# Patient Record
Sex: MALE | Race: WHITE | NOT HISPANIC OR LATINO | Employment: OTHER | URBAN - METROPOLITAN AREA
[De-identification: names, ages, dates, MRNs, and addresses within clinical notes are randomized per-mention and may not be internally consistent; named-entity substitution may affect disease eponyms.]

---

## 2017-01-03 DIAGNOSIS — E78.5 HYPERLIPIDEMIA LDL GOAL <70: Primary | ICD-10-CM

## 2017-01-03 NOTE — TELEPHONE ENCOUNTER
Lipitor     Last Written Prescription Date: 09/16/16  Last Fill Quantity: 90, # refills: 0  Last Office Visit with [facility] or  Health prescribing provider: 08/25/16   Next 5 appointments (look out 90 days)     Jan 19, 2017  2:45 PM   Return Visit with Harvey Rosas MD   [facility] ([facility])    [address]  [facility] [id number] [phone]                   CHOL      137   12/28/2015  HDL       58   12/28/2015  LDL       66   12/28/2015  TRIG       66   12/28/2015  CHOLHDLRATIO      3.5   8/19/2015    Labs showing if normal/abnormal  Lab Results   Component Value Date    CHOL 137 12/28/2015    TRIG 66 12/28/2015    HDL 58 12/28/2015    LDL 66 12/28/2015    VLDL 55* 08/19/2015    CHOLHDLRATIO 3.5 08/19/2015     Metformin         Last Written Prescription Date: 04/22/16  Last Fill Quantity: 180, # refills: 1  Last Office Visit with [facility] or  Health prescribing provider:  08/25/16 in IM and 12/22/16 Endo   Next 5 appointments (look out 90 days)     Jan 19, 2017  2:45 PM   Return Visit with Harvey Rosas MD   [facility] ([facility])    [address] [place] [address] [address]  [facility] [address] [phone]                   BP Readings from Last 3 Encounters:   12/22/16 132/68   11/02/16 126/68   09/08/16 132/78     MICROL       38   5/24/2016  No results found for this basename: microalbumin  CREATININE   Date Value Ref Range Status   05/24/2016 0.99 0.66 - 1.25 mg/dL Final   ]  GFR ESTIMATE   Date Value Ref Range Status   05/24/2016 76 >60 mL/min/1.7m2 Final     Comment:     Non  GFR Calc   04/22/2016 55* >60 mL/min/1.7m2 Final     Comment:     Non  GFR Calc   08/19/2015 79 >60 mL/min/1.7m2 Final     Comment:     Non  GFR Calc     GFR ESTIMATE IF BLACK   Date Value Ref Range Status   05/24/2016 >90   GFR Calc    >60 mL/min/1.7m2 Final   04/22/2016 67 >60 mL/min/1.7m2 Final     Comment:      GFR Calc   08/19/2015 >90   GFR Calc   >60 mL/min/1.7m2 Final     CHOL      137   12/28/2015  HDL       58   12/28/2015  LDL       66   12/28/2015  TRIG       66   12/28/2015  CHOLHDLRATIO      3.5   8/19/2015  AST       22   4/22/2016  ALT       51   4/22/2016  A1C     10.0   11/2/2016  A1C     12.9   6/27/2016  A1C     13.3   4/22/2016  A1C      7.1   11/10/2015  A1C      6.7   4/11/2015  POTASSIUM   Date Value Ref Range Status   05/24/2016 4.7 3.4 - 5.3 mmol/L Final       Labs showing if normal/abnormal  Lab Results   Component Value Date    UCRR 111 05/24/2016    MICROL 38 05/24/2016     Lab Results   Component Value Date    CHOL 137 12/28/2015    TRIG 66 12/28/2015    HDL 58 12/28/2015    LDL 66 12/28/2015    VLDL 55* 08/19/2015    CHOLHDLRATIO 3.5 08/19/2015     Lab Results   Component Value Date    A1C 10.0* 11/02/2016    A1C 12.9* 06/27/2016    A1C 13.3* 04/22/2016

## 2017-01-04 ENCOUNTER — MYC REFILL (OUTPATIENT)
Dept: INTERNAL MEDICINE | Facility: CLINIC | Age: 67
End: 2017-01-04

## 2017-01-04 DIAGNOSIS — E78.5 HYPERLIPIDEMIA LDL GOAL <70: ICD-10-CM

## 2017-01-04 DIAGNOSIS — R80.9 PROTEINURIA: ICD-10-CM

## 2017-01-04 DIAGNOSIS — R80.9 PROTEINURIA: Primary | ICD-10-CM

## 2017-01-04 RX ORDER — ATORVASTATIN CALCIUM 40 MG/1
40 TABLET, FILM COATED ORAL DAILY
Qty: 90 TABLET | Refills: 0 | Status: SHIPPED | OUTPATIENT
Start: 2017-01-04 | End: 2017-04-03

## 2017-01-04 RX ORDER — LISINOPRIL 5 MG/1
5 TABLET ORAL DAILY
Qty: 90 TABLET | Refills: 0 | Status: SHIPPED | OUTPATIENT
Start: 2017-01-04 | End: 2017-03-30

## 2017-01-04 NOTE — TELEPHONE ENCOUNTER
Last OV with PCP 7/12/16    Prescription approved per [facility].

## 2017-01-04 NOTE — TELEPHONE ENCOUNTER
LIsinopril      Last Written Prescription Date: 09/14/16  Last Fill Quantity: 90, # refills: 0  Last Office Visit with [facility] or [facility] prescribing provider: 12/22/16 Randa   Next 5 appointments (look out 90 days)     Jan 19, 2017  2:45 PM   Return Visit with Harvey Rosas MD   [facility] ([facility])    [address] [place] [id number] [phone]                   POTASSIUM   Date Value Ref Range Status   05/24/2016 4.7 3.4 - 5.3 mmol/L Final     CREATININE   Date Value Ref Range Status   05/24/2016 0.99 0.66 - 1.25 mg/dL Final     BP Readings from Last 3 Encounters:   12/22/16 132/68   11/02/16 126/68   09/08/16 132/78

## 2017-01-05 RX ORDER — LISINOPRIL 5 MG/1
5 TABLET ORAL DAILY
Qty: 90 TABLET | Refills: 0 | Status: CANCELLED | OUTPATIENT
Start: 2017-01-05

## 2017-01-05 RX ORDER — ATORVASTATIN CALCIUM 40 MG/1
40 TABLET, FILM COATED ORAL DAILY
Qty: 90 TABLET | Refills: 0 | Status: CANCELLED | OUTPATIENT
Start: 2017-01-05

## 2017-01-05 NOTE — TELEPHONE ENCOUNTER
Message from MyChart:  Original authorizing provider: MD Adan Canchola would like a refill of the following medications:  metFORMIN (GLUCOPHAGE) 1000 MG tablet [Lauro Galloway MD]  lisinopril (PRINIVIL,ZESTRIL) 5 MG tablet [Lauro Galloway MD]  atorvastatin (LIPITOR) 40 MG tablet [Lauro Galloway MD]    Preferred pharmacy: [facility] [address] [place] [address] [address] [place] [address]    Comment:  Have had refill in place from [facility] since 12/30. I am leaving on trip to [place] 01/06/17 7:30 am. Could I get these filled to  tonight or tomorrow. Thanks, Abraham Ruffin

## 2017-01-15 ENCOUNTER — MYC MEDICAL ADVICE (OUTPATIENT)
Dept: ENDOCRINOLOGY | Facility: CLINIC | Age: 67
End: 2017-01-15

## 2017-01-15 DIAGNOSIS — E11.21 TYPE 2 DIABETES MELLITUS WITH DIABETIC NEPHROPATHY, WITH LONG-TERM CURRENT USE OF INSULIN (H): Primary | ICD-10-CM

## 2017-01-15 DIAGNOSIS — Z79.4 TYPE 2 DIABETES MELLITUS WITH DIABETIC NEPHROPATHY, WITH LONG-TERM CURRENT USE OF INSULIN (H): Primary | ICD-10-CM

## 2017-01-16 ENCOUNTER — HOSPITAL ENCOUNTER (OUTPATIENT)
Dept: CARDIOLOGY | Facility: CLINIC | Age: 67
Discharge: HOME | End: 2017-01-16
Attending: INTERNAL MEDICINE | Admitting: INTERNAL MEDICINE
Payer: COMMERCIAL

## 2017-01-16 DIAGNOSIS — E78.2 MIXED HYPERLIPIDEMIA: ICD-10-CM

## 2017-01-16 DIAGNOSIS — I25.10 CORONARY ARTERY DISEASE INVOLVING NATIVE CORONARY ARTERY OF NATIVE HEART WITHOUT ANGINA PECTORIS: ICD-10-CM

## 2017-01-16 DIAGNOSIS — R07.89 OTHER CHEST PAIN: ICD-10-CM

## 2017-01-16 DIAGNOSIS — I10 ESSENTIAL HYPERTENSION: ICD-10-CM

## 2017-01-16 DIAGNOSIS — Z95.1 S/P CABG X 3: ICD-10-CM

## 2017-01-16 PROCEDURE — 93306 TTE W/DOPPLER COMPLETE: CPT

## 2017-01-16 PROCEDURE — 93306 TTE W/DOPPLER COMPLETE: CPT | Mod: 26 | Performed by: INTERNAL MEDICINE

## 2017-01-19 ENCOUNTER — OFFICE VISIT (OUTPATIENT)
Dept: CARDIOLOGY | Facility: CLINIC | Age: 67
End: 2017-01-19
Attending: INTERNAL MEDICINE
Payer: COMMERCIAL

## 2017-01-19 VITALS
HEART RATE: 74 BPM | DIASTOLIC BLOOD PRESSURE: 70 MMHG | HEIGHT: 69 IN | BODY MASS INDEX: 35.8 KG/M2 | WEIGHT: 241.7 LBS | SYSTOLIC BLOOD PRESSURE: 106 MMHG

## 2017-01-19 DIAGNOSIS — R07.89 OTHER CHEST PAIN: ICD-10-CM

## 2017-01-19 DIAGNOSIS — I10 ESSENTIAL HYPERTENSION: ICD-10-CM

## 2017-01-19 DIAGNOSIS — Z95.1 S/P CABG X 3: ICD-10-CM

## 2017-01-19 DIAGNOSIS — I25.10 CORONARY ARTERY DISEASE INVOLVING NATIVE CORONARY ARTERY OF NATIVE HEART WITHOUT ANGINA PECTORIS: ICD-10-CM

## 2017-01-19 DIAGNOSIS — E78.2 MIXED HYPERLIPIDEMIA: ICD-10-CM

## 2017-01-19 PROCEDURE — 99214 OFFICE O/P EST MOD 30 MIN: CPT | Performed by: INTERNAL MEDICINE

## 2017-01-19 NOTE — LETTER
1/19/2017    Lauro Galloway MD  [facility]   [address] [facility] [address]    RE: Adan Magallonrd       Dear Colleague,    I had the pleasure of seeing Adan Ruffin in the [facility].    Mr. Ruffin is a very pleasant 66-year-old gentleman with known history of coronary artery disease, hypertension and previous myocardial infarction.  He underwent 3-vessel coronary bypass surgery with a LIMA to the LAD, vein graft to the PDA and OM.  The surgery was complicated by RV dysfunction.  He has completed cardiac rehabilitation and has done well.      He comes to clinic today after a recent trip to [place].  He was active there, walking regularly with no difficulty.  He denies any chest pain, chest pressure, shortness of breath, heart racing, palpitations or edema.      Review of his echocardiogram from 01/16/2017 shows normal LV and RV function and normal valvular function.  Lipid profile has not been done within the past year.  His primary concern is that over erectile dysfunction which he has noted over the past year and a half period of time.     Outpatient Encounter Prescriptions as of 1/19/2017   Medication Sig Dispense Refill     atorvastatin (LIPITOR) 40 MG tablet Take 1 tablet (40 mg) by mouth daily 90 tablet 0     metFORMIN (GLUCOPHAGE) 1000 MG tablet Take 1 tablet (1,000 mg) by mouth 2 times daily (with meals) 180 tablet 1     lisinopril (PRINIVIL/ZESTRIL) 5 MG tablet Take 1 tablet (5 mg) by mouth daily 90 tablet 0     insulin glargine U-300 (TOUJEO) 300 UNIT/ML injection Inject 70 units in am, inject 60 units in pm 3 Month 3     insulin aspart (NOVOLOG PENFILL) 100 UNIT/ML injection Inject 3 times daily (before meals) 12-27 units per meal per carb count. About 90 units/day 3 Month 1     metoprolol (TOPROL-XL) 25 MG 24 hr tablet Take 1.5 tablets (37.5 mg) by mouth 2 times daily 270 tablet 1     glipiZIDE (GLIPIZIDE XL) 5 MG 24 hr tablet Take 1 tablet (5  mg) by mouth daily 90 tablet 1     sertraline (ZOLOFT) 100 MG tablet Take 1 tablet (100 mg) by mouth daily 90 tablet 1     aspirin  MG EC tablet Take 1 tablet (325 mg) by mouth daily 60 tablet 3     Multiple Vitamins-Minerals (CENTRUM SILVER) per tablet Take 1 tablet by mouth daily       vitamin  B complex with vitamin C (VITAMIN  B COMPLEX) TABS Take 2 tablets by mouth daily       order for DME Equipment being ordered: blood glucose meter 1 each 0     order for DME All DM testing supplies including test strips, lancets, solution, syringes, needles and/or pen needles for testing 3 times per day & injecting 3 times per day.      RX goes to  Network Supplier, see tele enc 09/21/2016 1 Box 3     order for DME All DM testing supplies including test strips, lancets, solution, syringes, needles and/or pen needles for testing 4 times per day & injecting 5 times per day.  Dispense glucometer compatible supplies. Accucheck Geena. 3 Month 3     insulin pen needle (NOVOFINE) 30G X 8 MM Use 4-5 daily or as directed. 300 each 1     blood glucose monitoring (NO BRAND SPECIFIED) test strip Use to test blood sugars 4 times daily or as directed 6 Box 1     ACE NOT PRESCRIBED, INTENTIONAL, 1 each daily ACE Inhibitor not prescribed due to Intolerance 0 each 0     No facility-administered encounter medications on file as of 1/19/2017.       ASSESSMENT AND PLAN:  Overall, Mr. Ruffin is doing well from a cardiovascular standpoint.  He has no evidence of angina pectoris, congestive heart failure or cardiac arrhythmias.  He is due for a lipid profile from Dr. Galloway and he will make an appointment today to get this drawn in the near future.  Regarding his erectile dysfunction, most likely secondary to diabetes.  Other possibilities include peripheral vascular disease.  He has no evidence of heart failure.  Other possibility is that of beta blocker.  I am recommending that we do a temporary trial off of beta blockers as he is now  greater than 1 year out from his myocardial infarction and has no evidence of angina or heart failure.  If this significantly improves his ED, than I am okay with him being off beta blockers.  If not, I would prefer him being back on beta blockers.  Further evaluation could be outlined including seeing a urologist, a peripheral vascular workup etc. should his beta blocker does cessation not help with his situation.  We will plan on seeing him back in 1 year's time or sooner should symptoms warrant.     Again, thank you for allowing me to participate in the care of your patient.      Sincerely,    LEWIS BYRNE MD     [facility]

## 2017-01-19 NOTE — PROGRESS NOTES
HPI and Plan:   See dictation    No orders of the defined types were placed in this encounter.     No orders of the defined types were placed in this encounter.     There are no discontinued medications.      Encounter Diagnoses   Name Primary?     Mixed hyperlipidemia      Essential hypertension      S/P CABG x 3      Coronary artery disease involving native coronary artery of native heart without angina pectoris      Other chest pain        CURRENT MEDICATIONS:  Current Outpatient Prescriptions   Medication Sig Dispense Refill     atorvastatin (LIPITOR) 40 MG tablet Take 1 tablet (40 mg) by mouth daily 90 tablet 0     metFORMIN (GLUCOPHAGE) 1000 MG tablet Take 1 tablet (1,000 mg) by mouth 2 times daily (with meals) 180 tablet 1     lisinopril (PRINIVIL/ZESTRIL) 5 MG tablet Take 1 tablet (5 mg) by mouth daily 90 tablet 0     insulin glargine U-300 (TOUJEO) 300 UNIT/ML injection Inject 70 units in am, inject 60 units in pm 3 Month 3     insulin aspart (NOVOLOG PENFILL) 100 UNIT/ML injection Inject 3 times daily (before meals) 12-27 units per meal per carb count. About 90 units/day 3 Month 1     metoprolol (TOPROL-XL) 25 MG 24 hr tablet Take 1.5 tablets (37.5 mg) by mouth 2 times daily 270 tablet 1     glipiZIDE (GLIPIZIDE XL) 5 MG 24 hr tablet Take 1 tablet (5 mg) by mouth daily 90 tablet 1     sertraline (ZOLOFT) 100 MG tablet Take 1 tablet (100 mg) by mouth daily 90 tablet 1     aspirin  MG EC tablet Take 1 tablet (325 mg) by mouth daily 60 tablet 3     Multiple Vitamins-Minerals (CENTRUM SILVER) per tablet Take 1 tablet by mouth daily       vitamin  B complex with vitamin C (VITAMIN  B COMPLEX) TABS Take 2 tablets by mouth daily       order for DME Equipment being ordered: blood glucose meter 1 each 0     order for DME All DM testing supplies including test strips, lancets, solution, syringes, needles and/or pen needles for testing 3 times per day & injecting 3 times per day.      RX goes to  Network  Supplier, see tele enc 09/21/2016 1 Box 3     order for DME All DM testing supplies including test strips, lancets, solution, syringes, needles and/or pen needles for testing 4 times per day & injecting 5 times per day.  Dispense glucometer compatible supplies. Accucheck Geena. 3 Month 3     insulin pen needle (NOVOFINE) 30G X 8 MM Use 4-5 daily or as directed. 300 each 1     blood glucose monitoring (NO BRAND SPECIFIED) test strip Use to test blood sugars 4 times daily or as directed 6 Box 1     ACE NOT PRESCRIBED, INTENTIONAL, 1 each daily ACE Inhibitor not prescribed due to Intolerance 0 each 0       ALLERGIES     Allergies   Allergen Reactions     Codeine Rash     Simvastatin Other (See Comments)     Leg pain       PAST MEDICAL HISTORY:  Past Medical History   Diagnosis Date     Chest pain on exertion      HTN (hypertension)      Hyperlipidaemia      Former smoker, stopped smoking in distant past      Quit 10 yrs ago, 1-2 PPD x 20+ yrs     CAD (coronary artery disease) 3/13/2015     Obesity 3/13/2015     History of angina      Diabetes mellitus (H)      Insulin & Metformin...diagnosed in 1996     Diabetes mellitus, type 2 (H) 8/21/2015     Microalbuminuria 6/27/2016     Encounter for long-term (current) use of insulin (H) 11/2/2016       PAST SURGICAL HISTORY:  Past Surgical History   Procedure Laterality Date     Excise pilonidal cyst, simple  1975     Removal of skin cancer  1992     Tonsillectomy & adenoidectomy  1950     Heart cath, angioplasty  3-     3 vessel disease-occluded RCA, stenosis LM-to have CABG     Bypass graft artery coronary N/A 4/7/2015     Procedure: BYPASS GRAFT ARTERY CORONARY;  Surgeon: Johan Hall MD;  Location:  OR     Colonoscopy N/A 6/20/2016     Procedure: COLONOSCOPY;  Surgeon: Marcela Schwartz MD;  Location:  GI       FAMILY HISTORY:  Family History   Problem Relation Age of Onset     Hyperlipidemia Father      Breast Cancer Mother      CEREBROVASCULAR  "DISEASE Mother      CANCER Father      lung, unrelated to smoking     CANCER Paternal Grandmother        SOCIAL HISTORY:  Social History     Social History     Marital Status: Single     Spouse Name: N/A     Number of Children: N/A     Years of Education: N/A     Social History Main Topics     Smoking status: Former Smoker -- 1.50 packs/day for 29 years     Types: Cigarettes     Quit date: 01/01/2000     Smokeless tobacco: Never Used     Alcohol Use: 0.0 oz/week     0 Standard drinks or equivalent per week      Comment: 1 mixed drink a night     Drug Use: No     Sexual Activity:     Partners: Female     Other Topics Concern     Caffeine Concern No     none     Sleep Concern No     Stress Concern Yes     sometimes at home     Weight Concern Yes     would like to lose weight     Special Diet No     Exercise No     Seat Belt Yes     Social History Narrative       Review of Systems:  Skin:  Negative     Eyes:  Positive for glasses  ENT:  Negative    Respiratory:  Positive for cough  Cardiovascular:  Negative    Gastroenterology: Negative    Genitourinary:  Negative    Musculoskeletal:  Positive for joint pain  Neurologic:  Positive for numbness or tingling of hands  Psychiatric:  Positive for anxiety  Heme/Lymph/Imm:  Positive for allergies  Endocrine:  Positive for diabetes    Physical Exam:  Vitals: /70 mmHg  Pulse 74  Ht 1.753 m (5' 9\")  Wt 109.634 kg (241 lb 11.2 oz)  BMI 35.68 kg/m2    Constitutional:  cooperative, alert and oriented, well developed, well nourished, in no acute distress        Skin:  warm and dry to the touch, no apparent skin lesions or masses noted surgical scars well-healed      Head:  no masses or lesions        Eyes:  conjunctivae and lids unremarkable;sclera white        ENT:  dentition good        Neck:  carotid pulses are full and equal bilaterally, JVP normal, no carotid bruit, no thyromegaly        Chest:  normal breath sounds, clear to auscultation, normal A-P diameter, normal " symmetry, normal respiratory excursion, no use of accessory muscles        Cardiac: regular rhythm, normal S1/S2, no S3 or S4, apical impulse not displaced, no murmurs, gallops or rubs                  Abdomen:           Vascular:                                        Extremities and Back:  no deformities, clubbing, cyanosis, erythema observed        Neurological:  affect appropriate, oriented to time, person and place          Recent Lab Results:  LIPID RESULTS:  Lab Results   Component Value Date    CHOL 137 12/28/2015    HDL 58 12/28/2015    LDL 66 12/28/2015    TRIG 66 12/28/2015    CHOLHDLRATIO 3.5 08/19/2015       LIVER ENZYME RESULTS:  Lab Results   Component Value Date    AST 22 04/22/2016    ALT 51 04/22/2016       CBC RESULTS:  Lab Results   Component Value Date    WBC 8.4 08/19/2015    RBC 5.02 08/19/2015    HGB 13.2* 08/19/2015    HCT 40.6 08/19/2015    MCV 81 08/19/2015    MCH 26.3* 08/19/2015    MCHC 32.5 08/19/2015    RDW 16.5* 08/19/2015     08/19/2015       BMP RESULTS:  Lab Results   Component Value Date     05/24/2016    POTASSIUM 4.7 05/24/2016    CHLORIDE 105 05/24/2016    CO2 26 05/24/2016    ANIONGAP 6 05/24/2016    * 05/24/2016    BUN 28 05/24/2016    CR 0.99 05/24/2016    GFRESTIMATED 76 05/24/2016    GFRESTBLACK >90   GFR Calc   05/24/2016    AURE 9.9 05/24/2016        A1C RESULTS:  Lab Results   Component Value Date    A1C 10.0* 11/02/2016       INR RESULTS:  Lab Results   Component Value Date    INR 1.30* 04/07/2015    INR 1.39* 04/07/2015           CC  Harvey Rosas MD   [facility]  [address]  [place] [id number]

## 2017-01-20 NOTE — PROGRESS NOTES
HISTORY OF PRESENT ILLNESS:  Mr. Ruffin is a very pleasant 66-year-old gentleman with known history of coronary artery disease, hypertension and previous myocardial infarction.  He underwent 3-vessel coronary bypass surgery with a LIMA to the LAD, vein graft to the PDA and OM.  The surgery was complicated by RV dysfunction.  He has completed cardiac rehabilitation and has done well.      He comes to clinic today after a recent trip to [place].  He was active there, walking regularly with no difficulty.  He denies any chest pain, chest pressure, shortness of breath, heart racing, palpitations or edema.      Review of his echocardiogram from 01/16/2017 shows normal LV and RV function and normal valvular function.  Lipid profile has not been done within the past year.  His primary concern is that over erectile dysfunction which he has noted over the past year and a half period of time.      ASSESSMENT AND PLAN:  Overall, Mr. Ruffin is doing well from a cardiovascular standpoint.  He has no evidence of angina pectoris, congestive heart failure or cardiac arrhythmias.  He is due for a lipid profile from Dr. Galloway and he will make an appointment today to get this drawn in the near future.  Regarding his erectile dysfunction, most likely secondary to diabetes.  Other possibilities include peripheral vascular disease.  He has no evidence of heart failure.  Other possibility is that of beta blocker.  I am recommending that we do a temporary trial off of beta blockers as he is now greater than 1 year out from his myocardial infarction and has no evidence of angina or heart failure.  If this significantly improves his ED, than I am okay with him being off beta blockers.  If not, I would prefer him being back on beta blockers.  Further evaluation could be outlined including seeing a urologist, a peripheral vascular workup etc. should his beta blocker does cessation not help with his situation.  We will plan on seeing him back  in 1 year's time or sooner should symptoms warrant.      Harvey Byrne MD, FACC       cc:   Lauro Galloway MD   [facility] [address]   [place]  [address]         HARVEY BYRNE MD [facility]             D: 2017 15:06   T: 2017 10:14   MT:       Name:     EDUARDO PARKINSON   MRN:      [id number]        Account:      [id number]   :      1950           Service Date: 2017      Document: [id number]

## 2017-01-23 ENCOUNTER — ALLIED HEALTH/NURSE VISIT (OUTPATIENT)
Dept: EDUCATION SERVICES | Facility: CLINIC | Age: 67
End: 2017-01-23
Payer: COMMERCIAL

## 2017-01-23 DIAGNOSIS — Z79.4 TYPE 2 DIABETES MELLITUS WITH DIABETIC NEPHROPATHY, WITH LONG-TERM CURRENT USE OF INSULIN (H): Primary | ICD-10-CM

## 2017-01-23 DIAGNOSIS — E11.21 TYPE 2 DIABETES MELLITUS WITH DIABETIC NEPHROPATHY, WITH LONG-TERM CURRENT USE OF INSULIN (H): Primary | ICD-10-CM

## 2017-01-23 PROCEDURE — G0108 DIAB MANAGE TRN  PER INDIV: HCPCS

## 2017-01-23 NOTE — PROGRESS NOTES
Diabetes Self Management Training: Professional Continuous Glucose Monitor Insertion    SUBJECTIVE:   Adan Ruffin is accompanied by self    Patient concerns: I don't think I am having good blood sugars    OBJECTIVE:    Lab Results:  A1C     10.0   11/2/2016   GLC      202   5/24/2016  HDL       58   12/28/2015    Vitals:  There were no vitals taken for this visit.    ASSESSMENT:    DexCom sensor started today.  DexCom sensor (Lot # [id number], Expiration date 9/21/17) was inserted with no resistance or bleeding at insertion site.    Pt will plan to wear the sensor through  1/27/17    WRITTEN AND VERBAL INFORMATION GIVEN TO SUPPORT UNDERSTANDING OF:  DexCom CGM: Charge  for only 1-3 hours at most when low battery indicator appears. Sensor insertion, intention of monitoring for 7 days and avoiding swimming more than 30 minutes. SMBG at least 2 times after 2-3 hours wearing initially, then at least 1 time every 12 hours, enter events of food intake, medications/insulin, exercise, hypoglycemia. Understanding of program screens, alarms, use of buttons, graphs available for viewing, sample sensor reports, trouble shooting, emergency contact, removal of sensor, stop sensor when prompted, need to leave sensor unit and data collection sheets at clinic at designated time/date.    Can purchase 3M medical tape if more tape necessary for adhesion.       Patient verbalizes understanding of how to remove sensor and all instructions provided.     Educational and other materials:  Food/exercise/medication log sheets  Contact information  Return Check List    PLAN:  Pt was given instructions for tracking BG, medications, food intake and activity.    Follow-up:    Patient to return all items associated with professional Continuous Glucose Monitor System to the [facility] by 1/31/17.     VARSHA Junior CDE  Time Spent: 30 minutes  Encounter Type: Individual

## 2017-01-30 ENCOUNTER — VIRTUAL VISIT (OUTPATIENT)
Dept: EDUCATION SERVICES | Facility: CLINIC | Age: 67
End: 2017-01-30

## 2017-01-30 DIAGNOSIS — E11.21 TYPE 2 DIABETES MELLITUS WITH DIABETIC NEPHROPATHY, WITH LONG-TERM CURRENT USE OF INSULIN (H): Primary | ICD-10-CM

## 2017-01-30 DIAGNOSIS — Z79.4 TYPE 2 DIABETES MELLITUS WITH DIABETIC NEPHROPATHY, WITH LONG-TERM CURRENT USE OF INSULIN (H): Primary | ICD-10-CM

## 2017-01-30 NOTE — PROGRESS NOTES
Dexcom Professional Continuous Glucose Monitor Interpretation     Patient History:   1. Type of Diabetes: Type 2 diabetes  2. Duration of diabetes or year of diagnosis: 20 years ago  3. Current treatment regimen (include all diabetes medications, dose & dosing frequency/timing): Oral Medications: Glipizide - Dose: 5 mg, Time: morning and Metformin - Dose: 1000, Time: breakfast and supper, Injectable Medications: Toujeo 70-0-0-60 and NovoLog Insulin 30-35 units TID (?)  *Abbreviated insulin dose documentation key: Insulin Trade Name (ftveexolb-julvt-lyibmp-bedtime) - i.e. Humalog 5-5-5-0 (Humalog 5 units at breakfast, 5 units at lunch, and 5 units at dinner).  4. Most Recent A1c Result:  A1C     10.0   2016  5. Indication/s for Dexcom study: Unexplained fluctuations in glucose values and Pre-pump glucose study.    Statistics:   1. Total number of sensor values is 1074. This averages to 288 values on each of the full days. 288 per day is ideal (the first and last day generally provide half or less of the usual number of readings because these are typically not full days).  The test is not optimal if <50% of the readings are available per day.  2. Number of meter values and paired readings is adequate.  Less than 2 (q 12 hours) paired readings per day may be associated with a less reliable test.  3. Standard deviation is: 48.  Standard deviation (SD) indicates how variable sensor readings are.  Patients who do not have diabetes may have a SD around 20, while someone with suboptimal diabetes control may have a SD of 60 or more.  4. Glucose excursions:     Percent in target is: 36%  Percent above target is: 61%  Percent below target is: 3%                        Data evaluation:   1. Sensor modal day evaluation shows the followin. Consistent day-to-day patterns noted: pattern of daytime hyperglycemia.  2. Average blood sugar: 149 mg/dL.  3. The overnight ranges from  mg/dL. The average BG on all nights  ranges from 119-166 mg/dL.  4. The premeal is usually above the target range.   5. The postmeal is above the target range.  Patient's Logbook shows the following:   Carbohydrate counting is: accurate  Medication and/or insulin dosing is: inaccurate     Assessment and Plan:  Current regimen is 62% long acting insulin, 38% short acting    Adan stated that he uses an insulin:carb to dose his insulin, however in this report he really only used ~30-35 units of Novolog as a set dose of insulin, no matter his carb intake. At times, he would check his blood sugar, give 30 units of Novolog, and then 1-2 hours later eat carbs with a meal. Sometimes he took his insulin right before eating, but most of the time he ate at least 30 minutes to an hour after his dose.     Also noted over correction of low blood sugars, resulting in hyperglycemia. Will discuss low blood sugar protocols.   Noted that he takes his evening meds without food - question if he is taking his Metformin at night without food.     Would recommend that he returns to an insulin to carb ratio with a correction scale, and teaching him the importance to checking and dosing his insulin before meals.     Total daily insulin dose = ~207 units on average  Rule of 450 = 2.17 --> will start with 1 unit:5 grams carbohydrate  Rule of 1800 = 8 --> will start with 1:10 >140  Decrease Toujeo by 5% to help with the increase in bolus insulin --> now take 65 units AM and 60 units PM     VARSHA Junior CDE  Time Spent: 60 minutes  Any diabetes medication dose changes were made via the CDE Protocol and Collaborative Practice Agreement with the patient's endocrinology provider. A copy of this encounter was shared with the provider.

## 2017-01-30 NOTE — Clinical Note
Please see scanned continuous glucose monitoring (CGM) reports, interpretation and recommendations. As a provider, you can bill for a non face-to-face interpretation of the sensor report. If you feel it is appropriate, please create a 'Documentation Only' encounter noting the interpretation and your recommended plan and bill for this glucose sensor interpretation using code [id number]. VARSHA Junior CDE

## 2017-01-30 NOTE — MR AVS SNAPSHOT
After Visit Summary   1/30/2017    Adan Ruffin    MRN: [id number]           Patient Information     Date Of Birth          1950        Visit Information        Provider Department      1/30/2017 9:30 AM [facility] DIABETIC ED RESOURCE [facility]        Today's Diagnoses     Type 2 diabetes mellitus with diabetic nephropathy, with long-term current use of insulin (H)    -  1       Follow-ups after your visit        Your next 10 appointments already scheduled     Apr 19, 2017  9:00 AM CDT   Return Visit with Marilee Tolentino MD   [facility] ([facility])    [address] [facility] [address]  [facility] [address] [phone]              Who to contact     If you have questions or need follow up information about today's clinic visit or your schedule please contact [facility] directly at [phone].  Normal or non-critical lab and imaging results will be communicated to you by MyChart, letter or phone within 4 business days after the clinic has received the results. If you do not hear from us within 7 days, please contact the clinic through [employer] or phone. If you have a critical or abnormal lab result, we will notify you by phone as soon as possible.  Submit refill requests through [employer] or call your pharmacy and they will forward the refill request to us. Please allow 3 business days for your refill to be completed.          Additional Information About Your Visit        MyChart Information     [employer] gives you secure access to your electronic health record. If you see a primary care provider, you can also send messages to your care team and make appointments. If you have questions, please call your primary care clinic.  If you do not have a primary care provider, please call [phone] and they will assist you.        Care EveryWhere ID     This is your Care EveryWhere ID. This could be used by other organizations to  access your [facility] medical records  [id number]         Blood Pressure from Last 3 Encounters:   03/08/17 130/70   01/19/17 106/70   12/22/16 132/68    Weight from Last 3 Encounters:   03/08/17 247 lb (112 kg)   01/19/17 241 lb 11.2 oz (109.6 kg)   12/22/16 242 lb 6.4 oz (110 kg)              Today, you had the following     No orders found for display       Primary Care Provider Office Phone # Fax #    Lauro Galloway [phone]       [facility] [address] [facility] [address]        Thank you!     Thank you for choosing [facility]  for your care. Our goal is always to provide you with excellent care. Hearing back from our patients is one way we can continue to improve our services. Please take a few minutes to complete the written survey that you may receive in the mail after your visit with us. Thank you!             Your Updated Medication List - Protect others around you: Learn how to safely use, store and throw away your medicines at www.disposemymeds.org.          This list is accurate as of: 1/30/17 11:59 PM.  Always use your most recent med list.                   Brand Name Dispense Instructions for use    ACE NOT PRESCRIBED (INTENTIONAL)     0 each    1 each daily ACE Inhibitor not prescribed due to Intolerance       aspirin 325 MG EC tablet     60 tablet    Take 1 tablet (325 mg) by mouth daily       blood glucose monitoring test strip    no brand specified    6 Box    Use to test blood sugars 4 times daily or as directed       CENTRUM SILVER per tablet      Take 1 tablet by mouth daily       glipiZIDE 5 MG 24 hr tablet    glipiZIDE XL    90 tablet    Take 1 tablet (5 mg) by mouth daily       insulin pen needle 30G X 8 MM    NOVOFINE    300 each    Use 4-5 daily or as directed.       metFORMIN 1000 MG tablet    GLUCOPHAGE    180 tablet    Take 1 tablet (1,000 mg) by mouth 2 times daily (with meals)       metoprolol 25 MG 24 hr tablet     TOPROL-XL    270 tablet    Take 1.5 tablets (37.5 mg) by mouth 2 times daily       * order for DME     3 Month    All DM testing supplies including test strips, lancets, solution, syringes, needles and/or pen needles for testing 4 times per day & injecting 5 times per day. Dispense glucometer compatible supplies. Accucheck Geena.       * order for DME     1 Box    All DM testing supplies including test strips, lancets, solution, syringes, needles and/or pen needles for testing 3 times per day & injecting 3 times per day.   RX goes to  [facility] Supplier, see tele enc 09/21/2016       order for DME     1 each    Equipment being ordered: blood glucose meter       sertraline 100 MG tablet    ZOLOFT    90 tablet    Take 1 tablet (100 mg) by mouth daily       vitamin B complex with vitamin C Tabs tablet      Take 2 tablets by mouth daily       * Notice:  This list has 2 medication(s) that are the same as other medications prescribed for you. Read the directions carefully, and ask your doctor or other care provider to review them with you.

## 2017-01-31 ENCOUNTER — DOCUMENTATION ONLY (OUTPATIENT)
Dept: ENDOCRINOLOGY | Facility: CLINIC | Age: 67
End: 2017-01-31
Payer: COMMERCIAL

## 2017-01-31 DIAGNOSIS — E11.21 TYPE 2 DIABETES MELLITUS WITH DIABETIC NEPHROPATHY, WITH LONG-TERM CURRENT USE OF INSULIN (H): Primary | ICD-10-CM

## 2017-01-31 DIAGNOSIS — Z79.4 TYPE 2 DIABETES MELLITUS WITH DIABETIC NEPHROPATHY, WITH LONG-TERM CURRENT USE OF INSULIN (H): Primary | ICD-10-CM

## 2017-01-31 PROCEDURE — 95251 CONT GLUC MNTR ANALYSIS I&R: CPT | Performed by: INTERNAL MEDICINE

## 2017-01-31 NOTE — PROGRESS NOTES
Dexcom Professional Continuous Glucose Monitor Interpretation      Patient History:    1. Type of Diabetes: Type 2 diabetes  2. Duration of diabetes or year of diagnosis: 20 years ago  3. Current treatment regimen (include all diabetes medications, dose & dosing frequency/timing): Oral Medications: Glipizide - Dose: 5 mg, Time: morning and Metformin - Dose: 1000, Time: breakfast and supper, Injectable Medications: Toujeo 70-0-0-60 and NovoLog Insulin 30-35 units TID (?)  *Abbreviated insulin dose documentation key: Insulin Trade Name (eiqabpvxl-vqwdq-xmpldy-bedtime) - i.e. Humalog 5-5-5-0 (Humalog 5 units at breakfast, 5 units at lunch, and 5 units at dinner).  4. Most Recent A1c Result:  A1C     10.0   2016  5. Indication/s for Dexcom study: Unexplained fluctuations in glucose values and Pre-pump glucose study.    Statistics:    1. Total number of sensor values is 1074. This averages to 288 values on each of the full days. 288 per day is ideal (the first and last day generally provide half or less of the usual number of readings because these are typically not full days).  The test is not optimal if <50% of the readings are available per day.  2. Number of meter values and paired readings is adequate.  Less than 2 (q 12 hours) paired readings per day may be associated with a less reliable test.  3. Standard deviation is: 48.  Standard deviation (SD) indicates how variable sensor readings are.  Patients who do not have diabetes may have a SD around 20, while someone with suboptimal diabetes control may have a SD of 60 or more.  4. Glucose excursions:    Percent in target is: 36%  Percent above target is: 61%  Percent below target is: 3%                        Data evaluation:    1. Sensor modal day evaluation shows the followin. Consistent day-to-day patterns noted: pattern of daytime hyperglycemia.  2. Average blood sugar: 149 mg/dL.  3. The overnight ranges from  mg/dL. The average BG on all  nights ranges from 119-166 mg/dL.  4. The premeal is usually above the target range.    5. The postmeal is above the target range.  Patient's Logbook shows the following:    Carbohydrate counting is: accurate  Medication and/or insulin dosing is: inaccurate     Assessment and Plan:  Current regimen is 62% long acting insulin, 38% short acting    Adan stated that he uses an insulin:carb to dose his insulin, however in this report he really only used ~30-35 units of Novolog as a set dose of insulin, no matter his carb intake. At times, he would check his blood sugar, give 30 units of Novolog, and then 1-2 hours later eat carbs with a meal. Sometimes he took his insulin right before eating, but most of the time he ate at least 30 minutes to an hour after his dose.     Also noted over correction of low blood sugars, resulting in hyperglycemia. Will discuss low blood sugar protocols.    Noted that he takes his evening meds without food - question if he is taking his Metformin at night without food.     Would recommend that he returns to an insulin to carb ratio with a correction scale, and teaching him the importance to checking and dosing his insulin before meals.     Total daily insulin dose = ~207 units on average  Rule of 450 = 2.17 --> will start with 1 unit:5 grams carbohydrate  Rule of 1800 = 8 --> will start with 1:10 >140  Decrease Toujeo by 5% to help with the increase in bolus insulin --> now take 65 units AM and 60 units PM     CGM data reviewed.  Diabetic Educator Assessment and plan reviewed.    I agree with CGM data assessment and plan.    Marilee Tolentino

## 2017-02-06 ENCOUNTER — MYC MEDICAL ADVICE (OUTPATIENT)
Dept: INTERNAL MEDICINE | Facility: CLINIC | Age: 67
End: 2017-02-06

## 2017-02-14 ENCOUNTER — ALLIED HEALTH/NURSE VISIT (OUTPATIENT)
Dept: EDUCATION SERVICES | Facility: CLINIC | Age: 67
End: 2017-02-14
Payer: COMMERCIAL

## 2017-02-14 DIAGNOSIS — Z79.4 TYPE 2 DIABETES MELLITUS WITH DIABETIC NEPHROPATHY, WITH LONG-TERM CURRENT USE OF INSULIN (H): ICD-10-CM

## 2017-02-14 DIAGNOSIS — E11.21 TYPE 2 DIABETES MELLITUS WITH DIABETIC NEPHROPATHY, WITH LONG-TERM CURRENT USE OF INSULIN (H): ICD-10-CM

## 2017-02-14 PROCEDURE — G0108 DIAB MANAGE TRN  PER INDIV: HCPCS

## 2017-02-14 NOTE — PATIENT INSTRUCTIONS
Changes to insulin dosing today:    For Novolog:   ALWAYS take your Novolog BEFORE you eat  For Food: Take 1 unit for every 5 grams of carbohydrate PLUS  For Blood Sugar: 1 unit for every 10 mg/dL you are above 140 mg/dL    If your blood sugar is Add this amount of insulin   141-150 1 unit   151-160 2 units   161-170 3 units   171-180 4 units   181-190 5 units   191-200 6 units   201-210 7 units   211-220 8 units   221-230 9 units   231-240 10 units     Decrease Toujeo to 65 units AM and 60 units PM     Also, get into the routine of testing blood sugars before each meal, so that you can be successful in this new insulin regimen     Always take Metformin with food, not on an empty stomach    You can email me within a week of making this change to make further adjustments -- it would be ideal for you to test 2 hours after a meal as well to see how well this insulin to carb ratio is working.

## 2017-02-14 NOTE — PROGRESS NOTES
Diabetes Self Management Training: Follow-up Visit    Adan LUI Ruffin presents today for review of continuous glucose monitoring related to Type 2 diabetes.    He is accompanied by self    Patient's diabetes management related comments/concerns: my numbers seem too high    Patient's emotional response to diabetes: expresses readiness to learn and concern for health and well-being    Patient would like this visit to be focused around the following diabetes-related behaviors and goals: Taking Medication    ASSESSMENT:  Patient Problem List and Family Medical History reviewed for relevant medical history, current medical status, and diabetes risk factors.    Current Diabetes Management per Patient:  Taking diabetes medications?   yes:     Diabetes Medication(s)     Biguanides Sig    metFORMIN (GLUCOPHAGE) 1000 MG tablet Take 1 tablet (1,000 mg) by mouth 2 times daily (with meals)    Insulin Sig    insulin glargine U-300 (TOUJEO) 300 UNIT/ML injection Inject 70 units in am, inject 60 units in pm    insulin aspart (NOVOLOG PENFILL) 100 UNIT/ML injection Inject 3 times daily (before meals) 12-27 units per meal per carb count. About 90 units/day    Sulfonylureas Sig    glipiZIDE (GLIPIZIDE XL) 5 MG 24 hr tablet Take 1 tablet (5 mg) by mouth daily          *Abbreviated insulin dose documentation key: Insulin Trade Name (xakthavrd-pstft-vosvov-bedtime) - i.e. Humalog 5-5-5-0 (Humalog 5 units at breakfast, 5 units at lunch, and 5 units at dinner).    Past Diabetes Education: Yes    Patient glucose self monitoring as follows: three times daily.   (See CGM download for review)    BG values are: Not in goal  Patient's most recent   Lab Results   Component Value Date    A1C 10.0 11/02/2016    is not meeting goal of <7.0    Nutrition:  (See CGM download for review)    Physical Activity:    Going to the gym a few days a week    Diabetes Complications:  Acute Complications: At risk for hypoglycemia? yes  Symptoms of low blood sugar?  "sweating and shaking  Frequency of hypoglycemia: 1-2x/week    Vitals:  There were no vitals taken for this visit.  Estimated body mass index is 35.69 kg/(m^2) as calculated from the following:    Height as of 1/19/17: 1.753 m (5' 9\").    Weight as of 1/19/17: 109.6 kg (241 lb 11.2 oz).   Last 3 BP:   BP Readings from Last 3 Encounters:   01/19/17 106/70   12/22/16 132/68   11/02/16 126/68       History   Smoking Status     Former Smoker     Packs/day: 1.50     Years: 29.00     Types: Cigarettes     Quit date: 1/1/2000   Smokeless Tobacco     Never Used       Labs:  Lab Results   Component Value Date    A1C 10.0 11/02/2016     Lab Results   Component Value Date     05/24/2016     Lab Results   Component Value Date    LDL 66 12/28/2015     HDL Cholesterol   Date Value Ref Range Status   12/28/2015 58 >39 mg/dL Final   ]  GFR Estimate   Date Value Ref Range Status   05/24/2016 76 >60 mL/min/1.7m2 Final     Comment:     Non  GFR Calc     GFR Estimate If Black   Date Value Ref Range Status   05/24/2016 >90   GFR Calc   >60 mL/min/1.7m2 Final     Lab Results   Component Value Date    CR 0.99 05/24/2016     No results found for: MICROALBUMIN    Socio/Economic Considerations:    Support system: spouse/significant other    Health Beliefs and Attitudes:   Patient Activation Measure Survey Score:  KRZYSZTOF Score (Last Two) 4/22/2016   KRZYSZTOF Raw Score 48   Activation Score 80   KRZYSZTOF Level 4       Stage of Change: PREPARATION (Decided to change - considering how)      Diabetes knowledge and skills assessment:     Patient is knowledgeable in diabetes management concepts related to: Healthy Eating, Being Active, Monitoring, Taking Medication and Problem Solving    Patient needs further education on the following diabetes management concepts: Reducing Risks    Barriers to Learning Assessment: No Barriers identified    Based on learning assessment above, most appropriate setting for further diabetes " education would be: Group class or Individual setting.    INTERVENTION:   Education provided today on:  AADE Self-Care Behaviors:  Healthy Eating: carbohydrate counting and consistency in amount, composition, and timing of food intake  Being Active: relationship to blood glucose  Taking Medication: action of prescribed medication, side effects of prescribed medications and when to take medications  Problem Solving: low blood glucose - causes, signs/symptoms, treatment and prevention and carrying a carbohydrate source at all times  Healthy Coping: benefits of making appropriate lifestyle changes    Opportunities for ongoing education and support in diabetes-self management were discussed.    Pt verbalized understanding of concepts discussed and recommendations provided today.       Education Materials Provided:  No new materials provided today    PLAN:  See Patient Instructions for co-developed, patient-stated behavior change goals.  AVS printed and provided to patient today.    FOLLOW-UP:  Follow-up planned for BG review by phone/e-mail/[place].   Chart routed to referring provider.    Ongoing plan for education and support: Written resources (magazines, books, etc.)    VARSHA Junior CDE  Time Spent: 30 minutes  Encounter Type: Individual    Any diabetes medication dose changes were made via the CDE Protocol and Collaborative Practice Agreement with the patient's endocrinology provider. A copy of this encounter was shared with the provider.

## 2017-02-14 NOTE — MR AVS SNAPSHOT
After Visit Summary   2/14/2017    Adan Ruffin    MRN: [id number]           Patient Information     Date Of Birth          1950        Visit Information        Provider Department      2/14/2017 8:30 AM [facility] DIABETIC ED RESOURCE [facility]        Care Instructions    Changes to insulin dosing today:    For Novolog:   ALWAYS take your Novolog BEFORE you eat  For Food: Take 1 unit for every 5 grams of carbohydrate PLUS  For Blood Sugar: 1 unit for every 10 mg/dL you are above 140 mg/dL    If your blood sugar is Add this amount of insulin   141-150 1 unit   151-160 2 units   161-170 3 units   171-180 4 units   181-190 5 units   191-200 6 units   201-210 7 units   211-220 8 units   221-230 9 units   231-240 10 units     Decrease Toujeo to 65 units AM and 60 units PM     Also, get into the routine of testing blood sugars before each meal, so that you can be successful in this new insulin regimen     Always take Metformin with food, not on an empty stomach    You can email me within a week of making this change to make further adjustments -- it would be ideal for you to test 2 hours after a meal as well to see how well this insulin to carb ratio is working.         Follow-ups after your visit        Who to contact     If you have questions or need follow up information about today's clinic visit or your schedule please contact [facility] directly at [phone].  Normal or non-critical lab and imaging results will be communicated to you by MyChart, letter or phone within 4 business days after the clinic has received the results. If you do not hear from us within 7 days, please contact the clinic through MyChart or phone. If you have a critical or abnormal lab result, we will notify you by phone as soon as possible.  Submit refill requests through Wiseryou or call your pharmacy and they will forward the refill request to us. Please allow 3 business days for your  refill to be completed.          Additional Information About Your Visit        Withlocalshart Information     Dubb gives you secure access to your electronic health record. If you see a primary care provider, you can also send messages to your care team and make appointments. If you have questions, please call your primary care clinic.  If you do not have a primary care provider, please call [phone] and they will assist you.        Care EveryWhere ID     This is your Care EveryWhere ID. This could be used by other organizations to access your [place] medical records  [id number]         Blood Pressure from Last 3 Encounters:   01/19/17 106/70   12/22/16 132/68   11/02/16 126/68    Weight from Last 3 Encounters:   01/19/17 109.6 kg (241 lb 11.2 oz)   12/22/16 110 kg (242 lb 6.4 oz)   11/02/16 107.9 kg (237 lb 12.8 oz)              Today, you had the following     No orders found for display       Primary Care Provider Office Phone # Fax #    Lauro Galloway [phone]       [facility] [address] [facility] [address]        Thank you!     Thank you for choosing [facility]  for your care. Our goal is always to provide you with excellent care. Hearing back from our patients is one way we can continue to improve our services. Please take a few minutes to complete the written survey that you may receive in the mail after your visit with us. Thank you!             Your Updated Medication List - Protect others around you: Learn how to safely use, store and throw away your medicines at www.disposemymeds.org.          This list is accurate as of: 2/14/17  8:59 AM.  Always use your most recent med list.                   Brand Name Dispense Instructions for use    ACE NOT PRESCRIBED (INTENTIONAL)     0 each    1 each daily ACE Inhibitor not prescribed due to Intolerance       aspirin 325 MG EC tablet     60 tablet    Take 1 tablet (325 mg) by mouth daily        atorvastatin 40 MG tablet    LIPITOR    90 tablet    Take 1 tablet (40 mg) by mouth daily       blood glucose monitoring test strip    no brand specified    6 Box    Use to test blood sugars 4 times daily or as directed       CENTRUM SILVER per tablet      Take 1 tablet by mouth daily       glipiZIDE 5 MG 24 hr tablet    glipiZIDE XL    90 tablet    Take 1 tablet (5 mg) by mouth daily       insulin aspart 100 UNIT/ML injection    NovoLOG PENFILL    3 Month    Inject 3 times daily (before meals) 12-27 units per meal per carb count. About 90 units/day       insulin glargine U-300 300 UNIT/ML injection    TOUJEO    3 Month    Inject 70 units in am, inject 60 units in pm       insulin pen needle 30G X 8 MM    NOVOFINE    300 each    Use 4-5 daily or as directed.       lisinopril 5 MG tablet    PRINIVIL/ZESTRIL    90 tablet    Take 1 tablet (5 mg) by mouth daily       metFORMIN 1000 MG tablet    GLUCOPHAGE    180 tablet    Take 1 tablet (1,000 mg) by mouth 2 times daily (with meals)       metoprolol 25 MG 24 hr tablet    TOPROL-XL    270 tablet    Take 1.5 tablets (37.5 mg) by mouth 2 times daily       * order for DME     3 Month    All DM testing supplies including test strips, lancets, solution, syringes, needles and/or pen needles for testing 4 times per day & injecting 5 times per day. Dispense glucometer compatible supplies. Accucheck Geena.       * order for DME     1 Box    All DM testing supplies including test strips, lancets, solution, syringes, needles and/or pen needles for testing 3 times per day & injecting 3 times per day.   RX goes to  Network Supplier, see tele enc 09/21/2016       order for DME     1 each    Equipment being ordered: blood glucose meter       sertraline 100 MG tablet    ZOLOFT    90 tablet    Take 1 tablet (100 mg) by mouth daily       vitamin B complex with vitamin C Tabs tablet      Take 2 tablets by mouth daily       * Notice:  This list has 2 medication(s) that are the same as other  medications prescribed for you. Read the directions carefully, and ask your doctor or other care provider to review them with you.

## 2017-03-07 ENCOUNTER — TELEPHONE (OUTPATIENT)
Dept: INTERNAL MEDICINE | Facility: CLINIC | Age: 67
End: 2017-03-07

## 2017-03-07 NOTE — TELEPHONE ENCOUNTER
"FYI:  Patient calling with 2 month history of deep cough and SOB x2 months.  SOB occurs when he lays flat or with activity.  It does not get bad enough to sleep sitting up.  Cough is a deep non-productive cough without wheezing or CP.  Denies fever, or post nasal drip, \"it just feels like I am dry from my nose to my lungs\".  Weight up 20-25 lb. Since last summer.  Denies having any dependent edema.      Scheduled pt to see PCP 3/8/17.  RODRIGO Valderrama R.N.    "

## 2017-03-08 ENCOUNTER — RADIANT APPOINTMENT (OUTPATIENT)
Dept: GENERAL RADIOLOGY | Facility: CLINIC | Age: 67
End: 2017-03-08
Attending: INTERNAL MEDICINE
Payer: COMMERCIAL

## 2017-03-08 ENCOUNTER — OFFICE VISIT (OUTPATIENT)
Dept: INTERNAL MEDICINE | Facility: CLINIC | Age: 67
End: 2017-03-08
Payer: COMMERCIAL

## 2017-03-08 VITALS
HEIGHT: 69 IN | WEIGHT: 247 LBS | OXYGEN SATURATION: 93 % | TEMPERATURE: 97.7 F | HEART RATE: 98 BPM | SYSTOLIC BLOOD PRESSURE: 130 MMHG | DIASTOLIC BLOOD PRESSURE: 70 MMHG | BODY MASS INDEX: 36.58 KG/M2

## 2017-03-08 DIAGNOSIS — E78.5 HYPERLIPIDEMIA, UNSPECIFIED HYPERLIPIDEMIA TYPE: ICD-10-CM

## 2017-03-08 DIAGNOSIS — R05.9 COUGH: ICD-10-CM

## 2017-03-08 DIAGNOSIS — Z00.00 ROUTINE GENERAL MEDICAL EXAMINATION AT A HEALTH CARE FACILITY: ICD-10-CM

## 2017-03-08 DIAGNOSIS — I25.10 CORONARY ARTERY DISEASE INVOLVING NATIVE CORONARY ARTERY OF NATIVE HEART WITHOUT ANGINA PECTORIS: ICD-10-CM

## 2017-03-08 DIAGNOSIS — Z79.4 TYPE 2 DIABETES MELLITUS WITH DIABETIC NEPHROPATHY, WITH LONG-TERM CURRENT USE OF INSULIN (H): ICD-10-CM

## 2017-03-08 DIAGNOSIS — R05.9 COUGH: Primary | ICD-10-CM

## 2017-03-08 DIAGNOSIS — E11.21 TYPE 2 DIABETES MELLITUS WITH DIABETIC NEPHROPATHY, WITH LONG-TERM CURRENT USE OF INSULIN (H): ICD-10-CM

## 2017-03-08 DIAGNOSIS — I10 ESSENTIAL HYPERTENSION: ICD-10-CM

## 2017-03-08 PROCEDURE — 71020 XR CHEST 2 VW: CPT

## 2017-03-08 PROCEDURE — 99215 OFFICE O/P EST HI 40 MIN: CPT | Performed by: INTERNAL MEDICINE

## 2017-03-08 RX ORDER — FUROSEMIDE 20 MG
20 TABLET ORAL DAILY
Qty: 30 TABLET | Refills: 1 | Status: SHIPPED | OUTPATIENT
Start: 2017-03-08 | End: 2017-04-17

## 2017-03-08 NOTE — MR AVS SNAPSHOT
After Visit Summary   3/8/2017    Adan Ruffin    MRN: [id number]           Patient Information     Date Of Birth          1950        Visit Information        Provider Department      3/8/2017 3:20 PM Lauro Galloway MD [facility]        Today's Diagnoses     Cough    -  1    Coronary artery disease involving native coronary artery of native heart without angina pectoris        Essential hypertension        Type 2 diabetes mellitus with diabetic nephropathy, with long-term current use of insulin (H)        Routine general medical examination at a health care facility        Hyperlipidemia, unspecified hyperlipidemia type           Follow-ups after your visit        Your next 10 appointments already scheduled     Mar 09, 2017  8:30 AM CST   LAB with [facility]   [facility]    [address]  [facility] [address] [phone]           Patient must bring picture ID.  Patient should be prepared to give a urine specimen  Please do not eat 10-12 hours before your appointment if you are coming in fasting for labs on lipids, cholesterol, or glucose (sugar).  Pregnant women should follow their Care Team instructions. Water with medications is okay. Do not drink coffee or other fluids.   If you have concerns about taking  your medications, please ask at office or if scheduling via Layer, send a message by clicking on Secure Messaging, Message Your Care Team.              Future tests that were ordered for you today     Open Future Orders        Priority Expected Expires Ordered    Lipid panel reflex to direct LDL Routine  4/8/2017 3/8/2017    BNP-N terminal pro Routine  4/8/2017 3/8/2017    Hepatitis C antibody Routine  4/8/2017 3/8/2017    Comprehensive metabolic panel Routine  4/8/2017 3/8/2017    CBC with platelets Routine  4/8/2017 3/8/2017    TSH with free T4 reflex Routine  4/8/2017 3/8/2017    Hemoglobin A1c Routine  " 4/8/2017 3/8/2017            Who to contact     If you have questions or need follow up information about today's clinic visit or your schedule please contact [facility] directly at [phone].  Normal or non-critical lab and imaging results will be communicated to you by MyChart, letter or phone within 4 business days after the clinic has received the results. If you do not hear from us within 7 days, please contact the clinic through VOIP Depothart or phone. If you have a critical or abnormal lab result, we will notify you by phone as soon as possible.  Submit refill requests through Cyber-Rain or call your pharmacy and they will forward the refill request to us. Please allow 3 business days for your refill to be completed.          Additional Information About Your Visit        VOIP DepotharS-cubism Information     Cyber-Rain gives you secure access to your electronic health record. If you see a primary care provider, you can also send messages to your care team and make appointments. If you have questions, please call your primary care clinic.  If you do not have a primary care provider, please call [phone] and they will assist you.        Care EveryWhere ID     This is your Care EveryWhere ID. This could be used by other organizations to access your [place] medical records  [id number]        Your Vitals Were     Pulse Temperature Height Pulse Oximetry BMI (Body Mass Index)       98 97.7  F (36.5  C) (Oral) 5' 9\" (1.753 m) 93% 36.48 kg/m2        Blood Pressure from Last 3 Encounters:   03/08/17 130/70   01/19/17 106/70   12/22/16 132/68    Weight from Last 3 Encounters:   03/08/17 247 lb (112 kg)   01/19/17 241 lb 11.2 oz (109.6 kg)   12/22/16 242 lb 6.4 oz (110 kg)                 Today's Medication Changes          These changes are accurate as of: 3/8/17  4:57 PM.  If you have any questions, ask your nurse or doctor.               Start taking these medicines.        Dose/Directions    furosemide 20 MG " tablet   Commonly known as:  LASIX   Used for:  Essential hypertension   Started by:  Lauro Galloway MD        Dose:  20 mg   Take 1 tablet (20 mg) by mouth daily   Quantity:  30 tablet   Refills:  1            Where to get your medicines      These medications were sent to [facility] [id number] [address] [facility] [address]  [address], [facility] [address]     Phone:  [phone]     furosemide 20 MG tablet    insulin aspart 100 UNIT/ML injection                Primary Care Provider Office Phone # Fax #    Lauro Galloway [phone]       [facility] [address] [facility] [address]        Thank you!     Thank you for choosing [facility]  for your care. Our goal is always to provide you with excellent care. Hearing back from our patients is one way we can continue to improve our services. Please take a few minutes to complete the written survey that you may receive in the mail after your visit with us. Thank you!             Your Updated Medication List - Protect others around you: Learn how to safely use, store and throw away your medicines at www.disposemymeds.org.          This list is accurate as of: 3/8/17  4:57 PM.  Always use your most recent med list.                   Brand Name Dispense Instructions for use    ACE NOT PRESCRIBED (INTENTIONAL)     0 each    1 each daily ACE Inhibitor not prescribed due to Intolerance       aspirin 325 MG EC tablet     60 tablet    Take 1 tablet (325 mg) by mouth daily       atorvastatin 40 MG tablet    LIPITOR    90 tablet    Take 1 tablet (40 mg) by mouth daily       blood glucose monitoring test strip    no brand specified    6 Box    Use to test blood sugars 4 times daily or as directed       CENTRUM SILVER per tablet      Take 1 tablet by mouth daily       furosemide 20 MG tablet    LASIX    30 tablet    Take 1 tablet (20 mg) by mouth daily        glipiZIDE 5 MG 24 hr tablet    glipiZIDE XL    90 tablet    Take 1 tablet (5 mg) by mouth daily       insulin aspart 100 UNIT/ML injection    NovoLOG PENFILL    30 mL    Take 1 unit for every 5 grams of carbohydrate PLUS 1 unit for every 10 mg/dL you are over 140mg/dL       insulin glargine U-300 300 UNIT/ML injection    TOUJEO     Inject 65 units in am, inject 60 units in pm       insulin pen needle 30G X 8 MM    NOVOFINE    300 each    Use 4-5 daily or as directed.       lisinopril 5 MG tablet    PRINIVIL/ZESTRIL    90 tablet    Take 1 tablet (5 mg) by mouth daily       metFORMIN 1000 MG tablet    GLUCOPHAGE    180 tablet    Take 1 tablet (1,000 mg) by mouth 2 times daily (with meals)       metoprolol 25 MG 24 hr tablet    TOPROL-XL    270 tablet    Take 1.5 tablets (37.5 mg) by mouth 2 times daily       * order for DME     3 Month    All DM testing supplies including test strips, lancets, solution, syringes, needles and/or pen needles for testing 4 times per day & injecting 5 times per day. Dispense glucometer compatible supplies. Accucheck Geena.       * order for DME     1 Box    All DM testing supplies including test strips, lancets, solution, syringes, needles and/or pen needles for testing 3 times per day & injecting 3 times per day.   RX goes to  Network Supplier, see tele enc 09/21/2016       order for DME     1 each    Equipment being ordered: blood glucose meter       sertraline 100 MG tablet    ZOLOFT    90 tablet    Take 1 tablet (100 mg) by mouth daily       vitamin B complex with vitamin C Tabs tablet      Take 2 tablets by mouth daily       * Notice:  This list has 2 medication(s) that are the same as other medications prescribed for you. Read the directions carefully, and ask your doctor or other care provider to review them with you.

## 2017-03-08 NOTE — PROGRESS NOTES
SUBJECTIVE:                                                    Adan Ruffin is a 66 year old male who presents to clinic today for the following health issues:    Patient is seen for a follow up visit.  Has had recent URI. Has persistent cough for 2 months. Non productive. No chest pain. Has mild SOB on exertion.   His weight is up and has increased LE edema, mild. No fevers.   Has h/o ischemic heart disease, asymptomatic regarding chest pains, has h/o SOB,no palpitations. Has good compliance with treatment, diet and exercise.  Has H/O hyperlipidemia. On medical treatment and diet. No side effects. No muscle weakness, myalgias or upset stomach.   Seen cardiology. Stopped / held Metoprolol because of possible ED side effects.   Has h/o HTN. on medical treatment. BP has been controlled. No side effects from medications. No CP, HA, dizziness. good compliance with medications and low salt diet.  BP remains controlled off BB.  Has H/O DM. On diet , exercise and insulin. Blood sugars are fluctuating. No parestesias. No hypoglycemias.  Pt is obese. Difficulty to lose weight because of SOB.       PROBLEMS TO ADD ON...     Has h/o depression. On Zoloft medical treatment, controlled, no side effects. No depressive symptoms or suicidal ideation.    Reviewed preventive measures.     Problem list and histories reviewed & adjusted, as indicated.  Additional history: as documented    Patient Active Problem List   Diagnosis     CAD (coronary artery disease)     Obesity     S/P CABG x 3     Fluid overload     Anemia due to blood loss, acute     Insulin dependent diabetes mellitus (H)     Anxiety     Hyperlipidemia     HTN (hypertension)     Advanced directives, counseling/discussion     Microalbuminuria     Type 2 diabetes mellitus with diabetic nephropathy, with long-term current use of insulin (H)     Encounter for long-term (current) use of insulin (H)     Past Surgical History   Procedure Laterality Date     Excise  pilonidal cyst, simple  1975     Removal of skin cancer  1992     Tonsillectomy & adenoidectomy  1950     Heart cath, angioplasty  3-     3 vessel disease-occluded RCA, stenosis LM-to have CABG     Bypass graft artery coronary N/A 4/7/2015     Procedure: BYPASS GRAFT ARTERY CORONARY;  Surgeon: Johan Hall MD;  Location: [facility] OR     Colonoscopy N/A 6/20/2016     Procedure: COLONOSCOPY;  Surgeon: Marcela Schwartz MD;  Location:  GI       Social History   Substance Use Topics     Smoking status: Former Smoker     Packs/day: 1.50     Years: 29.00     Types: Cigarettes     Quit date: 1/1/2000     Smokeless tobacco: Never Used     Alcohol use 0.0 oz/week     0 Standard drinks or equivalent per week      Comment: 1 mixed drink a night     Family History   Problem Relation Age of Onset     Breast Cancer Mother      CEREBROVASCULAR DISEASE Mother      Hyperlipidemia Father      CANCER Father      lung, unrelated to smoking     CANCER Paternal Grandmother          Current Outpatient Prescriptions   Medication Sig Dispense Refill     insulin aspart (NOVOLOG PENFILL) 100 UNIT/ML injection Take 1 unit for every 5 grams of carbohydrate PLUS 1 unit for every 10 mg/dL you are over 140mg/dL 30 mL 3     furosemide (LASIX) 20 MG tablet Take 1 tablet (20 mg) by mouth daily 30 tablet 1     insulin glargine U-300 (TOUJEO) 300 UNIT/ML injection Inject 65 units in am, inject 60 units in pm       atorvastatin (LIPITOR) 40 MG tablet Take 1 tablet (40 mg) by mouth daily 90 tablet 0     metFORMIN (GLUCOPHAGE) 1000 MG tablet Take 1 tablet (1,000 mg) by mouth 2 times daily (with meals) 180 tablet 1     lisinopril (PRINIVIL/ZESTRIL) 5 MG tablet Take 1 tablet (5 mg) by mouth daily 90 tablet 0     glipiZIDE (GLIPIZIDE XL) 5 MG 24 hr tablet Take 1 tablet (5 mg) by mouth daily 90 tablet 1     blood glucose monitoring (NO BRAND SPECIFIED) test strip Use to test blood sugars 4 times daily or as directed 6 Box 1     ACE NOT  PRESCRIBED, INTENTIONAL, 1 each daily ACE Inhibitor not prescribed due to Intolerance 0 each 0     aspirin  MG EC tablet Take 1 tablet (325 mg) by mouth daily 60 tablet 3     Multiple Vitamins-Minerals (CENTRUM SILVER) per tablet Take 1 tablet by mouth daily       vitamin  B complex with vitamin C (VITAMIN  B COMPLEX) TABS Take 2 tablets by mouth daily       [DISCONTINUED] insulin aspart (NOVOLOG PENFILL) 100 UNIT/ML injection Take 1 unit for every 5 grams of carbohydrate PLUS 1 unit for every 10 mg/dL you are over 140mg/dL       order for DME Equipment being ordered: blood glucose meter 1 each 0     metoprolol (TOPROL-XL) 25 MG 24 hr tablet Take 1.5 tablets (37.5 mg) by mouth 2 times daily (Patient not taking: Reported on 3/8/2017) 270 tablet 1     order for DME All DM testing supplies including test strips, lancets, solution, syringes, needles and/or pen needles for testing 3 times per day & injecting 3 times per day.      RX goes to  Network Supplier, see tele enc 09/21/2016 1 Box 3     sertraline (ZOLOFT) 100 MG tablet Take 1 tablet (100 mg) by mouth daily (Patient not taking: Reported on 3/8/2017) 90 tablet 1     order for DME All DM testing supplies including test strips, lancets, solution, syringes, needles and/or pen needles for testing 4 times per day & injecting 5 times per day.  Dispense glucometer compatible supplies. Accucheck Geena. 3 Month 3     insulin pen needle (NOVOFINE) 30G X 8 MM Use 4-5 daily or as directed. 300 each 1       Reviewed and updated as needed this visit by clinical staff  Tobacco  Allergies  Meds  Med Hx  Surg Hx  Fam Hx  Soc Hx      Reviewed and updated as needed this visit by Provider         ROS:  Constitutional, HEENT, cardiovascular, pulmonary, GI, , musculoskeletal, neuro, skin, endocrine and psych systems are negative, except as otherwise noted.    OBJECTIVE:                                                    /70  Pulse 98  Temp 97.7  F (36.5  C) (Oral)   "Ht 5' 9\" (1.753 m)  Wt 247 lb (112 kg)  SpO2 93%  BMI 36.48 kg/m2  Body mass index is 36.48 kg/(m^2).  GENERAL: obese, weak, alert and no distress  NECK: no adenopathy, no asymmetry, masses, or scars and thyroid normal to palpation  RESP: lungs clear to auscultation - no rales, rhonchi or wheezes, mild base crackles   CV: regular rate and rhythm, normal S1 S2, no S3 or S4, no murmur, click or rub, no peripheral edema and peripheral pulses strong  ABDOMEN: soft,obese,  nontender, no hepatosplenomegaly, no masses and bowel sounds normal  MS: no gross musculoskeletal defects noted, 1+ LE edema    Diagnostic Test Results:  No results found for this or any previous visit (from the past 24 hour(s)).     ASSESSMENT/PLAN:                                                      Problem List Items Addressed This Visit     CAD (coronary artery disease)    Relevant Orders    BNP-N terminal pro    Hyperlipidemia    HTN (hypertension)    Relevant Medications    furosemide (LASIX) 20 MG tablet    Other Relevant Orders    Comprehensive metabolic panel    CBC with platelets    TSH with free T4 reflex    Type 2 diabetes mellitus with diabetic nephropathy, with long-term current use of insulin (H)    Relevant Orders    Lipid panel reflex to direct LDL    Hemoglobin A1c      Other Visit Diagnoses     Cough    -  Primary    Relevant Orders    XR Chest 2 Views    Routine general medical examination at a health care facility        Relevant Orders    Hepatitis C antibody           Assess CXR- mild venous congestion, cardiomegaly   Will start on diuretic  Assess lab work for CHF, consider repeated ECHO  Controlled HTN  Resolved URI  Cont treatment   Assess DM/ lipids     Follow-Up:with results     Lauro Galloway MD  [facility]    "

## 2017-03-08 NOTE — TELEPHONE ENCOUNTER
[facility] Medication pended and routed to provider for approval

## 2017-03-08 NOTE — NURSING NOTE
"Chief Complaint   Patient presents with     URI     Sxs of cough, congestion and SOB ana 2 months       Initial /70  Pulse 98  Temp 97.7  F (36.5  C) (Oral)  Ht 5' 9\" (1.753 m)  Wt 247 lb (112 kg)  SpO2 93%  BMI 36.48 kg/m2 Estimated body mass index is 36.48 kg/(m^2) as calculated from the following:    Height as of this encounter: 5' 9\" (1.753 m).    Weight as of this encounter: 247 lb (112 kg).  Medication Reconciliation: complete   Faustina Edgar MA      "

## 2017-03-09 DIAGNOSIS — E11.21 TYPE 2 DIABETES MELLITUS WITH DIABETIC NEPHROPATHY, WITH LONG-TERM CURRENT USE OF INSULIN (H): ICD-10-CM

## 2017-03-09 DIAGNOSIS — I10 ESSENTIAL HYPERTENSION: ICD-10-CM

## 2017-03-09 DIAGNOSIS — Z79.4 TYPE 2 DIABETES MELLITUS WITH DIABETIC NEPHROPATHY, WITH LONG-TERM CURRENT USE OF INSULIN (H): ICD-10-CM

## 2017-03-09 DIAGNOSIS — Z00.00 ROUTINE GENERAL MEDICAL EXAMINATION AT A HEALTH CARE FACILITY: ICD-10-CM

## 2017-03-09 DIAGNOSIS — I25.10 CORONARY ARTERY DISEASE INVOLVING NATIVE CORONARY ARTERY OF NATIVE HEART WITHOUT ANGINA PECTORIS: ICD-10-CM

## 2017-03-09 LAB
ALBUMIN SERPL-MCNC: 3.6 G/DL (ref 3.4–5)
ALP SERPL-CCNC: 101 U/L (ref 40–150)
ALT SERPL W P-5'-P-CCNC: 50 U/L (ref 0–70)
ANION GAP SERPL CALCULATED.3IONS-SCNC: 6 MMOL/L (ref 3–14)
AST SERPL W P-5'-P-CCNC: 25 U/L (ref 0–45)
BILIRUB SERPL-MCNC: 0.2 MG/DL (ref 0.2–1.3)
BUN SERPL-MCNC: 28 MG/DL (ref 7–30)
CALCIUM SERPL-MCNC: 9.8 MG/DL (ref 8.5–10.1)
CHLORIDE SERPL-SCNC: 107 MMOL/L (ref 94–109)
CHOLEST SERPL-MCNC: 142 MG/DL
CO2 SERPL-SCNC: 27 MMOL/L (ref 20–32)
CREAT SERPL-MCNC: 1.01 MG/DL (ref 0.66–1.25)
ERYTHROCYTE [DISTWIDTH] IN BLOOD BY AUTOMATED COUNT: 14 % (ref 10–15)
GFR SERPL CREATININE-BSD FRML MDRD: 74 ML/MIN/1.7M2
GLUCOSE SERPL-MCNC: 253 MG/DL (ref 70–99)
HBA1C MFR BLD: 10.5 % (ref 4.3–6)
HCT VFR BLD AUTO: 41.4 % (ref 40–53)
HDLC SERPL-MCNC: 46 MG/DL
HGB BLD-MCNC: 13.6 G/DL (ref 13.3–17.7)
LDLC SERPL CALC-MCNC: 69 MG/DL
MCH RBC QN AUTO: 30.1 PG (ref 26.5–33)
MCHC RBC AUTO-ENTMCNC: 32.9 G/DL (ref 31.5–36.5)
MCV RBC AUTO: 92 FL (ref 78–100)
NONHDLC SERPL-MCNC: 96 MG/DL
NT-PROBNP SERPL-MCNC: 59 PG/ML (ref 0–125)
PLATELET # BLD AUTO: 233 10E9/L (ref 150–450)
POTASSIUM SERPL-SCNC: 4.8 MMOL/L (ref 3.4–5.3)
PROT SERPL-MCNC: 7.8 G/DL (ref 6.8–8.8)
RBC # BLD AUTO: 4.52 10E12/L (ref 4.4–5.9)
SODIUM SERPL-SCNC: 140 MMOL/L (ref 133–144)
TRIGL SERPL-MCNC: 134 MG/DL
TSH SERPL DL<=0.005 MIU/L-ACNC: 1.58 MU/L (ref 0.4–4)
WBC # BLD AUTO: 8.4 10E9/L (ref 4–11)

## 2017-03-09 PROCEDURE — 83880 ASSAY OF NATRIURETIC PEPTIDE: CPT | Performed by: INTERNAL MEDICINE

## 2017-03-09 PROCEDURE — 36415 COLL VENOUS BLD VENIPUNCTURE: CPT | Performed by: INTERNAL MEDICINE

## 2017-03-09 PROCEDURE — 84443 ASSAY THYROID STIM HORMONE: CPT | Performed by: INTERNAL MEDICINE

## 2017-03-09 PROCEDURE — 86803 HEPATITIS C AB TEST: CPT | Performed by: INTERNAL MEDICINE

## 2017-03-09 PROCEDURE — 80053 COMPREHEN METABOLIC PANEL: CPT | Performed by: INTERNAL MEDICINE

## 2017-03-09 PROCEDURE — 80061 LIPID PANEL: CPT | Performed by: INTERNAL MEDICINE

## 2017-03-09 PROCEDURE — 83036 HEMOGLOBIN GLYCOSYLATED A1C: CPT | Performed by: INTERNAL MEDICINE

## 2017-03-09 PROCEDURE — 85027 COMPLETE CBC AUTOMATED: CPT | Performed by: INTERNAL MEDICINE

## 2017-03-10 ENCOUNTER — TELEPHONE (OUTPATIENT)
Dept: INTERNAL MEDICINE | Facility: CLINIC | Age: 67
End: 2017-03-10

## 2017-03-10 ENCOUNTER — TELEPHONE (OUTPATIENT)
Dept: NURSING | Facility: CLINIC | Age: 67
End: 2017-03-10

## 2017-03-10 LAB — HCV AB SERPL QL IA: NORMAL

## 2017-03-11 NOTE — TELEPHONE ENCOUNTER
"Call Type: Triage Call    Presenting Problem: \"They called saying they would have to change my  insulin.  Also. they ordered the wrong Novolog.\"  Triage Note:  Guideline Title: Medication Questions - Adult  Recommended Disposition: Call Dispensing Pharmacy or Provider  Immediately  Original Inclination: Wanted to speak with a nurse  Override Disposition:  Intended Action: Follow advice given  Physician Contacted: No  Unable to obtain prescribed medication related to available resources AND  situation poses immediate clinical risk ?  YES  Sign(s) or symptom(s) associated with a diagnosed condition or with a new illness  ? NO  Prescription ordered today and not available at pharmacy putting patient at  clinical risk ? NO  Pharmacy calling to clarify prescription order. ? NO  Physician Instructions:  Care Advice:  "

## 2017-03-11 NOTE — TELEPHONE ENCOUNTER
Clinic Action Needed:Yes  Reason for Call: Patient can't get Novolog because he needs the Flexipen. Please send new prescription to the [facility]'s pharmacy on file.  Routed to: Med Bradley RN  [facility]

## 2017-03-13 NOTE — TELEPHONE ENCOUNTER
Call from pt stating he received insulin pen cartridges instead of insulin pens. Order re entered for insulin pens.

## 2017-03-17 ENCOUNTER — TELEPHONE (OUTPATIENT)
Dept: ENDOCRINOLOGY | Facility: CLINIC | Age: 67
End: 2017-03-17

## 2017-03-17 NOTE — TELEPHONE ENCOUNTER
Panel Management Review      Patient has the following on his problem list:     Diabetes    ASA: Passed    Last A1C  Lab Results   Component Value Date    A1C 10.5 03/09/2017    A1C 10.0 11/02/2016    A1C 12.9 06/27/2016    A1C 13.3 04/22/2016    A1C 7.1 11/10/2015     A1C tested: FAILED    Last LDL:    Lab Results   Component Value Date    CHOL 142 03/09/2017     Lab Results   Component Value Date    HDL 46 03/09/2017     Lab Results   Component Value Date    LDL 69 03/09/2017     Lab Results   Component Value Date    TRIG 134 03/09/2017     Lab Results   Component Value Date    CHOLHDLRATIO 3.5 08/19/2015     Lab Results   Component Value Date    NHDL 96 03/09/2017       Is the patient on a Statin? YES             Is the patient on Aspirin? YES    Medications     HMG CoA Reductase Inhibitors    atorvastatin (LIPITOR) 40 MG tablet    Salicylates    aspirin  MG EC tablet          Last three blood pressure readings:  BP Readings from Last 3 Encounters:   03/08/17 130/70   01/19/17 106/70   12/22/16 132/68       Date of last diabetes office visit: 12/22/16     Tobacco History:     History   Smoking Status     Former Smoker     Packs/day: 1.50     Years: 29.00     Types: Cigarettes     Quit date: 1/1/2000   Smokeless Tobacco     Never Used           Composite cancer screening  Chart review shows that this patient is due/due soon for the following None  Summary:    Patient is due/failing the following:   FOLLOW UP    Action needed:   Patient needs office visit for dm.    Type of outreach:    [employer] message.    Questions for provider review:    None                                                                                                                                    Zo Richey CMA       Chart routed to Care Team .

## 2017-03-28 NOTE — TELEPHONE ENCOUNTER
Patient called and informed of the message below. Patient has appt scheduled for 04/19.  Bryan Infante CMA

## 2017-03-30 DIAGNOSIS — R80.9 PROTEINURIA: ICD-10-CM

## 2017-03-31 RX ORDER — LISINOPRIL 5 MG/1
5 TABLET ORAL DAILY
Qty: 90 TABLET | Refills: 3 | Status: SHIPPED | OUTPATIENT
Start: 2017-03-31 | End: 2017-10-20

## 2017-04-03 DIAGNOSIS — E78.5 HYPERLIPIDEMIA LDL GOAL <70: ICD-10-CM

## 2017-04-03 RX ORDER — ATORVASTATIN CALCIUM 40 MG/1
40 TABLET, FILM COATED ORAL DAILY
Qty: 90 TABLET | Refills: 0 | Status: SHIPPED | OUTPATIENT
Start: 2017-04-03 | End: 2017-06-26

## 2017-04-03 NOTE — TELEPHONE ENCOUNTER
Per note attached to 3/9 lab results-Notes Recorded by Lauro Galloway MD on 3/10/2017 at 3:28 PM  Poor diabetic control. Rest of the tests are normal.   Try to improve diet, exercise. Increase insulin Tujeo dose by 2 units in am and 2 units in pm. Recheck in one month

## 2017-04-03 NOTE — TELEPHONE ENCOUNTER
Lipitor     Last Written Prescription Date: 01/04/17  Last Fill Quantity: 90, # refills: 0  Last Office Visit with [facility], P or [facility] prescribing provider: 03/08/17  Next 5 appointments (look out 90 days)     Apr 19, 2017  9:00 AM CDT   Return Visit with Marilee Tolentino MD   [facility]    [address]  [facility] [id number] [phone]                   Lab Results   Component Value Date    CHOL 142 03/09/2017     Lab Results   Component Value Date    HDL 46 03/09/2017     Lab Results   Component Value Date    LDL 69 03/09/2017     Lab Results   Component Value Date    TRIG 134 03/09/2017     Lab Results   Component Value Date    CHOLHDLRATIO 3.5 08/19/2015       Labs showing if normal/abnormal  Lab Results   Component Value Date    CHOL 142 03/09/2017    TRIG 134 03/09/2017    HDL 46 03/09/2017    LDL 69 03/09/2017    VLDL 55 (H) 08/19/2015    CHOLHDLRATIO 3.5 08/19/2015

## 2017-04-17 DIAGNOSIS — I10 ESSENTIAL HYPERTENSION: ICD-10-CM

## 2017-04-17 RX ORDER — FUROSEMIDE 20 MG
TABLET ORAL
Qty: 90 TABLET | Refills: 3 | Status: SHIPPED | OUTPATIENT
Start: 2017-04-17 | End: 2018-05-07

## 2017-04-17 NOTE — TELEPHONE ENCOUNTER
LAsix      Last Written Prescription Date: 3/8/17  Last Fill Quantity: 30, # refills: 1  Last Office Visit with [facility], [facility] or [facility] prescribing provider: 3/8/17  Next 5 appointments (look out 90 days)     Apr 19, 2017  9:00 AM CDT   [facility] Visit with Mar[facility] MD   [facility]    [address]  [facility] [id number] [phone]                   Potassium   Date Value Ref Range Status   03/09/2017 4.8 3.4 - 5.3 mmol/L Final     Creatinine   Date Value Ref Range Status   03/09/2017 1.01 0.66 - 1.25 mg/dL Final     BP Readings from Last 3 Encounters:   03/08/17 130/70   01/19/17 106/70   12/22/16 132/68     Prescription approved per [facility] Refill Protocol.

## 2017-04-19 ENCOUNTER — OFFICE VISIT (OUTPATIENT)
Dept: ENDOCRINOLOGY | Facility: CLINIC | Age: 67
End: 2017-04-19
Payer: COMMERCIAL

## 2017-04-19 VITALS
TEMPERATURE: 98.4 F | BODY MASS INDEX: 35.56 KG/M2 | DIASTOLIC BLOOD PRESSURE: 72 MMHG | WEIGHT: 240.1 LBS | OXYGEN SATURATION: 94 % | SYSTOLIC BLOOD PRESSURE: 128 MMHG | HEIGHT: 69 IN | HEART RATE: 95 BPM

## 2017-04-19 DIAGNOSIS — Z79.4 ENCOUNTER FOR LONG-TERM (CURRENT) USE OF INSULIN (H): ICD-10-CM

## 2017-04-19 DIAGNOSIS — E78.5 HYPERLIPIDEMIA, UNSPECIFIED HYPERLIPIDEMIA TYPE: ICD-10-CM

## 2017-04-19 DIAGNOSIS — I25.10 CORONARY ARTERY DISEASE INVOLVING NATIVE CORONARY ARTERY OF NATIVE HEART WITHOUT ANGINA PECTORIS: ICD-10-CM

## 2017-04-19 DIAGNOSIS — I10 ESSENTIAL HYPERTENSION: ICD-10-CM

## 2017-04-19 DIAGNOSIS — Z79.4 TYPE 2 DIABETES MELLITUS WITH DIABETIC NEPHROPATHY, WITH LONG-TERM CURRENT USE OF INSULIN (H): Primary | ICD-10-CM

## 2017-04-19 DIAGNOSIS — Z95.1 S/P CABG X 3: ICD-10-CM

## 2017-04-19 DIAGNOSIS — E11.21 TYPE 2 DIABETES MELLITUS WITH DIABETIC NEPHROPATHY, WITH LONG-TERM CURRENT USE OF INSULIN (H): Primary | ICD-10-CM

## 2017-04-19 PROCEDURE — 99214 OFFICE O/P EST MOD 30 MIN: CPT | Performed by: INTERNAL MEDICINE

## 2017-04-19 RX ORDER — GLIPIZIDE 10 MG/1
10 TABLET, FILM COATED, EXTENDED RELEASE ORAL DAILY
Qty: 30 TABLET | Refills: 3 | Status: SHIPPED | OUTPATIENT
Start: 2017-04-19 | End: 2017-08-07

## 2017-04-19 NOTE — MR AVS SNAPSHOT
After Visit Summary   2017    Adan Ruffin    MRN: [id number]           Patient Information     Date Of Birth          1950        Visit Information        Provider Department      2017 9:00 AM Marilee Tolentino MD [facility]        Today's Diagnoses     Obesity    -  1    Insulin dependent diabetes mellitus (H)        Type 2 diabetes mellitus with diabetic nephropathy, with long-term current use of insulin (H)        Encounter for long-term (current) use of insulin (H)          Care Instructions    [facility] & [place] locations   Dr Tolentino, Endocrinology Department      [facility]   [address] [place] [address]  Appointment [phone]  Fax: [phone]  [place]: Monday and Tuesday         [place] [address] [facility] [place] [address]  Appointment [phone]  Fax: [phone]  [place]: Wednesday and Thursday          To provide the best diabetic care, please bring your blood glucose meter to each and every visit with your Endocrinologist.  Your blood glucose meter/insulin pump will be downloaded at every appointment.      Please arrive 15 minutes before your scheduled appointment.  This will allow for your blood glucose meter/insulin pump to be downloaded and glycosylated hemoglobin (A1c) to be obtained prior to your appointment.    Increase Toujeo to 70 units in AM and 65 units PM  novolog same scale  metfomrin - continue same dose  Glipizide XR increase to 10 mg in AM with food ( breakfast)  Follow up in 6 weeks    If Blood glucose are low more often-> 2-3 times/week- give us a call.    The patient is advised to Make better food choices: reduce carbs, Reduce portion size, weight loss and exercise 3-4 times a week.              Follow-ups after your visit        Who to contact     If you have questions or need [facility] information [facility] or your schedule please contact [facility] directly at [phone].  Normal or non-critical lab and imaging results will be communicated to you by MyChart, letter or phone within 4 business days after the clinic has received the results. If you do not hear from us within 7 days, please contact the clinic through [employer] or phone. If you have a critical or abnormal lab result, we will notify you by phone as soon as possible.  Submit refill requests through [employer] or call your pharmacy and they will forward the refill request to us. Please allow 3 business days for your refill to be completed.          Additional Information About Your Visit        [employer] Information     [employer] gives you secure access to your electronic health record. If you see a primary care provider, you can also send messages to your care team and make appointments. If you have questions, please call your primary care clinic.  If you do not have a primary care provider, please call [phone] and they will assist you.        Care EveryWhere ID     This is your Care EveryWhere ID. This could be used by other organizations to access your [place] medical records  [id number]        Your Vitals Were     Pulse Temperature Height Pulse Oximetry BMI (Body Mass Index)       95 98.4  F (36.9  C) (Oral) 1.753 m (5' 9\") 94% 35.46 kg/m2        Blood Pressure from Last 3 Encounters:   04/19/17 128/72   03/08/17 130/70   01/19/17 106/70    Weight from Last 3 Encounters:   04/19/17 108.9 kg (240 lb 1.6 oz)   03/08/17 112 kg (247 lb)   01/19/17 109.6 kg (241 lb 11.2 oz)              Today, you had the following     No orders found for display         Today's Medication Changes          These changes are accurate as of: 4/19/17  9:18 AM.  If you have any questions, ask your nurse or doctor.               These medicines have changed or have updated prescriptions.        Dose/Directions    glipiZIDE 10 MG 24 hr tablet   Commonly known " as:  glipiZIDE XL   This may have changed:    - medication strength  - how much to take   Used for:  Type 2 diabetes mellitus with diabetic nephropathy, with long-term current use of insulin (H)   Changed by:  Marilee Tolentino MD        Dose:  10 mg   Take 1 tablet (10 mg) by mouth daily   Quantity:  30 tablet   Refills:  3       insulin glargine U-300 300 UNIT/ML injection   Commonly known as:  TOUJEO   This may have changed:  additional instructions   Used for:  Type 2 diabetes mellitus with diabetic nephropathy, with long-term current use of insulin (H)   Changed by:  Marilee Tolentino MD        Inject 70 units in am, inject 65 units in pm   Quantity:  30 mL   Refills:  3            Where to get your medicines      These medications were sent to [facility] [address] [place] [address] [address] [facility] [address]     Phone:  [phone]     glipiZIDE 10 MG 24 hr tablet    insulin aspart 100 UNIT/ML injection    insulin glargine U-300 300 UNIT/ML injection                Primary Care Provider Office Phone # Fax #    Lauro Galloway [phone]       [facility] [address] [facility] [address]        Thank you!     Thank you for choosing [facility]  for your care. Our goal is always to provide you with excellent care. Hearing back from our patients is one way we can continue to improve our services. Please take a few minutes to complete the written survey that you may receive in the mail after your visit with us. Thank you!             Your Updated Medication List - Protect others around you: Learn how to safely use, store and throw away your medicines at www.disposemymeds.org.          This list is accurate as of: 4/19/17  9:18 AM.  Always use your most recent med list.                   Brand Name Dispense Instructions for use    ACE NOT PRESCRIBED (INTENTIONAL)     0 each     1 each daily ACE Inhibitor not prescribed due to Intolerance       aspirin 325 MG EC tablet     60 tablet    Take 1 tablet (325 mg) by mouth daily       atorvastatin 40 MG tablet    LIPITOR    90 tablet    Take 1 tablet (40 mg) by mouth daily       blood glucose monitoring test strip    no brand specified    6 Box    Use to test blood sugars 4 times daily or as directed       CENTRUM SILVER per tablet      Take 1 tablet by mouth daily       furosemide 20 MG tablet    LASIX    90 tablet    TAKE 1 TABLET(20 MG) BY MOUTH DAILY       glipiZIDE 10 MG 24 hr tablet    glipiZIDE XL    30 tablet    Take 1 tablet (10 mg) by mouth daily       * insulin aspart 100 UNIT/ML injection    NovoLOG FLEXPEN    15 mL    Take 1 unit for every 5 grams of carbohydrate PLUS 1 unit for every 10 mg/dL you are over 140mg/dL       * insulin aspart 100 UNIT/ML injection    NovoLOG PENFILL    30 mL    Take 1 unit for every 5 grams of carbohydrate PLUS 1 unit for every 10 mg/dL you are over 140mg/dL       insulin glargine U-300 300 UNIT/ML injection    TOUJEO    30 mL    Inject 70 units in am, inject 65 units in pm       insulin pen needle 30G X 8 MM    NOVOFINE    300 each    Use 4-5 daily or as directed.       lisinopril 5 MG tablet    PRINIVIL/ZESTRIL    90 tablet    Take 1 tablet (5 mg) by mouth daily       metFORMIN 1000 MG tablet    GLUCOPHAGE    180 tablet    Take 1 tablet (1,000 mg) by mouth 2 times daily (with meals)       metoprolol 25 MG 24 hr tablet    TOPROL-XL    270 tablet    Take 1.5 tablets (37.5 mg) by mouth 2 times daily       * order for DME     3 Month    All DM testing supplies including test strips, lancets, solution, syringes, needles and/or pen needles for testing 4 times per day & injecting 5 times per day. Dispense glucometer compatible supplies. Accucheck Geena.       * order for DME     1 Box    All DM testing supplies including test strips, lancets, solution, syringes, needles and/or pen needles for testing 3 times  per day & injecting 3 times per day.   RX goes to  [facility] Supplier, see tele enc 09/21/2016       order for DME     1 each    Equipment being ordered: blood glucose meter       sertraline 100 MG tablet    ZOLOFT    90 tablet    Take 1 tablet (100 mg) by mouth daily       vitamin B complex with vitamin C Tabs tablet      Take 2 tablets by mouth daily       * Notice:  This list has 4 medication(s) that are the same as other medications prescribed for you. Read the directions carefully, and ask your doctor or other care provider to review them with you.

## 2017-04-19 NOTE — PROGRESS NOTES
ENDOCRINOLOGY CLINIC NOTE:  Name: Adan Ruffin  Seen for follow-up of diabetes .  HPI:  Adan Ruffin is a 65 year old male who presents for the evaluation/management of Diabetes.he was diagnosed with diabetes about 20 years back and is on insulin for last 15 years.  Blood sugar control was optimal.  A few years back.  Underwent CABG in April 2015    1. Type 2 DM:  Orginally diagnosed about 20 years back  Current Regimen: glipizide 5 mg/day, Metformin 1000 mg twice a day, Toujeo 67 units AM and 62 units PM, NovoLog-estimates the amount each time.  Takes about 35-40 units with each meal. Carb content is mostly same on each day.  About 1 unit/5 gm of CHO ?? But when questioned he was not able to answer the math  BS checks: in the morning and before going to bed  Average Meter Download:  168. BG variable. FBgs ranging from 120-200. Limited BG data during day. BG tend to rise post dinner. FBG slightly on higher side.  One episode of 45 at night ( in the settting of taking too much novolog)  Symptoms of hypoglycemia (low blood sugar):  Gets symptoms of hypoglycemia.  Episodes of hypoglycemia:occasional  Fixed meal pattern:typically has breakfast and dinner.  Sometimes skips lunch.  Patient counting carbs:no    DM Complications:   Nephropathy: has microalbuminuria  Retinopathy: no  Neuropathy: no  Microalbuminuria:present, on lisinopril 5 mg  MICROL       38   5/24/2016  MICROALBUMIN    34.05   5/24/2016    CAD/PAD: yes, CABG in April 2015  Gastroparesis: no  Hypoglycemia unawareness: no    2. Hypertension:    Blood pressure medications include lisinopril 5 mg  3. Hyperlipidemia: Takes atorvastatin 40 mg         PMH/PSH:  Past Medical History:   Diagnosis Date     CAD (coronary artery disease) 3/13/2015     Chest pain on exertion      Diabetes mellitus (H)     Insulin & Metformin...diagnosed in 1996     Diabetes mellitus, type 2 (H) 8/21/2015     Encounter for long-term (current) use of insulin (H) 11/2/2016      Former smoker, stopped smoking in distant past     Quit 10 yrs ago, 1-2 PPD x 20+ yrs     History of angina      HTN (hypertension)      Hyperlipidaemia      Microalbuminuria 6/27/2016     Obesity 3/13/2015     Past Surgical History:   Procedure Laterality Date     BYPASS GRAFT ARTERY CORONARY N/A 4/7/2015    Procedure: BYPASS GRAFT ARTERY CORONARY;  Surgeon: Johan Hall MD;  Location: [facility] OR     COLONOSCOPY N/A 6/20/2016    Procedure: COLONOSCOPY;  Surgeon: Marcela Schwartz MD;  Location: [facility] GI     EXCISE PILONIDAL CYST, SIMPLE  1975     HEART CATH, ANGIOPLASTY  3-    3 vessel disease-occluded RCA, stenosis LM-to have CABG     removal of skin cancer  1992     TONSILLECTOMY & ADENOIDECTOMY  1950     Family Hx:  Family History   Problem Relation Age of Onset     Breast Cancer Mother      CEREBROVASCULAR DISEASE Mother      Hyperlipidemia Father      CANCER Father      lung, unrelated to smoking     CANCER Paternal Grandmother        Diabetes:    Social Hx:  Social History     Social History     Marital status: Single     Spouse name: N/A     Number of children: N/A     Years of education: N/A     Occupational History     Not on file.     Social History Main Topics     Smoking status: Former Smoker     Packs/day: 1.50     Years: 29.00     Types: Cigarettes     Quit date: 1/1/2000     Smokeless tobacco: Never Used     Alcohol use 0.0 oz/week     0 Standard drinks or equivalent per week      Comment: 1 mixed drink a night     Drug use: No     Sexual activity: Yes     Partners: Female     Other Topics Concern     Caffeine Concern No     none     Sleep Concern No     Stress Concern Yes     sometimes at home     Weight Concern Yes     would like to lose weight     Special Diet No     Exercise No     Seat Belt Yes     Social History Narrative          MEDICATIONS:  has a current medication list which includes the following prescription(s): insulin aspart, insulin glargine u-300, glipizide,  "furosemide, atorvastatin, lisinopril, insulin aspart, order for dme, metformin, order for dme, sertraline, order for dme, insulin pen needle, blood glucose monitoring, aspirin, centrum silver, vitamin b complex with vitamin c, metoprolol, and ACE NOT PRESCRIBED, INTENTIONAL,.    ROS     ROS: 10 point ROS neg other than the symptoms noted above in the HPI.    Physical Exam   VS: /72 (BP Location: Left arm, Patient Position: Chair, Cuff Size: Adult Large)  Pulse 95  Temp 98.4  F (36.9  C) (Oral)  Ht 1.753 m (5' 9\")  Wt 108.9 kg (240 lb 1.6 oz)  SpO2 94%  BMI 35.46 kg/m2  GENERAL: AXOX3, NAD, well dressed, answering questions appropriately, appears stated age.  HEENT: No exopthalmous, no proptosis, EOMI, no lig lag, no retraction  NECK: Thyroid normal in size, nontender. No nodules were palpated.  CV: RRR, no rubs, gallops, no murmurs  LUNGS: CTAB, no wheezes, rales, or ronchi  ABDOMEN: +BS  EXTREMITIES: no edema, +pulses, no rashes, no lesions  NEUROLOGY: CN grossly intact, + DTR upper and lower extremity, no tremors  PSYCH: normal affect and mood      LABS:  A1c:  Lab Results   Component Value Date    A1C 10.5 03/09/2017    A1C 10.0 11/02/2016    A1C 12.9 06/27/2016    A1C 13.3 04/22/2016    A1C 7.1 11/10/2015         Creatinine:  Creatinine   Date Value Ref Range Status   03/09/2017 1.01 0.66 - 1.25 mg/dL Final       Urine Micro:  MICROL       38   5/24/2016  MICROALBUMIN    34.05   5/24/2016      LFTs/Lipids:  CHOL      137   12/28/2015  HDL       58   12/28/2015  LDL       66   12/28/2015  TRIG       66   12/28/2015  CHOLHDLRATIO   3.5   8/19/2015    TFTs:  TSH   Date Value Ref Range Status   03/09/2017 1.58 0.40 - 4.00 mU/L Final     All pertinent notes, labs, and images personally reviewed by me.     A/P  Mr.Raymond SANIA Ruffin is a 65 year old here for the evaluation/management of diabetes:    1. DM2 - Under Poor control.  A1c  10.5% .  Diabetes complicated by microalbuminuria and vasculopathy.  History " of CABG in April 2015.  BG improving. But variable. Few epsiodes of low BG in day.  FBGS again variable.  Increase  Toujeo to 70 units in AM and 65 units PM  Continue NovoLog 27-30 units with each meal  Continue metformin 1000 mg twice a day  Increase glipizide XR 10 mg in morning with food    He is using > 200 units/day. Can consider U-500 if BG are not under control with Glipizide. Discussed with him. He would like to hold off at this time.  Can consider insulin pump- he will think about it  With variable BG pattern- recommend CGM. Referral made in last clinic visit but not done yet. Encouraged to follow up on that.  Check blood glucose before each meal and at bedtime.  Do not miss meal.  Follow up in 6-8 weeks  If Blood glucose are low more often-> 2-3 times/week- give us a call.  Make better food choices: reduce carbs, Reduce portion size, weight loss and exercise 3-4 times a week.    Discussed hypoglycemia signs and symptoms as well as management in detail.     2. Hypertension - Under Good control.  Continue lisinopril 5 mg      3. Hyperlipidemia - Under Good control.  Continue atorvastatin 40 mg.  LDL 66, HDL 58     4. Prevention  Flu Shot- November 2015  Pneumovax- NA  Opthalmology- 3/2015. Due for eye exam  ASA- 325 mg  Smoking- no    5. Microalbuminuria:  MICROL       38   5/24/2016  MICROALBUMIN    34.05   5/24/2016  -- continue lisinopril 5 mg  -- recommend strict BG control.    All questions were answered.  The patient indicates understanding of the above issues and agrees with the plan set forth.     More than 50% of face to face time spent with Mr. Ruffin on counseling / coordinating his care.  Total appointment times was 25 minutes.      Follow-up:  follow up in 6 week(s)    Marilee Tolentino M.D  Endocrinology  [facility]  CC: Lauro Galloway    Disclaimer: This note consists of symbols derived from keyboarding, dictation and/or voice recognition software. As a result, there  may be errors in the script that have gone undetected. Please consider this when interpreting information found in this chart.

## 2017-04-19 NOTE — PATIENT INSTRUCTIONS
[facility] & [place] locations   Dr Tolentino, Endocrinology Department      [facility]   [address] [place] [address]  Appointment [phone]  Fax: [phone]  [place]: Monday and Tuesday         [facility]   [address] [place] [address]  Appointment [phone]  Fax: [phone]  [place]: Wednesday and Thursday          To provide the best diabetic care, please bring your blood glucose meter to each and every visit with your Endocrinologist.  Your blood glucose meter/insulin pump will be downloaded at every appointment.      Please arrive 15 minutes before your scheduled appointment.  This will allow for your blood glucose meter/insulin pump to be downloaded and glycosylated hemoglobin (A1c) to be obtained prior to your appointment.    Increase Toujeo to 70 units in AM and 65 units PM  novolog same scale  metfomrin - continue same dose  Glipizide XR increase to 10 mg in AM with food ( breakfast)  Follow up in 6 weeks    If Blood glucose are low more often-> 2-3 times/week- give us a call.    The patient is advised to Make better food choices: reduce carbs, Reduce portion size, weight loss and exercise 3-4 times a week.

## 2017-04-19 NOTE — NURSING NOTE
"Chief Complaint   Patient presents with     Consult     DM 2 LOV 16       Initial /72 (BP Location: Left arm, Patient Position: Chair, Cuff Size: Adult Large)  Pulse 95  Temp 98.4  F (36.9  C) (Oral)  Ht 1.753 m (5' 9\")  Wt 108.9 kg (240 lb 1.6 oz)  SpO2 94%  BMI 35.46 kg/m2 Estimated body mass index is 35.46 kg/(m^2) as calculated from the following:    Height as of this encounter: 1.753 m (5' 9\").    Weight as of this encounter: 108.9 kg (240 lb 1.6 oz).  Medication Reconciliation: complete     ENDOCRINOLOGY INTAKE FORM    Patient Name:  Adan Ruffin  :  1950    Is patient Diabetic?   Yes: type 2   Does patient have non-diabetic or other endocrine issues?  No    Vitals: /72 (BP Location: Left arm, Patient Position: Chair, Cuff Size: Adult Large)  Pulse 95  Temp 98.4  F (36.9  C) (Oral)  Ht 1.753 m (5' 9\")  Wt 108.9 kg (240 lb 1.6 oz)  SpO2 94%  BMI 35.46 kg/m2  BMI= Body mass index is 35.46 kg/(m^2).    Flu vaccine:  Yes: 16  Pneumonia vaccine:  Yes: 16    Smoking and Alcohol use:  Social History   Substance Use Topics     Smoking status: Former Smoker     Packs/day: 1.50     Years: 29.00     Types: Cigarettes     Quit date: 2000     Smokeless tobacco: Never Used     Alcohol use 0.0 oz/week     0 Standard drinks or equivalent per week      Comment: 1 mixed drink a night       Foot Exam: Yes: 16  Eye Exam:  Yes: 10/23/16  Dental Exam:  No  Aspirin Use:  Yes: 325 mg daily     Lab Results   Component Value Date    A1C 10.5 2017    A1C 10.0 2016    A1C 12.9 2016    A1C 13.3 2016    A1C 7.1 11/10/2015       Lab Results   Component Value Date    MICROL 38 2016     No results found for: MICROALBUMIN    Staff Signature:  Zo Richey CMA        "

## 2017-04-19 NOTE — TELEPHONE ENCOUNTER
Fax from pharmacy stating they need MAX units per day for Novolog.     Please advise. Please send New Rx or call with clarification.

## 2017-04-25 ENCOUNTER — MYC MEDICAL ADVICE (OUTPATIENT)
Dept: ENDOCRINOLOGY | Facility: CLINIC | Age: 67
End: 2017-04-25

## 2017-04-26 NOTE — TELEPHONE ENCOUNTER
Wrong type of insulin was reordered.  Patient is requesting refill of the Novolog Flexpen.  Shanna Garcia CMA ([facility])

## 2017-05-22 DIAGNOSIS — F41.9 ANXIETY: ICD-10-CM

## 2017-05-22 RX ORDER — SERTRALINE HYDROCHLORIDE 100 MG/1
100 TABLET, FILM COATED ORAL DAILY
Qty: 90 TABLET | Refills: 2 | Status: SHIPPED | OUTPATIENT
Start: 2017-05-22 | End: 2018-05-07

## 2017-05-22 NOTE — TELEPHONE ENCOUNTER
Sertraline     Last Written Prescription Date: 08/22/16  Last Fill Quantity: 90, # refills: 1  Last Office Visit with [facility] primary care provider:  03/08/17        Last PHQ-9 score on record=   PHQ-9 SCORE 5/24/2016   Total Score 3

## 2017-06-26 DIAGNOSIS — E78.5 HYPERLIPIDEMIA LDL GOAL <70: ICD-10-CM

## 2017-06-26 RX ORDER — ATORVASTATIN CALCIUM 40 MG/1
TABLET, FILM COATED ORAL
Qty: 30 TABLET | Refills: 0 | Status: SHIPPED | OUTPATIENT
Start: 2017-06-26 | End: 2017-07-19

## 2017-06-26 NOTE — TELEPHONE ENCOUNTER
Lipitor     Last Written Prescription Date: 04/03/17  Last Fill Quantity: 90, # refills: 0  Last Office Visit with [facility] or [facility] prescribing provider: 03/08/17       Lab Results   Component Value Date    CHOL 142 03/09/2017     Lab Results   Component Value Date    HDL 46 03/09/2017     Lab Results   Component Value Date    LDL 69 03/09/2017     Lab Results   Component Value Date    TRIG 134 03/09/2017     Lab Results   Component Value Date    CHOLHDLRATIO 3.5 08/19/2015       Labs showing if normal/abnormal  Lab Results   Component Value Date    CHOL 142 03/09/2017    TRIG 134 03/09/2017    HDL 46 03/09/2017    LDL 69 03/09/2017    VLDL 55 (H) 08/19/2015    CHOLHDLRATIO 3.5 08/19/2015

## 2017-06-26 NOTE — TELEPHONE ENCOUNTER
METFORMIN         Last Written Prescription Date: 01/04/17  Last Fill Quantity: 180, # refills: 1  Last Office Visit with [facility], [facility] or [facility] prescribing provider:  03/08/17        BP Readings from Last 3 Encounters:   04/19/17 128/72   03/08/17 130/70   01/19/17 106/70     Lab Results   Component Value Date    MICROL 38 05/24/2016     Lab Results   Component Value Date    UMALCR 34.05 05/24/2016     Creatinine   Date Value Ref Range Status   03/09/2017 1.01 0.66 - 1.25 mg/dL Final   ]  GFR Estimate   Date Value Ref Range Status   03/09/2017 74 >60 mL/min/1.7m2 Final     Comment:     Non  GFR Calc   05/24/2016 76 >60 mL/min/1.7m2 Final     Comment:     Non  GFR Calc   04/22/2016 55 (L) >60 mL/min/1.7m2 Final     Comment:     Non  GFR Calc     GFR Estimate If Black   Date Value Ref Range Status   03/09/2017 90 >60 mL/min/1.7m2 Final     Comment:      GFR Calc   05/24/2016 >90   GFR Calc   >60 mL/min/1.7m2 Final   04/22/2016 67 >60 mL/min/1.7m2 Final     Comment:      GFR Calc     Lab Results   Component Value Date    CHOL 142 03/09/2017     Lab Results   Component Value Date    HDL 46 03/09/2017     Lab Results   Component Value Date    LDL 69 03/09/2017     Lab Results   Component Value Date    TRIG 134 03/09/2017     Lab Results   Component Value Date    CHOLHDLRATIO 3.5 08/19/2015     Lab Results   Component Value Date    AST 25 03/09/2017     Lab Results   Component Value Date    ALT 50 03/09/2017     Lab Results   Component Value Date    A1C 10.5 03/09/2017    A1C 10.0 11/02/2016    A1C 12.9 06/27/2016    A1C 13.3 04/22/2016    A1C 7.1 11/10/2015     Potassium   Date Value Ref Range Status   03/09/2017 4.8 3.4 - 5.3 mmol/L Final       Labs showing if normal/abnormal  Lab Results   Component Value Date    UCRR 111 05/24/2016    MICROL 38 05/24/2016     Lab Results   Component Value Date    CHOL 142 03/09/2017    TRIG  134 03/09/2017    HDL 46 03/09/2017    LDL 69 03/09/2017    VLDL 55 (H) 08/19/2015    CHOLHDLRATIO 3.5 08/19/2015     Lab Results   Component Value Date    A1C 10.5 (H) 03/09/2017    A1C 10.0 (H) 11/02/2016    A1C 12.9 (H) 06/27/2016

## 2017-07-10 ENCOUNTER — DOCUMENTATION ONLY (OUTPATIENT)
Dept: LAB | Facility: CLINIC | Age: 67
End: 2017-07-10

## 2017-07-10 DIAGNOSIS — Z79.4 TYPE 2 DIABETES MELLITUS WITH DIABETIC NEPHROPATHY, WITH LONG-TERM CURRENT USE OF INSULIN (H): Primary | ICD-10-CM

## 2017-07-10 DIAGNOSIS — E11.21 TYPE 2 DIABETES MELLITUS WITH DIABETIC NEPHROPATHY, WITH LONG-TERM CURRENT USE OF INSULIN (H): Primary | ICD-10-CM

## 2017-07-19 DIAGNOSIS — E78.5 HYPERLIPIDEMIA LDL GOAL <70: ICD-10-CM

## 2017-07-19 RX ORDER — ATORVASTATIN CALCIUM 40 MG/1
TABLET, FILM COATED ORAL
Qty: 30 TABLET | Refills: 0 | Status: SHIPPED | OUTPATIENT
Start: 2017-07-19 | End: 2017-08-25

## 2017-07-19 NOTE — TELEPHONE ENCOUNTER
Atorvastatin and Metformin         Last Written Prescription Date: 6-26-17 for both  Last Fill Quantity: 30, 60, # refills: 0 for both  Last Office Visit with [facility], P or [facility] prescribing provider:  3-8-17   Next 5 appointments (look out 90 days)     Jul 20, 2017  5:00 PM CDT   [facility]    [address] [place]  [facility] [id number] [phone]            Aug 02, 2017 11:00 AM CDT   [facility] Visit with [facility]ino [facility]   [facility]    [address]  [facility] [id number] [phone]                   BP Readings from Last 3 Encounters:   04/19/17 128/72   03/08/17 130/70   01/19/17 106/70     Lab Results   Component Value Date    MICROL 38 05/24/2016     Lab Results   Component Value Date    UMALCR 34.05 05/24/2016     Creatinine   Date Value Ref Range Status   03/09/2017 1.01 0.66 - 1.25 mg/dL Final   ]  GFR Estimate   Date Value Ref Range Status   03/09/2017 74 >60 mL/min/1.7m2 Final     Comment:     Non  GFR Calc   05/24/2016 76 >60 mL/min/1.7m2 Final     Comment:     Non  GFR Calc   04/22/2016 55 (L) >60 mL/min/1.7m2 Final     Comment:     Non  GFR Calc     GFR Estimate If Black   Date Value Ref Range Status   03/09/2017 90 >60 mL/min/1.7m2 Final     Comment:      GFR Calc   05/24/2016 >90   GFR Calc   >60 mL/min/1.7m2 Final   04/22/2016 67 >60 mL/min/1.7m2 Final     Comment:      GFR Calc     Lab Results   Component Value Date    CHOL 142 03/09/2017     Lab Results   Component Value Date    HDL 46 03/09/2017     Lab Results   Component Value Date    LDL 69 03/09/2017     Lab Results   Component Value Date    TRIG 134 03/09/2017     Lab Results   Component Value Date    CHOLHDLRATIO 3.5 08/19/2015     Lab Results   Component Value Date    AST 25 03/09/2017     Lab  Results   Component Value Date    ALT 50 03/09/2017     Lab Results   Component Value Date    A1C 10.5 03/09/2017    A1C 10.0 11/02/2016    A1C 12.9 06/27/2016    A1C 13.3 04/22/2016    A1C 7.1 11/10/2015     Potassium   Date Value Ref Range Status   03/09/2017 4.8 3.4 - 5.3 mmol/L Final       Labs showing if normal/abnormal  Lab Results   Component Value Date    UCRR 111 05/24/2016    MICROL 38 05/24/2016     Lab Results   Component Value Date    CHOL 142 03/09/2017    TRIG 134 03/09/2017    HDL 46 03/09/2017    LDL 69 03/09/2017    VLDL 55 (H) 08/19/2015    CHOLHDLRATIO 3.5 08/19/2015     Lab Results   Component Value Date    A1C 10.5 (H) 03/09/2017    A1C 10.0 (H) 11/02/2016    A1C 12.9 (H) 06/27/2016       Medication is being filled for 1 time refill only due to:  has a future appt scheduled with Dr. Tolentino.

## 2017-07-20 DIAGNOSIS — Z79.4 TYPE 2 DIABETES MELLITUS WITH DIABETIC NEPHROPATHY, WITH LONG-TERM CURRENT USE OF INSULIN (H): ICD-10-CM

## 2017-07-20 DIAGNOSIS — E11.21 TYPE 2 DIABETES MELLITUS WITH DIABETIC NEPHROPATHY, WITH LONG-TERM CURRENT USE OF INSULIN (H): ICD-10-CM

## 2017-07-20 LAB — HBA1C MFR BLD: 7.8 % (ref 4.3–6)

## 2017-07-20 PROCEDURE — 36415 COLL VENOUS BLD VENIPUNCTURE: CPT | Performed by: INTERNAL MEDICINE

## 2017-07-20 PROCEDURE — 83036 HEMOGLOBIN GLYCOSYLATED A1C: CPT | Performed by: INTERNAL MEDICINE

## 2017-07-24 NOTE — PROGRESS NOTES
Labs noted. Will discuss in next clinic visit.8/2/17  Lab Results       Component                Value               Date                       A1C                      7.8                 07/20/2017                 A1C                      10.5                03/09/2017              Keep up the good work !

## 2017-07-31 NOTE — TELEPHONE ENCOUNTER
insulin aspart (NOVOLOG FLEXPEN) 100 UNIT/ML injection         Last Written Prescription Date: 4/26/2017  Last Fill Quantity: 30mL, # refills: 3  Last Office Visit with [facility], [facility] or [facility] prescribing provider:  3/8/2017   Next 5 appointments (look out 90 days)     Aug 02, 2017 11:00 AM CDT   [facility] Visit with Marilee [facility]ino MD   [facility]    [address]  [facility] [address] [phone]                   BP Readings from Last 3 Encounters:   04/19/17 128/72   03/08/17 130/70   01/19/17 106/70     Lab Results   Component Value Date    MICROL 38 05/24/2016     Lab Results   Component Value Date    UMALCR 34.05 05/24/2016     Creatinine   Date Value Ref Range Status   03/09/2017 1.01 0.66 - 1.25 mg/dL Final   ]  GFR Estimate   Date Value Ref Range Status   03/09/2017 74 >60 mL/min/1.7m2 Final     Comment:     Non  GFR Calc   05/24/2016 76 >60 mL/min/1.7m2 Final     Comment:     Non  GFR Calc   04/22/2016 55 (L) >60 mL/min/1.7m2 Final     Comment:     Non  GFR Calc     GFR Estimate If Black   Date Value Ref Range Status   03/09/2017 90 >60 mL/min/1.7m2 Final     Comment:      GFR Calc   05/24/2016 >90   GFR Calc   >60 mL/min/1.7m2 Final   04/22/2016 67 >60 mL/min/1.7m2 Final     Comment:      GFR Calc     Lab Results   Component Value Date    CHOL 142 03/09/2017     Lab Results   Component Value Date    HDL 46 03/09/2017     Lab Results   Component Value Date    LDL 69 03/09/2017     Lab Results   Component Value Date    TRIG 134 03/09/2017     Lab Results   Component Value Date    CHOLHDLRATIO 3.5 08/19/2015     Lab Results   Component Value Date    AST 25 03/09/2017     Lab Results   Component Value Date    ALT 50 03/09/2017     Lab Results   Component Value Date    A1C 7.8 07/20/2017    A1C 10.5 03/09/2017    A1C 10.0 11/02/2016    A1C 12.9  06/27/2016    A1C 13.3 04/22/2016     Potassium   Date Value Ref Range Status   03/09/2017 4.8 3.4 - 5.3 mmol/L Final

## 2017-08-01 ENCOUNTER — TELEPHONE (OUTPATIENT)
Dept: PEDIATRICS | Facility: CLINIC | Age: 67
End: 2017-08-01

## 2017-08-01 DIAGNOSIS — E11.21 TYPE 2 DIABETES MELLITUS WITH DIABETIC NEPHROPATHY (H): ICD-10-CM

## 2017-08-01 NOTE — TELEPHONE ENCOUNTER
Patient called and out of pen needles. Needs refill before tomorrows appt.      Jennifer pen needles      Last Written Prescription Date:  12/14/2015  Last Fill Quantity: 300,   # refills: 1  Last Office Visit with [facility], [facility] or [facility] prescribing provider: 4/19/2017  Future Office visit:    Next 5 appointments (look out 90 days)     Aug 02, 2017 11:00 AM CDT   [facility] with [facility]ino [facility]   [facility]    [address] [facility] [address]  [facility] [address] [phone]                   Alina Borges RN

## 2017-08-02 ENCOUNTER — OFFICE VISIT (OUTPATIENT)
Dept: ENDOCRINOLOGY | Facility: CLINIC | Age: 67
End: 2017-08-02
Payer: COMMERCIAL

## 2017-08-02 VITALS
BODY MASS INDEX: 35.41 KG/M2 | HEIGHT: 69 IN | WEIGHT: 239.1 LBS | HEART RATE: 133 BPM | SYSTOLIC BLOOD PRESSURE: 130 MMHG | DIASTOLIC BLOOD PRESSURE: 78 MMHG | OXYGEN SATURATION: 95 % | TEMPERATURE: 98 F

## 2017-08-02 DIAGNOSIS — I25.10 CORONARY ARTERY DISEASE INVOLVING NATIVE CORONARY ARTERY OF NATIVE HEART WITHOUT ANGINA PECTORIS: ICD-10-CM

## 2017-08-02 DIAGNOSIS — I10 ESSENTIAL HYPERTENSION: ICD-10-CM

## 2017-08-02 DIAGNOSIS — Z79.4 TYPE 2 DIABETES MELLITUS WITH DIABETIC NEPHROPATHY, WITH LONG-TERM CURRENT USE OF INSULIN (H): Primary | ICD-10-CM

## 2017-08-02 DIAGNOSIS — R80.9 MICROALBUMINURIA: ICD-10-CM

## 2017-08-02 DIAGNOSIS — E11.21 TYPE 2 DIABETES MELLITUS WITH DIABETIC NEPHROPATHY, WITH LONG-TERM CURRENT USE OF INSULIN (H): Primary | ICD-10-CM

## 2017-08-02 DIAGNOSIS — Z95.1 S/P CABG X 3: ICD-10-CM

## 2017-08-02 DIAGNOSIS — E78.5 HYPERLIPIDEMIA, UNSPECIFIED HYPERLIPIDEMIA TYPE: ICD-10-CM

## 2017-08-02 PROCEDURE — 99214 OFFICE O/P EST MOD 30 MIN: CPT | Performed by: INTERNAL MEDICINE

## 2017-08-02 RX ORDER — LANCETS
EACH MISCELLANEOUS
Qty: 200 EACH | Refills: 11 | Status: SHIPPED | OUTPATIENT
Start: 2017-08-02 | End: 2018-09-06

## 2017-08-02 NOTE — MR AVS SNAPSHOT
After Visit Summary   2017    Adan Ruffin    MRN: [id number]           Patient Information     Date Of Birth          1950        Visit Information        Provider Department      2017 11:00 AM Marilee Tolentino MD [facility]        Today's Diagnoses     Type 2 diabetes mellitus with diabetic nephropathy, with long-term current use of insulin (H)    -  1      Care Instructions    [facility] & [place] locations   Dr Tolentino, Endocrinology Department      [facility]   [address]  [place] [address]  Appointment [phone]  Fax: [phone]  [place]: Monday and Tuesday         [facility]   [address]  [place] [address]  Appointment [phone]  Fax: [phone]  [place]: Wednesday and Thursday            Please arrive 30 minutes before your scheduled appointment.   First go to the main floor lab suit  for previsit A1c lab appointment and then come upstairs and get checked in with  for clinic visit.  This will allow for your blood glucose meter/insulin pump to be downloaded and glycosylated hemoglobin (A1c) to be obtained prior to your appointment.      Increase Toujeo to 80 units in AM and continue 70 units at night  Take NOvolog with Meals only  NOvolog dose change- take 1 Unit/ 3 gm of CHO + correction scale 1 unit for 10 above 140 ( with meals).  At night only correction above 150 ( 1 unit for 20 > 150)    Follow up in 3 months    Recommend checking blood sugars before meals and at bedtime.    If Blood glucose are low more often-> 2-3 times/week- give us a call.  The patient is advised to Make better food choices: reduce carbs, Reduce portion size, weight loss and exercise 3-4 times a week.  Discussed hypoglycemia signs and symptoms as well as management in detail.                Follow-ups after your visit        Future  "tests that were ordered for you today     Open Future Orders        Priority Expected Expires Ordered    Hemoglobin A1c ASAP 10/1/2017 5/29/2018 8/2/2017            Who to contact     If you have questions or need follow up information about today's clinic visit or your schedule please contact [facility] directly at [phone].  Normal or non-critical lab and imaging results will be communicated to you by MyChart, letter or phone within 4 business days after the clinic has received the results. If you do not hear from us within 7 days, please contact the clinic through [employer] or phone. If you have a critical or abnormal lab result, we will notify you by phone as soon as possible.  Submit refill requests through [employer] or call your pharmacy and they will forward the refill request to us. Please allow 3 business days for your refill to be completed.          Additional Information About Your Visit        MyChart Information     [employer] gives you secure access to your electronic health record. If you see a primary care provider, you can also send messages to your care team and make appointments. If you have questions, please call your primary care clinic.  If you do not have a primary care provider, please call [phone] and they will assist you.        Care EveryWhere ID     This is your Care EveryWhere ID. This could be used by other organizations to access your [place] medical records  [id number]        Your Vitals Were     Pulse Temperature Height Pulse Oximetry BMI (Body Mass Index)       133 98  F (36.7  C) (Oral) 1.753 m (5' 9\") 95% 35.31 kg/m2        Blood Pressure from Last 3 Encounters:   08/02/17 130/78   04/19/17 128/72   03/08/17 130/70    Weight from Last 3 Encounters:   08/02/17 108.5 kg (239 lb 1.6 oz)   04/19/17 108.9 kg (240 lb 1.6 oz)   03/08/17 112 kg (247 lb)                 Today's Medication Changes          These changes are accurate as of: 8/2/17 11:38 AM.  If you " have any questions, ask your nurse or doctor.               Start taking these medicines.        Dose/Directions    blood glucose monitoring lancets   Used for:  Type 2 diabetes mellitus with diabetic nephropathy, with long-term current use of insulin (H)   Started by:  Marilee Tolentino MD        Use to test blood sugar 4 times daily or as directed.   Quantity:  200 each   Refills:  11         These medicines have changed or have updated prescriptions.        Dose/Directions    * insulin aspart 100 UNIT/ML injection   Commonly known as:  NovoLOG PENFILL   This may have changed:  Another medication with the same name was changed. Make sure you understand how and when to take each.   Used for:  Type 2 diabetes mellitus with diabetic nephropathy, with long-term current use of insulin (H)   Changed by:  Marilee Tolentino MD        Take 1 unit for every 5 grams of carbohydrate PLUS 1 unit for every 10 mg/dL you are over 140mg/dL. MAX UNITS 120 DAILY   Quantity:  30 mL   Refills:  3       * insulin aspart 100 UNIT/ML injection   Commonly known as:  NovoLOG FLEXPEN   This may have changed:  additional instructions   Used for:  Type 2 diabetes mellitus with diabetic nephropathy, with long-term current use of insulin (H)   Changed by:  Marilee Tolentino MD        Take 1 unit for every 3 grams of carbohydrate PLUS 1 unit for every 10 mg/dL you are over 140mg/dL  units   Quantity:  45 mL   Refills:  3       insulin glargine U-300 300 UNIT/ML injection   Commonly known as:  TOUJEO   This may have changed:  additional instructions   Used for:  Type 2 diabetes mellitus with diabetic nephropathy, with long-term current use of insulin (H)   Changed by:  Marilee Tolentino MD        Inject 80 units in am, inject 70 units in pm   Quantity:  30 mL   Refills:  3       * Notice:  This list has 2 medication(s) that are the same as other medications prescribed for you. Read the directions carefully, and ask your  doctor or other care provider to review them with you.         Where to get your medicines      These medications were sent to [facility] - [place], [place] - [address] [place] [address]  [address], [facility] [address]     Phone:  [phone]     blood glucose monitoring lancets    insulin aspart 100 UNIT/ML injection    insulin pen needle 30G X 8 MM         Some of these will need a paper prescription and others can be bought over the counter.  Ask your nurse if you have questions.     You don't need a prescription for these medications     insulin glargine U-300 300 UNIT/ML injection                Primary Care Provider Office Phone # Fax #    Lauro Galloway [phone]       [facility] [address] [facility] [address]        Equal Access to Services     DANIEL FAM : Hadii aad ku hadasho Soomaali, waaxda luqadaha, qaybta kaalmada adeegyada, waxay karriein hayaan kishan martinez . So [facility] [phone].    ATENCIÓN: Si habla español, tiene a moore disposición servicios gratuitos de asistencia lingüística. Ton al [phone].    We comply with applicable federal civil rights laws and [place] laws. We do not discriminate on the basis of race, color, national origin, age, disability sex, sexual orientation or gender identity.            Thank you!     Thank you for choosing [facility]  for your care. Our goal is always to provide you with excellent care. Hearing back from our patients is one way we can continue to improve our services. Please take a few minutes to complete the written survey that you may receive in the mail after your visit with us. Thank you!             Your Updated Medication List - Protect others around you: Learn how to safely use, store and throw away your medicines at www.disposemymeds.org.          This list is accurate as of: 8/2/17 11:38 AM.  Always use your most recent med  list.                   Brand Name Dispense Instructions for use Diagnosis    ACE NOT PRESCRIBED (INTENTIONAL)     0 each    1 each daily ACE Inhibitor not prescribed due to Intolerance    Type II or unspecified type diabetes mellitus without mention of complication, not stated as uncontrolled       aspirin 325 MG EC tablet     60 tablet    Take 1 tablet (325 mg) by mouth daily    CAD (coronary artery disease), S/P CABG x 3, Anxiety       atorvastatin 40 MG tablet    LIPITOR    30 tablet    TAKE 1 TABLET BY MOUTH DAILY    Hyperlipidemia LDL goal <70       blood glucose monitoring lancets     200 each    Use to test blood sugar 4 times daily or as directed.    Type 2 diabetes mellitus with diabetic nephropathy, with long-term current use of insulin (H)       blood glucose monitoring test strip    no brand specified    6 Box    Use to test blood sugars 4 times daily or as directed    Type 2 diabetes mellitus with diabetic nephropathy (H)       CENTRUM SILVER per tablet      Take 1 tablet by mouth daily        furosemide 20 MG tablet    LASIX    90 tablet    TAKE 1 TABLET(20 MG) BY MOUTH DAILY    Essential hypertension       glipiZIDE 10 MG 24 hr tablet    glipiZIDE XL    30 tablet    Take 1 tablet (10 mg) by mouth daily    Type 2 diabetes mellitus with diabetic nephropathy, with long-term current use of insulin (H)       * insulin aspart 100 UNIT/ML injection    NovoLOG PENFILL    30 mL    Take 1 unit for every 5 grams of carbohydrate PLUS 1 unit for every 10 mg/dL you are over 140mg/dL. MAX UNITS 120 DAILY    Type 2 diabetes mellitus with diabetic nephropathy, with long-term current use of insulin (H)       * insulin aspart 100 UNIT/ML injection    NovoLOG FLEXPEN    45 mL    Take 1 unit for every 3 grams of carbohydrate PLUS 1 unit for every 10 mg/dL you are over 140mg/dL  units    Type 2 diabetes mellitus with diabetic nephropathy, with long-term current use of insulin (H)       insulin glargine U-300 300  UNIT/ML injection    TOUJEO    30 mL    Inject 80 units in am, inject 70 units in pm    Type 2 diabetes mellitus with diabetic nephropathy, with long-term current use of insulin (H)       insulin pen needle 30G X 8 MM    NOVOFINE    300 each    Use 4-5 daily or as directed.    Type 2 diabetes mellitus with diabetic nephropathy, with long-term current use of insulin (H)       lisinopril 5 MG tablet    PRINIVIL/ZESTRIL    90 tablet    Take 1 tablet (5 mg) by mouth daily    Proteinuria       metFORMIN 1000 MG tablet    GLUCOPHAGE    60 tablet    TAKE 1 TABLET BY MOUTH TWICE DAILY WITH MEALS    Insulin dependent diabetes mellitus (H)       metoprolol 25 MG 24 hr tablet    TOPROL-XL    270 tablet    Take 1.5 tablets (37.5 mg) by mouth 2 times daily    S/P CABG x 3       * order for DME     3 Month    All DM testing supplies including test strips, lancets, solution, syringes, needles and/or pen needles for testing 4 times per day & injecting 5 times per day. Dispense glucometer compatible supplies. Accucheck Geena.    Type 2 diabetes mellitus with diabetic nephropathy (H)       * order for DME     1 Box    All DM testing supplies including test strips, lancets, solution, syringes, needles and/or pen needles for testing 3 times per day & injecting 3 times per day.   RX goes to  [facility] Supplier, see tele enc 09/21/2016    Type 2 diabetes mellitus with diabetic nephropathy (H)       order for DME     1 each    Equipment being ordered: blood glucose meter    Type 2 diabetes mellitus with diabetic nephropathy, with long-term current use of insulin (H)       sertraline 100 MG tablet    ZOLOFT    90 tablet    Take 1 tablet (100 mg) by mouth daily    Anxiety       vitamin B complex with vitamin C Tabs tablet      Take 2 tablets by mouth daily        VITAMIN C PO           * Notice:  This list has 4 medication(s) that are the same as other medications prescribed for you. Read the directions carefully, and ask your doctor or  other care provider to review them with you.

## 2017-08-02 NOTE — NURSING NOTE
"Chief Complaint   Patient presents with     Consult     DM2 LOV 17       Initial /78 (BP Location: Left arm, Patient Position: Chair, Cuff Size: Adult Large)  Pulse 133  Temp 98  F (36.7  C) (Oral)  Ht 1.753 m (5' 9\")  Wt 108.5 kg (239 lb 1.6 oz)  SpO2 95%  BMI 35.31 kg/m2 Estimated body mass index is 35.31 kg/(m^2) as calculated from the following:    Height as of this encounter: 1.753 m (5' 9\").    Weight as of this encounter: 108.5 kg (239 lb 1.6 oz).  Medication Reconciliation: complete     ENDOCRINOLOGY INTAKE FORM    Patient Name:  Adan Ruffin  :  1950    Is patient Diabetic?   Yes: type 2   Does patient have non-diabetic or other endocrine issues?  No    Vitals: /78 (BP Location: Left arm, Patient Position: Chair, Cuff Size: Adult Large)  Pulse 133  Temp 98  F (36.7  C) (Oral)  Ht 1.753 m (5' 9\")  Wt 108.5 kg (239 lb 1.6 oz)  SpO2 95%  BMI 35.31 kg/m2  BMI= Body mass index is 35.31 kg/(m^2).    Flu vaccine:  Yes: 16  Pneumonia vaccine:  Yes: 16    Smoking and Alcohol use:  Social History   Substance Use Topics     Smoking status: Former Smoker     Packs/day: 1.50     Years: 29.00     Types: Cigarettes     Quit date: 2000     Smokeless tobacco: Never Used     Alcohol use 0.0 oz/week     0 Standard drinks or equivalent per week      Comment: 1 mixed drink a night       Foot Exam: No  Eye Exam:  Yes: 10/23/16  Dental Exam:  Due   Aspirin Use:  Yes: 325 mg daily     Lab Results   Component Value Date    A1C 7.8 2017    A1C 10.5 2017    A1C 10.0 2016    A1C 12.9 2016    A1C 13.3 2016       Lab Results   Component Value Date    MICROL 38 2016     No results found for: MICROALBUMIN        Staff Signature:  Zo Richey CMA (AAMA)        "

## 2017-08-02 NOTE — PROGRESS NOTES
ENDOCRINOLOGY CLINIC NOTE:  Name: Adan Ruffin  Seen for follow-up of diabetes .  HPI:  Adan Ruffin is a 65 year old male who presents for the evaluation/management of Diabetes.he was diagnosed with diabetes about 20 years back and is on insulin for last 15 years.  Blood sugar control was optimal.  A few years back.  Underwent CABG in April 2015    1. Type 2 DM:  Orginally diagnosed about 20 years back  Current Regimen: glipizide 5 mg/day, Metformin 1000 mg twice a day, Toujeo 75 units AM and 70 units PM, NovoLog-estimates the amount each time.  Takes about 40 units with each meal. Carb content is mostly same on each day.  About 1 unit/3 gm of CHO + 1 unit for 5 > 140. Does correction at bedtime  BS checks: in the morning and before going to bed  Average Meter Download: 156.  Blood sugars are variable.  Sometimes he is over correcting blood sugar with too much insulin resulting in drop off blood sugar.  Sometimes he is not sure whether he took the dose and end up taking the dose again resulting in low blood sugar.  Sometimes he takes short-acting insulin even though he is not eating and then drops blood sugar.  There is no consistency with eating pattern.  As compared to last time in general blood sugars are looking improved.  Symptoms of hypoglycemia (low blood sugar):  Gets symptoms of hypoglycemia.  Episodes of hypoglycemia:occasional  Fixed meal pattern:typically has breakfast and dinner.  Sometimes skips lunch.  Patient counting carbs:no    DM Complications:   Nephropathy: has microalbuminuria  Retinopathy: no  Neuropathy: no  Microalbuminuria:present, on lisinopril 5 mg  MICROL       38   5/24/2016  MICROALBUMIN    34.05   5/24/2016    CAD/PAD: yes, CABG in April 2015  Gastroparesis: no  Hypoglycemia unawareness: no    2. Hypertension:    Blood pressure medications include lisinopril 5 mg  3. Hyperlipidemia: Takes atorvastatin 40 mg         PMH/PSH:  Past Medical History:   Diagnosis Date     CAD  (coronary artery disease) 3/13/2015     Chest pain on exertion      Diabetes mellitus (H)     Insulin & Metformin...diagnosed in 1996     Diabetes mellitus, type 2 (H) 8/21/2015     Encounter for long-term (current) use of insulin (H) 11/2/2016     Former smoker, stopped smoking in distant past     Quit 10 yrs ago, 1-2 PPD x 20+ yrs     History of angina      HTN (hypertension)      Hyperlipidaemia      Microalbuminuria 6/27/2016     Obesity 3/13/2015     Past Surgical History:   Procedure Laterality Date     BYPASS GRAFT ARTERY CORONARY N/A 4/7/2015    Procedure: BYPASS GRAFT ARTERY CORONARY;  Surgeon: Johan Hall MD;  Location: [facility]     COLONOSCOPY N/A 6/20/2016    Procedure: COLONOSCOPY;  Surgeon: Marcela Schwartz MD;  Location: [facility]     EXCISE PILONIDAL CYST, SIMPLE  1975     HEART CATH, ANGIOPLASTY  3-    3 vessel disease-occluded RCA, stenosis LM-to have CABG     removal of skin cancer  1992     TONSILLECTOMY & ADENOIDECTOMY  1950     Family Hx:  Family History   Problem Relation Age of Onset     Breast Cancer Mother      CEREBROVASCULAR DISEASE Mother      Hyperlipidemia Father      CANCER Father      lung, unrelated to smoking     CANCER Paternal Grandmother        Diabetes:    Social Hx:  Social History     Social History     Marital status: Single     Spouse name: N/A     Number of children: N/A     Years of education: N/A     Occupational History     Not on file.     Social History Main Topics     Smoking status: Former Smoker     Packs/day: 1.50     Years: 29.00     Types: Cigarettes     Quit date: 1/1/2000     Smokeless tobacco: Never Used     Alcohol use 0.0 oz/week     0 Standard drinks or equivalent per week      Comment: 1 mixed drink a night     Drug use: No     Sexual activity: Yes     Partners: Female     Other Topics Concern     Caffeine Concern No     none     Sleep Concern No     Stress Concern Yes     sometimes at home     Weight Concern Yes     would like to lose  "weight     Special Diet No     Exercise No     Seat Belt Yes     Social History Narrative          MEDICATIONS:  has a current medication list which includes the following prescription(s): insulin pen needle, ascorbic acid, insulin aspart, blood glucose monitoring, insulin glargine u-300, atorvastatin, metformin, sertraline, glipizide, insulin aspart, furosemide, lisinopril, order for dme, order for dme, order for dme, blood glucose monitoring, ACE NOT PRESCRIBED, INTENTIONAL,, aspirin, centrum silver, vitamin b complex with vitamin c, and metoprolol.    ROS     ROS: 10 point ROS neg other than the symptoms noted above in the HPI.    Physical Exam   VS: /78 (BP Location: Left arm, Patient Position: Chair, Cuff Size: Adult Large)  Pulse 133  Temp 98  F (36.7  C) (Oral)  Ht 1.753 m (5' 9\")  Wt 108.5 kg (239 lb 1.6 oz)  SpO2 95%  BMI 35.31 kg/m2  GENERAL: AXOX3, NAD, well dressed, answering questions appropriately, appears stated age.  HEENT: No exopthalmous, no proptosis, EOMI, no lig lag, no retraction  NECK: Thyroid normal in size, non tender, no nodules were palpated.  CV: RRR  LUNGS: CTAB  ABDOMEN: +BS  NEUROLOGY: CN grossly intact, no tremors  FOOT: DM FOOT EXAM: normal DP and PT pulses, no trophic changes or ulcerative lesions, normal sensory exam and normal monofilament exam.   PSYCH: normal affect and mood      LABS:  A1c:  Lab Results   Component Value Date    A1C 7.8 07/20/2017    A1C 10.5 03/09/2017    A1C 10.0 11/02/2016    A1C 12.9 06/27/2016    A1C 13.3 04/22/2016           Creatinine:  Creatinine   Date Value Ref Range Status   03/09/2017 1.01 0.66 - 1.25 mg/dL Final       Urine Micro:  MICROL       38   5/24/2016  MICROALBUMIN    34.05   5/24/2016      LFTs/Lipids:  CHOL      137   12/28/2015  HDL       58   12/28/2015  LDL       66   12/28/2015  TRIG       66   12/28/2015  CHOLHDLRATIO   3.5   8/19/2015    TFTs:  TSH   Date Value Ref Range Status   03/09/2017 1.58 0.40 - 4.00 mU/L Final "     All pertinent notes, labs, and images personally reviewed by me.     A/P  Mr.Raymond SANIA Ruffin is a 65 year old here for the evaluation/management of diabetes:    1. DM2 - Under fair control.  A1c improved to 7.8%.  Diabetes complicated by microalbuminuria and vasculopathy.  History of CABG in April 2015.  BG improving. But variable. Few epsiodes of low BG in day likely in the setting of taking too much short-acting insulin.  FBGS again variable.  Increase  Toujeo to 75 units in AM and continue 70 units PM  His correctional scale is very strict.  Currently using 1-5 >140  NovoLog: Continue 1 Unit/ 3 gm of CHO and decrease correctional scale to 1-15 > 140.   Continue metformin 1000 mg twice a day  Continue glipizide XR 10 mg in morning with food    He is using > 200 units/day. Can consider U-500 if BG are not under control  Can consider insulin pump- he will think about it  With variable BG pattern- recommend CGM. Referral made in last clinic visit but not done yet. Encouraged to follow up on that.  Check blood glucose before each meal and at bedtime.  Do not miss meal.  Follow up in 3 months    Recommend checking blood sugars before meals and at bedtime.    If Blood glucose are low more often-> 2-3 times/week- give us a call.  The patient is advised to Make better food choices: reduce carbs, Reduce portion size, weight loss and exercise 3-4 times a week.  Discussed hypoglycemia signs and symptoms as well as management in detail.       2. Hypertension - Under Good control.  Continue lisinopril 5 mg      3. Hyperlipidemia - Under Good control.  Continue atorvastatin 40 mg.  LDL 66, HDL 58     4. Prevention  Flu Shot- November 2015  Pneumovax- NA  Opthalmology- 3/2015. Due for eye exam  ASA- 325 mg  Smoking- no    5. Microalbuminuria:  MICROL       38   5/24/2016  MICROALBUMIN    34.05   5/24/2016  -- continue lisinopril 5 mg  -- recommend strict BG control.    All questions were answered.  The patient indicates  understanding of the above issues and agrees with the plan set forth.     More than 50% of face to face time spent with Mr. Ruffin on counseling / coordinating his care.  Total appointment times was 25 minutes.      Follow-up:  follow up in 3 months    Marilee Tolentino M.D  [facility]  CC: Lauro Galloway    Disclaimer: This note consists of symbols derived from keyboarding, dictation and/or voice recognition software. As a result, there may be errors in the script that have gone undetected. Please consider this when interpreting information found in this chart.

## 2017-08-02 NOTE — PATIENT INSTRUCTIONS
[facility] & [place] locations   Dr Tolentino, Endocrinology Department      [facility]   [address]  [place] [address]  Appointment [phone]  Fax: [phone]  [place]: Monday and Tuesday         [facility]   [address]  [place] [address]  Appointment [phone]  Fax: [phone]  [place]: Wednesday and Thursday            Please arrive 30 minutes before your scheduled appointment.   First go to the main floor lab suit  for previsit A1c lab appointment and then come upstairs and get checked in with  for clinic visit.  This will allow for your blood glucose meter/insulin pump to be downloaded and glycosylated hemoglobin (A1c) to be obtained prior to your appointment.      Increase Toujeo to 80 units in AM and continue 70 units at night  Take NOvolog with Meals only  NOvolog dose change- take 1 Unit/ 3 gm of CHO + correction scale 1 unit for 10 above 140 ( with meals).    Follow up in 3 months    Recommend checking blood sugars before meals and at bedtime.    If Blood glucose are low more often-> 2-3 times/week- give us a call.  The patient is advised to Make better food choices: reduce carbs, Reduce portion size, weight loss and exercise 3-4 times a week.  Discussed hypoglycemia signs and symptoms as well as management in detail.

## 2017-08-07 DIAGNOSIS — E11.21 TYPE 2 DIABETES MELLITUS WITH DIABETIC NEPHROPATHY, WITH LONG-TERM CURRENT USE OF INSULIN (H): ICD-10-CM

## 2017-08-07 DIAGNOSIS — Z79.4 TYPE 2 DIABETES MELLITUS WITH DIABETIC NEPHROPATHY, WITH LONG-TERM CURRENT USE OF INSULIN (H): ICD-10-CM

## 2017-08-07 RX ORDER — GLIPIZIDE 10 MG/1
10 TABLET, FILM COATED, EXTENDED RELEASE ORAL DAILY
Qty: 30 TABLET | Refills: 1 | Status: SHIPPED | OUTPATIENT
Start: 2017-08-07 | End: 2017-09-06

## 2017-08-07 NOTE — TELEPHONE ENCOUNTER
Glipizide         Last Written Prescription Date: 04/19/17  Last Fill Quantity: 30, # refills: 3  Last Office Visit with G, [facility] or [facility] prescribing provider:  03/08/17        BP Readings from Last 3 Encounters:   08/02/17 130/78   04/19/17 128/72   03/08/17 130/70     Lab Results   Component Value Date    MICROL 38 05/24/2016     Lab Results   Component Value Date    UMALCR 34.05 05/24/2016     Creatinine   Date Value Ref Range Status   03/09/2017 1.01 0.66 - 1.25 mg/dL Final   ]  GFR Estimate   Date Value Ref Range Status   03/09/2017 74 >60 mL/min/1.7m2 Final     Comment:     Non  GFR Calc   05/24/2016 76 >60 mL/min/1.7m2 Final     Comment:     Non  GFR Calc   04/22/2016 55 (L) >60 mL/min/1.7m2 Final     Comment:     Non  GFR Calc     GFR Estimate If Black   Date Value Ref Range Status   03/09/2017 90 >60 mL/min/1.7m2 Final     Comment:      GFR Calc   05/24/2016 >90   GFR Calc   >60 mL/min/1.7m2 Final   04/22/2016 67 >60 mL/min/1.7m2 Final     Comment:      GFR Calc     Lab Results   Component Value Date    CHOL 142 03/09/2017     Lab Results   Component Value Date    HDL 46 03/09/2017     Lab Results   Component Value Date    LDL 69 03/09/2017     Lab Results   Component Value Date    TRIG 134 03/09/2017     Lab Results   Component Value Date    CHOLHDLRATIO 3.5 08/19/2015     Lab Results   Component Value Date    AST 25 03/09/2017     Lab Results   Component Value Date    ALT 50 03/09/2017     Lab Results   Component Value Date    A1C 7.8 07/20/2017    A1C 10.5 03/09/2017    A1C 10.0 11/02/2016    A1C 12.9 06/27/2016    A1C 13.3 04/22/2016     Potassium   Date Value Ref Range Status   03/09/2017 4.8 3.4 - 5.3 mmol/L Final       Labs showing if normal/abnormal  Lab Results   Component Value Date    UCRR 111 05/24/2016    MICROL 38 05/24/2016     Lab Results   Component Value Date    CHOL 142 03/09/2017    TRIG  134 03/09/2017    HDL 46 03/09/2017    LDL 69 03/09/2017    VLDL 55 (H) 08/19/2015    CHOLHDLRATIO 3.5 08/19/2015     Lab Results   Component Value Date    A1C 7.8 (H) 07/20/2017    A1C 10.5 (H) 03/09/2017    A1C 10.0 (H) 11/02/2016

## 2017-08-25 DIAGNOSIS — E78.5 HYPERLIPIDEMIA LDL GOAL <70: ICD-10-CM

## 2017-08-26 RX ORDER — ATORVASTATIN CALCIUM 40 MG/1
TABLET, FILM COATED ORAL
Qty: 30 TABLET | Refills: 0 | Status: SHIPPED | OUTPATIENT
Start: 2017-08-26 | End: 2017-09-27

## 2017-08-26 NOTE — TELEPHONE ENCOUNTER
Atorvastatin     Last Written Prescription Date: 07/19/17  Last Fill Quantity: 30, # refills: 0  Last Office Visit with [facility], [facility] or  Health prescribing provider: 03/08/17  Next 5 appointments (look out 90 days)     Sep 15, 2017  9:40 AM CDT   SHORT with Lauro Galloway MD   [facility]    [address]  [facility] [address]   [phone]                   Lab Results   Component Value Date    CHOL 142 03/09/2017     Lab Results   Component Value Date    HDL 46 03/09/2017     Lab Results   Component Value Date    LDL 69 03/09/2017     Lab Results   Component Value Date    TRIG 134 03/09/2017     Lab Results   Component Value Date    CHOLHDLRATIO 3.5 08/19/2015       Labs showing if normal/abnormal  Lab Results   Component Value Date    CHOL 142 03/09/2017    TRIG 134 03/09/2017    HDL 46 03/09/2017    LDL 69 03/09/2017    VLDL 55 (H) 08/19/2015    CHOLHDLRATIO 3.5 08/19/2015     Metformin         Last Written Prescription Date: 07/19/17   Last Fill Quantity: 60, # refills: 0  Last Office Visit with [facility], [facility] or  Health prescribing provider:  03/08/17   Next 5 appointments (look out 90 days)     Sep 15, 2017  9:40 AM CDT   SHORT with Lauro Galloway MD   [facility]    [address]  [facility] [address]   [phone]                   BP Readings from Last 3 Encounters:   08/02/17 130/78   04/19/17 128/72   03/08/17 130/70     Lab Results   Component Value Date    MICROL 38 05/24/2016     Lab Results   Component Value Date    UMALCR 34.05 05/24/2016     Creatinine   Date Value Ref Range Status   03/09/2017 1.01 0.66 - 1.25 mg/dL Final   ]  GFR Estimate   Date Value Ref Range Status   03/09/2017 74 >60 mL/min/1.7m2 Final     Comment:     Non  GFR Calc   05/24/2016 76 >60 mL/min/1.7m2 Final     Comment:     Non  GFR Calc   04/22/2016 55 (L) >60 mL/min/1.7m2 Final      Comment:     Non  GFR Calc     GFR Estimate If Black   Date Value Ref Range Status   03/09/2017 90 >60 mL/min/1.7m2 Final     Comment:      GFR Calc   05/24/2016 >90   GFR Calc   >60 mL/min/1.7m2 Final   04/22/2016 67 >60 mL/min/1.7m2 Final     Comment:      GFR Calc     Lab Results   Component Value Date    CHOL 142 03/09/2017     Lab Results   Component Value Date    HDL 46 03/09/2017     Lab Results   Component Value Date    LDL 69 03/09/2017     Lab Results   Component Value Date    TRIG 134 03/09/2017     Lab Results   Component Value Date    CHOLHDLRATIO 3.5 08/19/2015     Lab Results   Component Value Date    AST 25 03/09/2017     Lab Results   Component Value Date    ALT 50 03/09/2017     Lab Results   Component Value Date    A1C 7.8 07/20/2017    A1C 10.5 03/09/2017    A1C 10.0 11/02/2016    A1C 12.9 06/27/2016    A1C 13.3 04/22/2016     Potassium   Date Value Ref Range Status   03/09/2017 4.8 3.4 - 5.3 mmol/L Final       Labs showing if normal/abnormal  Lab Results   Component Value Date    UCRR 111 05/24/2016    MICROL 38 05/24/2016     Lab Results   Component Value Date    CHOL 142 03/09/2017    TRIG 134 03/09/2017    HDL 46 03/09/2017    LDL 69 03/09/2017    VLDL 55 (H) 08/19/2015    CHOLHDLRATIO 3.5 08/19/2015     Lab Results   Component Value Date    A1C 7.8 (H) 07/20/2017    A1C 10.5 (H) 03/09/2017    A1C 10.0 (H) 11/02/2016     Medication is being filled for 1 time refill only due to:  upcoming appt

## 2017-09-06 DIAGNOSIS — Z79.4 TYPE 2 DIABETES MELLITUS WITH DIABETIC NEPHROPATHY, WITH LONG-TERM CURRENT USE OF INSULIN (H): ICD-10-CM

## 2017-09-06 DIAGNOSIS — E11.21 TYPE 2 DIABETES MELLITUS WITH DIABETIC NEPHROPATHY, WITH LONG-TERM CURRENT USE OF INSULIN (H): ICD-10-CM

## 2017-09-07 RX ORDER — GLIPIZIDE 10 MG/1
TABLET, FILM COATED, EXTENDED RELEASE ORAL
Qty: 30 TABLET | Refills: 0 | Status: SHIPPED | OUTPATIENT
Start: 2017-09-07 | End: 2018-01-25

## 2017-09-07 NOTE — TELEPHONE ENCOUNTER
Glipizide 10 mg         Last Written Prescription Date: 8/7/17  Last Fill Quantity: 30, # refills: 1  Last Office Visit with G, P or  Health prescribing provider:  3/8/17   Next 5 appointments (look out 90 days)     Sep 15, 2017  9:40 AM CDT   SHORT with Lauro Galloway MD   [facility]    [address]  [facility] [address] [phone]                   BP Readings from Last 3 Encounters:   08/02/17 130/78   04/19/17 128/72   03/08/17 130/70     Lab Results   Component Value Date    MICROL 38 05/24/2016     Lab Results   Component Value Date    UMALCR 34.05 05/24/2016     Creatinine   Date Value Ref Range Status   03/09/2017 1.01 0.66 - 1.25 mg/dL Final   ]  GFR Estimate   Date Value Ref Range Status   03/09/2017 74 >60 mL/min/1.7m2 Final     Comment:     Non  GFR Calc   05/24/2016 76 >60 mL/min/1.7m2 Final     Comment:     Non  GFR Calc   04/22/2016 55 (L) >60 mL/min/1.7m2 Final     Comment:     Non  GFR Calc     GFR Estimate If Black   Date Value Ref Range Status   03/09/2017 90 >60 mL/min/1.7m2 Final     Comment:      GFR Calc   05/24/2016 >90   GFR Calc   >60 mL/min/1.7m2 Final   04/22/2016 67 >60 mL/min/1.7m2 Final     Comment:      GFR Calc     Lab Results   Component Value Date    CHOL 142 03/09/2017     Lab Results   Component Value Date    HDL 46 03/09/2017     Lab Results   Component Value Date    LDL 69 03/09/2017     Lab Results   Component Value Date    TRIG 134 03/09/2017     Lab Results   Component Value Date    CHOLHDLRATIO 3.5 08/19/2015     Lab Results   Component Value Date    AST 25 03/09/2017     Lab Results   Component Value Date    ALT 50 03/09/2017     Lab Results   Component Value Date    A1C 7.8 07/20/2017    A1C 10.5 03/09/2017    A1C 10.0 11/02/2016    A1C 12.9 06/27/2016    A1C 13.3 04/22/2016     Potassium   Date Value Ref  Range Status   03/09/2017 4.8 3.4 - 5.3 mmol/L Final       Labs showing if normal/abnormal  Lab Results   Component Value Date    UCRR 111 05/24/2016    MICROL 38 05/24/2016     Lab Results   Component Value Date    CHOL 142 03/09/2017    TRIG 134 03/09/2017    HDL 46 03/09/2017    LDL 69 03/09/2017    VLDL 55 (H) 08/19/2015    CHOLHDLRATIO 3.5 08/19/2015     Lab Results   Component Value Date    A1C 7.8 (H) 07/20/2017    A1C 10.5 (H) 03/09/2017    A1C 10.0 (H) 11/02/2016

## 2017-09-15 ENCOUNTER — OFFICE VISIT (OUTPATIENT)
Dept: INTERNAL MEDICINE | Facility: CLINIC | Age: 67
End: 2017-09-15
Payer: COMMERCIAL

## 2017-09-15 VITALS
DIASTOLIC BLOOD PRESSURE: 76 MMHG | HEART RATE: 120 BPM | OXYGEN SATURATION: 91 % | HEIGHT: 69 IN | SYSTOLIC BLOOD PRESSURE: 136 MMHG | BODY MASS INDEX: 35.55 KG/M2 | WEIGHT: 240 LBS | TEMPERATURE: 98.5 F

## 2017-09-15 DIAGNOSIS — Z12.5 SCREENING FOR PROSTATE CANCER: ICD-10-CM

## 2017-09-15 DIAGNOSIS — I10 ESSENTIAL HYPERTENSION: ICD-10-CM

## 2017-09-15 DIAGNOSIS — Z00.00 ROUTINE GENERAL MEDICAL EXAMINATION AT A HEALTH CARE FACILITY: Primary | ICD-10-CM

## 2017-09-15 DIAGNOSIS — F41.9 ANXIETY: ICD-10-CM

## 2017-09-15 DIAGNOSIS — E78.5 HYPERLIPIDEMIA, UNSPECIFIED HYPERLIPIDEMIA TYPE: ICD-10-CM

## 2017-09-15 DIAGNOSIS — I47.10 PAROXYSMAL SUPRAVENTRICULAR TACHYCARDIA (H): ICD-10-CM

## 2017-09-15 DIAGNOSIS — Z95.1 S/P CABG X 3: ICD-10-CM

## 2017-09-15 PROCEDURE — 93000 ELECTROCARDIOGRAM COMPLETE: CPT | Performed by: INTERNAL MEDICINE

## 2017-09-15 PROCEDURE — 99396 PREV VISIT EST AGE 40-64: CPT | Performed by: INTERNAL MEDICINE

## 2017-09-15 PROCEDURE — G0103 PSA SCREENING: HCPCS | Performed by: INTERNAL MEDICINE

## 2017-09-15 RX ORDER — METOPROLOL SUCCINATE 25 MG/1
25 TABLET, EXTENDED RELEASE ORAL 2 TIMES DAILY
Qty: 180 TABLET | Refills: 1 | Status: SHIPPED | OUTPATIENT
Start: 2017-09-15 | End: 2017-10-02

## 2017-09-15 ASSESSMENT — ANXIETY QUESTIONNAIRES
7. FEELING AFRAID AS IF SOMETHING AWFUL MIGHT HAPPEN: NOT AT ALL
3. WORRYING TOO MUCH ABOUT DIFFERENT THINGS: SEVERAL DAYS
5. BEING SO RESTLESS THAT IT IS HARD TO SIT STILL: NOT AT ALL
6. BECOMING EASILY ANNOYED OR IRRITABLE: NOT AT ALL
2. NOT BEING ABLE TO STOP OR CONTROL WORRYING: NOT AT ALL
IF YOU CHECKED OFF ANY PROBLEMS ON THIS QUESTIONNAIRE, HOW DIFFICULT HAVE THESE PROBLEMS MADE IT FOR YOU TO DO YOUR WORK, TAKE CARE OF THINGS AT HOME, OR GET ALONG WITH OTHER PEOPLE: NOT DIFFICULT AT ALL
GAD7 TOTAL SCORE: 2
1. FEELING NERVOUS, ANXIOUS, OR ON EDGE: SEVERAL DAYS

## 2017-09-15 ASSESSMENT — PATIENT HEALTH QUESTIONNAIRE - PHQ9: 5. POOR APPETITE OR OVEREATING: NOT AT ALL

## 2017-09-15 NOTE — PROGRESS NOTES
SUBJECTIVE:   CC: Adan Ruffin is an 66 year old male who presents for preventative health visit.     Healthy Habits:    Do you get at least three servings of calcium containing foods daily (dairy, green leafy vegetables, etc.)? yes    Amount of exercise or daily activities, outside of work: 3 day(s) per week    Problems taking medications regularly No    Medication side effects: No    Have you had an eye exam in the past two years? yes    Do you see a dentist twice per year? No, every year    Do you have sleep apnea, excessive snoring or daytime drowsiness?no        PROBLEMS TO ADD ON...    Has h/o HTN. on medical treatment. BP has been controlled. No side effects from medications. No CP, HA, dizziness. good compliance with medications and low salt diet.  Has H/O hyperlipidemia. On medical treatment and diet. No side effects. No muscle weakness, myalgias or upset stomach.   Has h/o ischemic heart disease, asymptomatic regarding chest pains, SOB,palpitations. Has good compliance with treatment, diet and exercise.  Has H/O DM. On diet , exercise and insulin. Blood sugars are controlled. No parestesias. No hypoglycemias.  Follows with endocrinology       Today's PHQ-2 Score: 0  PHQ-2 ( 1999 [employer]) 9/15/2017 8/2/2017   Q1: Little interest or pleasure in doing things 0 0   Q2: Feeling down, depressed or hopeless 0 0   PHQ-2 Score 0 0         Abuse: Current or Past(Physical, Sexual or Emotional)- No  Do you feel safe in your environment - Yes  Social History   Substance Use Topics     Smoking status: Former Smoker     Packs/day: 1.50     Years: 29.00     Types: Cigarettes     Quit date: 1/1/2000     Smokeless tobacco: Never Used     Alcohol use 0.0 oz/week     0 Standard drinks or equivalent per week      Comment: 1 mixed drink a night     The patient does not drink >3 drinks per day nor >7 drinks per week.    Last PSA:   PSA   Date Value Ref Range Status   08/19/2015 1.97 0 - 4 ug/L Final       Reviewed orders  "with patient. Reviewed health maintenance and updated orders accordingly - Yes  Labs reviewed in EPIC    Reviewed and updated as needed this visit by clinical staff  Tobacco  Meds         Reviewed and updated as needed this visit by Provider              ROS:  C: NEGATIVE for fever, chills, change in weight  I: NEGATIVE for worrisome rashes, moles or lesions  E: NEGATIVE for vision changes or irritation  ENT: NEGATIVE for ear, mouth and throat problems  R: NEGATIVE for significant cough or SOB  CV: NEGATIVE for chest pain, palpitations or peripheral edema  GI: NEGATIVE for nausea, abdominal pain, heartburn, or change in bowel habits   male: negative for dysuria, hematuria, decreased urinary stream, erectile dysfunction, urethral discharge  M: NEGATIVE for significant arthralgias or myalgia  N: NEGATIVE for weakness, dizziness or paresthesias  P: NEGATIVE for changes in mood or affect    OBJECTIVE:    5' 9\" (1.753 m)  EXAM:  GENERAL:obese, alert and no distress  EYES: Eyes grossly normal to inspection, PERRL and conjunctivae and sclerae normal  HENT: ear canals and TM's normal, nose and mouth without ulcers or lesions  NECK: no adenopathy, no asymmetry, masses, or scars and thyroid normal to palpation  RESP: lungs clear to auscultation - no rales, rhonchi or wheezes  CV: regular rate and rhythm, tachycardia , normal S1 S2, no S3 or S4, no murmur, click or rub, no peripheral edema and peripheral pulses strong  ABDOMEN: soft, nontender, no hepatosplenomegaly, no masses and bowel sounds normal  MS: no gross musculoskeletal defects noted, no edema  SKIN: no suspicious lesions or rashes  NEURO: Normal strength and tone, mentation intact and speech normal  PSYCH: mentation appears normal, affect normal/bright    ASSESSMENT/PLAN:       ICD-10-CM    1. Routine general medical examination at a health care facility Z00.00    2. Paroxysmal supraventricular tachycardia (H) I47.1 EKG 12-lead complete w/read - Clinics     " "Echocardiogram Complete   3. Screening for prostate cancer Z12.5 Prostate spec antigen screen   4. S/P CABG x 3 Z95.1 metoprolol (TOPROL-XL) 25 MG 24 hr tablet     Echocardiogram Complete   5. Insulin dependent diabetes mellitus (H) E11.9     Z79.4    6. Hyperlipidemia, unspecified hyperlipidemia type E78.5    7. Essential hypertension I10    8. Anxiety F41.9        COUNSELING:  Reviewed preventive health counseling, as reflected in patient instructions       Regular exercise       Healthy diet/nutrition       Vision screening       Hearing screening       Colon cancer screening       Prostate cancer screening    Restart Metoprolol. Has been stopped because of ED.   Has tachycardia. Assess ECHO. Possible a flutter. Asymptomatic.        reports that he quit smoking about 17 years ago. His smoking use included Cigarettes. He has a 43.50 pack-year smoking history. He has never used smokeless tobacco.      Estimated body mass index is 35.31 kg/(m^2) as calculated from the following:    Height as of 8/2/17: 5' 9\" (1.753 m).    Weight as of 8/2/17: 239 lb 1.6 oz (108.5 kg).   Weight management plan: Discussed healthy diet and exercise guidelines and patient will follow up in 6 months in clinic to re-evaluate.    Counseling Resources:  ATP IV Guidelines  Pooled Cohorts Equation Calculator  FRAX Risk Assessment  ICSI Preventive Guidelines  Dietary Guidelines for Americans, 2010  USDA's MyPlate  ASA Prophylaxis  Lung CA Screening    Lauro Galloway MD  [facility]  "

## 2017-09-15 NOTE — LETTER
[facility]  [address], [address]  [place] [address]  [phone]        September 18, 2017    Adan Ruffin  [address]   [place] [address]            Dear Mr. Adan Ruffin:      The results of your recent labs were NORMAL.  If you have any further questions or problems, please contact our office.        Sincerely,        Lauro Galloway M.D.

## 2017-09-15 NOTE — PROGRESS NOTES
"  SUBJECTIVE:   Adan Ruffin is a 66 year old male who presents to clinic today for the following health issues:      Diabetes Follow-up    Patient is checking blood sugars: three times daily.   Blood sugar testing frequency justification: Uncontrolled diabetes  Results are as follows:       am - 105-200       lunchtime - 170-180       bedtime - 150-200        Diabetic concerns: None     Symptoms of hypoglycemia (low blood sugar): none     Paresthesias (numbness or burning in feet) or sores: No     Date of last diabetic eye exam: Overdue    Hyperlipidemia Follow-Up      Rate your low fat/cholesterol diet?: fair    Taking statin?  Yes, no muscle aches from statin    Other lipid medications/supplements?:  none    Hypertension Follow-up      Outpatient blood pressures are not being checked.  Low Salt Diet: not monitoring salt      Amount of exercise or physical activity: 2 days/week     Problems taking medications regularly: No    Medication side effects: none  Diet: diabetic and carbohydrate counting      {additional problems for provider to add:634902}    Problem list and histories reviewed & adjusted, as indicated.  Additional history: {NONE - AS DOCUMENTED:964685::\"as documented\"}    {HIST REVIEW/ LINKS 2:725575}    Reviewed and updated as needed this visit by clinical staff     Reviewed and updated as needed this visit by Provider         {PROVIDER CHARTING PREFERENCE:640112}    "

## 2017-09-15 NOTE — MR AVS SNAPSHOT
After Visit Summary   9/15/2017    Adan Ruffin    MRN: [id number]           Patient Information     Date Of Birth          1950        Visit Information        Provider Department      9/15/2017 9:40 AM Lauro Galloway MD [facility]        Today's Diagnoses     Routine general medical examination at a health care facility    -  1    Paroxysmal supraventricular tachycardia (H)        Screening for prostate cancer        S/P CABG x 3        Insulin dependent diabetes mellitus (H)        Hyperlipidemia, unspecified hyperlipidemia type        Essential hypertension        Anxiety          Care Instructions      Preventive Health Recommendations  Male Ages 50 - 64    Yearly exam:             See your health care provider every year in order to  o   Review health changes.   o   Discuss preventive care.    o   Review your medicines if your doctor has prescribed any.     Have a cholesterol test every 5 years, or more frequently if you are at risk for high cholesterol/heart disease.     Have a diabetes test (fasting glucose) every three years. If you are at risk for diabetes, you should have this test more often.     Have a colonoscopy at age 50, or have a yearly FIT test (stool test). These exams will check for colon cancer.      Talk with your health care provider about whether or not a prostate cancer screening test (PSA) is right for you.    You should be tested each year for STDs (sexually transmitted diseases), if you re at risk.     Shots: Get a flu shot each year. Get a tetanus shot every 10 years.     Nutrition:    Eat at least 5 servings of fruits and vegetables daily.     Eat whole-grain bread, whole-wheat pasta and brown rice instead of white grains and rice.     Talk to your provider about Calcium and Vitamin D.     Lifestyle    Exercise for at least 150 minutes a week (30 minutes a day, 5 days a week). This will help you control your weight and prevent disease.  "    Limit alcohol to one drink per day.     No smoking.     Wear sunscreen to prevent skin cancer.     See your dentist every six months for an exam and cleaning.     See your eye doctor every 1 to 2 years.            Follow-ups after your visit        Future tests that were ordered for you today     Open Future Orders        Priority Expected Expires Ordered    Echocardiogram Complete Routine  9/15/2018 9/15/2017            Who to contact     If you have questions or need follow up information about today's clinic visit or your schedule please contact [facility] directly at [phone].  Normal or non-critical lab and imaging results will be communicated to you by Netcontin[employer], letter or phone within 4 business days after the clinic has received the results. If you do not hear from us within 7 days, please contact the clinic through [facility] or phone. If you have a critical or abnormal lab result, we will notify you by phone as soon as possible.  Submit refill requests through [facility] [employer] [facility] or call your pharmacy and they will forward the refill request to us. Please allow 3 business days for your refill to be completed.          Additional Information About Your Visit        [facility] [employer] [facility] Information     [facility] gives you secure access to your electronic health record. If you see a primary care provider, you can also send messages to your care team and make appointments. If you have questions, please call your primary care clinic.  If you do not have a primary care provider, please call [phone] and they will assist you.        Care EveryWhere ID     This is your Care EveryWhere ID. This could be used by other organizations to access your [place] medical records  [id number]        Your Vitals Were     Pulse Temperature Height Pulse Oximetry BMI (Body Mass Index)       120 98.5  F (36.9  C) (Oral) 5' 9\" (1.753 m) 91% 35.44 kg/m2        Blood Pressure from Last 3 Encounters:   09/15/17 136/76   08/02/17 130/78 "   04/19/17 128/72    Weight from Last 3 Encounters:   09/15/17 240 lb (108.9 kg)   08/02/17 239 lb 1.6 oz (108.5 kg)   04/19/17 240 lb 1.6 oz (108.9 kg)              We Performed the Following     EKG 12-lead complete w/read - Clinics     Prostate spec antigen screen          Today's Medication Changes          These changes are accurate as of: 9/15/17 11:44 AM.  If you have any questions, ask your nurse or doctor.               These medicines have changed or have updated prescriptions.        Dose/Directions    insulin aspart 100 UNIT/ML injection   Commonly known as:  NovoLOG FLEXPEN   This may have changed:  Another medication with the same name was removed. Continue taking this medication, and follow the directions you see here.   Used for:  Type 2 diabetes mellitus with diabetic nephropathy, with long-term current use of insulin (H)   Changed by:  Marilee Tolentino MD        Take 1 unit for every 3 grams of carbohydrate PLUS 1 unit for every 10 mg/dL you are over 140mg/dL  units   Quantity:  45 mL   Refills:  3       metoprolol 25 MG 24 hr tablet   Commonly known as:  TOPROL-XL   This may have changed:  how much to take   Used for:  S/P CABG x 3   Changed by:  Lauro Galloway MD        Dose:  25 mg   Take 1 tablet (25 mg) by mouth 2 times daily   Quantity:  180 tablet   Refills:  1            Where to get your medicines      [facility] [address] [place] [address] 42  AT [address] [facility] [address]     Phone:  [phone]     metoprolol 25 MG 24 hr tablet                Primary Care Provider Office Phone # Fax #    Lauro Galloway [phone]       [address] [facility] [address]        Equal Access to Services     DANIEL FAM AH: Jenny Griffin, waaxda luqadaha, qaybta kaalmada adeegyacain, luke quintero. So [facility]  [phone].    ATENCIÓN: Si christian mayorga, tiene a moore disposición servicios gratuitos de asistencia lingüística. Ton booker [phone].    We comply with applicable federal civil rights laws and [place] laws. We do not discriminate on the basis of race, color, national origin, age, disability sex, sexual orientation or gender identity.            Thank you!     Thank you for choosing [facility]  for your care. Our goal is always to provide you with excellent care. Hearing back from our patients is one way we can continue to improve our services. Please take a few minutes to complete the written survey that you may receive in the mail after your visit with us. Thank you!             Your Updated Medication List - Protect others around you: Learn how to safely use, store and throw away your medicines at www.disposemymeds.org.          This list is accurate as of: 9/15/17 11:44 AM.  Always use your most recent med list.                   Brand Name Dispense Instructions for use Diagnosis    ACE NOT PRESCRIBED (INTENTIONAL)     0 each    1 each daily ACE Inhibitor not prescribed due to Intolerance    Type II or unspecified type diabetes mellitus without mention of complication, not stated as uncontrolled       aspirin 325 MG EC tablet     60 tablet    Take 1 tablet (325 mg) by mouth daily    CAD (coronary artery disease), S/P CABG x 3, Anxiety       atorvastatin 40 MG tablet    LIPITOR    30 tablet    TAKE 1 TABLET BY MOUTH DAILY    Hyperlipidemia LDL goal <70       blood glucose monitoring lancets     200 each    Use to test blood sugar 4 times daily or as directed.    Type 2 diabetes mellitus with diabetic nephropathy, with long-term current use of insulin (H)       blood glucose monitoring test strip    no brand specified    6 Box    Use to test blood sugars 4 times daily or as directed    Type 2 diabetes mellitus with diabetic nephropathy (H)       CENTRUM SILVER per tablet      Take 1 tablet by  mouth daily        furosemide 20 MG tablet    LASIX    90 tablet    TAKE 1 TABLET(20 MG) BY MOUTH DAILY    Essential hypertension       glipiZIDE 10 MG 24 hr tablet    GLUCOTROL XL    30 tablet    TAKE 1 TABLET BY MOUTH EVERY DAY    Type 2 diabetes mellitus with diabetic nephropathy, with long-term current use of insulin (H)       insulin aspart 100 UNIT/ML injection    NovoLOG FLEXPEN    45 mL    Take 1 unit for every 3 grams of carbohydrate PLUS 1 unit for every 10 mg/dL you are over 140mg/dL  units    Type 2 diabetes mellitus with diabetic nephropathy, with long-term current use of insulin (H)       insulin glargine U-300 300 UNIT/ML injection    TOUJEO    30 mL    Inject 80 units in am, inject 70 units in pm    Type 2 diabetes mellitus with diabetic nephropathy, with long-term current use of insulin (H)       insulin pen needle 30G X 8 MM    NOVOFINE    300 each    Use 4-5 daily or as directed.    Type 2 diabetes mellitus with diabetic nephropathy, with long-term current use of insulin (H)       lisinopril 5 MG tablet    PRINIVIL/ZESTRIL    90 tablet    Take 1 tablet (5 mg) by mouth daily    Proteinuria       metFORMIN 1000 MG tablet    GLUCOPHAGE    60 tablet    TAKE 1 TABLET BY MOUTH TWICE DAILY WITH MEALS    Insulin dependent diabetes mellitus (H)       metoprolol 25 MG 24 hr tablet    TOPROL-XL    180 tablet    Take 1 tablet (25 mg) by mouth 2 times daily    S/P CABG x 3       * order for DME     3 Month    All DM testing supplies including test strips, lancets, solution, syringes, needles and/or pen needles for testing 4 times per day & injecting 5 times per day. Dispense glucometer compatible supplies. Accucheck Geena.    Type 2 diabetes mellitus with diabetic nephropathy (H)       * order for DME     1 Box    All DM testing supplies including test strips, lancets, solution, syringes, needles and/or pen needles for testing 3 times per day & injecting 3 times per day.   RX goes to  [facility],  see tele enc 09/21/2016    Type 2 diabetes mellitus with diabetic nephropathy (H)       order for DME     1 each    Equipment being ordered: blood glucose meter    Type 2 diabetes mellitus with diabetic nephropathy, with long-term current use of insulin (H)       sertraline 100 MG tablet    ZOLOFT    90 tablet    Take 1 tablet (100 mg) by mouth daily    Anxiety       vitamin B complex with vitamin C Tabs tablet      Take 2 tablets by mouth daily        VITAMIN C PO           * Notice:  This list has 2 medication(s) that are the same as other medications prescribed for you. Read the directions carefully, and ask your doctor or other care provider to review them with you.

## 2017-09-16 LAB — PSA SERPL-ACNC: 1.63 UG/L (ref 0–4)

## 2017-09-16 ASSESSMENT — ANXIETY QUESTIONNAIRES: GAD7 TOTAL SCORE: 2

## 2017-09-25 ENCOUNTER — HOSPITAL ENCOUNTER (OUTPATIENT)
Dept: CARDIOLOGY | Facility: CLINIC | Age: 67
Discharge: HOME | End: 2017-09-25
Attending: INTERNAL MEDICINE | Admitting: INTERNAL MEDICINE
Payer: COMMERCIAL

## 2017-09-25 DIAGNOSIS — I47.10 PAROXYSMAL SUPRAVENTRICULAR TACHYCARDIA (H): ICD-10-CM

## 2017-09-25 DIAGNOSIS — Z95.1 S/P CABG X 3: ICD-10-CM

## 2017-09-25 PROCEDURE — 93306 TTE W/DOPPLER COMPLETE: CPT | Mod: 26 | Performed by: INTERNAL MEDICINE

## 2017-09-25 PROCEDURE — 40000264 ECHO COMPLETE WITH OPTISON

## 2017-09-25 PROCEDURE — 25500064 ZZH RX 255 OP 636: Performed by: INTERNAL MEDICINE

## 2017-09-25 RX ADMIN — HUMAN ALBUMIN MICROSPHERES AND PERFLUTREN 3 ML: 10; .22 INJECTION, SOLUTION INTRAVENOUS at 15:30

## 2017-09-27 ENCOUNTER — CARE COORDINATION (OUTPATIENT)
Dept: CARDIOLOGY | Facility: CLINIC | Age: 67
End: 2017-09-27

## 2017-09-27 DIAGNOSIS — E78.5 HYPERLIPIDEMIA LDL GOAL <70: ICD-10-CM

## 2017-09-27 RX ORDER — ATORVASTATIN CALCIUM 40 MG/1
TABLET, FILM COATED ORAL
Qty: 90 TABLET | Refills: 1 | Status: SHIPPED | OUTPATIENT
Start: 2017-09-27 | End: 2018-07-30

## 2017-09-27 NOTE — TELEPHONE ENCOUNTER
LIPITOR     Last Written Prescription Date: 03/31/17  Last Fill Quantity: 90, # refills: 3  Last Office Visit with [facility] or  Health prescribing provider: 09/15/17       Lab Results   Component Value Date    CHOL 142 03/09/2017     Lab Results   Component Value Date    HDL 46 03/09/2017     Lab Results   Component Value Date    LDL 69 03/09/2017     Lab Results   Component Value Date    TRIG 134 03/09/2017     Lab Results   Component Value Date    CHOLHDLRATIO 3.5 08/19/2015       Labs showing if normal/abnormal  Lab Results   Component Value Date    CHOL 142 03/09/2017    TRIG 134 03/09/2017    HDL 46 03/09/2017    LDL 69 03/09/2017    VLDL 55 (H) 08/19/2015    CHOLHDLRATIO 3.5 08/19/2015     METFORMIN         Last Written Prescription Date: 08/25/17  Last Fill Quantity: 60, # refills: 0  Last Office Visit with [facility], [facility] or [facility] prescribing provider:  09/15/17        BP Readings from Last 3 Encounters:   09/15/17 136/76   08/02/17 130/78   04/19/17 128/72     Lab Results   Component Value Date    MICROL 38 05/24/2016     Lab Results   Component Value Date    UMALCR 34.05 05/24/2016     Creatinine   Date Value Ref Range Status   03/09/2017 1.01 0.66 - 1.25 mg/dL Final   ]  GFR Estimate   Date Value Ref Range Status   03/09/2017 74 >60 mL/min/1.7m2 Final     Comment:     Non  GFR Calc   05/24/2016 76 >60 mL/min/1.7m2 Final     Comment:     Non  GFR Calc   04/22/2016 55 (L) >60 mL/min/1.7m2 Final     Comment:     Non  GFR Calc     GFR Estimate If Black   Date Value Ref Range Status   03/09/2017 90 >60 mL/min/1.7m2 Final     Comment:      GFR Calc   05/24/2016 >90   GFR Calc   >60 mL/min/1.7m2 Final   04/22/2016 67 >60 mL/min/1.7m2 Final     Comment:      GFR Calc     Lab Results   Component Value Date    CHOL 142 03/09/2017     Lab Results   Component Value Date    HDL 46 03/09/2017     Lab Results   Component Value  Date    LDL 69 03/09/2017     Lab Results   Component Value Date    TRIG 134 03/09/2017     Lab Results   Component Value Date    CHOLHDLRATIO 3.5 08/19/2015     Lab Results   Component Value Date    AST 25 03/09/2017     Lab Results   Component Value Date    ALT 50 03/09/2017     Lab Results   Component Value Date    A1C 7.8 07/20/2017    A1C 10.5 03/09/2017    A1C 10.0 11/02/2016    A1C 12.9 06/27/2016    A1C 13.3 04/22/2016     Potassium   Date Value Ref Range Status   03/09/2017 4.8 3.4 - 5.3 mmol/L Final       Labs showing if normal/abnormal  Lab Results   Component Value Date    UCRR 111 05/24/2016    MICROL 38 05/24/2016     Lab Results   Component Value Date    CHOL 142 03/09/2017    TRIG 134 03/09/2017    HDL 46 03/09/2017    LDL 69 03/09/2017    VLDL 55 (H) 08/19/2015    CHOLHDLRATIO 3.5 08/19/2015     Lab Results   Component Value Date    A1C 7.8 (H) 07/20/2017    A1C 10.5 (H) 03/09/2017    A1C 10.0 (H) 11/02/2016

## 2017-09-27 NOTE — PROGRESS NOTES
patient had echo 9/25/2017 ordered by PMD - advised to follow up with cardiologist.      Echo results showing a slightly decreased heart function and an abnormal heart rhythm.  Unable to find a spot on Dr. Rosas's schedule in the near future, will review with Dr. Rosas tomorrow and update the patient with the plan. No other tasks to be done at this time. Glory Eastman

## 2017-09-28 ENCOUNTER — CARE COORDINATION (OUTPATIENT)
Dept: CARDIOLOGY | Facility: CLINIC | Age: 67
End: 2017-09-28

## 2017-09-28 DIAGNOSIS — I25.10 CORONARY ARTERY DISEASE: Primary | ICD-10-CM

## 2017-09-28 NOTE — PROGRESS NOTES
Recent echo from 9/25/2017 showing decrease in EF to 40-45% (was 55-60% 1/16/17).  Also mentioned was narrow complex tachycardia likely atrial flutter throughout the study.   Unable to get patient into clinic to see Dr. Rosas.  Will have him seen by [facility].  Dr. Mcclellan has opening Oct. 2nd.  Order placed for EKG the same day.

## 2017-10-02 ENCOUNTER — OFFICE VISIT (OUTPATIENT)
Dept: CARDIOLOGY | Facility: CLINIC | Age: 67
End: 2017-10-02
Payer: COMMERCIAL

## 2017-10-02 ENCOUNTER — CARE COORDINATION (OUTPATIENT)
Dept: CARDIOLOGY | Facility: CLINIC | Age: 67
End: 2017-10-02

## 2017-10-02 VITALS
DIASTOLIC BLOOD PRESSURE: 71 MMHG | HEIGHT: 69 IN | HEART RATE: 126 BPM | BODY MASS INDEX: 35.74 KG/M2 | WEIGHT: 241.3 LBS | SYSTOLIC BLOOD PRESSURE: 101 MMHG | OXYGEN SATURATION: 94 %

## 2017-10-02 DIAGNOSIS — I42.9 CARDIOMYOPATHY, UNSPECIFIED TYPE (H): ICD-10-CM

## 2017-10-02 DIAGNOSIS — Z79.4 TYPE 2 DIABETES MELLITUS WITH DIABETIC NEPHROPATHY, WITH LONG-TERM CURRENT USE OF INSULIN (H): ICD-10-CM

## 2017-10-02 DIAGNOSIS — E11.21 TYPE 2 DIABETES MELLITUS WITH DIABETIC NEPHROPATHY, WITH LONG-TERM CURRENT USE OF INSULIN (H): ICD-10-CM

## 2017-10-02 DIAGNOSIS — Z95.1 S/P CABG X 3: ICD-10-CM

## 2017-10-02 DIAGNOSIS — F41.9 ANXIETY: ICD-10-CM

## 2017-10-02 DIAGNOSIS — I48.3 TYPICAL ATRIAL FLUTTER (H): ICD-10-CM

## 2017-10-02 DIAGNOSIS — I10 ESSENTIAL HYPERTENSION: ICD-10-CM

## 2017-10-02 DIAGNOSIS — I42.9 CARDIOMYOPATHY, UNSPECIFIED TYPE (H): Primary | ICD-10-CM

## 2017-10-02 DIAGNOSIS — Z95.1 S/P CABG (CORONARY ARTERY BYPASS GRAFT): ICD-10-CM

## 2017-10-02 DIAGNOSIS — I25.10 CAD (CORONARY ARTERY DISEASE): ICD-10-CM

## 2017-10-02 LAB
ANION GAP SERPL CALCULATED.3IONS-SCNC: 13.9 MMOL/L (ref 6–17)
BUN SERPL-MCNC: 36 MG/DL (ref 7–30)
CALCIUM SERPL-MCNC: 10.2 MG/DL (ref 8.5–10.5)
CHLORIDE SERPL-SCNC: 104 MMOL/L (ref 98–107)
CO2 SERPL-SCNC: 25 MMOL/L (ref 23–29)
CREAT SERPL-MCNC: 1.35 MG/DL (ref 0.7–1.3)
ERYTHROCYTE [DISTWIDTH] IN BLOOD BY AUTOMATED COUNT: 13.6 % (ref 10–15)
GFR SERPL CREATININE-BSD FRML MDRD: 53 ML/MIN/1.7M2
GLUCOSE SERPL-MCNC: 284 MG/DL (ref 70–105)
HBA1C MFR BLD: 8.3 % (ref 4.3–6)
HCT VFR BLD AUTO: 42.5 % (ref 40–53)
HGB BLD-MCNC: 14.8 G/DL (ref 13.3–17.7)
MCH RBC QN AUTO: 32.5 PG (ref 26.5–33)
MCHC RBC AUTO-ENTMCNC: 34.8 G/DL (ref 31.5–36.5)
MCV RBC AUTO: 93 FL (ref 78–100)
NT-PROBNP SERPL-MCNC: 331 PG/ML (ref 0–125)
PLATELET # BLD AUTO: 232 10E9/L (ref 150–450)
POTASSIUM SERPL-SCNC: 4.9 MMOL/L (ref 3.5–5.1)
RBC # BLD AUTO: 4.56 10E12/L (ref 4.4–5.9)
SODIUM SERPL-SCNC: 138 MMOL/L (ref 136–145)
TSH SERPL DL<=0.005 MIU/L-ACNC: 1.54 MU/L (ref 0.4–4)
WBC # BLD AUTO: 7.2 10E9/L (ref 4–11)

## 2017-10-02 PROCEDURE — 36415 COLL VENOUS BLD VENIPUNCTURE: CPT | Performed by: INTERNAL MEDICINE

## 2017-10-02 PROCEDURE — 93000 ELECTROCARDIOGRAM COMPLETE: CPT | Performed by: INTERNAL MEDICINE

## 2017-10-02 PROCEDURE — 83036 HEMOGLOBIN GLYCOSYLATED A1C: CPT | Performed by: INTERNAL MEDICINE

## 2017-10-02 PROCEDURE — 80048 BASIC METABOLIC PNL TOTAL CA: CPT | Performed by: INTERNAL MEDICINE

## 2017-10-02 PROCEDURE — 83880 ASSAY OF NATRIURETIC PEPTIDE: CPT | Performed by: INTERNAL MEDICINE

## 2017-10-02 PROCEDURE — 99215 OFFICE O/P EST HI 40 MIN: CPT | Performed by: INTERNAL MEDICINE

## 2017-10-02 PROCEDURE — 84443 ASSAY THYROID STIM HORMONE: CPT | Performed by: INTERNAL MEDICINE

## 2017-10-02 PROCEDURE — 85027 COMPLETE CBC AUTOMATED: CPT | Performed by: INTERNAL MEDICINE

## 2017-10-02 RX ORDER — METOPROLOL SUCCINATE 25 MG/1
50 TABLET, EXTENDED RELEASE ORAL 2 TIMES DAILY
Qty: 180 TABLET | Refills: 1 | COMMUNITY
Start: 2017-10-02 | End: 2017-10-20

## 2017-10-02 NOTE — PATIENT INSTRUCTIONS
1. I will schedule you for a procedure called cardioversion. This is to attempt to bring your heart back into a regular rhythm. Before this you will need to be on blood thinners or anticoagulation for a minimum of three weeks, and continue it afterwards to review are seen by a cardiologist. Therefore the soonest you can have this done is approximately on 19 October, 2017. I understand that you will be traveling out of the country on October 22 and these plans cannot be postponed. We will try our best to accommodate your travel plans.    2. You will need a transesophageal echocardiogram prior to the cardioversion because your anticoagulation duration may not be exactly 3 weeks and we want to make sure that you do not have a blood clot in your heart because this will increase your risk of getting a stroke.    3. Start blood thinners called Eliquis (Apixaban) 5 mg. Take one tablet two times a day, starting tonight. If this medication is expensive, please call my nurse and we will find to an alternative.    4. Increase metoprolol XL to 50 mg two times daily. Take two of the 25 mg pills two times daily.    5. Hold the lisinopril medication so that you blood pressure does not drop too low.    6. I will refer you to a sleep clinic in [facility] to make sure your do not have sleep apnea.    7. Schedule follow-up with Dr. Rosas before you travel.    8. Blood tests to be done today.    If you have any questions or concerns, please call my nurse Mayda Hernandez at [phone].

## 2017-10-02 NOTE — PROGRESS NOTES
cut down aspirin dosage  Received: Today       Chrissy Mcclellan MD McMahon, Bullis Mary, RN Mary     Please advise patient to cut down aspirin from 325 mg to 81 mg daily. This is because he is being started on Eliquis anticoagulation. Will decrease bleeding risk.     Thank you.     Dr. Mcclellan.                Called to patient with Dr Mcclellan's recommendations.  Left message and to call back to review.  Mayda Thomason RN 10/02/17 3:06 PM    10/5/2017 Called to patient with above - patient states agreement with reducing aspirin from 325 mg daily to 81 mg daily while on Eliquis per DR Mcclellan. Updated medication list.  Mayda Thomason RN 10/05/17 1:48 PM

## 2017-10-02 NOTE — MR AVS SNAPSHOT
After Visit Summary   10/2/2017    Adan Ruffin    MRN: [id number]           Patient Information     Date Of Birth          1950        Visit Information        Provider Department      10/2/2017 1:30 PM Chrissy Mcclellan MD [facility] HEART AT [place]        Today's Diagnoses     Cardiomyopathy, unspecified type (H)    -  1    Typical atrial flutter (H)        S/P CABG (coronary artery bypass graft)        Essential hypertension          Care Instructions    1. I will schedule you for a procedure called cardioversion. This is to attempt to bring your heart back into a regular rhythm. Before this you will need to be on blood thinners or anticoagulation for a minimum of three weeks, and continue it afterwards to review are seen by a cardiologist. Therefore the soonest you can have this done is approximately on 19 October, 2017. I understand that you will be traveling out of the country on October 22 and these plans cannot be postponed. We will try our best to accommodate your travel plans.    2. You will need a transesophageal echocardiogram prior to the cardioversion because your anticoagulation duration may not be exactly 3 weeks and we want to make sure that you do not have a blood clot in your heart because this will increase your risk of getting a stroke.    3. Start blood thinners called Eliquis (Apixaban) 5 mg. Take one tablet two times a day, starting tonight. If this medication is expensive, please call my nurse and we will find to an alternative.    4. Increase metoprolol XL to 50 mg two times daily. Take two of the 25 mg pills two times daily.    5. Hold the lisinopril medication so that you blood pressure does not drop too low.    6. I will refer you to a sleep clinic in [facility] ER to make sure your do not have sleep apnea.    7. Schedule follow-up with Dr. Rosas before you travel.    8. Blood tests to be done today.    If you have any questions or  concerns, please call my nurse Mayda Hernandez at [phone].            Follow-ups after your visit        Additional Services     Sleep Study (referral)       Please be aware that coverage of these services is subject to the terms and limitations of your health insurance plan.  Call member services at your health plan with any benefit or coverage questions.      Please bring the following to your appointment:    >>   List of current medications   >>   This referral request   >>   Any documents/labs given to you for this referral    [facility] [place] [phone] (Age 18 and up)            Follow-Up with Cardiac Advanced Practice Provider       Available BETHANY on 20 October 2017            Follow-Up with Cardiologist                 Future tests that were ordered for you today     Open Future Orders        Priority Expected Expires Ordered    Follow-Up with Cardiologist Routine 12/2/2017 10/2/2018 10/2/2017    Follow-Up with Cardiac Advanced Practice Provider Routine 10/20/2017 10/2/2018 10/2/2017    Cardioversion Routine 10/19/2017 10/2/2018 10/2/2017    TSH with free T4 reflex Routine 10/2/2017 8/2/2018 10/2/2017    Basic metabolic panel Routine 10/9/2017 10/2/2018 10/2/2017    N terminal pro BNP outpatient Routine 10/9/2017 10/2/2018 10/2/2017    CBC with platelets Routine 10/9/2017 10/2/2018 10/2/2017    Sleep Study (referral) Routine 10/9/2017 10/2/2018 10/2/2017            Who to contact     If you have questions or need follow up information about today's clinic visit or your schedule please contact [facility] [place] directly at [phone].  Normal or non-critical lab and imaging results will be communicated to you by MyChart, letter or phone within 4 business days after the clinic has received the results. If you do not hear from us within 7 days, please contact the clinic through MyChart or phone. If you have a critical or abnormal lab result, we will  "notify you by phone as soon as possible.  Submit refill requests through [employer] or call your pharmacy and they will forward the refill request to us. Please allow 3 business days for your refill to be completed.          Additional Information About Your Visit        [employer] Information     [employer] gives you secure access to your electronic health record. If you see a primary care provider, you can also send messages to your care team and make appointments. If you have questions, please call your primary care clinic.  If you do not have a primary care provider, please call [phone] and they will assist you.        Care EveryWhere ID     This is your Care EveryWhere ID. This could be used by other organizations to access your [facility] medical records  [id number]        Your Vitals Were     Pulse Height Pulse Oximetry BMI (Body Mass Index)          126 1.753 m (5' 9\") 94% 35.63 kg/m2         Blood Pressure from Last 3 Encounters:   10/02/17 101/71   09/15/17 136/76   08/02/17 130/78    Weight from Last 3 Encounters:   10/02/17 109.5 kg (241 lb 4.8 oz)   09/15/17 108.9 kg (240 lb)   08/02/17 108.5 kg (239 lb 1.6 oz)              We Performed the Following     EKG 12-lead complete w/read - Clinics          Today's Medication Changes          These changes are accurate as of: 10/2/17  2:29 PM.  If you have any questions, ask your nurse or doctor.               Start taking these medicines.        Dose/Directions    apixaban ANTICOAGULANT 5 MG tablet   Commonly known as:  ELIQUIS   Used for:  Cardiomyopathy, unspecified type (H), Typical atrial flutter (H)   Started by:  Chrissy Mcclellan MD        Dose:  5 mg   Take 1 tablet (5 mg) by mouth 2 times daily   Quantity:  120 tablet   Refills:  3         These medicines have changed or have updated prescriptions.        Dose/Directions    metoprolol 25 MG 24 hr tablet   Commonly known as:  TOPROL-XL   This may have changed:  how much to take   Changed by:  Eleuterio" Chrissy Owens MD        Dose:  50 mg   Take 2 tablets (50 mg) by mouth 2 times daily   Quantity:  180 tablet   Refills:  1            Where to get your medicines      These medications were sent to [facility] - [place], [place] - [address] AT [facility] [address]  [address], [facility] [address]     Phone:  [phone]     apixaban ANTICOAGULANT 5 MG tablet                Primary Care Provider Office Phone # Fax #    Lauro Galloway [phone]       [address] [facility] [address]        Equal Access to Services     [facility]: Hadii aad ku hadasho Sobeatrizali, waaxda luqadaha, qaybta kaalmada adeegyada, waxdami yoonin hayviolet martinez . So [facility] [phone].    ATENCIÓN: Si habla español, tiene a moore disposición servicios gratuitos de asistencia lingüística. [facility] [phone].    We comply with applicable federal civil rights laws and [place] laws. We do not discriminate on the basis of race, color, national origin, age, disability, sex, sexual orientation, or gender identity.            Thank you!     Thank you for choosing [facility] [place]  for your care. Our goal is always to provide you with excellent care. Hearing back from our patients is one way we can continue to improve our services. Please take a few minutes to complete the written survey that you may receive in the mail after your visit with us. Thank you!             Your Updated Medication List - Protect others around you: Learn how to safely use, store and throw away your medicines at www.disposemymeds.org.          This list is accurate as of: 10/2/17  2:29 PM.  Always use your most recent med list.                   Brand Name Dispense Instructions for use Diagnosis    ACE NOT PRESCRIBED (INTENTIONAL)     0 each    1 each daily ACE Inhibitor not prescribed due to Intolerance    Type II or unspecified type diabetes mellitus  without mention of complication, not stated as uncontrolled       apixaban ANTICOAGULANT 5 MG tablet    ELIQUIS    120 tablet    Take 1 tablet (5 mg) by mouth 2 times daily    Cardiomyopathy, unspecified type (H), Typical atrial flutter (H)       aspirin 325 MG EC tablet     60 tablet    Take 1 tablet (325 mg) by mouth daily    CAD (coronary artery disease), S/P CABG x 3, Anxiety       atorvastatin 40 MG tablet    LIPITOR    90 tablet    TAKE 1 TABLET BY MOUTH DAILY    Hyperlipidemia LDL goal <70       blood glucose monitoring lancets     200 each    Use to test blood sugar 4 times daily or as directed.    Type 2 diabetes mellitus with diabetic nephropathy, with long-term current use of insulin (H)       blood glucose monitoring test strip    no brand specified    6 Box    Use to test blood sugars 4 times daily or as directed    Type 2 diabetes mellitus with diabetic nephropathy (H)       CENTRUM SILVER per tablet      Take 1 tablet by mouth daily        furosemide 20 MG tablet    LASIX    90 tablet    TAKE 1 TABLET(20 MG) BY MOUTH DAILY    Essential hypertension       glipiZIDE 10 MG 24 hr tablet    GLUCOTROL XL    30 tablet    TAKE 1 TABLET BY MOUTH EVERY DAY    Type 2 diabetes mellitus with diabetic nephropathy, with long-term current use of insulin (H)       insulin aspart 100 UNIT/ML injection    NovoLOG FLEXPEN    45 mL    Take 1 unit for every 3 grams of carbohydrate PLUS 1 unit for every 10 mg/dL you are over 140mg/dL  units    Type 2 diabetes mellitus with diabetic nephropathy, with long-term current use of insulin (H)       insulin glargine U-300 300 UNIT/ML injection    TOUJEO    30 mL    Inject 80 units in am, inject 70 units in pm    Type 2 diabetes mellitus with diabetic nephropathy, with long-term current use of insulin (H)       insulin pen needle 30G X 8 MM    NOVOFINE    300 each    Use 4-5 daily or as directed.    Type 2 diabetes mellitus with diabetic nephropathy, with long-term current  use of insulin (H)       lisinopril 5 MG tablet    PRINIVIL/ZESTRIL    90 tablet    Take 1 tablet (5 mg) by mouth daily    Proteinuria       metFORMIN 1000 MG tablet    GLUCOPHAGE    180 tablet    TAKE 1 TABLET BY MOUTH TWICE DAILY WITH MEALS    Insulin dependent diabetes mellitus (H)       metoprolol 25 MG 24 hr tablet    TOPROL-XL    180 tablet    Take 2 tablets (50 mg) by mouth 2 times daily        * order for DME     3 Month    All DM testing supplies including test strips, lancets, solution, syringes, needles and/or pen needles for testing 4 times per day & injecting 5 times per day. Dispense glucometer compatible supplies. Accucheck Geena.    Type 2 diabetes mellitus with diabetic nephropathy (H)       * order for DME     1 Box    All DM testing supplies including test strips, lancets, solution, syringes, needles and/or pen needles for testing 3 times per day & injecting 3 times per day.   RX goes to  [facility] Supplier, see tele enc 09/21/2016    Type 2 diabetes mellitus with diabetic nephropathy (H)       order for DME     1 each    Equipment being ordered: blood glucose meter    Type 2 diabetes mellitus with diabetic nephropathy, with long-term current use of insulin (H)       sertraline 100 MG tablet    ZOLOFT    90 tablet    Take 1 tablet (100 mg) by mouth daily    Anxiety       vitamin B complex with vitamin C Tabs tablet      Take 2 tablets by mouth daily        VITAMIN C PO           * Notice:  This list has 2 medication(s) that are the same as other medications prescribed for you. Read the directions carefully, and ask your doctor or other care provider to review them with you.

## 2017-10-02 NOTE — LETTER
10/2/2017    Lauro Galloway MD  [address] [place] [address]    RE: Adan Ruffin       Dear Colleague,    I had the pleasure of seeing Adan Ruffin in the [facility].    PRIMARY CARE AND REFERRING PROVIDER:  Lauro Galloway MD       REASON FOR VISIT:   1.  New diagnosis of left ventricular systolic cardiomyopathy with LVEF of 30%-35%.   2.  New diagnosis of supraventricular tachycardia.      The patient is new to my practice, but his established cardiologist is my cardiologist partner, Dr. Harvey Rosas, whom he last saw 01/19/2017.  The patient is accompanied by his wife today. His medical history is significant for coronary artery disease, status post CABG x3 in 04/2015 (LIMA to LAD, vein graft to circumflex and PDA).  His last echocardiogram in 01/2017 showed normal LV function with an EF of 60%-65%.  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  He has stage 3 chronic kidney disease with a creatinine of 1.0 and an estimated GFR of 74, treated hypertension, ex-smoker (quit in the year 2000) and BMI of 35.7 kg/m2.     1.  New diagnosis of cardiomyopathy.   2.  New diagnosis of supraventricular tachycardia.      The patient had a routine ECG on 09/15/2017.  This showed atrial flutter at a rate of 130 beats per minute with 1:2 conduction.  He was entirely asymptomatic at the time.  On repeated questioning, he denies palpitations, dyspnea, angina, lower extremity edema, orthopnea or PND.  Echocardiogram done on 09/25/2017 showed a new drop in ejection fraction from his previous 60%-65% six months ago to 35%-40% with global hypokinesis and normal LV size.  The right ventricle was normal in size with mildly decreased systolic function, and there was no significant valve disease, but the patient was in rapid atrial flutter at a rate of 130 BPM at the time of the study.       This is his first diagnosis of atrial arrhythmia.  He has not been screened for sleep apnea, but does have insomnia, daytime somnolence and fatigue.  Wife states that she has not noticed him snoring or having apneic spells during sleep.  He drinks 1 alcoholic drink per night.  There is no prior history of alcohol excess.  He has never had a stroke, but his risk factors for stroke from arrhythmias include LV dysfunction, age 66 years, coronary artery disease, hypertension and diabetes.  His recent TSH earlier this year was normal.  He does not drink any significant amounts of stimulants or caffeine.      Based on his recent ECG, his primary provider restarted him on metoprolol XL 25 mg twice daily.  Previously, Metoprolol was discontinued by Dr. Rosas earlier this year because of patient-reported symptoms of erectile dysfunction and the fact that he had been angina free for almost 2 years following his CABG in 2015.  However, the 50 mg a day dose of the metoprolol has made no difference to his tachycardia.  His ECG today confirms he remains in a typical flutter at a rate of 130 BPM.  His blood pressure is also lower at 101/71 mmHg, but he has no symptoms of hypotension.      CURRENT MEDICATIONS:   1.  Metoprolol XL 25 mg b.i.d.   2.  Atorvastatin 40 mg daily.   3.  Metformin 1000 mg b.i.d.   4.  Glipizide XL 10 mg daily.   5.  Insulin.   6.  Vitamin C.   7.  Sertraline 100 mg daily.   8.  Furosemide 20 mg daily.   9.  Lisinopril 5 mg daily.   10.  Aspirin 325 mg daily.   11.  Vitamin B complex.      MEDICATION ALLERGIES:  Rash with codeine.  Myalgias with simvastatin.      Please see my attached note in Epic for review of systems, medications and allergies, past medical, surgical, social and family history.        PHYSICAL EXAMINATION:   VITAL SIGNS:  Blood pressure 101/71, pulse 126 per minute and regular, height 1.753 m, weight 109.5 kg, respiratory rate 16 per minute, saturations 94% on room air, BMI 35.63  kg/m2.      Please see my attached note for a detailed physical examination.        CONSTITUTIONAL:  Comfortable at rest, cooperative, alert, oriented, well developed, well nourished.  Looks fatigued.  No conversational dyspnea.   NECK:  Jugular venous pressure is not elevated.  Normal carotid pulses.  No thyromegaly.   RESPIRATORY:  Bilateral normal breath sounds.  A few rales at the bases, but no evidence of overt heart failure.  No wheeze.   CARDIOVASCULAR:  Apical impulse is not palpable due to obesity.  Midline sternotomy scar is well healed.  Heart sounds are tachycardic, regular, 130 BPM.  No audible murmur.  No S3, S4 or pericardial friction rub.   ABDOMEN:  Obese, soft and nontender.   EXTREMITIES:  Minimal edema bilaterally.  No clubbing or deformities.      DIAGNOSTIC DATA:  I have reviewed pertinent laboratory data and serial ECGs and echocardiogram.  The results were discussed with the patient.      Laboratory data from 03/09/2017:  Sodium 140, potassium 4.8, BUN 28, creatinine 1.0 with an estimated GFR of 74, TSH 1.58, hemoglobin 13.6.      ECG from today:  Typical atrial flutter at 130 BPM, 1:2 conduction.        Recent transthoracic echocardiogram 09/25/2017:  I personally reviewed the images.  It shows normal left ventricular size with significantly decreased systolic function with an LVEF of 35%-40%, global hypokinesis.  Normal right ventricular size with mild decrease in systolic function.  No significant valve disease.  The patient was in atrial flutter with a heart rate of 130 BPM at the time.        DIAGNOSES:   1.  New diagnosis of typical atrial flutter, asymptomatic.   2.  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   3.  Stable coronary artery disease, status post CABG x3 in 04/2015 (LIMA to LAD, vein graft to circumflex and PDA).   4.  Type 2 diabetes mellitus complicated by neuropathy and nephropathy, on  long-term insulin therapy.   5.  Obesity (BMI 35.71 kg/m2).   6.  Clinical suspicion of sleep apnea:  Fatigue and daytime somnolence.      ASSESSMENT:    This is a 66-year-old gentleman with asymptomatic typical atrial flutter at a rate of 130 BPM and tachycardia-mediated cardiomyopathy.  His echocardiogram shows a new drop in ejection fraction from 60%-65% in 01/2017 to 35%-40% with global hypokinesis. He has not had much of a response to being restarted on metoprolol XL 25 mg b.i.d.  I recommend electrical cardioversion and anticoagulation.  His CHADS-VASc score is high (age 66 years, diabetes, LV dysfunction, hypertension, vascular disease).  He has not had any previous bleeding issues.      PLAN:   1.  ANAID-guided electrical cardioversion.  The patient is traveling out of the country on 10/22 and is not able to postpone travel plans.  Hence, we will schedule it for 10/19/2017.  By that time he would have had close to 3 weeks of anticoagulation.  Also, he will get ANAID guidance due to limited anticoagulation duration. I reiterated that the patient needs a minimum of 3 weeks of anticoagulation before and after cardioversion.  He will likely require long-term anticoagulation given his multiple risk factors and high CHADS-VASc score. Check labs today.   2.  Start Eliquis 5 mg b.i.d.  Take the first dose tonight.   3.  Decrease aspirin from 325 to 81 mg daily to decrease bleeding risk. For CAD status post CABG.  4.  Follow up with Cardiology BETHANY at [facility] in [place] after cardioversion.   5.  Follow up with his established cardiologist, Dr. Harvey Rosas, in 2 months.   6.  Refer to the Sleep Clinic.      It was a pleasure to visit with the patient.  I spent 50 minutes with him; greater than 50% of the time was spent in counseling and coordination of care.     Sincerely,    Chrissy Mcclellan MD     [facility]

## 2017-10-02 NOTE — PROGRESS NOTES
Dictation reference number - [id number]    REFERRING PROVIDER:  Lauro Galloway MD  [address] [place] [address]    PRIMARY CARE PROVIDER:  Lauro Galloway  [address] [facility] [address]        REVIEW OF SYSTEMS:  A comprehensive 10-point review of systems was completed and the pertinent positives are documented in the history of present illness.    Skin:  Negative     Eyes:  Positive for glasses  ENT:  Negative    Respiratory:  Negative    Cardiovascular:    Positive for;fatigue  Gastroenterology: Negative    Genitourinary:  Negative    Musculoskeletal:  Positive for joint pain  Neurologic:  Positive for numbness or tingling of hands (at night)  Psychiatric:  Positive for anxiety  Heme/Lymph/Imm:  Positive for allergies  Endocrine:  Positive for diabetes    CURRENT MEDICATIONS:  Current Outpatient Prescriptions   Medication Sig Dispense Refill     metoprolol (TOPROL-XL) 25 MG 24 hr tablet Take 2 tablets (50 mg) by mouth 2 times daily 180 tablet 1     apixaban ANTICOAGULANT (ELIQUIS) 5 MG tablet Take 1 tablet (5 mg) by mouth 2 times daily 120 tablet 3     atorvastatin (LIPITOR) 40 MG tablet TAKE 1 TABLET BY MOUTH DAILY 90 tablet 1     metFORMIN (GLUCOPHAGE) 1000 MG tablet TAKE 1 TABLET BY MOUTH TWICE DAILY WITH MEALS 180 tablet 1     glipiZIDE (GLUCOTROL XL) 10 MG 24 hr tablet TAKE 1 TABLET BY MOUTH EVERY DAY 30 tablet 0     insulin pen needle (NOVOFINE) 30G X 8 MM Use 4-5 daily or as directed. 300 each 1     Ascorbic Acid (VITAMIN C PO)        insulin aspart (NOVOLOG FLEXPEN) 100 UNIT/ML injection Take 1 unit for every 3 grams of carbohydrate PLUS 1 unit for every 10 mg/dL you are over 140mg/dL  units 45 mL 3     blood glucose monitoring (ACCU-CHEK FASTCLIX) lancets Use to test blood sugar 4 times daily or as directed. 200 each 11     insulin glargine U-300 (TOUJEO) 300 UNIT/ML injection Inject 80 units in am, inject 70 units in pm 30 mL 3     sertraline (ZOLOFT) 100 MG tablet Take  1 tablet (100 mg) by mouth daily 90 tablet 2     furosemide (LASIX) 20 MG tablet TAKE 1 TABLET(20 MG) BY MOUTH DAILY 90 tablet 3     lisinopril (PRINIVIL/ZESTRIL) 5 MG tablet Take 1 tablet (5 mg) by mouth daily 90 tablet 3     order for DME Equipment being ordered: blood glucose meter 1 each 0     order for DME All DM testing supplies including test strips, lancets, solution, syringes, needles and/or pen needles for testing 3 times per day & injecting 3 times per day.      RX goes to  [facility] Supplier, see tele enc 09/21/2016 1 Box 3     order for DME All DM testing supplies including test strips, lancets, solution, syringes, needles and/or pen needles for testing 4 times per day & injecting 5 times per day.  Dispense glucometer compatible supplies. Accucheck Geena. 3 Month 3     blood glucose monitoring (NO BRAND SPECIFIED) test strip Use to test blood sugars 4 times daily or as directed 6 Box 1     ACE NOT PRESCRIBED, INTENTIONAL, 1 each daily ACE Inhibitor not prescribed due to Intolerance 0 each 0     aspirin  MG EC tablet Take 1 tablet (325 mg) by mouth daily 60 tablet 3     Multiple Vitamins-Minerals (CENTRUM SILVER) per tablet Take 1 tablet by mouth daily       vitamin  B complex with vitamin C (VITAMIN  B COMPLEX) TABS Take 2 tablets by mouth daily       [DISCONTINUED] metoprolol (TOPROL-XL) 25 MG 24 hr tablet Take 1 tablet (25 mg) by mouth 2 times daily 180 tablet 1         ALLERGIES:  Allergies   Allergen Reactions     Codeine Rash     Mild rash     Simvastatin Other (See Comments)     Leg pain       PAST MEDICAL HISTORY:    Past Medical History:   Diagnosis Date     CAD (coronary artery disease) 3/13/2015     Chest pain on exertion      Diabetes mellitus (H)     Insulin & Metformin...diagnosed in 1996     Diabetes mellitus, type 2 (H) 8/21/2015     Encounter for long-term (current) use of insulin (H) 11/2/2016     Former smoker, stopped smoking in distant past     Quit 10 yrs ago, 1-2 PPD x 20+ yrs  "    History of angina      HTN (hypertension)      Hyperlipidaemia      Microalbuminuria 6/27/2016     Obesity 3/13/2015       PAST SURGICAL HISTORY:    Past Surgical History:   Procedure Laterality Date     BYPASS GRAFT ARTERY CORONARY N/A 4/7/2015    Procedure: BYPASS GRAFT ARTERY CORONARY;  Surgeon: oJhan Hall MD;  Location: [facility]     COLONOSCOPY N/A 6/20/2016    Procedure: COLONOSCOPY;  Surgeon: Marcela Schwartz MD;  Location: [facility]     EXCISE PILONIDAL CYST, SIMPLE  1975     HEART CATH, ANGIOPLASTY  3-    3 vessel disease-occluded RCA, stenosis LM-to have CABG     removal of skin cancer  1992     TONSILLECTOMY & ADENOIDECTOMY  1950       FAMILY HISTORY:    Family History   Problem Relation Age of Onset     Breast Cancer Mother      CEREBROVASCULAR DISEASE Mother      Hyperlipidemia Father      CANCER Father      lung, unrelated to smoking     CANCER Paternal Grandmother        SOCIAL HISTORY:    Social History     Social History     Marital status: Single     Spouse name: N/A     Number of children: N/A     Years of education: N/A     Social History Main Topics     Smoking status: Former Smoker     Packs/day: 1.50     Years: 29.00     Types: Cigarettes     Quit date: 1/1/2000     Smokeless tobacco: Never Used     Alcohol use 0.0 oz/week     0 Standard drinks or equivalent per week      Comment: 1 mixed drink a night     Drug use: No     Sexual activity: Yes     Partners: Female     Other Topics Concern     Caffeine Concern No     none     Sleep Concern No     Stress Concern Yes     sometimes at home     Weight Concern Yes     would like to lose weight     Special Diet No     Exercise No     Seat Belt Yes     Social History Narrative       PHYSICAL EXAM:    Vitals: /71  Pulse 126  Ht 1.753 m (5' 9\")  Wt 109.5 kg (241 lb 4.8 oz)  SpO2 94%  BMI 35.63 kg/m2  Wt Readings from Last 5 Encounters:   10/02/17 109.5 kg (241 lb 4.8 oz)   09/15/17 108.9 kg (240 lb)   08/02/17 108.5 kg " (239 lb 1.6 oz)   04/19/17 108.9 kg (240 lb 1.6 oz)   03/08/17 112 kg (247 lb)       Constitutional: Comfortable at rest. Cooperative, alert and oriented, well developed, well nourished.  Eyes: Pupils equal and round, conjunctivae and lids unremarkable, sclera white, no xanthalasma,   ENT: Satisfactory dentition.  No cyanosis or pallor.  Neck: Carotid pulses are full and equal bilaterally, no carotid bruit, no thyromegaly     Respiratory: Normal respiratory effort with symmetrical chest wall movements and no use of accessory muscles. Good air entry with normal breath sounds and no rales or wheeze.  Cardiovascular: Normal jugular venous pulse and pressure.  Normal carotid pulse character and volume.  No carotid bruit.  Apical impulse is undisplaced and normal to palpation without parasternal heave or thrill.  Heart sounds are tachycardic and regular. No audible murmur. No S3, S4 or friction rub.    GI: Soft, nontender, no hepatosplenomegaly, no masses, active bowel sounds.    Skin: No rash, erythema, ecchymosis.  Musculoskeletal: Normal muscle tone and strength. Normal gait. No spinal deformities.  Neuropsychiatric: Oriented to time place and person.  Affect normal.  No gross motor deficits.  Extremities: Minimal bilateral edema. No clubbing or deformities.        Encounter Diagnoses   Name Primary?     Cardiomyopathy, unspecified type (H) Yes     Typical atrial flutter (H)      S/P CABG (coronary artery bypass graft)      Essential hypertension        Orders Placed This Encounter   Procedures     Basic metabolic panel     N terminal pro BNP outpatient     CBC with platelets     TSH with free T4 reflex     Sleep Study (referral)     Follow-Up with Cardiologist     Follow-Up with Cardiac Advanced Practice Provider     EKG 12-lead complete w/read - Clinics     Cardioversion     Transesophageal Echocardiogram       CC  REFERRING PROVIDER:  Lauro Galloway MD  [address] [place] [address]

## 2017-10-02 NOTE — PROGRESS NOTES
PRIMARY CARE AND REFERRING PROVIDER:  Lauro Galloway MD       REASON FOR VISIT:   1.  New diagnosis of left ventricular systolic cardiomyopathy with LVEF of 30%-35%.   2.  New diagnosis of supraventricular tachycardia.      HISTORY OF PRESENT ILLNESS:  The patient is new to my practice, but his established cardiologist is my cardiologist partner, Dr. Harvey Rosas, whom he last saw 01/19/2017.  The patient is accompanied by his wife today. His medical history is significant for coronary artery disease, status post CABG x3 in 04/2015 (LIMA to LAD, vein graft to circumflex and PDA).  His last echocardiogram in 01/2017 showed normal LV function with an EF of 60%-65%.  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  He has stage 3 chronic kidney disease with a creatinine of 1.0 and an estimated GFR of 74, treated hypertension, ex-smoker (quit in the year 2000) and BMI of 35.7 kg/m2.     1.  New diagnosis of cardiomyopathy.   2.  New diagnosis of supraventricular tachycardia.      The patient had a routine ECG on 09/15/2017.  This showed atrial flutter at a rate of 130 beats per minute with 1:2 conduction.  He was entirely asymptomatic at the time.  On repeated questioning, he denies palpitations, dyspnea, angina, lower extremity edema, orthopnea or PND.  Echocardiogram done on 09/25/2017 showed a new drop in ejection fraction from his previous 60%-65% six months ago to 35%-40% with global hypokinesis and normal LV size.  The right ventricle was normal in size with mildly decreased systolic function, and there was no significant valve disease, but the patient was in rapid atrial flutter at a rate of 130 BPM at the time of the study.      This is his first diagnosis of atrial arrhythmia.  He has not been screened for sleep apnea, but does have insomnia, daytime somnolence and fatigue.  Wife states that she has not noticed him  snoring or having apneic spells during sleep.  He drinks 1 alcoholic drink per night.  There is no prior history of alcohol excess.  He has never had a stroke, but his risk factors for stroke from arrhythmias include LV dysfunction, age 66 years, coronary artery disease, hypertension and diabetes.  His recent TSH earlier this year was normal.  He does not drink any significant amounts of stimulants or caffeine.      Based on his recent ECG, his primary provider restarted him on metoprolol XL 25 mg twice daily.  Previously, Metoprolol was discontinued by Dr. Rosas earlier this year because of patient-reported symptoms of erectile dysfunction and the fact that he had been angina free for almost 2 years following his CABG in 2015.  However, the 50 mg a day dose of the metoprolol has made no difference to his tachycardia.  His ECG today confirms he remains in a typical flutter at a rate of 130 BPM.  His blood pressure is also lower at 101/71 mmHg, but he has no symptoms of hypotension.      CURRENT MEDICATIONS:   1.  Metoprolol XL 25 mg b.i.d.   2.  Atorvastatin 40 mg daily.   3.  Metformin 1000 mg b.i.d.   4.  Glipizide XL 10 mg daily.   5.  Insulin.   6.  Vitamin C.   7.  Sertraline 100 mg daily.   8.  Furosemide 20 mg daily.   9.  Lisinopril 5 mg daily.   10.  Aspirin 325 mg daily.   11.  Vitamin B complex.      MEDICATION ALLERGIES:  Rash with codeine.  Myalgias with simvastatin.      Please see my attached note in Epic for review of systems, medications and allergies, past medical, surgical, social and family history.        PHYSICAL EXAMINATION:   VITAL SIGNS:  Blood pressure 101/71, pulse 126 per minute and regular, height 1.753 m, weight 109.5 kg, respiratory rate 16 per minute, saturations 94% on room air, BMI 35.63 kg/m2.      Please see my attached note for a detailed physical examination.        CONSTITUTIONAL:  Comfortable at rest, cooperative, alert, oriented, well developed, well nourished.  Looks  fatigued.  No conversational dyspnea.   NECK:  Jugular venous pressure is not elevated.  Normal carotid pulses.  No thyromegaly.   RESPIRATORY:  Bilateral normal breath sounds.  A few rales at the bases, but no evidence of overt heart failure.  No wheeze.   CARDIOVASCULAR:  Apical impulse is not palpable due to obesity.  Midline sternotomy scar is well healed.  Heart sounds are tachycardic, regular, 130 BPM.  No audible murmur.  No S3, S4 or pericardial friction rub.   ABDOMEN:  Obese, soft and nontender.   EXTREMITIES:  Minimal edema bilaterally.  No clubbing or deformities.      DIAGNOSTIC DATA:  I have reviewed pertinent laboratory data and serial ECGs and echocardiogram.  The results were discussed with the patient.      Laboratory data from 03/09/2017:  Sodium 140, potassium 4.8, BUN 28, creatinine 1.0 with an estimated GFR of 74, TSH 1.58, hemoglobin 13.6.      ECG from today:  Typical atrial flutter at 130 BPM, 1:2 conduction.        Recent transthoracic echocardiogram 09/25/2017:  I personally reviewed the images.  It shows normal left ventricular size with significantly decreased systolic function with an LVEF of 35%-40%, global hypokinesis.  Normal right ventricular size with mild decrease in systolic function.  No significant valve disease.  The patient was in atrial flutter with a heart rate of 130 BPM at the time.        DIAGNOSES:   1.  New diagnosis of typical atrial flutter, asymptomatic.   2.  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   3.  Stable coronary artery disease, status post CABG x3 in 04/2015 (LIMA to LAD, vein graft to circumflex and PDA).   4.  Type 2 diabetes mellitus complicated by neuropathy and nephropathy, on long-term insulin therapy.   5.  Obesity (BMI 35.71 kg/m2).   6.  Clinical suspicion of sleep apnea:  Fatigue and daytime somnolence.      ASSESSMENT:    This is a 66-year-old gentleman with  asymptomatic typical atrial flutter at a rate of 130 BPM and tachycardia-mediated cardiomyopathy.  His echocardiogram shows a new drop in ejection fraction from 60%-65% in 2017 to 35%-40% with global hypokinesis. He has not had much of a response to being restarted on metoprolol XL 25 mg b.i.d.  I recommend electrical cardioversion and anticoagulation.  His CHADS-VASc score is high (age 66 years, diabetes, LV dysfunction, hypertension, vascular disease).  He has not had any previous bleeding issues.      PLAN:   1.  ANAID-guided electrical cardioversion.  The patient is traveling out of the country on 10/22 and is not able to postpone travel plans.  Hence, we will schedule it for 10/19/2017.  By that time he would have had close to 3 weeks of anticoagulation.  Also, he will get ANAID guidance due to limited anticoagulation duration. I reiterated that the patient needs a minimum of 3 weeks of anticoagulation before and after cardioversion.  He will likely require long-term anticoagulation given his multiple risk factors and high CHADS-VASc score. Check labs today.   2.  Start Eliquis 5 mg b.i.d.  Take the first dose tonight.   3.  Decrease aspirin from 325 to 81 mg daily to decrease bleeding risk. For CAD status post CABG.  4.  Follow up with Cardiology BETHANY at [facility] in [place] after cardioversion.   5.  Follow up with his established cardiologist, Dr. Harvey Rosas, in 2 months.   6.  Refer to the Sleep Clinic.      It was a pleasure to visit with the patient.  I spent 50 minutes with him; greater than 50% of the time was spent in counseling and coordination of care.      cc:   Lauro Galloway MD   [facility] [address] [place] [address]         [facility] SOPHIA ALVARES MD             D: 10/02/2017 14:47   T: 10/02/2017 16:39   MT: ron      Name:     EDUARDO PARKINSON   MRN:      [id number]        Account:      [id number]   :      1950           Service Date:  10/02/2017      Document: [id number]

## 2017-10-05 RX ORDER — ASPIRIN 81 MG/1
81 TABLET ORAL DAILY
Qty: 1 TABLET | Refills: 0 | COMMUNITY
Start: 2017-10-05 | End: 2020-01-02

## 2017-10-17 ENCOUNTER — MYC MEDICAL ADVICE (OUTPATIENT)
Dept: INTERNAL MEDICINE | Facility: CLINIC | Age: 67
End: 2017-10-17

## 2017-10-18 NOTE — PROGRESS NOTES
Call to patient to review pre-procedure instructions for ANAID and Cardioversion scheduled 10/19/2017:    Reviewed the following with patient and patient states understanding of instructions:  Blood thinners: Remain on Eliquis.  Diabetic:  Yes  If on Metformin: Hold am of procedure. Hold oral diabetic medications am of procedure and Contact PMD for insulin recommendations.  NPO status reviewed  Aspirin therapy reviewed per protocol Including continuation of current aspirin therapy.   Patient has transportation to and from procedure.   Patient has arranged for someone to stay with them 24 hours post procedure.  Patient seeing Shanika IZQUIERDO NP on 10/20/2017 @ 230 pm - Going to [place] for 1 week post cardioversion - per Dr Mcclellan - keep appt as schedued and offer same day visit with her - offered visit with DR Mcclellan same day but patient does not have ride any other time.  Mayda Thomason RN 10/18/17 2:34 PM

## 2017-10-19 ENCOUNTER — HOSPITAL ENCOUNTER (OUTPATIENT)
Dept: CARDIOLOGY | Facility: CLINIC | Age: 67
End: 2017-10-19
Attending: INTERNAL MEDICINE | Admitting: INTERNAL MEDICINE
Payer: COMMERCIAL

## 2017-10-19 ENCOUNTER — HOSPITAL ENCOUNTER (OUTPATIENT)
Facility: CLINIC | Age: 67
Discharge: HOME | End: 2017-10-19
Attending: INTERNAL MEDICINE | Admitting: INTERNAL MEDICINE
Payer: COMMERCIAL

## 2017-10-19 ENCOUNTER — CARE COORDINATION (OUTPATIENT)
Dept: CARDIOLOGY | Facility: CLINIC | Age: 67
End: 2017-10-19

## 2017-10-19 ENCOUNTER — HOSPITAL ENCOUNTER (OUTPATIENT)
Dept: SURGERY | Facility: CLINIC | Age: 67
End: 2017-10-19
Attending: INTERNAL MEDICINE | Admitting: INTERNAL MEDICINE
Payer: COMMERCIAL

## 2017-10-19 VITALS
HEIGHT: 69 IN | DIASTOLIC BLOOD PRESSURE: 72 MMHG | TEMPERATURE: 97.7 F | OXYGEN SATURATION: 94 % | BODY MASS INDEX: 35.4 KG/M2 | RESPIRATION RATE: 18 BRPM | WEIGHT: 239 LBS | HEART RATE: 118 BPM | SYSTOLIC BLOOD PRESSURE: 107 MMHG

## 2017-10-19 DIAGNOSIS — Z95.1 S/P CABG (CORONARY ARTERY BYPASS GRAFT): ICD-10-CM

## 2017-10-19 DIAGNOSIS — I48.3 TYPICAL ATRIAL FLUTTER (H): ICD-10-CM

## 2017-10-19 DIAGNOSIS — I42.9 CARDIOMYOPATHY, UNSPECIFIED TYPE (H): ICD-10-CM

## 2017-10-19 DIAGNOSIS — I48.92 ATRIAL FLUTTER (H): Primary | ICD-10-CM

## 2017-10-19 LAB
GLUCOSE BLDC GLUCOMTR-MCNC: 48 MG/DL (ref 70–99)
GLUCOSE BLDC GLUCOMTR-MCNC: 92 MG/DL (ref 70–99)
INR PPP: 0.97 (ref 0.86–1.14)
MAGNESIUM SERPL-MCNC: 1.8 MG/DL (ref 1.6–2.3)
POTASSIUM SERPL-SCNC: 4.3 MMOL/L (ref 3.4–5.3)

## 2017-10-19 PROCEDURE — 93312 ECHO TRANSESOPHAGEAL: CPT | Mod: 26 | Performed by: INTERNAL MEDICINE

## 2017-10-19 PROCEDURE — 40000857 ZZH STATISTIC TEE INCLUDES SEDATION

## 2017-10-19 PROCEDURE — 83735 ASSAY OF MAGNESIUM: CPT | Performed by: INTERNAL MEDICINE

## 2017-10-19 PROCEDURE — 82962 GLUCOSE BLOOD TEST: CPT

## 2017-10-19 PROCEDURE — 85610 PROTHROMBIN TIME: CPT | Performed by: INTERNAL MEDICINE

## 2017-10-19 PROCEDURE — 25000125 ZZHC RX 250: Performed by: INTERNAL MEDICINE

## 2017-10-19 PROCEDURE — 36415 COLL VENOUS BLD VENIPUNCTURE: CPT | Performed by: INTERNAL MEDICINE

## 2017-10-19 PROCEDURE — 25000128 H RX IP 250 OP 636: Performed by: INTERNAL MEDICINE

## 2017-10-19 PROCEDURE — 25800025 ZZH RX 258: Performed by: INTERNAL MEDICINE

## 2017-10-19 PROCEDURE — 92960 CARDIOVERSION ELECTRIC EXT: CPT

## 2017-10-19 PROCEDURE — 93325 DOPPLER ECHO COLOR FLOW MAPG: CPT | Mod: 26 | Performed by: INTERNAL MEDICINE

## 2017-10-19 PROCEDURE — 84132 ASSAY OF SERUM POTASSIUM: CPT | Performed by: INTERNAL MEDICINE

## 2017-10-19 PROCEDURE — 93320 DOPPLER ECHO COMPLETE: CPT

## 2017-10-19 PROCEDURE — 93320 DOPPLER ECHO COMPLETE: CPT | Mod: 26 | Performed by: INTERNAL MEDICINE

## 2017-10-19 PROCEDURE — 25500064 ZZH RX 255 OP 636: Performed by: INTERNAL MEDICINE

## 2017-10-19 RX ORDER — NICOTINE POLACRILEX 4 MG
15-30 LOZENGE BUCCAL
Status: DISCONTINUED | OUTPATIENT
Start: 2017-10-19 | End: 2017-10-19 | Stop reason: HOSPADM

## 2017-10-19 RX ORDER — FLUMAZENIL 0.1 MG/ML
0.2 INJECTION, SOLUTION INTRAVENOUS
Status: DISCONTINUED | OUTPATIENT
Start: 2017-10-19 | End: 2017-10-19 | Stop reason: HOSPADM

## 2017-10-19 RX ORDER — POTASSIUM CHLORIDE 1500 MG/1
40 TABLET, EXTENDED RELEASE ORAL
Status: DISCONTINUED | OUTPATIENT
Start: 2017-10-19 | End: 2017-10-19 | Stop reason: HOSPADM

## 2017-10-19 RX ORDER — NALOXONE HYDROCHLORIDE 0.4 MG/ML
.1-.4 INJECTION, SOLUTION INTRAMUSCULAR; INTRAVENOUS; SUBCUTANEOUS
Status: DISCONTINUED | OUTPATIENT
Start: 2017-10-19 | End: 2017-10-19 | Stop reason: HOSPADM

## 2017-10-19 RX ORDER — POTASSIUM CHLORIDE 1500 MG/1
20 TABLET, EXTENDED RELEASE ORAL
Status: DISCONTINUED | OUTPATIENT
Start: 2017-10-19 | End: 2017-10-19 | Stop reason: HOSPADM

## 2017-10-19 RX ORDER — LIDOCAINE 50 MG/G
0.5 OINTMENT TOPICAL ONCE
Status: COMPLETED | OUTPATIENT
Start: 2017-10-19 | End: 2017-10-19

## 2017-10-19 RX ORDER — FENTANYL CITRATE 50 UG/ML
25 INJECTION, SOLUTION INTRAMUSCULAR; INTRAVENOUS
Status: DISCONTINUED | OUTPATIENT
Start: 2017-10-19 | End: 2017-10-19 | Stop reason: HOSPADM

## 2017-10-19 RX ORDER — DEXTROSE MONOHYDRATE 25 G/50ML
25-50 INJECTION, SOLUTION INTRAVENOUS
Status: DISCONTINUED | OUTPATIENT
Start: 2017-10-19 | End: 2017-10-19 | Stop reason: HOSPADM

## 2017-10-19 RX ORDER — SODIUM CHLORIDE 9 MG/ML
1000 INJECTION, SOLUTION INTRAVENOUS CONTINUOUS
Status: DISCONTINUED | OUTPATIENT
Start: 2017-10-19 | End: 2017-10-19 | Stop reason: HOSPADM

## 2017-10-19 RX ORDER — FENTANYL CITRATE 50 UG/ML
25-50 INJECTION, SOLUTION INTRAMUSCULAR; INTRAVENOUS
Status: COMPLETED | OUTPATIENT
Start: 2017-10-19 | End: 2017-10-19

## 2017-10-19 RX ORDER — GLYCOPYRROLATE 0.2 MG/ML
0.1 INJECTION, SOLUTION INTRAMUSCULAR; INTRAVENOUS ONCE
Status: COMPLETED | OUTPATIENT
Start: 2017-10-19 | End: 2017-10-19

## 2017-10-19 RX ORDER — ATROPINE SULFATE 0.1 MG/ML
.5-1 INJECTION INTRAVENOUS
Status: DISCONTINUED | OUTPATIENT
Start: 2017-10-19 | End: 2017-10-19 | Stop reason: HOSPADM

## 2017-10-19 RX ADMIN — DEXTROSE MONOHYDRATE 25 ML: 25 INJECTION, SOLUTION INTRAVENOUS at 13:56

## 2017-10-19 RX ADMIN — Medication 2 G: at 13:18

## 2017-10-19 RX ADMIN — MIDAZOLAM HYDROCHLORIDE 1 MG: 1 INJECTION, SOLUTION INTRAMUSCULAR; INTRAVENOUS at 13:47

## 2017-10-19 RX ADMIN — SULFUR HEXAFLUORIDE 6 ML: KIT at 14:41

## 2017-10-19 RX ADMIN — SODIUM CHLORIDE 1000 ML: 9 INJECTION, SOLUTION INTRAVENOUS at 12:38

## 2017-10-19 RX ADMIN — LIDOCAINE 0.5 G: 50 OINTMENT TOPICAL at 13:16

## 2017-10-19 RX ADMIN — GLYCOPYRROLATE 0.1 MG: 0.2 INJECTION, SOLUTION INTRAMUSCULAR; INTRAVENOUS at 13:15

## 2017-10-19 RX ADMIN — FENTANYL CITRATE 50 MCG: 50 INJECTION, SOLUTION INTRAMUSCULAR; INTRAVENOUS at 13:48

## 2017-10-19 NOTE — PROGRESS NOTES
Tolerated ANAID well. Cardioversion not done per Dr. Mcclellan because of possible clot seen in left atrium. Pt and wife instructed by Dr. Mcclellan to increase dose of Lasix to 40 mg daily and this was hand written on AVS, and AVS given to pt. Denies any throat pain. Bedside blood glucose at beginning of ANAID was 48, and 1/2 amp (25ml) of D50 IV was given. Blood glucose at 1426 92. Instructed to give pt PO juice before discharge when he states his throat is not numb. Pt drowsy but awakens easily and denies any c/o.

## 2017-10-19 NOTE — DISCHARGE INSTRUCTIONS
ANAID  (Transesophageal Echocardiogram)  with Cardioversion Discharge Instructions    After you go home:      Have an adult stay with you for 6 hours.       For 24 hours - due to the sedation you received:    Relax and take it easy.    Do NOT make any important or legal decisions.    Do NOT drive or operate machines at home or at work.    Do NOT drink alcohol.    Diet:      You may resume your normal diet, but no scratchy foods for two days.    If your throat is sore, eat cold, bland or soft foods.    You may have heartburn if the tube used in the exam entered your stomach.  If so:   - Do not eat acidic and spicy foods.   - Do not eat three hours before bedtime.  Clear liquids are okay.   - When lying down, use two pillows to raise your head.    Medicines:      Take your medications, including blood thinners, unless your provider tells you not to.    If you have stopped any medicines, check with your provider about when to restart them.    You may take Tylenol (Acetaminophen) if your throat is sore.    You may take antacids if you have heartburn.      Follow Up Appointments:      Follow up with your cardiologist at [facility] of patient preference as instructed.    Follow up with your primary care provider as needed.    If you ve had a cardioversion:    The skin on your chest or back may feel tender for 48 hours.  If your skin is tender, you may:    Use a cold pack on the site. Never use ice directly on your skin. Use the cold pack for 20 minutes. Remove it for at least 30 minutes before re-using.    Apply 1% hydrocortisone cream to the skin (sold at drug stores)    Take Advil (Ibuprofen) or Tylenol (Acetaminophen).      Call the clinic if:      You have heartburn that is severe or lasts more than 72 hours.    You have a sore throat that feels worse after 72 hours.    You have shortness of breath, neck pain, chest pain, fever, chills, coughing up blood, or other unusual signs.    Call your cardiologist right  away if you have an irregular heartbeat, shortness of breath or feel dizzy.    Questions or concerns      [facility] [place]:    [phone] UMP (7 days a week)

## 2017-10-19 NOTE — IP AVS SNAPSHOT
MRN:[id number]                      After Visit Summary   10/19/2017    Adan Ruffin    MRN: [id number]           Visit Information        Department      10/19/2017 11:22 AM [facility]          Review of your medicines      UNREVIEWED medicines. Ask your doctor about these medicines        Dose / Directions    ACE NOT PRESCRIBED (INTENTIONAL)   Used for:  Type II or unspecified type diabetes mellitus without mention of complication, not stated as uncontrolled        Dose:  1 each   1 each daily ACE Inhibitor not prescribed due to Intolerance   Quantity:  0 each   Refills:  0       apixaban ANTICOAGULANT 5 MG tablet   Commonly known as:  ELIQUIS   Used for:  Cardiomyopathy, unspecified type (H), Typical atrial flutter (H)        Dose:  5 mg   Take 1 tablet (5 mg) by mouth 2 times daily   Quantity:  120 tablet   Refills:  3       aspirin EC 81 MG EC tablet   Used for:  CAD (coronary artery disease), S/P CABG x 3, Anxiety        Dose:  81 mg   Take 1 tablet (81 mg) by mouth daily   Quantity:  1 tablet   Refills:  0       atorvastatin 40 MG tablet   Commonly known as:  LIPITOR   Used for:  Hyperlipidemia LDL goal <70        TAKE 1 TABLET BY MOUTH DAILY   Quantity:  90 tablet   Refills:  1       CENTRUM SILVER per tablet        Dose:  1 tablet   Take 1 tablet by mouth daily   Refills:  0       furosemide 20 MG tablet   Commonly known as:  LASIX   Used for:  Essential hypertension        TAKE 1 TABLET(20 MG) BY MOUTH DAILY   Quantity:  90 tablet   Refills:  3       glipiZIDE 10 MG 24 hr tablet   Commonly known as:  GLUCOTROL XL   Used for:  Type 2 diabetes mellitus with diabetic nephropathy, with long-term current use of insulin (H)        TAKE 1 TABLET BY MOUTH EVERY DAY   Quantity:  30 tablet   Refills:  0       insulin aspart 100 UNIT/ML injection   Commonly known as:  NovoLOG FLEXPEN   Used for:  Type 2 diabetes mellitus with diabetic nephropathy, with long-term current use of  insulin (H)        Take 1 unit for every 3 grams of carbohydrate PLUS 1 unit for every 10 mg/dL you are over 140mg/dL  units   Quantity:  45 mL   Refills:  3       insulin glargine U-300 300 UNIT/ML injection   Commonly known as:  TOUJEO   Used for:  Type 2 diabetes mellitus with diabetic nephropathy, with long-term current use of insulin (H)        Inject 80 units in am, inject 70 units in pm   Quantity:  30 mL   Refills:  3       lisinopril 5 MG tablet   Commonly known as:  PRINIVIL/ZESTRIL   Used for:  Proteinuria        Dose:  5 mg   Take 1 tablet (5 mg) by mouth daily   Quantity:  90 tablet   Refills:  3       metFORMIN 1000 MG tablet   Commonly known as:  GLUCOPHAGE   Used for:  Insulin dependent diabetes mellitus (H)        TAKE 1 TABLET BY MOUTH TWICE DAILY WITH MEALS   Quantity:  180 tablet   Refills:  1       metoprolol 25 MG 24 hr tablet   Commonly known as:  TOPROL-XL        Dose:  50 mg   Take 2 tablets (50 mg) by mouth 2 times daily   Quantity:  180 tablet   Refills:  1       sertraline 100 MG tablet   Commonly known as:  ZOLOFT   Used for:  Anxiety        Dose:  100 mg   Take 1 tablet (100 mg) by mouth daily   Quantity:  90 tablet   Refills:  2       vitamin B complex with vitamin C Tabs tablet        Dose:  2 tablet   Take 2 tablets by mouth daily   Refills:  0       VITAMIN C PO        Refills:  0         CONTINUE these medicines which have NOT CHANGED        Dose / Directions    blood glucose monitoring lancets   Used for:  Type 2 diabetes mellitus with diabetic nephropathy, with long-term current use of insulin (H)        Use to test blood sugar 4 times daily or as directed.   Quantity:  200 each   Refills:  11       blood glucose monitoring test strip   Commonly known as:  no brand specified   Used for:  Type 2 diabetes mellitus with diabetic nephropathy (H)        Use to test blood sugars 4 times daily or as directed   Quantity:  6 Box   Refills:  1       insulin pen needle 30G X 8 MM    Commonly known as:  NOVOFINE   Used for:  Type 2 diabetes mellitus with diabetic nephropathy, with long-term current use of insulin (H)        Use 4-5 daily or as directed.   Quantity:  300 each   Refills:  1       * order for DME   Used for:  Type 2 diabetes mellitus with diabetic nephropathy (H)        All DM testing supplies including test strips, lancets, solution, syringes, needles and/or pen needles for testing 4 times per day & injecting 5 times per day. Dispense glucometer compatible supplies. Accucheck Geena.   Quantity:  3 Month   Refills:  3       * order for DME   Used for:  Type 2 diabetes mellitus with diabetic nephropathy (H)        All DM testing supplies including test strips, lancets, solution, syringes, needles and/or pen needles for testing 3 times per day & injecting 3 times per day.   RX goes to  [facility], see tele enc 09/21/2016   Quantity:  1 Box   Refills:  3       order for DME   Used for:  Type 2 diabetes mellitus with diabetic nephropathy, with long-term current use of insulin (H)        Equipment being ordered: blood glucose meter   Quantity:  1 each   Refills:  0       * Notice:  This list has 2 medication(s) that are the same as other medications prescribed for you. Read the directions carefully, and ask your doctor or other care provider to review them with you.             Protect others around you: Learn how to safely use, store and throw away your medicines at www.disposemymeds.org.         Follow-ups after your visit        Your next 10 appointments already scheduled     Oct 19, 2017  1:30 PM CDT   Ech Kevin with SHECHR2   [facility] ([facility])    [address] [place] [address]   [phone]           1.  Please bring or wear a comfortable two-piece outfit. 2.  Arrival time: -   [facility]:  arrive 75 minutes prior to examination time. -   [facility]:  arrive 90 minutes prior to examination time. -   [facility]:    arrive 15 minutes prior to examination time. 3.  Plan to have someone here to drive you home after the test. -   Someone should stay with you for 6 hours after your test. 4.  No food or drink: -   6 hours before the test 5.  If you take antacids or water pills (diuretics): Do not take them until after your test. You may take blood pressure medicine with a few sips of water. 6.  If you have diabetes: -   Morning slots preferred -   If you take insulin, call your diabetes care team. Ask if you should take a   dose the morning of your test. -   If you take diabetes medicine by mouth, don't take it on the morning of your test. Bring it with you to take after the test. (If you have questions, call your diabetes care team.) 7.  Bring a list of any medicines you are taking. 8.  Do not drive for 24 hours after the test. 9.   A responsible adult must stay with you for 24 hours after the test.  10.  For any questions that cannot be answered, please contact the ordering physician            Oct 19, 2017   Procedure with GENERIC ANESTHESIA PROVIDER   [facility] (--)    [address]  [place] [address]   [phone]            Oct 19, 2017  2:30 PM CDT   Cardioversion with [facility] SUITES ([facility])   [facility] PACU ([facility])    [address]  [place] [address]   [phone]           1) NPO for 8 hours prior to the procedure except for sips of water with medications. 2) Hold insulin or oral diabetic medications morning of procedure. May take   dose long acting insulin. Follow further instructions of PMD. 3) Continue daily Coumadin. ~ If taking Digoxin, hold AM of procedure. ~ Plan to have a responsible adult take you home after the exam. You may not drive, take a bus or taxi by yourself. A responsible adult must stay with you for 24 hours after the test.            Oct 20, 2017  2:30 PM CDT   Return Visit with SANDRA Madera CNP    [facility] [place] ([facility])    [address]  [place] [address]   [phone]            Dec 05, 2017 12:15 PM CST   Return Visit with Harvey Rosas MD   [facility] [place] ([facility])    [address]  [place] [address]   [phone]               Care Instructions        Further instructions from your care team       ANAID  (Transesophageal Echocardiogram)  with Cardioversion Discharge Instructions    After you go home:      Have an adult stay with you for 6 hours.       For 24 hours - due to the sedation you received:    Relax and take it easy.    Do NOT make any important or legal decisions.    Do NOT drive or operate machines at home or at work.    Do NOT drink alcohol.    Diet:      You may resume your normal diet, but no scratchy foods for two days.    If your throat is sore, eat cold, bland or soft foods.    You may have heartburn if the tube used in the exam entered your stomach.  If so:   - Do not eat acidic and spicy foods.   - Do not eat three hours before bedtime.  Clear liquids are okay.   - When lying down, use two pillows to raise your head.    Medicines:      Take your medications, including blood thinners, unless your provider tells you not to.    If you have stopped any medicines, check with your provider about when to restart them.    You may take Tylenol (Acetaminophen) if your throat is sore.    You may take antacids if you have heartburn.      Follow Up Appointments:      Follow up with your cardiologist at [facility] of patient preference as instructed.    Follow up with your primary care provider as needed.    If you ve had a cardioversion:    The skin on your chest or back may feel tender for 48 hours.  If your skin is tender, you may:    Use a cold pack on the site. Never use ice directly on your skin. Use the cold pack for 20 minutes. Remove it  "for at least 30 minutes before re-using.    Apply 1% hydrocortisone cream to the skin (sold at drug stores)    Take Advil (Ibuprofen) or Tylenol (Acetaminophen).      Call the clinic if:      You have heartburn that is severe or lasts more than 72 hours.    You have a sore throat that feels worse after 72 hours.    You have shortness of breath, neck pain, chest pain, fever, chills, coughing up blood, or other unusual signs.    Call your cardiologist right away if you have an irregular heartbeat, shortness of breath or feel dizzy.    Questions or concerns      [facility] [place]:    [phone] UMP (7 days a week)               Additional Information About Your Visit        aloom[facility]Ofelia Feliz Information     [facility] gives you secure access to your electronic health record. If you see a primary care provider, you can also send messages to your care team and make appointments. If you have questions, please call your primary care clinic.  If you do not have a primary care provider, please call [phone] and they will assist you.        Care EveryWhere ID     This is your Care EveryWhere ID. This could be used by other organizations to access your [place] medical records  [id number]        Your Vitals Were     Blood Pressure Pulse Temperature Respirations Height Weight    110/72 (BP Location: Left arm) 122 97.7  F (36.5  C) (Oral) 18 1.753 m (5' 9\") 108.4 kg (239 lb)    Pulse Oximetry BMI (Body Mass Index)                98% 35.29 kg/m2           Primary Care Provider Office Phone # Fax #    Lauro Galloway [phone]      Equal Access to Services     [facility]: Hadii aad anam hadasho Soomaali, waaxda luqadaha, qaybta kaalmada adeegyada, luke martinez . So [facility] [phone].    ATENCIÓN: Si habla español, tiene a moore disposición servicios gratuitos de asistencia lingüística. Llame al [phone].    We comply with applicable federal civil " [employer] laws and [place] laws. We do not discriminate on the basis of race, color, national origin, age, disability, sex, sexual orientation, or gender identity.            Thank you!     Thank you for choosing [facility] for your care. Our goal is always to provide you with excellent care. Hearing back from our patients is one way we can continue to improve our services. Please take a few minutes to complete the written survey that you may receive in the mail after you visit with us. Thank you!             Medication List: This is a list of all your medications and when to take them. Check marks below indicate your daily home schedule. Keep this list as a reference.      Medications           Morning Afternoon Evening Bedtime As Needed    ACE NOT PRESCRIBED (INTENTIONAL)   1 each daily ACE Inhibitor not prescribed due to Intolerance                                apixaban ANTICOAGULANT 5 MG tablet   Commonly known as:  ELIQUIS   Take 1 tablet (5 mg) by mouth 2 times daily                                aspirin EC 81 MG EC tablet   Take 1 tablet (81 mg) by mouth daily                                atorvastatin 40 MG tablet   Commonly known as:  LIPITOR   TAKE 1 TABLET BY MOUTH DAILY                                blood glucose monitoring lancets   Use to test blood sugar 4 times daily or as directed.                                blood glucose monitoring test strip   Commonly known as:  no brand specified   Use to test blood sugars 4 times daily or as directed                                CENTRUM SILVER per tablet   Take 1 tablet by mouth daily                                furosemide 20 MG tablet   Commonly known as:  LASIX   TAKE 1 TABLET(20 MG) BY MOUTH DAILY                                glipiZIDE 10 MG 24 hr tablet   Commonly known as:  GLUCOTROL XL   TAKE 1 TABLET BY MOUTH EVERY DAY                                insulin aspart 100 UNIT/ML injection   Commonly known as:  NovoLOG FLEXPEN   Take 1 unit for  every 3 grams of carbohydrate PLUS 1 unit for every 10 mg/dL you are over 140mg/dL  units                                insulin glargine U-300 300 UNIT/ML injection   Commonly known as:  TOUJEO   Inject 80 units in am, inject 70 units in pm                                insulin pen needle 30G X 8 MM   Commonly known as:  NOVOFINE   Use 4-5 daily or as directed.                                lisinopril 5 MG tablet   Commonly known as:  PRINIVIL/ZESTRIL   Take 1 tablet (5 mg) by mouth daily                                metFORMIN 1000 MG tablet   Commonly known as:  GLUCOPHAGE   TAKE 1 TABLET BY MOUTH TWICE DAILY WITH MEALS                                metoprolol 25 MG 24 hr tablet   Commonly known as:  TOPROL-XL   Take 2 tablets (50 mg) by mouth 2 times daily                                * order for DME   All DM testing supplies including test strips, lancets, solution, syringes, needles and/or pen needles for testing 4 times per day & injecting 5 times per day. Dispense glucometer compatible supplies. Accucheck Geena.                                * order for DME   All DM testing supplies including test strips, lancets, solution, syringes, needles and/or pen needles for testing 3 times per day & injecting 3 times per day.   RX goes to  [facility] Supplier, see tele enc 09/21/2016                                order for DME   Equipment being ordered: blood glucose meter                                sertraline 100 MG tablet   Commonly known as:  ZOLOFT   Take 1 tablet (100 mg) by mouth daily                                vitamin B complex with vitamin C Tabs tablet   Take 2 tablets by mouth daily                                VITAMIN C PO                                * Notice:  This list has 2 medication(s) that are the same as other medications prescribed for you. Read the directions carefully, and ask your doctor or other care provider to review them with you.

## 2017-10-19 NOTE — PROGRESS NOTES
Ambulated around room without any lightheadedness or difficulty. Tolerated PO ice cream and orange juice well. Denies any c/o. Ready for discharge per w/c with wife driving.

## 2017-10-19 NOTE — PROGRESS NOTES
The patient presented today for an elective ANAID guided cardioversion for atrial flutter with tachycardia mediated cardiomyopathy. He is an established patient of Dr. Harvey Rosas M.D.    I had seen him in the capacity of cardiology DOD on 10/2/2017, started him on Eliquis anticoagulation and scheduled him for cardioversion. He remains in atrial flutter at a rate of 120 BPM. Unfortunately, the ANAID today showed spontaneous echo contrast in both atria and possible thrombus in the left atrial appendage. Hence, cardioversion was canceled. His LVEF has dropped from 35-40% to 20-25% with global severe hypokinesis. The right ventricular systolic function has decreased from mild to severely reduced. Mild tricuspid and mitral regurgitation.    Patient states that he has been consistently taking Eliquis 5 mg b.i.d. since 10/2 (skipped this morning's dose) and getting increasingly short of breath - currently NYHA class III - over the last few days. There is no lower extremity edema, orthopnea, PND or angina. He is on 20 mg daily furosemide. On exam, there are no signs of heart failure.    RECOMMENDATIONS:  1. With increasing dyspnea and further drop in biventricular systolic function, I urged him to reconsider his upcoming travel plans to [place], on 10/22. I would be happy to provide him with a letter which would help reimbursement of his travel costs.    2. He is scheduled to see cardiology BETHANY Shanika Davis at the [place] office tomorrow. I did offer him a follow-up appointment with me but he is unable to get transport at the times available.    3. Increase furosemide from 20 mg to 40 mg daily.    4. Continue metoprolol XL 50 mg b.i.d., aspirin 81 MG and Eliquis 5 mg b.i.d. Unable to go up on metoprolol due to low resting blood pressure of 100/80s.    5.Consider amiodarone initiation at the tomorrow's follow-up appointment. Recent TSH is 1.54.    6. I will arrange for him to have a chest x-ray, labs (BMP, NT proBNP)  prior to tomorrow's visit.    7. Electrophysiology consultation, and downstream follow-up visit with Dr. Rosas (patient's established cardiologist).

## 2017-10-19 NOTE — PROGRESS NOTES
Message from DR Mcclellan to add on labs and CXR to tomorrow BETHANY visit 10/20/2017 - spoke with Spray  who will arrange with patient @ 130 pm.  Olivia Walker for EP MD consult.  Mayda Thomason RN

## 2017-10-19 NOTE — IP AVS SNAPSHOT
[place] [address]    [place] [address]    Phone:  [phone]                                       After Visit Summary   10/19/2017    Adan Ruffin    MRN: [id number]           After Visit Summary Signature Page     I have received my discharge instructions, and my questions have been answered. I have discussed any challenges I see with this plan with the nurse or doctor.    ..........................................................................................................................................  Patient/Patient Representative Signature      ..........................................................................................................................................  Patient Representative Print Name and Relationship to Patient    ..................................................               ................................................  Date                                            Time    ..........................................................................................................................................  Reviewed by Signature/Title    ...................................................              ..............................................  Date                                                            Time

## 2017-10-20 ENCOUNTER — OFFICE VISIT (OUTPATIENT)
Dept: CARDIOLOGY | Facility: CLINIC | Age: 67
End: 2017-10-20
Attending: INTERNAL MEDICINE
Payer: COMMERCIAL

## 2017-10-20 VITALS
HEIGHT: 69 IN | OXYGEN SATURATION: 90 % | WEIGHT: 240 LBS | HEART RATE: 124 BPM | BODY MASS INDEX: 35.55 KG/M2 | DIASTOLIC BLOOD PRESSURE: 78 MMHG | SYSTOLIC BLOOD PRESSURE: 122 MMHG

## 2017-10-20 DIAGNOSIS — Z95.1 S/P CABG (CORONARY ARTERY BYPASS GRAFT): ICD-10-CM

## 2017-10-20 DIAGNOSIS — I42.9 CARDIOMYOPATHY, UNSPECIFIED TYPE (H): ICD-10-CM

## 2017-10-20 DIAGNOSIS — I48.92 ATRIAL FLUTTER (H): ICD-10-CM

## 2017-10-20 DIAGNOSIS — I10 ESSENTIAL HYPERTENSION: ICD-10-CM

## 2017-10-20 DIAGNOSIS — I48.3 TYPICAL ATRIAL FLUTTER (H): ICD-10-CM

## 2017-10-20 LAB
ANION GAP SERPL CALCULATED.3IONS-SCNC: 14.7 MMOL/L (ref 6–17)
BUN SERPL-MCNC: 31 MG/DL (ref 7–30)
CALCIUM SERPL-MCNC: 10.4 MG/DL (ref 8.5–10.5)
CHLORIDE SERPL-SCNC: 105 MMOL/L (ref 98–107)
CO2 SERPL-SCNC: 25 MMOL/L (ref 23–29)
CREAT SERPL-MCNC: 1.48 MG/DL (ref 0.7–1.3)
GFR SERPL CREATININE-BSD FRML MDRD: 48 ML/MIN/1.7M2
GLUCOSE SERPL-MCNC: 175 MG/DL (ref 70–105)
NT-PROBNP SERPL-MCNC: 931 PG/ML (ref 0–125)
POTASSIUM SERPL-SCNC: 4.7 MMOL/L (ref 3.5–5.1)
SODIUM SERPL-SCNC: 140 MMOL/L (ref 136–145)

## 2017-10-20 PROCEDURE — 36415 COLL VENOUS BLD VENIPUNCTURE: CPT | Performed by: INTERNAL MEDICINE

## 2017-10-20 PROCEDURE — 83880 ASSAY OF NATRIURETIC PEPTIDE: CPT | Performed by: INTERNAL MEDICINE

## 2017-10-20 PROCEDURE — 99214 OFFICE O/P EST MOD 30 MIN: CPT | Performed by: NURSE PRACTITIONER

## 2017-10-20 PROCEDURE — 80048 BASIC METABOLIC PNL TOTAL CA: CPT | Performed by: INTERNAL MEDICINE

## 2017-10-20 PROCEDURE — 93000 ELECTROCARDIOGRAM COMPLETE: CPT | Performed by: NURSE PRACTITIONER

## 2017-10-20 RX ORDER — METOPROLOL SUCCINATE 25 MG/1
75 TABLET, EXTENDED RELEASE ORAL 2 TIMES DAILY
Qty: 180 TABLET | Refills: 1 | COMMUNITY
Start: 2017-10-20 | End: 2017-10-25

## 2017-10-20 NOTE — MR AVS SNAPSHOT
After Visit Summary   10/20/2017    Adan Ruffin    MRN: [id number]           Patient Information     Date Of Birth          1950        Visit Information        Provider Department      10/20/2017 2:30 PM Shanika Davis APRN CNP [facility]        Today's Diagnoses     Cardiomyopathy, unspecified type (H)        Typical atrial flutter (H)        S/P CABG (coronary artery bypass graft)        Essential hypertension        Coronary artery disease          Care Instructions    -Increase metoprolol to 75 mg twice daily (3 pills twice daily)  -Follow up next week with the heart rhythm doctor          Follow-ups after your visit        Additional Services     Follow-Up with Electrophysiologist                 Your next 10 appointments already scheduled     Dec 05, 2017 12:15 PM CST   Return Visit with Harvey Rosas MD   [facility] ([facility])    [address] [place] [id number] [phone]              Future tests that were ordered for you today     Open Future Orders        Priority Expected Expires Ordered    EKG 12-lead complete w/read - Clinics (future- to be scheduled) Routine 10/25/2017 10/20/2018 10/20/2017    Follow-Up with Electrophysiologist Routine 10/25/2017 10/20/2019 10/20/2017    Follow-Up with Electrophysiologist Routine 11/30/2017 10/19/2018 10/19/2017    XR Chest 2 Views Routine 10/26/2017 10/19/2018 10/19/2017            Who to contact     If you have questions or need follow up information about today's clinic visit or your schedule please contact [facility] directly at [phone].  Normal or non-critical lab and imaging results will be communicated to you by MyChart, letter or phone within 4 business days after the clinic has received the results. If you do not hear from us within 7 days, please  "contact the clinic through [employer] or phone. If you have a critical or abnormal lab result, we will notify you by phone as soon as possible.  Submit refill requests through [employer] or call your pharmacy and they will forward the refill request to us. Please allow 3 business days for your refill to be completed.          Additional Information About Your Visit        [employer] Information     [employer] gives you secure access to your electronic health record. If you see a primary care provider, you can also send messages to your care team and make appointments. If you have questions, please call your primary care clinic.  If you do not have a primary care provider, please call [phone] and they will assist you.        Care EveryWhere ID     This is your Care EveryWhere ID. This could be used by other organizations to access your [place] medical records  [id number]        Your Vitals Were     Pulse Height Pulse Oximetry BMI (Body Mass Index)          124 1.753 m (5' 9\") 90% 35.44 kg/m2         Blood Pressure from Last 3 Encounters:   10/20/17 122/78   10/19/17 107/72   10/02/17 101/71    Weight from Last 3 Encounters:   10/20/17 108.9 kg (240 lb)   10/19/17 108.4 kg (239 lb)   10/02/17 109.5 kg (241 lb 4.8 oz)              We Performed the Following     EKG 12-lead, tracing only     Follow-Up with Cardiac Advanced Practice Provider          Today's Medication Changes          These changes are accurate as of: 10/20/17  2:53 PM.  If you have any questions, ask your nurse or doctor.               These medicines have changed or have updated prescriptions.        Dose/Directions    metoprolol 25 MG 24 hr tablet   Commonly known as:  TOPROL-XL   This may have changed:  how much to take   Used for:  Typical atrial flutter (H)   Changed by:  Shanika Davis APRN CNP        Dose:  75 mg   Take 3 tablets (75 mg) by mouth 2 times daily   Quantity:  180 tablet   Refills:  1                Primary Care Provider " Office Phone # Fax #    Lauro Galloway [phone]       [address] [facility] [address]        Equal Access to Services     CECILIOCARLOS SARWAT : Hadii beatriz mendieta oneliao Sobeatrizali, waaxda luqadaha, qaybta kaalmada italoda, [address] laiwonakenny ingrid. So [facility] [phone].    ATENCIÓN: Si habla español, tiene a moore disposición servicios gratuitos de asistencia lingüística. Llame al [phone].    We comply with applicable federal civil rights laws and [place] laws. We do not discriminate on the basis of race, color, national origin, age, disability, sex, sexual orientation, or gender identity.            Thank you!     Thank you for choosing [facility] [place]  for your care. Our goal is always to provide you with excellent care. Hearing back from our patients is one way we can continue to improve our services. Please take a few minutes to complete the written survey that you may receive in the mail after your visit with us. Thank you!             Your Updated Medication List - Protect others around you: Learn how to safely use, store and throw away your medicines at www.disposemymeds.org.          This list is accurate as of: 10/20/17  2:53 PM.  Always use your most recent med list.                   Brand Name Dispense Instructions for use Diagnosis    ACE NOT PRESCRIBED (INTENTIONAL)     0 each    1 each daily ACE Inhibitor not prescribed due to Intolerance    Type II or unspecified type diabetes mellitus without mention of complication, not stated as uncontrolled       apixaban ANTICOAGULANT 5 MG tablet    ELIQUIS    120 tablet    Take 1 tablet (5 mg) by mouth 2 times daily    Cardiomyopathy, unspecified type (H), Typical atrial flutter (H)       aspirin EC 81 MG EC tablet     1 tablet    Take 1 tablet (81 mg) by mouth daily    CAD (coronary artery disease), S/P CABG x 3, Anxiety       atorvastatin 40 MG tablet    LIPITOR    90 tablet     TAKE 1 TABLET BY MOUTH DAILY    Hyperlipidemia LDL goal <70       blood glucose monitoring lancets     200 each    Use to test blood sugar 4 times daily or as directed.    Type 2 diabetes mellitus with diabetic nephropathy, with long-term current use of insulin (H)       blood glucose monitoring test strip    no brand specified    6 Box    Use to test blood sugars 4 times daily or as directed    Type 2 diabetes mellitus with diabetic nephropathy (H)       CENTRUM SILVER per tablet      Take 1 tablet by mouth daily        furosemide 20 MG tablet    LASIX    90 tablet    TAKE 1 TABLET(20 MG) BY MOUTH DAILY    Essential hypertension       glipiZIDE 10 MG 24 hr tablet    GLUCOTROL XL    30 tablet    TAKE 1 TABLET BY MOUTH EVERY DAY    Type 2 diabetes mellitus with diabetic nephropathy, with long-term current use of insulin (H)       insulin aspart 100 UNIT/ML injection    NovoLOG FLEXPEN    45 mL    Take 1 unit for every 3 grams of carbohydrate PLUS 1 unit for every 10 mg/dL you are over 140mg/dL  units    Type 2 diabetes mellitus with diabetic nephropathy, with long-term current use of insulin (H)       insulin glargine U-300 300 UNIT/ML injection    TOUJEO    30 mL    Inject 80 units in am, inject 70 units in pm    Type 2 diabetes mellitus with diabetic nephropathy, with long-term current use of insulin (H)       insulin pen needle 30G X 8 MM    NOVOFINE    300 each    Use 4-5 daily or as directed.    Type 2 diabetes mellitus with diabetic nephropathy, with long-term current use of insulin (H)       metFORMIN 1000 MG tablet    GLUCOPHAGE    180 tablet    TAKE 1 TABLET BY MOUTH TWICE DAILY WITH MEALS    Insulin dependent diabetes mellitus (H)       metoprolol 25 MG 24 hr tablet    TOPROL-XL    180 tablet    Take 3 tablets (75 mg) by mouth 2 times daily    Typical atrial flutter (H)       * order for DME     3 Month    All DM testing supplies including test strips, lancets, solution, syringes, needles and/or pen  needles for testing 4 times per day & injecting 5 times per day. Dispense glucometer compatible supplies. Accucheck Geena.    Type 2 diabetes mellitus with diabetic nephropathy (H)       * order for DME     1 Box    All DM testing supplies including test strips, lancets, solution, syringes, needles and/or pen needles for testing 3 times per day & injecting 3 times per day.   RX goes to  Network Supplier, see tele enc 09/21/2016    Type 2 diabetes mellitus with diabetic nephropathy (H)       order for DME     1 each    Equipment being ordered: blood glucose meter    Type 2 diabetes mellitus with diabetic nephropathy, with long-term current use of insulin (H)       sertraline 100 MG tablet    ZOLOFT    90 tablet    Take 1 tablet (100 mg) by mouth daily    Anxiety       vitamin B complex with vitamin C Tabs tablet      Take 2 tablets by mouth daily        VITAMIN C PO           * Notice:  This list has 2 medication(s) that are the same as other medications prescribed for you. Read the directions carefully, and ask your doctor or other care provider to review them with you.

## 2017-10-20 NOTE — LETTER
10/20/2017    Lauro Galloway MD  [address] [facility] [address]    RE: Adan LUI Laly       Dear Colleague,    I had the pleasure of seeing Adan Ruffin in the [facility].    HISTORY OF PRESENT ILLNESS:  Mr. Ruffin is here today for a follow up visit.  He is a patient of Dr. Rosas's and was seen more recently by Dr. Mcclellan.  His medical history is significant for coronary artery disease, status post CABG x3 in 04/2015 (LIMA to LAD, vein graft to circumflex and PDA).  His last echocardiogram in 01/2017 showed normal LV function with an EF of 60%-65%.  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  He has stage 3 chronic kidney disease with a creatinine of 1.0 and an estimated GFR of 74, treated hypertension, ex-smoker (quit in the year 2000) and BMI of 35.7 kg/m2.      He had a routine ECG on 09/15/2017.  This showed atrial flutter at a rate of 130 beats per minute with 1:2 conduction.  He was entirely asymptomatic at the time.     Echocardiogram done on 09/25/2017 showed a new drop in ejection fraction from his previous 60%-65% six months ago to 35%-40% with global hypokinesis and normal LV size.  The right ventricle was normal in size with mildly decreased systolic function, and there was no significant valve disease, but the patient was in rapid atrial flutter at a rate of 130 BPM at the time of the study.      Based on his recent ECG, his primary provider restarted him on metoprolol XL 25 mg twice daily.  Previously, Metoprolol was discontinued by Dr. Rosas earlier this year because of patient-reported symptoms of erectile dysfunction and the fact that he had been angina free for almost 2 years following his CABG in 2015.     He saw Dr. Mcclellan in consult.  She recommended ANAID and DCCV.  Unfortunatley, his ANAID shows a clot in the HERMILA.  Additionally, his LVEF had further  fallen and both the LV and RV had further dilated.  He was having TERAN and Lasix was started.   His metoprolol was increased as he continue to be in flutter with RVR.     PHYSICAL EXAMINATION:   CONSTITUTIONAL:  Comfortable at rest  NECK:  Jugular venous pressure is not elevated.  Normal carotid pulses.  RESPIRATORY:  Bilateral normal breath sounds.  CARDIOVASCULAR: Midline sternotomy scar is well healed.  Heart sounds are tachycardic, regular.  No audible murmur.  No S3, S4 or rub.   ABDOMEN:  Obese, soft and nontender.   EXTREMITIES:  No edema     ECG from today shows typical atrial flutter at 121 BPM.      ASSESSMENT AND PLAN:  Reviewed with Dr. Mcclellan.  Will increase metoprolol.  She would like him to see EP though realizes we are limited in regard to restoring SR at this time given the HERMILA clot.  Plan will be for rate control with possible addition of digoxin if BP remains low and renal function is stable or up titration of BB.  Management of CHF symptoms until 3-4 weeks when we can consider repeat ANAID and restoration of SR via DCCV or ablation.      In regards to CHF sx's.  His first dose of Lasix was this AM.  Mild renal insufficieny noted on labs possibly due to low CO.  Repeat at OV next week.  I would be happy to see him again in the future.    Orders Placed This Encounter   Procedures     Follow-Up with Electrophysiologist     EKG 12-lead complete w/read - Clinics (future- to be scheduled)       Orders Placed This Encounter   Medications     metoprolol (TOPROL-XL) 25 MG 24 hr tablet     Sig: Take 3 tablets (75 mg) by mouth 2 times daily     Dispense:  180 tablet     Refill:  1       Medications Discontinued During This Encounter   Medication Reason     lisinopril (PRINIVIL/ZESTRIL) 5 MG tablet Medication Reconciliation Clean Up     metoprolol (TOPROL-XL) 25 MG 24 hr tablet Reorder         Encounter Diagnoses   Name Primary?     Cardiomyopathy, unspecified type (H)      Typical atrial flutter (H)      S/P  CABG (coronary artery bypass graft)      Essential hypertension      Coronary artery disease        CURRENT MEDICATIONS:  Current Outpatient Prescriptions   Medication Sig Dispense Refill     metoprolol (TOPROL-XL) 25 MG 24 hr tablet Take 3 tablets (75 mg) by mouth 2 times daily 180 tablet 1     aspirin 81 MG EC tablet Take 1 tablet (81 mg) by mouth daily 1 tablet 0     apixaban ANTICOAGULANT (ELIQUIS) 5 MG tablet Take 1 tablet (5 mg) by mouth 2 times daily 120 tablet 3     atorvastatin (LIPITOR) 40 MG tablet TAKE 1 TABLET BY MOUTH DAILY 90 tablet 1     metFORMIN (GLUCOPHAGE) 1000 MG tablet TAKE 1 TABLET BY MOUTH TWICE DAILY WITH MEALS 180 tablet 1     glipiZIDE (GLUCOTROL XL) 10 MG 24 hr tablet TAKE 1 TABLET BY MOUTH EVERY DAY 30 tablet 0     insulin pen needle (NOVOFINE) 30G X 8 MM Use 4-5 daily or as directed. 300 each 1     Ascorbic Acid (VITAMIN C PO)        insulin aspart (NOVOLOG FLEXPEN) 100 UNIT/ML injection Take 1 unit for every 3 grams of carbohydrate PLUS 1 unit for every 10 mg/dL you are over 140mg/dL  units 45 mL 3     blood glucose monitoring (ACCU-CHEK FASTCLIX) lancets Use to test blood sugar 4 times daily or as directed. 200 each 11     insulin glargine U-300 (TOUJEO) 300 UNIT/ML injection Inject 80 units in am, inject 70 units in pm 30 mL 3     sertraline (ZOLOFT) 100 MG tablet Take 1 tablet (100 mg) by mouth daily 90 tablet 2     furosemide (LASIX) 20 MG tablet TAKE 1 TABLET(20 MG) BY MOUTH DAILY 90 tablet 3     order for DME Equipment being ordered: blood glucose meter 1 each 0     order for DME All DM testing supplies including test strips, lancets, solution, syringes, needles and/or pen needles for testing 3 times per day & injecting 3 times per day.      RX goes to  Network Supplier, see tele enc 09/21/2016 1 Box 3     order for DME All DM testing supplies including test strips, lancets, solution, syringes, needles and/or pen needles for testing 4 times per day & injecting 5 times  per day.  Dispense glucometer compatible supplies. Accucheck Geena. 3 Month 3     blood glucose monitoring (NO BRAND SPECIFIED) test strip Use to test blood sugars 4 times daily or as directed 6 Box 1     ACE NOT PRESCRIBED, INTENTIONAL, 1 each daily ACE Inhibitor not prescribed due to Intolerance 0 each 0     Multiple Vitamins-Minerals (CENTRUM SILVER) per tablet Take 1 tablet by mouth daily       vitamin  B complex with vitamin C (VITAMIN  B COMPLEX) TABS Take 2 tablets by mouth daily         ALLERGIES     Allergies   Allergen Reactions     Codeine Rash     Mild rash     Simvastatin Other (See Comments)     Leg pain       PAST MEDICAL HISTORY:  Past Medical History:   Diagnosis Date     Atrial flutter (H) 10/02/2017     CAD (coronary artery disease) 03/13/2015    CORONARY ARTERY BYPASS X3      Cardiomyopathy (H) 10/02/2017    (L) ventricular systolic     Chest pain on exertion      Encounter for long-term (current) use of insulin (H) 11/2/2016     Former smoker, stopped smoking in distant past     Quit 10 yrs ago, 1-2 PPD x 20+ yrs     History of angina      HTN (hypertension)      Hyperlipidaemia      Microalbuminuria 6/27/2016     Obesity 3/13/2015     Type 2 diabetes mellitus with diabetic nephropathy, with long-term current use of insulin (H) 11/2/2016       PAST SURGICAL HISTORY:  Past Surgical History:   Procedure Laterality Date     BYPASS GRAFT ARTERY CORONARY N/A 4/7/2015    bypass LAD, OM, PDA        COLONOSCOPY N/A 6/20/2016    Procedure: COLONOSCOPY;  Surgeon: Marcela Schwartz MD;  Location: [facility]     EXCISE PILONIDAL CYST, SIMPLE  1975     HEART CATH, ANGIOPLASTY  3-    3 vessel disease-occluded RCA, stenosis LM-to have CABG     removal of skin cancer  1992     TONSILLECTOMY & ADENOIDECTOMY  1950       FAMILY HISTORY:  Family History   Problem Relation Age of Onset     Breast Cancer Mother      CEREBROVASCULAR DISEASE Mother      Hyperlipidemia Father      CANCER Father      lung,  "unrelated to smoking     CANCER Paternal Grandmother        SOCIAL HISTORY:  Social History     Social History     Marital status: Single     Spouse name: N/A     Number of children: N/A     Years of education: N/A     Social History Main Topics     Smoking status: Former Smoker     Packs/day: 1.50     Years: 29.00     Types: Cigarettes     Quit date: 1/1/2000     Smokeless tobacco: Never Used     Alcohol use No      Comment: 1 mixed drink a night     Drug use: No     Sexual activity: Yes     Partners: Female     Other Topics Concern     Caffeine Concern No     none     Sleep Concern No     Stress Concern Yes     sometimes at home     Weight Concern Yes     would like to lose weight     Special Diet No     Exercise No     Seat Belt Yes     Social History Narrative       Review of Systems:  Skin:  Negative       Eyes:  Positive for glasses    ENT:  Negative      Respiratory:  Positive for shortness of breath;cough     Cardiovascular:  Negative Positive for;fatigue    Gastroenterology: Negative      Genitourinary:  Negative      Musculoskeletal:  Positive for joint pain lower back surgery  Neurologic:  Positive for numbness or tingling of hands (at night)    Psychiatric:  Positive for anxiety    Heme/Lymph/Imm:  Positive for allergies    Endocrine:  Positive for diabetes      Physical Exam:  Vitals: /78  Pulse 124  Ht 1.753 m (5' 9\")  Wt 108.9 kg (240 lb)  SpO2 90%  BMI 35.44 kg/m2    Thank you for allowing me to participate in the care of your patient.    Sincerely,     Shanika Davis, ADOLFO, APRN [facility]    "

## 2017-10-20 NOTE — PATIENT INSTRUCTIONS
-Increase metoprolol to 75 mg twice daily (3 pills twice daily)  -Follow up next week with the heart rhythm doctor

## 2017-10-23 NOTE — PROGRESS NOTES
HISTORY OF PRESENT ILLNESS:  Mr. Ruffin is here today for a follow up visit.  He is a patient of Dr. Rosas's and was seen more recently by Dr. Mcclellan.  His medical history is significant for coronary artery disease, status post CABG x3 in 04/2015 (LIMA to LAD, vein graft to circumflex and PDA).  His last echocardiogram in 01/2017 showed normal LV function with an EF of 60%-65%.  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  He has stage 3 chronic kidney disease with a creatinine of 1.0 and an estimated GFR of 74, treated hypertension, ex-smoker (quit in the year 2000) and BMI of 35.7 kg/m2.      He had a routine ECG on 09/15/2017.  This showed atrial flutter at a rate of 130 beats per minute with 1:2 conduction.  He was entirely asymptomatic at the time.     Echocardiogram done on 09/25/2017 showed a new drop in ejection fraction from his previous 60%-65% six months ago to 35%-40% with global hypokinesis and normal LV size.  The right ventricle was normal in size with mildly decreased systolic function, and there was no significant valve disease, but the patient was in rapid atrial flutter at a rate of 130 BPM at the time of the study.      Based on his recent ECG, his primary provider restarted him on metoprolol XL 25 mg twice daily.  Previously, Metoprolol was discontinued by Dr. Rosas earlier this year because of patient-reported symptoms of erectile dysfunction and the fact that he had been angina free for almost 2 years following his CABG in 2015.     He saw Dr. Mcclellan in consult.  She recommended ANAID and DCCV.  Unfortunatley, his ANAID shows a clot in the HERMILA.  Additionally, his LVEF had further fallen and both the LV and RV had further dilated.  He was having TERAN and Lasix was started.   His metoprolol was increased as he continue to be in flutter with RVR.     PHYSICAL EXAMINATION:   CONSTITUTIONAL:  Comfortable at  rest  NECK:  Jugular venous pressure is not elevated.  Normal carotid pulses.  RESPIRATORY:  Bilateral normal breath sounds.  CARDIOVASCULAR: Midline sternotomy scar is well healed.  Heart sounds are tachycardic, regular.  No audible murmur.  No S3, S4 or rub.   ABDOMEN:  Obese, soft and nontender.   EXTREMITIES:  No edema     ECG from today shows typical atrial flutter at 121 BPM.      ASSESSMENT AND PLAN:  Reviewed with Dr. Mcclellan.  Will increase metoprolol.  She would like him to see EP though realizes we are limited in regard to restoring SR at this time given the HERMILA clot.  Plan will be for rate control with possible addition of digoxin if BP remains low and renal function is stable or up titration of BB.  Management of CHF symptoms until 3-4 weeks when we can consider repeat ANAID and restoration of SR via DCCV or ablation.      In regards to CHF sx's.  His first dose of Lasix was this AM.  Mild renal insufficieny noted on labs possibly due to low CO.  Repeat at OV next week.  I would be happy to see him again in the future.    Orders Placed This Encounter   Procedures     Follow-Up with Electrophysiologist     EKG 12-lead complete w/read - Clinics (future- to be scheduled)       Orders Placed This Encounter   Medications     metoprolol (TOPROL-XL) 25 MG 24 hr tablet     Sig: Take 3 tablets (75 mg) by mouth 2 times daily     Dispense:  180 tablet     Refill:  1       Medications Discontinued During This Encounter   Medication Reason     lisinopril (PRINIVIL/ZESTRIL) 5 MG tablet Medication Reconciliation Clean Up     metoprolol (TOPROL-XL) 25 MG 24 hr tablet Reorder         Encounter Diagnoses   Name Primary?     Cardiomyopathy, unspecified type (H)      Typical atrial flutter (H)      S/P CABG (coronary artery bypass graft)      Essential hypertension      Coronary artery disease        CURRENT MEDICATIONS:  Current Outpatient Prescriptions   Medication Sig Dispense Refill     metoprolol (TOPROL-XL) 25 MG 24 hr  tablet Take 3 tablets (75 mg) by mouth 2 times daily 180 tablet 1     aspirin 81 MG EC tablet Take 1 tablet (81 mg) by mouth daily 1 tablet 0     apixaban ANTICOAGULANT (ELIQUIS) 5 MG tablet Take 1 tablet (5 mg) by mouth 2 times daily 120 tablet 3     atorvastatin (LIPITOR) 40 MG tablet TAKE 1 TABLET BY MOUTH DAILY 90 tablet 1     metFORMIN (GLUCOPHAGE) 1000 MG tablet TAKE 1 TABLET BY MOUTH TWICE DAILY WITH MEALS 180 tablet 1     glipiZIDE (GLUCOTROL XL) 10 MG 24 hr tablet TAKE 1 TABLET BY MOUTH EVERY DAY 30 tablet 0     insulin pen needle (NOVOFINE) 30G X 8 MM Use 4-5 daily or as directed. 300 each 1     Ascorbic Acid (VITAMIN C PO)        insulin aspart (NOVOLOG FLEXPEN) 100 UNIT/ML injection Take 1 unit for every 3 grams of carbohydrate PLUS 1 unit for every 10 mg/dL you are over 140mg/dL  units 45 mL 3     blood glucose monitoring (ACCU-CHEK FASTCLIX) lancets Use to test blood sugar 4 times daily or as directed. 200 each 11     insulin glargine U-300 (TOUJEO) 300 UNIT/ML injection Inject 80 units in am, inject 70 units in pm 30 mL 3     sertraline (ZOLOFT) 100 MG tablet Take 1 tablet (100 mg) by mouth daily 90 tablet 2     furosemide (LASIX) 20 MG tablet TAKE 1 TABLET(20 MG) BY MOUTH DAILY 90 tablet 3     order for DME Equipment being ordered: blood glucose meter 1 each 0     order for DME All DM testing supplies including test strips, lancets, solution, syringes, needles and/or pen needles for testing 3 times per day & injecting 3 times per day.      RX goes to  [facility] Supplier, see tele enc 09/21/2016 1 Box 3     order for DME All DM testing supplies including test strips, lancets, solution, syringes, needles and/or pen needles for testing 4 times per day & injecting 5 times per day.  Dispense glucometer compatible supplies. Accucheck Geena. 3 Month 3     blood glucose monitoring (NO BRAND SPECIFIED) test strip Use to test blood sugars 4 times daily or as directed 6 Box 1     ACE NOT PRESCRIBED,  INTENTIONAL, 1 each daily ACE Inhibitor not prescribed due to Intolerance 0 each 0     Multiple Vitamins-Minerals (CENTRUM SILVER) per tablet Take 1 tablet by mouth daily       vitamin  B complex with vitamin C (VITAMIN  B COMPLEX) TABS Take 2 tablets by mouth daily         ALLERGIES     Allergies   Allergen Reactions     Codeine Rash     Mild rash     Simvastatin Other (See Comments)     Leg pain       PAST MEDICAL HISTORY:  Past Medical History:   Diagnosis Date     Atrial flutter (H) 10/02/2017     CAD (coronary artery disease) 03/13/2015    CORONARY ARTERY BYPASS X3      Cardiomyopathy (H) 10/02/2017    (L) ventricular systolic     Chest pain on exertion      Encounter for long-term (current) use of insulin (H) 11/2/2016     Former smoker, stopped smoking in distant past     Quit 10 yrs ago, 1-2 PPD x 20+ yrs     History of angina      HTN (hypertension)      Hyperlipidaemia      Microalbuminuria 6/27/2016     Obesity 3/13/2015     Type 2 diabetes mellitus with diabetic nephropathy, with long-term current use of insulin (H) 11/2/2016       PAST SURGICAL HISTORY:  Past Surgical History:   Procedure Laterality Date     BYPASS GRAFT ARTERY CORONARY N/A 4/7/2015    bypass LAD, OM, PDA        COLONOSCOPY N/A 6/20/2016    Procedure: COLONOSCOPY;  Surgeon: Marcela Schwartz MD;  Location: [facility]     EXCISE PILONIDAL CYST, SIMPLE  1975     HEART CATH, ANGIOPLASTY  3-    3 vessel disease-occluded RCA, stenosis LM-to have CABG     removal of skin cancer  1992     TONSILLECTOMY & ADENOIDECTOMY  1950       FAMILY HISTORY:  Family History   Problem Relation Age of Onset     Breast Cancer Mother      CEREBROVASCULAR DISEASE Mother      Hyperlipidemia Father      CANCER Father      lung, unrelated to smoking     CANCER Paternal Grandmother        SOCIAL HISTORY:  Social History     Social History     Marital status: Single     Spouse name: N/A     Number of children: N/A     Years of education: N/A     Social  "History Main Topics     Smoking status: Former Smoker     Packs/day: 1.50     Years: 29.00     Types: Cigarettes     Quit date: 1/1/2000     Smokeless tobacco: Never Used     Alcohol use No      Comment: 1 mixed drink a night     Drug use: No     Sexual activity: Yes     Partners: Female     Other Topics Concern     Caffeine Concern No     none     Sleep Concern No     Stress Concern Yes     sometimes at home     Weight Concern Yes     would like to lose weight     Special Diet No     Exercise No     Seat Belt Yes     Social History Narrative       Review of Systems:  Skin:  Negative       Eyes:  Positive for glasses    ENT:  Negative      Respiratory:  Positive for shortness of breath;cough     Cardiovascular:  Negative Positive for;fatigue    Gastroenterology: Negative      Genitourinary:  Negative      Musculoskeletal:  Positive for joint pain lower back surgery  Neurologic:  Positive for numbness or tingling of hands (at night)    Psychiatric:  Positive for anxiety    Heme/Lymph/Imm:  Positive for allergies    Endocrine:  Positive for diabetes      Physical Exam:  Vitals: /78  Pulse 124  Ht 1.753 m (5' 9\")  Wt 108.9 kg (240 lb)  SpO2 90%  BMI 35.44 kg/m2                      "

## 2017-10-24 DIAGNOSIS — I50.21 ACUTE SYSTOLIC HEART FAILURE (H): Primary | ICD-10-CM

## 2017-10-25 ENCOUNTER — OFFICE VISIT (OUTPATIENT)
Dept: CARDIOLOGY | Facility: CLINIC | Age: 67
End: 2017-10-25
Payer: COMMERCIAL

## 2017-10-25 VITALS
DIASTOLIC BLOOD PRESSURE: 60 MMHG | HEIGHT: 69 IN | BODY MASS INDEX: 35.1 KG/M2 | WEIGHT: 237 LBS | HEART RATE: 120 BPM | SYSTOLIC BLOOD PRESSURE: 110 MMHG

## 2017-10-25 DIAGNOSIS — I48.3 TYPICAL ATRIAL FLUTTER (H): ICD-10-CM

## 2017-10-25 DIAGNOSIS — I50.21 ACUTE SYSTOLIC HEART FAILURE (H): ICD-10-CM

## 2017-10-25 LAB
ANION GAP SERPL CALCULATED.3IONS-SCNC: 17.5 MMOL/L (ref 6–17)
BUN SERPL-MCNC: 35 MG/DL (ref 7–30)
CALCIUM SERPL-MCNC: 10.1 MG/DL (ref 8.5–10.5)
CHLORIDE SERPL-SCNC: 103 MMOL/L (ref 98–107)
CO2 SERPL-SCNC: 24 MMOL/L (ref 23–29)
CREAT SERPL-MCNC: 1.35 MG/DL (ref 0.7–1.3)
GFR SERPL CREATININE-BSD FRML MDRD: 53 ML/MIN/1.7M2
GLUCOSE SERPL-MCNC: 194 MG/DL (ref 70–105)
POTASSIUM SERPL-SCNC: 4.5 MMOL/L (ref 3.5–5.1)
SODIUM SERPL-SCNC: 140 MMOL/L (ref 136–145)

## 2017-10-25 PROCEDURE — 80048 BASIC METABOLIC PNL TOTAL CA: CPT | Performed by: NURSE PRACTITIONER

## 2017-10-25 PROCEDURE — 99205 OFFICE O/P NEW HI 60 MIN: CPT | Performed by: INTERNAL MEDICINE

## 2017-10-25 PROCEDURE — 93000 ELECTROCARDIOGRAM COMPLETE: CPT | Performed by: INTERNAL MEDICINE

## 2017-10-25 PROCEDURE — 36415 COLL VENOUS BLD VENIPUNCTURE: CPT | Performed by: NURSE PRACTITIONER

## 2017-10-25 RX ORDER — METOPROLOL SUCCINATE 100 MG/1
100 TABLET, EXTENDED RELEASE ORAL 2 TIMES DAILY
Qty: 60 TABLET | Refills: 3 | Status: SHIPPED | OUTPATIENT
Start: 2017-10-25 | End: 2018-06-15

## 2017-10-25 RX ORDER — METOPROLOL SUCCINATE 100 MG/1
100 TABLET, EXTENDED RELEASE ORAL DAILY
Qty: 60 TABLET | Refills: 3 | Status: SHIPPED | OUTPATIENT
Start: 2017-10-25 | End: 2017-10-25

## 2017-10-25 RX ORDER — DIGOXIN 250 MCG
250 TABLET ORAL DAILY
Qty: 30 TABLET | Refills: 3 | Status: ON HOLD | OUTPATIENT
Start: 2017-10-25 | End: 2017-11-27

## 2017-10-25 NOTE — MR AVS SNAPSHOT
After Visit Summary   10/25/2017    Adan Ruffin    MRN: [id number]           Patient Information     Date Of Birth          1950        Visit Information        Provider Department      10/25/2017 9:45 AM Ramy Laguna MD [facility]        Today's Diagnoses     Typical atrial flutter (H)           Follow-ups after your visit        Your next 10 appointments already scheduled     Dec 05, 2017 12:15 PM CST   Return Visit with Harvey Rosas MD   [facility] ([facility])    [address] [place] [id number]   [phone]              Future tests that were ordered for you today     Open Future Orders        Priority Expected Expires Ordered    EP Ablation Procedures Routine 11/1/2017 10/25/2018 10/25/2017    Transesophageal Echocardiogram Routine 11/8/2017 10/25/2018 10/25/2017            Who to contact     If you have questions or need follow up information about today's clinic visit or your schedule please contact [facility] directly at [phone].  Normal or non-critical lab and imaging results will be communicated to you by CallMinerhart, letter or phone within 4 business days after the clinic has received the results. If you do not hear from us within 7 days, please contact the clinic through CallMinerhart or phone. If you have a critical or abnormal lab result, we will notify you by phone as soon as possible.  Submit refill requests through Yapmo or call your pharmacy and they will forward the refill request to us. Please allow 3 business days for your refill to be completed.          Additional Information About Your Visit        CallMinerhart Information     Yapmo gives you secure access to your electronic health record. If you see a primary care provider, you can also send messages to your care team and make appointments. If you have  "questions, please call your primary care clinic.  If you do not have a primary care provider, please call [phone] and they will assist you.        Care EveryWhere ID     This is your Care EveryWhere ID. This could be used by other organizations to access your [place] medical records  [id number]        Your Vitals Were     Pulse Height BMI (Body Mass Index)             120 1.753 m (5' 9\") 35 kg/m2          Blood Pressure from Last 3 Encounters:   10/25/17 110/60   10/20/17 122/78   10/19/17 107/72    Weight from Last 3 Encounters:   10/25/17 107.5 kg (237 lb)   10/20/17 108.9 kg (240 lb)   10/19/17 108.4 kg (239 lb)              We Performed the Following     EKG 12-lead complete w/read - Clinics (performed today)     Follow-Up with Electrophysiologist          Today's Medication Changes          These changes are accurate as of: 10/25/17  9:58 AM.  If you have any questions, ask your nurse or doctor.               Start taking these medicines.        Dose/Directions    digoxin 250 MCG tablet   Commonly known as:  LANOXIN   Used for:  Typical atrial flutter (H)   Started by:  Ramy Laguna MD        Dose:  250 mcg   Take 1 tablet (250 mcg) by mouth daily   Quantity:  30 tablet   Refills:  3         These medicines have changed or have updated prescriptions.        Dose/Directions    metoprolol 100 MG 24 hr tablet   Commonly known as:  TOPROL XL   This may have changed:    - medication strength  - how much to take   Used for:  Typical atrial flutter (H)   Changed by:  Ramy Laguna MD        Dose:  100 mg   Take 1 tablet (100 mg) by mouth 2 times daily   Quantity:  60 tablet   Refills:  3            Where to get your medicines      These medications were sent to [facility] [id number] [place] [address] [address] [facility] [address]     Phone:  [phone]     digoxin 250 MCG tablet    metoprolol 100 MG 24 hr tablet                Primary " Care Provider Office Phone # Fax #    Lauro Galloway [phone]       [address] [facility] [address]        Equal Access to Services     CECILIOCARLOS SARWAT : Hadii aad ku oneliao Sobeatrizali, waaxda luqadaha, qaybta kaalmada aderemada, luke wrightkenny ingrid. So [facility] [phone].    ATENCIÓN: Si habla español, tiene a moore disposición servicios gratuitos de asistencia lingüística. Llame al [phone].    We comply with applicable federal civil rights laws and [place] laws. We do not discriminate on the basis of race, color, national origin, age, disability, sex, sexual orientation, or gender identity.            Thank you!     Thank you for choosing [facility] AT [place]  for your care. Our goal is always to provide you with excellent care. Hearing back from our patients is one way we can continue to improve our services. Please take a few minutes to complete the written survey that you may receive in the mail after your visit with us. Thank you!             Your Updated Medication List - Protect others around you: Learn how to safely use, store and throw away your medicines at www.disposemymeds.org.          This list is accurate as of: 10/25/17  9:58 AM.  Always use your most recent med list.                   Brand Name Dispense Instructions for use Diagnosis    ACE NOT PRESCRIBED (INTENTIONAL)     0 each    1 each daily ACE Inhibitor not prescribed due to Intolerance    Type II or unspecified type diabetes mellitus without mention of complication, not stated as uncontrolled       apixaban ANTICOAGULANT 5 MG tablet    ELIQUIS    120 tablet    Take 1 tablet (5 mg) by mouth 2 times daily    Cardiomyopathy, unspecified type (H), Typical atrial flutter (H)       aspirin EC 81 MG EC tablet     1 tablet    Take 1 tablet (81 mg) by mouth daily    CAD (coronary artery disease), S/P CABG x 3, Anxiety       atorvastatin 40 MG tablet    LIPITOR     90 tablet    TAKE 1 TABLET BY MOUTH DAILY    Hyperlipidemia LDL goal <70       blood glucose monitoring lancets     200 each    Use to test blood sugar 4 times daily or as directed.    Type 2 diabetes mellitus with diabetic nephropathy, with long-term current use of insulin (H)       blood glucose monitoring test strip    no brand specified    6 Box    Use to test blood sugars 4 times daily or as directed    Type 2 diabetes mellitus with diabetic nephropathy (H)       CENTRUM SILVER per tablet      Take 1 tablet by mouth daily        digoxin 250 MCG tablet    LANOXIN    30 tablet    Take 1 tablet (250 mcg) by mouth daily    Typical atrial flutter (H)       furosemide 20 MG tablet    LASIX    90 tablet    TAKE 1 TABLET(20 MG) BY MOUTH DAILY    Essential hypertension       glipiZIDE 10 MG 24 hr tablet    GLUCOTROL XL    30 tablet    TAKE 1 TABLET BY MOUTH EVERY DAY    Type 2 diabetes mellitus with diabetic nephropathy, with long-term current use of insulin (H)       insulin aspart 100 UNIT/ML injection    NovoLOG FLEXPEN    45 mL    Take 1 unit for every 3 grams of carbohydrate PLUS 1 unit for every 10 mg/dL you are over 140mg/dL  units    Type 2 diabetes mellitus with diabetic nephropathy, with long-term current use of insulin (H)       insulin glargine U-300 300 UNIT/ML injection    TOUJEO    30 mL    Inject 80 units in am, inject 70 units in pm    Type 2 diabetes mellitus with diabetic nephropathy, with long-term current use of insulin (H)       insulin pen needle 30G X 8 MM    NOVOFINE    300 each    Use 4-5 daily or as directed.    Type 2 diabetes mellitus with diabetic nephropathy, with long-term current use of insulin (H)       metFORMIN 1000 MG tablet    GLUCOPHAGE    180 tablet    TAKE 1 TABLET BY MOUTH TWICE DAILY WITH MEALS    Insulin dependent diabetes mellitus (H)       metoprolol 100 MG 24 hr tablet    TOPROL XL    60 tablet    Take 1 tablet (100 mg) by mouth 2 times daily    Typical atrial  valerie (H)       * order for DME     3 Month    All DM testing supplies including test strips, lancets, solution, syringes, needles and/or pen needles for testing 4 times per day & injecting 5 times per day. Dispense glucometer compatible supplies. Accucheck Geena.    Type 2 diabetes mellitus with diabetic nephropathy (H)       * order for DME     1 Box    All DM testing supplies including test strips, lancets, solution, syringes, needles and/or pen needles for testing 3 times per day & injecting 3 times per day.   RX goes to  Network Supplier, see tele enc 09/21/2016    Type 2 diabetes mellitus with diabetic nephropathy (H)       order for DME     1 each    Equipment being ordered: blood glucose meter    Type 2 diabetes mellitus with diabetic nephropathy, with long-term current use of insulin (H)       sertraline 100 MG tablet    ZOLOFT    90 tablet    Take 1 tablet (100 mg) by mouth daily    Anxiety       vitamin B complex with vitamin C Tabs tablet      Take 2 tablets by mouth daily        VITAMIN C PO           * Notice:  This list has 2 medication(s) that are the same as other medications prescribed for you. Read the directions carefully, and ask your doctor or other care provider to review them with you.

## 2017-10-25 NOTE — PROGRESS NOTES
HPI and Plan:   See dictation    Orders Placed This Encounter   Procedures     EKG 12-lead complete w/read - Clinics (performed today)     EP Ablation Procedures     Transesophageal Echocardiogram       Orders Placed This Encounter   Medications     DISCONTD: metoprolol (TOPROL XL) 100 MG 24 hr tablet     Sig: Take 1 tablet (100 mg) by mouth daily     Dispense:  60 tablet     Refill:  3     metoprolol (TOPROL XL) 100 MG 24 hr tablet     Sig: Take 1 tablet (100 mg) by mouth 2 times daily     Dispense:  60 tablet     Refill:  3     digoxin (LANOXIN) 250 MCG tablet     Sig: Take 1 tablet (250 mcg) by mouth daily     Dispense:  30 tablet     Refill:  3       Medications Discontinued During This Encounter   Medication Reason     metoprolol (TOPROL-XL) 25 MG 24 hr tablet      metoprolol (TOPROL XL) 100 MG 24 hr tablet          Encounter Diagnosis   Name Primary?     Typical atrial flutter (H)        CURRENT MEDICATIONS:  Current Outpatient Prescriptions   Medication Sig Dispense Refill     metoprolol (TOPROL XL) 100 MG 24 hr tablet Take 1 tablet (100 mg) by mouth 2 times daily 60 tablet 3     digoxin (LANOXIN) 250 MCG tablet Take 1 tablet (250 mcg) by mouth daily 30 tablet 3     aspirin 81 MG EC tablet Take 1 tablet (81 mg) by mouth daily 1 tablet 0     apixaban ANTICOAGULANT (ELIQUIS) 5 MG tablet Take 1 tablet (5 mg) by mouth 2 times daily 120 tablet 3     atorvastatin (LIPITOR) 40 MG tablet TAKE 1 TABLET BY MOUTH DAILY 90 tablet 1     metFORMIN (GLUCOPHAGE) 1000 MG tablet TAKE 1 TABLET BY MOUTH TWICE DAILY WITH MEALS 180 tablet 1     glipiZIDE (GLUCOTROL XL) 10 MG 24 hr tablet TAKE 1 TABLET BY MOUTH EVERY DAY 30 tablet 0     insulin pen needle (NOVOFINE) 30G X 8 MM Use 4-5 daily or as directed. 300 each 1     Ascorbic Acid (VITAMIN C PO)        insulin aspart (NOVOLOG FLEXPEN) 100 UNIT/ML injection Take 1 unit for every 3 grams of carbohydrate PLUS 1 unit for every 10 mg/dL you are over 140mg/dL  units 45 mL 3      blood glucose monitoring (ACCU-CHEK FASTCLIX) lancets Use to test blood sugar 4 times daily or as directed. 200 each 11     insulin glargine U-300 (TOUJEO) 300 UNIT/ML injection Inject 80 units in am, inject 70 units in pm 30 mL 3     sertraline (ZOLOFT) 100 MG tablet Take 1 tablet (100 mg) by mouth daily 90 tablet 2     furosemide (LASIX) 20 MG tablet TAKE 1 TABLET(20 MG) BY MOUTH DAILY 90 tablet 3     order for DME Equipment being ordered: blood glucose meter 1 each 0     order for DME All DM testing supplies including test strips, lancets, solution, syringes, needles and/or pen needles for testing 3 times per day & injecting 3 times per day.      RX goes to  [facility] Supplier, see tele enc 09/21/2016 1 Box 3     order for DME All DM testing supplies including test strips, lancets, solution, syringes, needles and/or pen needles for testing 4 times per day & injecting 5 times per day.  Dispense glucometer compatible supplies. Accucheck Geena. 3 Month 3     blood glucose monitoring (NO BRAND SPECIFIED) test strip Use to test blood sugars 4 times daily or as directed 6 Box 1     ACE NOT PRESCRIBED, INTENTIONAL, 1 each daily ACE Inhibitor not prescribed due to Intolerance 0 each 0     Multiple Vitamins-Minerals (CENTRUM SILVER) per tablet Take 1 tablet by mouth daily       vitamin  B complex with vitamin C (VITAMIN  B COMPLEX) TABS Take 2 tablets by mouth daily       [DISCONTINUED] metoprolol (TOPROL XL) 100 MG 24 hr tablet Take 1 tablet (100 mg) by mouth daily 60 tablet 3     [DISCONTINUED] metoprolol (TOPROL-XL) 25 MG 24 hr tablet Take 3 tablets (75 mg) by mouth 2 times daily 180 tablet 1       ALLERGIES     Allergies   Allergen Reactions     Codeine Rash     Mild rash     Simvastatin Other (See Comments)     Leg pain       PAST MEDICAL HISTORY:  Past Medical History:   Diagnosis Date     Atrial flutter (H) 10/02/2017     CAD (coronary artery disease) 03/13/2015    CABG 2013     Cardiomyopathy (H) 10/02/2017     (L) ventricular systolic     HTN (hypertension)      Hyperlipidaemia      Microalbuminuria 6/27/2016     Obesity 3/13/2015     Type 2 diabetes mellitus with diabetic nephropathy, with long-term current use of insulin (H) 11/2/2016       PAST SURGICAL HISTORY:  Past Surgical History:   Procedure Laterality Date     BYPASS GRAFT ARTERY CORONARY N/A 4/7/2015    bypass LAD, OM, PDA        COLONOSCOPY N/A 6/20/2016    Procedure: COLONOSCOPY;  Surgeon: Marcela Schwartz MD;  Location: [facility]     EXCISE PILONIDAL CYST, SIMPLE  1975     HEART CATH, ANGIOPLASTY  3-    3 vessel disease-occluded RCA, stenosis LM-to have CABG     removal of skin cancer  1992     TONSILLECTOMY & ADENOIDECTOMY  1950       FAMILY HISTORY:  Family History   Problem Relation Age of Onset     Breast Cancer Mother      CEREBROVASCULAR DISEASE Mother      Hyperlipidemia Father      CANCER Father      lung, unrelated to smoking     CANCER Paternal Grandmother        SOCIAL HISTORY:  Social History     Social History     Marital status: Single     Spouse name: N/A     Number of children: N/A     Years of education: N/A     Social History Main Topics     Smoking status: Former Smoker     Packs/day: 1.50     Years: 29.00     Types: Cigarettes     Quit date: 1/1/2000     Smokeless tobacco: Never Used     Alcohol use No      Comment: 1 mixed drink a night     Drug use: No     Sexual activity: Yes     Partners: Female     Other Topics Concern     Caffeine Concern No     none     Sleep Concern No     Stress Concern Yes     sometimes at home     Weight Concern Yes     would like to lose weight     Special Diet No     Exercise No     Seat Belt Yes     Social History Narrative       Review of Systems:  Skin:  Negative       Eyes:  Positive for glasses    ENT:  Negative      Respiratory:  Positive for shortness of breath;cough     Cardiovascular:  Negative Positive for;fatigue    Gastroenterology: Negative      Genitourinary:  Negative     "  Musculoskeletal:  Positive for joint pain lower back surgery  Neurologic:  Positive for numbness or tingling of hands (at night)    Psychiatric:  Positive for anxiety    Heme/Lymph/Imm:  Positive for allergies    Endocrine:  Positive for diabetes      Physical Exam:  Vitals: /60  Pulse 120  Ht 1.753 m (5' 9\")  Wt 107.5 kg (237 lb)  BMI 35 kg/m2    Constitutional:  cooperative, alert and oriented, well developed, well nourished, in no acute distress        Skin:  warm and dry to the touch, no apparent skin lesions or masses noted surgical scars well-healed      Head:  no masses or lesions        Eyes:  conjunctivae and lids unremarkable;sclera white        ENT:  dentition good        Neck:  carotid pulses are full and equal bilaterally, JVP normal, no carotid bruit, no thyromegaly        Chest:  normal breath sounds, clear to auscultation, normal A-P diameter, normal symmetry, normal respiratory excursion, no use of accessory muscles          Cardiac: regular rhythm, normal S1/S2, no S3 or S4, apical impulse not displaced, no murmurs, gallops or rubs                  Abdomen:           Vascular:                                          Extremities and Back:  no deformities, clubbing, cyanosis, erythema observed              Neurological:  affect appropriate, oriented to time, person and place              CC  Lauro Galloway MD  [facility] [place] [address]              "

## 2017-10-25 NOTE — LETTER
10/25/2017    Lauro Galloway MD  [address] [place] [address]    RE: Adan Ruffin       Dear Colleague,    I had the pleasure of seeing Adan Ruffin in the [facility].    I saw Mr. Ruffin for evaluation of atrial flutter.  He is a 67-year-old white male with a history of coronary artery disease and CABG in 2013.  He had no cardiac arrhythmia until 09/2017.   He had relatively preserved LV function in the past.  When he was diagnosed to have atrial flutter in September he felt reasonably well.  He was then started on anticoagulation.  Gradually, he has been having progressive shortness of breath.  He was scheduled for a ANAID and cardioversion recently.  The procedure of cardioversion was canceled because the ANAID suspected possible left atrial appendage clot.  He is currently on Eliquis 5 mg p.o. b.i.d. and aspirin 81 mg once a day.  Symptomatically, he does not feel palpitation but he is complaining of shortness of breath with exertion.  He has no chest pain or dizziness.      PAST MEDICAL HISTORY:  Remarkable for obesity, type 2 diabetes, hyperlipidemia and hypertension.      PHYSICAL EXAMINATION:   VITAL SIGNS:   Blood pressure was 110/60, heart rate 120 beats per minute, body weight 237 pounds.   HEENT:  Eyes and ENT were unremarkable.   LUNGS:  Clear.   CARDIAC:  Rhythm was regular and heart sounds were normal without murmur.   ABDOMEN:  Moderate obesity.   EXTREMITIES:  There was no pedal edema.      EKG showed atrial flutter with 2:1 AV conduction.     Outpatient Encounter Prescriptions as of 10/25/2017   Medication Sig Dispense Refill     metoprolol (TOPROL XL) 100 MG 24 hr tablet Take 1 tablet (100 mg) by mouth 2 times daily 60 tablet 3     digoxin (LANOXIN) 250 MCG tablet Take 1 tablet (250 mcg) by mouth daily 30 tablet 3     aspirin 81 MG EC tablet Take 1 tablet (81 mg) by mouth daily 1 tablet 0     apixaban ANTICOAGULANT (ELIQUIS) 5 MG tablet Take 1  tablet (5 mg) by mouth 2 times daily 120 tablet 3     atorvastatin (LIPITOR) 40 MG tablet TAKE 1 TABLET BY MOUTH DAILY 90 tablet 1     metFORMIN (GLUCOPHAGE) 1000 MG tablet TAKE 1 TABLET BY MOUTH TWICE DAILY WITH MEALS 180 tablet 1     glipiZIDE (GLUCOTROL XL) 10 MG 24 hr tablet TAKE 1 TABLET BY MOUTH EVERY DAY 30 tablet 0     insulin pen needle (NOVOFINE) 30G X 8 MM Use 4-5 daily or as directed. 300 each 1     Ascorbic Acid (VITAMIN C PO)        insulin aspart (NOVOLOG FLEXPEN) 100 UNIT/ML injection Take 1 unit for every 3 grams of carbohydrate PLUS 1 unit for every 10 mg/dL you are over 140mg/dL  units 45 mL 3     blood glucose monitoring (ACCU-CHEK FASTCLIX) lancets Use to test blood sugar 4 times daily or as directed. 200 each 11     insulin glargine U-300 (TOUJEO) 300 UNIT/ML injection Inject 80 units in am, inject 70 units in pm 30 mL 3     sertraline (ZOLOFT) 100 MG tablet Take 1 tablet (100 mg) by mouth daily 90 tablet 2     furosemide (LASIX) 20 MG tablet TAKE 1 TABLET(20 MG) BY MOUTH DAILY 90 tablet 3     order for DME Equipment being ordered: blood glucose meter 1 each 0     order for DME All DM testing supplies including test strips, lancets, solution, syringes, needles and/or pen needles for testing 3 times per day & injecting 3 times per day.      RX goes to  Network Supplier, see tele enc 09/21/2016 1 Box 3     order for DME All DM testing supplies including test strips, lancets, solution, syringes, needles and/or pen needles for testing 4 times per day & injecting 5 times per day.  Dispense glucometer compatible supplies. Accucheck Geena. 3 Month 3     blood glucose monitoring (NO BRAND SPECIFIED) test strip Use to test blood sugars 4 times daily or as directed 6 Box 1     ACE NOT PRESCRIBED, INTENTIONAL, 1 each daily ACE Inhibitor not prescribed due to Intolerance 0 each 0     Multiple Vitamins-Minerals (CENTRUM SILVER) per tablet Take 1 tablet by mouth daily       vitamin  B complex with  vitamin C (VITAMIN  B COMPLEX) TABS Take 2 tablets by mouth daily       [DISCONTINUED] metoprolol (TOPROL XL) 100 MG 24 hr tablet Take 1 tablet (100 mg) by mouth daily 60 tablet 3     [DISCONTINUED] metoprolol (TOPROL-XL) 25 MG 24 hr tablet Take 3 tablets (75 mg) by mouth 2 times daily 180 tablet 1     No facility-administered encounter medications on file as of 10/25/2017.       ASSESSMENT AND RECOMMENDATIONS:  Mr. Ruffin is a 67-year-old white male with coronary artery disease now in atrial flutter with uncontrolled ventricular rate.  He has progressive shortness of breath and ejection fraction has been severely depressed.  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  For management of his condition I have increased the dose of Toprol-XL from 75 b.i.d. to 100 mg b.i.d.  I added digoxin 0.25 mg once a day for better rate control.  He is scheduled for repeat ANAID in 2 weeks.  After that he will undergo EP study and atrial flutter ablation.  The risks and benefits of atrial flutter ablation were explained to the patient and his wife.  He expressed understanding and consented for the procedure.  About 4-6 weeks after the flutter ablation he should undergo repeat echocardiography and have re-discussion of anticoagulation at that time.     Again, thank you for allowing me to participate in the care of your patient.      Sincerely,    Ramy Laguna MD     [facility]

## 2017-10-25 NOTE — PROGRESS NOTES
HISTORY OF PRESENT ILLNESS:  I saw Mr. Ruffin for evaluation of atrial flutter.  He is a 67-year-old white male with a history of coronary artery disease and CABG in 2013.  He had no cardiac arrhythmia until 09/2017.   He had relatively preserved LV function in the past.  When he was diagnosed to have atrial flutter in September he felt reasonably well.  He was then started on anticoagulation.  Gradually, he has been having progressive shortness of breath.  He was scheduled for a ANAID and cardioversion recently.  The procedure of cardioversion was canceled because the ANAID suspected possible left atrial appendage clot.  He is currently on Eliquis 5 mg p.o. b.i.d. and aspirin 81 mg once a day.  Symptomatically, he does not feel palpitation but he is complaining of shortness of breath with exertion.  He has no chest pain or dizziness.      PAST MEDICAL HISTORY:  Remarkable for obesity, type 2 diabetes, hyperlipidemia and hypertension.      PHYSICAL EXAMINATION:   VITAL SIGNS:   Blood pressure was 110/60, heart rate 120 beats per minute, body weight 237 pounds.   HEENT:  Eyes and ENT were unremarkable.   LUNGS:  Clear.   CARDIAC:  Rhythm was regular and heart sounds were normal without murmur.   ABDOMEN:  Moderate obesity.   EXTREMITIES:  There was no pedal edema.      EKG showed atrial flutter with 2:1 AV conduction.      ASSESSMENT AND RECOMMENDATIONS:  Mr. Ruffin is a 67-year-old white male with coronary artery disease now in atrial flutter with uncontrolled ventricular rate.  He has progressive shortness of breath and ejection fraction has been severely depressed.  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  For management of his condition I have increased the dose of Toprol-XL from 75 b.i.d. to 100 mg b.i.d.  I added digoxin 0.25 mg once a day for better rate control.  He is scheduled for repeat ANAID in 2 weeks.  After that he will undergo EP study and atrial flutter ablation.  The risks and benefits of  atrial flutter ablation were explained to the patient and his wife.  He expressed understanding and consented for the procedure.  About 4-6 weeks after the flutter ablation he should undergo repeat echocardiography and have re-discussion of anticoagulation at that time.      Eri Laguna MD      cc:   Lauro Galloway MD    [facility]   [address]   [place]  [address]         ERI LAGUNA MD             D: 10/25/2017 10:05   T: 10/25/2017 12:16   MT: ARAM      Name:     EDUARDO PARKINSON   MRN:      [id number]        Account:      [id number]   :      1950           Service Date: 10/25/2017      Document: [id number]

## 2017-10-30 ENCOUNTER — RADIANT APPOINTMENT (OUTPATIENT)
Dept: GENERAL RADIOLOGY | Facility: CLINIC | Age: 67
End: 2017-10-30
Attending: FAMILY MEDICINE
Payer: COMMERCIAL

## 2017-10-30 ENCOUNTER — OFFICE VISIT (OUTPATIENT)
Dept: INTERNAL MEDICINE | Facility: CLINIC | Age: 67
End: 2017-10-30
Payer: COMMERCIAL

## 2017-10-30 ENCOUNTER — TELEPHONE (OUTPATIENT)
Dept: CARDIOLOGY | Facility: CLINIC | Age: 67
End: 2017-10-30

## 2017-10-30 VITALS
TEMPERATURE: 97.4 F | DIASTOLIC BLOOD PRESSURE: 60 MMHG | SYSTOLIC BLOOD PRESSURE: 100 MMHG | OXYGEN SATURATION: 94 % | HEIGHT: 69 IN | BODY MASS INDEX: 34.8 KG/M2 | HEART RATE: 124 BPM | WEIGHT: 235 LBS

## 2017-10-30 DIAGNOSIS — R05.9 COUGH: Primary | ICD-10-CM

## 2017-10-30 DIAGNOSIS — R05.9 COUGH: ICD-10-CM

## 2017-10-30 PROCEDURE — 71020 XR CHEST 2 VW: CPT

## 2017-10-30 PROCEDURE — 99213 OFFICE O/P EST LOW 20 MIN: CPT | Performed by: FAMILY MEDICINE

## 2017-10-30 RX ORDER — AMOXICILLIN 500 MG/1
500 CAPSULE ORAL 3 TIMES DAILY
Qty: 30 CAPSULE | Refills: 0 | Status: SHIPPED | OUTPATIENT
Start: 2017-10-30 | End: 2017-12-06

## 2017-10-30 RX ORDER — BENZONATATE 200 MG/1
200 CAPSULE ORAL 3 TIMES DAILY PRN
Qty: 21 CAPSULE | Refills: 0 | Status: SHIPPED | OUTPATIENT
Start: 2017-10-30 | End: 2017-12-06

## 2017-10-30 NOTE — TELEPHONE ENCOUNTER
Pt called because he has the flu and states that he was told that he should call his PMD and wanted to know if this is correct. Told pt that this is correct and Dr Galloway office would be the correct place to start. Pt stated understanding. Bernardino

## 2017-10-30 NOTE — NURSING NOTE
"Chief Complaint   Patient presents with     Cough       Initial /60 (BP Location: Left arm, Patient Position: Sitting, Cuff Size: Adult Large)  Pulse 124  Temp 97.4  F (36.3  C) (Oral)  Ht 5' 9\" (1.753 m)  Wt 235 lb (106.6 kg)  SpO2 94%  BMI 34.7 kg/m2 Estimated body mass index is 34.7 kg/(m^2) as calculated from the following:    Height as of this encounter: 5' 9\" (1.753 m).    Weight as of this encounter: 235 lb (106.6 kg).  Medication Reconciliation: complete    "

## 2017-10-30 NOTE — MR AVS SNAPSHOT
After Visit Summary   10/30/2017    Adan Ruffin    MRN: [id number]           Patient Information     Date Of Birth          1950        Visit Information        Provider Department      10/30/2017 2:00 PM Jasvir Jeff MD [facility]        Today's Diagnoses     Cough    -  1       Follow-ups after your visit        Your next 10 appointments already scheduled     Nov 27, 2017  8:30 AM CST   Ech Kevin with [id number]   [facility] Radiology ([facility])    [address] [place] [address] [phone]           1.  Please bring or wear a comfortable two-piece outfit. 2.  Arrival time: -   [facility]:  arrive 75 minutes prior to examination time. -   [facility]:  arrive 90 minutes prior to examination time. -   [facility]:   arrive 15 minutes prior to examination time. 3.  Plan to have someone here to drive you home after the test. -   Someone should stay with you for 6 hours after your test. 4.  No food or drink: -   6 hours before the test 5.  If you take antacids or water pills (diuretics): Do not take them until after your test. You may take blood pressure medicine with a few sips of water. 6.  If you have diabetes: -   Morning slots preferred -   If you take insulin, call your diabetes care team. Ask if you should take a   dose the morning of your test. -   If you take diabetes medicine by mouth, don't take it on the morning of your test. Bring it with you to take after the test. (If you have questions, call your diabetes care team.) 7.  Bring a list of any medicines you are taking. 8.  Do not drive for 24 hours after the test. 9.   A responsible adult must stay with you for 24 hours after the test.  10.  For any questions that cannot be answered, please contact the ordering physician            Nov 27, 2017  9:30 AM CST   Ep 90 Minute with [id number]   [facility] Cardiac Catheterization Lab ([facility])    [address] [place] [address] [phone]            Dec 05, 2017 12:15 PM CST   Return Visit with Harvey Rosas MD   [facility] AT [place] ([facility])    [address]  [place] [address] [phone]              Who to contact     If you have questions or need follow up information about today's clinic visit or your schedule please contact [facility] directly at [phone].  Normal or non-critical lab and imaging results will be communicated to you by Itaconixhart, letter or phone within 4 business days after the clinic has received the results. If you do not hear from us within 7 days, please contact the clinic through Log[employer] or phone. If you have a critical or abnormal lab result, we will notify you by phone as soon as possible.  Submit refill requests through [employer] or call your pharmacy and they will forward the refill request to us. Please allow 3 business days for your refill to be completed.          Additional Information About Your Visit        Itaconix[employer]Unioncy Information     [employer] gives you secure access to your electronic health record. If you see a primary care provider, you can also send messages to your care team and make appointments. If you have questions, please call your primary care clinic.  If you do not have a primary care provider, please call [phone] and they will assist you.        Care EveryWhere ID     This is your Care EveryWhere ID. This could be used by other organizations to access your [facility] medical records  [id number]        Your Vitals Were     Pulse Temperature Height Pulse Oximetry BMI (Body Mass Index)       124 97.4  F (36.3  C) (Oral) 5' 9\" (1.753 m) 94% 34.7 kg/m2        Blood Pressure from Last 3 Encounters:   10/30/17 100/60   10/25/17 110/60   10/20/17 122/78    Weight from Last 3 Encounters:   10/30/17 235 lb (106.6 kg)   10/25/17 237 lb (107.5 kg)   10/20/17 240 lb (108.9 " kg)                 Today's Medication Changes          These changes are accurate as of: 10/30/17  3:09 PM.  If you have any questions, ask your nurse or doctor.               Start taking these medicines.        Dose/Directions    amoxicillin 500 MG capsule   Commonly known as:  AMOXIL   Used for:  Cough   Started by:  Jasvir Jeff MD        Dose:  500 mg   Take 1 capsule (500 mg) by mouth 3 times daily   Quantity:  30 capsule   Refills:  0       benzonatate 200 MG capsule   Commonly known as:  TESSALON   Used for:  Cough   Started by:  Jasvir Jeff MD        Dose:  200 mg   Take 1 capsule (200 mg) by mouth 3 times daily as needed for cough   Quantity:  21 capsule   Refills:  0            Where to get your medicines      These medications were sent to [facility] [address] [address] AT [place] [address]  [address] [facility] [address]     Phone:  [phone]     amoxicillin 500 MG capsule    benzonatate 200 MG capsule                Primary Care Provider Office Phone # Fax #    Lauro Galloway [phone]       [address] [facility] [address]        Equal Access to Services     CARLOS FAM AH: Hadii aad ku hadasho Soomaali, waaxda luqadaha, qaybta kaalmada adeegyada, waxay karriein hayshahlan kishan quintero. So [facility] [phone].    ATENCIÓN: Si habla español, tiene a moore disposición servicios gratuitos de asistencia lingüística. [facility] [phone].    We comply with applicable federal civil rights laws and [place] laws. We do not discriminate on the basis of race, color, national origin, age, disability, sex, sexual orientation, or gender identity.            Thank you!     Thank you for choosing [facility]  for your care. Our goal is always to provide you with excellent care. Hearing back from our patients is one way we can continue to improve our services. Please take a few minutes to  complete the written survey that you may receive in the mail after your visit with us. Thank you!             Your Updated Medication List - Protect others around you: Learn how to safely use, store and throw away your medicines at www.disposemymeds.org.          This list is accurate as of: 10/30/17  3:09 PM.  Always use your most recent med list.                   Brand Name Dispense Instructions for use Diagnosis    ACE NOT PRESCRIBED (INTENTIONAL)     0 each    1 each daily ACE Inhibitor not prescribed due to Intolerance    Type II or unspecified type diabetes mellitus without mention of complication, not stated as uncontrolled       amoxicillin 500 MG capsule    AMOXIL    30 capsule    Take 1 capsule (500 mg) by mouth 3 times daily    Cough       apixaban ANTICOAGULANT 5 MG tablet    ELIQUIS    120 tablet    Take 1 tablet (5 mg) by mouth 2 times daily    Cardiomyopathy, unspecified type (H), Typical atrial flutter (H)       aspirin EC 81 MG EC tablet     1 tablet    Take 1 tablet (81 mg) by mouth daily    CAD (coronary artery disease), S/P CABG x 3, Anxiety       atorvastatin 40 MG tablet    LIPITOR    90 tablet    TAKE 1 TABLET BY MOUTH DAILY    Hyperlipidemia LDL goal <70       benzonatate 200 MG capsule    TESSALON    21 capsule    Take 1 capsule (200 mg) by mouth 3 times daily as needed for cough    Cough       blood glucose monitoring lancets     200 each    Use to test blood sugar 4 times daily or as directed.    Type 2 diabetes mellitus with diabetic nephropathy, with long-term current use of insulin (H)       blood glucose monitoring test strip    no brand specified    6 Box    Use to test blood sugars 4 times daily or as directed    Type 2 diabetes mellitus with diabetic nephropathy (H)       CENTRUM SILVER per tablet      Take 1 tablet by mouth daily        digoxin 250 MCG tablet    LANOXIN    30 tablet    Take 1 tablet (250 mcg) by mouth daily    Typical atrial flutter (H)       furosemide 20 MG  tablet    LASIX    90 tablet    TAKE 1 TABLET(20 MG) BY MOUTH DAILY    Essential hypertension       glipiZIDE 10 MG 24 hr tablet    GLUCOTROL XL    30 tablet    TAKE 1 TABLET BY MOUTH EVERY DAY    Type 2 diabetes mellitus with diabetic nephropathy, with long-term current use of insulin (H)       insulin aspart 100 UNIT/ML injection    NovoLOG FLEXPEN    45 mL    Take 1 unit for every 3 grams of carbohydrate PLUS 1 unit for every 10 mg/dL you are over 140mg/dL  units    Type 2 diabetes mellitus with diabetic nephropathy, with long-term current use of insulin (H)       insulin glargine U-300 300 UNIT/ML injection    TOUJEO    30 mL    Inject 80 units in am, inject 70 units in pm    Type 2 diabetes mellitus with diabetic nephropathy, with long-term current use of insulin (H)       insulin pen needle 30G X 8 MM    NOVOFINE    300 each    Use 4-5 daily or as directed.    Type 2 diabetes mellitus with diabetic nephropathy, with long-term current use of insulin (H)       metFORMIN 1000 MG tablet    GLUCOPHAGE    180 tablet    TAKE 1 TABLET BY MOUTH TWICE DAILY WITH MEALS    Insulin dependent diabetes mellitus (H)       metoprolol 100 MG 24 hr tablet    TOPROL XL    60 tablet    Take 1 tablet (100 mg) by mouth 2 times daily    Typical atrial flutter (H)       * order for DME     3 Month    All DM testing supplies including test strips, lancets, solution, syringes, needles and/or pen needles for testing 4 times per day & injecting 5 times per day. Dispense glucometer compatible supplies. Accucheck Geena.    Type 2 diabetes mellitus with diabetic nephropathy (H)       * order for DME     1 Box    All DM testing supplies including test strips, lancets, solution, syringes, needles and/or pen needles for testing 3 times per day & injecting 3 times per day.   RX goes to  Network Supplier, see tele enc 09/21/2016    Type 2 diabetes mellitus with diabetic nephropathy (H)       order for DME     1 each    Equipment being  ordered: blood glucose meter    Type 2 diabetes mellitus with diabetic nephropathy, with long-term current use of insulin (H)       sertraline 100 MG tablet    ZOLOFT    90 tablet    Take 1 tablet (100 mg) by mouth daily    Anxiety       vitamin B complex with vitamin C Tabs tablet      Take 2 tablets by mouth daily        VITAMIN C PO           * Notice:  This list has 2 medication(s) that are the same as other medications prescribed for you. Read the directions carefully, and ask your doctor or other care provider to review them with you.

## 2017-10-30 NOTE — PROGRESS NOTES
SUBJECTIVE:  Adan Rufifn is a 67 year old male who presents to the clinic today with a chief complaint of cough  for 3 day(s).  His cough is described as persistent and nonproductive.    The patient's symptoms are moderate and stable.  Associated symptoms include congestion. The patient's symptoms are exacerbated by no particular triggers  Patient has been using OTC cough suppressants  to improve symptoms.    Past Medical History:   Diagnosis Date     Atrial flutter (H) 10/02/2017     CAD (coronary artery disease) 03/13/2015    CABG 2013     Cardiomyopathy (H) 10/02/2017    (L) ventricular systolic     HTN (hypertension)      Hyperlipidaemia      Microalbuminuria 6/27/2016     Obesity 3/13/2015     Type 2 diabetes mellitus with diabetic nephropathy, with long-term current use of insulin (H) 11/2/2016       Current Outpatient Prescriptions   Medication Sig Dispense Refill     metoprolol (TOPROL XL) 100 MG 24 hr tablet Take 1 tablet (100 mg) by mouth 2 times daily 60 tablet 3     digoxin (LANOXIN) 250 MCG tablet Take 1 tablet (250 mcg) by mouth daily 30 tablet 3     aspirin 81 MG EC tablet Take 1 tablet (81 mg) by mouth daily 1 tablet 0     apixaban ANTICOAGULANT (ELIQUIS) 5 MG tablet Take 1 tablet (5 mg) by mouth 2 times daily 120 tablet 3     atorvastatin (LIPITOR) 40 MG tablet TAKE 1 TABLET BY MOUTH DAILY 90 tablet 1     metFORMIN (GLUCOPHAGE) 1000 MG tablet TAKE 1 TABLET BY MOUTH TWICE DAILY WITH MEALS 180 tablet 1     glipiZIDE (GLUCOTROL XL) 10 MG 24 hr tablet TAKE 1 TABLET BY MOUTH EVERY DAY 30 tablet 0     Ascorbic Acid (VITAMIN C PO)        insulin aspart (NOVOLOG FLEXPEN) 100 UNIT/ML injection Take 1 unit for every 3 grams of carbohydrate PLUS 1 unit for every 10 mg/dL you are over 140mg/dL  units 45 mL 3     insulin glargine U-300 (TOUJEO) 300 UNIT/ML injection Inject 80 units in am, inject 70 units in pm 30 mL 3     sertraline (ZOLOFT) 100 MG tablet Take 1 tablet (100 mg) by mouth daily 90  "tablet 2     furosemide (LASIX) 20 MG tablet TAKE 1 TABLET(20 MG) BY MOUTH DAILY 90 tablet 3     Multiple Vitamins-Minerals (CENTRUM SILVER) per tablet Take 1 tablet by mouth daily       vitamin  B complex with vitamin C (VITAMIN  B COMPLEX) TABS Take 2 tablets by mouth daily       insulin pen needle (NOVOFINE) 30G X 8 MM Use 4-5 daily or as directed. 300 each 1     blood glucose monitoring (ACCU-CHEK FASTCLIX) lancets Use to test blood sugar 4 times daily or as directed. 200 each 11     order for DME Equipment being ordered: blood glucose meter 1 each 0     order for DME All DM testing supplies including test strips, lancets, solution, syringes, needles and/or pen needles for testing 3 times per day & injecting 3 times per day.      RX goes to  Network Supplier, see tele enc 09/21/2016 1 Box 3     order for DME All DM testing supplies including test strips, lancets, solution, syringes, needles and/or pen needles for testing 4 times per day & injecting 5 times per day.  Dispense glucometer compatible supplies. Accucheck Geena. 3 Month 3     blood glucose monitoring (NO BRAND SPECIFIED) test strip Use to test blood sugars 4 times daily or as directed 6 Box 1     ACE NOT PRESCRIBED, INTENTIONAL, 1 each daily ACE Inhibitor not prescribed due to Intolerance 0 each 0       Social History   Substance Use Topics     Smoking status: Former Smoker     Packs/day: 1.50     Years: 29.00     Types: Cigarettes     Quit date: 1/1/2000     Smokeless tobacco: Never Used     Alcohol use No      Comment: 1 mixed drink a night       ROS  INTEGUMENTARY/SKIN: NEGATIVE for worrisome rashes, moles or lesions  EYES: NEGATIVE for vision changes or irritation    OBJECTIVE:  /60 (BP Location: Left arm, Patient Position: Sitting, Cuff Size: Adult Large)  Pulse 124  Temp 97.4  F (36.3  C) (Oral)  Ht 5' 9\" (1.753 m)  Wt 235 lb (106.6 kg)  SpO2 94%  BMI 34.7 kg/m2  GENERAL APPEARANCE: mild distress  EYES: EOMI,  PERRL, conjunctiva " clear  HENT: ear canals and TM's normal.  Nose and mouth without ulcers, erythema or lesions  NECK: supple, nontender, no lymphadenopathy  RESP: decreased breath sounds  CV: no irregular beats  ABDOMEN:  soft, nontender, no HSM or masses and bowel sounds normal  NEURO: Normal strength and tone, sensory exam grossly normal,  normal speech and mentation  SKIN: no suspicious lesions or rashes    ASSESSMENT:   1. Cough  Pt instructed to come back to the clinic for worsening sx      - XR Chest 2 Views; Future  - amoxicillin (AMOXIL) 500 MG capsule; Take 1 capsule (500 mg) by mouth 3 times daily  Dispense: 30 capsule; Refill: 0  - benzonatate (TESSALON) 200 MG capsule; Take 1 capsule (200 mg) by mouth 3 times daily as needed for cough  Dispense: 21 capsule; Refill: 0

## 2017-11-08 DIAGNOSIS — Z79.4 TYPE 2 DIABETES MELLITUS WITH DIABETIC NEPHROPATHY, WITH LONG-TERM CURRENT USE OF INSULIN (H): Primary | ICD-10-CM

## 2017-11-08 DIAGNOSIS — E11.21 TYPE 2 DIABETES MELLITUS WITH DIABETIC NEPHROPATHY, WITH LONG-TERM CURRENT USE OF INSULIN (H): Primary | ICD-10-CM

## 2017-11-15 ENCOUNTER — TELEPHONE (OUTPATIENT)
Dept: CARDIOLOGY | Facility: CLINIC | Age: 67
End: 2017-11-15

## 2017-11-15 NOTE — TELEPHONE ENCOUNTER
Pt called and and stated that his HR has been lower than in the past.  Pt checked BP and HR at [facility] and stated that BP was 134/74 and HR was 66. At last OV with Dr Laguna pt was started on Digoxin and increased Metoprolol, d/t poor rate control. Explained to pt that this was why the medications were increased and started. Pt also wondering if this changes things for his Ablation on the 11/27, told pt that this would not change the plan and that Ablation will still occur. Pt states that he does feel better with his HR lower. JNelsonRN

## 2017-11-22 ENCOUNTER — TELEPHONE (OUTPATIENT)
Dept: CARDIOLOGY | Facility: CLINIC | Age: 67
End: 2017-11-22

## 2017-11-22 DIAGNOSIS — I48.92 ATRIAL FLUTTER (H): Primary | ICD-10-CM

## 2017-11-22 DIAGNOSIS — I42.9 CARDIOMYOPATHY, UNSPECIFIED TYPE (H): ICD-10-CM

## 2017-11-22 RX ORDER — SODIUM CHLORIDE 450 MG/100ML
INJECTION, SOLUTION INTRAVENOUS CONTINUOUS
Status: CANCELLED | OUTPATIENT
Start: 2017-11-22

## 2017-11-22 RX ORDER — LIDOCAINE 40 MG/G
CREAM TOPICAL
Status: CANCELLED | OUTPATIENT
Start: 2017-11-22

## 2017-11-22 NOTE — TELEPHONE ENCOUNTER
Patient was called to review prep for Monday's aflutter ablation. His wife will be  and care giver. He was instructed to use [employer] parking. He will be NPO after mid night Sunday. He will hold Monday morning his Lasix,metformin,glucotrol and insulins. I stressed to continue all other medications. I asked if he has missed any days/doses of eliquis since starting it and he stated that he has not. I told him that we will call him next Tuesday to see how he is doing and review his upcoming appointments. All his questions were answered and I told him that we will be available till 5 today if he thinks of more questions. I also gave him the on call number. Dr. Laguna requested an echo and f/u with him in 4-6 weeks and I will arrange these. I will also try to move Dr. Rosas's appointment for 12/5 into January/Feb. Tomeka

## 2017-11-26 DIAGNOSIS — Z79.4 TYPE 2 DIABETES MELLITUS WITH DIABETIC NEPHROPATHY, WITH LONG-TERM CURRENT USE OF INSULIN (H): ICD-10-CM

## 2017-11-26 DIAGNOSIS — E11.21 TYPE 2 DIABETES MELLITUS WITH DIABETIC NEPHROPATHY, WITH LONG-TERM CURRENT USE OF INSULIN (H): ICD-10-CM

## 2017-11-27 ENCOUNTER — APPOINTMENT (OUTPATIENT)
Dept: CARDIOLOGY | Facility: CLINIC | Age: 67
End: 2017-11-27
Attending: INTERNAL MEDICINE
Payer: COMMERCIAL

## 2017-11-27 ENCOUNTER — HOSPITAL ENCOUNTER (OUTPATIENT)
Facility: CLINIC | Age: 67
Discharge: HOME | End: 2017-11-27
Attending: INTERNAL MEDICINE | Admitting: INTERNAL MEDICINE
Payer: COMMERCIAL

## 2017-11-27 ENCOUNTER — HOSPITAL ENCOUNTER (OUTPATIENT)
Dept: CARDIOLOGY | Facility: CLINIC | Age: 67
End: 2017-11-27
Attending: INTERNAL MEDICINE | Admitting: INTERNAL MEDICINE
Payer: COMMERCIAL

## 2017-11-27 ENCOUNTER — TELEPHONE (OUTPATIENT)
Dept: ENDOCRINOLOGY | Facility: CLINIC | Age: 67
End: 2017-11-27

## 2017-11-27 VITALS
BODY MASS INDEX: 33.61 KG/M2 | SYSTOLIC BLOOD PRESSURE: 123 MMHG | RESPIRATION RATE: 16 BRPM | OXYGEN SATURATION: 92 % | HEART RATE: 73 BPM | TEMPERATURE: 97.8 F | DIASTOLIC BLOOD PRESSURE: 76 MMHG | HEIGHT: 70 IN | WEIGHT: 234.8 LBS

## 2017-11-27 DIAGNOSIS — I48.3 TYPICAL ATRIAL FLUTTER (H): ICD-10-CM

## 2017-11-27 LAB
ANION GAP SERPL CALCULATED.3IONS-SCNC: 10 MMOL/L (ref 3–14)
BUN SERPL-MCNC: 31 MG/DL (ref 7–30)
CALCIUM SERPL-MCNC: 9.3 MG/DL (ref 8.5–10.1)
CHLORIDE SERPL-SCNC: 104 MMOL/L (ref 94–109)
CO2 SERPL-SCNC: 25 MMOL/L (ref 20–32)
CREAT SERPL-MCNC: 1.09 MG/DL (ref 0.66–1.25)
ERYTHROCYTE [DISTWIDTH] IN BLOOD BY AUTOMATED COUNT: 13.2 % (ref 10–15)
GFR SERPL CREATININE-BSD FRML MDRD: 68 ML/MIN/1.7M2
GLUCOSE SERPL-MCNC: 152 MG/DL (ref 70–99)
HCT VFR BLD AUTO: 42.3 % (ref 40–53)
HGB BLD-MCNC: 14.7 G/DL (ref 13.3–17.7)
MCH RBC QN AUTO: 32.5 PG (ref 26.5–33)
MCHC RBC AUTO-ENTMCNC: 34.8 G/DL (ref 31.5–36.5)
MCV RBC AUTO: 93 FL (ref 78–100)
PLATELET # BLD AUTO: 236 10E9/L (ref 150–450)
POTASSIUM SERPL-SCNC: 4 MMOL/L (ref 3.4–5.3)
RBC # BLD AUTO: 4.53 10E12/L (ref 4.4–5.9)
SODIUM SERPL-SCNC: 139 MMOL/L (ref 133–144)
WBC # BLD AUTO: 11.3 10E9/L (ref 4–11)

## 2017-11-27 PROCEDURE — 93621 COMP EP EVL L PAC&REC C SINS: CPT | Mod: 26 | Performed by: INTERNAL MEDICINE

## 2017-11-27 PROCEDURE — 99153 MOD SED SAME PHYS/QHP EA: CPT

## 2017-11-27 PROCEDURE — C1731 CATH, EP, 20 OR MORE ELEC: HCPCS

## 2017-11-27 PROCEDURE — 93613 INTRACARDIAC EPHYS 3D MAPG: CPT | Performed by: INTERNAL MEDICINE

## 2017-11-27 PROCEDURE — 85027 COMPLETE CBC AUTOMATED: CPT | Performed by: INTERNAL MEDICINE

## 2017-11-27 PROCEDURE — 99152 MOD SED SAME PHYS/QHP 5/>YRS: CPT

## 2017-11-27 PROCEDURE — 27210795 ZZH PAD DEFIB QUICK CR4

## 2017-11-27 PROCEDURE — 93653 COMPRE EP EVAL TX SVT: CPT

## 2017-11-27 PROCEDURE — 40000065 ZZH STATISTIC EKG NON-CHARGEABLE

## 2017-11-27 PROCEDURE — 40000852 ZZH STATISTIC HEART CATH LAB OR EP LAB

## 2017-11-27 PROCEDURE — 93325 DOPPLER ECHO COLOR FLOW MAPG: CPT | Mod: 26 | Performed by: INTERNAL MEDICINE

## 2017-11-27 PROCEDURE — 93613 INTRACARDIAC EPHYS 3D MAPG: CPT

## 2017-11-27 PROCEDURE — 40000857 ZZH STATISTIC TEE INCLUDES SEDATION

## 2017-11-27 PROCEDURE — 99152 MOD SED SAME PHYS/QHP 5/>YRS: CPT | Performed by: INTERNAL MEDICINE

## 2017-11-27 PROCEDURE — 93653 COMPRE EP EVAL TX SVT: CPT | Performed by: INTERNAL MEDICINE

## 2017-11-27 PROCEDURE — 25000128 H RX IP 250 OP 636: Performed by: INTERNAL MEDICINE

## 2017-11-27 PROCEDURE — C1730 CATH, EP, 19 OR FEW ELECT: HCPCS

## 2017-11-27 PROCEDURE — C1732 CATH, EP, DIAG/ABL, 3D/VECT: HCPCS

## 2017-11-27 PROCEDURE — 93621 COMP EP EVL L PAC&REC C SINS: CPT

## 2017-11-27 PROCEDURE — 93321 DOPPLER ECHO F-UP/LMTD STD: CPT | Mod: 26 | Performed by: INTERNAL MEDICINE

## 2017-11-27 PROCEDURE — 93010 ELECTROCARDIOGRAM REPORT: CPT | Mod: 76 | Performed by: INTERNAL MEDICINE

## 2017-11-27 PROCEDURE — 27210995 ZZH RX 272: Performed by: INTERNAL MEDICINE

## 2017-11-27 PROCEDURE — 93005 ELECTROCARDIOGRAM TRACING: CPT

## 2017-11-27 PROCEDURE — 25000125 ZZHC RX 250: Performed by: INTERNAL MEDICINE

## 2017-11-27 PROCEDURE — 36415 COLL VENOUS BLD VENIPUNCTURE: CPT | Performed by: INTERNAL MEDICINE

## 2017-11-27 PROCEDURE — 27210796 ZZH PAD EXTRNAL REFRENCE CARDIAC MAPPING CR14

## 2017-11-27 PROCEDURE — 93312 ECHO TRANSESOPHAGEAL: CPT | Mod: 26 | Performed by: INTERNAL MEDICINE

## 2017-11-27 PROCEDURE — 27210782 ZZH KIT EP TOTES DISP CR7

## 2017-11-27 PROCEDURE — 93325 DOPPLER ECHO COLOR FLOW MAPG: CPT

## 2017-11-27 PROCEDURE — C1893 INTRO/SHEATH, FIXED,NON-PEEL: HCPCS

## 2017-11-27 PROCEDURE — 40000235 ZZH STATISTIC TELEMETRY

## 2017-11-27 PROCEDURE — 80048 BASIC METABOLIC PNL TOTAL CA: CPT | Performed by: INTERNAL MEDICINE

## 2017-11-27 RX ORDER — BUPIVACAINE HYDROCHLORIDE 2.5 MG/ML
1-10 INJECTION, SOLUTION EPIDURAL; INFILTRATION; INTRACAUDAL
Status: DISCONTINUED | OUTPATIENT
Start: 2017-11-27 | End: 2017-11-27 | Stop reason: HOSPADM

## 2017-11-27 RX ORDER — ENALAPRILAT 1.25 MG/ML
1.25-2.5 INJECTION INTRAVENOUS
Status: DISCONTINUED | OUTPATIENT
Start: 2017-11-27 | End: 2017-11-27 | Stop reason: HOSPADM

## 2017-11-27 RX ORDER — OXYCODONE AND ACETAMINOPHEN 5; 325 MG/1; MG/1
1 TABLET ORAL EVERY 4 HOURS PRN
Status: DISCONTINUED | OUTPATIENT
Start: 2017-11-27 | End: 2017-11-27 | Stop reason: HOSPADM

## 2017-11-27 RX ORDER — VERAPAMIL HYDROCHLORIDE 2.5 MG/ML
1-2.5 INJECTION, SOLUTION INTRAVENOUS
Status: DISCONTINUED | OUTPATIENT
Start: 2017-11-27 | End: 2017-11-27 | Stop reason: HOSPADM

## 2017-11-27 RX ORDER — NITROGLYCERIN 5 MG/ML
100-200 VIAL (ML) INTRAVENOUS
Status: DISCONTINUED | OUTPATIENT
Start: 2017-11-27 | End: 2017-11-27 | Stop reason: HOSPADM

## 2017-11-27 RX ORDER — LIDOCAINE 40 MG/G
CREAM TOPICAL
Status: DISCONTINUED | OUTPATIENT
Start: 2017-11-27 | End: 2017-11-27 | Stop reason: HOSPADM

## 2017-11-27 RX ORDER — POTASSIUM CHLORIDE 29.8 MG/ML
20 INJECTION INTRAVENOUS
Status: DISCONTINUED | OUTPATIENT
Start: 2017-11-27 | End: 2017-11-27 | Stop reason: HOSPADM

## 2017-11-27 RX ORDER — NIFEDIPINE 10 MG/1
10 CAPSULE ORAL
Status: DISCONTINUED | OUTPATIENT
Start: 2017-11-27 | End: 2017-11-27 | Stop reason: HOSPADM

## 2017-11-27 RX ORDER — PROMETHAZINE HYDROCHLORIDE 25 MG/ML
6.25-25 INJECTION, SOLUTION INTRAMUSCULAR; INTRAVENOUS EVERY 4 HOURS PRN
Status: DISCONTINUED | OUTPATIENT
Start: 2017-11-27 | End: 2017-11-27 | Stop reason: HOSPADM

## 2017-11-27 RX ORDER — CLOPIDOGREL BISULFATE 75 MG/1
75 TABLET ORAL
Status: DISCONTINUED | OUTPATIENT
Start: 2017-11-27 | End: 2017-11-27 | Stop reason: HOSPADM

## 2017-11-27 RX ORDER — DEXTROSE MONOHYDRATE 25 G/50ML
12.5-5 INJECTION, SOLUTION INTRAVENOUS EVERY 30 MIN PRN
Status: DISCONTINUED | OUTPATIENT
Start: 2017-11-27 | End: 2017-11-27 | Stop reason: HOSPADM

## 2017-11-27 RX ORDER — PRASUGREL 10 MG/1
10-60 TABLET, FILM COATED ORAL
Status: DISCONTINUED | OUTPATIENT
Start: 2017-11-27 | End: 2017-11-27 | Stop reason: HOSPADM

## 2017-11-27 RX ORDER — SODIUM NITROPRUSSIDE 25 MG/ML
100-200 INJECTION INTRAVENOUS
Status: DISCONTINUED | OUTPATIENT
Start: 2017-11-27 | End: 2017-11-27 | Stop reason: HOSPADM

## 2017-11-27 RX ORDER — DOPAMINE HYDROCHLORIDE 160 MG/100ML
2-20 INJECTION, SOLUTION INTRAVENOUS CONTINUOUS PRN
Status: DISCONTINUED | OUTPATIENT
Start: 2017-11-27 | End: 2017-11-27 | Stop reason: HOSPADM

## 2017-11-27 RX ORDER — FENTANYL CITRATE 50 UG/ML
25 INJECTION, SOLUTION INTRAMUSCULAR; INTRAVENOUS
Status: DISCONTINUED | OUTPATIENT
Start: 2017-11-27 | End: 2017-11-27 | Stop reason: HOSPADM

## 2017-11-27 RX ORDER — NALOXONE HYDROCHLORIDE 0.4 MG/ML
.1-.4 INJECTION, SOLUTION INTRAMUSCULAR; INTRAVENOUS; SUBCUTANEOUS
Status: DISCONTINUED | OUTPATIENT
Start: 2017-11-27 | End: 2017-11-27 | Stop reason: HOSPADM

## 2017-11-27 RX ORDER — PHENYLEPHRINE HCL IN 0.9% NACL 1 MG/10 ML
20-100 SYRINGE (ML) INTRAVENOUS
Status: DISCONTINUED | OUTPATIENT
Start: 2017-11-27 | End: 2017-11-27 | Stop reason: HOSPADM

## 2017-11-27 RX ORDER — EPINEPHRINE 1 MG/ML
0.3 INJECTION, SOLUTION, CONCENTRATE INTRAVENOUS
Status: DISCONTINUED | OUTPATIENT
Start: 2017-11-27 | End: 2017-11-27 | Stop reason: HOSPADM

## 2017-11-27 RX ORDER — ASPIRIN 81 MG/1
81-324 TABLET, CHEWABLE ORAL
Status: DISCONTINUED | OUTPATIENT
Start: 2017-11-27 | End: 2017-11-27 | Stop reason: HOSPADM

## 2017-11-27 RX ORDER — DOBUTAMINE HYDROCHLORIDE 200 MG/100ML
2-20 INJECTION INTRAVENOUS CONTINUOUS PRN
Status: DISCONTINUED | OUTPATIENT
Start: 2017-11-27 | End: 2017-11-27 | Stop reason: HOSPADM

## 2017-11-27 RX ORDER — DIPHENHYDRAMINE HYDROCHLORIDE 50 MG/ML
25-50 INJECTION INTRAMUSCULAR; INTRAVENOUS
Status: DISCONTINUED | OUTPATIENT
Start: 2017-11-27 | End: 2017-11-27 | Stop reason: HOSPADM

## 2017-11-27 RX ORDER — PROTAMINE SULFATE 10 MG/ML
25-100 INJECTION, SOLUTION INTRAVENOUS EVERY 5 MIN PRN
Status: DISCONTINUED | OUTPATIENT
Start: 2017-11-27 | End: 2017-11-27 | Stop reason: HOSPADM

## 2017-11-27 RX ORDER — GLYCOPYRROLATE 0.2 MG/ML
0.1 INJECTION, SOLUTION INTRAMUSCULAR; INTRAVENOUS ONCE
Status: COMPLETED | OUTPATIENT
Start: 2017-11-27 | End: 2017-11-27

## 2017-11-27 RX ORDER — SODIUM CHLORIDE 450 MG/100ML
INJECTION, SOLUTION INTRAVENOUS CONTINUOUS
Status: DISCONTINUED | OUTPATIENT
Start: 2017-11-27 | End: 2017-11-27 | Stop reason: HOSPADM

## 2017-11-27 RX ORDER — FUROSEMIDE 10 MG/ML
20-100 INJECTION INTRAMUSCULAR; INTRAVENOUS
Status: DISCONTINUED | OUTPATIENT
Start: 2017-11-27 | End: 2017-11-27 | Stop reason: HOSPADM

## 2017-11-27 RX ORDER — MORPHINE SULFATE 2 MG/ML
1-2 INJECTION, SOLUTION INTRAMUSCULAR; INTRAVENOUS EVERY 5 MIN PRN
Status: DISCONTINUED | OUTPATIENT
Start: 2017-11-27 | End: 2017-11-27 | Stop reason: HOSPADM

## 2017-11-27 RX ORDER — LIDOCAINE HYDROCHLORIDE 10 MG/ML
30 INJECTION, SOLUTION EPIDURAL; INFILTRATION; INTRACAUDAL; PERINEURAL
Status: DISCONTINUED | OUTPATIENT
Start: 2017-11-27 | End: 2017-11-27 | Stop reason: HOSPADM

## 2017-11-27 RX ORDER — ASPIRIN 325 MG
325 TABLET ORAL
Status: DISCONTINUED | OUTPATIENT
Start: 2017-11-27 | End: 2017-11-27 | Stop reason: HOSPADM

## 2017-11-27 RX ORDER — FLUMAZENIL 0.1 MG/ML
0.2 INJECTION, SOLUTION INTRAVENOUS
Status: DISCONTINUED | OUTPATIENT
Start: 2017-11-27 | End: 2017-11-27 | Stop reason: HOSPADM

## 2017-11-27 RX ORDER — PROTAMINE SULFATE 10 MG/ML
1-5 INJECTION, SOLUTION INTRAVENOUS
Status: DISCONTINUED | OUTPATIENT
Start: 2017-11-27 | End: 2017-11-27 | Stop reason: HOSPADM

## 2017-11-27 RX ORDER — METHYLPREDNISOLONE SODIUM SUCCINATE 125 MG/2ML
125 INJECTION, POWDER, LYOPHILIZED, FOR SOLUTION INTRAMUSCULAR; INTRAVENOUS
Status: DISCONTINUED | OUTPATIENT
Start: 2017-11-27 | End: 2017-11-27 | Stop reason: HOSPADM

## 2017-11-27 RX ORDER — ADENOSINE 3 MG/ML
12-12000 INJECTION, SOLUTION INTRAVENOUS
Status: DISCONTINUED | OUTPATIENT
Start: 2017-11-27 | End: 2017-11-27 | Stop reason: HOSPADM

## 2017-11-27 RX ORDER — HYDRALAZINE HYDROCHLORIDE 20 MG/ML
10-20 INJECTION INTRAMUSCULAR; INTRAVENOUS
Status: DISCONTINUED | OUTPATIENT
Start: 2017-11-27 | End: 2017-11-27 | Stop reason: HOSPADM

## 2017-11-27 RX ORDER — ATROPINE SULFATE 0.1 MG/ML
.5-1 INJECTION INTRAVENOUS
Status: DISCONTINUED | OUTPATIENT
Start: 2017-11-27 | End: 2017-11-27 | Stop reason: HOSPADM

## 2017-11-27 RX ORDER — SODIUM CHLORIDE 9 MG/ML
INJECTION, SOLUTION INTRAVENOUS CONTINUOUS PRN
Status: DISCONTINUED | OUTPATIENT
Start: 2017-11-27 | End: 2017-11-27 | Stop reason: HOSPADM

## 2017-11-27 RX ORDER — CLOPIDOGREL 300 MG/1
300-600 TABLET, FILM COATED ORAL
Status: DISCONTINUED | OUTPATIENT
Start: 2017-11-27 | End: 2017-11-27 | Stop reason: HOSPADM

## 2017-11-27 RX ORDER — FENTANYL CITRATE 50 UG/ML
25-50 INJECTION, SOLUTION INTRAMUSCULAR; INTRAVENOUS
Status: COMPLETED | OUTPATIENT
Start: 2017-11-27 | End: 2017-11-27

## 2017-11-27 RX ORDER — METOPROLOL TARTRATE 1 MG/ML
5 INJECTION, SOLUTION INTRAVENOUS EVERY 5 MIN PRN
Status: DISCONTINUED | OUTPATIENT
Start: 2017-11-27 | End: 2017-11-27 | Stop reason: HOSPADM

## 2017-11-27 RX ORDER — FENTANYL CITRATE 50 UG/ML
25-50 INJECTION, SOLUTION INTRAMUSCULAR; INTRAVENOUS
Status: DISCONTINUED | OUTPATIENT
Start: 2017-11-27 | End: 2017-11-27 | Stop reason: HOSPADM

## 2017-11-27 RX ORDER — NITROGLYCERIN 20 MG/100ML
.07-1.88 INJECTION INTRAVENOUS CONTINUOUS PRN
Status: DISCONTINUED | OUTPATIENT
Start: 2017-11-27 | End: 2017-11-27 | Stop reason: HOSPADM

## 2017-11-27 RX ORDER — LIDOCAINE HYDROCHLORIDE 10 MG/ML
1-10 INJECTION, SOLUTION EPIDURAL; INFILTRATION; INTRACAUDAL; PERINEURAL
Status: COMPLETED | OUTPATIENT
Start: 2017-11-27 | End: 2017-11-27

## 2017-11-27 RX ORDER — NITROGLYCERIN 0.4 MG/1
0.4 TABLET SUBLINGUAL EVERY 5 MIN PRN
Status: DISCONTINUED | OUTPATIENT
Start: 2017-11-27 | End: 2017-11-27 | Stop reason: HOSPADM

## 2017-11-27 RX ORDER — NICARDIPINE HYDROCHLORIDE 2.5 MG/ML
100 INJECTION INTRAVENOUS
Status: DISCONTINUED | OUTPATIENT
Start: 2017-11-27 | End: 2017-11-27 | Stop reason: HOSPADM

## 2017-11-27 RX ORDER — POTASSIUM CHLORIDE 7.45 MG/ML
10 INJECTION INTRAVENOUS
Status: DISCONTINUED | OUTPATIENT
Start: 2017-11-27 | End: 2017-11-27 | Stop reason: HOSPADM

## 2017-11-27 RX ORDER — NALOXONE HYDROCHLORIDE 0.4 MG/ML
0.4 INJECTION, SOLUTION INTRAMUSCULAR; INTRAVENOUS; SUBCUTANEOUS EVERY 5 MIN PRN
Status: DISCONTINUED | OUTPATIENT
Start: 2017-11-27 | End: 2017-11-27 | Stop reason: HOSPADM

## 2017-11-27 RX ORDER — ONDANSETRON 2 MG/ML
4 INJECTION INTRAMUSCULAR; INTRAVENOUS EVERY 4 HOURS PRN
Status: DISCONTINUED | OUTPATIENT
Start: 2017-11-27 | End: 2017-11-27 | Stop reason: HOSPADM

## 2017-11-27 RX ORDER — LORAZEPAM 2 MG/ML
.5-2 INJECTION INTRAMUSCULAR EVERY 4 HOURS PRN
Status: DISCONTINUED | OUTPATIENT
Start: 2017-11-27 | End: 2017-11-27 | Stop reason: HOSPADM

## 2017-11-27 RX ORDER — LIDOCAINE HYDROCHLORIDE 40 MG/ML
1.5 SOLUTION TOPICAL ONCE
Status: DISCONTINUED | OUTPATIENT
Start: 2017-11-27 | End: 2017-11-27

## 2017-11-27 RX ORDER — NITROGLYCERIN 5 MG/ML
100-500 VIAL (ML) INTRAVENOUS
Status: DISCONTINUED | OUTPATIENT
Start: 2017-11-27 | End: 2017-11-27 | Stop reason: HOSPADM

## 2017-11-27 RX ORDER — HEPARIN SODIUM 1000 [USP'U]/ML
1000-10000 INJECTION, SOLUTION INTRAVENOUS; SUBCUTANEOUS EVERY 5 MIN PRN
Status: DISCONTINUED | OUTPATIENT
Start: 2017-11-27 | End: 2017-11-27 | Stop reason: HOSPADM

## 2017-11-27 RX ADMIN — FENTANYL CITRATE 25 MCG: 50 INJECTION, SOLUTION INTRAMUSCULAR; INTRAVENOUS at 08:46

## 2017-11-27 RX ADMIN — GLYCOPYRROLATE 0.1 MG: 0.2 INJECTION, SOLUTION INTRAMUSCULAR; INTRAVENOUS at 08:17

## 2017-11-27 RX ADMIN — LIDOCAINE HYDROCHLORIDE 100 MG: 10 INJECTION, SOLUTION EPIDURAL; INFILTRATION; INTRACAUDAL; PERINEURAL at 09:53

## 2017-11-27 RX ADMIN — MIDAZOLAM HYDROCHLORIDE 1 MG: 1 INJECTION, SOLUTION INTRAMUSCULAR; INTRAVENOUS at 08:45

## 2017-11-27 RX ADMIN — FENTANYL CITRATE 50 MCG: 50 INJECTION, SOLUTION INTRAMUSCULAR; INTRAVENOUS at 10:10

## 2017-11-27 RX ADMIN — FENTANYL CITRATE 50 MCG: 50 INJECTION, SOLUTION INTRAMUSCULAR; INTRAVENOUS at 10:28

## 2017-11-27 RX ADMIN — TOPICAL ANESTHETIC 2 ML: 200 SPRAY DENTAL; PERIODONTAL at 08:29

## 2017-11-27 RX ADMIN — MIDAZOLAM HYDROCHLORIDE 1 MG: 1 INJECTION, SOLUTION INTRAMUSCULAR; INTRAVENOUS at 10:12

## 2017-11-27 RX ADMIN — LIDOCAINE HYDROCHLORIDE 1.5 ML: 40 SOLUTION TOPICAL at 08:17

## 2017-11-27 RX ADMIN — SODIUM CHLORIDE: 4.5 INJECTION, SOLUTION INTRAVENOUS at 07:32

## 2017-11-27 NOTE — LETTER
[facility]  [address]  [place] [id number] [phone]    [facility]  [address]  [facility] [place] [facility] [address]  [phone]        11/27/2017  Adna Ruffin  [address] [place] [address]    Dear Adan    I care about your health and have reviewed your health plan. I have reviewed your medical conditions, medication list, and lab results and am making recommendations based on this review, to better manage your health.    You are in particular need of attention regarding:  -Diabetes    I am recommending that you:  -schedule a FOLLOWUP OFFICE APPOINTMENT with me.          Please call us at [phone] ([place]),  [phone] ([place]) or use [employer] to address the above recommendations.     Thank you for trusting [facility] and we appreciate the opportunity to serve you.  We look forward to supporting your healthcare needs in the future.    Healthy Regards,    Marilee Tolentino MD  Endocrinology  [facility]  November 27, 2017

## 2017-11-27 NOTE — PROGRESS NOTES
1110Pt returned from EP Lab. Bandaid CDI to right groin and left neck puncture sites. No oozing or hematoma noted. Area soft & flat. Pt denies pain. Pt instructed on activity restrictions while on bedrest. Verbal understanding received from pt.    1120Pt's family at bedside. Detailed update given.     1200 Pt taking diet & flds well. No complaints.      1330 Discharge teaching & instructions given to both pt & wifew/ verbal understanding received. All questions & concerns addressed.

## 2017-11-27 NOTE — TELEPHONE ENCOUNTER
Panel Management Review      Patient has the following on his problem list:     Diabetes    ASA: Passed    Last A1C  Lab Results   Component Value Date    A1C 8.3 10/02/2017    A1C 7.8 07/20/2017    A1C 10.5 03/09/2017    A1C 10.0 11/02/2016    A1C 12.9 06/27/2016     A1C tested: FAILED    Last LDL:    Lab Results   Component Value Date    CHOL 142 03/09/2017     Lab Results   Component Value Date    HDL 46 03/09/2017     Lab Results   Component Value Date    LDL 69 03/09/2017     Lab Results   Component Value Date    TRIG 134 03/09/2017     Lab Results   Component Value Date    CHOLHDLRATIO 3.5 08/19/2015     Lab Results   Component Value Date    NHDL 96 03/09/2017       Is the patient on a Statin? YES             Is the patient on Aspirin? YES    Medications     Salicylates    aspirin chewable tablet  mg    aspirin tablet 325 mg          Last three blood pressure readings:  BP Readings from Last 3 Encounters:   11/27/17 113/59   10/30/17 100/60   10/25/17 110/60       Date of last diabetes office visit: 8/2/17     Tobacco History:     History   Smoking Status     Former Smoker     Packs/day: 1.50     Years: 29.00     Types: Cigarettes     Quit date: 1/1/2000   Smokeless Tobacco     Never Used             Composite cancer screening  Chart review shows that this patient is due/due soon for the following None  Summary:    Patient is due/failing the following:   FOLLOW UP    Action needed:   Patient needs office visit for dm.    Type of outreach:    Sent letter.    Questions for provider review:    None                                                                                                                                    Zo Richey CMA ([facility])     Chart routed to closed .

## 2017-11-27 NOTE — PROGRESS NOTES
PRELIM ANAID FINDINGS  VERSED 2MG, FENTANYL 25MCG GIVEN FOR MODERATE SEDATION  NO COMPLICATIONS  NO EVIDENCE OF INTRACARDIAC THROMBUS

## 2017-11-27 NOTE — PROGRESS NOTES
States tolerated ANAID well. Denies any c/o. No clot was reported by Dr. Hurst. To EP lab for ablation. Report given to Mack ARELLANO.

## 2017-11-27 NOTE — PROGRESS NOTES
1510OOB - steady on feet. Ambulated in halls to bathroom with good nate. No change in puncture site assessment with activity.      1535 Pt discharged per w/c to private vehicle. All personal belongings sent w/ pt.

## 2017-11-27 NOTE — IP AVS SNAPSHOT
MRN:[id number]                      After Visit Summary   11/27/2017    Adan Ruffin    MRN: [id number]           Visit Information        Department      11/27/2017  6:03 AM [facility] Cardiac Catheter[facility]          Review of your medicines      CONTINUE these medicines which have NOT CHANGED        Dose / Directions    ACE NOT PRESCRIBED (INTENTIONAL)   Used for:  Type II or unspecified type diabetes mellitus without mention of complication, not stated as uncontrolled        Dose:  1 each   1 each daily ACE Inhibitor not prescribed due to Intolerance   Quantity:  0 each   Refills:  0       amoxicillin 500 MG capsule   Commonly known as:  AMOXIL   Used for:  Cough        Dose:  500 mg   Take 1 capsule (500 mg) by mouth 3 times daily   Quantity:  30 capsule   Refills:  0       apixaban ANTICOAGULANT 5 MG tablet   Commonly known as:  ELIQUIS   Used for:  Cardiomyopathy, unspecified type (H), Typical atrial flutter (H)        Dose:  5 mg   Take 1 tablet (5 mg) by mouth 2 times daily   Quantity:  120 tablet   Refills:  3       aspirin EC 81 MG EC tablet   Used for:  CAD (coronary artery disease), S/P CABG x 3, Anxiety        Dose:  81 mg   Take 1 tablet (81 mg) by mouth daily   Quantity:  1 tablet   Refills:  0       atorvastatin 40 MG tablet   Commonly known as:  LIPITOR   Used for:  Hyperlipidemia LDL goal <70        TAKE 1 TABLET BY MOUTH DAILY   Quantity:  90 tablet   Refills:  1       benzonatate 200 MG capsule   Commonly known as:  TESSALON   Used for:  Cough        Dose:  200 mg   Take 1 capsule (200 mg) by mouth 3 times daily as needed for cough   Quantity:  21 capsule   Refills:  0       blood glucose monitoring lancets   Used for:  Type 2 diabetes mellitus with diabetic nephropathy, with long-term current use of insulin (H)        Use to test blood sugar 4 times daily or as directed.   Quantity:  200 each   Refills:  11       blood glucose monitoring test strip   Commonly  known as:  no brand specified   Used for:  Type 2 diabetes mellitus with diabetic nephropathy, with long-term current use of insulin (H)        Use to test blood sugars 4 times daily or as directed   Quantity:  6 Box   Refills:  1       CENTRUM SILVER per tablet        Dose:  1 tablet   Take 1 tablet by mouth daily   Refills:  0       furosemide 20 MG tablet   Commonly known as:  LASIX   Used for:  Essential hypertension        TAKE 1 TABLET(20 MG) BY MOUTH DAILY   Quantity:  90 tablet   Refills:  3       glipiZIDE 10 MG 24 hr tablet   Commonly known as:  GLUCOTROL XL   Used for:  Type 2 diabetes mellitus with diabetic nephropathy, with long-term current use of insulin (H)        TAKE 1 TABLET BY MOUTH EVERY DAY   Quantity:  30 tablet   Refills:  0       insulin aspart 100 UNIT/ML injection   Commonly known as:  NovoLOG FLEXPEN   Used for:  Type 2 diabetes mellitus with diabetic nephropathy, with long-term current use of insulin (H)        Take 1 unit for every 3 grams of carbohydrate PLUS 1 unit for every 10 mg/dL you are over 140mg/dL  units   Quantity:  45 mL   Refills:  3       insulin glargine U-300 300 UNIT/ML injection   Commonly known as:  TOUJEO   Used for:  Type 2 diabetes mellitus with diabetic nephropathy, with long-term current use of insulin (H)        Inject 80 units in am, inject 70 units in pm   Quantity:  30 mL   Refills:  3       insulin pen needle 30G X 8 MM   Commonly known as:  NOVOFINE   Used for:  Type 2 diabetes mellitus with diabetic nephropathy, with long-term current use of insulin (H)        Use 4-5 daily or as directed.   Quantity:  300 each   Refills:  1       metFORMIN 1000 MG tablet   Commonly known as:  GLUCOPHAGE   Used for:  Insulin dependent diabetes mellitus (H)        TAKE 1 TABLET BY MOUTH TWICE DAILY WITH MEALS   Quantity:  180 tablet   Refills:  1       metoprolol 100 MG 24 hr tablet   Commonly known as:  TOPROL XL   Used for:  Typical atrial flutter (H)        Dose:   100 mg   Take 1 tablet (100 mg) by mouth 2 times daily   Quantity:  60 tablet   Refills:  3       * order for DME   Used for:  Type 2 diabetes mellitus with diabetic nephropathy (H)        All DM testing supplies including test strips, lancets, solution, syringes, needles and/or pen needles for testing 4 times per day & injecting 5 times per day. Dispense glucometer compatible supplies. Accucheck Geena.   Quantity:  3 Month   Refills:  3       * order for DME   Used for:  Type 2 diabetes mellitus with diabetic nephropathy (H)        All DM testing supplies including test strips, lancets, solution, syringes, needles and/or pen needles for testing 3 times per day & injecting 3 times per day.   RX goes to  [facility], see tele enc 09/21/2016   Quantity:  1 Box   Refills:  3       order for DME   Used for:  Type 2 diabetes mellitus with diabetic nephropathy, with long-term current use of insulin (H)        Equipment being ordered: blood glucose meter   Quantity:  1 each   Refills:  0       sertraline 100 MG tablet   Commonly known as:  ZOLOFT   Used for:  Anxiety        Dose:  100 mg   Take 1 tablet (100 mg) by mouth daily   Quantity:  90 tablet   Refills:  2       vitamin B complex with vitamin C Tabs tablet        Dose:  2 tablet   Take 2 tablets by mouth daily   Refills:  0       VITAMIN C PO        Take by mouth daily   Refills:  0       * Notice:  This list has 2 medication(s) that are the same as other medications prescribed for you. Read the directions carefully, and ask your doctor or other care provider to review them with you.      STOP taking     digoxin 250 MCG tablet   Commonly known as:  LANOXIN                    Protect others around you: Learn how to safely use, store and throw away your medicines at www.disposemymeds.org.         Follow-ups after your visit        Additional Services     Follow-Up with Electrophysiologist                 Your next 10 appointments already scheduled     Dec 05,  2017 12:15 PM CST   Return Visit with Harvey Rosas MD   [facility]   [place] [facility]    [address]  [place] [id number] [phone]              Future tests that were ordered for you     Echocardiogram       Administration of IV contrast will be tailored to this examination per the appropriate written protocol listed in the Echocardiography department Protocol Book, or by the supervising Cardiologist. This may result in an order change.    Use of contrast is at the discretion of the supervising Cardiologist.            EKG 12-lead complete w/read  (to be scheduled)                  Care Instructions        After Care Instructions     Discharge Instructions - Follow up with Device Check RN        Follow up with Device Check RN in 7-10 days.            Discharge Instructions - Keep incision dry for 72 hours       Keep incision dry for 72 hours (unless Derma Reese was applied)                  Further instructions from your care team       Atrial Flutter Ablation Discharge Instructions - Femoral & Neck/Chest      After you go home:      Have an adult stay with you until tomorrow.    You may resume your normal diet.       For 24 hours - due to the sedation you received:    Relax and take it easy.    Do NOT make any important or legal decisions.    Do NOT drive or operate machines at home or at work.    Do NOT drink alcohol.    Care of Neck/Chest & Groin Puncture Sites:      For the first 24 hrs - check the puncture site every 1-2 hours while awake.    For 2 days, when you cough, sneeze, laugh or move your bowels, hold your hand over the puncture site and press firmly.    Remove the bandaids after 24 hours. If there is minor oozing, apply another bandaid and remove it after 12 hours.    It is normal to have a small bruise, pea sized lump or soreness at the site.    You may shower tomorrow. Do NOT take a bath, or use a hot tub or pool for at least  3 days. Do NOT scrub the site. Do not use lotion or powder near the puncture site.      Activity - For 2 days:    Neck/Chest site:    Avoid heavy lifting or the overuse of your shoulder.        Groin site:      No stooping or squatting    Do NOT do any heavy activity such as exercise, lifting, or straining.     No housework, yard work or any activity that make you sweat    Do NOT lift more than 10 pounds    Bleeding:     Neck/Chest site:    If you start bleeding from the site in your neck/chest, sit down and press gently on the site for 10 minutes.     Once bleeding stops, sit still for 2 hours.     Call [facility] as soon as you can      Groin site:    If you start bleeding from the site in your groin, lie down flat and press firmly on the site for 10 minutes.     Once bleeding stops, lay flat for 2 hours.    Call [facility] as soon as you can.       Call 911 right away if you have heavy bleeding or bleeding that does not stop.      Medicines:      Take your medications, including blood thinners, unless your provider tells you not to.    If you have stopped any medicines, check with your provider about when to restart them.    If you have pain or shortness of breath, you may take Advil (ibuprofen) or Tylenol (acetaminophen).    Follow Up Appointments:      An appointment has been set up for you for follow-up care    You will receive a phone call tomorrow morning from Davina ARELLANO or Ayala ARELLANO.    Call the clinic if:      You have increased pain or a large or growing hard lump around the site.    The site is red, swollen, hot or tender.    Blood or fluid is draining from the site.    You have chills or a fever greater than 101 F (38 C).    Your leg feels numb, cool or changes color.    Increased pain in the chest and/or groin.    Increased shortness of breath    Chest pain not relieved by Tylenol or Advil    New pain in the back or belly that you cannot control with Tylenol.    Recurrent irregular or fast  heart rate (AFlutter) lasting over 2 hours.    Any questions or concerns.    Heart rhythms:    You may have some irregular heartbeats. These feel very strong. They may make you feel that the fast heart rhythm is going to start again.  Give it time. The irregular beats should occur less often.       [facility]:    [phone] ( 8am-5pm M-F)  EILEEN Ghosh or EILEEN Cai    [phone] [facility] (7 days a week)      ANAID  (Transesophageal Echocardiogram)  Discharge Instructions    After you go home:      Have an adult stay with you for 6 hours.       For 24 hours - due to the sedation you received:    Relax and take it easy.    Do NOT make any important or legal decisions.    Do NOT drive or operate machines at home or at work.    Do NOT drink alcohol.    Diet:    You may resume your normal diet, but no scratchy foods for two days.    If your throat is sore, eat cold, bland or soft foods.    You may have heartburn if the tube used in the exam entered your stomach.  If so:   - Do not eat acidic and spicy foods.   - Do not eat three hours before bedtime. Clear liquids are okay.   - When lying down, use two pillows to raise your head.    Medicines:      Take your medications, including blood thinners, unless your provider tells you not to.    If you have stopped any medicines, check with your provider about when to restart them.    You may take Tylenol (Acetaminophen) if your throat is sore.    You may take antacids if you have heartburn.      Follow Up Appointments:      Follow up with your cardiologist at [facility] of patient preference as instructed.    Follow up with your primary care provider as needed.    Call the clinic if:      You have heartburn that is severe or lasts more than 72 hours.    You have a sore throat that feels worse after 72 hours.    You have shortness of breath, neck pain, chest pain, fever, chills, coughing up blood, or other unusual signs.    Questions or  "concerns      [facility]:    [phone] [facility] (7 days a week)         Additional Information About Your Visit        MyChart Information     [employer] gives you secure access to your electronic health record. If you see a primary care provider, you can also send messages to your care team and make appointments. If you have questions, please call your primary care clinic.  If you do not have a primary care provider, please call [phone] and they will assist you.        Care EveryWhere ID     This is your Care EveryWhere ID. This could be used by other organizations to access your [facility] medical records  [id number]        Your Vitals Were     Blood Pressure Pulse Temperature Respirations Height Weight    118/85 (BP Location: Right arm) 73 97.8  F (36.6  C) (Oral) 16 1.778 m (5' 10\") 106.5 kg (234 lb 12.8 oz)    Pulse Oximetry BMI (Body Mass Index)                93% 33.69 kg/m2           Primary Care Provider Office Phone # Fax #    Lauro Galloway [phone]      Equal Access to Services     [facility] : Hadii aad ku hadasho Soomaali, waaxda luqadaha, qaybta kaalmada maximiliano, luke quintero. So [facility] [phone].    ATENCIÓN: Si habla español, tiene a moore disposición servicios gratuitos de asistencia lingüística. Ton al [phone].    We comply with applicable federal civil rights laws and [place] laws. We do not discriminate on the basis of race, color, national origin, age, disability, sex, sexual orientation, or gender identity.            Thank you!     Thank you for choosing [facility] for your care. Our goal is always to provide you with excellent care. Hearing back from our patients is one way we can continue to improve our services. Please take a few minutes to complete the written survey that you may receive in the mail after you visit with us. Thank you!             Medication List: This is a list of all " your medications and when to take them. Check marks below indicate your daily home schedule. Keep this list as a reference.      Medications           Morning Afternoon Evening Bedtime As Needed    ACE NOT PRESCRIBED (INTENTIONAL)   1 each daily ACE Inhibitor not prescribed due to Intolerance                                amoxicillin 500 MG capsule   Commonly known as:  AMOXIL   Take 1 capsule (500 mg) by mouth 3 times daily                                apixaban ANTICOAGULANT 5 MG tablet   Commonly known as:  ELIQUIS   Take 1 tablet (5 mg) by mouth 2 times daily                                aspirin EC 81 MG EC tablet   Take 1 tablet (81 mg) by mouth daily                                atorvastatin 40 MG tablet   Commonly known as:  LIPITOR   TAKE 1 TABLET BY MOUTH DAILY                                benzonatate 200 MG capsule   Commonly known as:  TESSALON   Take 1 capsule (200 mg) by mouth 3 times daily as needed for cough                                blood glucose monitoring lancets   Use to test blood sugar 4 times daily or as directed.                                blood glucose monitoring test strip   Commonly known as:  no brand specified   Use to test blood sugars 4 times daily or as directed                                CENTRUM SILVER per tablet   Take 1 tablet by mouth daily                                furosemide 20 MG tablet   Commonly known as:  LASIX   TAKE 1 TABLET(20 MG) BY MOUTH DAILY                                glipiZIDE 10 MG 24 hr tablet   Commonly known as:  GLUCOTROL XL   TAKE 1 TABLET BY MOUTH EVERY DAY                                insulin aspart 100 UNIT/ML injection   Commonly known as:  NovoLOG FLEXPEN   Take 1 unit for every 3 grams of carbohydrate PLUS 1 unit for every 10 mg/dL you are over 140mg/dL  units                                insulin glargine U-300 300 UNIT/ML injection   Commonly known as:  TOUJEO   Inject 80 units in am, inject 70 units in pm                                 insulin pen needle 30G X 8 MM   Commonly known as:  NOVOFINE   Use 4-5 daily or as directed.                                metFORMIN 1000 MG tablet   Commonly known as:  GLUCOPHAGE   TAKE 1 TABLET BY MOUTH TWICE DAILY WITH MEALS                                metoprolol 100 MG 24 hr tablet   Commonly known as:  TOPROL XL   Take 1 tablet (100 mg) by mouth 2 times daily                                * order for DME   All DM testing supplies including test strips, lancets, solution, syringes, needles and/or pen needles for testing 4 times per day & injecting 5 times per day. Dispense glucometer compatible supplies. Accucheck Geena.                                * order for DME   All DM testing supplies including test strips, lancets, solution, syringes, needles and/or pen needles for testing 3 times per day & injecting 3 times per day.   RX goes to  Network Supplier, see tele enc 09/21/2016                                order for DME   Equipment being ordered: blood glucose meter                                sertraline 100 MG tablet   Commonly known as:  ZOLOFT   Take 1 tablet (100 mg) by mouth daily                                vitamin B complex with vitamin C Tabs tablet   Take 2 tablets by mouth daily                                VITAMIN C PO   Take by mouth daily                                * Notice:  This list has 2 medication(s) that are the same as other medications prescribed for you. Read the directions carefully, and ask your doctor or other care provider to review them with you.

## 2017-11-27 NOTE — IP AVS SNAPSHOT
[facility] Cardiac Catheterization [facility]    [address] [place] [place] [address]    Phone:  [phone]                                       After Visit Summary   11/27/2017    Adan Ruffin    MRN: [id number]           After Visit Summary Signature Page     I have received my discharge instructions, and my questions have been answered. I have discussed any challenges I see with this plan with the nurse or doctor.    ..........................................................................................................................................  Patient/Patient Representative Signature      ..........................................................................................................................................  Patient Representative Print Name and Relationship to Patient    ..................................................               ................................................  Date                                            Time    ..........................................................................................................................................  Reviewed by Signature/Title    ...................................................              ..............................................  Date                                                            Time

## 2017-11-27 NOTE — PROGRESS NOTES
Typical atrial flutter. Successful ablation.  No complications.  May be discharged around 4 PM.  Stop digoxin. Continue anticoagulation.  See me in 1 month with an ECG and repeat echo (ordered).

## 2017-11-27 NOTE — PROGRESS NOTES
1210 Report received from Krissy Fox RN.  1230 Bandaid CDI to right groin puncture sites. No oozing or hematoma noted. Area soft & flat. Bandaid CDI to left subclavian puncture site. Area soft & flat. Pt denies pain. Activity restrictions while on bedrest reinforced with pt. Verbal understanding received from pt. Pt's wife at bedside.   1245 Pt taking diet & flds well. No complaints.      Report given to Krissy Fox RN.

## 2017-11-27 NOTE — DISCHARGE INSTRUCTIONS
Atrial Flutter Ablation Discharge Instructions - Femoral & Neck/Chest      After you go home:      Have an adult stay with you until tomorrow.    You may resume your normal diet.       For 24 hours - due to the sedation you received:    Relax and take it easy.    Do NOT make any important or legal decisions.    Do NOT drive or operate machines at home or at work.    Do NOT drink alcohol.    Care of Neck/Chest & Groin Puncture Sites:      For the first 24 hrs - check the puncture site every 1-2 hours while awake.    For 2 days, when you cough, sneeze, laugh or move your bowels, hold your hand over the puncture site and press firmly.    Remove the bandaids after 24 hours. If there is minor oozing, apply another bandaid and remove it after 12 hours.    It is normal to have a small bruise, pea sized lump or soreness at the site.    You may shower tomorrow. Do NOT take a bath, or use a hot tub or pool for at least 3 days. Do NOT scrub the site. Do not use lotion or powder near the puncture site.      Activity - For 2 days:    Neck/Chest site:    Avoid heavy lifting or the overuse of your shoulder.        Groin site:      No stooping or squatting    Do NOT do any heavy activity such as exercise, lifting, or straining.     No housework, yard work or any activity that make you sweat    Do NOT lift more than 10 pounds    Bleeding:     Neck/Chest site:    If you start bleeding from the site in your neck/chest, sit down and press gently on the site for 10 minutes.     Once bleeding stops, sit still for 2 hours.     Call [email or web address] as soon as you can      Groin site:    If you start bleeding from the site in your groin, lie down flat and press firmly on the site for 10 minutes.     Once bleeding stops, lay flat for 2 hours.    Call [email or web address] as soon as you can.       Call 911 right away if you have heavy bleeding or bleeding that does not stop.      Medicines:      Take your medications, including blood thinners,  unless your provider tells you not to.    If you have stopped any medicines, check with your provider about when to restart them.    If you have pain or shortness of breath, you may take Advil (ibuprofen) or Tylenol (acetaminophen).    Follow Up Appointments:      An appointment has been set up for you for follow-up care    You will receive a phone call tomorrow morning from Davina ARELLANO or Ayala RN.    Call the clinic if:      You have increased pain or a large or growing hard lump around the site.    The site is red, swollen, hot or tender.    Blood or fluid is draining from the site.    You have chills or a fever greater than 101 F (38 C).    Your leg feels numb, cool or changes color.    Increased pain in the chest and/or groin.    Increased shortness of breath    Chest pain not relieved by Tylenol or Advil    New pain in the back or belly that you cannot control with Tylenol.    Recurrent irregular or fast heart rate (AFlutter) lasting over 2 hours.    Any questions or concerns.    Heart rhythms:    You may have some irregular heartbeats. These feel very strong. They may make you feel that the fast heart rhythm is going to start again.  Give it time. The irregular beats should occur less often.       [facility]:    [phone] ( 8am-5pm M-F)  EILEEN Ghosh or EILEEN Cai    [phone] [facility] (7 days a week)      ANAID  (Transesophageal Echocardiogram)  Discharge Instructions    After you go home:      Have an adult stay with you for 6 hours.       For 24 hours - due to the sedation you received:    Relax and take it easy.    Do NOT make any important or legal decisions.    Do NOT drive or operate machines at home or at work.    Do NOT drink alcohol.    Diet:    You may resume your normal diet, but no scratchy foods for two days.    If your throat is sore, eat cold, bland or soft foods.    You may have heartburn if the tube used in the exam entered your stomach.  If so:   - Do not eat acidic  and spicy foods.   - Do not eat three hours before bedtime. Clear liquids are okay.   - When lying down, use two pillows to raise your head.    Medicines:      Take your medications, including blood thinners, unless your provider tells you not to.    If you have stopped any medicines, check with your provider about when to restart them.    You may take Tylenol (Acetaminophen) if your throat is sore.    You may take antacids if you have heartburn.      Follow Up Appointments:      Follow up with your cardiologist at [facility] of patient preference as instructed.    Follow up with your primary care provider as needed.    Call the clinic if:      You have heartburn that is severe or lasts more than 72 hours.    You have a sore throat that feels worse after 72 hours.    You have shortness of breath, neck pain, chest pain, fever, chills, coughing up blood, or other unusual signs.    Questions or concerns      [facility] at [place]:    [phone] [facility] (7 days a week)

## 2017-11-28 ENCOUNTER — MYC REFILL (OUTPATIENT)
Dept: ENDOCRINOLOGY | Facility: CLINIC | Age: 67
End: 2017-11-28

## 2017-11-28 ENCOUNTER — MYC REFILL (OUTPATIENT)
Dept: CARDIOLOGY | Facility: CLINIC | Age: 67
End: 2017-11-28

## 2017-11-28 ENCOUNTER — TELEPHONE (OUTPATIENT)
Dept: CARDIOLOGY | Facility: CLINIC | Age: 67
End: 2017-11-28

## 2017-11-28 DIAGNOSIS — I48.3 TYPICAL ATRIAL FLUTTER (H): ICD-10-CM

## 2017-11-28 DIAGNOSIS — Z79.4 TYPE 2 DIABETES MELLITUS WITH DIABETIC NEPHROPATHY, WITH LONG-TERM CURRENT USE OF INSULIN (H): ICD-10-CM

## 2017-11-28 DIAGNOSIS — E11.21 TYPE 2 DIABETES MELLITUS WITH DIABETIC NEPHROPATHY, WITH LONG-TERM CURRENT USE OF INSULIN (H): ICD-10-CM

## 2017-11-28 RX ORDER — METOPROLOL SUCCINATE 100 MG/1
100 TABLET, EXTENDED RELEASE ORAL 2 TIMES DAILY
Qty: 60 TABLET | Refills: 3 | Status: CANCELLED | OUTPATIENT
Start: 2017-11-28

## 2017-11-28 NOTE — TELEPHONE ENCOUNTER
[place]         Last Written Prescription Date: 8/2/17  Last Fill Quantity: 30ml, # refills: 3  Last Office Visit with [facility] or [facility] prescribing provider:  9/15/17   Next 5 appointments (look out 90 days)     Dec 05, 2017 12:15 PM CST   Return Visit with Harvey Rosas MD   [facility] ([facility])    [facility] [id number]  [facility] [id number] [phone]                   BP Readings from Last 3 Encounters:   11/27/17 123/76   10/30/17 100/60   10/25/17 110/60     Lab Results   Component Value Date    MICROL 38 05/24/2016     Lab Results   Component Value Date    UMALCR 34.05 05/24/2016     Creatinine   Date Value Ref Range Status   11/27/2017 1.09 0.66 - 1.25 mg/dL Final   ]  GFR Estimate   Date Value Ref Range Status   11/27/2017 68 >60 mL/min/1.7m2 Final     Comment:     Non  GFR Calc   10/25/2017 53 (L) >60 mL/min/1.7m2 Final   10/20/2017 48 (L) >60 mL/min/1.7m2 Final     GFR Estimate If Black   Date Value Ref Range Status   11/27/2017 82 >60 mL/min/1.7m2 Final     Comment:      GFR Calc   10/25/2017 64 >60 mL/min/1.7m2 Final   10/20/2017 58 (L) >60 mL/min/1.7m2 Final     Lab Results   Component Value Date    CHOL 142 03/09/2017     Lab Results   Component Value Date    HDL 46 03/09/2017     Lab Results   Component Value Date    LDL 69 03/09/2017     Lab Results   Component Value Date    TRIG 134 03/09/2017     Lab Results   Component Value Date    CHOLHDLRATIO 3.5 08/19/2015     Lab Results   Component Value Date    AST 25 03/09/2017     Lab Results   Component Value Date    ALT 50 03/09/2017     Lab Results   Component Value Date    A1C 8.3 10/02/2017    A1C 7.8 07/20/2017    A1C 10.5 03/09/2017    A1C 10.0 11/02/2016    A1C 12.9 06/27/2016     Potassium   Date Value Ref Range Status   11/27/2017 4.0 3.4 - 5.3 mmol/L Final       Labs showing if normal/abnormal  Lab Results   Component Value Date    UCRR  111 05/24/2016    MICROL 38 05/24/2016     Lab Results   Component Value Date    CHOL 142 03/09/2017    TRIG 134 03/09/2017    HDL 46 03/09/2017    LDL 69 03/09/2017    VLDL 55 (H) 08/19/2015    CHOLHDLRATIO 3.5 08/19/2015     Lab Results   Component Value Date    A1C 8.3 (H) 10/02/2017    A1C 7.8 (H) 07/20/2017    A1C 10.5 (H) 03/09/2017

## 2017-11-28 NOTE — TELEPHONE ENCOUNTER
Message from SIZESEEKERhart:  Original authorizing provider: MD Adan Adames would like a refill of the following medications:  metoprolol (TOPROL XL) 100 MG 24 hr tablet [Ramy Laguna MD]    Preferred pharmacy: [facility] [address] [place] [address] [address] AT [facility]    Comment:  Hi Dr Laguna, Could you provide [facility] the current dosage on this. They have the 100/day dosage. Thank You, Abraham Ruffin

## 2017-11-28 NOTE — TELEPHONE ENCOUNTER
Requested Prescriptions   Signed Prescriptions Disp Refills     insulin pen needle (NOVOFINE) 30G X 8  each 1     Sig: Use 4-5 daily or as directed.    Diabetic Supplies Protocol Passed    11/26/2017  1:23 PM       Passed - Patient is 18 years of age or older       Passed - Patient has had appt within past 6 mos    IV to PO - Antibiotics     None                Prescription approved per [facility] Refill Protocol.  Dariana Matias, RN  Triage Nurse

## 2017-11-28 NOTE — TELEPHONE ENCOUNTER
Patient returned call. He states that he is doing well. His HR's are in the 72-82 bpm range this morning by pulse oximetry. He slept well and his groin looks fine.He is voiding w/o problems. I advised him to take it easy for the next few days and not to lift,push or pull more than 10 # for the next few days. I cautioned him that he may experience some episodes of aflutter over the next few months and to keep a log of these events. I advised him to seek medical attention should an episode last longer than 2-3 hours. I reminded him to continue with Eliquis and to stop digoxin which he has done. I will schedule him to see Dr. Hdz in [place] on 12/27 and he will get an echo in [facility] on 12/20. He will be rescheduling his 12/5 appointment with Dr. Rosas. All his questions were answered and he is pleased where things are at. Tomeka

## 2017-11-28 NOTE — TELEPHONE ENCOUNTER
Message from orderboltt:  Original authorizing provider: MD Adan Lovett would like a refill of the following medications:  insulin glargine U-300 (TOUJEO) 300 UNIT/ML injection [Marilee Tolentino MD]    Preferred pharmacy: [facility] [address] [place] [address] - [address] [place] [address] [address] AT [facility] 42    Comment:  Hi Dr Tolentino, Could you approve a refill on this prescription with the current dosage? I will be running out tomorrow. Thanks You, Abraham Ruffin

## 2017-11-29 ENCOUNTER — TELEPHONE (OUTPATIENT)
Dept: INTERNAL MEDICINE | Facility: CLINIC | Age: 67
End: 2017-11-29

## 2017-11-29 ENCOUNTER — MYC MEDICAL ADVICE (OUTPATIENT)
Dept: CARDIOLOGY | Facility: CLINIC | Age: 67
End: 2017-11-29

## 2017-11-29 NOTE — TELEPHONE ENCOUNTER
(See mychart encounter from 10-17-17.)  Pt states form was not received by the [place] [employer].    He is asking for a copy of this form.  He was advised if form not received, his 's license will be revoked.    Routed to station coordinator to provide a copy for pt.    Please call pt.

## 2017-11-30 LAB
INTERPRETATION ECG - MUSE: NORMAL
INTERPRETATION ECG - MUSE: NORMAL

## 2017-12-01 ENCOUNTER — TELEPHONE (OUTPATIENT)
Dept: INTERNAL MEDICINE | Facility: CLINIC | Age: 67
End: 2017-12-01

## 2017-12-01 NOTE — TELEPHONE ENCOUNTER
Form received from patient at the  for DMV - Diabetic Form for review and signature.  Put in Dr. Galloway's in basket.    Patient states that is not turned in by Monday he will loose his license.  Please complete as soon as possible.

## 2017-12-04 DIAGNOSIS — Z79.4 TYPE 2 DIABETES MELLITUS WITH DIABETIC NEPHROPATHY, WITH LONG-TERM CURRENT USE OF INSULIN (H): ICD-10-CM

## 2017-12-04 DIAGNOSIS — E11.21 TYPE 2 DIABETES MELLITUS WITH DIABETIC NEPHROPATHY, WITH LONG-TERM CURRENT USE OF INSULIN (H): ICD-10-CM

## 2017-12-04 NOTE — TELEPHONE ENCOUNTER
Patient request refill. Medication pended and routed to provider for approval     Per  it states that this medication will need a PA

## 2017-12-06 ENCOUNTER — OFFICE VISIT (OUTPATIENT)
Dept: ENDOCRINOLOGY | Facility: CLINIC | Age: 67
End: 2017-12-06
Payer: COMMERCIAL

## 2017-12-06 VITALS
HEIGHT: 69 IN | DIASTOLIC BLOOD PRESSURE: 62 MMHG | HEART RATE: 73 BPM | OXYGEN SATURATION: 94 % | TEMPERATURE: 97.6 F | SYSTOLIC BLOOD PRESSURE: 118 MMHG | BODY MASS INDEX: 35.83 KG/M2 | WEIGHT: 241.9 LBS

## 2017-12-06 DIAGNOSIS — R80.9 MICROALBUMINURIA: ICD-10-CM

## 2017-12-06 DIAGNOSIS — I10 ESSENTIAL HYPERTENSION: ICD-10-CM

## 2017-12-06 DIAGNOSIS — I25.10 CORONARY ARTERY DISEASE INVOLVING NATIVE CORONARY ARTERY OF NATIVE HEART WITHOUT ANGINA PECTORIS: ICD-10-CM

## 2017-12-06 DIAGNOSIS — E78.5 HYPERLIPIDEMIA, UNSPECIFIED HYPERLIPIDEMIA TYPE: ICD-10-CM

## 2017-12-06 DIAGNOSIS — Z95.1 S/P CABG X 3: ICD-10-CM

## 2017-12-06 DIAGNOSIS — Z79.4 TYPE 2 DIABETES MELLITUS WITH DIABETIC NEPHROPATHY, WITH LONG-TERM CURRENT USE OF INSULIN (H): Primary | ICD-10-CM

## 2017-12-06 DIAGNOSIS — E11.21 TYPE 2 DIABETES MELLITUS WITH DIABETIC NEPHROPATHY, WITH LONG-TERM CURRENT USE OF INSULIN (H): Primary | ICD-10-CM

## 2017-12-06 PROCEDURE — 99214 OFFICE O/P EST MOD 30 MIN: CPT | Performed by: INTERNAL MEDICINE

## 2017-12-06 PROCEDURE — 99207 C FOOT EXAM  NO CHARGE: CPT | Performed by: INTERNAL MEDICINE

## 2017-12-06 NOTE — PATIENT INSTRUCTIONS
[facility] [place] [facility] [place] locations   Dr Tolentino, Endocrinology Department      [facility]   [address]  [place] [address]  Appointment [phone]  Fax: [phone]  [place]: Monday and Tuesday         [facility]   [address]  [place] [address]  Appointment [phone]  Fax: [phone]  [place]: Wednesday and Thursday          Please arrive 30 minutes before your scheduled appointment.   First go to the main floor lab suit  for previsit A1c lab appointment and then come upstairs and get checked in with  for clinic visit.  This will allow for your blood glucose meter/insulin pump to be downloaded and glycosylated hemoglobin (A1c) to be obtained prior to your appointment.    Increase Toujeo to 90 units in AM and continue 70 Units at night  Continue Novolog at current dose. 1 Units/ 3 gm of Carbs + 1: 15 > 140 with each meal  Your provider has referred you to Diabetes Education: For all [place] Clinics:  Phone [phone]; Fax [phone]  Please call and make the appointment.--> transition to U-500  Check with insurance if U-500 insulin is covered and also if diabetic educator visit for U-500 initiation is covered.  Labs and follow up in 3 months.    Recommend checking blood sugars before meals and at bedtime.    If Blood glucose are low more often-> 2-3 times/week- give us a call.  The patient is advised to Make better food choices: reduce carbs, Reduce portion size, weight loss and exercise 3-4 times a week.  Discussed hypoglycemia signs and symptoms as well as management in detail.

## 2017-12-06 NOTE — NURSING NOTE
"Chief Complaint   Patient presents with     RECHECK     follow up dm2        Initial /62 (BP Location: Left arm, Patient Position: Chair, Cuff Size: Adult Large)  Pulse 73  Temp 97.6  F (36.4  C) (Oral)  Ht 1.753 m (5' 9\")  Wt 109.7 kg (241 lb 14.4 oz)  SpO2 94%  BMI 35.72 kg/m2 Estimated body mass index is 35.72 kg/(m^2) as calculated from the following:    Height as of this encounter: 1.753 m (5' 9\").    Weight as of this encounter: 109.7 kg (241 lb 14.4 oz).  Medication Reconciliation: complete     ENDOCRINOLOGY INTAKE FORM    Patient Name:  Adan Ruffin  :  1950    Is patient Diabetic?   Yes: type 2  Does patient have non-diabetic or other endocrine issues?  No    Vitals: /62 (BP Location: Left arm, Patient Position: Chair, Cuff Size: Adult Large)  Pulse 73  Temp 97.6  F (36.4  C) (Oral)  Ht 1.753 m (5' 9\")  Wt 109.7 kg (241 lb 14.4 oz)  SpO2 94%  BMI 35.72 kg/m2  BMI= Body mass index is 35.72 kg/(m^2).    Flu vaccine:  No  Pneumonia vaccine:  Yes: 16    Smoking and Alcohol use:  Social History   Substance Use Topics     Smoking status: Former Smoker     Packs/day: 1.50     Years: 29.00     Types: Cigarettes     Quit date: 2000     Smokeless tobacco: Never Used     Alcohol use No      Comment: 1 mixed drink a night       Foot Exam: No  Eye Exam:  No  Dental Exam:  No  Aspirin Use:  Yes: 81 mg daily     Lab Results   Component Value Date    A1C 8.3 10/02/2017    A1C 7.8 2017    A1C 10.5 2017    A1C 10.0 2016    A1C 12.9 2016       Lab Results   Component Value Date    MICROL 38 2016     No results found for: MICROALBUMIN        Staff Signature:  Zo Richey CMA (AAMA)        "

## 2017-12-06 NOTE — PROGRESS NOTES
ENDOCRINOLOGY CLINIC NOTE:  Name: Adan Ruffin  Seen for follow-up of diabetes .  HPI:  Adan Ruffin is a 65 year old male who presents for the evaluation/management of Diabetes.he was diagnosed with diabetes about 20 years back and is on insulin for last 15 years.  Blood sugar control was optimal.  A few years back.  Underwent CABG in April 2015  Underwent ablation 2017    1. Type 2 DM:  Orginally diagnosed about 20 years back  Current Regimen: glipizide 5 mg/day, Metformin 1000 mg twice a day, Toujeo 80 units AM and 70 units PM, NovoLog-estimates the amount each time.  Takes about 40-50 units with each meal. Carb content is mostly same on each day.  About 1 unit/3 gm of CHO + 1 unit for 10 > 140. Does correction at bedtime  Though I do not see that he is checking before each meal.  BS checks: in the morning and before going to bed  Average Meter Download: 237. BG variable but mostly on high side.  One episode in last 15 days when BG was 37 in the setting of taking insulin twice.  Sometimes he takes short-acting insulin even though he is not eating and then drops blood sugar.  There is no consistency with eating pattern.  Symptoms of hypoglycemia (low blood sugar):  Gets symptoms of hypoglycemia.   Episodes of hypoglycemia:occasional  Fixed meal pattern:typically has breakfast and dinner.  Sometimes skips lunch.  Patient counting carbs:no    DM Complications:   Nephropathy: has microalbuminuria  Retinopathy: no  Neuropathy: no  Microalbuminuria:present, on lisinopril 5 mg  MICROL       38   5/24/2016  MICROALBUMIN    34.05   5/24/2016    CAD/PAD: yes, CABG in April 2015  Gastroparesis: no  Hypoglycemia unawareness: no    2. Hypertension:    Blood pressure medications include lisinopril 5 mg  3. Hyperlipidemia: Takes atorvastatin 40 mg         PMH/PSH:  Past Medical History:   Diagnosis Date     Atrial flutter (H) 10/02/2017     CAD (coronary artery disease) 03/13/2015    CABG 2013     Cardiomyopathy (H)  10/02/2017    (L) ventricular systolic     HTN (hypertension)      Hyperlipidaemia      Microalbuminuria 6/27/2016     Obesity 3/13/2015     Type 2 diabetes mellitus with diabetic nephropathy, with long-term current use of insulin (H) 11/2/2016     Past Surgical History:   Procedure Laterality Date     BYPASS GRAFT ARTERY CORONARY N/A 4/7/2015    bypass LAD, OM, PDA        COLONOSCOPY N/A 6/20/2016    Procedure: COLONOSCOPY;  Surgeon: Marcela Schwartz MD;  Location: [facility]     EXCISE PILONIDAL CYST, SIMPLE  1975     HEART CATH, ANGIOPLASTY  3-    3 vessel disease-occluded RCA, stenosis LM-to have CABG     removal of skin cancer  1992     TONSILLECTOMY & ADENOIDECTOMY  1950     Family Hx:  Family History   Problem Relation Age of Onset     Breast Cancer Mother      CEREBROVASCULAR DISEASE Mother      Hyperlipidemia Father      CANCER Father      lung, unrelated to smoking     CANCER Paternal Grandmother        Diabetes:    Social Hx:  Social History     Social History     Marital status:      Spouse name: N/A     Number of children: N/A     Years of education: N/A     Occupational History     Not on file.     Social History Main Topics     Smoking status: Former Smoker     Packs/day: 1.50     Years: 29.00     Types: Cigarettes     Quit date: 1/1/2000     Smokeless tobacco: Never Used     Alcohol use No      Comment: 1 mixed drink a night     Drug use: No     Sexual activity: Yes     Partners: Female     Other Topics Concern     Caffeine Concern No     none     Sleep Concern No     Stress Concern Yes     sometimes at home     Weight Concern Yes     would like to lose weight     Special Diet No     Exercise No     Seat Belt Yes     Social History Narrative          MEDICATIONS:  has a current medication list which includes the following prescription(s): insulin glargine u-300, insulin aspart, insulin pen needle, blood glucose monitoring, metoprolol, aspirin ec, apixaban anticoagulant, atorvastatin,  "metformin, glipizide, ascorbic acid, blood glucose monitoring, sertraline, furosemide, order for dme, order for dme, order for dme, ACE NOT PRESCRIBED, INTENTIONAL,, centrum silver, and vitamin b complex with vitamin c.    ROS     ROS: 10 point ROS neg other than the symptoms noted above in the HPI.    Physical Exam   VS: /62 (BP Location: Left arm, Patient Position: Chair, Cuff Size: Adult Large)  Pulse 73  Temp 97.6  F (36.4  C) (Oral)  Ht 1.753 m (5' 9\")  Wt 109.7 kg (241 lb 14.4 oz)  SpO2 94%  BMI 35.72 kg/m2  GENERAL: AXOX3, NAD, well dressed, answering questions appropriately, appears stated age.  HEENT: No exopthalmous, no proptosis, EOMI, no lig lag, no retraction  NECK: Thyroid normal in size, non tender, no nodules were palpated.  CV: RRR  LUNGS: CTAB  ABDOMEN: +BS  NEUROLOGY: CN grossly intact, no tremors  FOOT: DM FOOT EXAM: normal DP and PT pulses, no trophic changes or ulcerative lesions, normal sensory exam and normal monofilament exam.   PSYCH: normal affect and mood      LABS:  A1c:  Lab Results   Component Value Date    A1C 8.3 10/02/2017    A1C 7.8 07/20/2017    A1C 10.5 03/09/2017    A1C 10.0 11/02/2016    A1C 12.9 06/27/2016       Creatinine:  Creatinine   Date Value Ref Range Status   11/27/2017 1.09 0.66 - 1.25 mg/dL Final       Urine Micro:  Lab Results   Component Value Date    UMALCR 34.05 05/24/2016         LFTs/Lipids:  Recent Labs   Lab Test  03/09/17   0821  12/28/15   0907  08/19/15   0934 01/08/15   CHOL  142  137  176  154   HDL  46  58  51  64   LDL  69  66  70  69   TRIG  134  66  275*  104   CHOLHDLRATIO   --    --   3.5  2.4       TFTs:  TSH   Date Value Ref Range Status   10/02/2017 1.54 0.40 - 4.00 mU/L Final     All pertinent notes, labs, and images personally reviewed by me.     A/P  Mr.Raymond SANIA Ruffin is a 65 year old here for the evaluation/management of diabetes:    1. DM2 - Under fair control.  A1c 8.5%.  Diabetes complicated by microalbuminuria and " vasculopathy.  History of CABG in April 2015.  BG variable. Few epsiodes of low BG in day likely in the setting of taking too much short-acting insulin.  BG mostly on high side.  Increase  Toujeo to 90 units in AM and continue 70 units PM  NovoLog: Continue 1 Unit/ 3 gm of CHO and decrease correctional scale to 1-15 > 140.   Continue metformin 1000 mg twice a day  Continue glipizide XR 10 mg in morning with food    He is using > 200-300 units/day.  Will be a good candidate to transition to -500  E referral in place  He wants to check with insurance first.    Recommend checking blood sugars before meals and at bedtime.    If Blood glucose are low more often-> 2-3 times/week- give us a call.  The patient is advised to Make better food choices: reduce carbs, Reduce portion size, weight loss and exercise 3-4 times a week.  Discussed hypoglycemia signs and symptoms as well as management in detail.       2. Hypertension - Under Good control.  Continue lisinopril 5 mg      3. Hyperlipidemia - Under Good control.  Continue atorvastatin 40 mg.  LDL 66, HDL 58     4. Prevention  Flu Shot- November 2015  Pneumovax- 9/2016  Opthalmology- 3/2015. Due for eye exam. Encouraged f/u  ASA- 325 mg  Smoking- no    5. Microalbuminuria:  MICROL       38   5/24/2016  MICROALBUMIN    34.05   5/24/2016  -- continue lisinopril 5 mg  -- recommend strict BG control.  Labs with next lab draw.    All questions were answered.  The patient indicates understanding of the above issues and agrees with the plan set forth.     More than 50% of face to face time spent with Mr. Ruffin on counseling / coordinating his care.  Total appointment times was 25 minutes.      Follow-up:  follow up in 3 months    Marilee Tolentino M.D  Endocrinology  [facility]  CC: Lauro Galloway    Disclaimer: This note consists of symbols derived from keyboarding, dictation and/or voice recognition software. As a result, there may be errors in the  script that have gone undetected. Please consider this when interpreting information found in this chart.

## 2017-12-14 ENCOUNTER — MYC MEDICAL ADVICE (OUTPATIENT)
Dept: ENDOCRINOLOGY | Facility: CLINIC | Age: 67
End: 2017-12-14

## 2017-12-14 NOTE — TELEPHONE ENCOUNTER
I am confused on patients medication list is U 300, sig id 90 am and 70 pm, then novolog 100u sliding scale TID? To switch to U-500 he would stop these and take that medication how?

## 2017-12-14 NOTE — TELEPHONE ENCOUNTER
U-300 is TOujeo.  For transition he needs to be seen by CDE first. Based on TDD they will do calculations and then come up with a dosing of U-500

## 2017-12-14 NOTE — TELEPHONE ENCOUNTER
Transition from current insulin regimen to U-500 ( that will be 2-3 injections and will replace current insulin)  Let me know what exact orders are needed.  I believe CDE referral is in place.  Please call patient with above information.    Marilee Tolentino MD  Endocrinology   [facility]  December 14, 2017

## 2017-12-20 ENCOUNTER — HOSPITAL ENCOUNTER (OUTPATIENT)
Dept: CARDIOLOGY | Facility: CLINIC | Age: 67
Discharge: HOME | End: 2017-12-20
Attending: INTERNAL MEDICINE | Admitting: INTERNAL MEDICINE
Payer: COMMERCIAL

## 2017-12-20 DIAGNOSIS — I48.3 TYPICAL ATRIAL FLUTTER (H): ICD-10-CM

## 2017-12-20 PROCEDURE — 93325 DOPPLER ECHO COLOR FLOW MAPG: CPT | Mod: 26 | Performed by: INTERNAL MEDICINE

## 2017-12-20 PROCEDURE — 93308 TTE F-UP OR LMTD: CPT | Mod: 26 | Performed by: INTERNAL MEDICINE

## 2017-12-20 PROCEDURE — 93321 DOPPLER ECHO F-UP/LMTD STD: CPT | Mod: 26 | Performed by: INTERNAL MEDICINE

## 2017-12-20 PROCEDURE — 93308 TTE F-UP OR LMTD: CPT

## 2017-12-21 ENCOUNTER — ALLIED HEALTH/NURSE VISIT (OUTPATIENT)
Dept: EDUCATION SERVICES | Facility: CLINIC | Age: 67
End: 2017-12-21
Payer: COMMERCIAL

## 2017-12-21 DIAGNOSIS — Z79.4 TYPE 2 DIABETES MELLITUS WITH DIABETIC NEPHROPATHY, WITH LONG-TERM CURRENT USE OF INSULIN (H): Primary | ICD-10-CM

## 2017-12-21 DIAGNOSIS — E11.21 TYPE 2 DIABETES MELLITUS WITH DIABETIC NEPHROPATHY, WITH LONG-TERM CURRENT USE OF INSULIN (H): Primary | ICD-10-CM

## 2017-12-21 PROCEDURE — G0108 DIAB MANAGE TRN  PER INDIV: HCPCS

## 2017-12-21 NOTE — PATIENT INSTRUCTIONS
Reminders:    1) When you start taking U-500, stop the Novolog and Toujeo. The U-500 replaces both.    2) Take 95 units U-500 three times each day: before breakfast, lunch, and dinner.    3) Take it 30 minutes before the meals.    4) Email blood sugar record 5-7 days after starting the U-500, or sooner if you are having daily lows.

## 2017-12-21 NOTE — Clinical Note
Adan Tuttle is ready to change to U-500 next week, prescription ordered and sent to you for signature.  Thank you! Aura Nickerson RD, LD, CDE

## 2017-12-21 NOTE — PROGRESS NOTES
Adan is in for transition from Toujeo and Novolog to U-500 insulin.    Reviewed current dosing of Toujeo 90-0-70-0 and Novolog 1 unit:3 grams carb and 1:15mg/dl>140 ( units).  TDD all insulin = 310 units    Instructed on these concepts:  1) U-500 replaces both Toujeo and Novolog, do not take other insulin with U-500  2) Give 95 units before breakfast, lunch, dinner (30 minutes before eating)  3) Mckay'd pen, he currently uses pen so no problems. Reminded U-500 is in 5 unit increments.  4) Eat 3 meals/day (he describes that he does) and snacks as usual.    Looked at Adan's BG meter (Geena). He checks ~2 times/day but at random times. Says he remembers after he eats but not checking 2 hours after, specifically.   1) provided BG log sheet recommending check before or 2 hours after meals  2) reviewed goals  3) His numbers range from 61 to 330 in the past week, most readings 180-240 but a few higher and lower.  7 day average= 171  14 day average= 199  30 day average 214    Numbers are improving overall.   With the few numbers under 100, and hypoglycemia unawarness until BG <50, recommended 10% decrease in insulin to start the U-500.     Start 95 units 3x/day for first 5-7 days ( units), then email in BG log for adjustment if elevated.  Call sooner if 3 lows.     Current dose of 310 units/day = 103 units TID    If dose increases, will need increased Rx.   Prescription sent to pharmacy. Voucher for free box provided, informed of savings card.    Aura Nickerson RD, LD, CDE

## 2017-12-21 NOTE — MR AVS SNAPSHOT
After Visit Summary   12/21/2017    Adan Ruffin    MRN: [id number]           Patient Information     Date Of Birth          1950        Visit Information        Provider Department      12/21/2017 10:30 AM  DIABETIC ED RESOURCE [facility][1]        Care Instructions    Reminders:    1) When you start taking U-500, stop the Novolog and Toujeo. The U-500 replaces both.    2) Take 95 units U-500 three times each day: before breakfast, lunch, and dinner.    3) Take it 30 minutes before the meals.    4) Email blood sugar record 5-7 days after starting the U-500, or sooner if you are having daily lows.          Follow-ups after your visit        Your next 10 appointments already scheduled     Dec 27, 2017  1:15 PM CST   [facility] EP RETURN with Ramy Laguna MD   [facility] ([facility])    [address] [place] [id number] [phone]              Future tests that were ordered for you today     Open Future Orders        Priority Expected Expires Ordered    Echocardiogram Limited Routine 1/19/2018 11/27/2018 11/27/2017            Who to contact     If you have questions or need follow up information about today's clinic visit or your schedule please contact [facility] directly at [phone].  Normal or non-critical lab and imaging results will be communicated to you by MyChart, letter or phone within 4 business days after the clinic has received the results. If you do not hear from us within 7 days, please contact the clinic through MyChart or phone. If you have a critical or abnormal lab result, we will notify you by phone as soon as possible.  Submit refill requests through [employer] or call your pharmacy and they will forward the refill request to us. Please allow 3 business days for your refill to be completed.          Additional Information About Your Visit        MyChart Information      [employer] gives you secure access to your electronic health record. If you see a primary care provider, you can also send messages to your care team and make appointments. If you have questions, please call your primary care clinic.  If you do not have a primary care provider, please call [phone] and they will assist you.        Care EveryWhere ID     This is your Care EveryWhere ID. This could be used by other organizations to access your [facility] medical records  [id number]         Blood Pressure from Last 3 Encounters:   12/06/17 118/62   11/27/17 123/76   10/30/17 100/60    Weight from Last 3 Encounters:   12/06/17 109.7 kg (241 lb 14.4 oz)   11/27/17 106.5 kg (234 lb 12.8 oz)   10/30/17 106.6 kg (235 lb)              Today, you had the following     No orders found for display       Primary Care Provider Office Phone # Fax #    Lauro Galloway [phone]       [address] [facility] [address]        Equal Access to Services     [facility]: Hadii aad ku hadasho Soomaali, waaxda luqadaha, qaybta kaalmada adeegyada, waxay johnson hayviolet martinez . So [facility] [phone].    ATENCIÓN: Si habla español, tiene a moore disposición servicios gratuitos de asistencia lingüística. Llame al [phone].    We comply with applicable federal civil rights laws and [place] laws. We do not discriminate on the basis of race, color, national origin, age, disability, sex, sexual orientation, or gender identity.            Thank you!     Thank you for choosing [facility]  for your care. Our goal is always to provide you with excellent care. Hearing back from our patients is one way we can continue to improve our services. Please take a few minutes to complete the written survey that you may receive in the mail after your visit with us. Thank you!             Your Updated Medication List - Protect others around you: Learn how to safely use, store and throw  away your medicines at www.disposemymeds.org.          This list is accurate as of: 12/21/17 12:14 PM.  Always use your most recent med list.                   Brand Name Dispense Instructions for use Diagnosis    ACE NOT PRESCRIBED (INTENTIONAL)     0 each    1 each daily ACE Inhibitor not prescribed due to Intolerance    Type II or unspecified type diabetes mellitus without mention of complication, not stated as uncontrolled       apixaban ANTICOAGULANT 5 MG tablet    ELIQUIS    120 tablet    Take 1 tablet (5 mg) by mouth 2 times daily    Cardiomyopathy, unspecified type (H), Typical atrial flutter (H)       aspirin EC 81 MG EC tablet     1 tablet    Take 1 tablet (81 mg) by mouth daily    CAD (coronary artery disease), S/P CABG x 3, Anxiety       atorvastatin 40 MG tablet    LIPITOR    90 tablet    TAKE 1 TABLET BY MOUTH DAILY    Hyperlipidemia LDL goal <70       blood glucose monitoring lancets     200 each    Use to test blood sugar 4 times daily or as directed.    Type 2 diabetes mellitus with diabetic nephropathy, with long-term current use of insulin (H)       blood glucose monitoring test strip    no brand specified    6 Box    Use to test blood sugars 4 times daily or as directed    Type 2 diabetes mellitus with diabetic nephropathy, with long-term current use of insulin (H)       CENTRUM SILVER per tablet      Take 1 tablet by mouth daily        furosemide 20 MG tablet    LASIX    90 tablet    TAKE 1 TABLET(20 MG) BY MOUTH DAILY    Essential hypertension       glipiZIDE 10 MG 24 hr tablet    GLUCOTROL XL    30 tablet    TAKE 1 TABLET BY MOUTH EVERY DAY    Type 2 diabetes mellitus with diabetic nephropathy, with long-term current use of insulin (H)       insulin aspart 100 UNIT/ML injection    NovoLOG FLEXPEN    45 mL    Take 1 unit for every 3 grams of carbohydrate PLUS 1 unit for every 15 mg/dL you are over 140mg/dL  units    Type 2 diabetes mellitus with diabetic nephropathy, with long-term  current use of insulin (H)       insulin glargine U-300 300 UNIT/ML injection    TOUJEO    135 mL    Inject 90 units in am, inject 70 units in pm    Type 2 diabetes mellitus with diabetic nephropathy, with long-term current use of insulin (H)       insulin pen needle 30G X 8 MM    NOVOFINE    300 each    Use 4-5 daily or as directed.    Type 2 diabetes mellitus with diabetic nephropathy, with long-term current use of insulin (H)       metFORMIN 1000 MG tablet    GLUCOPHAGE    180 tablet    TAKE 1 TABLET BY MOUTH TWICE DAILY WITH MEALS    Insulin dependent diabetes mellitus (H)       metoprolol 100 MG 24 hr tablet    TOPROL XL    60 tablet    Take 1 tablet (100 mg) by mouth 2 times daily    Typical atrial flutter (H)       * order for DME     3 Month    All DM testing supplies including test strips, lancets, solution, syringes, needles and/or pen needles for testing 4 times per day & injecting 5 times per day. Dispense glucometer compatible supplies. Accucheck Geena.    Type 2 diabetes mellitus with diabetic nephropathy (H)       * order for DME     1 Box    All DM testing supplies including test strips, lancets, solution, syringes, needles and/or pen needles for testing 3 times per day & injecting 3 times per day.   RX goes to  Network Supplier, see tele enc 09/21/2016    Type 2 diabetes mellitus with diabetic nephropathy (H)       order for DME     1 each    Equipment being ordered: blood glucose meter    Type 2 diabetes mellitus with diabetic nephropathy, with long-term current use of insulin (H)       sertraline 100 MG tablet    ZOLOFT    90 tablet    Take 1 tablet (100 mg) by mouth daily    Anxiety       vitamin B complex with vitamin C Tabs tablet      Take 2 tablets by mouth daily        VITAMIN C PO      Take by mouth daily        * Notice:  This list has 2 medication(s) that are the same as other medications prescribed for you. Read the directions carefully, and ask your doctor or other care provider to  review them with you.

## 2017-12-27 ENCOUNTER — OFFICE VISIT (OUTPATIENT)
Dept: CARDIOLOGY | Facility: CLINIC | Age: 67
End: 2017-12-27
Payer: COMMERCIAL

## 2017-12-27 VITALS
SYSTOLIC BLOOD PRESSURE: 120 MMHG | HEART RATE: 72 BPM | DIASTOLIC BLOOD PRESSURE: 60 MMHG | BODY MASS INDEX: 35.4 KG/M2 | HEIGHT: 69 IN | WEIGHT: 239 LBS

## 2017-12-27 DIAGNOSIS — I48.3 TYPICAL ATRIAL FLUTTER (H): ICD-10-CM

## 2017-12-27 PROCEDURE — 93000 ELECTROCARDIOGRAM COMPLETE: CPT | Performed by: INTERNAL MEDICINE

## 2017-12-27 PROCEDURE — 99214 OFFICE O/P EST MOD 30 MIN: CPT | Performed by: INTERNAL MEDICINE

## 2017-12-27 NOTE — PROGRESS NOTES
HPI and Plan:   See dictation    No orders of the defined types were placed in this encounter.      No orders of the defined types were placed in this encounter.      Medications Discontinued During This Encounter   Medication Reason     apixaban ANTICOAGULANT (ELIQUIS) 5 MG tablet          Encounter Diagnosis   Name Primary?     Typical atrial flutter (H)        CURRENT MEDICATIONS:  Current Outpatient Prescriptions   Medication Sig Dispense Refill     insulin reg HIGH CONC (HUMULIN R U-500 KWIKPEN) 500 UNIT/ML PEN soln Inject 95 Units Subcutaneous 3 times daily (before meals) 18 mL 3     insulin glargine U-300 (TOUJEO) 300 UNIT/ML injection Inject 90 units in am, inject 70 units in pm 135 mL 1     insulin aspart (NOVOLOG FLEXPEN) 100 UNIT/ML injection Take 1 unit for every 3 grams of carbohydrate PLUS 1 unit for every 15 mg/dL you are over 140mg/dL  units 45 mL 3     insulin pen needle (NOVOFINE) 30G X 8 MM Use 4-5 daily or as directed. 300 each 1     blood glucose monitoring (NO BRAND SPECIFIED) test strip Use to test blood sugars 4 times daily or as directed 6 Box 1     metoprolol (TOPROL XL) 100 MG 24 hr tablet Take 1 tablet (100 mg) by mouth 2 times daily 60 tablet 3     aspirin 81 MG EC tablet Take 1 tablet (81 mg) by mouth daily 1 tablet 0     atorvastatin (LIPITOR) 40 MG tablet TAKE 1 TABLET BY MOUTH DAILY 90 tablet 1     metFORMIN (GLUCOPHAGE) 1000 MG tablet TAKE 1 TABLET BY MOUTH TWICE DAILY WITH MEALS 180 tablet 1     glipiZIDE (GLUCOTROL XL) 10 MG 24 hr tablet TAKE 1 TABLET BY MOUTH EVERY DAY 30 tablet 0     Ascorbic Acid (VITAMIN C PO) Take by mouth daily        blood glucose monitoring (ACCU-CHEK FASTCLIX) lancets Use to test blood sugar 4 times daily or as directed. 200 each 11     sertraline (ZOLOFT) 100 MG tablet Take 1 tablet (100 mg) by mouth daily 90 tablet 2     furosemide (LASIX) 20 MG tablet TAKE 1 TABLET(20 MG) BY MOUTH DAILY 90 tablet 3     order for DME Equipment being ordered:  blood glucose meter 1 each 0     order for DME All DM testing supplies including test strips, lancets, solution, syringes, needles and/or pen needles for testing 3 times per day & injecting 3 times per day.      RX goes to  Network Supplier, see tele enc 09/21/2016 1 Box 3     order for DME All DM testing supplies including test strips, lancets, solution, syringes, needles and/or pen needles for testing 4 times per day & injecting 5 times per day.  Dispense glucometer compatible supplies. Accucheck Geena. 3 Month 3     ACE NOT PRESCRIBED, INTENTIONAL, 1 each daily ACE Inhibitor not prescribed due to Intolerance 0 each 0     Multiple Vitamins-Minerals (CENTRUM SILVER) per tablet Take 1 tablet by mouth daily       vitamin  B complex with vitamin C (VITAMIN  B COMPLEX) TABS Take 2 tablets by mouth daily         ALLERGIES     Allergies   Allergen Reactions     Codeine Rash     Mild rash     Simvastatin Other (See Comments)     Leg pain       PAST MEDICAL HISTORY:  Past Medical History:   Diagnosis Date     Atrial flutter (H) 10/02/2017    ablation 11/26/2017     CAD (coronary artery disease) 03/13/2015    CABG 2013     Cardiomyopathy (H) 10/02/2017    (L) ventricular systolic     HTN (hypertension)      Hyperlipidaemia      Microalbuminuria 6/27/2016     Obesity 3/13/2015     Type 2 diabetes mellitus with diabetic nephropathy, with long-term current use of insulin (H) 11/2/2016       PAST SURGICAL HISTORY:  Past Surgical History:   Procedure Laterality Date     BYPASS GRAFT ARTERY CORONARY N/A 4/7/2015    bypass LAD, OM, PDA        COLONOSCOPY N/A 6/20/2016    Procedure: COLONOSCOPY;  Surgeon: Marcela Schwartz MD;  Location:  GI     EXCISE PILONIDAL CYST, SIMPLE  1975     HEART CATH, ANGIOPLASTY  3-    3 vessel disease-occluded RCA, stenosis LM-to have CABG     removal of skin cancer  1992     TONSILLECTOMY & ADENOIDECTOMY  1950       FAMILY HISTORY:  Family History   Problem Relation Age of Onset      "Breast Cancer Mother      CEREBROVASCULAR DISEASE Mother      Hyperlipidemia Father      CANCER Father      lung, unrelated to smoking     CANCER Paternal Grandmother        SOCIAL HISTORY:  Social History     Social History     Marital status:      Spouse name: N/A     Number of children: N/A     Years of education: N/A     Social History Main Topics     Smoking status: Former Smoker     Packs/day: 1.50     Years: 29.00     Types: Cigarettes     Quit date: 1/1/2000     Smokeless tobacco: Never Used     Alcohol use No      Comment: 1 mixed drink a night     Drug use: No     Sexual activity: Yes     Partners: Female     Other Topics Concern     Caffeine Concern No     none     Sleep Concern No     Stress Concern Yes     sometimes at home     Weight Concern Yes     would like to lose weight     Special Diet No     Exercise No     Seat Belt Yes     Social History Narrative       Review of Systems:  Skin:  Negative       Eyes:  Positive for glasses    ENT:  Negative      Respiratory:  Positive for shortness of breath;cough     Cardiovascular:  Negative Positive for;fatigue    Gastroenterology: Negative      Genitourinary:  Negative      Musculoskeletal:  Positive for joint pain lower back surgery  Neurologic:  Positive for numbness or tingling of hands (at night)    Psychiatric:  Positive for anxiety    Heme/Lymph/Imm:  Positive for allergies    Endocrine:  Positive for diabetes      Physical Exam:  Vitals: /60  Pulse 72  Ht 1.753 m (5' 9\")  Wt 108.4 kg (239 lb)  BMI 35.29 kg/m2    Constitutional:  cooperative, alert and oriented, well developed, well nourished, in no acute distress        Skin:  warm and dry to the touch, no apparent skin lesions or masses noted surgical scars well-healed        Head:  no masses or lesions        Eyes:  conjunctivae and lids unremarkable;sclera white        Lymph:      ENT:  dentition good        Neck:           Respiratory:  normal breath sounds, clear to " auscultation, normal A-P diameter, normal symmetry, normal respiratory excursion, no use of accessory muscles         Cardiac: regular rhythm, normal S1/S2, no S3 or S4, apical impulse not displaced, no murmurs, gallops or rubs                                                         GI:           Extremities and Muscular Skeletal:  no deformities, clubbing, cyanosis, erythema observed              Neurological:           Psych:           CC  No referring provider defined for this encounter.

## 2017-12-27 NOTE — LETTER
12/27/2017      Lauro Galloway MD  [address] [facility] [address]      RE: Adan Ruffin       Dear Colleague,    I had the pleasure of seeing Adan Ruffin in the [facility].    HISTORY OF PRESENT ILLNESS:  I saw Mr. Ruffin for followup of atrial flutter ablation.  He is a 67-year-old white male with a history of coronary artery disease and previous CABG.  He presented with atrial flutter with tachycardia-induced cardiomyopathy a few months ago.  The patient underwent catheter ablation of atrial flutter successfully on 11/27.  Since the ablation, he has been doing well without palpitation, shortness of breath or fatigue.      PHYSICAL EXAMINATION:     VITALS SIGNS:  Blood pressure was 120/60, heart rate 72 beats per minute, body weight 239 pounds.     CARDIAC:  Rhythm was regular and heart sounds were normal.        RADIOLOGIC STUDIES:  The EKG confirms that he is in sinus rhythm.  Repeat echocardiography showed normalization of the LV ejection fraction.      ASSESSMENT AND RECOMMENDATIONS:  Mr. Ruffin is about 1 month after atrial flutter ablation.  He remains in sinus rhythm and is doing well symptomatically.  I agree for him to discontinue Eliquis but continue aspirin 81 mg once a day.  He will resume future cardiology followup with Dr. Rosas.  He does not have to see me regularly unless he has recurrent atrial fibrillation or flutter.      Outpatient Encounter Prescriptions as of 12/27/2017   Medication Sig Dispense Refill     insulin reg HIGH CONC (HUMULIN R U-500 KWIKPEN) 500 UNIT/ML PEN soln Inject 95 Units Subcutaneous 3 times daily (before meals) 18 mL 3     insulin glargine U-300 (TOUJEO) 300 UNIT/ML injection Inject 90 units in am, inject 70 units in pm 135 mL 1     insulin aspart (NOVOLOG FLEXPEN) 100 UNIT/ML injection Take 1 unit for every 3 grams of carbohydrate PLUS 1 unit for every 15 mg/dL you are over 140mg/dL  units 45 mL 3     insulin pen  needle (NOVOFINE) 30G X 8 MM Use 4-5 daily or as directed. 300 each 1     blood glucose monitoring (NO BRAND SPECIFIED) test strip Use to test blood sugars 4 times daily or as directed 6 Box 1     metoprolol (TOPROL XL) 100 MG 24 hr tablet Take 1 tablet (100 mg) by mouth 2 times daily 60 tablet 3     aspirin 81 MG EC tablet Take 1 tablet (81 mg) by mouth daily 1 tablet 0     atorvastatin (LIPITOR) 40 MG tablet TAKE 1 TABLET BY MOUTH DAILY 90 tablet 1     metFORMIN (GLUCOPHAGE) 1000 MG tablet TAKE 1 TABLET BY MOUTH TWICE DAILY WITH MEALS 180 tablet 1     glipiZIDE (GLUCOTROL XL) 10 MG 24 hr tablet TAKE 1 TABLET BY MOUTH EVERY DAY 30 tablet 0     Ascorbic Acid (VITAMIN C PO) Take by mouth daily        blood glucose monitoring (ACCU-CHEK FASTCLIX) lancets Use to test blood sugar 4 times daily or as directed. 200 each 11     sertraline (ZOLOFT) 100 MG tablet Take 1 tablet (100 mg) by mouth daily 90 tablet 2     furosemide (LASIX) 20 MG tablet TAKE 1 TABLET(20 MG) BY MOUTH DAILY 90 tablet 3     order for DME Equipment being ordered: blood glucose meter 1 each 0     order for DME All DM testing supplies including test strips, lancets, solution, syringes, needles and/or pen needles for testing 3 times per day & injecting 3 times per day.      RX goes to  Network Supplier, see tele enc 09/21/2016 1 Box 3     order for DME All DM testing supplies including test strips, lancets, solution, syringes, needles and/or pen needles for testing 4 times per day & injecting 5 times per day.  Dispense glucometer compatible supplies. Accucheck Geena. 3 Month 3     ACE NOT PRESCRIBED, INTENTIONAL, 1 each daily ACE Inhibitor not prescribed due to Intolerance 0 each 0     Multiple Vitamins-Minerals (CENTRUM SILVER) per tablet Take 1 tablet by mouth daily       vitamin  B complex with vitamin C (VITAMIN  B COMPLEX) TABS Take 2 tablets by mouth daily       [DISCONTINUED] apixaban ANTICOAGULANT (ELIQUIS) 5 MG tablet Take 1 tablet (5 mg) by  mouth 2 times daily 120 tablet 3     No facility-administered encounter medications on file as of 12/27/2017.      Again, thank you for allowing me to participate in the care of your patient.      Sincerely,    Ramy Laguna MD     [facility]

## 2017-12-27 NOTE — PROGRESS NOTES
HISTORY OF PRESENT ILLNESS:  I saw Mr. Parkinson for followup of atrial flutter ablation.  He is a 67-year-old white male with a history of coronary artery disease and previous CABG.  He presented with atrial flutter with tachycardia-induced cardiomyopathy a few months ago.  The patient underwent catheter ablation of atrial flutter successfully on .  Since the ablation, he has been doing well without palpitation, shortness of breath or fatigue.      PHYSICAL EXAMINATION:     VITALS SIGNS:  Blood pressure was 120/60, heart rate 72 beats per minute, body weight 239 pounds.     CARDIAC:  Rhythm was regular and heart sounds were normal.        RADIOLOGIC STUDIES:  The EKG confirms that he is in sinus rhythm.  Repeat echocardiography showed normalization of the LV ejection fraction.      ASSESSMENT AND RECOMMENDATIONS:  Mr. Parkinson is about 1 month after atrial flutter ablation.  He remains in sinus rhythm and is doing well symptomatically.  I agree for him to discontinue Eliquis but continue aspirin 81 mg once a day.  He will resume future cardiology followup with Dr. Rosas.  He does not have to see me regularly unless he has recurrent atrial fibrillation or flutter.      cc:   Lauro Galloway MD    [facility]    [address], [place] [address]         ERI CARPENTER MD             D: 2017 13:49   T: 2017 14:00   MT: MARY      Name:     EDUARDO PARKINSON   MRN:      [id number]        Account:      [id number]   :      1950           Service Date: 2017      Document: [id number]

## 2017-12-27 NOTE — MR AVS SNAPSHOT
"              After Visit Summary   12/27/2017    Adan Ruffin    MRN: [id number]           Patient Information     Date Of Birth          1950        Visit Information        Provider Department      12/27/2017 1:15 PM Ramy Laguna MD [facility]        Today's Diagnoses     Typical atrial flutter (H)           Follow-ups after your visit        Who to contact     If you have questions or need follow up information about today's clinic visit or your schedule please contact [facility] directly at [phone].  Normal or non-critical lab and imaging results will be communicated to you by Edfoliohart, letter or phone within 4 business days after the clinic has received the results. If you do not hear from us within 7 days, please contact the clinic through Colovoret or phone. If you have a critical or abnormal lab result, we will notify you by phone as soon as possible.  Submit refill requests through Poliana or call your pharmacy and they will forward the refill request to us. Please allow 3 business days for your refill to be completed.          Additional Information About Your Visit        MyChart Information     Poliana gives you secure access to your electronic health record. If you see a primary care provider, you can also send messages to your care team and make appointments. If you have questions, please call your primary care clinic.  If you do not have a primary care provider, please call [phone] and they will assist you.        Care EveryWhere ID     This is your Care EveryWhere ID. This could be used by other organizations to access your [facility] medical records  [id number]        Your Vitals Were     Pulse Height BMI (Body Mass Index)             72 1.753 m (5' 9\") 35.29 kg/m2          Blood Pressure from Last 3 Encounters:   12/27/17 120/60   12/06/17 118/62   11/27/17 123/76    Weight from Last 3 Encounters: "   12/27/17 108.4 kg (239 lb)   12/06/17 109.7 kg (241 lb 14.4 oz)   11/27/17 106.5 kg (234 lb 12.8 oz)              We Performed the Following     EKG 12-lead complete w/read  (to be scheduled)     Follow-Up with Electrophysiologist          Today's Medication Changes          These changes are accurate as of: 12/27/17 11:59 PM.  If you have any questions, ask your nurse or doctor.               Stop taking these medicines if you haven't already. Please contact your care team if you have questions.     apixaban ANTICOAGULANT 5 MG tablet   Commonly known as:  ELIQUIS   Stopped by:  Ramy Laguna MD                    Primary Care Provider Office Phone # Fax #    Lauro Galloway [phone]       [address] [facility] [address]        Equal Access to Services     [facility]: Hadii beatriz mendieta hadasho Sopura, waaxda luqadaha, qaybta kaalmada kishanyacain, luke martinez . So [facility] [phone].    ATENCIÓN: Si habla español, tiene a moore disposición servicios gratuitos de asistencia lingüística. [facility] [phone].    We comply with applicable federal civil rights laws and [place] laws. We do not discriminate on the basis of race, color, national origin, age, disability, sex, sexual orientation, or gender identity.            Thank you!     Thank you for choosing [facility]  for your care. Our goal is always to provide you with excellent care. Hearing back from our patients is one way we can continue to improve our services. Please take a few minutes to complete the written survey that you may receive in the mail after your visit with us. Thank you!             Your Updated Medication List - Protect others around you: Learn how to safely use, store and throw away your medicines at www.disposemymeds.org.          This list is accurate as of: 12/27/17 11:59 PM.  Always use your most recent med list.                   Brand Name  Dispense Instructions for use Diagnosis    ACE NOT PRESCRIBED (INTENTIONAL)     0 each    1 each daily ACE Inhibitor not prescribed due to Intolerance    Type II or unspecified type diabetes mellitus without mention of complication, not stated as uncontrolled       aspirin EC 81 MG EC tablet     1 tablet    Take 1 tablet (81 mg) by mouth daily    CAD (coronary artery disease), S/P CABG x 3, Anxiety       atorvastatin 40 MG tablet    LIPITOR    90 tablet    TAKE 1 TABLET BY MOUTH DAILY    Hyperlipidemia LDL goal <70       blood glucose monitoring lancets     200 each    Use to test blood sugar 4 times daily or as directed.    Type 2 diabetes mellitus with diabetic nephropathy, with long-term current use of insulin (H)       blood glucose monitoring test strip    no brand specified    6 Box    Use to test blood sugars 4 times daily or as directed    Type 2 diabetes mellitus with diabetic nephropathy, with long-term current use of insulin (H)       CENTRUM SILVER per tablet      Take 1 tablet by mouth daily        furosemide 20 MG tablet    LASIX    90 tablet    TAKE 1 TABLET(20 MG) BY MOUTH DAILY    Essential hypertension       glipiZIDE 10 MG 24 hr tablet    GLUCOTROL XL    30 tablet    TAKE 1 TABLET BY MOUTH EVERY DAY    Type 2 diabetes mellitus with diabetic nephropathy, with long-term current use of insulin (H)       insulin aspart 100 UNIT/ML injection    NovoLOG FLEXPEN    45 mL    Take 1 unit for every 3 grams of carbohydrate PLUS 1 unit for every 15 mg/dL you are over 140mg/dL  units    Type 2 diabetes mellitus with diabetic nephropathy, with long-term current use of insulin (H)       insulin glargine U-300 300 UNIT/ML injection    TOUJEO    135 mL    Inject 90 units in am, inject 70 units in pm    Type 2 diabetes mellitus with diabetic nephropathy, with long-term current use of insulin (H)       insulin pen needle 30G X 8 MM    NOVOFINE    300 each    Use 4-5 daily or as directed.    Type 2 diabetes  mellitus with diabetic nephropathy, with long-term current use of insulin (H)       insulin reg HIGH CONC 500 UNIT/ML PEN soln    HUMULIN R U-500 KWIKPEN    18 mL    Inject 95 Units Subcutaneous 3 times daily (before meals)    Type 2 diabetes mellitus with diabetic nephropathy, with long-term current use of insulin (H)       metFORMIN 1000 MG tablet    GLUCOPHAGE    180 tablet    TAKE 1 TABLET BY MOUTH TWICE DAILY WITH MEALS    Insulin dependent diabetes mellitus (H)       metoprolol 100 MG 24 hr tablet    TOPROL XL    60 tablet    Take 1 tablet (100 mg) by mouth 2 times daily    Typical atrial flutter (H)       * order for DME     3 Month    All DM testing supplies including test strips, lancets, solution, syringes, needles and/or pen needles for testing 4 times per day & injecting 5 times per day. Dispense glucometer compatible supplies. Accucheck Geena.    Type 2 diabetes mellitus with diabetic nephropathy (H)       * order for DME     1 Box    All DM testing supplies including test strips, lancets, solution, syringes, needles and/or pen needles for testing 3 times per day & injecting 3 times per day.   RX goes to  Network Supplier, see tele enc 09/21/2016    Type 2 diabetes mellitus with diabetic nephropathy (H)       order for DME     1 each    Equipment being ordered: blood glucose meter    Type 2 diabetes mellitus with diabetic nephropathy, with long-term current use of insulin (H)       sertraline 100 MG tablet    ZOLOFT    90 tablet    Take 1 tablet (100 mg) by mouth daily    Anxiety       vitamin B complex with vitamin C Tabs tablet      Take 2 tablets by mouth daily        VITAMIN C PO      Take by mouth daily        * Notice:  This list has 2 medication(s) that are the same as other medications prescribed for you. Read the directions carefully, and ask your doctor or other care provider to review them with you.

## 2018-01-25 DIAGNOSIS — Z79.4 TYPE 2 DIABETES MELLITUS WITH DIABETIC NEPHROPATHY, WITH LONG-TERM CURRENT USE OF INSULIN (H): ICD-10-CM

## 2018-01-25 DIAGNOSIS — E11.21 TYPE 2 DIABETES MELLITUS WITH DIABETIC NEPHROPATHY, WITH LONG-TERM CURRENT USE OF INSULIN (H): ICD-10-CM

## 2018-01-31 NOTE — TELEPHONE ENCOUNTER
Routing refill request to provider for review/approval because:  Labs not current:  microalbumin  A break in medication

## 2018-01-31 NOTE — TELEPHONE ENCOUNTER
"Last Written Prescription Date:  9/7/17  Last Fill Quantity: 30,  # refills: 0   Last Office Visit with speciality provider within that speciality :  12/6/17   Future Office Visit:       Requested Prescriptions   Pending Prescriptions Disp Refills     glipiZIDE (GLUCOTROL XL) 10 MG 24 hr tablet [Pharmacy Med Name: GLIPIZIDE ER 10MG TABLETS] 30 tablet 0     Sig: TAKE 1 TABLET BY MOUTH DAILY    Sulfonylurea Agents Failed    1/25/2018  9:54 AM       Failed - Patient has had a Microalbumin in the past 12 mos.    Recent Labs   Lab Test  05/24/16   1708   MICROL  38   UMALCR  34.05*            Failed - Patient has documented A1c within the specified period of time.    Recent Labs   Lab Test  10/02/17   1454   A1C  8.3*            Passed - Patient's BP is less than 140/90    BP Readings from Last 3 Encounters:   12/27/17 120/60   12/06/17 118/62   11/27/17 123/76                Passed - Patient has documented LDL within the past 12 mos.    Recent Labs   Lab Test  03/09/17   0821   LDL  69            Passed - Patient is age 18 or older       Passed - Patient has a recent creatinine (normal) within the past 12 mos.    Recent Labs   Lab Test  11/27/17   0730   CR  1.09            Passed - Patient has had an appointment with authorizing provider within the past 6 mos. or  within next 30 days    Patient had office visit in the last 6 months or has a visit in the next 30 days with authorizing provider.  See \"Patient Info\" tab in inbasket, or \"Choose Columns\" in Meds & Orders section of the refill encounter.                "

## 2018-02-01 RX ORDER — GLIPIZIDE 10 MG/1
TABLET, FILM COATED, EXTENDED RELEASE ORAL
Qty: 30 TABLET | Refills: 3 | Status: SHIPPED | OUTPATIENT
Start: 2018-02-01 | End: 2018-06-01

## 2018-02-21 ENCOUNTER — MYC MEDICAL ADVICE (OUTPATIENT)
Dept: ENDOCRINOLOGY | Facility: CLINIC | Age: 68
End: 2018-02-21

## 2018-04-12 ENCOUNTER — TELEPHONE (OUTPATIENT)
Dept: ENDOCRINOLOGY | Facility: CLINIC | Age: 68
End: 2018-04-12

## 2018-04-12 NOTE — TELEPHONE ENCOUNTER
Panel Management Review      Patient has the following on his problem list:     Diabetes    ASA: Passed    Last A1C  Lab Results   Component Value Date    A1C 8.3 10/02/2017    A1C 7.8 07/20/2017    A1C 10.5 03/09/2017    A1C 10.0 11/02/2016    A1C 12.9 06/27/2016     A1C tested: FAILED    Last LDL:    Lab Results   Component Value Date    CHOL 142 03/09/2017     Lab Results   Component Value Date    HDL 46 03/09/2017     Lab Results   Component Value Date    LDL 69 03/09/2017     Lab Results   Component Value Date    TRIG 134 03/09/2017     Lab Results   Component Value Date    CHOLHDLRATIO 3.5 08/19/2015     Lab Results   Component Value Date    NHDL 96 03/09/2017       Is the patient on a Statin? YES             Is the patient on Aspirin? YES    Medications     HMG CoA Reductase Inhibitors    atorvastatin (LIPITOR) 40 MG tablet    Salicylates    aspirin 81 MG EC tablet          Last three blood pressure readings:  BP Readings from Last 3 Encounters:   12/27/17 120/60   12/06/17 118/62   11/27/17 123/76       Date of last diabetes office visit: 12/6/17     Tobacco History:     History   Smoking Status     Former Smoker     Packs/day: 1.50     Years: 29.00     Types: Cigarettes     Quit date: 1/1/2000   Smokeless Tobacco     Never Used           Composite cancer screening  Chart review shows that this patient is due/due soon for the following None  Summary:    Patient is due/failing the following:   FOLLOW UP    Action needed:   Patient needs office visit for dm.    Type of outreach:    Sent letter.    Questions for provider review:    None                                                                                                                                    Zo Richey CMA ([facility])       Chart routed to closed .

## 2018-04-12 NOTE — LETTER
April 12, 2018    Adan Ruffin  [address] [place] [address]    Dear Adan,  This letter is being sent on behalf of Dr. Tolentino. It is to remind you that your provider expected you to return for a follow up clinic visit. Please make a lab only appointment, and then follow up with a clinic visit one week afterwards to review those results.     You may call our office at [phone] (Ruben) or [phone] (Sonny) to schedule an appointment.     If you have already scheduled these appointments, please disregard this notice.      Sincerely,    [facility],  Dr. Marilee Tolentino

## 2018-05-07 ENCOUNTER — OFFICE VISIT (OUTPATIENT)
Dept: INTERNAL MEDICINE | Facility: CLINIC | Age: 68
End: 2018-05-07
Payer: COMMERCIAL

## 2018-05-07 ENCOUNTER — TELEPHONE (OUTPATIENT)
Dept: INTERNAL MEDICINE | Facility: CLINIC | Age: 68
End: 2018-05-07

## 2018-05-07 VITALS
HEIGHT: 69 IN | HEART RATE: 92 BPM | SYSTOLIC BLOOD PRESSURE: 132 MMHG | OXYGEN SATURATION: 95 % | BODY MASS INDEX: 36.73 KG/M2 | WEIGHT: 248 LBS | DIASTOLIC BLOOD PRESSURE: 64 MMHG | TEMPERATURE: 97.7 F | RESPIRATION RATE: 24 BRPM

## 2018-05-07 DIAGNOSIS — E11.21 TYPE 2 DIABETES MELLITUS WITH DIABETIC NEPHROPATHY, WITH LONG-TERM CURRENT USE OF INSULIN (H): Primary | ICD-10-CM

## 2018-05-07 DIAGNOSIS — Z79.4 TYPE 2 DIABETES MELLITUS WITH DIABETIC NEPHROPATHY, WITH LONG-TERM CURRENT USE OF INSULIN (H): Primary | ICD-10-CM

## 2018-05-07 DIAGNOSIS — Z79.4 TYPE 2 DIABETES MELLITUS WITH DIABETIC NEPHROPATHY, WITH LONG-TERM CURRENT USE OF INSULIN (H): ICD-10-CM

## 2018-05-07 DIAGNOSIS — I10 ESSENTIAL HYPERTENSION: Primary | ICD-10-CM

## 2018-05-07 DIAGNOSIS — E11.21 TYPE 2 DIABETES MELLITUS WITH DIABETIC NEPHROPATHY, WITH LONG-TERM CURRENT USE OF INSULIN (H): ICD-10-CM

## 2018-05-07 DIAGNOSIS — E78.5 HYPERLIPIDEMIA, UNSPECIFIED HYPERLIPIDEMIA TYPE: ICD-10-CM

## 2018-05-07 PROCEDURE — 99214 OFFICE O/P EST MOD 30 MIN: CPT | Performed by: INTERNAL MEDICINE

## 2018-05-07 NOTE — MR AVS SNAPSHOT
After Visit Summary   5/7/2018    Adna Ruffin    MRN: [id number]           Patient Information     Date Of Birth          1950        Visit Information        Provider Department      5/7/2018 1:00 PM Lauro Galloway MD [facility]        Today's Diagnoses     Essential hypertension    -  1    Type 2 diabetes mellitus with diabetic nephropathy, with long-term current use of insulin (H)        Hyperlipidemia, unspecified hyperlipidemia type           Follow-ups after your visit        Your next 10 appointments already scheduled     Jun 14, 2018  8:00 AM CDT   [facility]    [address]  [facility] [address] [phone]              Who to contact     If you have questions or need follow up information about today's clinic visit or your schedule please contact [facility] directly at [phone].  Normal or non-critical lab and imaging results will be communicated to you by MyChart, letter or phone within 4 business days after the clinic has received the results. If you do not hear from us within 7 days, please contact the clinic through Meine Spielzeugkistehart or phone. If you have a critical or abnormal lab result, we will notify you by phone as soon as possible.  Submit refill requests through ReacciÃ³n or call your pharmacy and they will forward the refill request to us. Please allow 3 business days for your refill to be completed.          Additional Information About Your Visit        Meine Spielzeugkistehart Information     ReacciÃ³n gives you secure access to your electronic health record. If you see a primary care provider, you can also send messages to your care team and make appointments. If you have questions, please call your primary care clinic.  If you do not have a primary care provider, please call [phone] and they will assist you.        Care EveryWhere ID      "This is your Care EveryWhere ID. This could be used by other organizations to access your [facility] medical records  [id number]        Your Vitals Were     Pulse Temperature Respirations Height Pulse Oximetry BMI (Body Mass Index)    92 97.7  F (36.5  C) (Oral) 24 5' 9\" (1.753 m) 95% 36.62 kg/m2       Blood Pressure from Last 3 Encounters:   05/07/18 132/64   12/27/17 120/60   12/06/17 118/62    Weight from Last 3 Encounters:   05/07/18 248 lb (112.5 kg)   12/27/17 239 lb (108.4 kg)   12/06/17 241 lb 14.4 oz (109.7 kg)              Today, you had the following     No orders found for display         Today's Medication Changes          These changes are accurate as of 5/7/18  2:04 PM.  If you have any questions, ask your nurse or doctor.               Stop taking these medicines if you haven't already. Please contact your care team if you have questions.     insulin reg HIGH CONC 500 UNIT/ML PEN soln   Commonly known as:  HUMULIN R U-500 KWIKPEN   Stopped by:  Lauro Galloway MD                Where to get your medicines      These medications were sent to [facility] [address] [place] [address] [address] [facility] [address]     Phone:  [phone]     insulin aspart 100 UNIT/ML injection    insulin glargine U-300 300 UNIT/ML injection                Primary Care Provider Office Phone # Fax #    Lauro Galloway [phone]       [address] [facility] [address]        Equal Access to Services     [facility]: Hadii aad anam frank Sopura, waaxda luqadaha, qaybta kaalmada adeegyada, luke quintero. So [facility] [phone].    ATENCIÓN: Si habla español, tiene a moore disposición servicios gratuitos de asistencia lingüística. Llame al [phone].    We comply with applicable federal civil rights laws and [place] laws. We do not discriminate on the basis of race, color, national origin, " age, disability, sex, sexual orientation, or gender identity.            Thank you!     Thank you for choosing [facility]  for your care. Our goal is always to provide you with excellent care. Hearing back from our patients is one way we can continue to improve our services. Please take a few minutes to complete the written survey that you may receive in the mail after your visit with us. Thank you!             Your Updated Medication List - Protect others around you: Learn how to safely use, store and throw away your medicines at www.disposemymeds.org.          This list is accurate as of 5/7/18  2:04 PM.  Always use your most recent med list.                   Brand Name Dispense Instructions for use Diagnosis    ACE NOT PRESCRIBED (INTENTIONAL)     0 each    1 each daily ACE Inhibitor not prescribed due to Intolerance    Type II or unspecified type diabetes mellitus without mention of complication, not stated as uncontrolled       aspirin EC 81 MG EC tablet     1 tablet    Take 1 tablet (81 mg) by mouth daily    CAD (coronary artery disease), S/P CABG x 3, Anxiety       atorvastatin 40 MG tablet    LIPITOR    90 tablet    TAKE 1 TABLET BY MOUTH DAILY    Hyperlipidemia LDL goal <70       blood glucose monitoring lancets     200 each    Use to test blood sugar 4 times daily or as directed.    Type 2 diabetes mellitus with diabetic nephropathy, with long-term current use of insulin (H)       blood glucose monitoring test strip    no brand specified    6 Box    Use to test blood sugars 4 times daily or as directed    Type 2 diabetes mellitus with diabetic nephropathy, with long-term current use of insulin (H)       glipiZIDE 10 MG 24 hr tablet    GLUCOTROL XL    30 tablet    TAKE 1 TABLET BY MOUTH DAILY    Type 2 diabetes mellitus with diabetic nephropathy, with long-term current use of insulin (H)       insulin aspart 100 UNIT/ML injection    NovoLOG FLEXPEN    45 mL    Take 1 unit for every 3 grams  of carbohydrate PLUS 1 unit for every 15 mg/dL you are over 140mg/dL  units    Type 2 diabetes mellitus with diabetic nephropathy, with long-term current use of insulin (H)       insulin glargine U-300 300 UNIT/ML injection    TOUJEO    135 mL    Inject 90 units in am, inject 70 units in pm    Type 2 diabetes mellitus with diabetic nephropathy, with long-term current use of insulin (H)       insulin pen needle 30G X 8 MM    NOVOFINE    300 each    Use 4-5 daily or as directed.    Type 2 diabetes mellitus with diabetic nephropathy, with long-term current use of insulin (H)       metFORMIN 1000 MG tablet    GLUCOPHAGE    180 tablet    TAKE 1 TABLET BY MOUTH TWICE DAILY WITH MEALS    Insulin dependent diabetes mellitus (H)       metoprolol succinate 100 MG 24 hr tablet    TOPROL XL    60 tablet    Take 1 tablet (100 mg) by mouth 2 times daily    Typical atrial flutter (H)       * order for DME     3 Month    All DM testing supplies including test strips, lancets, solution, syringes, needles and/or pen needles for testing 4 times per day & injecting 5 times per day. Dispense glucometer compatible supplies. Accucheck Geena.    Type 2 diabetes mellitus with diabetic nephropathy (H)       * order for DME     1 Box    All DM testing supplies including test strips, lancets, solution, syringes, needles and/or pen needles for testing 3 times per day & injecting 3 times per day.   RX goes to  Network Supplier, see tele enc 09/21/2016    Type 2 diabetes mellitus with diabetic nephropathy (H)       order for DME     1 each    Equipment being ordered: blood glucose meter    Type 2 diabetes mellitus with diabetic nephropathy, with long-term current use of insulin (H)       * Notice:  This list has 2 medication(s) that are the same as other medications prescribed for you. Read the directions carefully, and ask your doctor or other care provider to review them with you.

## 2018-05-07 NOTE — PROGRESS NOTES
SUBJECTIVE:   Adan Ruffin is a 67 year old male who presents to clinic today for the following health issues:      Diabetes Follow-up  Has H/O DM. On diet , exercise and Insulin + PO treatment. Blood sugars are not controlled. No parestesias. rare hypoglycemias.    Patient is checking blood sugars: twice daily.    Blood sugar testing frequency justification: Uncontrolled diabetes and Adjustment of medication(s)  Results are as follows: 250 average, 400 today         am - 7am         bedtime - 8:30pm    Diabetic concerns: None and blood sugar frequently over 200     Symptoms of hypoglycemia (low blood sugar): a couple of time <70 past 2 wks, shaky, dizzy, weak, confusion     Paresthesias (numbness or burning in feet) or sores: No     Date of last diabetic eye exam: 1/2018    Hyperlipidemia Follow-Up      Rate your low fat/cholesterol diet?: not monitoring fat    Taking statin?  Yes, no muscle aches from statin    Other lipid medications/supplements?:  none    Hypertension Follow-up      Outpatient blood pressures are not being checked. Occasionally at drug stores    Low Salt Diet: not monitoring salt    BP Readings from Last 2 Encounters:   05/07/18 132/64   12/27/17 120/60     Hemoglobin A1C (%)   Date Value   10/02/2017 8.3 (H)   07/20/2017 7.8 (H)     LDL Cholesterol Calculated (mg/dL)   Date Value   03/09/2017 69   12/28/2015 66       Amount of exercise or physical activity: 2-3 days/week for an average yard work/walking of greater than 60 minutes    Problems taking medications regularly: No    Medication side effects: none    Diet: regular (no restrictions)            Problem list and histories reviewed & adjusted, as indicated.  Additional history: as documented    Patient Active Problem List   Diagnosis     CAD (coronary artery disease)     Obesity     S/P CABG x 3     Fluid overload     Anemia due to blood loss, acute     Insulin dependent diabetes mellitus (H)     Anxiety     Hyperlipidemia     HTN  (hypertension)     Advanced directives, counseling/discussion     Microalbuminuria     Type 2 diabetes mellitus with diabetic nephropathy, with long-term current use of insulin (H)     Encounter for long-term (current) use of insulin (H)     Acute systolic heart failure (H)     Past Surgical History:   Procedure Laterality Date     BYPASS GRAFT ARTERY CORONARY N/A 4/7/2015    bypass LAD, OM, PDA        COLONOSCOPY N/A 6/20/2016    Procedure: COLONOSCOPY;  Surgeon: Marcela Schwartz MD;  Location: [facility]     EXCISE PILONIDAL CYST, SIMPLE  1975     HEART CATH, ANGIOPLASTY  3-    3 vessel disease-occluded RCA, stenosis LM-to have CABG     removal of skin cancer  1992     TONSILLECTOMY & ADENOIDECTOMY  1950       Social History   Substance Use Topics     Smoking status: Former Smoker     Packs/day: 1.50     Years: 29.00     Types: Cigarettes     Quit date: 1/1/2000     Smokeless tobacco: Never Used     Alcohol use No      Comment: 1 mixed drink a night     Family History   Problem Relation Age of Onset     Breast Cancer Mother      CEREBROVASCULAR DISEASE Mother      Hyperlipidemia Father      CANCER Father      lung, unrelated to smoking     CANCER Paternal Grandmother          Current Outpatient Prescriptions   Medication Sig Dispense Refill     ACE NOT PRESCRIBED, INTENTIONAL, 1 each daily ACE Inhibitor not prescribed due to Intolerance 0 each 0     aspirin 81 MG EC tablet Take 1 tablet (81 mg) by mouth daily 1 tablet 0     atorvastatin (LIPITOR) 40 MG tablet TAKE 1 TABLET BY MOUTH DAILY 90 tablet 1     blood glucose monitoring (ACCU-CHEK FASTCLIX) lancets Use to test blood sugar 4 times daily or as directed. 200 each 11     blood glucose monitoring (NO BRAND SPECIFIED) test strip Use to test blood sugars 4 times daily or as directed 6 Box 1     glipiZIDE (GLUCOTROL XL) 10 MG 24 hr tablet TAKE 1 TABLET BY MOUTH DAILY 30 tablet 3     insulin aspart (NOVOLOG FLEXPEN) 100 UNIT/ML injection Take 1 unit for  every 3 grams of carbohydrate PLUS 1 unit for every 15 mg/dL you are over 140mg/dL  units 45 mL 3     insulin glargine U-300 (TOUJEO) 300 UNIT/ML injection Inject 90 units in am, inject 70 units in pm 135 mL 1     insulin pen needle (NOVOFINE) 30G X 8 MM Use 4-5 daily or as directed. 300 each 1     metFORMIN (GLUCOPHAGE) 1000 MG tablet TAKE 1 TABLET BY MOUTH TWICE DAILY WITH MEALS 180 tablet 1     metoprolol (TOPROL XL) 100 MG 24 hr tablet Take 1 tablet (100 mg) by mouth 2 times daily 60 tablet 3     order for DME All DM testing supplies including test strips, lancets, solution, syringes, needles and/or pen needles for testing 4 times per day & injecting 5 times per day.  Dispense glucometer compatible supplies. Accucheck Geena. 3 Month 3     order for DME All DM testing supplies including test strips, lancets, solution, syringes, needles and/or pen needles for testing 3 times per day & injecting 3 times per day.      RX goes to  Network Supplier, see tele enc 09/21/2016 1 Box 3     order for DME Equipment being ordered: blood glucose meter 1 each 0     [DISCONTINUED] insulin aspart (NOVOLOG FLEXPEN) 100 UNIT/ML injection Take 1 unit for every 3 grams of carbohydrate PLUS 1 unit for every 15 mg/dL you are over 140mg/dL  units (Patient not taking: Reported on 5/7/2018) 45 mL 3     [DISCONTINUED] insulin glargine U-300 (TOUJEO) 300 UNIT/ML injection Inject 90 units in am, inject 70 units in pm (Patient not taking: Reported on 5/7/2018) 135 mL 1       Reviewed and updated as needed this visit by clinical staff  Tobacco  Allergies  Meds  Med Hx  Surg Hx  Fam Hx  Soc Hx      Reviewed and updated as needed this visit by Provider         ROS:  Constitutional, HEENT, cardiovascular, pulmonary, gi and gu systems are negative, except as otherwise noted.    OBJECTIVE:     /64 (BP Location: Left arm, Patient Position: Sitting, Cuff Size: Adult Large)  Pulse 92  Temp 97.7  F (36.5  C) (Oral)  Resp  "24  Ht 5' 9\" (1.753 m)  Wt 248 lb (112.5 kg)  SpO2 95%  BMI 36.62 kg/m2  Body mass index is 36.62 kg/(m^2).   GENERAL: obese, alert and no distress  NECK: no adenopathy, no asymmetry, masses, or scars and thyroid normal to palpation  RESP: lungs clear to auscultation - no rales, rhonchi or wheezes  CV: regular rate and rhythm, normal S1 S2, no S3 or S4, no murmur, click or rub, no peripheral edema and peripheral pulses strong  ABDOMEN: soft, nontender, no hepatosplenomegaly, no masses and bowel sounds normal  MS: no gross musculoskeletal defects noted, no edema    Diagnostic Test Results:  none     ASSESSMENT/PLAN:     Problem List Items Addressed This Visit     Hyperlipidemia    HTN (hypertension) - Primary    Type 2 diabetes mellitus with diabetic nephropathy, with long-term current use of insulin (H)    Relevant Medications    insulin aspart (NOVOLOG FLEXPEN) 100 UNIT/ML injection    insulin glargine U-300 (TOUJEO) 300 UNIT/ML injection           Change to Toujeo and Novolog insulin. Has had better control on these.   Keep diet, exercise, continue PO treatment   Controlled HTN and lipids. Cont treatment   Follow up with endocrinology in 2 months     Follow-Up:in 6 months     Lauro Galloway MD  [facility]    "

## 2018-05-09 ENCOUNTER — TELEPHONE (OUTPATIENT)
Dept: INTERNAL MEDICINE | Facility: CLINIC | Age: 68
End: 2018-05-09

## 2018-05-09 DIAGNOSIS — E11.21 TYPE 2 DIABETES MELLITUS WITH DIABETIC NEPHROPATHY, WITH LONG-TERM CURRENT USE OF INSULIN (H): Primary | ICD-10-CM

## 2018-05-09 DIAGNOSIS — Z79.4 TYPE 2 DIABETES MELLITUS WITH DIABETIC NEPHROPATHY, WITH LONG-TERM CURRENT USE OF INSULIN (H): Primary | ICD-10-CM

## 2018-05-09 NOTE — TELEPHONE ENCOUNTER
Central Prior Authorization Team   Phone: [phone]      PA Initiation    Medication: Toujeo - Urgent  Insurance Company: Comment:  [employer] Ph. [phone] Fax. [phone]  Pharmacy Filling the Rx: [facility] [address] [place] [address] [address] AT [facility]  Filling Pharmacy Phone: [phone]  Filling Pharmacy Fax: [phone]  Start Date: 5/9/2018    Manually faxed in P/A request.

## 2018-05-09 NOTE — TELEPHONE ENCOUNTER
"Pt calls, reports there's a PA being pursued for his Novolog. He's not been notified that he needs a PA for the Toujeo as well. Indicates he's been taking Humulin instead of the Toujeo and Novolog that is now ordered. He has 3-4 days worth of Humulin left. Per epic med list Numulin is U-500 strength.     Also see 05/07 PA request for Novolog.  Prior Authorization Retail Medication Request  Please tommy urgent (pt out of med in 3-4 days)    Medication/Dose: Toujeo  ICD code (if different than what is on RX):  E11.21, z79.4  Previously Tried and Failed:  Humulin U-500  Rationale:  Per 05/07 OV note: \"Change to Toujeo and Novolog insulin. Has had better control on these\"    Insurance Name:  [facility] OUT OF STATE  Insurance ID:  [id number]      Pharmacy Information (if different than what is on RX)  Name:  [facility]  Phone:  [phone]    "

## 2018-05-10 NOTE — TELEPHONE ENCOUNTER
PRIOR AUTHORIZATION DENIED    Medication: Toujeo - Denied    Denial Date: 5/9/2018    Denial Rational:  Insurance covers Basaglar and Lantus as alternatives.     Appeal Information:

## 2018-05-24 ENCOUNTER — MYC MEDICAL ADVICE (OUTPATIENT)
Dept: INTERNAL MEDICINE | Facility: CLINIC | Age: 68
End: 2018-05-24

## 2018-05-24 DIAGNOSIS — Z79.4 TYPE 2 DIABETES MELLITUS WITH DIABETIC NEPHROPATHY, WITH LONG-TERM CURRENT USE OF INSULIN (H): ICD-10-CM

## 2018-05-24 DIAGNOSIS — E11.21 TYPE 2 DIABETES MELLITUS WITH DIABETIC NEPHROPATHY, WITH LONG-TERM CURRENT USE OF INSULIN (H): ICD-10-CM

## 2018-05-29 DIAGNOSIS — E11.21 TYPE 2 DIABETES MELLITUS WITH DIABETIC NEPHROPATHY, WITH LONG-TERM CURRENT USE OF INSULIN (H): ICD-10-CM

## 2018-05-29 DIAGNOSIS — Z79.4 TYPE 2 DIABETES MELLITUS WITH DIABETIC NEPHROPATHY, WITH LONG-TERM CURRENT USE OF INSULIN (H): ICD-10-CM

## 2018-05-31 ENCOUNTER — MYC MEDICAL ADVICE (OUTPATIENT)
Dept: INTERNAL MEDICINE | Facility: CLINIC | Age: 68
End: 2018-05-31

## 2018-05-31 ENCOUNTER — MYC MEDICAL ADVICE (OUTPATIENT)
Dept: ENDOCRINOLOGY | Facility: CLINIC | Age: 68
End: 2018-05-31

## 2018-06-01 ENCOUNTER — MYC REFILL (OUTPATIENT)
Dept: ENDOCRINOLOGY | Facility: CLINIC | Age: 68
End: 2018-06-01

## 2018-06-01 DIAGNOSIS — E11.21 TYPE 2 DIABETES MELLITUS WITH DIABETIC NEPHROPATHY, WITH LONG-TERM CURRENT USE OF INSULIN (H): ICD-10-CM

## 2018-06-01 DIAGNOSIS — Z79.4 TYPE 2 DIABETES MELLITUS WITH DIABETIC NEPHROPATHY, WITH LONG-TERM CURRENT USE OF INSULIN (H): ICD-10-CM

## 2018-06-01 RX ORDER — GLIPIZIDE 10 MG/1
10 TABLET, FILM COATED, EXTENDED RELEASE ORAL DAILY
Qty: 30 TABLET | Refills: 0 | Status: SHIPPED | OUTPATIENT
Start: 2018-06-01 | End: 2018-06-14

## 2018-06-01 NOTE — TELEPHONE ENCOUNTER
"Requested Prescriptions   Pending Prescriptions Disp Refills     glipiZIDE (GLUCOTROL XL) 10 MG 24 hr tablet 30 tablet 3     Sig: Take 1 tablet (10 mg) by mouth daily    Sulfonylurea Agents Failed    6/1/2018 11:26 AM       Failed - Patient has documented LDL within the past 12 mos.    Recent Labs   Lab Test  03/09/17   0821   LDL  69            Failed - Patient has had a Microalbumin in the past 12 mos.    Recent Labs   Lab Test  05/24/16   1708   MICROL  38   UMALCR  34.05*            Failed - Patient has documented A1c within the specified period of time.    If HgbA1C is 8 or greater, it needs to be on file within the past 3 months.  If less than 8, must be on file within the past 6 months.     Recent Labs   Lab Test  10/02/17   1454   A1C  8.3*            Passed - Blood pressure less than 140/90 in past 6 months    BP Readings from Last 3 Encounters:   05/07/18 132/64   12/27/17 120/60   12/06/17 118/62                Passed - Patient is age 18 or older       Passed - Patient has a recent creatinine (normal) within the past 12 mos.    Recent Labs   Lab Test  11/27/17   0730   CR  1.09            Passed - Recent (6 mo) or future (30 days) visit within the authorizing provider's specialty    Patient had office visit in the last 6 months or has a visit in the next 30 days with authorizing provider or within the authorizing provider's specialty.  See \"Patient Info\" tab in inbasket, or \"Choose Columns\" in Meds & Orders section of the refill encounter.            Medication is being filled for 1 time refill only due to:  Last OV with Endo 12/6. Patient was to f/u in 3 months. Scheduled.      "

## 2018-06-05 RX ORDER — GLIPIZIDE 10 MG/1
10 TABLET, FILM COATED, EXTENDED RELEASE ORAL DAILY
Qty: 30 TABLET | Refills: 3 | OUTPATIENT
Start: 2018-06-05

## 2018-06-11 ENCOUNTER — MYC MEDICAL ADVICE (OUTPATIENT)
Dept: ENDOCRINOLOGY | Facility: CLINIC | Age: 68
End: 2018-06-11

## 2018-06-11 DIAGNOSIS — I10 HTN (HYPERTENSION): ICD-10-CM

## 2018-06-11 DIAGNOSIS — E11.21 TYPE 2 DIABETES MELLITUS WITH DIABETIC NEPHROPATHY, WITH LONG-TERM CURRENT USE OF INSULIN (H): Primary | ICD-10-CM

## 2018-06-11 DIAGNOSIS — E78.5 HYPERLIPIDEMIA: ICD-10-CM

## 2018-06-11 DIAGNOSIS — R80.9 MICROALBUMINURIA: ICD-10-CM

## 2018-06-11 DIAGNOSIS — Z79.4 TYPE 2 DIABETES MELLITUS WITH DIABETIC NEPHROPATHY, WITH LONG-TERM CURRENT USE OF INSULIN (H): Primary | ICD-10-CM

## 2018-06-13 DIAGNOSIS — Z79.4 TYPE 2 DIABETES MELLITUS WITH DIABETIC NEPHROPATHY, WITH LONG-TERM CURRENT USE OF INSULIN (H): ICD-10-CM

## 2018-06-13 DIAGNOSIS — E11.21 TYPE 2 DIABETES MELLITUS WITH DIABETIC NEPHROPATHY, WITH LONG-TERM CURRENT USE OF INSULIN (H): ICD-10-CM

## 2018-06-13 LAB — HBA1C MFR BLD: 9.9 % (ref 0–5.6)

## 2018-06-13 PROCEDURE — 36415 COLL VENOUS BLD VENIPUNCTURE: CPT | Performed by: INTERNAL MEDICINE

## 2018-06-13 PROCEDURE — 83036 HEMOGLOBIN GLYCOSYLATED A1C: CPT | Performed by: INTERNAL MEDICINE

## 2018-06-13 PROCEDURE — 82043 UR ALBUMIN QUANTITATIVE: CPT | Performed by: INTERNAL MEDICINE

## 2018-06-13 PROCEDURE — 80048 BASIC METABOLIC PNL TOTAL CA: CPT | Performed by: INTERNAL MEDICINE

## 2018-06-13 PROCEDURE — 84460 ALANINE AMINO (ALT) (SGPT): CPT | Performed by: INTERNAL MEDICINE

## 2018-06-14 ENCOUNTER — OFFICE VISIT (OUTPATIENT)
Dept: ENDOCRINOLOGY | Facility: CLINIC | Age: 68
End: 2018-06-14
Payer: COMMERCIAL

## 2018-06-14 VITALS
BODY MASS INDEX: 36.17 KG/M2 | RESPIRATION RATE: 17 BRPM | DIASTOLIC BLOOD PRESSURE: 72 MMHG | HEIGHT: 69 IN | HEART RATE: 99 BPM | WEIGHT: 244.2 LBS | SYSTOLIC BLOOD PRESSURE: 122 MMHG | TEMPERATURE: 97.4 F | OXYGEN SATURATION: 94 %

## 2018-06-14 DIAGNOSIS — Z95.1 S/P CABG X 3: ICD-10-CM

## 2018-06-14 DIAGNOSIS — E11.21 TYPE 2 DIABETES MELLITUS WITH DIABETIC NEPHROPATHY, WITH LONG-TERM CURRENT USE OF INSULIN (H): ICD-10-CM

## 2018-06-14 DIAGNOSIS — Z79.4 TYPE 2 DIABETES MELLITUS WITH DIABETIC NEPHROPATHY, WITH LONG-TERM CURRENT USE OF INSULIN (H): ICD-10-CM

## 2018-06-14 DIAGNOSIS — I25.10 CORONARY ARTERY DISEASE INVOLVING NATIVE CORONARY ARTERY OF NATIVE HEART WITHOUT ANGINA PECTORIS: ICD-10-CM

## 2018-06-14 DIAGNOSIS — E78.5 HYPERLIPIDEMIA, UNSPECIFIED HYPERLIPIDEMIA TYPE: ICD-10-CM

## 2018-06-14 LAB
ALT SERPL W P-5'-P-CCNC: 69 U/L (ref 0–70)
ANION GAP SERPL CALCULATED.3IONS-SCNC: 9 MMOL/L (ref 3–14)
BUN SERPL-MCNC: 37 MG/DL (ref 7–30)
CALCIUM SERPL-MCNC: 11.1 MG/DL (ref 8.5–10.1)
CHLORIDE SERPL-SCNC: 105 MMOL/L (ref 94–109)
CO2 SERPL-SCNC: 24 MMOL/L (ref 20–32)
CREAT SERPL-MCNC: 1.15 MG/DL (ref 0.66–1.25)
GFR SERPL CREATININE-BSD FRML MDRD: 64 ML/MIN/1.7M2
GLUCOSE SERPL-MCNC: 290 MG/DL (ref 70–99)
POTASSIUM SERPL-SCNC: 5.6 MMOL/L (ref 3.4–5.3)
SODIUM SERPL-SCNC: 138 MMOL/L (ref 133–144)

## 2018-06-14 PROCEDURE — 99214 OFFICE O/P EST MOD 30 MIN: CPT | Performed by: INTERNAL MEDICINE

## 2018-06-14 NOTE — PROGRESS NOTES
ENDOCRINOLOGY CLINIC NOTE:  Name: Adan Ruffin  Seen for follow-up of diabetes .  HPI:  Adan Ruffin is a 67 year old male who presents for the evaluation/management of Diabetes.he was diagnosed with diabetes about 20 years back and is on insulin for last 15 years.  Blood sugar control was optimal.  A few years back.  Underwent CABG in April 2015  Underwent ablation 2017    Previously we tried transition to U-500 but it was not covered by insurnace. PA was denied. Was switched back to lantus and Humalog following that.    In last clinic visit he was taking 1 unit per 3 g of carbs of Humalog.  After that he misunderstood the dosing and he increased the dose.  Currently is taking Humalog 1 unit per 1 g of carb.  But he reports that blood sugars have since then improved.  He also continues to take glipizide in morning.  He thinks that Lantus and Humalog works well for him rather than Toujeo or you-500.  Reports that CDE visit or CGM placement is not covered by insurance.    1. Type 2 DM:  Orginally diagnosed about 20 years back  Current Regimen: glipizide 10 mg/day, Metformin 1000 mg twice a day, Toujeo 80 units AM and 70 units PM, NovoLog-estimates the amount each time.  Takes about 40- 50 units with each meal. Carb content is mostly same on each day.  About 1 unit/1 gm of CHO + 1 unit for 10 > 140. Does correction at bedtime.  Though I do not see that he is checking before each meal or using correctional scale.  BG checks: in the morning and before going to bed. Not checking everyday.  Average Meter Download: 206. BG variable.  Symptoms of hypoglycemia (low blood sugar):  Gets symptoms of hypoglycemia.   Episodes of hypoglycemia:occasional  Fixed meal pattern:typically has breakfast and dinner.  Sometimes skips lunch.  Patient counting carbs:no    DM Complications:   Nephropathy: has microalbuminuria  Retinopathy: no  Neuropathy: no  Microalbuminuria:present, on lisinopril 5 mg  MICROL       38    5/24/2016  MICROALBUMIN    34.05   5/24/2016    CAD/PAD: yes, CABG in April 2015  Gastroparesis: no  Hypoglycemia unawareness: no    2. Hypertension:    Blood pressure medications include lisinopril 5 mg  3. Hyperlipidemia: Takes atorvastatin 40 mg         A/P  Mr.Raymond SANIA Ruffin is a 67 year old here for the evaluation/management of diabetes:    1. DM2 - Under fair control.  A1c 9.9%.  Diabetes complicated by microalbuminuria and vasculopathy.  History of CABG in April 2015.  Previously we tried transition to U-500 but it was not covered by insurnace. PA was denied. Was switched back to lantus and Humalog following that.  BG variable.   Limited data- discussed diagnostic CGM - but reports that it is not covered by insurance.  Has increased Novolog since last clinic visit and currently taking 1 Unit/1 gm of CHO.  Plan:  STOP GLIPIZIDE--as  is on high dose of novolog and risk of hypoglycemia..  Lantus- increase in AM-- Take 90 Units in AM and 80 at night.  Novolog- continue at current dose 1 Unit/ 1 gm of CHO + SSI  Continue metformin.  Consider GLP-1 RA but cost can be a limiting factor.    Recommend checking blood sugars before meals and at bedtime.    If Blood glucose are low more often-> 2-3 times/week- give us a call.  The patient is advised to Make better food choices: reduce carbs, Reduce portion size, weight loss and exercise 3-4 times a week.  Discussed hypoglycemia signs and symptoms as well as management in detail.       2. Hypertension - Under Good control.  Continue lisinopril 5 mg      3. Hyperlipidemia - Under Good control.  Continue atorvastatin 40 mg.  LDL 66, HDL 58     4. Prevention  Flu Shot- November 2015  Pneumovax- 9/2016  Opthalmology- 3/2015. Due for eye exam. Encouraged f/u  ASA- 325 mg  Smoking- no    5. Microalbuminuria:  MICROL       38   5/24/2016  MICROALBUMIN    34.05   5/24/2016  -- continue lisinopril 5 mg  -- recommend strict BG control.  Labs with next lab draw.      PMH/PSH:  Past  Medical History:   Diagnosis Date     Atrial flutter (H) 10/02/2017    ablation 11/26/2017     CAD (coronary artery disease) 03/13/2015    CABG 2013     Cardiomyopathy (H) 10/02/2017    (L) ventricular systolic     HTN (hypertension)      Hyperlipidaemia      Microalbuminuria 6/27/2016     Obesity 3/13/2015     Type 2 diabetes mellitus with diabetic nephropathy, with long-term current use of insulin (H) 11/2/2016     Past Surgical History:   Procedure Laterality Date     BYPASS GRAFT ARTERY CORONARY N/A 4/7/2015    bypass LAD, OM, PDA        COLONOSCOPY N/A 6/20/2016    Procedure: COLONOSCOPY;  Surgeon: Marcela Schwartz MD;  Location: [facility]     EXCISE PILONIDAL CYST, SIMPLE  1975     HEART CATH, ANGIOPLASTY  3-    3 vessel disease-occluded RCA, stenosis LM-to have CABG     removal of skin cancer  1992     TONSILLECTOMY & ADENOIDECTOMY  1950     Family Hx:  Family History   Problem Relation Age of Onset     Breast Cancer Mother      CEREBROVASCULAR DISEASE Mother      Hyperlipidemia Father      CANCER Father      lung, unrelated to smoking     CANCER Paternal Grandmother        Diabetes:    Social Hx:  Social History     Social History     Marital status:      Spouse name: N/A     Number of children: N/A     Years of education: N/A     Occupational History     Not on file.     Social History Main Topics     Smoking status: Former Smoker     Packs/day: 1.50     Years: 29.00     Types: Cigarettes     Quit date: 1/1/2000     Smokeless tobacco: Never Used     Alcohol use No      Comment: 1 mixed drink a night     Drug use: No     Sexual activity: Yes     Partners: Female     Other Topics Concern     Caffeine Concern No     none     Sleep Concern No     Stress Concern Yes     sometimes at home     Weight Concern Yes     would like to lose weight     Special Diet No     Exercise No     Seat Belt Yes     Social History Narrative          MEDICATIONS:  has a current medication list which includes the  "following prescription(s): ACE NOT PRESCRIBED, INTENTIONAL,, aspirin, atorvastatin, blood glucose monitoring, blood glucose monitoring, glipizide, insulin glargine, insulin lispro, insulin pen needle, metformin, metoprolol succinate, order for dme, order for dme, order for dme, insulin aspart, and insulin glargine u-300.    ROS     ROS: 10 point ROS neg other than the symptoms noted above in the HPI.    Physical Exam   VS: /72 (BP Location: Right arm, Patient Position: Chair, Cuff Size: Adult Large)  Pulse 99  Temp 97.4  F (36.3  C) (Oral)  Resp 17  Ht 1.753 m (5' 9\")  Wt 110.8 kg (244 lb 3.2 oz)  SpO2 94%  BMI 36.06 kg/m2  GENERAL: AXOX3, NAD, well dressed, answering questions appropriately, appears stated age.  HEENT: No exopthalmous, no proptosis, EOMI, no lig lag, no retraction  NECK: Thyroid normal in size, non tender, no nodules were palpated.  CV: RRR  LUNGS: CTAB  ABDOMEN: +BS  NEUROLOGY: CN grossly intact, no tremors  FOOT: DM FOOT EXAM: normal DP and PT pulses, no trophic changes or ulcerative lesions, normal sensory exam and normal monofilament exam.   PSYCH: normal affect and mood      LABS:  A1c:  Lab Results   Component Value Date    A1C 9.9 06/13/2018    A1C 8.3 10/02/2017    A1C 7.8 07/20/2017    A1C 10.5 03/09/2017    A1C 10.0 11/02/2016       Creatinine:  Creatinine   Date Value Ref Range Status   11/27/2017 1.09 0.66 - 1.25 mg/dL Final       Urine Micro:  Lab Results   Component Value Date    UMALCR 34.05 05/24/2016         LFTs/Lipids:  Recent Labs   Lab Test  03/09/17   0821  12/28/15   0907  08/19/15   0934 01/08/15   CHOL  142  137  176  154   HDL  46  58  51  64   LDL  69  66  70  69   TRIG  134  66  275*  104   CHOLHDLRATIO   --    --   3.5  2.4       TFTs:  TSH   Date Value Ref Range Status   10/02/2017 1.54 0.40 - 4.00 mU/L Final     All pertinent notes, labs, and images personally reviewed by me.     All questions were answered.  The patient indicates understanding of the " above issues and agrees with the plan set forth.     More than 50% of face to face time spent with Mr. Ruffin on counseling / coordinating his care.  Total appointment times was 25 minutes.      Follow-up:  follow up in 3 months    Marilee Tolentino M.D  Endocrinology  [facility]  CC: Lauro Galloway    Disclaimer: This note consists of symbols derived from keyboarding, dictation and/or voice recognition software. As a result, there may be errors in the script that have gone undetected. Please consider this when interpreting information found in this chart.

## 2018-06-14 NOTE — LETTER
6/14/2018         RE: Adan Ruffin  [address] [place] [address]        Dear Colleague,    Thank you for referring your patient, Adan Ruffin, to the [facility]. Please see a copy of my visit note below.    ENDOCRINOLOGY CLINIC NOTE:  Name: Adan Ruffin  Seen for follow-up of diabetes .  HPI:  Adan Ruffin is a 67 year old male who presents for the evaluation/management of Diabetes.he was diagnosed with diabetes about 20 years back and is on insulin for last 15 years.  Blood sugar control was optimal.  A few years back.  Underwent CABG in April 2015  Underwent ablation 2017    Previously we tried transition to U-500 but it was not covered by insurnace. PA was denied. Was switched back to lantus and Humalog following that.    In last clinic visit he was taking 1 unit per 3 g of carbs of Humalog.  After that he misunderstood the dosing and he increased the dose.  Currently is taking Humalog 1 unit per 1 g of carb.  But he reports that blood sugars have since then improved.  He also continues to take glipizide in morning.  He thinks that Lantus and Humalog works well for him rather than Toujeo or you-500.  Reports that CDE visit or CGM placement is not covered by insurance.    1. Type 2 DM:  Orginally diagnosed about 20 years back  Current Regimen: glipizide 10 mg/day, Metformin 1000 mg twice a day, Toujeo 80 units AM and 70 units PM, NovoLog-estimates the amount each time.  Takes about 40- 50 units with each meal. Carb content is mostly same on each day.  About 1 unit/1 gm of CHO + 1 unit for 10 > 140. Does correction at bedtime.  Though I do not see that he is checking before each meal or using correctional scale.  BG checks: in the morning and before going to bed. Not checking everyday.  Average Meter Download: 206. BG variable.  Symptoms of hypoglycemia (low blood sugar):  Gets symptoms of hypoglycemia.   Episodes of hypoglycemia:occasional  Fixed meal  pattern:typically has breakfast and dinner.  Sometimes skips lunch.  Patient counting carbs:no    DM Complications:   Nephropathy: has microalbuminuria  Retinopathy: no  Neuropathy: no  Microalbuminuria:present, on lisinopril 5 mg  MICROL       38   5/24/2016  MICROALBUMIN    34.05   5/24/2016    CAD/PAD: yes, CABG in April 2015  Gastroparesis: no  Hypoglycemia unawareness: no    2. Hypertension:    Blood pressure medications include lisinopril 5 mg  3. Hyperlipidemia: Takes atorvastatin 40 mg         A/P  Mr.Raymond SANIA Ruffin is a 67 year old here for the evaluation/management of diabetes:    1. DM2 - Under fair control.  A1c 9.9%.  Diabetes complicated by microalbuminuria and vasculopathy.  History of CABG in April 2015.  Previously we tried transition to U-500 but it was not covered by insurnace. PA was denied. Was switched back to lantus and Humalog following that.  BG variable.   Limited data- discussed diagnostic CGM - but reports that it is not covered by insurance.  Has increased Novolog since last clinic visit and currently taking 1 Unit/1 gm of CHO.  Plan:  STOP GLIPIZIDE--as  is on high dose of novolog and risk of hypoglycemia..  Lantus- increase in AM-- Take 90 Units in AM and 80 at night.  Novolog- continue at current dose 1 Unit/ 1 gm of CHO + SSI  Continue metformin.  Consider GLP-1 RA but cost can be a limiting factor.    Recommend checking blood sugars before meals and at bedtime.    If Blood glucose are low more often-> 2-3 times/week- give us a call.  The patient is advised to Make better food choices: reduce carbs, Reduce portion size, weight loss and exercise 3-4 times a week.  Discussed hypoglycemia signs and symptoms as well as management in detail.       2. Hypertension - Under Good control.  Continue lisinopril 5 mg      3. Hyperlipidemia - Under Good control.  Continue atorvastatin 40 mg.  LDL 66, HDL 58     4. Prevention  Flu Shot- November 2015  Pneumovax- 9/2016  Opthalmology- 3/2015. Due  for eye exam. Encouraged f/u  ASA- 325 mg  Smoking- no    5. Microalbuminuria:  MICROL       38   5/24/2016  MICROALBUMIN    34.05   5/24/2016  -- continue lisinopril 5 mg  -- recommend strict BG control.  Labs with next lab draw.      PMH/PSH:  Past Medical History:   Diagnosis Date     Atrial flutter (H) 10/02/2017    ablation 11/26/2017     CAD (coronary artery disease) 03/13/2015    CABG 2013     Cardiomyopathy (H) 10/02/2017    (L) ventricular systolic     HTN (hypertension)      Hyperlipidaemia      Microalbuminuria 6/27/2016     Obesity 3/13/2015     Type 2 diabetes mellitus with diabetic nephropathy, with long-term current use of insulin (H) 11/2/2016     Past Surgical History:   Procedure Laterality Date     BYPASS GRAFT ARTERY CORONARY N/A 4/7/2015    bypass LAD, OM, PDA        COLONOSCOPY N/A 6/20/2016    Procedure: COLONOSCOPY;  Surgeon: Marcela Schwartz MD;  Location: [facility]     EXCISE PILONIDAL CYST, SIMPLE  1975     HEART CATH, ANGIOPLASTY  3-    3 vessel disease-occluded RCA, stenosis LM-to have CABG     removal of skin cancer  1992     TONSILLECTOMY & ADENOIDECTOMY  1950     Family Hx:  Family History   Problem Relation Age of Onset     Breast Cancer Mother      CEREBROVASCULAR DISEASE Mother      Hyperlipidemia Father      CANCER Father      lung, unrelated to smoking     CANCER Paternal Grandmother        Diabetes:    Social Hx:  Social History     Social History     Marital status:      Spouse name: N/A     Number of children: N/A     Years of education: N/A     Occupational History     Not on file.     Social History Main Topics     Smoking status: Former Smoker     Packs/day: 1.50     Years: 29.00     Types: Cigarettes     Quit date: 1/1/2000     Smokeless tobacco: Never Used     Alcohol use No      Comment: 1 mixed drink a night     Drug use: No     Sexual activity: Yes     Partners: Female     Other Topics Concern     Caffeine Concern No     none     Sleep Concern No      "Stress Concern Yes     sometimes at home     Weight Concern Yes     would like to lose weight     Special Diet No     Exercise No     Seat Belt Yes     Social History Narrative          MEDICATIONS:  has a current medication list which includes the following prescription(s): ACE NOT PRESCRIBED, INTENTIONAL,, aspirin, atorvastatin, blood glucose monitoring, blood glucose monitoring, glipizide, insulin glargine, insulin lispro, insulin pen needle, metformin, metoprolol succinate, order for dme, order for dme, order for dme, insulin aspart, and insulin glargine u-300.    ROS     ROS: 10 point ROS neg other than the symptoms noted above in the HPI.    Physical Exam   VS: /72 (BP Location: Right arm, Patient Position: Chair, Cuff Size: Adult Large)  Pulse 99  Temp 97.4  F (36.3  C) (Oral)  Resp 17  Ht 1.753 m (5' 9\")  Wt 110.8 kg (244 lb 3.2 oz)  SpO2 94%  BMI 36.06 kg/m2  GENERAL: AXOX3, NAD, well dressed, answering questions appropriately, appears stated age.  HEENT: No exopthalmous, no proptosis, EOMI, no lig lag, no retraction  NECK: Thyroid normal in size, non tender, no nodules were palpated.  CV: RRR  LUNGS: CTAB  ABDOMEN: +BS  NEUROLOGY: CN grossly intact, no tremors  FOOT: DM FOOT EXAM: normal DP and PT pulses, no trophic changes or ulcerative lesions, normal sensory exam and normal monofilament exam.   PSYCH: normal affect and mood      LABS:  A1c:  Lab Results   Component Value Date    A1C 9.9 06/13/2018    A1C 8.3 10/02/2017    A1C 7.8 07/20/2017    A1C 10.5 03/09/2017    A1C 10.0 11/02/2016       Creatinine:  Creatinine   Date Value Ref Range Status   11/27/2017 1.09 0.66 - 1.25 mg/dL Final       Urine Micro:  Lab Results   Component Value Date    UMALCR 34.05 05/24/2016         LFTs/Lipids:  Recent Labs   Lab Test  03/09/17   0821  12/28/15   0907  08/19/15   0934 01/08/15   CHOL  142  137  176  154   HDL  46  58  51  64   LDL  69  66  70  69   TRIG  134  66  275*  104   CHOLHDLRATIO   --    -- "   3.5  2.4       TFTs:  TSH   Date Value Ref Range Status   10/02/2017 1.54 0.40 - 4.00 mU/L Final     All pertinent notes, labs, and images personally reviewed by me.     All questions were answered.  The patient indicates understanding of the above issues and agrees with the plan set forth.     More than 50% of face to face time spent with Mr. Ruffin on counseling / coordinating his care.  Total appointment times was 25 minutes.      Follow-up:  follow up in 3 months    Marilee Tolentino M.D  Endocrinology  [facility]  CC: Lauro Galloway    Disclaimer: This note consists of symbols derived from keyboarding, dictation and/or voice recognition software. As a result, there may be errors in the script that have gone undetected. Please consider this when interpreting information found in this chart.        Again, thank you for allowing me to participate in the care of your patient.        Sincerely,        Marilee Tolentino MD

## 2018-06-14 NOTE — MR AVS SNAPSHOT
After Visit Summary   2018    Adan Ruffin    MRN: [id number]           Patient Information     Date Of Birth          1950        Visit Information        Provider Department      2018 8:00 AM Marilee Tolentino MD [facility]        Today's Diagnoses     Type 2 diabetes mellitus with diabetic nephropathy, with long-term current use of insulin (H)          Care Instructions    [facility] & [place] locations   Dr Tolentino, Endocrinology Department      [facility]   [address]  [place] [address]  Appointment [phone]  Fax: [phone]  [place]: Monday and Tuesday         [facility]   [address]  [place] [address]  Appointment [phone]  Fax: [phone]  [place]: Wednesday and Thursday          Please arrive 30 minutes before your scheduled appointment.   First go to the main floor lab suit  for previsit A1c lab appointment and then come upstairs and get checked in with  for clinic visit.  This will allow for your blood glucose meter/insulin pump to be downloaded and glycosylated hemoglobin (A1c) to be obtained prior to your appointment.    STOP GLIPIZIDE.  Lantus- increase in AM-- Take 90 Units in AM and 80 at night.  Novolog- continue at current dose  Continue metformin.    Send me data in 6 weeks.    Labs (fasting)  and follow up in 3 months.    Recommend checking blood sugars before meals and at bedtime.    If Blood glucose are low more often-> 2-3 times/week- give us a call.  The patient is advised to Make better food choices: reduce carbs, Reduce portion size, weight loss and exercise 3-4 times a week.  Discussed hypoglycemia signs and symptoms as well as management in detail.                  Follow-ups after your visit        Who to contact     If you have questions or need follow up information about today's  "clinic visit or your schedule please contact [facility] directly at [phone].  Normal or non-critical lab and imaging results will be communicated to you by MyChart, letter or phone within 4 business days after the clinic has received the results. If you do not hear from us within 7 days, please contact the clinic through [employer] or phone. If you have a critical or abnormal lab result, we will notify you by phone as soon as possible.  Submit refill requests through [employer] or call your pharmacy and they will forward the refill request to us. Please allow 3 business days for your refill to be completed.          Additional Information About Your Visit        [employer] Information     [employer] gives you secure access to your electronic health record. If you see a primary care provider, you can also send messages to your care team and make appointments. If you have questions, please call your primary care clinic.  If you do not have a primary care provider, please call [phone] and they will assist you.        Care EveryWhere ID     This is your Care EveryWhere ID. This could be used by other organizations to access your [place] medical records  [id number]        Your Vitals Were     Pulse Temperature Respirations Height Pulse Oximetry BMI (Body Mass Index)    99 97.4  F (36.3  C) (Oral) 17 1.753 m (5' 9\") 94% 36.06 kg/m2       Blood Pressure from Last 3 Encounters:   06/14/18 122/72   05/07/18 132/64   12/27/17 120/60    Weight from Last 3 Encounters:   06/14/18 110.8 kg (244 lb 3.2 oz)   05/07/18 112.5 kg (248 lb)   12/27/17 108.4 kg (239 lb)              Today, you had the following     No orders found for display         Today's Medication Changes          These changes are accurate as of 6/14/18  8:30 AM.  If you have any questions, ask your nurse or doctor.               These medicines have changed or have updated prescriptions.        Dose/Directions    insulin glargine 100 UNIT/ML " injection   Commonly known as:  LANTUS SOLOSTAR   This may have changed:  Another medication with the same name was removed. Continue taking this medication, and follow the directions you see here.   Used for:  Type 2 diabetes mellitus with diabetic nephropathy, with long-term current use of insulin (H)   Changed by:  Marilee Tolentino MD        Dose:  80 Units   Inject 80 Units Subcutaneous 2 times daily   Quantity:  30 mL   Refills:  3         Stop taking these medicines if you haven't already. Please contact your care team if you have questions.     glipiZIDE 10 MG 24 hr tablet   Commonly known as:  GLUCOTROL XL   Stopped by:  Marilee Tolentino MD           insulin aspart 100 UNIT/ML injection   Commonly known as:  NovoLOG FLEXPEN   Stopped by:  Marilee Tolentino MD                    Primary Care Provider Office Phone # Fax #    Lauro KOBE Galloway [phone]       [address] [facility] [address]        Equal Access to Services     [facility] AH: Hadii aad ku hadasho Soomaali, waaxda luqadaha, qaybta kaalmada adeegyada, waxay idiin hayaan kishan kharacarla martinez . So [facility] [phone].    ATENCIÓN: Si habla español, tiene a moore disposición servicios gratuitos de asistencia lingüística. Llame al [phone].    We comply with applicable federal civil rights laws and [place] laws. We do not discriminate on the basis of race, color, national origin, age, disability, sex, sexual orientation, or gender identity.            Thank you!     Thank you for choosing [facility]  for your care. Our goal is always to provide you with excellent care. Hearing back from our patients is one way we can continue to improve our services. Please take a few minutes to complete the written survey that you may receive in the mail after your visit with us. Thank you!             Your Updated Medication List - Protect others around you: Learn how to safely use, store and throw away  your medicines at www.disposemymeds.org.          This list is accurate as of 6/14/18  8:30 AM.  Always use your most recent med list.                   Brand Name Dispense Instructions for use Diagnosis    ACE NOT PRESCRIBED (INTENTIONAL)     0 each    1 each daily ACE Inhibitor not prescribed due to Intolerance    Type II or unspecified type diabetes mellitus without mention of complication, not stated as uncontrolled       aspirin 81 MG EC tablet     1 tablet    Take 1 tablet (81 mg) by mouth daily    CAD (coronary artery disease), S/P CABG x 3, Anxiety       atorvastatin 40 MG tablet    LIPITOR    90 tablet    TAKE 1 TABLET BY MOUTH DAILY    Hyperlipidemia LDL goal <70       blood glucose monitoring lancets     200 each    Use to test blood sugar 4 times daily or as directed.    Type 2 diabetes mellitus with diabetic nephropathy, with long-term current use of insulin (H)       blood glucose monitoring test strip    no brand specified    6 Box    Use to test blood sugars 4 times daily or as directed    Type 2 diabetes mellitus with diabetic nephropathy, with long-term current use of insulin (H)       insulin glargine 100 UNIT/ML injection    LANTUS SOLOSTAR    30 mL    Inject 80 Units Subcutaneous 2 times daily    Type 2 diabetes mellitus with diabetic nephropathy, with long-term current use of insulin (H)       insulin lispro 100 UNIT/ML injection    HumaLOG KWIKpen    15 mL    1 unit per carb before meals three times daily    Type 2 diabetes mellitus with diabetic nephropathy, with long-term current use of insulin (H)       insulin pen needle 30G X 8 MM    NOVOFINE    300 each    Use 4-5 daily or as directed.    Type 2 diabetes mellitus with diabetic nephropathy, with long-term current use of insulin (H)       metFORMIN 1000 MG tablet    GLUCOPHAGE    180 tablet    TAKE 1 TABLET BY MOUTH TWICE DAILY WITH MEALS    Insulin dependent diabetes mellitus (H)       metoprolol succinate 100 MG 24 hr tablet    TOPROL XL     60 tablet    Take 1 tablet (100 mg) by mouth 2 times daily    Typical atrial flutter (H)       * order for DME     3 Month    All DM testing supplies including test strips, lancets, solution, syringes, needles and/or pen needles for testing 4 times per day & injecting 5 times per day. Dispense glucometer compatible supplies. Accucheck Geena.    Type 2 diabetes mellitus with diabetic nephropathy (H)       * order for DME     1 Box    All DM testing supplies including test strips, lancets, solution, syringes, needles and/or pen needles for testing 3 times per day & injecting 3 times per day.   RX goes to  [facility] Supplier, see tele enc 09/21/2016    Type 2 diabetes mellitus with diabetic nephropathy (H)       order for DME     1 each    Equipment being ordered: blood glucose meter    Type 2 diabetes mellitus with diabetic nephropathy, with long-term current use of insulin (H)       * Notice:  This list has 2 medication(s) that are the same as other medications prescribed for you. Read the directions carefully, and ask your doctor or other care provider to review them with you.

## 2018-06-14 NOTE — PATIENT INSTRUCTIONS
[facility] & [place] locations   Dr Tolentino, Endocrinology Department      [facility]   [address]  [place] [address]  Appointment [phone]  Fax: [phone]  [place]: Monday and Tuesday         [facility]   [address]  [place] [address]  Appointment [phone]  Fax: [phone]  [place]: Wednesday and Thursday          Please arrive 30 minutes before your scheduled appointment.   First go to the main floor lab suit  for previsit A1c lab appointment and then come upstairs and get checked in with  for clinic visit.  This will allow for your blood glucose meter/insulin pump to be downloaded and glycosylated hemoglobin (A1c) to be obtained prior to your appointment.    STOP GLIPIZIDE.  Lantus- increase in AM-- Take 90 Units in AM and 80 at night.  Novolog- continue at current dose  Continue metformin.    Send me data in 6 weeks.    Labs (fasting)  and follow up in 3 months.    Recommend checking blood sugars before meals and at bedtime.    If Blood glucose are low more often-> 2-3 times/week- give us a call.  The patient is advised to Make better food choices: reduce carbs, Reduce portion size, weight loss and exercise 3-4 times a week.  Discussed hypoglycemia signs and symptoms as well as management in detail.

## 2018-06-15 DIAGNOSIS — I48.3 TYPICAL ATRIAL FLUTTER (H): ICD-10-CM

## 2018-06-15 LAB
CREAT UR-MCNC: 151 MG/DL
MICROALBUMIN UR-MCNC: 428 MG/L
MICROALBUMIN/CREAT UR: 283.44 MG/G CR (ref 0–17)

## 2018-06-15 RX ORDER — METOPROLOL SUCCINATE 100 MG/1
100 TABLET, EXTENDED RELEASE ORAL 2 TIMES DAILY
Qty: 180 TABLET | Refills: 3 | Status: SHIPPED | OUTPATIENT
Start: 2018-06-15 | End: 2019-06-03

## 2018-06-19 ENCOUNTER — TELEPHONE (OUTPATIENT)
Dept: ENDOCRINOLOGY | Facility: CLINIC | Age: 68
End: 2018-06-19

## 2018-06-19 NOTE — TELEPHONE ENCOUNTER
Component      Latest Ref Rng & Units 6/13/2018   Sodium      133 - 144 mmol/L 138   Potassium      3.4 - 5.3 mmol/L 5.6 (H)   Chloride      94 - 109 mmol/L 105   Carbon Dioxide      20 - 32 mmol/L 24   Anion Gap      3 - 14 mmol/L 9   Glucose      70 - 99 mg/dL 290 (H)   Urea Nitrogen      7 - 30 mg/dL 37 (H)   Creatinine      0.66 - 1.25 mg/dL 1.15   GFR Estimate      >60 mL/min/1.7m2 64   GFR Estimate If Black      >60 mL/min/1.7m2 77   Calcium      8.5 - 10.1 mg/dL 11.1 (H)     Lab Results   Component Value Date    A1C 9.9 06/13/2018    A1C 8.3 10/02/2017    A1C 7.8 07/20/2017    A1C 10.5 03/09/2017    A1C 10.0 11/02/2016       urinary microalbumin:  Lab Results   Component Value Date    UMALCR 283.44 06/13/2018      A1c has increased to 9.9%  There are some proteins in the urine--and it has increased as compared to previous labs.  He is he taking lisinopril or any other ACE inhibitor or ARB?  Can you please check with patient.  I do not see any thing listed under med list intolerance--?    BMP showing high potassium and high calcium levels.    Recommend adequate hydration and repeat labs in 1 week. Avoid high K foods.  Need to make lab only appointment.    Please call patient with above information.    Marilee Tolentino MD  Endocrinology   [facility]  June 19, 2018

## 2018-06-20 NOTE — TELEPHONE ENCOUNTER
Pt returning call.  Advised him of Dr. Tolentino's message below.    States he is not taking Lisinopril, ACE inhibitor, or ARB's.    He will schedule a lab appt to be done in 1 wk.    Please advise, thanks.

## 2018-06-27 DIAGNOSIS — E11.21 TYPE 2 DIABETES MELLITUS WITH DIABETIC NEPHROPATHY, WITH LONG-TERM CURRENT USE OF INSULIN (H): ICD-10-CM

## 2018-06-27 DIAGNOSIS — Z79.4 TYPE 2 DIABETES MELLITUS WITH DIABETIC NEPHROPATHY, WITH LONG-TERM CURRENT USE OF INSULIN (H): ICD-10-CM

## 2018-06-27 LAB
CA-I SERPL ISE-MCNC: 5.1 MG/DL (ref 4.4–5.2)
CALCIUM SERPL-MCNC: 10.1 MG/DL (ref 8.5–10.1)
CHOLEST SERPL-MCNC: 146 MG/DL
DEPRECATED CALCIDIOL+CALCIFEROL SERPL-MC: 24 UG/L (ref 20–75)
HDLC SERPL-MCNC: 42 MG/DL
LDLC SERPL CALC-MCNC: 73 MG/DL
MAGNESIUM SERPL-MCNC: 1.8 MG/DL (ref 1.6–2.3)
NONHDLC SERPL-MCNC: 104 MG/DL
PHOSPHATE SERPL-MCNC: 3.2 MG/DL (ref 2.5–4.5)
POTASSIUM SERPL-SCNC: 5 MMOL/L (ref 3.4–5.3)
PTH-INTACT SERPL-MCNC: 67 PG/ML (ref 18–80)
TRIGL SERPL-MCNC: 154 MG/DL

## 2018-06-27 PROCEDURE — 83970 ASSAY OF PARATHORMONE: CPT | Performed by: INTERNAL MEDICINE

## 2018-06-27 PROCEDURE — 83735 ASSAY OF MAGNESIUM: CPT | Performed by: INTERNAL MEDICINE

## 2018-06-27 PROCEDURE — 84132 ASSAY OF SERUM POTASSIUM: CPT | Performed by: INTERNAL MEDICINE

## 2018-06-27 PROCEDURE — 36415 COLL VENOUS BLD VENIPUNCTURE: CPT | Performed by: INTERNAL MEDICINE

## 2018-06-27 PROCEDURE — 82306 VITAMIN D 25 HYDROXY: CPT | Performed by: INTERNAL MEDICINE

## 2018-06-27 PROCEDURE — 82330 ASSAY OF CALCIUM: CPT | Performed by: INTERNAL MEDICINE

## 2018-06-27 PROCEDURE — 84100 ASSAY OF PHOSPHORUS: CPT | Performed by: INTERNAL MEDICINE

## 2018-06-27 PROCEDURE — 82310 ASSAY OF CALCIUM: CPT | Performed by: INTERNAL MEDICINE

## 2018-06-27 PROCEDURE — 80061 LIPID PANEL: CPT | Performed by: INTERNAL MEDICINE

## 2018-06-27 NOTE — TELEPHONE ENCOUNTER
"Requested Prescriptions   Pending Prescriptions Disp Refills     insulin pen needle (NOVOFINE) 30G X 8 MM  Last Written Prescription Date:  11/28/17  Last Fill Quantity: 300,  # refills: 1   Last office visit: No previous visit found with prescribing provider:  06/14/18   Future Office Visit:     300 each 1     Sig: Use 4-5 daily or as directed.    Diabetic Supplies Protocol Passed    6/27/2018 12:58 PM       Passed - Patient is 18 years of age or older       Passed - Recent (6 mo) or future (30 days) visit within the authorizing provider's specialty    Patient had office visit in the last 6 months or has a visit in the next 30 days with authorizing provider.  See \"Patient Info\" tab in inbasket, or \"Choose Columns\" in Meds & Orders section of the refill encounter.              "

## 2018-07-04 ENCOUNTER — MYC MEDICAL ADVICE (OUTPATIENT)
Dept: ENDOCRINOLOGY | Facility: CLINIC | Age: 68
End: 2018-07-04

## 2018-07-04 DIAGNOSIS — E11.21 TYPE 2 DIABETES MELLITUS WITH DIABETIC NEPHROPATHY, WITH LONG-TERM CURRENT USE OF INSULIN (H): ICD-10-CM

## 2018-07-04 DIAGNOSIS — Z79.4 TYPE 2 DIABETES MELLITUS WITH DIABETIC NEPHROPATHY, WITH LONG-TERM CURRENT USE OF INSULIN (H): ICD-10-CM

## 2018-07-05 ENCOUNTER — MYC MEDICAL ADVICE (OUTPATIENT)
Dept: ENDOCRINOLOGY | Facility: CLINIC | Age: 68
End: 2018-07-05

## 2018-07-05 ENCOUNTER — TELEPHONE (OUTPATIENT)
Dept: NURSING | Facility: CLINIC | Age: 68
End: 2018-07-05

## 2018-07-05 DIAGNOSIS — Z79.4 TYPE 2 DIABETES MELLITUS WITH DIABETIC NEPHROPATHY, WITH LONG-TERM CURRENT USE OF INSULIN (H): ICD-10-CM

## 2018-07-05 DIAGNOSIS — E11.21 TYPE 2 DIABETES MELLITUS WITH DIABETIC NEPHROPATHY, WITH LONG-TERM CURRENT USE OF INSULIN (H): ICD-10-CM

## 2018-07-05 NOTE — TELEPHONE ENCOUNTER
"Requested Prescriptions   Pending Prescriptions Disp Refills     insulin lispro (HUMALOG KWIKPEN) 100 UNIT/ML injection 45 mL 6     Si unit per 1 gm of carb before meals three times daily    Short Acting Insulin Protocol Passed    2018  3:12 PM       Passed - Blood pressure less than 140/90 in past 6 months    BP Readings from Last 3 Encounters:   18 122/72   18 132/64   17 120/60                Passed - LDL on file in past 12 months    Recent Labs   Lab Test  18   0809   LDL  73            Passed - Microalbumin on file in past 12 months    Recent Labs   Lab Test  18   1618   MICROL  428   UMALCR  283.44*            Passed - Serum creatinine on file in past 12 months    Recent Labs   Lab Test  18   1618   CR  1.15            Passed - HgbA1C in past 3 or 6 months    If HgbA1C is 8 or greater, it needs to be on file within the past 3 months.  If less than 8, must be on file within the past 6 months.     Recent Labs   Lab Test  18   1618   A1C  9.9*            Passed - Patient is age 18 or older       Passed - Recent (6 mo) or future (30 days) visit within the authorizing provider's specialty    Patient had office visit in the last 6 months or has a visit in the next 30 days with authorizing provider or within the authorizing provider's specialty.  See \"Patient Info\" tab in inbasket, or \"Choose Columns\" in Meds & Orders section of the refill encounter.              Medication is being filled for 1 time refill only due to:  pt is due for OV in 2018 for diab.    Pt informed via [employer]  "

## 2018-07-05 NOTE — TELEPHONE ENCOUNTER
"  [facility] Triage Call  Presenting Problem:[facility]'s pharmacy calling \" I need better clarification on RX   insulin lispro (HUMALOG KWIKPEN) 100 UNIT/ML injection 45 mL 0 2018  No   Si unit per 1 gm of carb before meals three times daily       Patient Recommendations/Teaching:Gave per [facility], advised to call clinic when open. No triage was needed.  Patricia Casey RN [facility]            "

## 2018-07-06 NOTE — TELEPHONE ENCOUNTER
Patient calls regarding Mychart message. Pharmacy needs daily usage for Humalog in order to fill this. Patient uses about 150 units/day. Prescription updated and resent to [facility].

## 2018-07-14 ENCOUNTER — TRANSFERRED RECORDS (OUTPATIENT)
Dept: HEALTH INFORMATION MANAGEMENT | Facility: CLINIC | Age: 68
End: 2018-07-14

## 2018-07-30 DIAGNOSIS — E78.5 HYPERLIPIDEMIA LDL GOAL <70: ICD-10-CM

## 2018-07-31 NOTE — TELEPHONE ENCOUNTER
"Requested Prescriptions   Pending Prescriptions Disp Refills     atorvastatin (LIPITOR) 40 MG tablet [Pharmacy Med Name: ATORVASTATIN 40MG TABLETS]  Last Written Prescription Date:  9/27/2017  Last Fill Quantity: 90,  # refills: 1   Last office visit: 5/7/2018 with prescribing provider:     Future Office Visit:   Next 5 appointments (look out 90 days)     Sep 20, 2018 11:30 AM CDT   Return Visit with Marilee Tolentino MD   [facility]    [address] [facility] [address]  [facility] [address] [phone]                90 tablet 0     Sig: TAKE 1 TABLET BY MOUTH DAILY    Statins Protocol Passed    7/30/2018  7:28 AM       Passed - LDL on file in past 12 months    Recent Labs   Lab Test  06/27/18   0809   LDL  73            Passed - No abnormal creatine kinase in past 12 months    No lab results found.            Passed - Recent (12 mo) or future (30 days) visit within the authorizing provider's specialty    Patient had office visit in the last 12 months or has a visit in the next 30 days with authorizing provider or within the authorizing provider's specialty.  See \"Patient Info\" tab in inbasket, or \"Choose Columns\" in Meds & Orders section of the refill encounter.           Passed - Patient is age 18 or older        "

## 2018-08-02 ENCOUNTER — TRANSFERRED RECORDS (OUTPATIENT)
Dept: HEALTH INFORMATION MANAGEMENT | Facility: CLINIC | Age: 68
End: 2018-08-02

## 2018-08-02 RX ORDER — ATORVASTATIN CALCIUM 40 MG/1
TABLET, FILM COATED ORAL
Qty: 90 TABLET | Refills: 2 | Status: SHIPPED | OUTPATIENT
Start: 2018-08-02 | End: 2019-04-05

## 2018-08-23 ENCOUNTER — TELEPHONE (OUTPATIENT)
Dept: ENDOCRINOLOGY | Facility: CLINIC | Age: 68
End: 2018-08-23

## 2018-08-23 NOTE — TELEPHONE ENCOUNTER
Panel Management Review      Patient has the following on his problem list:     Diabetes    ASA: Passed    Last A1C  Lab Results   Component Value Date    A1C 9.9 06/13/2018    A1C 8.3 10/02/2017    A1C 7.8 07/20/2017    A1C 10.5 03/09/2017    A1C 10.0 11/02/2016     A1C tested: FAILED    Last LDL:    Lab Results   Component Value Date    CHOL 146 06/27/2018     Lab Results   Component Value Date    HDL 42 06/27/2018     Lab Results   Component Value Date    LDL 73 06/27/2018     Lab Results   Component Value Date    TRIG 154 06/27/2018     Lab Results   Component Value Date    CHOLHDLRATIO 3.5 08/19/2015     Lab Results   Component Value Date    NHDL 104 06/27/2018       Is the patient on a Statin? YES             Is the patient on Aspirin? YES    Medications     HMG CoA Reductase Inhibitors    atorvastatin (LIPITOR) 40 MG tablet    Salicylates    aspirin 81 MG EC tablet          Last three blood pressure readings:  BP Readings from Last 3 Encounters:   06/14/18 122/72   05/07/18 132/64   12/27/17 120/60       Date of last diabetes office visit: 6/17/18     Tobacco History:     History   Smoking Status     Former Smoker     Packs/day: 1.50     Years: 29.00     Types: Cigarettes     Quit date: 1/1/2000   Smokeless Tobacco     Never Used         Hypertension   Last three blood pressure readings:  BP Readings from Last 3 Encounters:   06/14/18 122/72   05/07/18 132/64   12/27/17 120/60     Blood pressure: Passed    HTN Guidelines:  Age 18-59 BP range:  Less than 140/90  Age 60-85 with Diabetes:  Less than 140/90  Age 60-85 without Diabetes:  less than 150/90      Composite cancer screening  Chart review shows that this patient is due/due soon for the following None  Summary:    Patient is due/failing the following:   FOLLOW UP    Action needed:   Patient needs office visit for endocrine.    Type of outreach:    none patient has appointment scheduled. if appointment is missed will open new quaility measure.      Questions for provider review:    None                                                                                                                                    Zo Richey CMA ([facility])       Chart routed to closed .

## 2018-09-06 ENCOUNTER — OFFICE VISIT (OUTPATIENT)
Dept: INTERNAL MEDICINE | Facility: CLINIC | Age: 68
End: 2018-09-06
Payer: COMMERCIAL

## 2018-09-06 VITALS
WEIGHT: 251 LBS | RESPIRATION RATE: 16 BRPM | TEMPERATURE: 97.6 F | HEIGHT: 69 IN | BODY MASS INDEX: 37.18 KG/M2 | SYSTOLIC BLOOD PRESSURE: 158 MMHG | DIASTOLIC BLOOD PRESSURE: 82 MMHG | HEART RATE: 73 BPM | OXYGEN SATURATION: 97 %

## 2018-09-06 DIAGNOSIS — E78.5 HYPERLIPIDEMIA LDL GOAL <70: ICD-10-CM

## 2018-09-06 DIAGNOSIS — I10 ESSENTIAL HYPERTENSION: ICD-10-CM

## 2018-09-06 DIAGNOSIS — Z23 NEED FOR PNEUMOCOCCAL VACCINATION: ICD-10-CM

## 2018-09-06 DIAGNOSIS — Z79.4 TYPE 2 DIABETES MELLITUS WITH DIABETIC NEPHROPATHY, WITH LONG-TERM CURRENT USE OF INSULIN (H): ICD-10-CM

## 2018-09-06 DIAGNOSIS — Z23 NEED FOR PROPHYLACTIC VACCINATION AND INOCULATION AGAINST INFLUENZA: ICD-10-CM

## 2018-09-06 DIAGNOSIS — E11.21 TYPE 2 DIABETES MELLITUS WITH DIABETIC NEPHROPATHY, WITH LONG-TERM CURRENT USE OF INSULIN (H): ICD-10-CM

## 2018-09-06 DIAGNOSIS — Z13.6 SCREENING FOR ABDOMINAL AORTIC ANEURYSM: ICD-10-CM

## 2018-09-06 DIAGNOSIS — Z00.00 ENCOUNTER FOR ROUTINE ADULT HEALTH EXAMINATION WITHOUT ABNORMAL FINDINGS: Primary | ICD-10-CM

## 2018-09-06 DIAGNOSIS — E66.01 MORBID OBESITY (H): ICD-10-CM

## 2018-09-06 PROBLEM — I50.21 ACUTE SYSTOLIC HEART FAILURE (H): Status: RESOLVED | Noted: 2017-10-24 | Resolved: 2018-09-06

## 2018-09-06 PROCEDURE — 99397 PER PM REEVAL EST PAT 65+ YR: CPT | Mod: 25 | Performed by: INTERNAL MEDICINE

## 2018-09-06 PROCEDURE — 90732 PPSV23 VACC 2 YRS+ SUBQ/IM: CPT | Performed by: INTERNAL MEDICINE

## 2018-09-06 PROCEDURE — G0009 ADMIN PNEUMOCOCCAL VACCINE: HCPCS | Performed by: INTERNAL MEDICINE

## 2018-09-06 PROCEDURE — G0008 ADMIN INFLUENZA VIRUS VAC: HCPCS | Performed by: INTERNAL MEDICINE

## 2018-09-06 PROCEDURE — 90662 IIV NO PRSV INCREASED AG IM: CPT | Performed by: INTERNAL MEDICINE

## 2018-09-06 ASSESSMENT — ACTIVITIES OF DAILY LIVING (ADL)
I_NEED_ASSISTANCE_FOR_THE_FOLLOWING_DAILY_ACTIVITIES:: NO ASSISTANCE IS NEEDED
CURRENT_FUNCTION: NO ASSISTANCE NEEDED

## 2018-09-06 NOTE — PROGRESS NOTES

## 2018-09-06 NOTE — MR AVS SNAPSHOT
After Visit Summary   9/6/2018    Adan Ruffin    MRN: [id number]           Patient Information     Date Of Birth          1950        Visit Information        Provider Department      9/6/2018 4:40 PM Mayda Pham MD [facility]        Today's Diagnoses     Encounter for routine adult health examination without abnormal findings    -  1    Type 2 diabetes mellitus with diabetic nephropathy, with long-term current use of insulin (H)        Morbid obesity (H)        Essential hypertension        Hyperlipidemia LDL goal <70        Need for prophylactic vaccination and inoculation against influenza        Need for pneumococcal vaccination        Screening for abdominal aortic aneurysm          Care Instructions      Preventive Health Recommendations:   Male Ages 65 and over    Yearly exam:             See your health care provider every year in order to  o   Review health changes.   o   Discuss preventive care.    o   Review your medicines if your doctor has prescribed any.    Talk with your health care provider about whether you should have a test to screen for prostate cancer (PSA).    Every 3 years, have a diabetes test (fasting glucose). If you are at risk for diabetes, you should have this test more often.    Every 5 years, have a cholesterol test. Have this test more often if you are at risk for high cholesterol or heart disease.     Every 10 years, have a colonoscopy. Or, have a yearly FIT test (stool test). These exams will check for colon cancer.    Talk to with your health care provider about screening for Abdominal Aortic Aneurysm if you have a family history of AAA or have a history of smoking.    Shots:     Get a flu shot each year.     Get a tetanus shot every 10 years.     Talk to your doctor about your pneumonia vaccines. There are now two you should receive - Pneumovax (PPSV 23) and Prevnar (PCV 13).     Talk to your pharmacist about a shingles vaccine.     Talk  to your doctor about the hepatitis B vaccine.  Nutrition:     Eat at least 5 servings of fruits and vegetables each day.     Eat whole-grain bread, whole-wheat pasta and brown rice instead of white grains and rice.     Get adequate Calcium and Vitamin D.   Lifestyle    Exercise for at least 150 minutes a week (30 minutes a day, 5 days a week). This will help you control your weight and prevent disease.     Limit alcohol to one drink per day.     No smoking.     Wear sunscreen to prevent skin cancer.     See your dentist every six months for an exam and cleaning.     See your eye doctor every 1 to 2 years to screen for conditions such as glaucoma, macular degeneration, cataracts, etc           Follow-ups after your visit        Your next 10 appointments already scheduled     Sep 20, 2018 11:30 AM CDT   Return Visit with Marilee Tolentino MD   [facility]    [address] [facility] [address]  [facility] [address] [phone]              Who to contact     If you have questions or need follow up information about today's clinic visit or your schedule please contact [facility] directly at [phone].  Normal or non-critical lab and imaging results will be communicated to you by [employer], letter or phone within 4 business days after the clinic has received the results. If you do not hear from us within 7 days, please contact the clinic through [employer] or phone. If you have a critical or abnormal lab result, we will notify you by phone as soon as possible.  Submit refill requests through [employer] or call your pharmacy and they will forward the refill request to us. Please allow 3 business days for your refill to be completed.          Additional Information About Your Visit        [employer] Information     [employer] gives you secure access to your electronic health record. If you see a primary care provider, you can also send messages to your care team and  "make appointments. If you have questions, please call your primary care clinic.  If you do not have a primary care provider, please call [phone] and they will assist you.        Care EveryWhere ID     This is your Care EveryWhere ID. This could be used by other organizations to access your [facility] medical records  [id number]        Your Vitals Were     Pulse Temperature Respirations Height Pulse Oximetry BMI (Body Mass Index)    73 97.6  F (36.4  C) (Oral) 16 5' 9\" (1.753 m) 97% 37.07 kg/m2       Blood Pressure from Last 3 Encounters:   09/06/18 158/82   06/14/18 122/72   05/07/18 132/64    Weight from Last 3 Encounters:   09/06/18 251 lb (113.9 kg)   06/14/18 244 lb 3.2 oz (110.8 kg)   05/07/18 248 lb (112.5 kg)              We Performed the Following     ADMIN: Vaccine, Initial (16726)     FLU VACCINE, INCREASED ANTIGEN, PRESV FREE, AGE 65+ [80198]     Pneumococcal vaccine 23 valent PPSV23  (Pneumovax) [93533]     Vaccine Administration, Each Additional [51848]          Today's Medication Changes          These changes are accurate as of 9/6/18 11:59 PM.  If you have any questions, ask your nurse or doctor.               These medicines have changed or have updated prescriptions.        Dose/Directions    metFORMIN 1000 MG tablet   Commonly known as:  GLUCOPHAGE   This may have changed:  See the new instructions.   Used for:  Type 2 diabetes mellitus with diabetic nephropathy, with long-term current use of insulin (H)   Changed by:  Mayda Pham MD        Dose:  1000 mg   Take 1 tablet (1,000 mg) by mouth 2 times daily (with meals)   Quantity:  180 tablet   Refills:  1         Stop taking these medicines if you haven't already. Please contact your care team if you have questions.     order for DME   Stopped by:  Mayda Pham MD           order for DME   Stopped by:  Mayda Pham MD                Where to get your medicines      These medications were sent to [facility] [address] [place] [address] " - [address]  [address], [facility] [address]     Phone:  [phone]     blood glucose monitoring lancets    blood glucose monitoring test strip    metFORMIN 1000 MG tablet                Primary Care Provider Office Phone # Fax #    Lauro Galloway [phone]       [address] [facility] [address]        Equal Access to Services     DANIEL FAM : Hadii aad ku hadasho Soomaali, waaxda luqadaha, qaybta kaalmada adeegyada, waxay idiin hayaan adeeg kharash la'shahlan ah. So [facility] [phone].    ATENCIÓN: Si habla español, tiene a moore disposición servicios gratuitos de asistencia lingüística. Llame al [phone].    We comply with applicable federal civil rights laws and [place] laws. We do not discriminate on the basis of race, color, national origin, age, disability, sex, sexual orientation, or gender identity.            Thank you!     Thank you for choosing [facility]  for your care. Our goal is always to provide you with excellent care. Hearing back from our patients is one way we can continue to improve our services. Please take a few minutes to complete the written survey that you may receive in the mail after your visit with us. Thank you!             Your Updated Medication List - Protect others around you: Learn how to safely use, store and throw away your medicines at www.disposemymeds.org.          This list is accurate as of 9/6/18 11:59 PM.  Always use your most recent med list.                   Brand Name Dispense Instructions for use Diagnosis    ACE NOT PRESCRIBED (INTENTIONAL)     0 each    1 each daily ACE Inhibitor not prescribed due to Intolerance    Type II or unspecified type diabetes mellitus without mention of complication, not stated as uncontrolled       aspirin 81 MG EC tablet     1 tablet    Take 1 tablet (81 mg) by mouth daily    CAD (coronary artery disease), S/P CABG x 3, Anxiety        atorvastatin 40 MG tablet    LIPITOR    90 tablet    TAKE 1 TABLET BY MOUTH DAILY    Hyperlipidemia LDL goal <70       blood glucose monitoring lancets     200 each    Use to test blood sugar 4 times daily or as directed.    Type 2 diabetes mellitus with diabetic nephropathy, with long-term current use of insulin (H)       blood glucose monitoring test strip    no brand specified    6 Box    Use to test blood sugars 4 times daily or as directed    Type 2 diabetes mellitus with diabetic nephropathy, with long-term current use of insulin (H)       insulin glargine 100 UNIT/ML injection    LANTUS SOLOSTAR    60 mL    Take 90 Units in AM and 80 at night    Type 2 diabetes mellitus with diabetic nephropathy, with long-term current use of insulin (H)       insulin lispro 100 UNIT/ML injection    HumaLOG KWIKpen    90 mL    1 unit per 1 gm of carb before meals three times daily. Average use of 150 Units/day.    Type 2 diabetes mellitus with diabetic nephropathy, with long-term current use of insulin (H)       insulin pen needle 30G X 8 MM    NOVOFINE    300 each    Use 4-5 daily or as directed.    Type 2 diabetes mellitus with diabetic nephropathy, with long-term current use of insulin (H)       metFORMIN 1000 MG tablet    GLUCOPHAGE    180 tablet    Take 1 tablet (1,000 mg) by mouth 2 times daily (with meals)    Type 2 diabetes mellitus with diabetic nephropathy, with long-term current use of insulin (H)       metoprolol succinate 100 MG 24 hr tablet    TOPROL XL    180 tablet    Take 1 tablet (100 mg) by mouth 2 times daily    Typical atrial flutter (H)

## 2018-09-06 NOTE — PROGRESS NOTES
SUBJECTIVE:   CC: Adan Ruffin is an 67 year old man who presents for preventive health visit.     Physical   Annual:     Getting at least 3 servings of Calcium per day:  Yes    Bi-annual eye exam:  Yes    Dental care twice a year:  NO    Sleep apnea or symptoms of sleep apnea:  Daytime drowsiness    Diet:  Diabetic    Frequency of exercise:  2-3 days/week    Taking medications regularly:  Yes    Medication side effects:  None    Additional concerns today:  No    Ability to successfully perform activities of daily living: no assistance needed    Home Safety:  Lack of grab bars in the bathroom    Hearing Impairment: no hearing concerns      Current concerns:  None        Today's PHQ-2 Score:   PHQ-2 ( 1999 Pfizer) 9/6/2018   Q1: Little interest or pleasure in doing things 0   Q2: Feeling down, depressed or hopeless 0   PHQ-2 Score 0   Q1: Little interest or pleasure in doing things Not at all   Q2: Feeling down, depressed or hopeless Not at all   PHQ-2 Score 0       Abuse: Current or Past(Physical, Sexual or Emotional)- No  Do you feel safe in your environment - Yes    Social History   Substance Use Topics     Smoking status: Former Smoker     Packs/day: 1.50     Years: 29.00     Types: Cigarettes     Quit date: 1/1/2000     Smokeless tobacco: Never Used     Alcohol use No      Comment: 1 mixed drink a night     Alcohol Use 9/6/2018   If you drink alcohol do you typically have greater than 3 drinks per day OR greater than 7 drinks per week? No             Review of Systems       Physical Exam    Patient Active Problem List   Diagnosis     CAD (coronary artery disease)     Obesity     S/P CABG x 3     Fluid overload     Anemia due to blood loss, acute     Insulin dependent diabetes mellitus (H)     Anxiety     Hyperlipidemia     HTN (hypertension)     Advanced directives, counseling/discussion     Microalbuminuria     Type 2 diabetes mellitus with diabetic nephropathy, with long-term current use of insulin (H)      Encounter for long-term (current) use of insulin (H)     Acute systolic heart failure (H)     Current Outpatient Prescriptions   Medication Sig Dispense Refill     ACE NOT PRESCRIBED, INTENTIONAL, 1 each daily ACE Inhibitor not prescribed due to Intolerance 0 each 0     aspirin 81 MG EC tablet Take 1 tablet (81 mg) by mouth daily 1 tablet 0     atorvastatin (LIPITOR) 40 MG tablet TAKE 1 TABLET BY MOUTH DAILY 90 tablet 2     blood glucose monitoring (ACCU-CHEK FASTCLIX) lancets Use to test blood sugar 4 times daily or as directed. 200 each 11     blood glucose monitoring (NO BRAND SPECIFIED) test strip Use to test blood sugars 4 times daily or as directed 6 Box 1     insulin glargine (LANTUS SOLOSTAR) 100 UNIT/ML pen Take 90 Units in AM and 80 at night 60 mL 6     insulin lispro (HUMALOG KWIKPEN) 100 UNIT/ML injection 1 unit per 1 gm of carb before meals three times daily. Average use of 150 Units/day. 90 mL 0     insulin pen needle (NOVOFINE) 30G X 8 MM Use 4-5 daily or as directed. 300 each 1     metFORMIN (GLUCOPHAGE) 1000 MG tablet TAKE 1 TABLET BY MOUTH TWICE DAILY WITH MEALS 180 tablet 1     metoprolol succinate (TOPROL XL) 100 MG 24 hr tablet Take 1 tablet (100 mg) by mouth 2 times daily 180 tablet 3        Review Of Systems  Skin: negative  Eyes: negative  Ears/Nose/Throat: He has had some ongoing nasal congestion issues, ENT put him on Flonase with good results  Respiratory: negative  Cardiovascular: negative  Gastrointestinal: negative  Genitourinary: negative  Musculoskeletal: negative  Neurologic: negative  Psychiatric: negative  Hematologic/Lymphatic/Immunologic: negative  Endocrine: negative      Objective:  Patient alert, in no acute distress  /82  Pulse 73  Temp 97.6  F (36.4  C) (Oral)  Resp 16  Wt 251 lb (113.9 kg)  SpO2 97%  BMI 37.07 kg/m2    HEENT: extraocular movements are intact, pupils equal and reactive to light and accommodation, TMs clear, oropharynx clear  NECK: Neck  supple. No adenopathy. Thyroid symmetric, normal size,, Carotids without bruits.  PULMONARY: clear to auscultation  CARDIAC: S1, S2 normal, no murmur, rub or gallop, regular rate and rhythm  PULSES: 2/2 throughout  ABDOMINAL: Soft, nontender.  Normal bowel sounds.  No hepatosplenomegaly or abnormal masses  BREAST: male breasts are normal without masses  RECTAL: Normal sphincter tone, no masses, prostate moderately enlarged, symmetric, without nodules  REFLEXES: 2+ throughout  SKIN unremarkable         ASSESSMENT/PLAN:   1. Encounter for routine adult health examination without abnormal findings      2. Type 2 diabetes mellitus with diabetic nephropathy, with long-term current use of insulin (H)  Refill meds, labs up to date  - blood glucose monitoring (NO BRAND SPECIFIED) test strip; Use to test blood sugars 4 times daily or as directed  Dispense: 6 Box; Refill: 1  - blood glucose monitoring (ACCU-CHEK FASTCLIX) lancets; Use to test blood sugar 4 times daily or as directed.  Dispense: 200 each; Refill: 11  - metFORMIN (GLUCOPHAGE) 1000 MG tablet; Take 1 tablet (1,000 mg) by mouth 2 times daily (with meals)  Dispense: 180 tablet; Refill: 1    3. Morbid obesity (H)  Increased weight, work on diet    4. Essential hypertension  Elevated today, recommend nurse BP check in a few weeks.     5. Need for prophylactic vaccination and inoculation against influenza    - FLU VACCINE, INCREASED ANTIGEN, PRESV FREE, AGE 65+ [56445]  - Vaccine Administration, Each Additional [74136]    6. Need for pneumococcal vaccination    - Pneumococcal vaccine 23 valent PPSV23  (Pneumovax) [76812]  - ADMIN: Vaccine, Initial (61860)    7. Screening for abdominal aortic aneurysm    -  Aorta Medicare AAA Screening; Future    COUNSELING:  Reviewed preventive health counseling, as reflected in patient instructions  Special attention given to:        AAA screening    BP Readings from Last 1 Encounters:   06/14/18 122/72     Estimated body mass  "index is 36.06 kg/(m^2) as calculated from the following:    Height as of 6/14/18: 5' 9\" (1.753 m).    Weight as of 6/14/18: 244 lb 3.2 oz (110.8 kg).      Weight management plan: Discussed healthy diet and exercise guidelines and patient will follow up in 6 months in clinic to re-evaluate.     reports that he quit smoking about 18 years ago. His smoking use included Cigarettes. He has a 43.50 pack-year smoking history. He has never used smokeless tobacco.      Counseling Resources:  ATP IV Guidelines  Pooled Cohorts Equation Calculator  Breast Cancer Risk Calculator  FRAX Risk Assessment  ICSI Preventive Guidelines  Dietary Guidelines for Americans, 2010  USDA's MyPlate  ASA Prophylaxis  Lung CA Screening    Mayda Pham MD  [facility]  Answers for HPI/ROS submitted by the patient on 9/6/2018   PHQ-2 Score: 0    "

## 2018-09-06 NOTE — NURSING NOTE
Physician Results Form was faxed to [employer] at [phone], [facility] Credit Criteria Waiver Form was faxed to [employer] at [phone], and originals were given back to the patient.

## 2018-09-06 NOTE — NURSING NOTE
/82  Pulse 73  Temp 97.6  F (36.4  C) (Oral)  Resp 16  Wt 251 lb (113.9 kg)  SpO2 97%  BMI 37.07 kg/m2

## 2018-09-09 PROBLEM — E66.01 MORBID OBESITY (H): Status: ACTIVE | Noted: 2018-09-09

## 2018-09-09 RX ORDER — LANCETS
EACH MISCELLANEOUS
Qty: 200 EACH | Refills: 11 | Status: SHIPPED | OUTPATIENT
Start: 2018-09-09 | End: 2020-12-03

## 2018-09-14 ENCOUNTER — MYC MEDICAL ADVICE (OUTPATIENT)
Dept: ENDOCRINOLOGY | Facility: CLINIC | Age: 68
End: 2018-09-14

## 2018-09-19 ENCOUNTER — MYC MEDICAL ADVICE (OUTPATIENT)
Dept: ENDOCRINOLOGY | Facility: CLINIC | Age: 68
End: 2018-09-19

## 2018-09-20 ENCOUNTER — OFFICE VISIT (OUTPATIENT)
Dept: ENDOCRINOLOGY | Facility: CLINIC | Age: 68
End: 2018-09-20
Payer: COMMERCIAL

## 2018-09-20 VITALS
TEMPERATURE: 97.4 F | OXYGEN SATURATION: 95 % | DIASTOLIC BLOOD PRESSURE: 84 MMHG | RESPIRATION RATE: 24 BRPM | WEIGHT: 257 LBS | HEART RATE: 85 BPM | HEIGHT: 69 IN | SYSTOLIC BLOOD PRESSURE: 190 MMHG | BODY MASS INDEX: 38.06 KG/M2

## 2018-09-20 DIAGNOSIS — I10 ESSENTIAL HYPERTENSION: ICD-10-CM

## 2018-09-20 DIAGNOSIS — E78.5 HYPERLIPIDEMIA LDL GOAL <70: Primary | ICD-10-CM

## 2018-09-20 DIAGNOSIS — E11.21 TYPE 2 DIABETES MELLITUS WITH DIABETIC NEPHROPATHY, WITH LONG-TERM CURRENT USE OF INSULIN (H): ICD-10-CM

## 2018-09-20 DIAGNOSIS — Z79.4 TYPE 2 DIABETES MELLITUS WITH DIABETIC NEPHROPATHY, WITH LONG-TERM CURRENT USE OF INSULIN (H): ICD-10-CM

## 2018-09-20 DIAGNOSIS — I25.10 CORONARY ARTERY DISEASE INVOLVING NATIVE CORONARY ARTERY OF NATIVE HEART WITHOUT ANGINA PECTORIS: ICD-10-CM

## 2018-09-20 LAB — HBA1C MFR BLD: 8.4 % (ref 0–5.6)

## 2018-09-20 PROCEDURE — 83036 HEMOGLOBIN GLYCOSYLATED A1C: CPT | Performed by: INTERNAL MEDICINE

## 2018-09-20 PROCEDURE — 99214 OFFICE O/P EST MOD 30 MIN: CPT | Performed by: INTERNAL MEDICINE

## 2018-09-20 PROCEDURE — 36415 COLL VENOUS BLD VENIPUNCTURE: CPT | Performed by: INTERNAL MEDICINE

## 2018-09-20 NOTE — MR AVS SNAPSHOT
After Visit Summary   2018    Adan Ruffin    MRN: [id number]           Patient Information     Date Of Birth          1950        Visit Information        Provider Department      2018 11:30 AM Marilee Tolentino MD [facility]        Today's Diagnoses     Type 2 diabetes mellitus with diabetic nephropathy, with long-term current use of insulin (H)          Care Instructions    [facility] & [place] locations   Dr Tolentino, Endocrinology Department      [facility]   [address] [address]  Appointment [phone]  Fax: [phone]   Monday and Tuesday         [facility]   [address] [facility] [place] [address]  Appointment [phone]  Fax: [phone]  Wednesday and Thursday           Labs today  Continue current regimen  Labs and follow up in 3 months  Try to be consistent with insulin and other medications    If you are having low Blood glucose then call us and decrease lantus by 10 Units in AM.  Recommend checking blood sugars before meals and at bedtime.    If Blood glucose are low more often-> 2-3 times/week- give us a call.  The patient is advised to Make better food choices: reduce carbs, Reduce portion size, weight loss and exercise 3-4 times a week.  Discussed hypoglycemia signs and symptoms as well as management in detail.                Follow-ups after your visit        Your next 10 appointments already scheduled     Sep 27, 2018  9:00 AM CDT   US AORTA MEDICARE AAA SCREENING with [id number]   [facility] [place] ([facility])    [address]  [place] [facility] [place] [address] [phone]           How do I prepare for my exam? (Food and drink instructions) Adults: No eating, smoking, gum chewing or drinking for 8 hours before the exam. You may take medicine with a small sip of water.   Children: * Infants, breast-fed: may have breast milk up to 2 hours before exam. * Infants, formula: may have bottle until 4 hours before exam. * Children 1-5 years: No food or drink for 4 hours before exam. * Children 6 -12 years: No food or drink for 6 hours before exam. * Children over 12 years: No food or drink for 8 hours before exam.  * J Tube Fed: No need to stop feedings.  What should I wear: Wear comfortable clothes.  How long does the exam take: Most ultrasounds take 30 to 60 minutes.  What should I bring: Bring a list of your medicines, including vitamins, minerals and over-the-counter drugs. It is safest to leave personal items at home.  Do I need a :  No  is needed.  What do I need to tell my doctor: Tell your doctor about any allergies you may have.  What should I do after the exam: No restrictions, You may resume normal activities.  What is this test: An ultrasound uses sound waves to make pictures of the body. Sound waves do not cause pain. The only discomfort may be the pressure of the wand against your skin or full bladder.  Who should I call with questions: If you have any questions, please call the Imaging Department where you will have your exam. Directions, parking instructions, and other information is available on our website, [email or web address].              Who to contact     If you have questions or need follow up information about today's clinic visit or your schedule please contact [facility] directly at [phone].  Normal or non-critical lab and imaging results will be communicated to you by MyChart, letter or phone within 4 business days after the clinic has received the results. If you do not hear from us within 7 days, please contact the clinic through [facility] or phone. If you have a critical or abnormal lab result, we will notify you by phone as soon as possible.  Submit refill requests through [employer] or call your pharmacy and they will forward the  "refill request to us. Please allow 3 business days for your refill to be completed.          Additional Information About Your Visit        Zoe Majestehart Information     [facility] gives you secure access to your electronic health record. If you see a primary care provider, you can also send messages to your care team and make appointments. If you have questions, please call your primary care clinic.  If you do not have a primary care provider, please call [phone] and they will assist you.        Care EveryWhere ID     This is your Care EveryWhere ID. This could be used by other organizations to access your [place] medical records  [id number]        Your Vitals Were     Pulse Temperature Respirations Height Pulse Oximetry BMI (Body Mass Index)    85 97.4  F (36.3  C) (Oral) 24 5' 9\" (1.753 m) 95% 37.95 kg/m2       Blood Pressure from Last 3 Encounters:   09/20/18 190/84   09/06/18 158/82   06/14/18 122/72    Weight from Last 3 Encounters:   09/20/18 257 lb (116.6 kg)   09/06/18 251 lb (113.9 kg)   06/14/18 244 lb 3.2 oz (110.8 kg)              We Performed the Following     Hemoglobin A1c          Today's Medication Changes          These changes are accurate as of 9/20/18 12:06 PM.  If you have any questions, ask your nurse or doctor.               These medicines have changed or have updated prescriptions.        Dose/Directions    insulin lispro 100 UNIT/ML injection   Commonly known as:  HumaLOG KWIKpen   This may have changed:  additional instructions   Used for:  Type 2 diabetes mellitus with diabetic nephropathy, with long-term current use of insulin (H)   Changed by:  Marilee Tolentino MD        1 unit per 1 gm of carb before meals three times daily. Average use of 170 Units/day.   Quantity:  90 mL   Refills:  3            Where to get your medicines      These medications were sent to [facility] [address] [place], [place] - [address] [facility] [address] [facility] [address]  [address], " [facility] [address]     Phone:  [phone]     insulin lispro 100 UNIT/ML injection                Primary Care Provider Office Phone # Fax #    Lauro Galloway [phone]       [address]  [facility] [address]        Equal Access to Services     DANIEL FAM : Hadii aad ku hadasho Soomaali, waaxda luqadaha, qaybta kaalmada adeegyada, waxay idiin hayaan adeeg domingo la'aan . So [facility] [phone].    ATENCIÓN: Si habla español, tiene a moore disposición servicios gratuitos de asistencia lingüística. Llame al [phone].    We comply with applicable federal civil rights laws and [place] laws. We do not discriminate on the basis of race, color, national origin, age, disability, sex, sexual orientation, or gender identity.            Thank you!     Thank you for choosing [facility]  for your care. Our goal is always to provide you with excellent care. Hearing back from our patients is one way we can continue to improve our services. Please take a few minutes to complete the written survey that you may receive in the mail after your visit with us. Thank you!             Your Updated Medication List - Protect others around you: Learn how to safely use, store and throw away your medicines at www.disposemymeds.org.          This list is accurate as of 9/20/18 12:06 PM.  Always use your most recent med list.                   Brand Name Dispense Instructions for use Diagnosis    ACE NOT PRESCRIBED (INTENTIONAL)     0 each    1 each daily ACE Inhibitor not prescribed due to Intolerance    Type II or unspecified type diabetes mellitus without mention of complication, not stated as uncontrolled       aspirin 81 MG EC tablet     1 tablet    Take 1 tablet (81 mg) by mouth daily    CAD (coronary artery disease), S/P CABG x 3, Anxiety       atorvastatin 40 MG tablet    LIPITOR    90 tablet    TAKE 1 TABLET BY MOUTH DAILY    Hyperlipidemia LDL goal <70       blood glucose  monitoring lancets     200 each    Use to test blood sugar 4 times daily or as directed.    Type 2 diabetes mellitus with diabetic nephropathy, with long-term current use of insulin (H)       blood glucose monitoring test strip    no brand specified    6 Box    Use to test blood sugars 4 times daily or as directed    Type 2 diabetes mellitus with diabetic nephropathy, with long-term current use of insulin (H)       insulin glargine 100 UNIT/ML injection    LANTUS SOLOSTAR    60 mL    Take 90 Units in AM and 80 at night    Type 2 diabetes mellitus with diabetic nephropathy, with long-term current use of insulin (H)       insulin lispro 100 UNIT/ML injection    HumaLOG KWIKpen    90 mL    1 unit per 1 gm of carb before meals three times daily. Average use of 170 Units/day.    Type 2 diabetes mellitus with diabetic nephropathy, with long-term current use of insulin (H)       insulin pen needle 30G X 8 MM    NOVOFINE    300 each    Use 4-5 daily or as directed.    Type 2 diabetes mellitus with diabetic nephropathy, with long-term current use of insulin (H)       metFORMIN 1000 MG tablet    GLUCOPHAGE    180 tablet    Take 1 tablet (1,000 mg) by mouth 2 times daily (with meals)    Type 2 diabetes mellitus with diabetic nephropathy, with long-term current use of insulin (H)       metoprolol succinate 100 MG 24 hr tablet    TOPROL XL    180 tablet    Take 1 tablet (100 mg) by mouth 2 times daily    Typical atrial flutter (H)

## 2018-09-20 NOTE — PROGRESS NOTES
ENDOCRINOLOGY CLINIC NOTE:  Name: Adan Ruffin  Seen for follow-up of diabetes .  HPI:  Adan Ruffin is a 67 year old male who presents for the evaluation/management of Diabetes.he was diagnosed with diabetes about 20 years back and is on insulin for last 15 years.  Blood sugar control was optimal.  A few years back.  Underwent CABG in April 2015  Underwent ablation 2017.    Previously we tried transition to U-500 but it was not covered by insurnace. PA was denied. Was switched back to lantus and Humalog following that.    Previously he was taking 1 unit per 3 g of carbs of Humalog.  After that he misunderstood the dosing and he increased the dose and started to take Humalog 1 unit per 1 g of carb.  He thinks that Lantus and Humalog works well for him rather than Toujeo or U-500 and would like to continue that..  Reports that CDE visit or CGM placement is not covered by insurance.    1. Type 2 DM:  Orginally diagnosed about 20 years back  Current Regimen:   yes:     Diabetes Medication(s)     Biguanides Sig    metFORMIN (GLUCOPHAGE) 1000 MG tablet Take 1 tablet (1,000 mg) by mouth 2 times daily (with meals)    Insulin Sig    insulin glargine (LANTUS SOLOSTAR) 100 UNIT/ML pen Take 90 Units in AM and 80 at night    insulin lispro (HUMALOG KWIKPEN) 100 UNIT/ML injection 1 unit per 1 gm of carb before meals three times daily. Average use of 150 Units/day.        Though I do not see that he is checking before each meal or using correctional scale.  BG checks: in the morning and before going to bed. Not checking everyday.  Average Meter Download: 143 with SD 70. Highest was 321 and lowest 48. BG variable.  Symptoms of hypoglycemia (low blood sugar):  Gets symptoms of hypoglycemia.   Episodes of hypoglycemia:occasional  Fixed meal pattern:typically has breakfast and dinner.  Sometimes skips lunch.  Patient counting carbs:no    DM Complications:   Nephropathy: has microalbuminuria  Retinopathy: no  Neuropathy:  no  Microalbuminuria:present, on lisinopril 5 mg  CAD/PAD: yes, CABG in April 2015  Gastroparesis: no  Hypoglycemia unawareness: no    2. Hypertension:    Blood pressure medications include lisinopril 5 mg  3. Hyperlipidemia: Takes atorvastatin 40 mg       PMH/PSH:  Past Medical History:   Diagnosis Date     Atrial flutter (H) 10/02/2017    ablation 11/26/2017     CAD (coronary artery disease) 03/13/2015    CABG 2013     Cardiomyopathy (H) 10/02/2017    (L) ventricular systolic     HTN (hypertension)      Hyperlipidaemia      Microalbuminuria 6/27/2016     Obesity 3/13/2015     Type 2 diabetes mellitus with diabetic nephropathy, with long-term current use of insulin (H) 11/2/2016     Past Surgical History:   Procedure Laterality Date     BYPASS GRAFT ARTERY CORONARY N/A 4/7/2015    bypass LAD, OM, PDA        COLONOSCOPY N/A 6/20/2016    Procedure: COLONOSCOPY;  Surgeon: Marcela Schwartz MD;  Location: [facility]     EXCISE PILONIDAL CYST, SIMPLE  1975     HEART CATH, ANGIOPLASTY  3-    3 vessel disease-occluded RCA, stenosis LM-to have CABG     removal of skin cancer  1992     TONSILLECTOMY & ADENOIDECTOMY  1950     Family Hx:  Family History   Problem Relation Age of Onset     Breast Cancer Mother      Cerebrovascular Disease Mother      Hyperlipidemia Father      Cancer Father      lung, unrelated to smoking     Cancer Paternal Grandmother        Diabetes:    Social Hx:  Social History     Social History     Marital status:      Spouse name: N/A     Number of children: N/A     Years of education: N/A     Occupational History     Not on file.     Social History Main Topics     Smoking status: Former Smoker     Packs/day: 1.50     Years: 29.00     Types: Cigarettes     Quit date: 1/1/2000     Smokeless tobacco: Never Used     Alcohol use No      Comment: 1 mixed drink a night     Drug use: No     Sexual activity: Yes     Partners: Female     Other Topics Concern     Caffeine Concern No     none      "Sleep Concern No     Stress Concern Yes     sometimes at home     Weight Concern Yes     would like to lose weight     Special Diet No     Exercise No     Seat Belt Yes     Social History Narrative          MEDICATIONS:  has a current medication list which includes the following prescription(s): aspirin, atorvastatin, blood glucose monitoring, blood glucose monitoring, insulin glargine, insulin lispro, insulin pen needle, metformin, metoprolol succinate, and ACE NOT PRESCRIBED, INTENTIONAL,.    ROS     ROS: 10 point ROS neg other than the symptoms noted above in the HPI.    Physical Exam   VS: /84  Pulse 85  Temp 97.4  F (36.3  C) (Oral)  Resp 24  Ht 1.753 m (5' 9\")  Wt 116.6 kg (257 lb)  SpO2 95%  BMI 37.95 kg/m2  GENERAL: AXOX3, NAD, well dressed, answering questions appropriately, appears stated age.  HEENT: No exopthalmous, no proptosis, EOMI, no lig lag, no retraction  NECK: Thyroid normal in size, non tender, no nodules were palpated.  CV: RRR  LUNGS: CTAB  ABDOMEN: +BS  NEUROLOGY: CN grossly intact, no tremors  PSYCH: normal affect and mood      LABS:  A1c:  Lab Results   Component Value Date    A1C 8.4 09/20/2018    A1C 9.9 06/13/2018    A1C 8.3 10/02/2017    A1C 7.8 07/20/2017    A1C 10.5 03/09/2017       Creatinine:  Creatinine   Date Value Ref Range Status   06/13/2018 1.15 0.66 - 1.25 mg/dL Final       Urine Micro:  Lab Results   Component Value Date    UMALCR 34.05 05/24/2016         LFTs/Lipids:  Recent Labs   Lab Test  03/09/17   0821  12/28/15   0907  08/19/15   0934 01/08/15   CHOL  142  137  176  154   HDL  46  58  51  64   LDL  69  66  70  69   TRIG  134  66  275*  104   CHOLHDLRATIO   --    --   3.5  2.4       TFTs:  TSH   Date Value Ref Range Status   10/02/2017 1.54 0.40 - 4.00 mU/L Final     All pertinent notes, labs, and images personally reviewed by me.       A/P  Mr.Raymond SANIA Ruffin is a 67 year old here for the evaluation/management of diabetes:    1. DM2 - Under fair control.  " A1c 9.9%.  Diabetes complicated by microalbuminuria and vasculopathy.  History of CABG in April 2015.  Previously we tried transition to U-500 but it was not covered by insurnace. PA was denied. Was switched back to lantus and Humalog following that.  BG variable.   Limited data- discussed diagnostic CGM - but reports that it is not covered by insurance.  Has increased Novolog since last clinic visit and currently taking 1 Unit/1 gm of CHO.  Glipizide was discontinued previously.  variable BG   No consistent with insulin or other meds. Sometimes takes twice resulting in low BG.  Discussed to use planner/chart and put it on kitchen cabinet to make sure he has taken insulin and avoid repeat injections.  He will do that.  Recommend to decrease insulin the setting of hypoglycemia but he does not want to change regimen today and wants to focus on taking it correctly  Plan:  Continue Lantus at current dose- Take 90 Units in AM and 80 at night.  Novolog- continue at current dose 1 Unit/ 1 gm of CHO + SSI  Continue metformin.  Consider GLP-1 RA but cost can be a limiting factor.  Labs today.    Recommend checking blood sugars before meals and at bedtime.    If Blood glucose are low more often-> 2-3 times/week- give us a call.  The patient is advised to Make better food choices: reduce carbs, Reduce portion size, weight loss and exercise 3-4 times a week.  Discussed hypoglycemia signs and symptoms as well as management in detail.       2. Hypertension - Under Good control.  Continue lisinopril 5 mg      3. Hyperlipidemia - Under Good control.  Continue atorvastatin 40 mg.  LDL 66, HDL 58     4. Prevention  Flu Shot- November 2015  Pneumovax- 9/2016  Opthalmology- 3/2015. Due for eye exam. Encouraged f/u  ASA- 325 mg  Smoking- no    5. Microalbuminuria:  Lab Results   Component Value Date    UMALCR 283.44 06/13/2018   -- continue lisinopril 5 mg  -- recommend strict BG control.    All questions were answered.  The patient  indicates understanding of the above issues and agrees with the plan set forth.     More than 50% of face to face time spent with Mr. Ruffin on counseling / coordinating his care.  Total appointment times was 25 minutes.      Follow-up:  follow up in 3 months    Marilee Tolentino M.D  Endocrinology  [facility]  CC: Lauro Galloway    Disclaimer: This note consists of symbols derived from keyboarding, dictation and/or voice recognition software. As a result, there may be errors in the script that have gone undetected. Please consider this when interpreting information found in this chart.    Addendum to above note and clinic visit:    Labs reviewed.    See result note/telephone encounter.

## 2018-09-20 NOTE — LETTER
9/20/2018         RE: Adan Ruffin  [id number] [place] [address]        Dear Colleague,    Thank you for referring your patient, Adan Ruffin, to the [facility]. Please see a copy of my visit note below.    ENDOCRINOLOGY CLINIC NOTE:  Name: Adan Ruffin  Seen for follow-up of diabetes .  HPI:  Adan Ruffin is a 67 year old male who presents for the evaluation/management of Diabetes.he was diagnosed with diabetes about 20 years back and is on insulin for last 15 years.  Blood sugar control was optimal.  A few years back.  Underwent CABG in April 2015  Underwent ablation 2017.    Previously we tried transition to U-500 but it was not covered by insurnace. PA was denied. Was switched back to lantus and Humalog following that.    Previously he was taking 1 unit per 3 g of carbs of Humalog.  After that he misunderstood the dosing and he increased the dose and started to take Humalog 1 unit per 1 g of carb.  He thinks that Lantus and Humalog works well for him rather than Toujeo or U-500 and would like to continue that..  Reports that CDE visit or CGM placement is not covered by insurance.    1. Type 2 DM:  Orginally diagnosed about 20 years back  Current Regimen:   yes:     Diabetes Medication(s)     Biguanides Sig    metFORMIN (GLUCOPHAGE) 1000 MG tablet Take 1 tablet (1,000 mg) by mouth 2 times daily (with meals)    Insulin Sig    insulin glargine (LANTUS SOLOSTAR) 100 UNIT/ML pen Take 90 Units in AM and 80 at night    insulin lispro (HUMALOG KWIKPEN) 100 UNIT/ML injection 1 unit per 1 gm of carb before meals three times daily. Average use of 150 Units/day.        Though I do not see that he is checking before each meal or using correctional scale.  BG checks: in the morning and before going to bed. Not checking everyday.  Average Meter Download: 143 with SD 70. Highest was 321 and lowest 48. BG variable.  Symptoms of hypoglycemia (low blood sugar):  Gets symptoms of  hypoglycemia.   Episodes of hypoglycemia:occasional  Fixed meal pattern:typically has breakfast and dinner.  Sometimes skips lunch.  Patient counting carbs:no    DM Complications:   Nephropathy: has microalbuminuria  Retinopathy: no  Neuropathy: no  Microalbuminuria:present, on lisinopril 5 mg  CAD/PAD: yes, CABG in April 2015  Gastroparesis: no  Hypoglycemia unawareness: no    2. Hypertension:    Blood pressure medications include lisinopril 5 mg  3. Hyperlipidemia: Takes atorvastatin 40 mg       PMH/PSH:  Past Medical History:   Diagnosis Date     Atrial flutter (H) 10/02/2017    ablation 11/26/2017     CAD (coronary artery disease) 03/13/2015    CABG 2013     Cardiomyopathy (H) 10/02/2017    (L) ventricular systolic     HTN (hypertension)      Hyperlipidaemia      Microalbuminuria 6/27/2016     Obesity 3/13/2015     Type 2 diabetes mellitus with diabetic nephropathy, with long-term current use of insulin (H) 11/2/2016     Past Surgical History:   Procedure Laterality Date     BYPASS GRAFT ARTERY CORONARY N/A 4/7/2015    bypass LAD, OM, PDA        COLONOSCOPY N/A 6/20/2016    Procedure: COLONOSCOPY;  Surgeon: Marcela Schwartz MD;  Location: [facility]     EXCISE PILONIDAL CYST, SIMPLE  1975     HEART CATH, ANGIOPLASTY  3-    3 vessel disease-occluded RCA, stenosis LM-to have CABG     removal of skin cancer  1992     TONSILLECTOMY & ADENOIDECTOMY  1950     Family Hx:  Family History   Problem Relation Age of Onset     Breast Cancer Mother      Cerebrovascular Disease Mother      Hyperlipidemia Father      Cancer Father      lung, unrelated to smoking     Cancer Paternal Grandmother        Diabetes:    Social Hx:  Social History     Social History     Marital status:      Spouse name: N/A     Number of children: N/A     Years of education: N/A     Occupational History     Not on file.     Social History Main Topics     Smoking status: Former Smoker     Packs/day: 1.50     Years: 29.00     Types:  "Cigarettes     Quit date: 1/1/2000     Smokeless tobacco: Never Used     Alcohol use No      Comment: 1 mixed drink a night     Drug use: No     Sexual activity: Yes     Partners: Female     Other Topics Concern     Caffeine Concern No     none     Sleep Concern No     Stress Concern Yes     sometimes at home     Weight Concern Yes     would like to lose weight     Special Diet No     Exercise No     Seat Belt Yes     Social History Narrative          MEDICATIONS:  has a current medication list which includes the following prescription(s): aspirin, atorvastatin, blood glucose monitoring, blood glucose monitoring, insulin glargine, insulin lispro, insulin pen needle, metformin, metoprolol succinate, and ACE NOT PRESCRIBED, INTENTIONAL,.    ROS     ROS: 10 point ROS neg other than the symptoms noted above in the HPI.    Physical Exam   VS: /84  Pulse 85  Temp 97.4  F (36.3  C) (Oral)  Resp 24  Ht 1.753 m (5' 9\")  Wt 116.6 kg (257 lb)  SpO2 95%  BMI 37.95 kg/m2  GENERAL: AXOX3, NAD, well dressed, answering questions appropriately, appears stated age.  HEENT: No exopthalmous, no proptosis, EOMI, no lig lag, no retraction  NECK: Thyroid normal in size, non tender, no nodules were palpated.  CV: RRR  LUNGS: CTAB  ABDOMEN: +BS  NEUROLOGY: CN grossly intact, no tremors  PSYCH: normal affect and mood      LABS:  A1c:  Lab Results   Component Value Date    A1C 8.4 09/20/2018    A1C 9.9 06/13/2018    A1C 8.3 10/02/2017    A1C 7.8 07/20/2017    A1C 10.5 03/09/2017       Creatinine:  Creatinine   Date Value Ref Range Status   06/13/2018 1.15 0.66 - 1.25 mg/dL Final       Urine Micro:  Lab Results   Component Value Date    UMALCR 34.05 05/24/2016         LFTs/Lipids:  Recent Labs   Lab Test  03/09/17   0821  12/28/15   0907  08/19/15   0934 01/08/15   CHOL  142  137  176  154   HDL  46  58  51  64   LDL  69  66  70  69   TRIG  134  66  275*  104   CHOLHDLRATIO   --    --   3.5  2.4       TFTs:  TSH   Date Value Ref " Range Status   10/02/2017 1.54 0.40 - 4.00 mU/L Final     All pertinent notes, labs, and images personally reviewed by me.       A/P  Mr.Raymond SANIA Ruffin is a 67 year old here for the evaluation/management of diabetes:    1. DM2 - Under fair control.  A1c 9.9%.  Diabetes complicated by microalbuminuria and vasculopathy.  History of CABG in April 2015.  Previously we tried transition to U-500 but it was not covered by insurnace. PA was denied. Was switched back to lantus and Humalog following that.  BG variable.   Limited data- discussed diagnostic CGM - but reports that it is not covered by insurance.  Has increased Novolog since last clinic visit and currently taking 1 Unit/1 gm of CHO.  Glipizide was discontinued previously.  variable BG   No consistent with insulin or other meds. Sometimes takes twice resulting in low BG.  Discussed to use planner/chart and put it on kitchen cabinet to make sure he has taken insulin and avoid repeat injections.  He will do that.  Recommend to decrease insulin the setting of hypoglycemia but he does not want to change regimen today and wants to focus on taking it correctly  Plan:  Continue Lantus at current dose- Take 90 Units in AM and 80 at night.  Novolog- continue at current dose 1 Unit/ 1 gm of CHO + SSI  Continue metformin.  Consider GLP-1 RA but cost can be a limiting factor.  Labs today.    Recommend checking blood sugars before meals and at bedtime.    If Blood glucose are low more often-> 2-3 times/week- give us a call.  The patient is advised to Make better food choices: reduce carbs, Reduce portion size, weight loss and exercise 3-4 times a week.  Discussed hypoglycemia signs and symptoms as well as management in detail.       2. Hypertension - Under Good control.  Continue lisinopril 5 mg      3. Hyperlipidemia - Under Good control.  Continue atorvastatin 40 mg.  LDL 66, HDL 58     4. Prevention  Flu Shot- November 2015  Pneumovax- 9/2016  Opthalmology- 3/2015. Due for  eye exam. Encouraged f/u  ASA- 325 mg  Smoking- no    5. Microalbuminuria:  Lab Results   Component Value Date    UMALCR 283.44 06/13/2018   -- continue lisinopril 5 mg  -- recommend strict BG control.    All questions were answered.  The patient indicates understanding of the above issues and agrees with the plan set forth.     More than 50% of face to face time spent with Mr. Ruffin on counseling / coordinating his care.  Total appointment times was 25 minutes.      Follow-up:  follow up in 3 months    Marilee Tolentino M.D  Endocrinology  [facility]  CC: Lauro Galloway    Disclaimer: This note consists of symbols derived from keyboarding, dictation and/or voice recognition software. As a result, there may be errors in the script that have gone undetected. Please consider this when interpreting information found in this chart.    Addendum to above note and clinic visit:    Labs reviewed.    See result note/telephone encounter.              Again, thank you for allowing me to participate in the care of your patient.        Sincerely,        Marilee Tolentino MD

## 2018-09-20 NOTE — PATIENT INSTRUCTIONS
[facility]   Dr Tolentino, Endocrinology Department      [facility]   [address] [address]  Appointment [phone]  Fax: [phone]   Monday and Tuesday         [facility]   [address] [place] [address]  Appointment [phone]  Fax: [phone]  Wednesday and Thursday           Labs today  Continue current regimen  Labs and follow up in 3 months  Try to be consistent with insulin and other medications    If you are having low Blood glucose then call us and decrease lantus by 10 Units in AM.  Recommend checking blood sugars before meals and at bedtime.    If Blood glucose are low more often-> 2-3 times/week- give us a call.  The patient is advised to Make better food choices: reduce carbs, Reduce portion size, weight loss and exercise 3-4 times a week.  Discussed hypoglycemia signs and symptoms as well as management in detail.

## 2018-09-25 ENCOUNTER — MYC MEDICAL ADVICE (OUTPATIENT)
Dept: INTERNAL MEDICINE | Facility: CLINIC | Age: 68
End: 2018-09-25

## 2018-09-27 ENCOUNTER — HOSPITAL ENCOUNTER (OUTPATIENT)
Dept: ULTRASOUND IMAGING | Facility: CLINIC | Age: 68
Discharge: HOME | End: 2018-09-27
Attending: INTERNAL MEDICINE | Admitting: INTERNAL MEDICINE
Payer: COMMERCIAL

## 2018-09-27 DIAGNOSIS — Z13.6 SCREENING FOR ABDOMINAL AORTIC ANEURYSM: ICD-10-CM

## 2018-09-27 PROCEDURE — 76706 US ABDL AORTA SCREEN AAA: CPT

## 2018-10-17 ENCOUNTER — TELEPHONE (OUTPATIENT)
Dept: ENDOCRINOLOGY | Facility: CLINIC | Age: 68
End: 2018-10-17

## 2018-10-17 NOTE — TELEPHONE ENCOUNTER
Panel Management Review      Patient has the following on his problem list:     Diabetes    ASA: Passed    Last A1C  Lab Results   Component Value Date    A1C 8.4 09/20/2018    A1C 9.9 06/13/2018    A1C 8.3 10/02/2017    A1C 7.8 07/20/2017    A1C 10.5 03/09/2017     A1C tested: FAILED    Last LDL:    Lab Results   Component Value Date    CHOL 146 06/27/2018     Lab Results   Component Value Date    HDL 42 06/27/2018     Lab Results   Component Value Date    LDL 73 06/27/2018     Lab Results   Component Value Date    TRIG 154 06/27/2018     Lab Results   Component Value Date    CHOLHDLRATIO 3.5 08/19/2015     Lab Results   Component Value Date    NHDL 104 06/27/2018       Is the patient on a Statin? YES             Is the patient on Aspirin? YES    Medications     HMG CoA Reductase Inhibitors    atorvastatin (LIPITOR) 40 MG tablet    Salicylates    aspirin 81 MG EC tablet          Last three blood pressure readings:  BP Readings from Last 3 Encounters:   09/20/18 190/84   09/06/18 158/82   06/14/18 122/72       Date of last diabetes office visit: 09/20/18     Tobacco History:     History   Smoking Status     Former Smoker     Packs/day: 1.50     Years: 29.00     Types: Cigarettes     Quit date: 1/1/2000   Smokeless Tobacco     Never Used           Composite cancer screening  Chart review shows that this patient is due/due soon for the following None  Summary:    Patient is due/failing the following:   A1C and FOLLOW UP    Action needed:   Patient needs office visit for dm.    Type of outreach:    Sent [employer] message.    Questions for provider review:    None                                                                                    Erika Hall CMA

## 2018-11-13 DIAGNOSIS — Z79.4 TYPE 2 DIABETES MELLITUS WITH DIABETIC NEPHROPATHY, WITH LONG-TERM CURRENT USE OF INSULIN (H): ICD-10-CM

## 2018-11-13 DIAGNOSIS — E11.21 TYPE 2 DIABETES MELLITUS WITH DIABETIC NEPHROPATHY, WITH LONG-TERM CURRENT USE OF INSULIN (H): ICD-10-CM

## 2018-11-13 NOTE — TELEPHONE ENCOUNTER
"Requested Prescriptions   Pending Prescriptions Disp Refills     NOVOFINE 30 30G X 8 MM insulin pen needle [Pharmacy Med Name: NOVOFINE AUTOCVR [id number] PEN NEEDLE]  Last Written Prescription Date:  6/27/18  Last Fill Quantity: 300,  # refills: 1   Last office visit: No previous visit found with prescribing provider:  Vero   Future Office Visit:     300 each 0     Sig: USE 4 TO 5 DAILY OR AS DIRECTED    Diabetic Supplies Protocol Passed    11/13/2018  3:15 AM       Passed - Patient is 18 years of age or older       Passed - Recent (6 mo) or future (30 days) visit within the authorizing provider's specialty    Patient had office visit in the last 6 months or has a visit in the next 30 days with authorizing provider.  See \"Patient Info\" tab in inbasket, or \"Choose Columns\" in Meds & Orders section of the refill encounter.              "

## 2018-11-14 NOTE — TELEPHONE ENCOUNTER
Prescription approved per [facility] Refill Protocol.  Loraine Nobles RN

## 2018-11-19 ENCOUNTER — RADIANT APPOINTMENT (OUTPATIENT)
Dept: GENERAL RADIOLOGY | Facility: CLINIC | Age: 68
End: 2018-11-19
Attending: FAMILY MEDICINE
Payer: COMMERCIAL

## 2018-11-19 ENCOUNTER — OFFICE VISIT (OUTPATIENT)
Dept: FAMILY MEDICINE | Facility: CLINIC | Age: 68
End: 2018-11-19
Payer: COMMERCIAL

## 2018-11-19 VITALS
BODY MASS INDEX: 37.8 KG/M2 | DIASTOLIC BLOOD PRESSURE: 79 MMHG | RESPIRATION RATE: 20 BRPM | SYSTOLIC BLOOD PRESSURE: 155 MMHG | TEMPERATURE: 97.9 F | WEIGHT: 256 LBS | HEART RATE: 72 BPM

## 2018-11-19 DIAGNOSIS — S82.892A CLOSED AVULSION FRACTURE OF LEFT ANKLE, INITIAL ENCOUNTER: ICD-10-CM

## 2018-11-19 DIAGNOSIS — S99.912A ANKLE INJURY, LEFT, INITIAL ENCOUNTER: ICD-10-CM

## 2018-11-19 DIAGNOSIS — S93.402A SPRAIN OF LEFT ANKLE, UNSPECIFIED LIGAMENT, INITIAL ENCOUNTER: ICD-10-CM

## 2018-11-19 DIAGNOSIS — S99.912A ANKLE INJURY, LEFT, INITIAL ENCOUNTER: Primary | ICD-10-CM

## 2018-11-19 PROCEDURE — 73630 X-RAY EXAM OF FOOT: CPT | Mod: LT

## 2018-11-19 PROCEDURE — 99214 OFFICE O/P EST MOD 30 MIN: CPT | Performed by: FAMILY MEDICINE

## 2018-11-19 PROCEDURE — 73610 X-RAY EXAM OF ANKLE: CPT | Mod: LT

## 2018-11-19 NOTE — MR AVS SNAPSHOT
After Visit Summary   11/19/2018    Adan Ruffin    MRN: [id number]           Patient Information     Date Of Birth          1950        Visit Information        Provider Department      11/19/2018 11:20 AM Ela Toney MD [facility]        Today's Diagnoses     Ankle injury, left, initial encounter    -  1    Sprain of left ankle, unspecified ligament, initial encounter          Care Instructions      Follow up in 2 weeks or sooner if needed   Treating Ankle Sprains  Treatment will depend on how bad your sprain is. For a severe sprain, healing may take 3 months or more.  Right after your injury: Use R.I.C.E.    Rest: At first, keep weight off the ankle as much as you can. You may be given crutches to help you walk without putting weight on the ankle.    Ice: Put an ice pack on the ankle for 20 minutes. Remove the pack and wait at least 30 minutes. Repeat for up to 3 days. This helps reduce swelling.    Compression: To reduce swelling and keep the joint stable, you may need to wrap the ankle with an elastic bandage. For more severe sprains, you may need an ankle brace, a boot, or a cast.    Elevation: To reduce swelling, keep your ankle raised above your heart when you sit or lie down.  Medicine  Your healthcare provider may suggest oral nonsteroidal anti-inflammatory medicine (NSAIDs), such as ibuprofen. This relieves the pain and helps reduce swelling. Be sure to take your medicine as directed.  Exercises    After about 2 to 3 weeks, you may be given exercises to strengthen the ligaments and muscles in the ankle. Doing these exercises will help prevent another ankle sprain. Exercises may include standing on your toes and then on your heels and doing ankle curls.  Ankle curls    Sit on the edge of a sturdy table or lie on your back.    Pull your toes toward you. Then point them away from you. Repeat for 2 to 3 minutes.   Date Last Reviewed: 1/1/2018     0862-8827 [employer]. [address] [place] [address]. All rights reserved. This information is not intended as a substitute for professional medical care. Always follow your healthcare professional's instructions.                Follow-ups after your visit        Follow-up notes from your care team     Return in about 2 weeks (around 12/3/2018).      Your next 10 appointments already scheduled     Dec 03, 2018  4:40 PM CST   SHORT with Ela Toney MD   [facility]    [address] [place] [address] [phone]              Future tests that were ordered for you today     Open Future Orders        Priority Expected Expires Ordered    XR Ankle Left G/E 3 Views Routine 11/19/2018 11/19/2019 11/19/2018            Who to contact     If you have questions or need follow up information about today's clinic visit or your schedule please contact [facility] directly at [phone].  Normal or non-critical lab and imaging results will be communicated to you by MyChart, letter or phone within 4 business days after the clinic has received the results. If you do not hear from us within 7 days, please contact the clinic through [employer] or phone. If you have a critical or abnormal lab result, we will notify you by phone as soon as possible.  Submit refill requests through [employer] or call your pharmacy and they will forward the refill request to us. Please allow 3 business days for your refill to be completed.          Additional Information About Your Visit        [employer] Information     [employer] gives you secure access to your electronic health record. If you see a primary care provider, you can also send messages to your care team and make appointments. If you have questions, please call your primary care clinic.  If you do not have a primary care provider, please call [phone] and they will  assist you.        Care EveryWhere ID     This is your Care EveryWhere ID. This could be used by other organizations to access your [facility] medical records  [id number]        Your Vitals Were     Pulse Temperature Respirations BMI (Body Mass Index)          72 97.9  F (36.6  C) (Oral) 20 37.8 kg/m2         Blood Pressure from Last 3 Encounters:   11/19/18 155/79   09/20/18 190/84   09/06/18 158/82    Weight from Last 3 Encounters:   11/19/18 116.1 kg (256 lb)   09/20/18 116.6 kg (257 lb)   09/06/18 113.9 kg (251 lb)                 Today's Medication Changes          These changes are accurate as of 11/19/18 12:38 PM.  If you have any questions, ask your nurse or doctor.               Start taking these medicines.        Dose/Directions    order for DME   Used for:  Sprain of left ankle, unspecified ligament, initial encounter   Started by:  Ela Toney MD        Equipment being ordered: CAM boot - left   Quantity:  1 Package   Refills:  0            Where to get your medicines      Some of these will need a paper prescription and others can be bought over the counter.  Ask your nurse if you have questions.     Bring a paper prescription for each of these medications     order for DME                Primary Care Provider Office Phone # Fax #    Lauro Galloway [phone]       [address] [facility] [address]        Equal Access to Services     [facility] AH: Hadii beatriz rousseauo Sopura, waaxda luqadaha, qaybta kaalmada kishanyada, luke quintero. So [facility] [phone].    ATENCIÓN: Si habla español, tiene a moore disposición servicios gratuitos de asistencia lingüística. Llame al [phone].    We comply with applicable federal civil rights laws and [place] laws. We do not discriminate on the basis of race, color, national origin, age, disability, sex, sexual orientation, or gender identity.            Thank you!     Thank you for choosing  [facility]  for your care. Our goal is always to provide you with excellent care. Hearing back from our patients is one way we can continue to improve our services. Please take a few minutes to complete the written survey that you may receive in the mail after your visit with us. Thank you!             Your Updated Medication List - Protect others around you: Learn how to safely use, store and throw away your medicines at www.disposemymeds.org.          This list is accurate as of 11/19/18 12:38 PM.  Always use your most recent med list.                   Brand Name Dispense Instructions for use Diagnosis    ACE NOT PRESCRIBED (INTENTIONAL)     0 each    1 each daily ACE Inhibitor not prescribed due to Intolerance    Type II or unspecified type diabetes mellitus without mention of complication, not stated as uncontrolled       aspirin 81 MG EC tablet     1 tablet    Take 1 tablet (81 mg) by mouth daily    CAD (coronary artery disease), S/P CABG x 3, Anxiety       atorvastatin 40 MG tablet    LIPITOR    90 tablet    TAKE 1 TABLET BY MOUTH DAILY    Hyperlipidemia LDL goal <70       blood glucose monitoring lancets     200 each    Use to test blood sugar 4 times daily or as directed.    Type 2 diabetes mellitus with diabetic nephropathy, with long-term current use of insulin (H)       blood glucose monitoring test strip    no brand specified    6 Box    Use to test blood sugars 4 times daily or as directed    Type 2 diabetes mellitus with diabetic nephropathy, with long-term current use of insulin (H)       insulin glargine 100 UNIT/ML injection    LANTUS SOLOSTAR    60 mL    Take 90 Units in AM and 80 at night    Type 2 diabetes mellitus with diabetic nephropathy, with long-term current use of insulin (H)       insulin lispro 100 UNIT/ML injection    HumaLOG KWIKpen    90 mL    1 unit per 1 gm of carb before meals three times daily. Average use of 170 Units/day.    Type 2 diabetes mellitus with  diabetic nephropathy, with long-term current use of insulin (H)       * insulin pen needle 30G X 8 MM miscellaneous    NOVOFINE    300 each    Use 4-5 daily or as directed.    Type 2 diabetes mellitus with diabetic nephropathy, with long-term current use of insulin (H)       * NOVOFINE 30 30G X 8 MM miscellaneous   Generic drug:  insulin pen needle     300 each    USE 4 TO 5 DAILY OR AS DIRECTED    Type 2 diabetes mellitus with diabetic nephropathy, with long-term current use of insulin (H)       metFORMIN 1000 MG tablet    GLUCOPHAGE    180 tablet    Take 1 tablet (1,000 mg) by mouth 2 times daily (with meals)    Type 2 diabetes mellitus with diabetic nephropathy, with long-term current use of insulin (H)       metoprolol succinate 100 MG 24 hr tablet    TOPROL XL    180 tablet    Take 1 tablet (100 mg) by mouth 2 times daily    Typical atrial flutter (H)       order for DME     1 Package    Equipment being ordered: CAM boot - left    Sprain of left ankle, unspecified ligament, initial encounter       * Notice:  This list has 2 medication(s) that are the same as other medications prescribed for you. Read the directions carefully, and ask your doctor or other care provider to review them with you.

## 2018-11-19 NOTE — PROGRESS NOTES
SUBJECTIVE:   Adan Ruffin is a 68 year old male who presents to clinic today for the following health issues:      Joint Pain    Onset: 1 week     Description:   Location: left ankle  Character: Dull ache    Intensity: moderate    Progression of Symptoms: worse    Accompanying Signs & Symptoms:  Other symptoms: swelling    History:   Previous similar pain: no       Precipitating factors:   Trauma or overuse: unsure     Alleviating factors:  Improved by: rest/inactivity    Therapies Tried and outcome: ice yesterday     Travelled to [place] last week and did a lot of walking which worsened the swelling.     Problem list and histories reviewed & adjusted, as indicated.  Additional history: as documented    Patient Active Problem List   Diagnosis     CAD (coronary artery disease)     Anxiety     Hyperlipidemia LDL goal <70     HTN (hypertension)     Advanced directives, counseling/discussion     Microalbuminuria     Type 2 diabetes mellitus with diabetic nephropathy, with long-term current use of insulin (H)     Morbid obesity (H)     Past Surgical History:   Procedure Laterality Date     BYPASS GRAFT ARTERY CORONARY N/A 4/7/2015    bypass LAD, OM, PDA        COLONOSCOPY N/A 6/20/2016    Procedure: COLONOSCOPY;  Surgeon: Marcela Schwartz MD;  Location: [facility]     EXCISE PILONIDAL CYST, SIMPLE  1975     HEART CATH, ANGIOPLASTY  3-    3 vessel disease-occluded RCA, stenosis LM-to have CABG     removal of skin cancer  1992     TONSILLECTOMY & ADENOIDECTOMY  1950       Social History   Substance Use Topics     Smoking status: Former Smoker     Packs/day: 1.50     Years: 29.00     Types: Cigarettes     Quit date: 1/1/2000     Smokeless tobacco: Never Used     Alcohol use No      Comment: 1 mixed drink a night     Family History   Problem Relation Age of Onset     Breast Cancer Mother      Cerebrovascular Disease Mother      Hyperlipidemia Father      Cancer Father      lung, unrelated to smoking     Cancer  Paternal Grandmother            Reviewed and updated as needed this visit by clinical staff  Tobacco  Allergies  Meds       Reviewed and updated as needed this visit by Provider         ROS:  Constitutional, msk systems are negative, except as otherwise noted.    OBJECTIVE:     /79 (BP Location: Right arm, Patient Position: Chair, Cuff Size: Adult Large)  Pulse 72  Temp 97.9  F (36.6  C) (Oral)  Resp 20  Wt 256 lb (116.1 kg)  BMI 37.8 kg/m2  Body mass index is 37.8 kg/(m^2).  GENERAL: healthy, alert and no distress  MS: moderate swelling of ankle especially over lateral malleolus, pain with AROM   SKIN: no obvious bruising or erythema     Diagnostic Test Results:  XR left ankle: I independently visualized the xray: some swelling noted, no acute fracture. Will await on official read from radiology.     ASSESSMENT/PLAN:         ICD-10-CM    1. Ankle injury, left, initial encounter S99.912A XR Ankle Left G/E 3 Views     XR Foot Left G/E 3 Views     XR Ankle Left G/E 3 Views   2. Sprain of left ankle, unspecified ligament, initial encounter S93.402A order for DME     - will place in a CAM boot  - supportive care discussed including R.I.C.E    See Patient Instructions    Ela Toney MD  [facility]

## 2018-11-19 NOTE — PATIENT INSTRUCTIONS
Follow up in 2 weeks or sooner if needed   Treating Ankle Sprains  Treatment will depend on how bad your sprain is. For a severe sprain, healing may take 3 months or more.  Right after your injury: Use R.I.C.E.    Rest: At first, keep weight off the ankle as much as you can. You may be given crutches to help you walk without putting weight on the ankle.    Ice: Put an ice pack on the ankle for 20 minutes. Remove the pack and wait at least 30 minutes. Repeat for up to 3 days. This helps reduce swelling.    Compression: To reduce swelling and keep the joint stable, you may need to wrap the ankle with an elastic bandage. For more severe sprains, you may need an ankle brace, a boot, or a cast.    Elevation: To reduce swelling, keep your ankle raised above your heart when you sit or lie down.  Medicine  Your healthcare provider may suggest oral nonsteroidal anti-inflammatory medicine (NSAIDs), such as ibuprofen. This relieves the pain and helps reduce swelling. Be sure to take your medicine as directed.  Exercises    After about 2 to 3 weeks, you may be given exercises to strengthen the ligaments and muscles in the ankle. Doing these exercises will help prevent another ankle sprain. Exercises may include standing on your toes and then on your heels and doing ankle curls.  Ankle curls    Sit on the edge of a sturdy table or lie on your back.    Pull your toes toward you. Then point them away from you. Repeat for 2 to 3 minutes.   Date Last Reviewed: 1/1/2018 2000-2018 [employer]. [address] [place] [address], [place] [address]. All rights reserved. This information is not intended as a substitute for professional medical care. Always follow your healthcare professional's instructions.

## 2018-11-21 ENCOUNTER — TELEPHONE (OUTPATIENT)
Dept: FAMILY MEDICINE | Facility: CLINIC | Age: 68
End: 2018-11-21

## 2018-11-21 NOTE — TELEPHONE ENCOUNTER
Called patient and he did see result in mychart.  Advised of result note and scheduling # for ortho.  Patient will call and schedule.  Delphine Rizvi RN    All areas ~ [phone]    Notes Recorded by Ela Toney MD on 11/21/2018 at 12:51 PM  Please notify patient that official xray shows some irregularity along the side where he had the most pain and swelling.  Will refer to ortho for further evaluation.   Referral placed.    ESSENCE

## 2018-11-28 ENCOUNTER — OFFICE VISIT (OUTPATIENT)
Dept: ORTHOPEDICS | Facility: CLINIC | Age: 68
End: 2018-11-28
Payer: COMMERCIAL

## 2018-11-28 VITALS — BODY MASS INDEX: 37.8 KG/M2 | DIASTOLIC BLOOD PRESSURE: 72 MMHG | SYSTOLIC BLOOD PRESSURE: 126 MMHG | WEIGHT: 256 LBS

## 2018-11-28 DIAGNOSIS — S82.839A AVULSION FRACTURE OF DISTAL FIBULA: Primary | ICD-10-CM

## 2018-11-28 DIAGNOSIS — S93.492A SPRAIN OF ANTERIOR TALOFIBULAR LIGAMENT OF LEFT ANKLE, INITIAL ENCOUNTER: ICD-10-CM

## 2018-11-28 PROCEDURE — 99203 OFFICE O/P NEW LOW 30 MIN: CPT | Performed by: FAMILY MEDICINE

## 2018-11-28 NOTE — PROGRESS NOTES
ASSESSMENT & PLAN    1. Avulsion fracture of distal fibula    2. Sprain of anterior talofibular ligament of left ankle, initial encounter      Doing well  Be aggressive with icing, elevation and range of motion to help with the swelling  Pain today is due to an ATFL sprain. Nonpainful over the avulsion fracture  If pain and swelling is not improved in 2 weeks would recommend formal physical therapy. Follow-up over MyChart if needed.    Follow-up as needed.    -----    SUBJECTIVE  Adan Ruffin is a/an 68 year old male who is seen in consultation at the request of  Ela Toney M.D. for evaluation of left ankle pain. The patient is seen by themselves.    Onset: 16 day(s) ago. Reports insidious onset without acute precipitating event. Patient states that he is unsure how he injured the ankle.  He woke on up11/9 or 11/10 and had left ankle pain. He states he was in [place]  11/10-11/15 and did lots of walking which caused increased ankle pain.  Since he was evaluated on 11/19/18 he states his pain has resolved 90-95%.      Location of Pain: left lateral ankle pain, he states he has intermittent radiating pain up the lateral aspect of the lower leg it is a sharp and short lasting.   Rating of Pain at worst: 9/10  Rating of Pain Currently: 2/10  Worsened by:  Walking for extended periods of time. Patient notes he is cautious with using stairs- he states he does not trust his ankle descending stairs  Better with: laying with ankle elevated/ propped on pillows/ off the edge of the bed.  Treatments tried: elevation, ice, ibuprofen and CAM walker: since dissipating symptoms patient has tapered use of CAM walker to as needed, elevating the ankle   Associated symptoms: swelling of the left lateral ankle into the foot.   Orthopedic history: NO  Relevant surgical history: history of chronic left ankle sprains  Patient Social History: works in [employer].     Patient's past medical, surgical, social,  and family histories were reviewed today and no changes are noted.    REVIEW OF SYSTEMS:  10 point ROS is negative other than symptoms noted above in HPI, Past Medical History or as stated below  Constitutional: NEGATIVE for fever, chills, change in weight  Skin: NEGATIVE for worrisome rashes, moles or lesions  GI/: NEGATIVE for bowel or bladder changes  Neuro: NEGATIVE for weakness, dizziness or paresthesias    OBJECTIVE:  /72 (BP Location: Right arm, Patient Position: Chair, Cuff Size: Adult Large)  Wt 256 lb (116.1 kg)  BMI 37.8 kg/m2   General: healthy, alert and in no distress  HEENT: no scleral icterus or conjunctival erythema  Skin: no suspicious lesions or rash. No jaundice.  CV:  no pedal edema  Resp: normal respiratory effort without conversational dyspnea   Psych: normal mood and affect  Gait: normal steady gait with appropriate coordination and balance  Neuro: Normal light sensory exam of lower extremity  MSK:  LEFT ANKLE  Inspection:    No swelling or ecchymosis is observed  Palpation:    Tender about the ATFL. Remainder of bony and ligamentous landmarks are nontender.  Range of Motion:     Plantarflexion limited but not painful / dorsiflexion limited but not painful  Strength:    Fair  Special Tests:    positive anterior drawer, negative talar tilt, negative forced external rotation/eversion, negative Florian sign    LEFT FOOT  Inspection:    swelling over the foot, ankle and lower extremity with venous stasis changes. No ecchymosis  Range of Motion:     Grossly intact and non-painful  Strength:    Grossly intact in all planes    Independent visualization of the below image:    Recent Results (from the past 744 hour(s))   XR Ankle Left G/E 3 Views    Narrative    XR ANKLE LT G/E 3 VW 11/19/2018 3:35 PM    HISTORY: ; Ankle injury, left, initial encounter      Impression    IMPRESSION: Minimal irregularity of the lateral malleolus suggesting  possible avulsion injury. Clinical correlation  recommended. Possible  overlying soft tissue swelling. Soft tissue vascular calcifications.  No other findings.    MICHELLE HODGE MD   XR Foot Left G/E 3 Views    Narrative    XR FOOT LT G/E 3 VW 11/19/2018 3:35 PM    HISTORY: ; Ankle injury, left, initial encounter      Impression    IMPRESSION: Minimal degenerative change first MTP joint. Soft tissue  vascular calcifications. No other findings.    MICHELLE HODGE MD     Patient's conditions were thoroughly discussed during today's visit with greater than 50% of the visit spent counseling the patient with total time spent face-to-face with the patient being 15 minutes.    Elizabeth Crowe DO [facility]

## 2018-11-28 NOTE — LETTER
11/28/2018         RE: Adan Ruffin  [id number] [place] [address]        Dear Colleague,    Thank you for referring your patient, Adan Ruffin, to the [facility]. Please see a copy of my visit note below.    ASSESSMENT & PLAN    1. Avulsion fracture of distal fibula    2. Sprain of anterior talofibular ligament of left ankle, initial encounter      Doing well  Be aggressive with icing, elevation and range of motion to help with the swelling  Pain today is due to an ATFL sprain. Nonpainful over the avulsion fracture  If pain and swelling is not improved in 2 weeks would recommend formal physical therapy. [facility].    Follow-up as needed.    -----    SUBJECTIVE  Adan Ruffin is a/an 68 year old male who is seen in consultation at the request of  Ela Toney M.D. for evaluation of left ankle pain. The patient is seen by themselves.    Onset: 16 day(s) ago. Reports insidious onset without acute precipitating event. Patient states that he is unsure how he injured the ankle.  He woke on up11/9 or 11/10 and had left ankle pain. He states he was in [place]  11/10-11/15 and did lots of walking which caused increased ankle pain.  Since he was evaluated on 11/19/18 he states his pain has resolved 90-95%.      Location of Pain: left lateral ankle pain, he states he has intermittent radiating pain up the lateral aspect of the lower leg it is a sharp and short lasting.   Rating of Pain at worst: 9/10  Rating of Pain Currently: 2/10  Worsened by:  Walking for extended periods of time. Patient notes he is cautious with using stairs- he states he does not trust his ankle descending stairs  Better with: laying with ankle elevated/ propped on pillows/ off the edge of the bed.  Treatments tried: elevation, ice, ibuprofen and CAM walker: since dissipating symptoms patient has tapered use of CAM walker to as needed, elevating the ankle    Associated symptoms: swelling of the left lateral ankle into the foot.   Orthopedic history: NO  Relevant surgical history: history of chronic left ankle sprains  Patient Social History: works in [employer].     Patient's past medical, surgical, social, and family histories were reviewed today and no changes are noted.    REVIEW OF SYSTEMS:  10 point ROS is negative other than symptoms noted above in HPI, Past Medical History or as stated below  Constitutional: NEGATIVE for fever, chills, change in weight  Skin: NEGATIVE for worrisome rashes, moles or lesions  GI/: NEGATIVE for bowel or bladder changes  Neuro: NEGATIVE for weakness, dizziness or paresthesias    OBJECTIVE:  /72 (BP Location: Right arm, Patient Position: Chair, Cuff Size: Adult Large)  Wt 256 lb (116.1 kg)  BMI 37.8 kg/m2   General: healthy, alert and in no distress  HEENT: no scleral icterus or conjunctival erythema  Skin: no suspicious lesions or rash. No jaundice.  CV:  no pedal edema  Resp: normal respiratory effort without conversational dyspnea   Psych: normal mood and affect  Gait: normal steady gait with appropriate coordination and balance  Neuro: Normal light sensory exam of lower extremity  MSK:  LEFT ANKLE  Inspection:    No swelling or ecchymosis is observed  Palpation:    Tender about the ATFL. Remainder of bony and ligamentous landmarks are nontender.  Range of Motion:     Plantarflexion limited but not painful / dorsiflexion limited but not painful  Strength:    Fair  Special Tests:    positive anterior drawer, negative talar tilt, negative forced external rotation/eversion, negative Florian sign    LEFT FOOT  Inspection:    swelling over the foot, ankle and lower extremity with venous stasis changes. No ecchymosis  Range of Motion:     Grossly intact and non-painful  Strength:    Grossly intact in all planes    Independent visualization of the below image:    Recent Results (from the past 744 hour(s))   XR Ankle  Left G/E 3 Views    Narrative    XR ANKLE LT G/E 3 VW 11/19/2018 3:35 PM    HISTORY: ; Ankle injury, left, initial encounter      Impression    IMPRESSION: Minimal irregularity of the lateral malleolus suggesting  possible avulsion injury. Clinical correlation recommended. Possible  overlying soft tissue swelling. Soft tissue vascular calcifications.  No other findings.    MICHELLE HODGE MD   XR Foot Left G/E 3 Views    Narrative    XR FOOT LT G/E 3 VW 11/19/2018 3:35 PM    HISTORY: ; Ankle injury, left, initial encounter      Impression    IMPRESSION: Minimal degenerative change first MTP joint. Soft tissue  vascular calcifications. No other findings.    MICHELLE HODGE MD     Patient's conditions were thoroughly discussed during today's visit with greater than 50% of the visit spent counseling the patient with total time spent face-to-face with the patient being 15 minutes.    Elizabeth Crowe DO [facility]      Again, thank you for allowing me to participate in the care of your patient.        Sincerely,        Elizabeth Crowe DO

## 2018-11-28 NOTE — MR AVS SNAPSHOT
After Visit Summary   11/28/2018    Adan Ruffin    MRN: [id number]           Patient Information     Date Of Birth          1950        Visit Information        Provider Department      11/28/2018 11:20 AM Elizabeth Crowe DO [facility]        Today's Diagnoses     Avulsion fracture of distal fibula    -  1    Sprain of anterior talofibular ligament of left ankle, initial encounter          Care Instructions    1. Avulsion fracture of distal fibula    2. Sprain of anterior talofibular ligament of left ankle, initial encounter      Doing well  Be aggressive with icing, elevation and range of motion to help with the swelling  Your pain today is due to an ankle sprain. You do not have any pain over the avulsion fracture  If pain and swelling is not improved in 2 weeks would recommend formal physical therapy. Follow-up over MyChart if needed.    Follow-up as needed.    Official Walk with a Doc Chapter  Join me in the lobby of the [facility], December 1st at 1 PM for a free health talk.  Get a free T-shirt, listen and walk at your own pace!                Follow-ups after your visit        Your next 10 appointments already scheduled     Dec 03, 2018  4:40 PM CST   [facility] Ela Toney MD   [facility]    [address] [place] [id number] [phone]              Who to contact     If you have questions or need follow up information about today's clinic visit or your schedule please contact [facility] directly at [phone].  Normal or non-critical lab and imaging results will be communicated to you by MyChart, letter or phone within 4 business days after the clinic has received the results. If you do not hear from us within 7 days, please contact the clinic through MyChart or phone. If you have a critical or abnormal lab result, we will notify you by phone  as soon as possible.  Submit refill requests through [facility] or call your pharmacy and they will forward the refill request to us. Please allow 3 business days for your refill to be completed.          Additional Information About Your Visit        Vindihart Information     [facility] gives you secure access to your electronic health record. If you see a primary care provider, you can also send messages to your care team and make appointments. If you have questions, please call your primary care clinic.  If you do not have a primary care provider, please call [phone] and they will assist you.        Care EveryWhere ID     This is your Care EveryWhere ID. This could be used by other organizations to access your [place] medical records  [id number]        Your Vitals Were     BMI (Body Mass Index)                   37.8 kg/m2            Blood Pressure from Last 3 Encounters:   11/28/18 126/72   11/19/18 155/79   09/20/18 190/84    Weight from Last 3 Encounters:   11/28/18 256 lb (116.1 kg)   11/19/18 256 lb (116.1 kg)   09/20/18 257 lb (116.6 kg)              Today, you had the following     No orders found for display       Primary Care Provider Office Phone # Fax #    Lauro Galloway [phone]       [address] [facility] [address]        Equal Access to Services     DANIEL FAM : Hadii aad ku hadasho Soomaali, waaxda luqadaha, qaybta kaalmada adeegyada, luke ornelas hayshahlan kishan martinez . So [facility] [phone].    ATENCIÓN: Si habla español, tiene a moore disposición servicios gratuitos de asistencia lingüística. Sarahame al [phone].    We comply with applicable federal civil rights laws and [place] laws. We do not discriminate on the basis of race, color, national origin, age, disability, sex, sexual orientation, or gender identity.            Thank you!     Thank you for choosing [facility]  for your care. Our goal is always to provide you with  excellent care. Hearing back from our patients is one way we can continue to improve our services. Please take a few minutes to complete the written survey that you may receive in the mail after your visit with us. Thank you!             Your Updated Medication List - Protect others around you: Learn how to safely use, store and throw away your medicines at www.disposemymeds.org.          This list is accurate as of 11/28/18 11:58 AM.  Always use your most recent med list.                   Brand Name Dispense Instructions for use Diagnosis    ACE NOT PRESCRIBED (INTENTIONAL)     0 each    1 each daily ACE Inhibitor not prescribed due to Intolerance    Type II or unspecified type diabetes mellitus without mention of complication, not stated as uncontrolled       aspirin 81 MG EC tablet     1 tablet    Take 1 tablet (81 mg) by mouth daily    CAD (coronary artery disease), S/P CABG x 3, Anxiety       atorvastatin 40 MG tablet    LIPITOR    90 tablet    TAKE 1 TABLET BY MOUTH DAILY    Hyperlipidemia LDL goal <70       blood glucose monitoring lancets     200 each    Use to test blood sugar 4 times daily or as directed.    Type 2 diabetes mellitus with diabetic nephropathy, with long-term current use of insulin (H)       blood glucose monitoring test strip    NO BRAND SPECIFIED    6 Box    Use to test blood sugars 4 times daily or as directed    Type 2 diabetes mellitus with diabetic nephropathy, with long-term current use of insulin (H)       insulin glargine 100 UNIT/ML pen    LANTUS SOLOSTAR    60 mL    Take 90 Units in AM and 80 at night    Type 2 diabetes mellitus with diabetic nephropathy, with long-term current use of insulin (H)       insulin lispro 100 UNIT/ML pen    HumaLOG KWIKpen    90 mL    1 unit per 1 gm of carb before meals three times daily. Average use of 170 Units/day.    Type 2 diabetes mellitus with diabetic nephropathy, with long-term current use of insulin (H)       * insulin pen needle 30G X 8 MM  miscellaneous    NOVOFINE    300 each    Use 4-5 daily or as directed.    Type 2 diabetes mellitus with diabetic nephropathy, with long-term current use of insulin (H)       * NOVOFINE 30 30G X 8 MM miscellaneous   Generic drug:  insulin pen needle     300 each    USE 4 TO 5 DAILY OR AS DIRECTED    Type 2 diabetes mellitus with diabetic nephropathy, with long-term current use of insulin (H)       metFORMIN 1000 MG tablet    GLUCOPHAGE    180 tablet    Take 1 tablet (1,000 mg) by mouth 2 times daily (with meals)    Type 2 diabetes mellitus with diabetic nephropathy, with long-term current use of insulin (H)       metoprolol succinate 100 MG 24 hr tablet    TOPROL XL    180 tablet    Take 1 tablet (100 mg) by mouth 2 times daily    Typical atrial flutter (H)       order for DME     1 Package    Equipment being ordered: CAM boot - left    Sprain of left ankle, unspecified ligament, initial encounter       * Notice:  This list has 2 medication(s) that are the same as other medications prescribed for you. Read the directions carefully, and ask your doctor or other care provider to review them with you.

## 2018-11-28 NOTE — PATIENT INSTRUCTIONS
1. Avulsion fracture of distal fibula    2. Sprain of anterior talofibular ligament of left ankle, initial encounter      Doing well  Be aggressive with icing, elevation and range of motion to help with the swelling  Your pain today is due to an ankle sprain. You do not have any pain over the avulsion fracture  If pain and swelling is not improved in 2 weeks would recommend formal physical therapy. [facility] if [facility].    Follow-up as needed.    Official Walk with a Doc Chapter  Join me in the lobby of the [facility], December 1st at 1 PM for a free health talk.  Get a free T-shirt, listen and walk at your own pace!

## 2018-12-31 ENCOUNTER — TELEPHONE (OUTPATIENT)
Dept: ENDOCRINOLOGY | Facility: CLINIC | Age: 68
End: 2018-12-31

## 2018-12-31 NOTE — TELEPHONE ENCOUNTER
Panel Management Review      Patient has the following on his problem list:     Diabetes    ASA: Passed    Last A1C  Lab Results   Component Value Date    A1C 8.4 09/20/2018    A1C 9.9 06/13/2018    A1C 8.3 10/02/2017    A1C 7.8 07/20/2017    A1C 10.5 03/09/2017     A1C tested: FAILED    Last LDL:    Lab Results   Component Value Date    CHOL 146 06/27/2018     Lab Results   Component Value Date    HDL 42 06/27/2018     Lab Results   Component Value Date    LDL 73 06/27/2018     Lab Results   Component Value Date    TRIG 154 06/27/2018     Lab Results   Component Value Date    CHOLHDLRATIO 3.5 08/19/2015     Lab Results   Component Value Date    NHDL 104 06/27/2018       Is the patient on a Statin? YES             Is the patient on Aspirin? YES    Medications     HMG CoA Reductase Inhibitors    atorvastatin (LIPITOR) 40 MG tablet    Salicylates    aspirin 81 MG EC tablet          Last three blood pressure readings:  BP Readings from Last 3 Encounters:   11/28/18 126/72   11/19/18 155/79   09/20/18 190/84       Date of last diabetes office visit: 09/20/18     Tobacco History:     History   Smoking Status     Former Smoker     Packs/day: 1.50     Years: 29.00     Types: Cigarettes     Quit date: 1/1/2000   Smokeless Tobacco     Never Used           Composite cancer screening  Chart review shows that this patient is due/due soon for the following None  Summary:    Patient is due/failing the following:   FOLLOW UP    Action needed:   Patient needs office visit for endo.    Type of outreach:    Sent [employer] message.    Questions for provider review:    None                                                                                                                                    Erika Hall CMA       Chart routed to no one .

## 2019-02-06 DIAGNOSIS — E11.21 TYPE 2 DIABETES MELLITUS WITH DIABETIC NEPHROPATHY, WITH LONG-TERM CURRENT USE OF INSULIN (H): ICD-10-CM

## 2019-02-06 DIAGNOSIS — Z79.4 TYPE 2 DIABETES MELLITUS WITH DIABETIC NEPHROPATHY, WITH LONG-TERM CURRENT USE OF INSULIN (H): ICD-10-CM

## 2019-02-06 NOTE — TELEPHONE ENCOUNTER
"Requested Prescriptions   Pending Prescriptions Disp Refills     blood glucose (ACCU-CHEK SMARTVIEW) test strip [Pharmacy Med Name: ACCU-CHEK SMARTVIEW TESTSTRIPS 100S] 300 strip 0    Last Written Prescription Date:  09/09/2018  Last Fill Quantity: 6 box,  # refills: 1   Last office visit: 9/6/2018 with prescribing provider:     Future Office Visit:   Sig: USE TO TEST BLOOD SUGAR FOUR TIMES DAILY OR AS DIRECTED    Diabetic Supplies Protocol Passed - 2/6/2019 10:15 AM       Passed - Medication is active on med list       Passed - Patient is 18 years of age or older       Passed - Recent (6 mo) or future (30 days) visit within the authorizing provider's specialty    Patient had office visit in the last 6 months or has a visit in the next 30 days with authorizing provider.  See \"Patient Info\" tab in inbasket, or \"Choose Columns\" in Meds & Orders section of the refill encounter.            "

## 2019-02-08 NOTE — TELEPHONE ENCOUNTER
Last OV 9/6/18    Prescription approved per [facility] Refill Protocol.

## 2019-03-07 DIAGNOSIS — Z79.4 TYPE 2 DIABETES MELLITUS WITH DIABETIC NEPHROPATHY, WITH LONG-TERM CURRENT USE OF INSULIN (H): ICD-10-CM

## 2019-03-07 DIAGNOSIS — E11.21 TYPE 2 DIABETES MELLITUS WITH DIABETIC NEPHROPATHY, WITH LONG-TERM CURRENT USE OF INSULIN (H): ICD-10-CM

## 2019-03-07 NOTE — LETTER
[facility]  [address]  [facility] [address]  Phone: [phone]        March 11, 2019      Adan Ruffin                                                                                                                                [id number] [place] [address]            Dear Mr. Ruffin,    We are concerned about your health care.  We recently provided you with a medication refill.  Many medications require routine follow-up with your Doctor.      At this time we ask that: You schedule a routine office visit with your physician to follow your Diabetes.    Your prescription: Has been refilled for 1 month so you may have time for the above noted follow-up.      Thank you,    Mayda Pham MD

## 2019-03-07 NOTE — TELEPHONE ENCOUNTER
"Requested Prescriptions   Pending Prescriptions Disp Refills     metFORMIN (GLUCOPHAGE) 1000 MG tablet [Pharmacy Med Name: METFORMIN 1000MG TABLETS] 180 tablet 0    Last Written Prescription Date:  09/09/2018  Last Fill Quantity: 180,  # refills: 1   Last office visit: 9/6/2018 with prescribing provider:     Future Office Visit:   Sig: TAKE 1 TABLET BY MOUTH TWICE DAILY WITH MEALS    Biguanide Agents Failed - 3/7/2019  3:17 AM       Failed - Patient has documented A1c within the specified period of time.    If HgbA1C is 8 or greater, it needs to be on file within the past 3 months.  If less than 8, must be on file within the past 6 months.     Recent Labs   Lab Test 09/20/18  1211   A1C 8.4*            Failed - Recent (6 mo) or future (30 days) visit within the authorizing provider's specialty    Patient had office visit in the last 6 months or has a visit in the next 30 days with authorizing provider or within the authorizing provider's specialty.  See \"Patient Info\" tab in inbasket, or \"Choose Columns\" in Meds & Orders section of the refill encounter.           Passed - Blood pressure less than 140/90 in past 6 months    BP Readings from Last 3 Encounters:   11/28/18 126/72   11/19/18 155/79   09/20/18 190/84                Passed - Patient has documented LDL within the past 12 mos.    Recent Labs   Lab Test 06/27/18  0809   LDL 73            Passed - Patient has had a Microalbumin in the past 15 mos.    Recent Labs   Lab Test 06/13/18  1618   MICROL 428   UMALCR 283.44*            Passed - Patient is age 10 or older       Passed - Patient's CR is NOT>1.4 OR Patient's EGFR is NOT<45 within past 12 mos.    Recent Labs   Lab Test 06/13/18  1618   GFRESTIMATED 64   GFRESTBLACK 77       Recent Labs   Lab Test 06/13/18  1618   CR 1.15            Passed - Patient does NOT have a diagnosis of CHF.       Passed - Medication is active on med list        "

## 2019-03-11 NOTE — TELEPHONE ENCOUNTER
Routing refill request to provider for review/approval because:  Labs out of range:  A1C   Labs not current:  A1C       Last Diabetes follow-up was 9/20/18 with [facility]. Sent letter reminding patient they are due for a follow-up.

## 2019-03-13 DIAGNOSIS — E11.21 TYPE 2 DIABETES MELLITUS WITH DIABETIC NEPHROPATHY, WITH LONG-TERM CURRENT USE OF INSULIN (H): ICD-10-CM

## 2019-03-13 DIAGNOSIS — Z79.4 TYPE 2 DIABETES MELLITUS WITH DIABETIC NEPHROPATHY, WITH LONG-TERM CURRENT USE OF INSULIN (H): ICD-10-CM

## 2019-03-13 NOTE — TELEPHONE ENCOUNTER
"Requested Prescriptions   Pending Prescriptions Disp Refills     insulin glargine (LANTUS SOLOSTAR PEN) 100 UNIT/ML pen [Pharmacy Med Name: LANTUS SOLOSTAR PEN INJ 3ML] 60 mL 0    Last Written Prescription Date:  06/14/2018  Last Fill Quantity: 60 mL,  # refills: 6   Last office visit: 9/6/2018 with prescribing provider:     Future Office Visit:   Sig: INJECT 90 UNITS UNDER THE SKIN IN THE MORNING AND 80 UNITS UNDER THE SKIN AT NIGHT    Long Acting Insulin Protocol Failed - 3/13/2019  2:06 PM       Failed - HgbA1C in past 3 or 6 months    If HgbA1C is 8 or greater, it needs to be on file within the past 3 months.  If less than 8, must be on file within the past 6 months.     Recent Labs   Lab Test 09/20/18  1211   A1C 8.4*            Failed - Recent (6 mo) or future (30 days) visit within the authorizing provider's specialty    Patient had office visit in the last 6 months or has a visit in the next 30 days with authorizing provider or within the authorizing provider's specialty.  See \"Patient Info\" tab in inbasket, or \"Choose Columns\" in Meds & Orders section of the refill encounter.           Passed - Blood pressure less than 140/90 in past 6 months    BP Readings from Last 3 Encounters:   11/28/18 126/72   11/19/18 155/79   09/20/18 190/84                Passed - LDL on file in past 12 months    Recent Labs   Lab Test 06/27/18  0809   LDL 73            Passed - Microalbumin on file in past 12 months    Recent Labs   Lab Test 06/13/18  1618   MICROL 428   UMALCR 283.44*            Passed - Serum creatinine on file in past 12 months    Recent Labs   Lab Test 06/13/18  1618   CR 1.15            Passed - Medication is active on med list       Passed - Patient is age 18 or older        "

## 2019-03-14 ENCOUNTER — MYC MEDICAL ADVICE (OUTPATIENT)
Dept: INTERNAL MEDICINE | Facility: CLINIC | Age: 69
End: 2019-03-14

## 2019-03-14 NOTE — TELEPHONE ENCOUNTER
Routing refill request to provider for review/approval because:  Patient needs to be seen because:  Diabetic follow up due.  [employer] message sent.  Tasha Rojas RN

## 2019-03-19 ENCOUNTER — MYC MEDICAL ADVICE (OUTPATIENT)
Dept: INTERNAL MEDICINE | Facility: CLINIC | Age: 69
End: 2019-03-19

## 2019-03-19 DIAGNOSIS — Z12.5 SCREENING FOR PROSTATE CANCER: ICD-10-CM

## 2019-03-19 DIAGNOSIS — E11.21 TYPE 2 DIABETES MELLITUS WITH DIABETIC NEPHROPATHY, WITH LONG-TERM CURRENT USE OF INSULIN (H): Primary | ICD-10-CM

## 2019-03-19 DIAGNOSIS — Z79.4 TYPE 2 DIABETES MELLITUS WITH DIABETIC NEPHROPATHY, WITH LONG-TERM CURRENT USE OF INSULIN (H): Primary | ICD-10-CM

## 2019-03-19 NOTE — TELEPHONE ENCOUNTER
Pt had labs done through [facility] done in June and September.     Please advise if should have labs done before his OV.     Lab Results   Component Value Date    A1C 8.4 09/20/2018    A1C 9.9 06/13/2018    A1C 8.3 10/02/2017    A1C 7.8 07/20/2017    A1C 10.5 03/09/2017     Recent Labs   Lab Test 06/27/18  0809 03/09/17  0821  08/19/15  0934 01/08/15   CHOL 146 142   < > 176 154   HDL 42 46   < > 51 64   LDL 73 69   < > 70 69   TRIG 154* 134   < > 275* 104   CHOLHDLRATIO  --   --   --  3.5 2.4    < > = values in this interval not displayed.

## 2019-04-01 DIAGNOSIS — E11.21 TYPE 2 DIABETES MELLITUS WITH DIABETIC NEPHROPATHY, WITH LONG-TERM CURRENT USE OF INSULIN (H): ICD-10-CM

## 2019-04-01 DIAGNOSIS — Z79.4 TYPE 2 DIABETES MELLITUS WITH DIABETIC NEPHROPATHY, WITH LONG-TERM CURRENT USE OF INSULIN (H): ICD-10-CM

## 2019-04-01 DIAGNOSIS — Z12.5 SCREENING FOR PROSTATE CANCER: ICD-10-CM

## 2019-04-01 LAB — HBA1C MFR BLD: 7.9 % (ref 0–5.6)

## 2019-04-01 PROCEDURE — 36415 COLL VENOUS BLD VENIPUNCTURE: CPT | Performed by: INTERNAL MEDICINE

## 2019-04-01 PROCEDURE — G0103 PSA SCREENING: HCPCS | Performed by: INTERNAL MEDICINE

## 2019-04-01 PROCEDURE — 83036 HEMOGLOBIN GLYCOSYLATED A1C: CPT | Performed by: INTERNAL MEDICINE

## 2019-04-01 PROCEDURE — 80053 COMPREHEN METABOLIC PANEL: CPT | Performed by: INTERNAL MEDICINE

## 2019-04-02 LAB
ALBUMIN SERPL-MCNC: 3.4 G/DL (ref 3.4–5)
ALP SERPL-CCNC: 108 U/L (ref 40–150)
ALT SERPL W P-5'-P-CCNC: 47 U/L (ref 0–70)
ANION GAP SERPL CALCULATED.3IONS-SCNC: 7 MMOL/L (ref 3–14)
AST SERPL W P-5'-P-CCNC: 63 U/L (ref 0–45)
BILIRUB SERPL-MCNC: 0.4 MG/DL (ref 0.2–1.3)
BUN SERPL-MCNC: 24 MG/DL (ref 7–30)
CALCIUM SERPL-MCNC: 10.2 MG/DL (ref 8.5–10.1)
CHLORIDE SERPL-SCNC: 106 MMOL/L (ref 94–109)
CO2 SERPL-SCNC: 25 MMOL/L (ref 20–32)
CREAT SERPL-MCNC: 0.99 MG/DL (ref 0.66–1.25)
GFR SERPL CREATININE-BSD FRML MDRD: 79 ML/MIN/{1.73_M2}
GLUCOSE SERPL-MCNC: 86 MG/DL (ref 70–99)
POTASSIUM SERPL-SCNC: 4.5 MMOL/L (ref 3.4–5.3)
PROT SERPL-MCNC: 8 G/DL (ref 6.8–8.8)
PSA SERPL-ACNC: 1.09 UG/L (ref 0–4)
SODIUM SERPL-SCNC: 138 MMOL/L (ref 133–144)

## 2019-04-03 DIAGNOSIS — R79.89 LFT ELEVATION: Primary | ICD-10-CM

## 2019-04-05 ENCOUNTER — OFFICE VISIT (OUTPATIENT)
Dept: INTERNAL MEDICINE | Facility: CLINIC | Age: 69
End: 2019-04-05
Payer: COMMERCIAL

## 2019-04-05 VITALS
HEIGHT: 69 IN | WEIGHT: 249 LBS | DIASTOLIC BLOOD PRESSURE: 72 MMHG | SYSTOLIC BLOOD PRESSURE: 134 MMHG | OXYGEN SATURATION: 95 % | TEMPERATURE: 98.1 F | BODY MASS INDEX: 36.88 KG/M2 | HEART RATE: 63 BPM | RESPIRATION RATE: 20 BRPM

## 2019-04-05 DIAGNOSIS — Z79.4 TYPE 2 DIABETES MELLITUS WITH DIABETIC NEPHROPATHY, WITH LONG-TERM CURRENT USE OF INSULIN (H): ICD-10-CM

## 2019-04-05 DIAGNOSIS — E11.21 TYPE 2 DIABETES MELLITUS WITH DIABETIC NEPHROPATHY, WITH LONG-TERM CURRENT USE OF INSULIN (H): ICD-10-CM

## 2019-04-05 DIAGNOSIS — R06.83 SNORING: Primary | ICD-10-CM

## 2019-04-05 DIAGNOSIS — E66.01 MORBID OBESITY (H): ICD-10-CM

## 2019-04-05 DIAGNOSIS — E78.5 HYPERLIPIDEMIA LDL GOAL <70: ICD-10-CM

## 2019-04-05 DIAGNOSIS — I10 ESSENTIAL HYPERTENSION: ICD-10-CM

## 2019-04-05 PROCEDURE — 99214 OFFICE O/P EST MOD 30 MIN: CPT | Performed by: INTERNAL MEDICINE

## 2019-04-05 RX ORDER — ATORVASTATIN CALCIUM 40 MG/1
40 TABLET, FILM COATED ORAL DAILY
Qty: 90 TABLET | Refills: 3 | Status: SHIPPED | OUTPATIENT
Start: 2019-04-05 | End: 2020-06-09

## 2019-04-05 ASSESSMENT — MIFFLIN-ST. JEOR: SCORE: 1899.84

## 2019-04-05 NOTE — PROGRESS NOTES
.  SUBJECTIVE:   Adan Ruffin is a 68 year old male who presents to clinic today for the following health issues:    Patient is seen for a follow up visit.  No acute complaints, no medication change or new medical conditions.    Diabetes Follow-up  Has H/O DM. On diet , exercise and Metformin + Insulin. Blood sugars are controlled. No parestesias. No hypoglycemias.    Patient is checking blood sugars: three times daily.   Blood sugar testing frequency justification: Uncontrolled diabetes  Results are as follows:         am - 148    Diabetic concerns: None     Symptoms of hypoglycemia (low blood sugar): none     Paresthesias (numbness or burning in feet) or sores: No     Date of last diabetic eye exam: 9-2018    Diabetes Management Resources    Hyperlipidemia Follow-Up  Has H/O hyperlipidemia. On medical treatment and diet. No side effects. No muscle weakness, myalgias or upset stomach.       Rate your low fat/cholesterol diet?: not monitoring fat    Taking statin?  Yes, no muscle aches from statin    Other lipid medications/supplements?:  none    Hypertension Follow-up  Has h/o HTN. on medical treatment. BP has been controlled. No side effects from medications. No CP, HA, dizziness. good compliance with medications and low salt diet.      Outpatient blood pressures are not being checked.    Low Salt Diet: not monitoring salt    BP Readings from Last 2 Encounters:   11/28/18 126/72   11/19/18 155/79     Hemoglobin A1C (%)   Date Value   04/01/2019 7.9 (H)   09/20/2018 8.4 (H)     LDL Cholesterol Calculated (mg/dL)   Date Value   06/27/2018 73   03/09/2017 69       Amount of exercise or physical activity: walks    Problems taking medications regularly: Yes,  Sometimes forgets     Medication side effects: none    Diet: regular (no restrictions)        PROBLEMS TO ADD ON...  Has h/o obesity, snoring, fatigue.     Problem list and histories reviewed & adjusted, as indicated.  Additional history: as  documented    Patient Active Problem List   Diagnosis     CAD (coronary artery disease)     Anxiety     Hyperlipidemia LDL goal <70     HTN (hypertension)     Advanced directives, counseling/discussion     Microalbuminuria     Type 2 diabetes mellitus with diabetic nephropathy, with long-term current use of insulin (H)     Morbid obesity (H)     Past Surgical History:   Procedure Laterality Date     BYPASS GRAFT ARTERY CORONARY N/A 2015    bypass LAD, OM, PDA        COLONOSCOPY N/A 2016    Procedure: COLONOSCOPY;  Surgeon: Marcela Schwartz MD;  Location: [facility]     EXCISE PILONIDAL CYST, SIMPLE       HEART CATH, ANGIOPLASTY  3-    3 vessel disease-occluded RCA, stenosis LM-to have CABG     removal of skin cancer       TONSILLECTOMY & ADENOIDECTOMY  1950       Social History     Tobacco Use     Smoking status: Former Smoker     Packs/day: 1.50     Years: 29.00     Pack years: 43.50     Types: Cigarettes     Last attempt to quit: 2000     Years since quittin.2     Smokeless tobacco: Never Used   Substance Use Topics     Alcohol use: No     Alcohol/week: 0.0 oz     Comment: 1 mixed drink a night     Family History   Problem Relation Age of Onset     Breast Cancer Mother      Cerebrovascular Disease Mother      Hyperlipidemia Father      Cancer Father         lung, unrelated to smoking     Cancer Paternal Grandmother          Current Outpatient Medications   Medication Sig Dispense Refill     aspirin 81 MG EC tablet Take 1 tablet (81 mg) by mouth daily 1 tablet 0     atorvastatin (LIPITOR) 40 MG tablet Take 1 tablet (40 mg) by mouth daily 90 tablet 3     blood glucose (ACCU-CHEK SMARTVIEW) test strip USE TO TEST BLOOD SUGAR FOUR TIMES DAILY OR AS DIRECTED 400 strip 2     blood glucose monitoring (ACCU-CHEK FASTCLIX) lancets Use to test blood sugar 4 times daily or as directed. 200 each 11     insulin glargine (LANTUS SOLOSTAR PEN) 100 UNIT/ML pen INJECT 90 UNITS UNDER THE SKIN IN  "THE MORNING AND 80 UNITS UNDER THE SKIN AT NIGHT 60 mL 0     insulin lispro (HUMALOG KWIKPEN) 100 UNIT/ML injection 1 unit per 1 gm of carb before meals three times daily. Average use of 170 Units/day. 90 mL 3     insulin pen needle (NOVOFINE) 30G X 8 MM Use 4-5 daily or as directed. 300 each 1     metFORMIN (GLUCOPHAGE) 1000 MG tablet TAKE 1 TABLET BY MOUTH TWICE DAILY WITH MEALS 180 tablet 0     metoprolol succinate (TOPROL XL) 100 MG 24 hr tablet Take 1 tablet (100 mg) by mouth 2 times daily 180 tablet 3     ACE NOT PRESCRIBED, INTENTIONAL, 1 each daily ACE Inhibitor not prescribed due to Intolerance 0 each 0       Reviewed and updated as needed this visit by clinical staff  Tobacco  Med Hx  Surg Hx  Fam Hx  Soc Hx      Reviewed and updated as needed this visit by Provider         ROS:  Constitutional, HEENT, cardiovascular, pulmonary, GI, , musculoskeletal, neuro, skin, endocrine and psych systems are negative, except as otherwise noted.    OBJECTIVE:     /72   Pulse 63   Temp 98.1  F (36.7  C) (Oral)   Resp 20   Ht 1.753 m (5' 9\")   Wt 112.9 kg (249 lb)   SpO2 95%   BMI 36.77 kg/m    Body mass index is 36.77 kg/m .   GENERAL: obese, alert and no distress  NECK: no adenopathy, no asymmetry, masses, or scars and thyroid normal to palpation  RESP: lungs clear to auscultation - no rales, rhonchi or wheezes  CV: regular rate and rhythm, normal S1 S2, no S3 or S4, no murmur, click or rub, no peripheral edema and peripheral pulses strong  ABDOMEN: soft, nontender, no hepatosplenomegaly, no masses and bowel sounds normal  MS: no gross musculoskeletal defects noted, no edema    Diagnostic Test Results:  Results for orders placed or performed in visit on 04/01/19   Prostate spec antigen screen   Result Value Ref Range    PSA 1.09 0 - 4 ug/L   Hemoglobin A1c   Result Value Ref Range    Hemoglobin A1C 7.9 (H) 0 - 5.6 %   Comprehensive metabolic panel   Result Value Ref Range    Sodium 138 133 - 144 " mmol/L    Potassium 4.5 3.4 - 5.3 mmol/L    Chloride 106 94 - 109 mmol/L    Carbon Dioxide 25 20 - 32 mmol/L    Anion Gap 7 3 - 14 mmol/L    Glucose 86 70 - 99 mg/dL    Urea Nitrogen 24 7 - 30 mg/dL    Creatinine 0.99 0.66 - 1.25 mg/dL    GFR Estimate 79 >60 mL/min/[1.73_m2]    GFR Estimate If Black >90 >60 mL/min/[1.73_m2]    Calcium 10.2 (H) 8.5 - 10.1 mg/dL    Bilirubin Total 0.4 0.2 - 1.3 mg/dL    Albumin 3.4 3.4 - 5.0 g/dL    Protein Total 8.0 6.8 - 8.8 g/dL    Alkaline Phosphatase 108 40 - 150 U/L    ALT 47 0 - 70 U/L    AST 63 (H) 0 - 45 U/L       ASSESSMENT/PLAN:     Problem List Items Addressed This Visit     Hyperlipidemia LDL goal <70    Relevant Medications    atorvastatin (LIPITOR) 40 MG tablet    HTN (hypertension)    Type 2 diabetes mellitus with diabetic nephropathy, with long-term current use of insulin (H)    Relevant Medications    insulin glargine (LANTUS SOLOSTAR PEN) 100 UNIT/ML pen    Morbid obesity (H)    Relevant Medications    insulin glargine (LANTUS SOLOSTAR PEN) 100 UNIT/ML pen      Other Visit Diagnoses     Snoring    -  Primary    Relevant Orders    SLEEP EVALUATION & MANAGEMENT REFERRAL - ADULT -[facility]  [phone] (Age 18 and up)           Continue treatment   Keep diet, exercise   Monitor lab  Assess sleep study     Follow-Up:in 6 months     Lauro Galloway MD  [facility]

## 2019-04-05 NOTE — NURSING NOTE
"Chief Complaint   Patient presents with     Diabetes     initial /72   Pulse 63   Temp 98.1  F (36.7  C) (Oral)   Resp 20   Ht 1.753 m (5' 9\")   Wt 112.9 kg (249 lb)   SpO2 95%   BMI 36.77 kg/m   Estimated body mass index is 36.77 kg/m  as calculated from the following:    Height as of this encounter: 1.753 m (5' 9\").    Weight as of this encounter: 112.9 kg (249 lb)..  bp completed using cuff size large  INES MAI LPN  "

## 2019-05-01 DIAGNOSIS — R79.89 LFT ELEVATION: ICD-10-CM

## 2019-05-01 PROCEDURE — 80053 COMPREHEN METABOLIC PANEL: CPT | Performed by: INTERNAL MEDICINE

## 2019-05-01 PROCEDURE — 36415 COLL VENOUS BLD VENIPUNCTURE: CPT | Performed by: INTERNAL MEDICINE

## 2019-05-02 LAB
ALBUMIN SERPL-MCNC: 3.3 G/DL (ref 3.4–5)
ALP SERPL-CCNC: 123 U/L (ref 40–150)
ALT SERPL W P-5'-P-CCNC: 38 U/L (ref 0–70)
ANION GAP SERPL CALCULATED.3IONS-SCNC: 6 MMOL/L (ref 3–14)
AST SERPL W P-5'-P-CCNC: 57 U/L (ref 0–45)
BILIRUB SERPL-MCNC: 0.2 MG/DL (ref 0.2–1.3)
BUN SERPL-MCNC: 20 MG/DL (ref 7–30)
CALCIUM SERPL-MCNC: 10 MG/DL (ref 8.5–10.1)
CHLORIDE SERPL-SCNC: 112 MMOL/L (ref 94–109)
CO2 SERPL-SCNC: 26 MMOL/L (ref 20–32)
CREAT SERPL-MCNC: 1.1 MG/DL (ref 0.66–1.25)
GFR SERPL CREATININE-BSD FRML MDRD: 69 ML/MIN/{1.73_M2}
GLUCOSE SERPL-MCNC: 91 MG/DL (ref 70–99)
POTASSIUM SERPL-SCNC: 4.7 MMOL/L (ref 3.4–5.3)
PROT SERPL-MCNC: 7.9 G/DL (ref 6.8–8.8)
SODIUM SERPL-SCNC: 144 MMOL/L (ref 133–144)

## 2019-05-06 ENCOUNTER — NURSE TRIAGE (OUTPATIENT)
Dept: INTERNAL MEDICINE | Facility: CLINIC | Age: 69
End: 2019-05-06

## 2019-05-06 NOTE — TELEPHONE ENCOUNTER
"Patient wanting to see primary care provider today for shortness of breath with exertion and a feeling like the bottoms of his lungs are bruised when he takes in a deep breath.  Denies pain in arms, neck, jaw or back.  Patient refuses ER as recommended--\"too pricey\".  Wants message sent to MD.    Reason for Disposition    Difficulty breathing     Refuses ER, \"too pricey\", would not be persuaded.  Wants to see primary care provider.    Taking a deep breath makes pain worse    [1] Chest pain lasting <= 5 minutes AND [2] NO chest pain or cardiac symptoms now(Exceptions: pains lasting a few seconds)    Additional Information    Negative: [1] Breathing stopped AND [2] hasn't returned    Negative: Choking on something    Negative: Severe difficulty breathing (e.g., struggling for each breath, speaks in single words)    Negative: Bluish (or gray) lips or face now    Negative: Difficult to awaken or acting confused (e.g., disoriented, slurred speech)    Negative: Passed out (i.e., lost consciousness, collapsed and was not responding)    Negative: Wheezing started suddenly after medicine, an allergic food or bee sting    Negative: Stridor    Negative: Slow, shallow and weak breathing    Negative: Sounds like a life-threatening emergency to the triager    Chest pain    Negative: Shock suspected (e.g., cold/pale/clammy skin, too weak to stand, low BP, rapid pulse)    Negative: Difficult to awaken or acting confused (e.g., disoriented, slurred speech)    Negative: Severe difficulty breathing (e.g., struggling for each breath, speaks in single words)    Negative: [1] Chest pain lasts > 5 minutes AND [2] not relieved with nitroglycerin    Negative: [1] Chest pain lasts > 5 minutes AND [2] age > 30 AND [3] at least one cardiac risk factor (i.e., hypertension, diabetes, obesity, smoker or strong family history of heart disease)    Negative: [1] Chest pain lasts > 5 minutes AND [2] age > 50    Negative: [1] Chest pain lasts > 5 " "minutes AND [2] history of heart disease  (i.e., heart attack, bypass surgery, angina, angioplasty, CHF; not just a heart murmur)    Negative: [1] Chest pain lasts > 5 minutes AND [2] described as crushing, pressure-like, or heavy    Negative: Passed out (i.e., lost consciousness, collapsed and was not responding)    Negative: Heart beating < 50 beats per minute OR > 140 beats per minute    Negative: Sounds like a life-threatening emergency to the triager    Negative: Visible sweat on face or sweat dripping down face    Negative: SEVERE chest pain    Negative: Pain also present in shoulder(s) or arm(s) or jaw  (Exception: pain is clearly made worse by movement)    Negative: [1] Intermittent  chest pain or \"angina\" AND [2] increasing in severity or frequency  (Exception: pains lasting a few seconds)    Negative: Coughing up blood    Negative: History of prior \"blood clot\" in leg or lungs (i.e., deep vein thrombosis, pulmonary embolism)    Negative: Cocaine use within last 3 days    Negative: Hip or leg fracture in past 2 months (e.g., had cast on leg or ankle)    Negative: Recent illness requiring prolonged bedrest (i.e., immobilization)    Negative: Major surgery in the past month    Negative: Recent long-distance travel with prolonged time in car, bus, plane, or train (i.e., within past 2 weeks; 6 or  more hours duration)    Negative: Patient sounds very sick or weak to the triager    Negative: Chest pain lasts > 5 minutes (Exceptions: chest pain occurring > 3 days ago and now asymptomatic; same as previously diagnosed heartburn and has accompanying sour taste in mouth)    Negative: Fever > 100.5 F (38.1 C)    Negative: Rash in same area as pain (may be described as \"small blisters\")    Answer Assessment - Initial Assessment Questions  1. RESPIRATORY STATUS: \"Describe your breathing?\" (e.g., wheezing, shortness of breath, unable to speak, severe coughing)       Cough \"for a few days\", non-productive.  Started " "noticing SOB yesterday with any activity.  Feels like the bottoms of his lungs are bruised when he takes in a deep breath.  2. ONSET: \"When did this breathing problem begin?\"       Worsened significantly yesterday.  3. PATTERN \"Does the difficult breathing come and go, or has it been constant since it started?\"       On and off with exertion  4. SEVERITY: \"How bad is your breathing?\" (e.g., mild, moderate, severe)     - MILD: No SOB at rest, mild SOB with walking, speaks normally in sentences, can lay down, no retractions, pulse < 100.     - MODERATE: SOB at rest, SOB with minimal exertion and prefers to sit, cannot lie down flat, speaks in phrases, mild retractions, audible wheezing, pulse 100-120.     - SEVERE: Very SOB at rest, speaks in single words, struggling to breathe, sitting hunched forward, retractions, pulse > 120       Mild, does not worsen with laying flat but with any exertion.  Can hold conversation without becoming extremely SOB.  5. RECURRENT SYMPTOM: \"Have you had difficulty breathing before?\" If so, ask: \"When was the last time?\" and \"What happened that time?\"       no  6. CARDIAC HISTORY: \"Do you have any history of heart disease?\" (e.g., heart attack, angina, bypass surgery, angioplasty)       History of bypass surgery  7. LUNG HISTORY: \"Do you have any history of lung disease?\"  (e.g., pulmonary embolus, asthma, emphysema)      no  8. CAUSE: \"What do you think is causing the breathing problem?\"       Does not know  9. OTHER SYMPTOMS: \"Do you have any other symptoms? (e.g., dizziness, runny nose, cough, chest pain, fever)      Non productive cough, raspy breathing at times  10. PREGNANCY: \"Is there any chance you are pregnant?\" \"When was your last menstrual period?\"        NA  11. TRAVEL: \"Have you traveled out of the country in the last month?\" (e.g., travel history, exposures)        none    Protocols used: CHEST PAIN-A-AH, BREATHING DIFFICULTY-A-AH      "

## 2019-05-06 NOTE — TELEPHONE ENCOUNTER
Primary care provider has no availability on schedule today, please advise when patient should be worked in.

## 2019-05-30 DIAGNOSIS — Z79.4 TYPE 2 DIABETES MELLITUS WITH DIABETIC NEPHROPATHY, WITH LONG-TERM CURRENT USE OF INSULIN (H): ICD-10-CM

## 2019-05-30 DIAGNOSIS — E11.21 TYPE 2 DIABETES MELLITUS WITH DIABETIC NEPHROPATHY, WITH LONG-TERM CURRENT USE OF INSULIN (H): ICD-10-CM

## 2019-05-30 NOTE — TELEPHONE ENCOUNTER
"Requested Prescriptions   Pending Prescriptions Disp Refills     insulin glargine (LANTUS SOLOSTAR PEN) 100 UNIT/ML pen [Pharmacy Med Name: LANTUS SOLOSTAR PEN INJ 3ML] 60 mL 0     Sig: INJECT 90 UNITS UNDER THE SKIN IN THE MORNING AND 80 UNITS UNDER THE SKIN AT NIGHT       Long Acting Insulin Protocol Passed - 2019 11:33 AM        Passed - Blood pressure less than 140/90 in past 6 months     BP Readings from Last 3 Encounters:   19 134/72   18 126/72   18 155/79                 Passed - LDL on file in past 12 months     Recent Labs   Lab Test 18  0809   LDL 73             Passed - Microalbumin on file in past 12 months     Recent Labs   Lab Test 18  1618   MICROL 428   UMALCR 283.44*             Passed - Serum creatinine on file in past 12 months     Recent Labs   Lab Test 19  1711   CR 1.10             Passed - HgbA1C in past 3 or 6 months     If HgbA1C is 8 or greater, it needs to be on file within the past 3 months.  If less than 8, must be on file within the past 6 months.     Recent Labs   Lab Test 19  1701   A1C 7.9*             Passed - Medication is active on med list        Passed - Patient is age 18 or older        Passed - Recent (6 mo) or future (30 days) visit within the authorizing provider's specialty     Patient had office visit in the last 6 months or has a visit in the next 30 days with authorizing provider or within the authorizing provider's specialty.  See \"Patient Info\" tab in inbasket, or \"Choose Columns\" in Meds & Orders section of the refill encounter.            insulin lispro (HUMALOG KWIKPEN) 100 UNIT/ML pen 90 mL 3     Si unit per 1 gm of carb before meals three times daily. Average use of 170 Units/day.       There is no refill protocol information for this order          "

## 2019-05-30 NOTE — TELEPHONE ENCOUNTER
"Requested Prescriptions   Pending Prescriptions Disp Refills     insulin glargine (LANTUS SOLOSTAR PEN) 100 UNIT/ML pen 60 mL 0     Sig: INJECT 90 UNITS UNDER THE SKIN IN THE MORNING AND 80 UNITS UNDER THE SKIN AT NIGHT       Long Acting Insulin Protocol Passed - 2019  2:20 PM        Passed - Blood pressure less than 140/90 in past 6 months     BP Readings from Last 3 Encounters:   19 134/72   18 126/72   18 155/79                 Passed - LDL on file in past 12 months     Recent Labs   Lab Test 18  0809   LDL 73             Passed - Microalbumin on file in past 12 months     Recent Labs   Lab Test 18  1618   MICROL 428   UMALCR 283.44*             Passed - Serum creatinine on file in past 12 months     Recent Labs   Lab Test 19  1711   CR 1.10             Passed - HgbA1C in past 3 or 6 months     If HgbA1C is 8 or greater, it needs to be on file within the past 3 months.  If less than 8, must be on file within the past 6 months.     Recent Labs   Lab Test 19  1701   A1C 7.9*             Passed - Medication is active on med list        Passed - Patient is age 18 or older        Passed - Recent (6 mo) or future (30 days) visit within the authorizing provider's specialty     Patient had office visit in the last 6 months or has a visit in the next 30 days with authorizing provider or within the authorizing provider's specialty.  See \"Patient Info\" tab in inbasket, or \"Choose Columns\" in Meds & Orders section of the refill encounter.            insulin lispro (HUMALOG KWIKPEN) 100 UNIT/ML pen 90 mL 3     Si unit per 1 gm of carb before meals three times daily. Average use of 170 Units/day.       Short Acting Insulin Protocol Passed - 2019  2:20 PM        Passed - Blood pressure less than 140/90 in past 6 months     BP Readings from Last 3 Encounters:   19 134/72   18 126/72   18 155/79                 Passed - LDL on file in past 12 months     " "Recent Labs   Lab Test 06/27/18  0809   LDL 73             Passed - Microalbumin on file in past 12 months     Recent Labs   Lab Test 06/13/18  1618   MICROL 428   UMALCR 283.44*             Passed - Serum creatinine on file in past 12 months     Recent Labs   Lab Test 05/01/19  1711   CR 1.10             Passed - HgbA1C in past 3 or 6 months     If HgbA1C is 8 or greater, it needs to be on file within the past 3 months.  If less than 8, must be on file within the past 6 months.     Recent Labs   Lab Test 04/01/19  1701   A1C 7.9*             Passed - Medication is active on med list        Passed - Patient is age 18 or older        Passed - Recent (6 mo) or future (30 days) visit within the authorizing provider's specialty     Patient had office visit in the last 6 months or has a visit in the next 30 days with authorizing provider or within the authorizing provider's specialty.  See \"Patient Info\" tab in inbasket, or \"Choose Columns\" in Meds & Orders section of the refill encounter.            Prescription approved per [facility] Refill Protocol.    "

## 2019-06-03 DIAGNOSIS — E78.5 HYPERLIPIDEMIA LDL GOAL <70: ICD-10-CM

## 2019-06-03 DIAGNOSIS — I48.3 TYPICAL ATRIAL FLUTTER (H): ICD-10-CM

## 2019-06-03 DIAGNOSIS — I25.10 CAD (CORONARY ARTERY DISEASE): Primary | ICD-10-CM

## 2019-06-03 RX ORDER — ATORVASTATIN CALCIUM 40 MG/1
TABLET, FILM COATED ORAL
Qty: 90 TABLET | Refills: 0 | Status: SHIPPED | OUTPATIENT
Start: 2019-06-03 | End: 2019-09-19

## 2019-06-03 RX ORDER — METOPROLOL SUCCINATE 100 MG/1
100 TABLET, EXTENDED RELEASE ORAL 2 TIMES DAILY
Qty: 180 TABLET | Refills: 0 | Status: SHIPPED | OUTPATIENT
Start: 2019-06-03 | End: 2019-09-17

## 2019-06-03 NOTE — TELEPHONE ENCOUNTER
"Prescription approved per [facility] Refill Protocol. RODRIGO Valderrama R.N.          Requested Prescriptions   Pending Prescriptions Disp Refills     atorvastatin (LIPITOR) 40 MG tablet [Pharmacy Med Name: ATORVASTATIN 40MG TABLETS] 90 tablet 0     Sig: TAKE 1 TABLET BY MOUTH DAILY       Statins Protocol Passed - 6/3/2019  3:16 AM        Passed - LDL on file in past 12 months     Recent Labs   Lab Test 06/27/18  0809   LDL 73             Passed - No abnormal creatine kinase in past 12 months     No lab results found.             Passed - Recent (12 mo) or future (30 days) visit within the authorizing provider's specialty     Patient had office visit in the last 12 months or has a visit in the next 30 days with authorizing provider or within the authorizing provider's specialty.  See \"Patient Info\" tab in inbasket, or \"Choose Columns\" in Meds & Orders section of the refill encounter.              Passed - Medication is active on med list        Passed - Patient is age 18 or older          "

## 2019-06-03 NOTE — TELEPHONE ENCOUNTER
Pt should follow up with Dr Rosas as recommended by Dr garcia, Dr Mcclellan and Dr Rosas.  Order placed and 3 month's of metoprolol escribed. JNelsonRN

## 2019-06-05 DIAGNOSIS — E11.21 TYPE 2 DIABETES MELLITUS WITH DIABETIC NEPHROPATHY, WITH LONG-TERM CURRENT USE OF INSULIN (H): ICD-10-CM

## 2019-06-05 DIAGNOSIS — Z79.4 TYPE 2 DIABETES MELLITUS WITH DIABETIC NEPHROPATHY, WITH LONG-TERM CURRENT USE OF INSULIN (H): ICD-10-CM

## 2019-06-05 NOTE — TELEPHONE ENCOUNTER
Last OV 4/5/19    Prescription approved per [facility] Refill Protocol.    Lab Results   Component Value Date    A1C 7.9 04/01/2019    A1C 8.4 09/20/2018    A1C 9.9 06/13/2018    A1C 8.3 10/02/2017    A1C 7.8 07/20/2017

## 2019-06-05 NOTE — TELEPHONE ENCOUNTER
"Requested Prescriptions   Pending Prescriptions Disp Refills     NOVOFINE 30 30G X 8 MM insulin pen needle [Pharmacy Med Name: NOVOFINE AUTOCVR 14HR3HZ PEN NEEDLE] 300 each 0     Sig: USE 4 TO 5 DAILY OR AS DIRECTED   Last Written Prescription Date:  06/27/2018  Last Fill Quantity: 300,  # refills: 1   Last office visit: No previous visit found with prescribing provider:     Future Office Visit:      Diabetic Supplies Protocol Passed - 6/5/2019  3:15 AM        Passed - Medication is active on med list        Passed - Patient is 18 years of age or older        Passed - Recent (6 mo) or future (30 days) visit within the authorizing provider's specialty     Patient had office visit in the last 6 months or has a visit in the next 30 days with authorizing provider.  See \"Patient Info\" tab in inbasket, or \"Choose Columns\" in Meds & Orders section of the refill encounter.            "

## 2019-06-19 DIAGNOSIS — E11.21 TYPE 2 DIABETES MELLITUS WITH DIABETIC NEPHROPATHY, WITH LONG-TERM CURRENT USE OF INSULIN (H): ICD-10-CM

## 2019-06-19 DIAGNOSIS — Z79.4 TYPE 2 DIABETES MELLITUS WITH DIABETIC NEPHROPATHY, WITH LONG-TERM CURRENT USE OF INSULIN (H): ICD-10-CM

## 2019-06-20 NOTE — TELEPHONE ENCOUNTER
"Requested Prescriptions   Pending Prescriptions Disp Refills     metFORMIN (GLUCOPHAGE) 1000 MG tablet [Pharmacy Med Name: METFORMIN 1000MG TABLETS] 180 tablet 0     Sig: TAKE 1 TABLET BY MOUTH TWICE DAILY WITH MEALS   Last Written Prescription Date:  03/11/2019  Last Fill Quantity: 180,  # refills: 0   Last office visit: 4/5/2019 with prescribing provider:     Future Office Visit:      Biguanide Agents Passed - 6/19/2019  6:45 PM        Passed - Blood pressure less than 140/90 in past 6 months     BP Readings from Last 3 Encounters:   04/05/19 134/72   11/28/18 126/72   11/19/18 155/79                 Passed - Patient has documented LDL within the past 12 mos.     Recent Labs   Lab Test 06/27/18  0809   LDL 73             Passed - Patient has had a Microalbumin in the past 15 mos.     Recent Labs   Lab Test 06/13/18  1618   MICROL 428   UMALCR 283.44*             Passed - Patient is age 10 or older        Passed - Patient has documented A1c within the specified period of time.     If HgbA1C is 8 or greater, it needs to be on file within the past 3 months.  If less than 8, must be on file within the past 6 months.     Recent Labs   Lab Test 04/01/19  1701   A1C 7.9*             Passed - Patient's CR is NOT>1.4 OR Patient's EGFR is NOT<45 within past 12 mos.     Recent Labs   Lab Test 05/01/19  1711   GFRESTIMATED 69   GFRESTBLACK 80       Recent Labs   Lab Test 05/01/19  1711   CR 1.10             Passed - Patient does NOT have a diagnosis of CHF.        Passed - Medication is active on med list        Passed - Recent (6 mo) or future (30 days) visit within the authorizing provider's specialty     Patient had office visit in the last 6 months or has a visit in the next 30 days with authorizing provider or within the authorizing provider's specialty.  See \"Patient Info\" tab in inbasket, or \"Choose Columns\" in Meds & Orders section of the refill encounter.            "

## 2019-09-16 DIAGNOSIS — E11.21 TYPE 2 DIABETES MELLITUS WITH DIABETIC NEPHROPATHY, WITH LONG-TERM CURRENT USE OF INSULIN (H): ICD-10-CM

## 2019-09-16 DIAGNOSIS — Z79.4 TYPE 2 DIABETES MELLITUS WITH DIABETIC NEPHROPATHY, WITH LONG-TERM CURRENT USE OF INSULIN (H): ICD-10-CM

## 2019-09-16 NOTE — TELEPHONE ENCOUNTER
Requested Prescriptions   Pending Prescriptions Disp Refills     metFORMIN (GLUCOPHAGE) 1000 MG tablet [Pharmacy Med Name: METFORMIN  Last Written Prescription Date:  6/21/2019  Last Fill Quantity: 180,  # refills: 0   Last office visit: 4/5/2019 with prescribing provider:     Future Office Visit:   Next 5 appointments (look out 90 days)    Sep 19, 2019  2:15 PM CDT  Return Visit with Harvey Rosas MD  [facility] [address] [place] [id number] [phone] OPT 2        1000MG TABLETS] 180 tablet 0     Sig: TAKE 1 TABLET BY MOUTH TWICE DAILY WITH MEALS       Biguanide Agents Failed - 9/16/2019 10:15 AM        Failed - Patient has documented LDL within the past 12 mos.     Recent Labs   Lab Test 06/27/18  0809   LDL 73             Failed - Patient has had a Microalbumin in the past 15 mos.     Recent Labs   Lab Test 06/13/18  1618   MICROL 428   UMALCR 283.44*             Passed - Blood pressure less than 140/90 in past 6 months     BP Readings from Last 3 Encounters:   04/05/19 134/72   11/28/18 126/72   11/19/18 155/79                 Passed - Patient is age 10 or older        Passed - Patient has documented A1c within the specified period of time.     If HgbA1C is 8 or greater, it needs to be on file within the past 3 months.  If less than 8, must be on file within the past 6 months.     Recent Labs   Lab Test 04/01/19  1701   A1C 7.9*             Passed - Patient's CR is NOT>1.4 OR Patient's EGFR is NOT<45 within past 12 mos.     Recent Labs   Lab Test 05/01/19  1711   GFRESTIMATED 69   GFRESTBLACK 80       Recent Labs   Lab Test 05/01/19  1711   CR 1.10             Passed - Patient does NOT have a diagnosis of CHF.        Passed - Medication is active on med list        Passed - Recent (6 mo) or future (30 days) visit within the authorizing provider's specialty     Patient had office visit in the last 6 months or has a  "visit in the next 30 days with authorizing provider or within the authorizing provider's specialty.  See \"Patient Info\" tab in inbasket, or \"Choose Columns\" in Meds & Orders section of the refill encounter.            "

## 2019-09-17 DIAGNOSIS — I48.3 TYPICAL ATRIAL FLUTTER (H): ICD-10-CM

## 2019-09-17 RX ORDER — METOPROLOL SUCCINATE 100 MG/1
100 TABLET, EXTENDED RELEASE ORAL 2 TIMES DAILY
Qty: 180 TABLET | Refills: 0 | Status: SHIPPED | OUTPATIENT
Start: 2019-09-17 | End: 2020-06-02

## 2019-09-19 ENCOUNTER — OFFICE VISIT (OUTPATIENT)
Dept: CARDIOLOGY | Facility: CLINIC | Age: 69
End: 2019-09-19
Payer: COMMERCIAL

## 2019-09-19 VITALS
HEIGHT: 69 IN | WEIGHT: 230 LBS | DIASTOLIC BLOOD PRESSURE: 74 MMHG | SYSTOLIC BLOOD PRESSURE: 125 MMHG | BODY MASS INDEX: 34.07 KG/M2 | HEART RATE: 68 BPM

## 2019-09-19 DIAGNOSIS — R06.09 DYSPNEA ON MINIMAL EXERTION: ICD-10-CM

## 2019-09-19 DIAGNOSIS — I48.0 PAROXYSMAL ATRIAL FIBRILLATION (H): Primary | ICD-10-CM

## 2019-09-19 DIAGNOSIS — I48.3 TYPICAL ATRIAL FLUTTER (H): ICD-10-CM

## 2019-09-19 DIAGNOSIS — Z95.1 S/P CABG (CORONARY ARTERY BYPASS GRAFT): ICD-10-CM

## 2019-09-19 DIAGNOSIS — I25.10 CORONARY ARTERY DISEASE INVOLVING NATIVE CORONARY ARTERY OF NATIVE HEART WITHOUT ANGINA PECTORIS: ICD-10-CM

## 2019-09-19 PROCEDURE — 93000 ELECTROCARDIOGRAM COMPLETE: CPT | Performed by: INTERNAL MEDICINE

## 2019-09-19 PROCEDURE — 99214 OFFICE O/P EST MOD 30 MIN: CPT | Performed by: INTERNAL MEDICINE

## 2019-09-19 ASSESSMENT — MIFFLIN-ST. JEOR: SCORE: 1813.65

## 2019-09-19 NOTE — PROGRESS NOTES
Service Date: 2019      HISTORY OF PRESENT ILLNESS:  Mr. Parkinson is a very delightful 68-year-old gentleman who comes in with his wife today for routine appointment.  He has not been seen since .  He is followed by both myself as well as Dr. Laguna.  He has had coronary bypass surgery in  with a LIMA to the LAD, vein graft to the circumflex as well as PDA, history of hypertension, diabetes, hyperlipidemia, obesity and atrial fibrillation.      He underwent fibrillation ablation with Dr. Laguna in 2017 and was taken off Eliquis.  He denies any chest pain or chest pressure, however, he has had new onset since this summer of quite limiting shortness of breath with minimal exertion.  Even walking on flat ground, he will have to stop for dyspnea.  He has a distant history of smoking, almost 2 packs per day, but quit in .        He denies any PND, orthopnea or pedal edema.  He has been compliant with his medical therapy.  His diabetes has also been under reasonable control.      ASSESSMENT AND PLAN:  I am concerned regarding Mr. Parkinson's new onset of symptoms since this summer with history of coronary artery disease.  Actually he has lost approximately 30 pounds in the past year.  Despite this, he has quite severe dyspnea with minimal exertion.  I am concerned firstly about an ischemic equivalent and secondary about possible cardiomyopathy.  I am recommending we obtain an echocardiogram, nuclear Lexiscan stress test and follow up with EP for discussion of these results and further workup based upon them.  Another in the differential diagnosis would include worsened emphysema and pulmonary function tests could be obtained if the above aforementioned tests are negative.         LEWIS BYRNE MD [facility]             D: 2019   T: 2019   MT: SHALINI      Name:     EDUARDO PARKINSON   MRN:      [id number]        Account:      [id number]   :      1950           Service Date: 2019      Document:  [id number]

## 2019-09-19 NOTE — LETTER
9/19/2019      Lauro Galloway MD  [address] [facility] [address]      RE: Adan Ruffin       Dear Colleague,    I had the pleasure of seeing Adan Ruffin in the [facility].    Service Date: 09/19/2019      HISTORY OF PRESENT ILLNESS:  Mr. Ruffin is a very delightful 68-year-old gentleman who comes in with his wife today for routine appointment.  He has not been seen since 2017.  He is followed by both myself as well as Dr. Laguna.  He has had coronary bypass surgery in 2005 with a LIMA to the LAD, vein graft to the circumflex as well as PDA, history of hypertension, diabetes, hyperlipidemia, obesity and atrial fibrillation.      He underwent fibrillation ablation with Dr. Laguna in 11/2017 and was taken off Eliquis.  He denies any chest pain or chest pressure, however, he has had new onset since this summer of quite limiting shortness of breath with minimal exertion.  Even walking on flat ground, he will have to stop for dyspnea.  He has a distant history of smoking, almost 2 packs per day, but quit in 2003.        He denies any PND, orthopnea or pedal edema.  He has been compliant with his medical therapy.  His diabetes has also been under reasonable control.      ASSESSMENT AND PLAN:  I am concerned regarding Mr. Ruffin's new onset of symptoms since this summer with history of coronary artery disease.  Actually he has lost approximately 30 pounds in the past year.  Despite this, he has quite severe dyspnea with minimal exertion.  I am concerned firstly about an ischemic equivalent and secondary about possible cardiomyopathy.  I am recommending we obtain an echocardiogram, nuclear Lexiscan stress test and follow up with EP for discussion of these results and further workup based upon them.  Another in the differential diagnosis would include worsened emphysema and pulmonary function tests could be obtained if the above aforementioned tests are negative.         LEWIS DIAS  MD [facility]             D: 2019   T: 2019   MT: SHALINI      Name:     EDUARDO PARKINSON   MRN:      [id number]        Account:      [id number]   :      1950           Service Date: 2019      Document: [id number]         Outpatient Encounter Medications as of 2019   Medication Sig Dispense Refill     ACE NOT PRESCRIBED, INTENTIONAL, 1 each daily ACE Inhibitor not prescribed due to Intolerance 0 each 0     aspirin 81 MG EC tablet Take 1 tablet (81 mg) by mouth daily 1 tablet 0     atorvastatin (LIPITOR) 40 MG tablet Take 1 tablet (40 mg) by mouth daily 90 tablet 3     blood glucose (ACCU-CHEK SMARTVIEW) test strip USE TO TEST BLOOD SUGAR FOUR TIMES DAILY OR AS DIRECTED 400 strip 2     blood glucose monitoring (ACCU-CHEK FASTCLIX) lancets Use to test blood sugar 4 times daily or as directed. 200 each 11     insulin glargine (LANTUS SOLOSTAR PEN) 100 UNIT/ML pen INJECT 90 UNITS UNDER THE SKIN IN THE MORNING AND 80 UNITS UNDER THE SKIN AT NIGHT 60 mL 4     insulin lispro (HUMALOG KWIKPEN) 100 UNIT/ML pen 1 unit per 1 gm of carb before meals three times daily. Average use of 170 Units/day. 90 mL 2     insulin pen needle (NOVOFINE) 30G X 8 MM Use 4-5 daily or as directed. 300 each 1     metFORMIN (GLUCOPHAGE) 1000 MG tablet TAKE 1 TABLET BY MOUTH TWICE DAILY WITH MEALS 180 tablet 0     metoprolol succinate ER (TOPROL XL) 100 MG 24 hr tablet Take 1 tablet (100 mg) by mouth 2 times daily 180 tablet 0     NOVOFINE 30 30G X 8 MM insulin pen needle USE 4 TO 5 DAILY OR AS DIRECTED 400 each 1     [DISCONTINUED] atorvastatin (LIPITOR) 40 MG tablet TAKE 1 TABLET BY MOUTH DAILY 90 tablet 0     No facility-administered encounter medications on file as of 2019.        Again, thank you for allowing me to participate in the care of your patient.      Sincerely,    LEWIS BYRNE MD     [facility]

## 2019-09-19 NOTE — PROGRESS NOTES
HPI and Plan:   See dictation    Orders Placed This Encounter   Procedures     NM Lexiscan stress test (nuc card)     Follow-Up with Cardiac Advanced Practice Provider     EKG 12-lead complete w/read - Clinics (performed today)     Echocardiogram Limited     No orders of the defined types were placed in this encounter.    Medications Discontinued During This Encounter   Medication Reason     atorvastatin (LIPITOR) 40 MG tablet Erroneous Entry         Encounter Diagnoses   Name Primary?     Paroxysmal atrial fibrillation (H) Yes     Typical atrial flutter (H)      Coronary artery disease involving native coronary artery of native heart without angina pectoris      S/P CABG (coronary artery bypass graft)      Dyspnea on minimal exertion        CURRENT MEDICATIONS:  Current Outpatient Medications   Medication Sig Dispense Refill     ACE NOT PRESCRIBED, INTENTIONAL, 1 each daily ACE Inhibitor not prescribed due to Intolerance 0 each 0     aspirin 81 MG EC tablet Take 1 tablet (81 mg) by mouth daily 1 tablet 0     atorvastatin (LIPITOR) 40 MG tablet Take 1 tablet (40 mg) by mouth daily 90 tablet 3     blood glucose (ACCU-CHEK SMARTVIEW) test strip USE TO TEST BLOOD SUGAR FOUR TIMES DAILY OR AS DIRECTED 400 strip 2     blood glucose monitoring (ACCU-CHEK FASTCLIX) lancets Use to test blood sugar 4 times daily or as directed. 200 each 11     insulin glargine (LANTUS SOLOSTAR PEN) 100 UNIT/ML pen INJECT 90 UNITS UNDER THE SKIN IN THE MORNING AND 80 UNITS UNDER THE SKIN AT NIGHT 60 mL 4     insulin lispro (HUMALOG KWIKPEN) 100 UNIT/ML pen 1 unit per 1 gm of carb before meals three times daily. Average use of 170 Units/day. 90 mL 2     insulin pen needle (NOVOFINE) 30G X 8 MM Use 4-5 daily or as directed. 300 each 1     metFORMIN (GLUCOPHAGE) 1000 MG tablet TAKE 1 TABLET BY MOUTH TWICE DAILY WITH MEALS 180 tablet 0     metoprolol succinate ER (TOPROL XL) 100 MG 24 hr tablet Take 1 tablet (100 mg) by mouth 2 times daily 180  tablet 0     NOVOFINE 30 30G X 8 MM insulin pen needle USE 4 TO 5 DAILY OR AS DIRECTED 400 each 1       ALLERGIES     Allergies   Allergen Reactions     Tetracycline      PN: LW Reaction: RASH     Codeine Rash     Mild rash     Simvastatin Other (See Comments)     Leg pain       PAST MEDICAL HISTORY:  Past Medical History:   Diagnosis Date     Atrial flutter (H) 10/02/2017    ablation 11/26/2017     CAD (coronary artery disease) 03/13/2015    CABG 2013     Cardiomyopathy (H) 10/02/2017    (L) ventricular systolic     HTN (hypertension)      Hyperlipidaemia      Microalbuminuria 6/27/2016     Obesity 3/13/2015     Type 2 diabetes mellitus with diabetic nephropathy, with long-term current use of insulin (H) 11/2/2016       PAST SURGICAL HISTORY:  Past Surgical History:   Procedure Laterality Date     BYPASS GRAFT ARTERY CORONARY N/A 4/7/2015    bypass LAD, OM, PDA        COLONOSCOPY N/A 6/20/2016    Procedure: COLONOSCOPY;  Surgeon: Marcela Schwartz MD;  Location: [facility]     EXCISE PILONIDAL CYST, SIMPLE  1975     HEART CATH, ANGIOPLASTY  3-    3 vessel disease-occluded RCA, stenosis LM-to have CABG     removal of skin cancer  1992     TONSILLECTOMY & ADENOIDECTOMY  1950       FAMILY HISTORY:  Family History   Problem Relation Age of Onset     Breast Cancer Mother      Cerebrovascular Disease Mother      Hyperlipidemia Father      Cancer Father         lung, unrelated to smoking     Cancer Paternal Grandmother        SOCIAL HISTORY:  Social History     Socioeconomic History     Marital status:      Spouse name: None     Number of children: None     Years of education: None     Highest education level: None   Occupational History     None   Social Needs     Financial resource strain: None     Food insecurity:     Worry: None     Inability: None     Transportation needs:     Medical: None     Non-medical: None   Tobacco Use     Smoking status: Former Smoker     Packs/day: 1.50     Years: 29.00      "Pack years: 43.50     Types: Cigarettes     Last attempt to quit: 2000     Years since quittin.7     Smokeless tobacco: Never Used   Substance and Sexual Activity     Alcohol use: No     Alcohol/week: 0.0 oz     Comment: 1 mixed drink a night     Drug use: No     Sexual activity: Yes     Partners: Female   Lifestyle     Physical activity:     Days per week: None     Minutes per session: None     Stress: None   Relationships     Social connections:     Talks on phone: None     Gets together: None     Attends Druze service: None     Active member of club or organization: None     Attends meetings of clubs or organizations: None     Relationship status: None     Intimate partner violence:     Fear of current or ex partner: None     Emotionally abused: None     Physically abused: None     Forced sexual activity: None   Other Topics Concern     Parent/sibling w/ CABG, MI or angioplasty before 65F 55M? Not Asked      Service Not Asked     Blood Transfusions Not Asked     Caffeine Concern No     Comment: none     Occupational Exposure Not Asked     Hobby Hazards Not Asked     Sleep Concern No     Stress Concern Yes     Comment: sometimes at home     Weight Concern Yes     Comment: would like to lose weight     Special Diet No     Back Care Not Asked     Exercise No     Bike Helmet Not Asked     Seat Belt Yes     Self-Exams Not Asked   Social History Narrative     None       Review of Systems:  Skin:  Negative     Eyes:  Positive for glasses  ENT:  Negative    Respiratory:  Positive for shortness of breath;cough  Cardiovascular:  Negative Positive for;fatigue  Gastroenterology: Negative    Genitourinary:  Negative    Musculoskeletal:  Positive for joint pain  Neurologic:  Positive for numbness or tingling of hands(at night)  Psychiatric:  Positive for anxiety  Heme/Lymph/Imm:  Positive for allergies  Endocrine:  Positive for diabetes    Physical Exam:  Vitals: /74   Pulse 68   Ht 1.753 m (5' 9\") "   Wt 104.3 kg (230 lb)   BMI 33.97 kg/m      Constitutional:  cooperative, alert and oriented, well developed, well nourished, in no acute distress        Skin:  warm and dry to the touch, no apparent skin lesions or masses noted surgical scars well-healed        Head:  no masses or lesions        Eyes:  conjunctivae and lids unremarkable;sclera white        Lymph:      ENT:  dentition good        Neck:           Respiratory:  normal breath sounds, clear to auscultation, normal A-P diameter, normal symmetry, normal respiratory excursion, no use of accessory muscles         Cardiac: regular rhythm, normal S1/S2, no S3 or S4, apical impulse not displaced, no murmurs, gallops or rubs                                                         GI:           Extremities and Muscular Skeletal:  no deformities, clubbing, cyanosis, erythema observed              Neurological:           Psych:           Recent Lab Results:  LIPID RESULTS:  Lab Results   Component Value Date    CHOL 146 06/27/2018    HDL 42 06/27/2018    LDL 73 06/27/2018    TRIG 154 (H) 06/27/2018    CHOLHDLRATIO 3.5 08/19/2015       LIVER ENZYME RESULTS:  Lab Results   Component Value Date    AST 57 (H) 05/01/2019    ALT 38 05/01/2019       CBC RESULTS:  Lab Results   Component Value Date    WBC 11.3 (H) 11/27/2017    RBC 4.53 11/27/2017    HGB 14.7 11/27/2017    HCT 42.3 11/27/2017    MCV 93 11/27/2017    MCH 32.5 11/27/2017    MCHC 34.8 11/27/2017    RDW 13.2 11/27/2017     11/27/2017       BMP RESULTS:  Lab Results   Component Value Date     05/01/2019    POTASSIUM 4.7 05/01/2019    CHLORIDE 112 (H) 05/01/2019    CO2 26 05/01/2019    ANIONGAP 6 05/01/2019    GLC 91 05/01/2019    BUN 20 05/01/2019    CR 1.10 05/01/2019    GFRESTIMATED 69 05/01/2019    GFRESTBLACK 80 05/01/2019    AURE 10.0 05/01/2019        A1C RESULTS:  Lab Results   Component Value Date    A1C 7.9 (H) 04/01/2019       INR RESULTS:  Lab Results   Component Value Date    INR  0.97 10/19/2017    INR 1.30 (H) 04/07/2015           CC  Lauro Galloway MD  [address] [place] [address]

## 2019-09-19 NOTE — LETTER
9/19/2019    Lauro Galloway MD  [address] [facility] [address]    RE: Adan Ruffin       Dear Colleague,    I had the pleasure of seeing Adan Ruffin in the [facility].    HPI and Plan:   See dictation    Orders Placed This Encounter   Procedures     NM Lexiscan stress test (nuc card)     Follow-Up with Cardiac Advanced Practice Provider     EKG 12-lead complete w/read - Clinics (performed today)     Echocardiogram Limited     No orders of the defined types were placed in this encounter.    Medications Discontinued During This Encounter   Medication Reason     atorvastatin (LIPITOR) 40 MG tablet Erroneous Entry         Encounter Diagnoses   Name Primary?     Paroxysmal atrial fibrillation (H) Yes     Typical atrial flutter (H)      Coronary artery disease involving native coronary artery of native heart without angina pectoris      S/P CABG (coronary artery bypass graft)      Dyspnea on minimal exertion        CURRENT MEDICATIONS:  Current Outpatient Medications   Medication Sig Dispense Refill     ACE NOT PRESCRIBED, INTENTIONAL, 1 each daily ACE Inhibitor not prescribed due to Intolerance 0 each 0     aspirin 81 MG EC tablet Take 1 tablet (81 mg) by mouth daily 1 tablet 0     atorvastatin (LIPITOR) 40 MG tablet Take 1 tablet (40 mg) by mouth daily 90 tablet 3     blood glucose (ACCU-CHEK SMARTVIEW) test strip USE TO TEST BLOOD SUGAR FOUR TIMES DAILY OR AS DIRECTED 400 strip 2     blood glucose monitoring (ACCU-CHEK FASTCLIX) lancets Use to test blood sugar 4 times daily or as directed. 200 each 11     insulin glargine (LANTUS SOLOSTAR PEN) 100 UNIT/ML pen INJECT 90 UNITS UNDER THE SKIN IN THE MORNING AND 80 UNITS UNDER THE SKIN AT NIGHT 60 mL 4     insulin lispro (HUMALOG KWIKPEN) 100 UNIT/ML pen 1 unit per 1 gm of carb before meals three times daily. Average use of 170 Units/day. 90 mL 2     insulin pen needle (NOVOFINE) 30G X 8 MM Use 4-5 daily or as directed.  300 each 1     metFORMIN (GLUCOPHAGE) 1000 MG tablet TAKE 1 TABLET BY MOUTH TWICE DAILY WITH MEALS 180 tablet 0     metoprolol succinate ER (TOPROL XL) 100 MG 24 hr tablet Take 1 tablet (100 mg) by mouth 2 times daily 180 tablet 0     NOVOFINE 30 30G X 8 MM insulin pen needle USE 4 TO 5 DAILY OR AS DIRECTED 400 each 1       ALLERGIES     Allergies   Allergen Reactions     Tetracycline      PN: LW Reaction: RASH     Codeine Rash     Mild rash     Simvastatin Other (See Comments)     Leg pain       PAST MEDICAL HISTORY:  Past Medical History:   Diagnosis Date     Atrial flutter (H) 10/02/2017    ablation 11/26/2017     CAD (coronary artery disease) 03/13/2015    CABG 2013     Cardiomyopathy (H) 10/02/2017    (L) ventricular systolic     HTN (hypertension)      Hyperlipidaemia      Microalbuminuria 6/27/2016     Obesity 3/13/2015     Type 2 diabetes mellitus with diabetic nephropathy, with long-term current use of insulin (H) 11/2/2016       PAST SURGICAL HISTORY:  Past Surgical History:   Procedure Laterality Date     BYPASS GRAFT ARTERY CORONARY N/A 4/7/2015    bypass LAD, OM, PDA        COLONOSCOPY N/A 6/20/2016    Procedure: COLONOSCOPY;  Surgeon: Marcela Schwartz MD;  Location: [facility]     EXCISE PILONIDAL CYST, SIMPLE  1975     HEART CATH, ANGIOPLASTY  3-    3 vessel disease-occluded RCA, stenosis LM-to have CABG     removal of skin cancer  1992     TONSILLECTOMY & ADENOIDECTOMY  1950       FAMILY HISTORY:  Family History   Problem Relation Age of Onset     Breast Cancer Mother      Cerebrovascular Disease Mother      Hyperlipidemia Father      Cancer Father         lung, unrelated to smoking     Cancer Paternal Grandmother        SOCIAL HISTORY:  Social History     Socioeconomic History     Marital status:      Spouse name: None     Number of children: None     Years of education: None     Highest education level: None   Occupational History     None   Social Needs     Financial resource  strain: None     Food insecurity:     Worry: None     Inability: None     Transportation needs:     Medical: None     Non-medical: None   Tobacco Use     Smoking status: Former Smoker     Packs/day: 1.50     Years: 29.00     Pack years: 43.50     Types: Cigarettes     Last attempt to quit: 2000     Years since quittin.7     Smokeless tobacco: Never Used   Substance and Sexual Activity     Alcohol use: No     Alcohol/week: 0.0 oz     Comment: 1 mixed drink a night     Drug use: No     Sexual activity: Yes     Partners: Female   Lifestyle     Physical activity:     Days per week: None     Minutes per session: None     Stress: None   Relationships     Social connections:     Talks on phone: None     Gets together: None     Attends Jain service: None     Active member of club or organization: None     Attends meetings of clubs or organizations: None     Relationship status: None     Intimate partner violence:     Fear of current or ex partner: None     Emotionally abused: None     Physically abused: None     Forced sexual activity: None   Other Topics Concern     Parent/sibling w/ CABG, MI or angioplasty before 65F 55M? Not Asked      Service Not Asked     Blood Transfusions Not Asked     Caffeine Concern No     Comment: none     Occupational Exposure Not Asked     Hobby Hazards Not Asked     Sleep Concern No     Stress Concern Yes     Comment: sometimes at home     Weight Concern Yes     Comment: would like to lose weight     Special Diet No     Back Care Not Asked     Exercise No     Bike Helmet Not Asked     Seat Belt Yes     Self-Exams Not Asked   Social History Narrative     None       Review of Systems:  Skin:  Negative     Eyes:  Positive for glasses  ENT:  Negative    Respiratory:  Positive for shortness of breath;cough  Cardiovascular:  Negative Positive for;fatigue  Gastroenterology: Negative    Genitourinary:  Negative    Musculoskeletal:  Positive for joint pain  Neurologic:  Positive  "for numbness or tingling of hands(at night)  Psychiatric:  Positive for anxiety  Heme/Lymph/Imm:  Positive for allergies  Endocrine:  Positive for diabetes    Physical Exam:  Vitals: /74   Pulse 68   Ht 1.753 m (5' 9\")   Wt 104.3 kg (230 lb)   BMI 33.97 kg/m       Constitutional:  cooperative, alert and oriented, well developed, well nourished, in no acute distress        Skin:  warm and dry to the touch, no apparent skin lesions or masses noted surgical scars well-healed        Head:  no masses or lesions        Eyes:  conjunctivae and lids unremarkable;sclera white        Lymph:      ENT:  dentition good        Neck:           Respiratory:  normal breath sounds, clear to auscultation, normal A-P diameter, normal symmetry, normal respiratory excursion, no use of accessory muscles         Cardiac: regular rhythm, normal S1/S2, no S3 or S4, apical impulse not displaced, no murmurs, gallops or rubs                                                         GI:           Extremities and Muscular Skeletal:  no deformities, clubbing, cyanosis, erythema observed              Neurological:           Psych:           Recent Lab Results:  LIPID RESULTS:  Lab Results   Component Value Date    CHOL 146 06/27/2018    HDL 42 06/27/2018    LDL 73 06/27/2018    TRIG 154 (H) 06/27/2018    CHOLHDLRATIO 3.5 08/19/2015       LIVER ENZYME RESULTS:  Lab Results   Component Value Date    AST 57 (H) 05/01/2019    ALT 38 05/01/2019       CBC RESULTS:  Lab Results   Component Value Date    WBC 11.3 (H) 11/27/2017    RBC 4.53 11/27/2017    HGB 14.7 11/27/2017    HCT 42.3 11/27/2017    MCV 93 11/27/2017    MCH 32.5 11/27/2017    MCHC 34.8 11/27/2017    RDW 13.2 11/27/2017     11/27/2017       BMP RESULTS:  Lab Results   Component Value Date     05/01/2019    POTASSIUM 4.7 05/01/2019    CHLORIDE 112 (H) 05/01/2019    CO2 26 05/01/2019    ANIONGAP 6 05/01/2019    GLC 91 05/01/2019    BUN 20 05/01/2019    CR 1.10 05/01/2019 "    GFRESTIMATED 69 05/01/2019    GFRESTBLACK 80 05/01/2019    AURE 10.0 05/01/2019        A1C RESULTS:  Lab Results   Component Value Date    A1C 7.9 (H) 04/01/2019       INR RESULTS:  Lab Results   Component Value Date    INR 0.97 10/19/2017    INR 1.30 (H) 04/07/2015           CC  Lauro Galloway MD  [address]  [place] [phone]                  Thank you for allowing me to participate in the care of your patient.      Sincerely,     LEWIS BYRNE MD     [facility]    cc:   Lauro Galloway MD  [address]  [place] [phone]

## 2019-09-24 ENCOUNTER — TELEPHONE (OUTPATIENT)
Dept: ENDOCRINOLOGY | Facility: CLINIC | Age: 69
End: 2019-09-24

## 2019-09-24 NOTE — TELEPHONE ENCOUNTER
Panel Management Review      Patient has the following on his problem list:   Diabetes    ASA: Passed    Last A1C  Lab Results   Component Value Date    A1C 7.9 04/01/2019    A1C 8.4 09/20/2018    A1C 9.9 06/13/2018    A1C 8.3 10/02/2017    A1C 7.8 07/20/2017     A1C tested: Passed    Last LDL:    Lab Results   Component Value Date    CHOL 146 06/27/2018     Lab Results   Component Value Date    HDL 42 06/27/2018     Lab Results   Component Value Date    LDL 73 06/27/2018     Lab Results   Component Value Date    TRIG 154 06/27/2018     Lab Results   Component Value Date    CHOLHDLRATIO 3.5 08/19/2015     Lab Results   Component Value Date    NHDL 104 06/27/2018       Is the patient on a Statin? YES             Is the patient on Aspirin? YES    Medications     HMG CoA Reductase Inhibitors     atorvastatin (LIPITOR) 40 MG tablet       Salicylates     aspirin 81 MG EC tablet             Last three blood pressure readings:  BP Readings from Last 3 Encounters:   09/19/19 125/74   04/05/19 134/72   11/28/18 126/72       Date of last diabetes office visit: 09/20/18     Tobacco History:     History   Smoking Status     Former Smoker     Packs/day: 1.50     Years: 29.00     Types: Cigarettes     Quit date: 1/1/2000   Smokeless Tobacco     Never Used           Composite cancer screening  Chart review shows that this patient is due/due soon for the following None  Summary:    Patient is due/failing the following:   A1C and FOLLOW UP    Action needed:   Patient needs office visit for endo.    Type of outreach:    Sent letter.    Questions for provider review:    None                                                                                                                                    Erika Hall CMA  [facility]  Ruben[facility]ny       Chart routed to no one .

## 2019-09-24 NOTE — LETTER
[facility]  [address], [address]  [place], [place]  [address]                                            TEL:[phone]  FAX:[phone]      Adan Ruffin  [address] [place] [address]      September 24, 2019    Dear Adan       This letter is being sent on behalf of Dr. Tolentino. It is to remind you that your provider expected you to return for a follow up clinic visit. Please make a lab only appointment, and then follow up with a clinic visit one week afterwards to review those results. If this is not done, it may result in your provider not being able to refill your medications.     You may call our office at [phone] ([place]) or [phone] ([place]) to schedule an appointment. You may also see Nancy Shelley in [place] at [phone]    If you have already scheduled these appointments, please disregard this notice.      Sincerely,    [facility],  Dr. Marilee Tolentino

## 2019-09-30 ENCOUNTER — HOSPITAL ENCOUNTER (OUTPATIENT)
Dept: CARDIOLOGY | Facility: CLINIC | Age: 69
Discharge: HOME | End: 2019-09-30
Attending: INTERNAL MEDICINE | Admitting: INTERNAL MEDICINE
Payer: COMMERCIAL

## 2019-09-30 VITALS — HEART RATE: 87 BPM | SYSTOLIC BLOOD PRESSURE: 158 MMHG | DIASTOLIC BLOOD PRESSURE: 78 MMHG

## 2019-09-30 DIAGNOSIS — I48.3 TYPICAL ATRIAL FLUTTER (H): ICD-10-CM

## 2019-09-30 DIAGNOSIS — I48.0 PAROXYSMAL ATRIAL FIBRILLATION (H): ICD-10-CM

## 2019-09-30 DIAGNOSIS — R06.09 DYSPNEA ON MINIMAL EXERTION: ICD-10-CM

## 2019-09-30 DIAGNOSIS — I25.10 CORONARY ARTERY DISEASE INVOLVING NATIVE CORONARY ARTERY OF NATIVE HEART WITHOUT ANGINA PECTORIS: ICD-10-CM

## 2019-09-30 DIAGNOSIS — Z95.1 S/P CABG (CORONARY ARTERY BYPASS GRAFT): ICD-10-CM

## 2019-09-30 PROCEDURE — 25000128 H RX IP 250 OP 636: Performed by: INTERNAL MEDICINE

## 2019-09-30 PROCEDURE — 78452 HT MUSCLE IMAGE SPECT MULT: CPT | Mod: 26 | Performed by: INTERNAL MEDICINE

## 2019-09-30 PROCEDURE — 40000264 ECHOCARDIOGRAM COMPLETE

## 2019-09-30 PROCEDURE — 93018 CV STRESS TEST I&R ONLY: CPT | Performed by: INTERNAL MEDICINE

## 2019-09-30 PROCEDURE — 34300033 ZZH RX 343: Performed by: INTERNAL MEDICINE

## 2019-09-30 PROCEDURE — 93016 CV STRESS TEST SUPVJ ONLY: CPT | Performed by: INTERNAL MEDICINE

## 2019-09-30 PROCEDURE — 93017 CV STRESS TEST TRACING ONLY: CPT

## 2019-09-30 PROCEDURE — 93306 TTE W/DOPPLER COMPLETE: CPT | Mod: 26 | Performed by: INTERNAL MEDICINE

## 2019-09-30 PROCEDURE — A9502 TC99M TETROFOSMIN: HCPCS | Performed by: INTERNAL MEDICINE

## 2019-09-30 PROCEDURE — 25500064 ZZH RX 255 OP 636: Performed by: INTERNAL MEDICINE

## 2019-09-30 RX ORDER — CAFFEINE 200 MG
200 TABLET ORAL
Status: DISCONTINUED | OUTPATIENT
Start: 2019-09-30 | End: 2019-10-01 | Stop reason: HOSPADM

## 2019-09-30 RX ORDER — CAFFEINE CITRATE 20 MG/ML
60 SOLUTION INTRAVENOUS
Status: DISCONTINUED | OUTPATIENT
Start: 2019-09-30 | End: 2019-10-01 | Stop reason: HOSPADM

## 2019-09-30 RX ORDER — ACYCLOVIR 200 MG/1
0-1 CAPSULE ORAL
Status: DISCONTINUED | OUTPATIENT
Start: 2019-09-30 | End: 2019-10-01 | Stop reason: HOSPADM

## 2019-09-30 RX ORDER — REGADENOSON 0.08 MG/ML
0.4 INJECTION, SOLUTION INTRAVENOUS ONCE
Status: COMPLETED | OUTPATIENT
Start: 2019-09-30 | End: 2019-09-30

## 2019-09-30 RX ORDER — AMINOPHYLLINE 25 MG/ML
50-100 INJECTION, SOLUTION INTRAVENOUS
Status: DISCONTINUED | OUTPATIENT
Start: 2019-09-30 | End: 2019-10-01 | Stop reason: HOSPADM

## 2019-09-30 RX ORDER — ALBUTEROL SULFATE 90 UG/1
2 AEROSOL, METERED RESPIRATORY (INHALATION) EVERY 5 MIN PRN
Status: DISCONTINUED | OUTPATIENT
Start: 2019-09-30 | End: 2019-10-01 | Stop reason: HOSPADM

## 2019-09-30 RX ADMIN — TETROFOSMIN 6.26 MCI.: 1.38 INJECTION, POWDER, LYOPHILIZED, FOR SOLUTION INTRAVENOUS at 09:07

## 2019-09-30 RX ADMIN — REGADENOSON 0.4 MG: 0.08 INJECTION, SOLUTION INTRAVENOUS at 10:42

## 2019-09-30 RX ADMIN — HUMAN ALBUMIN MICROSPHERES AND PERFLUTREN 9 ML: 10; .22 INJECTION, SOLUTION INTRAVENOUS at 08:42

## 2019-09-30 RX ADMIN — TETROFOSMIN 20.3 MCI.: 1.38 INJECTION, POWDER, LYOPHILIZED, FOR SOLUTION INTRAVENOUS at 10:45

## 2019-10-11 ENCOUNTER — OFFICE VISIT (OUTPATIENT)
Dept: CARDIOLOGY | Facility: CLINIC | Age: 69
End: 2019-10-11
Attending: INTERNAL MEDICINE
Payer: COMMERCIAL

## 2019-10-11 VITALS
WEIGHT: 230.4 LBS | HEIGHT: 69 IN | HEART RATE: 60 BPM | DIASTOLIC BLOOD PRESSURE: 70 MMHG | SYSTOLIC BLOOD PRESSURE: 158 MMHG | BODY MASS INDEX: 34.13 KG/M2

## 2019-10-11 DIAGNOSIS — I25.10 CORONARY ARTERY DISEASE INVOLVING NATIVE CORONARY ARTERY OF NATIVE HEART WITHOUT ANGINA PECTORIS: ICD-10-CM

## 2019-10-11 DIAGNOSIS — Z95.1 S/P CABG (CORONARY ARTERY BYPASS GRAFT): ICD-10-CM

## 2019-10-11 DIAGNOSIS — I48.3 TYPICAL ATRIAL FLUTTER (H): ICD-10-CM

## 2019-10-11 DIAGNOSIS — I10 ESSENTIAL HYPERTENSION: ICD-10-CM

## 2019-10-11 DIAGNOSIS — R06.09 DYSPNEA ON MINIMAL EXERTION: Primary | ICD-10-CM

## 2019-10-11 DIAGNOSIS — Z86.79 STATUS POST ABLATION OF ATRIAL FLUTTER: ICD-10-CM

## 2019-10-11 DIAGNOSIS — K92.1 BLOODY STOOL: ICD-10-CM

## 2019-10-11 DIAGNOSIS — I48.0 PAROXYSMAL ATRIAL FIBRILLATION (H): ICD-10-CM

## 2019-10-11 DIAGNOSIS — R06.09 DYSPNEA ON MINIMAL EXERTION: ICD-10-CM

## 2019-10-11 DIAGNOSIS — Z98.890 STATUS POST ABLATION OF ATRIAL FLUTTER: ICD-10-CM

## 2019-10-11 LAB
ALBUMIN SERPL-MCNC: 3.3 G/DL (ref 3.4–5)
ALP SERPL-CCNC: 144 U/L (ref 40–150)
ALT SERPL W P-5'-P-CCNC: 37 U/L (ref 0–70)
ANION GAP SERPL CALCULATED.3IONS-SCNC: 8 MMOL/L (ref 3–14)
AST SERPL W P-5'-P-CCNC: 42 U/L (ref 0–45)
BILIRUB SERPL-MCNC: 0.4 MG/DL (ref 0.2–1.3)
BUN SERPL-MCNC: 19 MG/DL (ref 7–30)
CALCIUM SERPL-MCNC: 9.5 MG/DL (ref 8.5–10.1)
CHLORIDE SERPL-SCNC: 109 MMOL/L (ref 94–109)
CO2 SERPL-SCNC: 23 MMOL/L (ref 20–32)
CREAT SERPL-MCNC: 0.85 MG/DL (ref 0.66–1.25)
GFR SERPL CREATININE-BSD FRML MDRD: >90 ML/MIN/{1.73_M2}
GLUCOSE SERPL-MCNC: 81 MG/DL (ref 70–99)
POTASSIUM SERPL-SCNC: 3.8 MMOL/L (ref 3.4–5.3)
PROT SERPL-MCNC: 8.1 G/DL (ref 6.8–8.8)
SODIUM SERPL-SCNC: 140 MMOL/L (ref 133–144)

## 2019-10-11 PROCEDURE — 80053 COMPREHEN METABOLIC PANEL: CPT | Performed by: INTERNAL MEDICINE

## 2019-10-11 PROCEDURE — 36415 COLL VENOUS BLD VENIPUNCTURE: CPT | Performed by: INTERNAL MEDICINE

## 2019-10-11 PROCEDURE — 99214 OFFICE O/P EST MOD 30 MIN: CPT | Performed by: PHYSICIAN ASSISTANT

## 2019-10-11 ASSESSMENT — MIFFLIN-ST. JEOR: SCORE: 1815.47

## 2019-10-11 NOTE — PATIENT INSTRUCTIONS
Today's Plan:   1) Stop by lab today. If these results are inconclusive, we may need to do the heart CT to make sure your arteries are okay.   2) If heart CT does not show any concerning findings, we may need to have you see primary provider to find out if this could be your lungs.     If you have questions or concerns please call my nurse team at [phone].     Scheduling phone number: [phone]  Reminder: Please bring in all current medications, over the counter supplements and vitamin bottles to your next appointment.    It was a pleasure seeing you today!     Magno Sandoval PA-C  10/11/2019

## 2019-10-11 NOTE — LETTER
10/11/2019    Lauro Galloway MD  [address] [facility] [address]    RE: Adan Ruffin       Dear Colleague,    I had the pleasure of seeing Adan Ruffin in the [facility].    Primary Cardiologist: Dr. Rosas    Reason for visit: To review the results of recent echocardiogram and nuclear stress scan    History of Present Illness:   This a very pleasant 68-year-old gentleman with past medical history notable for coronary artery bypass grafting in 2015 (LIMA to the LAD, vein graft to the circumflex, vein graft to the PDA), hypertension, type 2 diabetes mellitus, hyperlipidemia, history of atrial flutter (status post ablation in 2017 performed by Dr. Laguna; Eliquis was discontinued subsequently as he had no recurrence of it), obesity, and distant history of tobacco use.    He was seen in the cardiology clinic recently and at that time he had reports of dyspnea on exertion for the last 2 months.  He was set up for echocardiogram as well as nuclear stress scan for further evaluation of his symptoms.    He tells me that his symptoms prior to his bypass were central chest pressure with radiation down to his left arm.  He denies any recurrence of these symptoms in the last few months.  He has been noticing shortness of breath on moderate exertion.  He thinks that since his lost some weight this is actually getting somewhat better but he continues to have noticeable shortness of breath.  He denies orthopnea, PND, peripheral edema, wheezing, pleuritic chest discomfort, fever chills, productive cough.  He does tell me that he has been having bloody stool for the last 5 to 6 weeks.  He describes this occurred once in the past and at that time he was recommended to decrease his aspirin from full dose to 81 mg.  He is not aware of having an EGD or colonoscopy being done at that time.  His shortness of breath does seem to correlate with the time he started to notice having bloody  stools.  He denies palpitations, presyncope, or syncope.    Assessment and Plan:  This a very pleasant 68-year-old gentleman with past medical history notable for coronary artery bypass grafting in 2015 (LIMA to the LAD, vein graft to the circumflex, vein graft to the PDA), hypertension, type 2 diabetes mellitus, hyperlipidemia, history of atrial flutter (status post ablation in 2017 performed by Dr. Laguna; Eliquis was discontinued subsequently as he had no recurrence of it), obesity, and distant history of tobacco use.    Repeat echocardiogram demonstrated preserved biventricular function with no significant valve abnormalities.  He appears to have very mild ascending aorta dilatation measuring at 3.9 cm.  Nuclear stress scan showed no evidence of ischemia or infarct but he did have borderline high transient ischemic dilatation at 1.24.  It was however noted that in the absence of perfusion defect the specificity of transient ischemic dilatation to detect significant coronary disease was low.  Clinical correlation was required.    I am not quite convinced that his dyspnea on exertion is angina equivalent as his prior angina with central chest discomfort with radiation to his left arm.  He tells me that shortness of breath has become a concern since he is noticed bloody stools which makes me wonder if he has significant anemia.  I would like him to stop by lab today for CBC as well as basic metabolic panel.  If he has significantly low hemoglobin he will need GI referral.  Part of his shortness of breath could be due to deconditioning as he tells me he has a Versed and try lifestyle.  He has noticed that his shortness of breath got somewhat better when he started to lose weight.  At this time I will await the results of his labs.  Depending on these numbers we may consider further evaluation for coronary disease.  I did review his nuclear stress scan results with Dr. Rosas who recommended proceeding with CT coronary  angiogram.  I will hold off on this at this time.    Thank you for allowing me to participate in the care of this pleasant patient today.      This note was completed in part using Dragon voice recognition software. Although reviewed after completion, some word and grammatical errors may occur.    Orders this Visit:  Orders Placed This Encounter   Procedures     Basic metabolic panel     Comprehensive metabolic panel     No orders of the defined types were placed in this encounter.    There are no discontinued medications.      Encounter Diagnoses   Name Primary?     Paroxysmal atrial fibrillation (H)      Typical atrial flutter (H)      Coronary artery disease involving native coronary artery of native heart without angina pectoris      S/P CABG (coronary artery bypass graft)      Dyspnea on minimal exertion        CURRENT MEDICATIONS:  Current Outpatient Medications   Medication Sig Dispense Refill     aspirin 81 MG EC tablet Take 1 tablet (81 mg) by mouth daily 1 tablet 0     atorvastatin (LIPITOR) 40 MG tablet Take 1 tablet (40 mg) by mouth daily 90 tablet 3     blood glucose (ACCU-CHEK SMARTVIEW) test strip USE TO TEST BLOOD SUGAR FOUR TIMES DAILY OR AS DIRECTED 400 strip 2     blood glucose monitoring (ACCU-CHEK FASTCLIX) lancets Use to test blood sugar 4 times daily or as directed. 200 each 11     insulin glargine (LANTUS SOLOSTAR PEN) 100 UNIT/ML pen INJECT 90 UNITS UNDER THE SKIN IN THE MORNING AND 80 UNITS UNDER THE SKIN AT NIGHT 60 mL 4     insulin lispro (HUMALOG KWIKPEN) 100 UNIT/ML pen 1 unit per 1 gm of carb before meals three times daily. Average use of 170 Units/day. 90 mL 2     metFORMIN (GLUCOPHAGE) 1000 MG tablet TAKE 1 TABLET BY MOUTH TWICE DAILY WITH MEALS 180 tablet 0     metoprolol succinate ER (TOPROL XL) 100 MG 24 hr tablet Take 1 tablet (100 mg) by mouth 2 times daily 180 tablet 0     NOVOFINE 30 30G X 8 MM insulin pen needle USE 4 TO 5 DAILY OR AS DIRECTED 400 each 1     ACE NOT  PRESCRIBED, INTENTIONAL, 1 each daily ACE Inhibitor not prescribed due to Intolerance (Patient not taking: Reported on 10/11/2019) 0 each 0     insulin pen needle (NOVOFINE) 30G X 8 MM Use 4-5 daily or as directed. (Patient not taking: Reported on 10/11/2019) 300 each 1       ALLERGIES     Allergies   Allergen Reactions     Tetracycline      PN: LW Reaction: RASH     Codeine Rash     Mild rash     Simvastatin Other (See Comments)     Leg pain       PAST MEDICAL HISTORY:  Past Medical History:   Diagnosis Date     Aortic root dilatation (H)      Atrial flutter (H) 10/02/2017    ablation 11/26/2017     CAD (coronary artery disease) 03/13/2015    CABG 4/7/2015 by Dr. Johan Hall: LIMA to LAD, SVG to circumflex, SVG to PDA     Cardiomyopathy (H) 10/02/2017    tachycardia-induced with EF 40-45%, improved once back to NSR     HTN (hypertension)      Hyperlipidaemia      Microalbuminuria 6/27/2016     Obesity 3/13/2015     Type 2 diabetes mellitus with diabetic nephropathy, with long-term current use of insulin (H) 11/2/2016       PAST SURGICAL HISTORY:  Past Surgical History:   Procedure Laterality Date     BYPASS GRAFT ARTERY CORONARY N/A 4/7/2015    bypass LAD, OM, PDA        COLONOSCOPY N/A 6/20/2016    Procedure: COLONOSCOPY;  Surgeon: Marcela Schwartz MD;  Location: [facility]     EXCISE PILONIDAL CYST, SIMPLE  1975     HEART CATH, ANGIOPLASTY  3-    3 vessel disease-occluded RCA, stenosis LM-to have CABG     removal of skin cancer  1992     TONSILLECTOMY & ADENOIDECTOMY  1950       FAMILY HISTORY:  Family History   Problem Relation Age of Onset     Breast Cancer Mother      Cerebrovascular Disease Mother      Hyperlipidemia Father      Cancer Father         lung, unrelated to smoking     Cancer Paternal Grandmother        SOCIAL HISTORY:  Social History     Socioeconomic History     Marital status:      Spouse name: None     Number of children: None     Years of education: None     Highest  education level: None   Occupational History     None   Social Needs     Financial resource strain: None     Food insecurity:     Worry: None     Inability: None     Transportation needs:     Medical: None     Non-medical: None   Tobacco Use     Smoking status: Former Smoker     Packs/day: 1.50     Years: 29.00     Pack years: 43.50     Types: Cigarettes     Start date:      Last attempt to quit: 2000     Years since quittin.7     Smokeless tobacco: Never Used   Substance and Sexual Activity     Alcohol use: Yes     Alcohol/week: 0.0 standard drinks     Comment: 1 mixed drink a night     Drug use: No     Sexual activity: Yes     Partners: Female   Lifestyle     Physical activity:     Days per week: None     Minutes per session: None     Stress: None   Relationships     Social connections:     Talks on phone: None     Gets together: None     Attends Worship service: None     Active member of club or organization: None     Attends meetings of clubs or organizations: None     Relationship status: None     Intimate partner violence:     Fear of current or ex partner: None     Emotionally abused: None     Physically abused: None     Forced sexual activity: None   Other Topics Concern     Parent/sibling w/ CABG, MI or angioplasty before 65F 55M? Not Asked      Service Not Asked     Blood Transfusions Not Asked     Caffeine Concern No     Comment: none     Occupational Exposure Not Asked     Hobby Hazards Not Asked     Sleep Concern No     Stress Concern Yes     Comment: sometimes at home     Weight Concern Yes     Comment: would like to lose weight     Special Diet No     Back Care Not Asked     Exercise No     Bike Helmet Not Asked     Seat Belt Yes     Self-Exams Not Asked   Social History Narrative     None       Review of Systems:  Skin:  Negative     Eyes:  Positive for glasses  ENT:  Negative    Respiratory:  Positive for dyspnea on exertion  Cardiovascular:  Negative    Gastroenterology:  "Negative    Genitourinary:  Negative    Musculoskeletal:  Negative    Neurologic:  Positive for numbness or tingling of hands  Psychiatric:  Negative    Heme/Lymph/Imm:  Negative    Endocrine:  Positive for diabetes    Physical Exam:  Vitals: BP (!) 158/70   Pulse 60   Ht 1.753 m (5' 9\")   Wt 104.5 kg (230 lb 6.4 oz)   BMI 34.02 kg/m        GEN:  NAD.   truncal obesity.  NECK: Unable to assess JVD due to body habitus.  C/V:  Regular rate and rhythm, no murmur, rub or gallop.  RESP: Clear to auscultation bilaterally.  GI: Abdomen soft, nontender, nondistended.   EXTREM: No LE edema.   NEURO: Alert and oriented, cooperative. No obvious focal deficits.   PSYCH: Normal affect.  SKIN: Warm and dry.       Recent Lab Results:  LIPID RESULTS:  Lab Results   Component Value Date    CHOL 146 06/27/2018    HDL 42 06/27/2018    LDL 73 06/27/2018    TRIG 154 (H) 06/27/2018    CHOLHDLRATIO 3.5 08/19/2015       LIVER ENZYME RESULTS:  Lab Results   Component Value Date    AST 57 (H) 05/01/2019    ALT 38 05/01/2019       CBC RESULTS:  Lab Results   Component Value Date    WBC 11.3 (H) 11/27/2017    RBC 4.53 11/27/2017    HGB 14.7 11/27/2017    HCT 42.3 11/27/2017    MCV 93 11/27/2017    MCH 32.5 11/27/2017    MCHC 34.8 11/27/2017    RDW 13.2 11/27/2017     11/27/2017       BMP RESULTS:  Lab Results   Component Value Date     05/01/2019    POTASSIUM 4.7 05/01/2019    CHLORIDE 112 (H) 05/01/2019    CO2 26 05/01/2019    ANIONGAP 6 05/01/2019    GLC 91 05/01/2019    BUN 20 05/01/2019    CR 1.10 05/01/2019    GFRESTIMATED 69 05/01/2019    GFRESTBLACK 80 05/01/2019    AURE 10.0 05/01/2019        A1C RESULTS:  Lab Results   Component Value Date    A1C 7.9 (H) 04/01/2019       INR RESULTS:  Lab Results   Component Value Date    INR 0.97 10/19/2017    INR 1.30 (H) 04/07/2015           Magno Sandoval PA-C, PABennyC   October 11, 2019       Thank you for allowing me to participate in the care of your patient.      Sincerely, "     Magno Sandoval PA-C     [facility]    cc:   Harvey Rosas MD  [address]  [place] [phone]

## 2019-10-11 NOTE — PROGRESS NOTES
Primary Cardiologist: Dr. Rosas    Reason for visit: To review the results of recent echocardiogram and nuclear stress scan    History of Present Illness:   This a very pleasant 68-year-old gentleman with past medical history notable for coronary artery bypass grafting in 2015 (LIMA to the LAD, vein graft to the circumflex, vein graft to the PDA), hypertension, type 2 diabetes mellitus, hyperlipidemia, history of atrial flutter (status post ablation in 2017 performed by Dr. Laguna; Eliquis was discontinued subsequently as he had no recurrence of it), obesity, and distant history of tobacco use.    He was seen in the cardiology clinic recently and at that time he had reports of dyspnea on exertion for the last 2 months.  He was set up for echocardiogram as well as nuclear stress scan for further evaluation of his symptoms.    He tells me that his symptoms prior to his bypass were central chest pressure with radiation down to his left arm.  He denies any recurrence of these symptoms in the last few months.  He has been noticing shortness of breath on moderate exertion.  He thinks that since his lost some weight this is actually getting somewhat better but he continues to have noticeable shortness of breath.  He denies orthopnea, PND, peripheral edema, wheezing, pleuritic chest discomfort, fever chills, productive cough.  He does tell me that he has been having bloody stool for the last 5 to 6 weeks.  He describes this occurred once in the past and at that time he was recommended to decrease his aspirin from full dose to 81 mg.  He is not aware of having an EGD or colonoscopy being done at that time.  His shortness of breath does seem to correlate with the time he started to notice having bloody stools.  He denies palpitations, presyncope, or syncope.    Assessment and Plan:  This a very pleasant 68-year-old gentleman with past medical history notable for coronary artery bypass grafting in 2015 (LIMA to the LAD, vein  graft to the circumflex, vein graft to the PDA), hypertension, type 2 diabetes mellitus, hyperlipidemia, history of atrial flutter (status post ablation in 2017 performed by Dr. Laguna; Eliquis was discontinued subsequently as he had no recurrence of it), obesity, and distant history of tobacco use.    Repeat echocardiogram demonstrated preserved biventricular function with no significant valve abnormalities.  He appears to have very mild ascending aorta dilatation measuring at 3.9 cm.  Nuclear stress scan showed no evidence of ischemia or infarct but he did have borderline high transient ischemic dilatation at 1.24.  It was however noted that in the absence of perfusion defect the specificity of transient ischemic dilatation to detect significant coronary disease was low.  Clinical correlation was required.    I am not quite convinced that his dyspnea on exertion is angina equivalent as his prior angina with central chest discomfort with radiation to his left arm.  He tells me that shortness of breath has become a concern since he is noticed bloody stools which makes me wonder if he has significant anemia.  I would like him to stop by lab today for CBC as well as basic metabolic panel.  If he has significantly low hemoglobin he will need GI referral.  Part of his shortness of breath could be due to deconditioning as he tells me he has a Versed and try lifestyle.  He has noticed that his shortness of breath got somewhat better when he started to lose weight.  At this time I will await the results of his labs.  Depending on these numbers we may consider further evaluation for coronary disease.  I did review his nuclear stress scan results with Dr. Rosas who recommended proceeding with CT coronary angiogram.  I will hold off on this at this time.    Thank you for allowing me to participate in the care of this pleasant patient today.      This note was completed in part using Dragon voice recognition software. Although  reviewed after completion, some word and grammatical errors may occur.    Orders this Visit:  Orders Placed This Encounter   Procedures     Basic metabolic panel     Comprehensive metabolic panel     No orders of the defined types were placed in this encounter.    There are no discontinued medications.      Encounter Diagnoses   Name Primary?     Paroxysmal atrial fibrillation (H)      Typical atrial flutter (H)      Coronary artery disease involving native coronary artery of native heart without angina pectoris      S/P CABG (coronary artery bypass graft)      Dyspnea on minimal exertion        CURRENT MEDICATIONS:  Current Outpatient Medications   Medication Sig Dispense Refill     aspirin 81 MG EC tablet Take 1 tablet (81 mg) by mouth daily 1 tablet 0     atorvastatin (LIPITOR) 40 MG tablet Take 1 tablet (40 mg) by mouth daily 90 tablet 3     blood glucose (ACCU-CHEK SMARTVIEW) test strip USE TO TEST BLOOD SUGAR FOUR TIMES DAILY OR AS DIRECTED 400 strip 2     blood glucose monitoring (ACCU-CHEK FASTCLIX) lancets Use to test blood sugar 4 times daily or as directed. 200 each 11     insulin glargine (LANTUS SOLOSTAR PEN) 100 UNIT/ML pen INJECT 90 UNITS UNDER THE SKIN IN THE MORNING AND 80 UNITS UNDER THE SKIN AT NIGHT 60 mL 4     insulin lispro (HUMALOG KWIKPEN) 100 UNIT/ML pen 1 unit per 1 gm of carb before meals three times daily. Average use of 170 Units/day. 90 mL 2     metFORMIN (GLUCOPHAGE) 1000 MG tablet TAKE 1 TABLET BY MOUTH TWICE DAILY WITH MEALS 180 tablet 0     metoprolol succinate ER (TOPROL XL) 100 MG 24 hr tablet Take 1 tablet (100 mg) by mouth 2 times daily 180 tablet 0     NOVOFINE 30 30G X 8 MM insulin pen needle USE 4 TO 5 DAILY OR AS DIRECTED 400 each 1     ACE NOT PRESCRIBED, INTENTIONAL, 1 each daily ACE Inhibitor not prescribed due to Intolerance (Patient not taking: Reported on 10/11/2019) 0 each 0     insulin pen needle (NOVOFINE) 30G X 8 MM Use 4-5 daily or as directed. (Patient not  taking: Reported on 10/11/2019) 300 each 1       ALLERGIES     Allergies   Allergen Reactions     Tetracycline      PN: LW Reaction: RASH     Codeine Rash     Mild rash     Simvastatin Other (See Comments)     Leg pain       PAST MEDICAL HISTORY:  Past Medical History:   Diagnosis Date     Aortic root dilatation (H)      Atrial flutter (H) 10/02/2017    ablation 11/26/2017     CAD (coronary artery disease) 03/13/2015    CABG 4/7/2015 by Dr. Johan Hall: LIMA to LAD, SVG to circumflex, SVG to PDA     Cardiomyopathy (H) 10/02/2017    tachycardia-induced with EF 40-45%, improved once back to NSR     HTN (hypertension)      Hyperlipidaemia      Microalbuminuria 6/27/2016     Obesity 3/13/2015     Type 2 diabetes mellitus with diabetic nephropathy, with long-term current use of insulin (H) 11/2/2016       PAST SURGICAL HISTORY:  Past Surgical History:   Procedure Laterality Date     BYPASS GRAFT ARTERY CORONARY N/A 4/7/2015    bypass LAD, OM, PDA        COLONOSCOPY N/A 6/20/2016    Procedure: COLONOSCOPY;  Surgeon: Marcela Schwartz MD;  Location: [facility]     EXCISE PILONIDAL CYST, SIMPLE  1975     HEART CATH, ANGIOPLASTY  3-    3 vessel disease-occluded RCA, stenosis LM-to have CABG     removal of skin cancer  1992     TONSILLECTOMY & ADENOIDECTOMY  1950       FAMILY HISTORY:  Family History   Problem Relation Age of Onset     Breast Cancer Mother      Cerebrovascular Disease Mother      Hyperlipidemia Father      Cancer Father         lung, unrelated to smoking     Cancer Paternal Grandmother        SOCIAL HISTORY:  Social History     Socioeconomic History     Marital status:      Spouse name: None     Number of children: None     Years of education: None     Highest education level: None   Occupational History     None   Social Needs     Financial resource strain: None     Food insecurity:     Worry: None     Inability: None     Transportation needs:     Medical: None     Non-medical: None    Tobacco Use     Smoking status: Former Smoker     Packs/day: 1.50     Years: 29.00     Pack years: 43.50     Types: Cigarettes     Start date:      Last attempt to quit: 2000     Years since quittin.7     Smokeless tobacco: Never Used   Substance and Sexual Activity     Alcohol use: Yes     Alcohol/week: 0.0 standard drinks     Comment: 1 mixed drink a night     Drug use: No     Sexual activity: Yes     Partners: Female   Lifestyle     Physical activity:     Days per week: None     Minutes per session: None     Stress: None   Relationships     Social connections:     Talks on phone: None     Gets together: None     Attends [employer] service: None     Active member of club or organization: None     Attends meetings of clubs or organizations: None     Relationship status: None     Intimate partner violence:     Fear of current or ex partner: None     Emotionally abused: None     Physically abused: None     Forced sexual activity: None   Other Topics Concern     Parent/sibling w/ CABG, MI or angioplasty before 65F 55M? Not Asked      Service Not Asked     Blood Transfusions Not Asked     Caffeine Concern No     Comment: none     Occupational Exposure Not Asked     Hobby Hazards Not Asked     Sleep Concern No     Stress Concern Yes     Comment: sometimes at home     Weight Concern Yes     Comment: would like to lose weight     Special Diet No     Back Care Not Asked     Exercise No     Bike Helmet Not Asked     Seat Belt Yes     Self-Exams Not Asked   Social History Narrative     None       Review of Systems:  Skin:  Negative     Eyes:  Positive for glasses  ENT:  Negative    Respiratory:  Positive for dyspnea on exertion  Cardiovascular:  Negative    Gastroenterology: Negative    Genitourinary:  Negative    Musculoskeletal:  Negative    Neurologic:  Positive for numbness or tingling of hands  Psychiatric:  Negative    Heme/Lymph/Imm:  Negative    Endocrine:  Positive for diabetes    Physical  "Exam:  Vitals: BP (!) 158/70   Pulse 60   Ht 1.753 m (5' 9\")   Wt 104.5 kg (230 lb 6.4 oz)   BMI 34.02 kg/m       GEN:  NAD.   truncal obesity.  NECK: Unable to assess JVD due to body habitus.  C/V:  Regular rate and rhythm, no murmur, rub or gallop.  RESP: Clear to auscultation bilaterally.  GI: Abdomen soft, nontender, nondistended.   EXTREM: No LE edema.   NEURO: Alert and oriented, cooperative. No obvious focal deficits.   PSYCH: Normal affect.  SKIN: Warm and dry.       Recent Lab Results:  LIPID RESULTS:  Lab Results   Component Value Date    CHOL 146 06/27/2018    HDL 42 06/27/2018    LDL 73 06/27/2018    TRIG 154 (H) 06/27/2018    CHOLHDLRATIO 3.5 08/19/2015       LIVER ENZYME RESULTS:  Lab Results   Component Value Date    AST 57 (H) 05/01/2019    ALT 38 05/01/2019       CBC RESULTS:  Lab Results   Component Value Date    WBC 11.3 (H) 11/27/2017    RBC 4.53 11/27/2017    HGB 14.7 11/27/2017    HCT 42.3 11/27/2017    MCV 93 11/27/2017    MCH 32.5 11/27/2017    MCHC 34.8 11/27/2017    RDW 13.2 11/27/2017     11/27/2017       BMP RESULTS:  Lab Results   Component Value Date     05/01/2019    POTASSIUM 4.7 05/01/2019    CHLORIDE 112 (H) 05/01/2019    CO2 26 05/01/2019    ANIONGAP 6 05/01/2019    GLC 91 05/01/2019    BUN 20 05/01/2019    CR 1.10 05/01/2019    GFRESTIMATED 69 05/01/2019    GFRESTBLACK 80 05/01/2019    AURE 10.0 05/01/2019        A1C RESULTS:  Lab Results   Component Value Date    A1C 7.9 (H) 04/01/2019       INR RESULTS:  Lab Results   Component Value Date    INR 0.97 10/19/2017    INR 1.30 (H) 04/07/2015           Magno Sandoval PA-C, [facility]   October 11, 2019     "

## 2019-10-11 NOTE — LETTER
10/11/2019    Lauro Galloway MD  [address] [facility] [address]    RE: Adan Ruffin       Dear Colleague,    I had the pleasure of seeing Adan Ruffin in the [facility].    Primary Cardiologist: Dr. Rosas    Reason for visit: To review the results of recent echocardiogram and nuclear stress scan    History of Present Illness:   This a very pleasant 68-year-old gentleman with past medical history notable for coronary artery bypass grafting in 2015 (LIMA to the LAD, vein graft to the circumflex, vein graft to the PDA), hypertension, type 2 diabetes mellitus, hyperlipidemia, history of atrial flutter (status post ablation in 2017 performed by Dr. Laguna; Eliquis was discontinued subsequently as he had no recurrence of it), obesity, and distant history of tobacco use.    He was seen in the cardiology clinic recently and at that time he had reports of dyspnea on exertion for the last 2 months.  He was set up for echocardiogram as well as nuclear stress scan for further evaluation of his symptoms.    He tells me that his symptoms prior to his bypass were central chest pressure with radiation down to his left arm.  He denies any recurrence of these symptoms in the last few months.  He has been noticing shortness of breath on moderate exertion.  He thinks that since his lost some weight this is actually getting somewhat better but he continues to have noticeable shortness of breath.  He denies orthopnea, PND, peripheral edema, wheezing, pleuritic chest discomfort, fever chills, productive cough.  He does tell me that he has been having bloody stool for the last 5 to 6 weeks.  He describes this occurred once in the past and at that time he was recommended to decrease his aspirin from full dose to 81 mg.  He is not aware of having an EGD or colonoscopy being done at that time.  His shortness of breath does seem to correlate with the time he started to notice having bloody  stools.  He denies palpitations, presyncope, or syncope.    Assessment and Plan:  This a very pleasant 68-year-old gentleman with past medical history notable for coronary artery bypass grafting in 2015 (LIMA to the LAD, vein graft to the circumflex, vein graft to the PDA), hypertension, type 2 diabetes mellitus, hyperlipidemia, history of atrial flutter (status post ablation in 2017 performed by Dr. Laguna; Eliquis was discontinued subsequently as he had no recurrence of it), obesity, and distant history of tobacco use.    Repeat echocardiogram demonstrated preserved biventricular function with no significant valve abnormalities.  He appears to have very mild ascending aorta dilatation measuring at 3.9 cm.  Nuclear stress scan showed no evidence of ischemia or infarct but he did have borderline high transient ischemic dilatation at 1.24.  It was however noted that in the absence of perfusion defect the specificity of transient ischemic dilatation to detect significant coronary disease was low.  Clinical correlation was required.    I am not quite convinced that his dyspnea on exertion is angina equivalent as his prior angina with central chest discomfort with radiation to his left arm.  He tells me that shortness of breath has become a concern since he is noticed bloody stools which makes me wonder if he has significant anemia.  I would like him to stop by lab today for CBC as well as basic metabolic panel.  If he has significantly low hemoglobin he will need GI referral.  Part of his shortness of breath could be due to deconditioning as he tells me he has a Versed and try lifestyle.  He has noticed that his shortness of breath got somewhat better when he started to lose weight.  At this time I will await the results of his labs.  Depending on these numbers we may consider further evaluation for coronary disease.  I did review his nuclear stress scan results with Dr. Rosas who recommended proceeding with CT coronary  angiogram.  I will hold off on this at this time.    Thank you for allowing me to participate in the care of this pleasant patient today.      This note was completed in part using Dragon voice recognition software. Although reviewed after completion, some word and grammatical errors may occur.    Orders this Visit:  Orders Placed This Encounter   Procedures     Basic metabolic panel     Comprehensive metabolic panel     No orders of the defined types were placed in this encounter.    There are no discontinued medications.      Encounter Diagnoses   Name Primary?     Paroxysmal atrial fibrillation (H)      Typical atrial flutter (H)      Coronary artery disease involving native coronary artery of native heart without angina pectoris      S/P CABG (coronary artery bypass graft)      Dyspnea on minimal exertion        CURRENT MEDICATIONS:  Current Outpatient Medications   Medication Sig Dispense Refill     aspirin 81 MG EC tablet Take 1 tablet (81 mg) by mouth daily 1 tablet 0     atorvastatin (LIPITOR) 40 MG tablet Take 1 tablet (40 mg) by mouth daily 90 tablet 3     blood glucose (ACCU-CHEK SMARTVIEW) test strip USE TO TEST BLOOD SUGAR FOUR TIMES DAILY OR AS DIRECTED 400 strip 2     blood glucose monitoring (ACCU-CHEK FASTCLIX) lancets Use to test blood sugar 4 times daily or as directed. 200 each 11     insulin glargine (LANTUS SOLOSTAR PEN) 100 UNIT/ML pen INJECT 90 UNITS UNDER THE SKIN IN THE MORNING AND 80 UNITS UNDER THE SKIN AT NIGHT 60 mL 4     insulin lispro (HUMALOG KWIKPEN) 100 UNIT/ML pen 1 unit per 1 gm of carb before meals three times daily. Average use of 170 Units/day. 90 mL 2     metFORMIN (GLUCOPHAGE) 1000 MG tablet TAKE 1 TABLET BY MOUTH TWICE DAILY WITH MEALS 180 tablet 0     metoprolol succinate ER (TOPROL XL) 100 MG 24 hr tablet Take 1 tablet (100 mg) by mouth 2 times daily 180 tablet 0     NOVOFINE 30 30G X 8 MM insulin pen needle USE 4 TO 5 DAILY OR AS DIRECTED 400 each 1     ACE NOT  PRESCRIBED, INTENTIONAL, 1 each daily ACE Inhibitor not prescribed due to Intolerance (Patient not taking: Reported on 10/11/2019) 0 each 0     insulin pen needle (NOVOFINE) 30G X 8 MM Use 4-5 daily or as directed. (Patient not taking: Reported on 10/11/2019) 300 each 1       ALLERGIES     Allergies   Allergen Reactions     Tetracycline      PN: LW Reaction: RASH     Codeine Rash     Mild rash     Simvastatin Other (See Comments)     Leg pain       PAST MEDICAL HISTORY:  Past Medical History:   Diagnosis Date     Aortic root dilatation (H)      Atrial flutter (H) 10/02/2017    ablation 11/26/2017     CAD (coronary artery disease) 03/13/2015    CABG 4/7/2015 by Dr. Johan Hall: LIMA to LAD, SVG to circumflex, SVG to PDA     Cardiomyopathy (H) 10/02/2017    tachycardia-induced with EF 40-45%, improved once back to NSR     HTN (hypertension)      Hyperlipidaemia      Microalbuminuria 6/27/2016     Obesity 3/13/2015     Type 2 diabetes mellitus with diabetic nephropathy, with long-term current use of insulin (H) 11/2/2016       PAST SURGICAL HISTORY:  Past Surgical History:   Procedure Laterality Date     BYPASS GRAFT ARTERY CORONARY N/A 4/7/2015    bypass LAD, OM, PDA        COLONOSCOPY N/A 6/20/2016    Procedure: COLONOSCOPY;  Surgeon: Marcela Schwartz MD;  Location: [facility]     EXCISE PILONIDAL CYST, SIMPLE  1975     HEART CATH, ANGIOPLASTY  3-    3 vessel disease-occluded RCA, stenosis LM-to have CABG     removal of skin cancer  1992     TONSILLECTOMY & ADENOIDECTOMY  1950       FAMILY HISTORY:  Family History   Problem Relation Age of Onset     Breast Cancer Mother      Cerebrovascular Disease Mother      Hyperlipidemia Father      Cancer Father         lung, unrelated to smoking     Cancer Paternal Grandmother        SOCIAL HISTORY:  Social History     Socioeconomic History     Marital status:      Spouse name: None     Number of children: None     Years of education: None     Highest  education level: None   Occupational History     None   Social Needs     Financial resource strain: None     Food insecurity:     Worry: None     Inability: None     Transportation needs:     Medical: None     Non-medical: None   Tobacco Use     Smoking status: Former Smoker     Packs/day: 1.50     Years: 29.00     Pack years: 43.50     Types: Cigarettes     Start date:      Last attempt to quit: 2000     Years since quittin.7     Smokeless tobacco: Never Used   Substance and Sexual Activity     Alcohol use: Yes     Alcohol/week: 0.0 standard drinks     Comment: 1 mixed drink a night     Drug use: No     Sexual activity: Yes     Partners: Female   Lifestyle     Physical activity:     Days per week: None     Minutes per session: None     Stress: None   Relationships     Social connections:     Talks on phone: None     Gets together: None     Attends [employer] service: None     Active member of club or organization: None     Attends meetings of clubs or organizations: None     Relationship status: None     Intimate partner violence:     Fear of current or ex partner: None     Emotionally abused: None     Physically abused: None     Forced sexual activity: None   Other Topics Concern     Parent/sibling w/ CABG, MI or angioplasty before 65F 55M? Not Asked      Service Not Asked     Blood Transfusions Not Asked     Caffeine Concern No     Comment: none     Occupational Exposure Not Asked     Hobby Hazards Not Asked     Sleep Concern No     Stress Concern Yes     Comment: sometimes at home     Weight Concern Yes     Comment: would like to lose weight     Special Diet No     Back Care Not Asked     Exercise No     Bike Helmet Not Asked     Seat Belt Yes     Self-Exams Not Asked   Social History Narrative     None       Review of Systems:  Skin:  Negative     Eyes:  Positive for glasses  ENT:  Negative    Respiratory:  Positive for dyspnea on exertion  Cardiovascular:  Negative    Gastroenterology:  "Negative    Genitourinary:  Negative    Musculoskeletal:  Negative    Neurologic:  Positive for numbness or tingling of hands  Psychiatric:  Negative    Heme/Lymph/Imm:  Negative    Endocrine:  Positive for diabetes    Physical Exam:  Vitals: BP (!) 158/70   Pulse 60   Ht 1.753 m (5' 9\")   Wt 104.5 kg (230 lb 6.4 oz)   BMI 34.02 kg/m        GEN:  NAD.   truncal obesity.  NECK: Unable to assess JVD due to body habitus.  C/V:  Regular rate and rhythm, no murmur, rub or gallop.  RESP: Clear to auscultation bilaterally.  GI: Abdomen soft, nontender, nondistended.   EXTREM: No LE edema.   NEURO: Alert and oriented, cooperative. No obvious focal deficits.   PSYCH: Normal affect.  SKIN: Warm and dry.       Recent Lab Results:  LIPID RESULTS:  Lab Results   Component Value Date    CHOL 146 06/27/2018    HDL 42 06/27/2018    LDL 73 06/27/2018    TRIG 154 (H) 06/27/2018    CHOLHDLRATIO 3.5 08/19/2015       LIVER ENZYME RESULTS:  Lab Results   Component Value Date    AST 57 (H) 05/01/2019    ALT 38 05/01/2019       CBC RESULTS:  Lab Results   Component Value Date    WBC 11.3 (H) 11/27/2017    RBC 4.53 11/27/2017    HGB 14.7 11/27/2017    HCT 42.3 11/27/2017    MCV 93 11/27/2017    MCH 32.5 11/27/2017    MCHC 34.8 11/27/2017    RDW 13.2 11/27/2017     11/27/2017       BMP RESULTS:  Lab Results   Component Value Date     05/01/2019    POTASSIUM 4.7 05/01/2019    CHLORIDE 112 (H) 05/01/2019    CO2 26 05/01/2019    ANIONGAP 6 05/01/2019    GLC 91 05/01/2019    BUN 20 05/01/2019    CR 1.10 05/01/2019    GFRESTIMATED 69 05/01/2019    GFRESTBLACK 80 05/01/2019    AURE 10.0 05/01/2019        A1C RESULTS:  Lab Results   Component Value Date    A1C 7.9 (H) 04/01/2019       INR RESULTS:  Lab Results   Component Value Date    INR 0.97 10/19/2017    INR 1.30 (H) 04/07/2015           Thank you for allowing me to participate in the care of your patient.    Sincerely,     Magno Sandoval PA-C     [facility] " [facility]

## 2019-10-14 ENCOUNTER — TELEPHONE (OUTPATIENT)
Dept: CARDIOLOGY | Facility: CLINIC | Age: 69
End: 2019-10-14

## 2019-10-14 DIAGNOSIS — I25.10 CORONARY ARTERY DISEASE INVOLVING NATIVE CORONARY ARTERY OF NATIVE HEART WITHOUT ANGINA PECTORIS: Primary | ICD-10-CM

## 2019-10-14 NOTE — TELEPHONE ENCOUNTER
Order entered for CBC per [facility]. Called patient to arrange lab draw, pt agreeable to plan. Message to scheduling to arrange lab drawn in [facility] per patient preference.

## 2019-10-14 NOTE — TELEPHONE ENCOUNTER
----- Message from Magno Sandoval PA-C sent at 10/11/2019  5:32 PM CDT -----  I wanted patient to get CBC and BMP after our visit on Friday. It looks like I accidentally ordered CMP and BMP. Can we have him stop by lab sometime this week to get a CBC as well?    Thanks,     Magno

## 2019-10-15 ENCOUNTER — DOCUMENTATION ONLY (OUTPATIENT)
Dept: CARDIOLOGY | Facility: CLINIC | Age: 69
End: 2019-10-15

## 2019-10-15 DIAGNOSIS — I25.10 CORONARY ARTERY DISEASE INVOLVING NATIVE CORONARY ARTERY OF NATIVE HEART WITHOUT ANGINA PECTORIS: ICD-10-CM

## 2019-10-15 DIAGNOSIS — I25.10 CAD (CORONARY ARTERY DISEASE): Primary | ICD-10-CM

## 2019-10-15 LAB
ERYTHROCYTE [DISTWIDTH] IN BLOOD BY AUTOMATED COUNT: 17.5 % (ref 10–15)
HCT VFR BLD AUTO: 41.9 % (ref 40–53)
HGB BLD-MCNC: 12.3 G/DL (ref 13.3–17.7)
MCH RBC QN AUTO: 23.3 PG (ref 26.5–33)
MCHC RBC AUTO-ENTMCNC: 29.4 G/DL (ref 31.5–36.5)
MCV RBC AUTO: 80 FL (ref 78–100)
PLATELET # BLD AUTO: 312 10E9/L (ref 150–450)
RBC # BLD AUTO: 5.27 10E12/L (ref 4.4–5.9)
WBC # BLD AUTO: 6.2 10E9/L (ref 4–11)

## 2019-10-15 PROCEDURE — 85027 COMPLETE CBC AUTOMATED: CPT | Performed by: INTERNAL MEDICINE

## 2019-10-15 PROCEDURE — 36415 COLL VENOUS BLD VENIPUNCTURE: CPT | Performed by: INTERNAL MEDICINE

## 2019-10-15 NOTE — PROGRESS NOTES
Message from BETHANY Magno Sandoval:  Hemoglobin is slightly lower from his baseline.  Not sure if this is low enough to cause symptoms.  My schedule him for CT coronary angiogram as recommended by Dr. Rosas. Renal function is good.    Magno Sandoval PA-C

## 2019-10-16 ENCOUNTER — TELEPHONE (OUTPATIENT)
Dept: CARDIOLOGY | Facility: CLINIC | Age: 69
End: 2019-10-16

## 2019-10-16 NOTE — PROGRESS NOTES
Spoke with Patient and HBG reviewed. Patient agrees with scheduling CT Coronary angiogram. Orders entered.

## 2019-10-16 NOTE — TELEPHONE ENCOUNTER
PA test team messaged to obtain PA  for CTa coronary artery for CABG less than 5 years ago, dyspnea, recent echo and nuclear stress test.  Order entered.   Patient called with recommendation and agrees.   Per PA test Team, once test scheduled PA received order and PA is initiated.

## 2019-12-03 DIAGNOSIS — E78.5 HYPERLIPIDEMIA LDL GOAL <70: ICD-10-CM

## 2019-12-03 NOTE — TELEPHONE ENCOUNTER
"Requested Prescriptions   Pending Prescriptions Disp Refills     atorvastatin (LIPITOR) 40 MG tablet [Pharmacy Med Name: ATORVASTATIN 40MG   TABLETS]    Last Written Prescription Date:  4/5/19  Last Fill Quantity: 90,  # refills: 3   Last office visit: 4/5/2019 with prescribing provider:  Nilesh   Future Office Visit:     90 tablet 0     Sig: TAKE 1 TABLET BY MOUTH DAILY       Statins Protocol Failed - 12/3/2019 12:00 PM        Failed - LDL on file in past 12 months     Recent Labs   Lab Test 06/27/18  0809   LDL 73             Passed - No abnormal creatine kinase in past 12 months     No lab results found.             Passed - Recent (12 mo) or future (30 days) visit within the authorizing provider's specialty     Patient has had an office visit with the authorizing provider or a provider within the authorizing providers department within the previous 12 mos or has a future within next 30 days. See \"Patient Info\" tab in inbasket, or \"Choose Columns\" in Meds & Orders section of the refill encounter.              Passed - Medication is active on med list        Passed - Patient is age 18 or older          "

## 2019-12-04 RX ORDER — ATORVASTATIN CALCIUM 40 MG/1
TABLET, FILM COATED ORAL
Qty: 90 TABLET | Refills: 0 | OUTPATIENT
Start: 2019-12-04

## 2019-12-15 ENCOUNTER — HEALTH MAINTENANCE LETTER (OUTPATIENT)
Age: 69
End: 2019-12-15

## 2020-01-02 ENCOUNTER — HOSPITAL ENCOUNTER (INPATIENT)
Facility: CLINIC | Age: 70
LOS: 5 days | Discharge: HOME | End: 2020-01-07
Attending: EMERGENCY MEDICINE | Admitting: HOSPITALIST
Payer: COMMERCIAL

## 2020-01-02 ENCOUNTER — APPOINTMENT (OUTPATIENT)
Dept: CT IMAGING | Facility: CLINIC | Age: 70
End: 2020-01-02
Attending: EMERGENCY MEDICINE
Payer: COMMERCIAL

## 2020-01-02 ENCOUNTER — APPOINTMENT (OUTPATIENT)
Dept: GENERAL RADIOLOGY | Facility: CLINIC | Age: 70
End: 2020-01-02
Attending: EMERGENCY MEDICINE
Payer: COMMERCIAL

## 2020-01-02 DIAGNOSIS — R91.8 PULMONARY INFILTRATES: ICD-10-CM

## 2020-01-02 DIAGNOSIS — J96.01 ACUTE HYPOXEMIC RESPIRATORY FAILURE (H): Primary | ICD-10-CM

## 2020-01-02 DIAGNOSIS — J96.01 ACUTE RESPIRATORY FAILURE WITH HYPOXIA (H): ICD-10-CM

## 2020-01-02 LAB
ALBUMIN SERPL-MCNC: 3.1 G/DL (ref 3.4–5)
ALP SERPL-CCNC: 150 U/L (ref 40–150)
ALT SERPL W P-5'-P-CCNC: 32 U/L (ref 0–70)
ANION GAP SERPL CALCULATED.3IONS-SCNC: 11 MMOL/L (ref 3–14)
APTT PPP: 32 SEC (ref 22–37)
AST SERPL W P-5'-P-CCNC: 42 U/L (ref 0–45)
BASOPHILS # BLD AUTO: 0 10E9/L (ref 0–0.2)
BASOPHILS NFR BLD AUTO: 0.4 %
BILIRUB SERPL-MCNC: 0.6 MG/DL (ref 0.2–1.3)
BUN SERPL-MCNC: 22 MG/DL (ref 7–30)
CALCIUM SERPL-MCNC: 9 MG/DL (ref 8.5–10.1)
CHLORIDE SERPL-SCNC: 110 MMOL/L (ref 94–109)
CO2 SERPL-SCNC: 19 MMOL/L (ref 20–32)
CREAT SERPL-MCNC: 0.86 MG/DL (ref 0.66–1.25)
CRP SERPL-MCNC: 22.3 MG/L (ref 0–8)
D DIMER PPP FEU-MCNC: 2 UG/ML FEU (ref 0–0.5)
DIFFERENTIAL METHOD BLD: ABNORMAL
EOSINOPHIL # BLD AUTO: 0.5 10E9/L (ref 0–0.7)
EOSINOPHIL NFR BLD AUTO: 6.2 %
ERYTHROCYTE [DISTWIDTH] IN BLOOD BY AUTOMATED COUNT: 17.1 % (ref 10–15)
FLUAV+FLUBV AG SPEC QL: NEGATIVE
FLUAV+FLUBV AG SPEC QL: NEGATIVE
FLUAV+FLUBV RNA SPEC QL NAA+PROBE: NEGATIVE
FLUAV+FLUBV RNA SPEC QL NAA+PROBE: NEGATIVE
GFR SERPL CREATININE-BSD FRML MDRD: 89 ML/MIN/{1.73_M2}
GLUCOSE BLDC GLUCOMTR-MCNC: 123 MG/DL (ref 70–99)
GLUCOSE BLDC GLUCOMTR-MCNC: 85 MG/DL (ref 70–99)
GLUCOSE SERPL-MCNC: 106 MG/DL (ref 70–99)
HBA1C MFR BLD: 7.7 % (ref 0–5.6)
HCO3 BLD-SCNC: 22 MMOL/L (ref 21–28)
HCT VFR BLD AUTO: 32.9 % (ref 40–53)
HGB BLD-MCNC: 9.7 G/DL (ref 13.3–17.7)
IMM GRANULOCYTES # BLD: 0.1 10E9/L (ref 0–0.4)
IMM GRANULOCYTES NFR BLD: 1.5 %
INR PPP: 1.04 (ref 0.86–1.14)
INTERPRETATION ECG - MUSE: NORMAL
LYMPHOCYTES # BLD AUTO: 1.1 10E9/L (ref 0.8–5.3)
LYMPHOCYTES NFR BLD AUTO: 15.2 %
MAGNESIUM SERPL-MCNC: 1.6 MG/DL (ref 1.6–2.3)
MCH RBC QN AUTO: 22.9 PG (ref 26.5–33)
MCHC RBC AUTO-ENTMCNC: 29.5 G/DL (ref 31.5–36.5)
MCV RBC AUTO: 78 FL (ref 78–100)
MONOCYTES # BLD AUTO: 0.6 10E9/L (ref 0–1.3)
MONOCYTES NFR BLD AUTO: 8 %
NEUTROPHILS # BLD AUTO: 5 10E9/L (ref 1.6–8.3)
NEUTROPHILS NFR BLD AUTO: 68.7 %
NRBC # BLD AUTO: 0 10*3/UL
NRBC BLD AUTO-RTO: 0 /100
NT-PROBNP SERPL-MCNC: 1222 PG/ML (ref 0–900)
O2/TOTAL GAS SETTING VFR VENT: ABNORMAL %
OXYHGB MFR BLD: 41 % (ref 92–100)
PCO2 BLD: 39 MM HG (ref 35–45)
PH BLD: 7.36 PH (ref 7.35–7.45)
PLATELET # BLD AUTO: 371 10E9/L (ref 150–450)
PO2 BLD: 27 MM HG (ref 80–105)
POTASSIUM SERPL-SCNC: 4 MMOL/L (ref 3.4–5.3)
PROCALCITONIN SERPL-MCNC: <0.05 NG/ML
PROT SERPL-MCNC: 7.7 G/DL (ref 6.8–8.8)
RBC # BLD AUTO: 4.24 10E12/L (ref 4.4–5.9)
RSV RNA SPEC NAA+PROBE: NEGATIVE
S PNEUM AG SPEC QL: NORMAL
SODIUM SERPL-SCNC: 140 MMOL/L (ref 133–144)
SPECIMEN SOURCE: NORMAL
TROPONIN I SERPL-MCNC: <0.015 UG/L (ref 0–0.04)
WBC # BLD AUTO: 7.3 10E9/L (ref 4–11)

## 2020-01-02 PROCEDURE — 94640 AIRWAY INHALATION TREATMENT: CPT

## 2020-01-02 PROCEDURE — 85730 THROMBOPLASTIN TIME PARTIAL: CPT | Performed by: EMERGENCY MEDICINE

## 2020-01-02 PROCEDURE — 83036 HEMOGLOBIN GLYCOSYLATED A1C: CPT | Performed by: EMERGENCY MEDICINE

## 2020-01-02 PROCEDURE — 85379 FIBRIN DEGRADATION QUANT: CPT | Performed by: EMERGENCY MEDICINE

## 2020-01-02 PROCEDURE — 40000275 ZZH STATISTIC RCP TIME EA 10 MIN

## 2020-01-02 PROCEDURE — 82805 BLOOD GASES W/O2 SATURATION: CPT | Performed by: EMERGENCY MEDICINE

## 2020-01-02 PROCEDURE — 96361 HYDRATE IV INFUSION ADD-ON: CPT

## 2020-01-02 PROCEDURE — 96365 THER/PROPH/DIAG IV INF INIT: CPT | Mod: 59

## 2020-01-02 PROCEDURE — 94640 AIRWAY INHALATION TREATMENT: CPT | Mod: 76

## 2020-01-02 PROCEDURE — 87899 AGENT NOS ASSAY W/OPTIC: CPT | Performed by: HOSPITALIST

## 2020-01-02 PROCEDURE — 83880 ASSAY OF NATRIURETIC PEPTIDE: CPT | Performed by: EMERGENCY MEDICINE

## 2020-01-02 PROCEDURE — 71046 X-RAY EXAM CHEST 2 VIEWS: CPT

## 2020-01-02 PROCEDURE — 84145 PROCALCITONIN (PCT): CPT | Performed by: EMERGENCY MEDICINE

## 2020-01-02 PROCEDURE — 36600 WITHDRAWAL OF ARTERIAL BLOOD: CPT

## 2020-01-02 PROCEDURE — 25000128 H RX IP 250 OP 636: Performed by: EMERGENCY MEDICINE

## 2020-01-02 PROCEDURE — 84484 ASSAY OF TROPONIN QUANT: CPT | Performed by: EMERGENCY MEDICINE

## 2020-01-02 PROCEDURE — 40000274 ZZH STATISTIC RCP CONSULT EA 30 MIN

## 2020-01-02 PROCEDURE — 25000125 ZZHC RX 250: Performed by: HOSPITALIST

## 2020-01-02 PROCEDURE — 25000125 ZZHC RX 250: Performed by: EMERGENCY MEDICINE

## 2020-01-02 PROCEDURE — 86140 C-REACTIVE PROTEIN: CPT | Performed by: EMERGENCY MEDICINE

## 2020-01-02 PROCEDURE — 25000132 ZZH RX MED GY IP 250 OP 250 PS 637: Performed by: HOSPITALIST

## 2020-01-02 PROCEDURE — 25000131 ZZH RX MED GY IP 250 OP 636 PS 637: Performed by: HOSPITALIST

## 2020-01-02 PROCEDURE — 00000146 ZZHCL STATISTIC GLUCOSE BY METER IP

## 2020-01-02 PROCEDURE — 12000000 ZZH R&B MED SURG/OB

## 2020-01-02 PROCEDURE — 87631 RESP VIRUS 3-5 TARGETS: CPT | Performed by: HOSPITALIST

## 2020-01-02 PROCEDURE — 87804 INFLUENZA ASSAY W/OPTIC: CPT | Performed by: EMERGENCY MEDICINE

## 2020-01-02 PROCEDURE — 85025 COMPLETE CBC W/AUTO DIFF WBC: CPT | Performed by: EMERGENCY MEDICINE

## 2020-01-02 PROCEDURE — 99223 1ST HOSP IP/OBS HIGH 75: CPT | Mod: AI | Performed by: HOSPITALIST

## 2020-01-02 PROCEDURE — 85610 PROTHROMBIN TIME: CPT | Performed by: EMERGENCY MEDICINE

## 2020-01-02 PROCEDURE — 99285 EMERGENCY DEPT VISIT HI MDM: CPT | Mod: 25

## 2020-01-02 PROCEDURE — 71275 CT ANGIOGRAPHY CHEST: CPT

## 2020-01-02 PROCEDURE — 25800030 ZZH RX IP 258 OP 636: Performed by: EMERGENCY MEDICINE

## 2020-01-02 PROCEDURE — 80053 COMPREHEN METABOLIC PANEL: CPT | Performed by: EMERGENCY MEDICINE

## 2020-01-02 PROCEDURE — 83735 ASSAY OF MAGNESIUM: CPT | Performed by: EMERGENCY MEDICINE

## 2020-01-02 PROCEDURE — 93005 ELECTROCARDIOGRAM TRACING: CPT

## 2020-01-02 RX ORDER — ALBUTEROL SULFATE 0.83 MG/ML
2.5 SOLUTION RESPIRATORY (INHALATION)
Status: DISCONTINUED | OUTPATIENT
Start: 2020-01-02 | End: 2020-01-04

## 2020-01-02 RX ORDER — IPRATROPIUM BROMIDE AND ALBUTEROL SULFATE 2.5; .5 MG/3ML; MG/3ML
3 SOLUTION RESPIRATORY (INHALATION)
Status: DISCONTINUED | OUTPATIENT
Start: 2020-01-02 | End: 2020-01-04

## 2020-01-02 RX ORDER — ONDANSETRON 4 MG/1
4 TABLET, ORALLY DISINTEGRATING ORAL EVERY 6 HOURS PRN
Status: DISCONTINUED | OUTPATIENT
Start: 2020-01-02 | End: 2020-01-07 | Stop reason: HOSPADM

## 2020-01-02 RX ORDER — BUDESONIDE 0.5 MG/2ML
0.5 INHALANT ORAL 2 TIMES DAILY
Status: DISCONTINUED | OUTPATIENT
Start: 2020-01-02 | End: 2020-01-04

## 2020-01-02 RX ORDER — POTASSIUM CHLORIDE 29.8 MG/ML
20 INJECTION INTRAVENOUS
Status: DISCONTINUED | OUTPATIENT
Start: 2020-01-02 | End: 2020-01-07 | Stop reason: HOSPADM

## 2020-01-02 RX ORDER — ATORVASTATIN CALCIUM 40 MG/1
40 TABLET, FILM COATED ORAL DAILY
Status: DISCONTINUED | OUTPATIENT
Start: 2020-01-03 | End: 2020-01-05

## 2020-01-02 RX ORDER — NICOTINE POLACRILEX 4 MG
15-30 LOZENGE BUCCAL
Status: DISCONTINUED | OUTPATIENT
Start: 2020-01-02 | End: 2020-01-02

## 2020-01-02 RX ORDER — NALOXONE HYDROCHLORIDE 0.4 MG/ML
.1-.4 INJECTION, SOLUTION INTRAMUSCULAR; INTRAVENOUS; SUBCUTANEOUS
Status: DISCONTINUED | OUTPATIENT
Start: 2020-01-02 | End: 2020-01-07 | Stop reason: HOSPADM

## 2020-01-02 RX ORDER — IOPAMIDOL 755 MG/ML
500 INJECTION, SOLUTION INTRAVASCULAR ONCE
Status: COMPLETED | OUTPATIENT
Start: 2020-01-02 | End: 2020-01-02

## 2020-01-02 RX ORDER — POTASSIUM CHLORIDE 1500 MG/1
20-40 TABLET, EXTENDED RELEASE ORAL
Status: DISCONTINUED | OUTPATIENT
Start: 2020-01-02 | End: 2020-01-07 | Stop reason: HOSPADM

## 2020-01-02 RX ORDER — POTASSIUM CL/LIDO/0.9 % NACL 10MEQ/0.1L
10 INTRAVENOUS SOLUTION, PIGGYBACK (ML) INTRAVENOUS
Status: DISCONTINUED | OUTPATIENT
Start: 2020-01-02 | End: 2020-01-07 | Stop reason: HOSPADM

## 2020-01-02 RX ORDER — NICOTINE POLACRILEX 4 MG
15-30 LOZENGE BUCCAL
Status: DISCONTINUED | OUTPATIENT
Start: 2020-01-02 | End: 2020-01-07 | Stop reason: HOSPADM

## 2020-01-02 RX ORDER — LIDOCAINE 40 MG/G
CREAM TOPICAL
Status: DISCONTINUED | OUTPATIENT
Start: 2020-01-02 | End: 2020-01-07 | Stop reason: HOSPADM

## 2020-01-02 RX ORDER — INSULIN LISPRO 100 [IU]/ML
INJECTION, SOLUTION INTRAVENOUS; SUBCUTANEOUS
COMMUNITY
End: 2020-09-30

## 2020-01-02 RX ORDER — ACETAMINOPHEN 325 MG/1
650 TABLET ORAL EVERY 4 HOURS PRN
Status: DISCONTINUED | OUTPATIENT
Start: 2020-01-02 | End: 2020-01-07 | Stop reason: HOSPADM

## 2020-01-02 RX ORDER — AMOXICILLIN 250 MG
2 CAPSULE ORAL 2 TIMES DAILY
Status: DISCONTINUED | OUTPATIENT
Start: 2020-01-02 | End: 2020-01-07 | Stop reason: HOSPADM

## 2020-01-02 RX ORDER — METOPROLOL SUCCINATE 100 MG/1
100 TABLET, EXTENDED RELEASE ORAL 2 TIMES DAILY
Status: DISCONTINUED | OUTPATIENT
Start: 2020-01-02 | End: 2020-01-07 | Stop reason: HOSPADM

## 2020-01-02 RX ORDER — CALCIUM CARBONATE 500 MG/1
1000 TABLET, CHEWABLE ORAL 4 TIMES DAILY PRN
Status: DISCONTINUED | OUTPATIENT
Start: 2020-01-02 | End: 2020-01-07 | Stop reason: HOSPADM

## 2020-01-02 RX ORDER — CEFTRIAXONE 2 G/1
2 INJECTION, POWDER, FOR SOLUTION INTRAMUSCULAR; INTRAVENOUS ONCE
Status: COMPLETED | OUTPATIENT
Start: 2020-01-02 | End: 2020-01-02

## 2020-01-02 RX ORDER — ALBUTEROL SULFATE 0.83 MG/ML
2.5 SOLUTION RESPIRATORY (INHALATION) ONCE
Status: COMPLETED | OUTPATIENT
Start: 2020-01-02 | End: 2020-01-02

## 2020-01-02 RX ORDER — DEXTROSE MONOHYDRATE 25 G/50ML
25-50 INJECTION, SOLUTION INTRAVENOUS
Status: DISCONTINUED | OUTPATIENT
Start: 2020-01-02 | End: 2020-01-02

## 2020-01-02 RX ORDER — DEXTROSE MONOHYDRATE 25 G/50ML
25-50 INJECTION, SOLUTION INTRAVENOUS
Status: DISCONTINUED | OUTPATIENT
Start: 2020-01-02 | End: 2020-01-07 | Stop reason: HOSPADM

## 2020-01-02 RX ORDER — MAGNESIUM SULFATE HEPTAHYDRATE 40 MG/ML
4 INJECTION, SOLUTION INTRAVENOUS EVERY 4 HOURS PRN
Status: DISCONTINUED | OUTPATIENT
Start: 2020-01-02 | End: 2020-01-07 | Stop reason: HOSPADM

## 2020-01-02 RX ORDER — CEFTRIAXONE 2 G/1
2 INJECTION, POWDER, FOR SOLUTION INTRAMUSCULAR; INTRAVENOUS EVERY 24 HOURS
Status: DISCONTINUED | OUTPATIENT
Start: 2020-01-03 | End: 2020-01-07 | Stop reason: HOSPADM

## 2020-01-02 RX ORDER — INSULIN LISPRO 100 [IU]/ML
10 INJECTION, SOLUTION INTRAVENOUS; SUBCUTANEOUS
Status: DISCONTINUED | OUTPATIENT
Start: 2020-01-02 | End: 2020-01-02 | Stop reason: CLARIF

## 2020-01-02 RX ORDER — ONDANSETRON 2 MG/ML
4 INJECTION INTRAMUSCULAR; INTRAVENOUS EVERY 6 HOURS PRN
Status: DISCONTINUED | OUTPATIENT
Start: 2020-01-02 | End: 2020-01-07 | Stop reason: HOSPADM

## 2020-01-02 RX ORDER — POTASSIUM CHLORIDE 1.5 G/1.58G
20-40 POWDER, FOR SOLUTION ORAL
Status: DISCONTINUED | OUTPATIENT
Start: 2020-01-02 | End: 2020-01-07 | Stop reason: HOSPADM

## 2020-01-02 RX ORDER — AMOXICILLIN 250 MG
1 CAPSULE ORAL 2 TIMES DAILY
Status: DISCONTINUED | OUTPATIENT
Start: 2020-01-02 | End: 2020-01-07 | Stop reason: HOSPADM

## 2020-01-02 RX ORDER — POTASSIUM CHLORIDE 7.45 MG/ML
10 INJECTION INTRAVENOUS
Status: DISCONTINUED | OUTPATIENT
Start: 2020-01-02 | End: 2020-01-07 | Stop reason: HOSPADM

## 2020-01-02 RX ADMIN — ALBUTEROL SULFATE 2.5 MG: 2.5 SOLUTION RESPIRATORY (INHALATION) at 13:25

## 2020-01-02 RX ADMIN — CEFTRIAXONE 2 G: 2 INJECTION, POWDER, FOR SOLUTION INTRAMUSCULAR; INTRAVENOUS at 13:56

## 2020-01-02 RX ADMIN — AZITHROMYCIN MONOHYDRATE 500 MG: 500 INJECTION, POWDER, LYOPHILIZED, FOR SOLUTION INTRAVENOUS at 14:36

## 2020-01-02 RX ADMIN — SODIUM CHLORIDE 500 ML: 9 INJECTION, SOLUTION INTRAVENOUS at 11:53

## 2020-01-02 RX ADMIN — IPRATROPIUM BROMIDE AND ALBUTEROL SULFATE 3 ML: .5; 3 SOLUTION RESPIRATORY (INHALATION) at 17:00

## 2020-01-02 RX ADMIN — SODIUM CHLORIDE 89 ML: 9 INJECTION, SOLUTION INTRAVENOUS at 12:10

## 2020-01-02 RX ADMIN — IOPAMIDOL 71 ML: 755 INJECTION, SOLUTION INTRAVENOUS at 12:10

## 2020-01-02 RX ADMIN — METOPROLOL SUCCINATE 100 MG: 100 TABLET, EXTENDED RELEASE ORAL at 20:54

## 2020-01-02 RX ADMIN — BUDESONIDE 0.5 MG: 0.5 INHALANT RESPIRATORY (INHALATION) at 20:12

## 2020-01-02 RX ADMIN — INSULIN GLARGINE 65 UNITS: 100 INJECTION, SOLUTION SUBCUTANEOUS at 21:46

## 2020-01-02 RX ADMIN — IPRATROPIUM BROMIDE AND ALBUTEROL SULFATE 3 ML: .5; 3 SOLUTION RESPIRATORY (INHALATION) at 20:14

## 2020-01-02 ASSESSMENT — ENCOUNTER SYMPTOMS
RECTAL PAIN: 0
BLOOD IN STOOL: 1
ABDOMINAL PAIN: 0
COUGH: 1
FEVER: 0
SHORTNESS OF BREATH: 1

## 2020-01-02 ASSESSMENT — ACTIVITIES OF DAILY LIVING (ADL)
ADLS_ACUITY_SCORE: 12
ADLS_ACUITY_SCORE: 12

## 2020-01-02 ASSESSMENT — MIFFLIN-ST. JEOR
SCORE: 1771.91
SCORE: 1745.15

## 2020-01-02 NOTE — ED NOTES
Bed: ED10  Expected date: 1/2/20  Expected time: 10:14 AM  Means of arrival: Ambulance  Comments:  BV3  SOB

## 2020-01-02 NOTE — ED NOTES
[facility]  ED Nurse Handoff Report    Adan Ruffin is a 69 year old male   ED Chief complaint: Shortness of Breath  . ED Diagnosis:   Final diagnoses:   Acute respiratory failure with hypoxia (H)     Allergies:   Allergies   Allergen Reactions     Tetracycline      PN: LW Reaction: RASH     Codeine Rash     Mild rash     Simvastatin Other (See Comments)     Leg pain       Code Status: Full Code  Activity level - Baseline/Home:  Independent. Activity Level - Current:   Assist X 1. Lift room needed: No. Bariatric: No   Needed: No   Isolation: No. Infection: Not Applicable.     Vital Signs:   Vitals:    01/02/20 1100 01/02/20 1115 01/02/20 1145 01/02/20 1240   BP: (!) 156/77 (!) 158/96 126/61 (!) 142/75   Pulse: 71 73 67 77   Resp:       Temp:       TempSrc:       SpO2: 95% 97%  95%   Weight:       Height:           Cardiac Rhythm:  ,      Pain level:    Patient confused: No. Patient Falls Risk: Yes.   Elimination Status: Has voided   Patient Report - Initial Complaint: SOB. Focused Assessment: Respiratory - Breath Sounds: DEONDRE; LLL; RUL; RML; RLL  DEONDRE Breath Sounds: Posterior:; clear; equal bilaterally  LLL Breath Sounds: Posterior:; clear; equal bilaterally; crackles  RUL Breath Sounds: Posterior:; clear; equal bilaterally  RML Breath Sounds: Posterior:; clear; equal bilaterally  RLL Breath Sounds: Posterior:; clear; equal bilaterally; crackles   Tests Performed:   Labs Ordered and Resulted from Time of ED Arrival Up to the Time of Departure from the ED   CBC WITH PLATELETS DIFFERENTIAL - Abnormal; Notable for the following components:       Result Value    RBC Count 4.24 (*)     Hemoglobin 9.7 (*)     Hematocrit 32.9 (*)     MCH 22.9 (*)     MCHC 29.5 (*)     RDW 17.1 (*)     All other components within normal limits   COMPREHENSIVE METABOLIC PANEL - Abnormal; Notable for the following components:    Chloride 110 (*)     Carbon Dioxide 19 (*)     Glucose 106 (*)     Albumin 3.1 (*)      All other components within normal limits   D DIMER QUANTITATIVE - Abnormal; Notable for the following components:    D Dimer 2.0 (*)     All other components within normal limits   NT PROBNP INPATIENT - Abnormal; Notable for the following components:    N-Terminal Pro BNP Inpatient 1,222 (*)     All other components within normal limits   BLOOD GAS ARTERIAL AND OXYHGB - Abnormal; Notable for the following components:    pO2 Arterial 27 (*)     Oxyhemoglobin Arterial 41 (*)     All other components within normal limits   MAGNESIUM   TROPONIN I   INR   PARTIAL THROMBOPLASTIN TIME   PERIPHERAL IV CATHETER   CARDIAC CONTINUOUS MONITORING   INFLUENZA A/B ANTIGEN       Treatments provided:   Medications   0.9% sodium chloride BOLUS (500 mLs Intravenous New Bag 1/2/20 1153)   iopamidol (ISOVUE-370) solution 500 mL (71 mLs Intravenous Given 1/2/20 1210)   sodium chloride 0.9 % bag 500mL for CT scan flush use (89 mLs Intravenous Given 1/2/20 1210)   albuterol (PROVENTIL) neb solution 2.5 mg (2.5 mg Nebulization Given 1/2/20 1325)       Family Comments: With wife  OBS brochure/video discussed/provided to patient:  No  ED Medications:   Medications   0.9% sodium chloride BOLUS (500 mLs Intravenous New Bag 1/2/20 1153)   iopamidol (ISOVUE-370) solution 500 mL (71 mLs Intravenous Given 1/2/20 1210)   sodium chloride 0.9 % bag 500mL for CT scan flush use (89 mLs Intravenous Given 1/2/20 1210)   albuterol (PROVENTIL) neb solution 2.5 mg (2.5 mg Nebulization Given 1/2/20 1325)     Drips infusing:  No  For the majority of the shift, the patient's behavior Green. Interventions performed were N/A.     Severe Sepsis OR Septic Shock Diagnosis Present: No      ED Nurse Name/Phone Number: Mc Mcdonough RN,   1:29 PM    RECEIVING UNIT ED HANDOFF REVIEW    Above ED Nurse Handoff Report was reviewed: Yes  Reviewed by: Shireen Hall RN on January 2, 2020 at 2:03 PM

## 2020-01-02 NOTE — PROGRESS NOTES
An ABG was completed on the pt's RRA  @ 1256 on an FIO2 of 4 lpm oxymask with no complications noted during the procedure, pressure held to site until bleeding stopped then bandaged. No Complications.

## 2020-01-02 NOTE — ED PROVIDER NOTES
"  History     Chief Complaint:  Shortness of Breath      HPI   Adan Ruffin is a 69 year old male diabetic with a history of coronary artery disease, triple bypass in 2015, and hypertension who presents with shortness of breath. The patient says that over the past few days he has been becoming short of breath intermittently. He says that it gets worse when he lifts or exerts himself. This morning he says it was the worst it has felt so far. He says that he woke up with a dry cough and a sore throat with coughing, feels \"flu-like\". He endorses the use of aspirin this morning. He went to [facility] this morning with concerns for the shortness of breath and cough. His oxygen saturation was 78% on room air, he was placed on 2L of O2 and brought to the ED. He notes some soreness in his lungs. The patient also says that he has had blood in his stools for the past month. He denies any chest pain, leg swelling, fever, abdominal pain, rectal pain, recent travel, or history of blood clots. While in the room with the patient, he was taken off the supplemental oxygen, and his oxygenation fell to 87%.     NM Lexiscan stress test 9/30/2019  1.  Myocardial perfusion imaging using single isotope technique  demonstrated normal myocardial perfusion without evidence of infarct  or ischemia.   2. Gated images demonstrated normal sized left ventricular cavity with  normal segmental wall motion.  The left ventricular systolic function  is low normal. EF is 52%      3.  Prior study showed small sized, mild intensity apical and  distal anterior wall ischemia which was not seen today.       4. TID was 1.24, which is mildly abnormal for Lexiscan. However,  in the absence perfusion defect the specificity of TID to detect  significant coronary artery disease is low. Correlated clinically..    Echocardiogram Complete 9/30/2019  Left ventricular systolic function is normal. The visual ejection fraction is  estimated at 55-60%. No " regional wall motion abnormalities noted.  The right ventricle is normal in size and function.  No significant valvular abnormalities noted.  Mild aortic root dilatation. Max diameter of the visualized portion 3.9 cm.  This study was compared to a prior TTE from 12/2017. Aortic root was  previously measured at 3.8cm, so it is stable in diameter. Otherwise no  significant change.        Allergies:  Tetracycline  Codeine  Simvastatin      Medications:    Aspirin  Lipitor  Lantus  Humalog  Metformin  Toprol     Past Medical History:    Diabetes  Obesity  Hyperlipidemia  hypertension  Cardiomyopathy  CAD  Atrial flutter  Aortic root dilatation       Past Surgical History:    Bypass graft coronary artery   Colonoscopy  Excise pilonidal cyst  Heart cath angioplasty  Removal of skin cancer  Tonsillectomy and adenoidectomy     Family History:    Breast cancer  cerebrovascular disease  hyperlipidemia   Lung cancer  Cancer     Social History:  Smoking Status: Former Smoker  Smokeless Tobacco: Never Used  Alcohol Use: Positive  Drug Use: Negative  PCP: Lauro Galloway   Marital Status:         Review of Systems   Constitutional: Negative for fever.   Respiratory: Positive for cough and shortness of breath.    Cardiovascular: Negative for chest pain and leg swelling.   Gastrointestinal: Positive for blood in stool. Negative for abdominal pain and rectal pain.   All other systems reviewed and are negative.      Physical Exam     Patient Vitals for the past 24 hrs:   BP Temp Temp src Pulse Heart Rate Resp SpO2 Height Weight   01/02/20 1310 -- -- -- -- -- -- 97 % -- --   01/02/20 1305 -- -- -- -- -- -- 92 % -- --   01/02/20 1300 (!) 158/73 -- -- 76 -- -- 95 % -- --   01/02/20 1240 (!) 142/75 -- -- 77 -- -- 95 % -- --   01/02/20 1145 126/61 -- -- 67 73 -- -- -- --   01/02/20 1115 (!) 158/96 -- -- 73 71 -- 97 % -- --   01/02/20 1100 (!) 156/77 -- -- 71 73 -- 95 % -- --   01/02/20 1024 (!) 152/81 -- -- 77 -- -- 93 % -- --  "  01/02/20 1020 (!) 173/76 -- -- 78 -- -- 90 % -- --   01/02/20 1019 (!) 173/76 98  F (36.7  C) Oral 79 79 (!) 32 (!) 89 % 1.753 m (5' 9\") 98 kg (216 lb)        Physical Exam  General: Elderly male sitting upright  Eyes: PERRL, Conjunctive within normal limits. No scleral icterus.  ENT: Moist mucous membranes, oropharynx clear.   CV: Normal S1S2, no murmur, rub or gallop. Regular rate and rhythm. No JVD.  Resp: Crackles right base. Diminished throughout. Normal respiratory effort.  GI: Abdomen is soft, nontender and nondistended. No palpable masses. No rebound or guarding.  Rectal: No visible external abnormalities. Nontender. Normal tone. Small amount of firm stool in the rectal vault. Mucous with occasional flecks of blood on digital exam.  MSK: No edema. Nontender. Normal active range of motion.  Skin: Warm and dry. No rashes or lesions or ecchymoses on visible skin.  Neuro: Alert and oriented. Responds appropriately to all questions and commands. No focal findings appreciated. Normal muscle tone.  Psych: Normal mood and affect. Pleasant.    Emergency Department Course     ECG:  ECG taken at 1022, ECG read at 1027  Normal sinus rhythm  Prolonged QT  Abnormal ECG  Rate 77 bpm. ME interval 154 ms. QRS duration 96 ms. QT/QTc 442/500 ms. P-R-T axes 63 37 23.    Imaging:  Radiology findings were communicated with the patient who voiced understanding of the findings.    CT Chest Pulmonary Embolism w Contrast  1.  No pulmonary embolism seen to the subsegmental level.  2.  Diffuse airspace and groundglass opacities seen in the lungs  bilaterally concerning for acute pulmonary edema. Multifocal pneumonia  could also have a similar appearance. Correlate with clinical  symptoms.  3.  Emphysematous changes in the lungs.  4.  Mediastinal and hilar lymphadenopathy, suspected to be reactive.  5.  Cardiomegaly with multivessel coronary artery calcifications.  YADIRA TINSLEY MD  Reading per radiology    XR Chest 2 " Views  Enlarged, stable cardiomediastinal silhouette. Median  sternotomy wires present. Diffuse patchy airspace and interstitial  opacities are seen bilaterally concerning for acute pulmonary edema  versus multifocal pneumonia. Correlate with clinical symptoms.  Multilevel degenerative changes seen in the spine.  YADIRA TINSLEY MD  Reading per radiology    Laboratory:  Laboratory findings were communicated with the patient who voiced understanding of the findings.    Influenza: A negative, B negative  Blood gas arterial and oxyhgb: Ph 7.36, pCO2 39, pO2 27 (LL), bicarbonate 22,oxyhgb 41 (L)    CBC: WBC 7.3, HGB 9.7 (L),   CMP: chloride 110 (H), carbon dioxide 19 (L),  (H), albumin 3.1 (L) o/w WNL (Creatinine 0.86)  Magnesium: 1.6  D Dimer: 2.0 (H)  BNP: 1222 (H)  Troponin (Collected 1028): <0.015   INR 1.04  PTT: 32    Interventions:  1153  mL IV  1325 Proventil 2.5 mg nebulization     Emergency Department Course:    1025 Nursing notes and vitals reviewed.    1028 IV was inserted and blood was drawn for laboratory testing, results above.     1047 The patient was sent for a XR while in the emergency department, results above.      1122 I performed an exam of the patient as documented above. While in the room on room air the patient's O2 dropped to 87%.    1209 The patient was sent for a CT while in the emergency department, results above.      Patient reassessed. Discussed CT and laboratory findings and recommendation for admission, which he agrees to.    1345 I spoke with Dr. Contreras of the hospitalist service regarding patient's presentation, findings, and plan of care.       The patient is admitted into the care of Dr. Contreras .     Impression & Plan      Medical Decision Making:  Adan Ruffin is a 69 year old male with multiple medical problems including known coronary artery disease status post CABG who presents to the emergency department today for evaluation of days of worsening dyspnea  associated with influenza like symptoms. He had a normal ejection fraction as of earlier this year and does not appear to have congestive heart failure however this was considered. Also considered was influenza or influenza like illness. pneumonia was considered and seems possible based on chest Xray. D Dimer was elevated there for a pulmonary embolism was made less likely by CT scan. This may also be hypoxia secondary to COPD. He will be admitted for further care stable on supplemental O2 to a medical bed with current treatment for community-acquired pneumonia consideration for further therapy not indicated. I discussed this plan and he verbalized understanding. All questions answered prior to admission.     Diagnosis:    ICD-10-CM    1. Acute respiratory failure with hypoxia (H) J96.01    2. Pulmonary infiltrates R91.8      Disposition:   Findings and plan explained to the Patient who consents to admission. Discussed the patient with Dr. Contreras, who will admit the patient to a med/surg bed for further monitoring, evaluation, and treatment.       Scribe Disclosure:  I, Nikita Lovell, am serving as a scribe at 10:29 AM on 1/2/2020 to document services personally performed by Jessica Damon MD based on my observations and the provider's statements to me.      [facility] EMERGENCY DEPARTMENT       Jessica Damon MD  01/02/20 [id number]

## 2020-01-02 NOTE — ED NOTES
Azithromycin IV bag meds came from main pharmacy upon Pt's transfer to the floor. Azithromycin ABX IV bag was sent with Pt to be started at admitted floor.

## 2020-01-02 NOTE — LETTER
Transition Communication Hand-off for Care Transitions to Next Level of Care Provider    Name: Adan Ruffin  : 1950  MRN #: [id number]  Primary Care Provider: Lauro Galloway  Primary Care MD Name: emeterio  Primary Clinic: [address]  [facility] [address]  Primary Care Clinic Name: [facility]  Reason for Hospitalization:  Acute respiratory failure with hypoxia (H) [J96.01]  Admit Date/Time: 2020 10:15 AM  Discharge Date: 2020  Payor Source: Payor: BCBS / Plan: BCBS OUT OF STATE / Product Type: Indemnity /   Readmission Assessment Measure (GURU) Risk Score/category: Average        Reason for Communication Hand-off Referral: Admission diagnoses: PN    Discharge Plan: Home   Concern for non-adherence with plan of care: No   Discharge Needs Assessment:  Needs      Most Recent Value   Equipment Currently Used at Home  none   # of Referrals Placed by [facility]  Internal Clinic Care Coordination        Follow-up specialty is recommended: No    Follow-up plan:    Future Appointments   Date Time Provider Department Center   2020 11:00 AM [facility]     Any outstanding tests or procedures:  No. Recommend a repeat BMP in 7 days.       Recommendations:   Pt is admitted with PNA.  We did discuss the PNA action care plan.  He has had DM for around 20 years.  His goal has always been to keep his a1c less than 7.0.  He has mostly achieved this goal.  However, over the last 2-3 years it has been more difficult for him.  He has recently lost 35 lbs with being on the Keto Diet.  He does check his blood glucose 2-4 times a day.  His last a1c was 7.7 as of 20, which is trending downward.      Recommendation are home with a follow up appointment and BMP at that appt.     Kristine Alvarado RN    Sent by Jerrica Shelley RN, BSN, CPHN, CM    AVS/Discharge Summary is the source of truth; this is a helpful guide for improved communication of patient story

## 2020-01-02 NOTE — PHARMACY-ADMISSION MEDICATION HISTORY
Admission medication history interview status for this patient is complete. See [facility] admission navigator for allergy information, prior to admission medications and immunization status.     Medication history interview source(s):Patient  Medication history resources (including written lists, pill bottles, clinic record):None    Changes made to PTA medication list:  Added: none  Deleted: none  Changed: insulin lantus 90 units am & 80 units at bedtime -> 70 units am & 65 units at bedtime.   For patients on insulin therapy: Y (  Lantus/levemir/NPH/Mix 70/30 dose: yes   (see Med list for doses)   Sliding scale Novolog: No  If Yes, do you have a baseline novolog pre-meal dose: No, pt is on keto diet, takes Humalog if includes carbs in his meal.  Patients eat three meals a day: yes  How many episodes of hypoglycemia do you have per week: about once per month, last low BG (48) a week ago.   How many missed doses do you have per week: ~ one per week  How many times do you check your blood glucose per day: 2-4 times/day     Any Barriers to therapy - Be specific :  cost of medications, comfortable with giving injections (if applicable), comfortable and confident with current diabetes regimen: None     Actions taken by pharmacist (provider contacted, etc):None     Additional medication history information:None    Medication reconciliation/reorder completed by provider prior to medication history? No    Prior to Admission medications    Medication Sig Last Dose Taking? Auth Provider   aspirin (ASA) 325 MG EC tablet Take 325 mg by mouth daily 1/2/2020 Yes Unknown, Entered By History   atorvastatin (LIPITOR) 40 MG tablet Take 1 tablet (40 mg) by mouth daily 1/2/2020 Yes Lauro Galloway MD   insulin glargine (LANTUS PEN) 100 UNIT/ML pen Inject 70 Units Subcutaneous every morning 1/2/2020 Yes Reported, Patient   insulin glargine (LANTUS PEN) 100 UNIT/ML pen Inject 65 Units Subcutaneous At Bedtime 1/1/2020 Yes Reported, Patient    insulin lispro (HUMALOG KWIKPEN) 100 UNIT/ML (1 unit dial) pen Inject Subcutaneous 3 times daily (before meals) 10 units insulin per 1 carb unit (for example, takes 10 units for 1 slice of bread)  Yes Reported, Patient   metFORMIN (GLUCOPHAGE) 1000 MG tablet TAKE 1 TABLET BY MOUTH TWICE DAILY WITH MEALS 1/2/2020 at am Yes Lauro Galloway MD   metoprolol succinate ER (TOPROL XL) 100 MG 24 hr tablet Take 1 tablet (100 mg) by mouth 2 times daily 1/2/2020 at am Yes Harvey Rosas MD   ACE NOT PRESCRIBED, INTENTIONAL, 1 each daily ACE Inhibitor not prescribed due to Intolerance  Patient not taking: Reported on 10/11/2019   Lauro Galloway MD   blood glucose (ACCU-CHEK SMARTVIEW) test strip USE TO TEST BLOOD SUGAR FOUR TIMES DAILY OR AS DIRECTED   Mayda Pham MD   blood glucose monitoring (ACCU-CHEK FASTCLIX) lancets Use to test blood sugar 4 times daily or as directed.   Mayda Pham MD   insulin pen needle (NOVOFINE) 30G X 8 MM Use 4-5 daily or as directed.  Patient not taking: Reported on 10/11/2019   Marilee Tolentino MD   NOVOFINE 30 30G X 8 MM insulin pen needle USE 4 TO 5 DAILY OR AS DIRECTED   Lauro Galloway MD

## 2020-01-02 NOTE — ED TRIAGE NOTES
Patient presents to ED from [facility] after complaints of shortness of breath and cough. Oxygen saturation was 78% on RA. Patient given 1 DuoNeb and put on 2L O2 at clinic, and oxygen saturations maintained at 93%. Patient has hx of triple bypass in 2015. Denies pain, recent illness, fever. Shortness of breath began yesterday. Patient denies hx of COPD or asthma. At this time, ABCs intact with 4L O2. Pt A&OX4.

## 2020-01-02 NOTE — LETTER
Key Recommendations:  Pt is admitted with PNA.  We did discuss the PNA action care plan.  He has had DM for around 20 years.  His goal has always been to keep his a1c less than 7.0.  He has mostly achieved this goal.  However, over the last 2-3 years it has been more difficult for him.  He has recently lost 35 lbs with being on the Keto Diet.  He does check his blood glucose 2-4 times a day.  His last a1c was 7.7 as of 1/2/20, which is trending downward.      Recommendation are ***    Kristine Alvarado RN    AVS/Discharge Summary is the source of truth; this is a helpful guide for improved communication of patient story

## 2020-01-02 NOTE — H&P
[facility]    History and Physical  Hospitalist     Date of Admission:  1/2/2020  Date of Service (when I saw the patient): 01/02/20  Provider: Beni Contreras MD      Chief Complaint   Shortness of breath, sore throat and cough     History is obtained from the patient. EMR and emergency room physician.     History of Present Illness   Adan Ruffin is a 69 year old male who has a past medical history of Aortic root dilatation, Atrial flutter with ablation on 2017, CAD with CABG LIMA to LAD, SVG to circumflex and SVG to PDA (2015), Cardiomyopathy, HTN, Hyperlipidemia, Microalbuminuria, Obesity, and Type 2 diabetes mellitus with diabetic nephropathy, with long-term current use of insulin and high resistance.  He presents with new onset of acute respiratory symptoms starting in the last 24 hours.  He is having sore throat, cough moving scant amount of phlegm of unknown color per patient report.  Worsening of shortness of breath, severely exacerbated on exertion, however he has been sleeping flat in bed with good tolerance.  He reports normal urine output and no significant change of edema in his lower extremities.  He denies household contacts.  He denies fever.  He consulted in his primary care clinic this morning, at that time his oxygen saturation was in the 70s, he was started on oxygen and transferred to the emergency department where a full work-up was obtained.  The ED physician  has requested admission to the hospital, I have discussed his case with her.  He stopped smoking in 2001.  He is a daily drinker of one shot of hard liquor.  Most significant findings in the laboratory work-up are:  CBC with normal white count, hemoglobin 9.7, normal platelet count and normal differential.  Chemistry with elevated chloride 110 (seems to be a chronic issue) carbon dioxide 19 and rest of the values within normal limit.  His hemoglobin A1c is 7.7.  CRP inflammation 22.3.  NT terminal proBNP  1222.  Troponin I E <0.015.  EKG shows normal sinus rhythm.  D-dimer elevated, CT Ivonne of the lungs obtained.  Results as below.  IMPRESSION:   1.  No pulmonary embolism seen to the subsegmental level.  2.  Diffuse airspace and groundglass opacities seen in the lungs  bilaterally concerning for acute pulmonary edema. Multifocal pneumonia  could also have a similar appearance. Correlate with clinical  symptoms.  3.  Emphysematous changes in the lungs.  4.  Mediastinal and hilar lymphadenopathy, suspected to be reactive.  5.  Cardiomegaly with multivessel coronary artery calcifications.    Echocardiogram 9/2019.    Interpretation Summary:  Left ventricular systolic function is normal. The visual ejection fraction is  estimated at 55-60%. No regional wall motion abnormalities noted.  The right ventricle is normal in size and function.  No significant valvular abnormalities noted.  Mild aortic root dilatation. Max diameter of the visualized portion 3.9 cm.  This study was compared to a prior TTE from 12/2017. Aortic root was  previously measured at 3.8cm, so it is stable in diameter. Otherwise no  significant change.    Past Medical History    I have reviewed this patient's medical history and updated it with pertinent information if needed.   Past Medical History:   Diagnosis Date     Aortic root dilatation (H)      Atrial flutter (H) 10/02/2017    ablation 11/26/2017     CAD (coronary artery disease) 03/13/2015    CABG 4/7/2015 by Dr. Johan Hall: LIMA to LAD, SVG to circumflex, SVG to PDA     Cardiomyopathy (H) 10/02/2017    tachycardia-induced with EF 40-45%, improved once back to NSR     HTN (hypertension)      Hyperlipidaemia      Microalbuminuria 6/27/2016     Obesity 3/13/2015     Type 2 diabetes mellitus with diabetic nephropathy, with long-term current use of insulin (H) 11/2/2016        Assessment & Plan   Adan Ruffin is a 69 year old male who presents with new onset of respiratory symptoms including  cough, shortness of breath, sore throat, severe documented hypoxemia in the 70s, CT of the chest with bilateral groundglass infiltrates, no leukocytosis, no toxemic aspect, elevated CRP, no chest pain, elevated NT proBNP.    1.  Acute hypoxemic respiratory failure. With presence of bilateral ground glass infiltrates, most likely etiology should be a viral infection.  With current epidemic season influenza A-B, RSV or other respiratory virus are suspected the main culprits.  He tested negative for the rapid test.  In the light of his many comorbidities I will send for a PCR test.  According to the patient he has not had flu shot this year, he is not clear about his immunization status for pneumococcus.  Main intervention is symptomatic treatment, oxygen supplementation to keep oxygen saturation 92% of higher.  2.  Possible community-acquired pneumonia with bilateral infiltrate.  Not totally convinced at this point however I am going to treat him with Rocephin/Zithromax per protocol as a started in the emergency department until we have definitive diagnosis.  Gram staining of the sputum, sputum culture, pneumococcal antigen in urine and blood cultures are all requested or in process.  Procalcitonin is a still in process.  3.  Elevated NT proBNP.  No complaints and symptomatology of left sided heart failure.  I believe that abnormal level of BNP is secondary to RV strain in the setting of bilateral infiltrates of the lungs and probably underlying pulmonary hypertension.  Echocardiogram of September of this year reported normal LVEF, no evidence of diastolic dysfunction and no valvular abnormalities.  I will obtain an echocardiogram for follow-up.  4.  Type 2 diabetes mellitus on insulin.  Very resistant to insulin apparently given the high doses needed.  On the other hand his A1c on admission is 7.7 which is remarkable.  I will continue his home medication except for metformin due to current respiratory issues,  hypoxemia and high risk for lactic acidosis.  Order moderate CHO.   High intensity sliding scale for correction.    5.  Essential hypertension.  On metoprolol.  6.  Coronary artery disease status post CABG.  Continue metoprolol, aspirin and atorvastatin.  7.  Hyperlipidemia.  Atorvastatin.  8.  History of atrial flutter status post ablation.  Normal sinus rhythm on admission.  9.  Obesity with BMI 33.    Orders.  -Admit to inpatient.  -Cardiac telemetry.    Code Status   Full Code    Primary Care Physician   Lauro Galloway      Past Surgical History   I have reviewed this patient's surgical history and updated it with pertinent information if needed.  Past Surgical History:   Procedure Laterality Date     BYPASS GRAFT ARTERY CORONARY N/A 2015    bypass LAD, OM, PDA        COLONOSCOPY N/A 2016    Procedure: COLONOSCOPY;  Surgeon: Marcela Schwartz MD;  Location: [facility]     EXCISE PILONIDAL CYST, SIMPLE  1975     HEART CATH, ANGIOPLASTY  3-    3 vessel disease-occluded RCA, stenosis LM-to have CABG     removal of skin cancer       TONSILLECTOMY & ADENOIDECTOMY  1950       Prior to Admission Medications   Prior to Admission Medications   Prescriptions Last Dose Informant Patient Reported? Taking?   ACE NOT PRESCRIBED, INTENTIONAL,   No No   Si each daily ACE Inhibitor not prescribed due to Intolerance   Patient not taking: Reported on 10/11/2019   NOVOFINE 30 30G X 8 MM insulin pen needle   No No   Sig: USE 4 TO 5 DAILY OR AS DIRECTED   aspirin (ASA) 325 MG EC tablet 2020  Yes Yes   Sig: Take 325 mg by mouth daily   atorvastatin (LIPITOR) 40 MG tablet 2020  No Yes   Sig: Take 1 tablet (40 mg) by mouth daily   blood glucose (ACCU-CHEK SMARTVIEW) test strip   No No   Sig: USE TO TEST BLOOD SUGAR FOUR TIMES DAILY OR AS DIRECTED   blood glucose monitoring (ACCU-CHEK FASTCLIX) lancets   No No   Sig: Use to test blood sugar 4 times daily or as directed.   insulin glargine (LANTUS PEN)  100 UNIT/ML pen 2020  Yes Yes   Sig: Inject 70 Units Subcutaneous every morning   insulin glargine (LANTUS PEN) 100 UNIT/ML pen 2020  Yes Yes   Sig: Inject 65 Units Subcutaneous At Bedtime   insulin lispro (HUMALOG KWIKPEN) 100 UNIT/ML (1 unit dial) pen   Yes Yes   Sig: Inject Subcutaneous 3 times daily (before meals) 10 units insulin per 1 carb unit (for example, takes 10 units for 1 slice of bread)   insulin pen needle (NOVOFINE) 30G X 8 MM   No No   Sig: Use 4-5 daily or as directed.   Patient not taking: Reported on 10/11/2019   metFORMIN (GLUCOPHAGE) 1000 MG tablet 2020 at am  No Yes   Sig: TAKE 1 TABLET BY MOUTH TWICE DAILY WITH MEALS   metoprolol succinate ER (TOPROL XL) 100 MG 24 hr tablet 2020 at am  No Yes   Sig: Take 1 tablet (100 mg) by mouth 2 times daily      Facility-Administered Medications: None     Allergies   Allergies   Allergen Reactions     Tetracycline      PN: LW Reaction: RASH     Codeine Rash     Mild rash     Simvastatin Other (See Comments)     Leg pain       Social History   I have personally reviewed the social history with the patient showing.  Social History     Tobacco Use     Smoking status: Former Smoker     Packs/day: 1.50     Years: 29.00     Pack years: 43.50     Types: Cigarettes     Start date:      Last attempt to quit: 2000     Years since quittin.0     Smokeless tobacco: Never Used   Substance Use Topics     Alcohol use: Yes     Alcohol/week: 0.0 standard drinks     Comment: 1 mixed drink a night     Family History   I have reviewed this patient's family history and it is not contributory to the admission .       Review of Systems   Except as noted in the HPI, a 12-system Review of Systems was found to be negative.      Physical Exam   Vital Signs with Ranges  Temp:  [96.3  F (35.7  C)-98  F (36.7  C)] 96.3  F (35.7  C)  Pulse:  [66-79] 77  Heart Rate:  [71-79] 78  Resp:  [22-32] 22  BP: (126-173)/(61-96) 158/72  SpO2:  [89 %-100 %] 91 %  221  lbs 14.4 oz    GEN:  Alert, oriented x 3, appears comfortable, NAD.  HEENT:  Normocephalic/atraumatic, no scleral icterus, no nasal discharge, mouth moist.  CV:  Regular rate and rhythm, no murmur or JVD.  S1 + S2 noted, no S3 or S4.  LUNGS: Globally diminished to auscultation bilaterally without rales/rhonchi/wheezing/retractions.  Symmetric chest rise on inhalation noted.  ABD: Prominent.  Active bowel sounds, soft, non-tender/non-distended.  No rebound/guarding/rigidity.  EXT: Bilateral trace of pretibial edema, no cyanosis.  No joint synovitis noted.  SKIN:  Dry to touch, no exanthems noted in the visualized areas.       Data   I personally reviewed the EKG tracing showing NSR.  Results for orders placed or performed during the hospital encounter of 01/02/20 (from the past 24 hour(s))   EKG 12-lead, tracing only   Result Value Ref Range    Interpretation ECG Click View Image link to view waveform and result    CBC with platelets differential   Result Value Ref Range    WBC 7.3 4.0 - 11.0 10e9/L    RBC Count 4.24 (L) 4.4 - 5.9 10e12/L    Hemoglobin 9.7 (L) 13.3 - 17.7 g/dL    Hematocrit 32.9 (L) 40.0 - 53.0 %    MCV 78 78 - 100 fl    MCH 22.9 (L) 26.5 - 33.0 pg    MCHC 29.5 (L) 31.5 - 36.5 g/dL    RDW 17.1 (H) 10.0 - 15.0 %    Platelet Count 371 150 - 450 10e9/L    Diff Method Automated Method     % Neutrophils 68.7 %    % Lymphocytes 15.2 %    % Monocytes 8.0 %    % Eosinophils 6.2 %    % Basophils 0.4 %    % Immature Granulocytes 1.5 %    Nucleated RBCs 0 0 /100    Absolute Neutrophil 5.0 1.6 - 8.3 10e9/L    Absolute Lymphocytes 1.1 0.8 - 5.3 10e9/L    Absolute Monocytes 0.6 0.0 - 1.3 10e9/L    Absolute Eosinophils 0.5 0.0 - 0.7 10e9/L    Absolute Basophils 0.0 0.0 - 0.2 10e9/L    Abs Immature Granulocytes 0.1 0 - 0.4 10e9/L    Absolute Nucleated RBC 0.0    Comprehensive metabolic panel   Result Value Ref Range    Sodium 140 133 - 144 mmol/L    Potassium 4.0 3.4 - 5.3 mmol/L    Chloride 110 (H) 94 - 109 mmol/L     Carbon Dioxide 19 (L) 20 - 32 mmol/L    Anion Gap 11 3 - 14 mmol/L    Glucose 106 (H) 70 - 99 mg/dL    Urea Nitrogen 22 7 - 30 mg/dL    Creatinine 0.86 0.66 - 1.25 mg/dL    GFR Estimate 89 >60 mL/min/[1.73_m2]    GFR Estimate If Black >90 >60 mL/min/[1.73_m2]    Calcium 9.0 8.5 - 10.1 mg/dL    Bilirubin Total 0.6 0.2 - 1.3 mg/dL    Albumin 3.1 (L) 3.4 - 5.0 g/dL    Protein Total 7.7 6.8 - 8.8 g/dL    Alkaline Phosphatase 150 40 - 150 U/L    ALT 32 0 - 70 U/L    AST 42 0 - 45 U/L   Magnesium   Result Value Ref Range    Magnesium 1.6 1.6 - 2.3 mg/dL   D dimer quantitative   Result Value Ref Range    D Dimer 2.0 (H) 0.0 - 0.50 ug/ml FEU   Nt probnp inpatient   Result Value Ref Range    N-Terminal Pro BNP Inpatient 1,222 (H) 0 - 900 pg/mL   Troponin I   Result Value Ref Range    Troponin I ES <0.015 0.000 - 0.045 ug/L   INR   Result Value Ref Range    INR 1.04 0.86 - 1.14   Partial thromboplastin time   Result Value Ref Range    PTT 32 22 - 37 sec   XR Chest 2 Views    Narrative    CHEST TWO VIEWS   1/2/2020 10:55 AM     HISTORY: Cough.    COMPARISON: 10/30/2017      Impression    IMPRESSION: Enlarged, stable cardiomediastinal silhouette. Median  sternotomy wires present. Diffuse patchy airspace and interstitial  opacities are seen bilaterally concerning for acute pulmonary edema  versus multifocal pneumonia. Correlate with clinical symptoms.  Multilevel degenerative changes seen in the spine.    YADIRA TINSLEY MD   CT Chest Pulmonary Embolism w Contrast    Narrative    CT CHEST PULMONARY EMBOLISM WITH CONTRAST  1/2/2020 12:23 PM    HISTORY:  Pulmonary embolus suspected, intermediate probability,  positive D-dimer.    TECHNIQUE: Scans obtained from the apices through the diaphragm with  IV contrast. 71 mL Isovue-370 IV injected. Radiation dose for this  scan was reduced using automated exposure control, adjustment of the  mA and/or kV according to patient size, or iterative reconstruction  technique.    COMPARISON:   4/9/2015    FINDINGS:   Vascular: No acute thoracic aortic abnormality. No evidence for  pulmonary embolism. Scattered atherosclerotic calcifications seen in  the thoracic aorta.    Lungs and pleura: Centrilobular and paraseptal emphysematous changes  seen bilaterally. Diffuse airspace and groundglass opacities are seen  bilaterally. Bilateral bronchiectasis and bronchial wall thickening  also seen, most prominent within the lower lobes.    Lymph nodes: Multiple enlarged mediastinal and hilar lymph nodes are  present. For example, one of the largest lymph nodes measures 1.8 cm  in the right hilum (series 6, image 78). Another example includes a  1.3 cm left paratracheal lymph node (series 6, image 60).    Additional findings: The visualized portions of the thyroid gland are  unremarkable heart size is enlarged. Multivessel coronary artery  calcifications are seen. Thickening of the right adrenal gland appears  unchanged since prior exam. No suspicious osseous lesion seen. Median  sternotomy wires are present. Multilevel degenerative changes are  present in the spine.      Impression    IMPRESSION:   1.  No pulmonary embolism seen to the subsegmental level.  2.  Diffuse airspace and groundglass opacities seen in the lungs  bilaterally concerning for acute pulmonary edema. Multifocal pneumonia  could also have a similar appearance. Correlate with clinical  symptoms.  3.  Emphysematous changes in the lungs.  4.  Mediastinal and hilar lymphadenopathy, suspected to be reactive.  5.  Cardiomegaly with multivessel coronary artery calcifications.    YADIRA TINSLEY MD   Blood gas arterial and oxyhgb   Result Value Ref Range    pH Arterial 7.36 7.35 - 7.45 pH    pCO2 Arterial 39 35 - 45 mm Hg    pO2 Arterial 27 (LL) 80 - 105 mm Hg    Bicarbonate Arterial 22 21 - 28 mmol/L    FIO2 Oxymask 4LPM     Oxyhemoglobin Arterial 41 (L) 92 - 100 %   Influenza A/B antigen   Result Value Ref Range    Influenza A/B Agn Specimen  Nasopharyngeal     Influenza A Negative NEG^Negative    Influenza B Negative NEG^Negative       Disclaimer: This note consists of symbols derived from keyboarding, dictation and/or voice recognition software. As a result, there may be errors in the script that have gone undetected. Please consider this when interpreting information found in this chart.

## 2020-01-02 NOTE — PLAN OF CARE
PT: order received; Per chart review, patient just admitted with SOB this afternoon; Will allow for further medical management today and see for evaluation on 1/3/20 as tolerated

## 2020-01-03 ENCOUNTER — APPOINTMENT (OUTPATIENT)
Dept: CARDIOLOGY | Facility: CLINIC | Age: 70
End: 2020-01-03
Attending: HOSPITALIST
Payer: COMMERCIAL

## 2020-01-03 LAB
ALBUMIN SERPL-MCNC: 2.7 G/DL (ref 3.4–5)
ALP SERPL-CCNC: 125 U/L (ref 40–150)
ALT SERPL W P-5'-P-CCNC: 24 U/L (ref 0–70)
ANION GAP SERPL CALCULATED.3IONS-SCNC: 6 MMOL/L (ref 3–14)
AST SERPL W P-5'-P-CCNC: 29 U/L (ref 0–45)
BACTERIA SPEC CULT: ABNORMAL
BACTERIA SPEC CULT: ABNORMAL
BASOPHILS # BLD AUTO: 0 10E9/L (ref 0–0.2)
BASOPHILS NFR BLD AUTO: 0.4 %
BILIRUB SERPL-MCNC: 0.8 MG/DL (ref 0.2–1.3)
BUN SERPL-MCNC: 13 MG/DL (ref 7–30)
CALCIUM SERPL-MCNC: 8.8 MG/DL (ref 8.5–10.1)
CHLORIDE SERPL-SCNC: 109 MMOL/L (ref 94–109)
CO2 SERPL-SCNC: 23 MMOL/L (ref 20–32)
CREAT SERPL-MCNC: 0.84 MG/DL (ref 0.66–1.25)
DIFFERENTIAL METHOD BLD: ABNORMAL
EOSINOPHIL # BLD AUTO: 0.5 10E9/L (ref 0–0.7)
EOSINOPHIL NFR BLD AUTO: 7.1 %
ERYTHROCYTE [DISTWIDTH] IN BLOOD BY AUTOMATED COUNT: 16.9 % (ref 10–15)
GFR SERPL CREATININE-BSD FRML MDRD: >90 ML/MIN/{1.73_M2}
GLUCOSE BLDC GLUCOMTR-MCNC: 117 MG/DL (ref 70–99)
GLUCOSE BLDC GLUCOMTR-MCNC: 121 MG/DL (ref 70–99)
GLUCOSE BLDC GLUCOMTR-MCNC: 135 MG/DL (ref 70–99)
GLUCOSE BLDC GLUCOMTR-MCNC: 187 MG/DL (ref 70–99)
GLUCOSE BLDC GLUCOMTR-MCNC: 81 MG/DL (ref 70–99)
GLUCOSE BLDC GLUCOMTR-MCNC: 85 MG/DL (ref 70–99)
GLUCOSE SERPL-MCNC: 74 MG/DL (ref 70–99)
GRAM STN SPEC: ABNORMAL
HCT VFR BLD AUTO: 31.6 % (ref 40–53)
HGB BLD-MCNC: 9.1 G/DL (ref 13.3–17.7)
IMM GRANULOCYTES # BLD: 0.1 10E9/L (ref 0–0.4)
IMM GRANULOCYTES NFR BLD: 1.1 %
LYMPHOCYTES # BLD AUTO: 1.1 10E9/L (ref 0.8–5.3)
LYMPHOCYTES NFR BLD AUTO: 15.5 %
Lab: ABNORMAL
MCH RBC QN AUTO: 22.1 PG (ref 26.5–33)
MCHC RBC AUTO-ENTMCNC: 28.8 G/DL (ref 31.5–36.5)
MCV RBC AUTO: 77 FL (ref 78–100)
MONOCYTES # BLD AUTO: 0.6 10E9/L (ref 0–1.3)
MONOCYTES NFR BLD AUTO: 8.9 %
NEUTROPHILS # BLD AUTO: 4.7 10E9/L (ref 1.6–8.3)
NEUTROPHILS NFR BLD AUTO: 67 %
NRBC # BLD AUTO: 0 10*3/UL
NRBC BLD AUTO-RTO: 0 /100
PLATELET # BLD AUTO: 333 10E9/L (ref 150–450)
POTASSIUM SERPL-SCNC: 3.6 MMOL/L (ref 3.4–5.3)
PROT SERPL-MCNC: 6.9 G/DL (ref 6.8–8.8)
RBC # BLD AUTO: 4.11 10E12/L (ref 4.4–5.9)
SODIUM SERPL-SCNC: 138 MMOL/L (ref 133–144)
SPECIMEN SOURCE: ABNORMAL
SPECIMEN SOURCE: ABNORMAL
WBC # BLD AUTO: 7.1 10E9/L (ref 4–11)

## 2020-01-03 PROCEDURE — 99233 SBSQ HOSP IP/OBS HIGH 50: CPT | Performed by: HOSPITALIST

## 2020-01-03 PROCEDURE — 25500064 ZZH RX 255 OP 636: Performed by: HOSPITALIST

## 2020-01-03 PROCEDURE — 40000274 ZZH STATISTIC RCP CONSULT EA 30 MIN

## 2020-01-03 PROCEDURE — 93306 TTE W/DOPPLER COMPLETE: CPT | Mod: 26 | Performed by: INTERNAL MEDICINE

## 2020-01-03 PROCEDURE — 40000264 ECHOCARDIOGRAM COMPLETE

## 2020-01-03 PROCEDURE — 25000132 ZZH RX MED GY IP 250 OP 250 PS 637: Performed by: HOSPITALIST

## 2020-01-03 PROCEDURE — 94640 AIRWAY INHALATION TREATMENT: CPT

## 2020-01-03 PROCEDURE — 25000125 ZZHC RX 250: Performed by: HOSPITALIST

## 2020-01-03 PROCEDURE — 85025 COMPLETE CBC W/AUTO DIFF WBC: CPT | Performed by: HOSPITALIST

## 2020-01-03 PROCEDURE — 94640 AIRWAY INHALATION TREATMENT: CPT | Mod: 76

## 2020-01-03 PROCEDURE — 87070 CULTURE OTHR SPECIMN AEROBIC: CPT | Performed by: HOSPITALIST

## 2020-01-03 PROCEDURE — 25000128 H RX IP 250 OP 636: Performed by: HOSPITALIST

## 2020-01-03 PROCEDURE — 87205 SMEAR GRAM STAIN: CPT | Performed by: HOSPITALIST

## 2020-01-03 PROCEDURE — 40000275 ZZH STATISTIC RCP TIME EA 10 MIN

## 2020-01-03 PROCEDURE — 00000146 ZZHCL STATISTIC GLUCOSE BY METER IP

## 2020-01-03 PROCEDURE — 36415 COLL VENOUS BLD VENIPUNCTURE: CPT | Performed by: HOSPITALIST

## 2020-01-03 PROCEDURE — 25000131 ZZH RX MED GY IP 250 OP 636 PS 637: Performed by: HOSPITALIST

## 2020-01-03 PROCEDURE — 12000000 ZZH R&B MED SURG/OB

## 2020-01-03 PROCEDURE — 80053 COMPREHEN METABOLIC PANEL: CPT | Performed by: HOSPITALIST

## 2020-01-03 PROCEDURE — 25800030 ZZH RX IP 258 OP 636: Performed by: HOSPITALIST

## 2020-01-03 RX ORDER — AZITHROMYCIN 250 MG/1
250 TABLET, FILM COATED ORAL DAILY
Status: COMPLETED | OUTPATIENT
Start: 2020-01-04 | End: 2020-01-06

## 2020-01-03 RX ADMIN — ATORVASTATIN CALCIUM 40 MG: 40 TABLET, FILM COATED ORAL at 08:51

## 2020-01-03 RX ADMIN — IPRATROPIUM BROMIDE AND ALBUTEROL SULFATE 3 ML: .5; 3 SOLUTION RESPIRATORY (INHALATION) at 07:31

## 2020-01-03 RX ADMIN — IPRATROPIUM BROMIDE AND ALBUTEROL SULFATE 3 ML: .5; 3 SOLUTION RESPIRATORY (INHALATION) at 19:52

## 2020-01-03 RX ADMIN — BUDESONIDE 0.5 MG: 0.5 INHALANT RESPIRATORY (INHALATION) at 07:37

## 2020-01-03 RX ADMIN — ASPIRIN 325 MG: 325 TABLET, DELAYED RELEASE ORAL at 08:51

## 2020-01-03 RX ADMIN — BUDESONIDE 0.5 MG: 0.5 INHALANT RESPIRATORY (INHALATION) at 19:53

## 2020-01-03 RX ADMIN — SALINE NASAL SPRAY 1 SPRAY: 1.5 SOLUTION NASAL at 01:11

## 2020-01-03 RX ADMIN — HUMAN ALBUMIN MICROSPHERES AND PERFLUTREN 3 ML: 10; .22 INJECTION, SOLUTION INTRAVENOUS at 14:30

## 2020-01-03 RX ADMIN — METOPROLOL SUCCINATE 100 MG: 100 TABLET, EXTENDED RELEASE ORAL at 21:24

## 2020-01-03 RX ADMIN — CEFTRIAXONE 2 G: 2 INJECTION, POWDER, FOR SOLUTION INTRAMUSCULAR; INTRAVENOUS at 16:03

## 2020-01-03 RX ADMIN — SALINE NASAL SPRAY 1 SPRAY: 1.5 SOLUTION NASAL at 21:31

## 2020-01-03 RX ADMIN — IPRATROPIUM BROMIDE AND ALBUTEROL SULFATE 3 ML: .5; 3 SOLUTION RESPIRATORY (INHALATION) at 11:56

## 2020-01-03 RX ADMIN — INSULIN GLARGINE 65 UNITS: 100 INJECTION, SOLUTION SUBCUTANEOUS at 21:25

## 2020-01-03 RX ADMIN — AZITHROMYCIN MONOHYDRATE 250 MG: 500 INJECTION, POWDER, LYOPHILIZED, FOR SOLUTION INTRAVENOUS at 14:26

## 2020-01-03 RX ADMIN — METOPROLOL SUCCINATE 100 MG: 100 TABLET, EXTENDED RELEASE ORAL at 08:51

## 2020-01-03 ASSESSMENT — ACTIVITIES OF DAILY LIVING (ADL)
ADLS_ACUITY_SCORE: 12

## 2020-01-03 ASSESSMENT — MIFFLIN-ST. JEOR: SCORE: 1749.23

## 2020-01-03 NOTE — PROVIDER NOTIFICATION
Pagerito MÁRQUEZ: Haven't given patient home lantus dose of 70 units yet, BS overnight and this morning have been 70-80's.  Do you want to decrease dose?

## 2020-01-03 NOTE — PROGRESS NOTES
"[facility]     Date: 1-3-2020  Admission Dx: acute resp failure  Pulmonary History:  none  Home Nebulizer/MDI Use: No  Home Oxygen: No  Acuity Level (RCAT flow sheet): 3  Aerosol Therapy initiated: Duoneb QID, Pulmicort BID      Pulmonary Hygiene initiated: nebs, Cough and Deep Breathe      Volume Expansion initiated: Cough and Deep Breathe, I S      Current Oxygen Requirements: 5 lpm oxymask  Current SpO2: 95%  Re-evaluation date: 1-6-2020    Patient Education: proper use of nebulizer, Cough and Deep Breathe      See \"RT Assessments\" flow sheet for patient assessment scoring and Acuity Level Details.             "

## 2020-01-03 NOTE — PLAN OF CARE
PT: PT orders received, discussed with RN re: need for PT as pt I in room. Pt RN reporting that he still is on high level of O2 and further medical management is needed. PT to hold one more day, and re-assess need for PT in inpt setting.

## 2020-01-03 NOTE — PLAN OF CARE
[id number].  Vitals stable. On 4-5 LPM oxymask, sats 88-93%. Dyspnea with exertion. Tele: SR(80's). Getting up independently to the bathroom. Voiding adequately. Patient had formed stool streaked with blood. Patient reports he's been having blood-streaked stools for about a month now and had a similar episode a few years back requiring Canasa suppositories. BG checks 123, 81, 121.

## 2020-01-03 NOTE — CONSULTS
Care Transition Initial Assessment - RN        Met with: Patient and wife.  DATA   Active Problems:    Acute hypoxemic respiratory failure (H)       Cognitive Status: awake, alert and oriented.  [facility]  Primary Care MD Name: emeterio  Contact information and PCP information verified: Yes  Lives With: spouse         Description of Support System: Supportive   Who is your support system?: Wife       Insurance concerns: No Insurance issues identified  ASSESSMENT  Patient currently receives the following services:  None.  He works full time still.          Identified issues/concerns regarding health management: Pt is admitted with PNA.  We did discuss the PNA action care plan.  He has had DM for around 20 years.  His goal has always been to keep his a1c less than 7.0.  He has mostly achieved this goal.  However, over the last 2-3 years it has been more difficult for him.  He has recently lost 35 lbs with being on the Keto Diet.  He does check his blood glucose 2-4 times a day.  His last a1c was 7.7 as of 1/2/20, which is trending downward.  I will give a hand off to his clinic RN CTS at time of discharge.      PLAN  Patient given options and choices for discharge yes .  Patient/family is agreeable to the plan?  Yes:   Patient anticipates discharging to home with wife .        Patient anticipates needs for home equipment: No  Transportation/person available to transport on day of discharge  is his wife and she is aware.  Plan/Disposition: Julee   Appointments:     Jan13 Office Visit with Lauro Galloway MD   Monday Jan 13, 2020 11:00 AM   Bring a current list of meds and any records pertaining to this visit.  For Physicals, please bring immunization records and any forms needing to be filled out. Please arrive 10 minutes early to complete paperwork. [facility] [place] [address]  [facility] [id number] [phone]        Care  (CTS) will  continue to follow as needed.    Doris Alvarado RN BSN   Inpatient Care Coordination  [facility]  [phone]

## 2020-01-03 NOTE — PLAN OF CARE
OT- Orders received, per discussion with PT, RN reports patient has been I in room, but still is on high level of O2 and further medical management is needed. PT to hold one more day, and re-assess need for PT in inpt setting, also will screen for OT needs. OT will follow progress and proceed as appropriate.

## 2020-01-03 NOTE — PROGRESS NOTES
[facility]    Hospitalist Progress Note    Date of Service (when I saw the patient): 01/03/2020  Provider:  Beni Contreras MD   Text Page  7am - 6PM       Assessment & Plan   Adan Ruffin is a 69 year old male who presents with new onset of respiratory symptoms including cough, shortness of breath, sore throat, severe documented hypoxemia in the 70s, CT of the chest with bilateral groundglass infiltrates, no leukocytosis, no toxemic aspect, elevated CRP, no chest pain, elevated NT proBNP.     1.  Acute hypoxemic respiratory failure. With presence of bilateral ground glass infiltrates, most likely etiology should be a viral infection.  With current epidemic season influenza A-B, RSV or other respiratory virus are suspected the main culprits.  He tested negative for the rapid test.  In the light of his many comorbidities I sent for a resp PCR test with negative results for influenza and RSV.  According to the patient he has not had flu shot this year, he is not clear about his immunization status for pneumococcus.  Main intervention is symptomatic treatment, oxygen supplementation to keep oxygen saturation 92% of higher.  2.  Possible community-acquired pneumonia with bilateral infiltrate.  Not totally convincing of bacterial infection, however continue treatment with Rocephin/Zithromax per protocol as a started in the emergency department.  Gram staining of the sputum, sputum culture negative, pneumococcal antigen in urine negative and blood cultures are in process.  CRP 22.3. Procalcitonin is nondetectable.    3.  Elevated NT proBNP.  No complaints and symptomatology of left sided heart failure.  I believe that abnormal level of BNP is secondary to RV strain in the setting of bilateral infiltrates of the lungs and probably underlying pulmonary hypertension.  Echocardiogram of September of this year reported normal LVEF, no evidence of diastolic dysfunction and no valvular  abnormalities.  Echocardiogram for follow-up is pending report.  4.  Type 2 diabetes mellitus on insulin.  Very resistant to insulin apparently given the high doses needed.  On the other hand his A1c on admission is 7.7 which is remarkable.  He is following a ketogenic diet.  At home only he is using the long-acting insulin and metformin, he seldom use rapid acting insulin before meals.  Blood sugar values have been normal and relatively low since admission.  Metformin on hold due to current respiratory issues, hypoxemia and high risk for lactic acidosis.  Order moderate CHO.  He has 70 units of Lantus a.m. and 65 units of Lantus p.m.  At this point I am going to continue only with the p.m. dose and adjust based on the glycemic curve.  Patient has lost 35 pounds since August just dieting and his insulin dose has been decreasing significantly.  High intensity sliding scale for correction.     5.  Essential hypertension.  On metoprolol.  6.  Coronary artery disease status post CABG.  Continue metoprolol, aspirin and atorvastatin.  7.  Hyperlipidemia.  Atorvastatin.  8.  History of atrial flutter status post ablation.  Normal sinus rhythm on admission.  9.  Obesity with BMI 33.  10.  Bloody diarrhea, intermittent episodes.  History of ulcerative colitis.  He was having bloody diarrhea before this respiratory infection started.  It seems to be low geode for C. difficile infection, close follow-up and testing as needed.     Orders.  -Inpatient care  -Cardiac telemetry.  -Oxygen supplementation to keep saturation 92 or higher.     DVT Prophylaxis: Pneumatic Compression Devices  Code Status: Full Code    Disposition: Expected discharge in 2 days once not hypoxemic, breathing at baseline.    Interval History   Patient is having episodes of hypoxemia resting at bed and worsening when he goes to the bathroom.  Having oxygen supplementation through facial mask.  No fever documented after the admission.  He is having bloody  diarrhea intermittently, he has a history of ulcerative colitis and this is a recurrent problem.  Not on any medication for ulcerative colitis.    -Data reviewed today: I reviewed all new labs and imaging results over the last 24 hours. I personally reviewed the EKG tracing showing Normal sinus rhythm in the monitor..    Physical Exam   Temp: 98.6  F (37  C) Temp src: Oral BP: 129/62 Pulse: 75 Heart Rate: 71 Resp: 18 SpO2: 95 % O2 Device: Oxymask Oxygen Delivery: 5 LPM  Vitals:    01/02/20 1019 01/02/20 1423 01/03/20 0605   Weight: 98 kg (216 lb) 100.7 kg (221 lb 14.4 oz) 98.4 kg (216 lb 14.4 oz)     Vital Signs with Ranges  Temp:  [96.3  F (35.7  C)-98.8  F (37.1  C)] 98.6  F (37  C)  Pulse:  [66-79] 75  Heart Rate:  [71-89] 71  Resp:  [18-32] 18  BP: (126-173)/(53-96) 129/62  SpO2:  [88 %-100 %] 95 %  I/O last 3 completed shifts:  In: 150 [P.O.:150]  Out: 2000 [Urine:2000]    GEN: Obese.  Alert, oriented x 3, appears comfortable, NAD.  HEENT:  Normocephalic/atraumatic, no scleral icterus, no nasal discharge, mouth moist.  CV:  Regular rate and rhythm, no murmur or JVD.  S1 + S2 noted, no S3 or S4.  LUNGS:   Sounds diminished at to auscultation bilaterally without rales/rhonchi/wheezing/retractions.  Symmetric chest rise on inhalation noted.  ABD:  Active bowel sounds, soft, non-tender/non-distended.  No rebound/guarding/rigidity.  EXT:  No edema or cyanosis.  No joint synovitis noted.  SKIN:  Dry to touch, no exanthems noted in the visualized areas.       Medications       aspirin  325 mg Oral Daily     atorvastatin  40 mg Oral Daily     azithromycin  250 mg Intravenous Q24H     budesonide  0.5 mg Nebulization BID     cefTRIAXone  2 g Intravenous Q24H     insulin aspart  1-10 Units Subcutaneous TID AC     insulin aspart  1-7 Units Subcutaneous At Bedtime     insulin aspart   Subcutaneous TID w/meals     insulin glargine  65 Units Subcutaneous At Bedtime     insulin glargine  70 Units Subcutaneous QAM      ipratropium - albuterol 0.5 mg/2.5 mg/3 mL  3 mL Nebulization 4x daily     metoprolol succinate ER  100 mg Oral BID     senna-docusate  1 tablet Oral BID    Or     senna-docusate  2 tablet Oral BID     sodium chloride (PF)  3 mL Intracatheter Q8H       Data   Recent Labs   Lab 01/03/20  0625 01/02/20  1028   WBC 7.1 7.3   HGB 9.1* 9.7*   MCV 77* 78    371   INR  --  1.04    140   POTASSIUM 3.6 4.0   CHLORIDE 109 110*   CO2 23 19*   BUN 13 22   CR 0.84 0.86   ANIONGAP 6 11   AURE 8.8 9.0   GLC 74 106*   ALBUMIN 2.7* 3.1*   PROTTOTAL 6.9 7.7   BILITOTAL 0.8 0.6   ALKPHOS 125 150   ALT 24 32   AST 29 42   TROPI  --  <0.015       Recent Results (from the past 24 hour(s))   XR Chest 2 Views    Narrative    CHEST TWO VIEWS   1/2/2020 10:55 AM     HISTORY: Cough.    COMPARISON: 10/30/2017      Impression    IMPRESSION: Enlarged, stable cardiomediastinal silhouette. Median  sternotomy wires present. Diffuse patchy airspace and interstitial  opacities are seen bilaterally concerning for acute pulmonary edema  versus multifocal pneumonia. Correlate with clinical symptoms.  Multilevel degenerative changes seen in the spine.    YADIRA TINSLEY MD   CT Chest Pulmonary Embolism w Contrast    Narrative    CT CHEST PULMONARY EMBOLISM WITH CONTRAST  1/2/2020 12:23 PM    HISTORY:  Pulmonary embolus suspected, intermediate probability,  positive D-dimer.    TECHNIQUE: Scans obtained from the apices through the diaphragm with  IV contrast. 71 mL Isovue-370 IV injected. Radiation dose for this  scan was reduced using automated exposure control, adjustment of the  mA and/or kV according to patient size, or iterative reconstruction  technique.    COMPARISON:  4/9/2015    FINDINGS:   Vascular: No acute thoracic aortic abnormality. No evidence for  pulmonary embolism. Scattered atherosclerotic calcifications seen in  the thoracic aorta.    Lungs and pleura: Centrilobular and paraseptal emphysematous changes  seen bilaterally.  Diffuse airspace and groundglass opacities are seen  bilaterally. Bilateral bronchiectasis and bronchial wall thickening  also seen, most prominent within the lower lobes.    Lymph nodes: Multiple enlarged mediastinal and hilar lymph nodes are  present. For example, one of the largest lymph nodes measures 1.8 cm  in the right hilum (series 6, image 78). Another example includes a  1.3 cm left paratracheal lymph node (series 6, image 60).    Additional findings: The visualized portions of the thyroid gland are  unremarkable heart size is enlarged. Multivessel coronary artery  calcifications are seen. Thickening of the right adrenal gland appears  unchanged since prior exam. No suspicious osseous lesion seen. Median  sternotomy wires are present. Multilevel degenerative changes are  present in the spine.      Impression    IMPRESSION:   1.  No pulmonary embolism seen to the subsegmental level.  2.  Diffuse airspace and groundglass opacities seen in the lungs  bilaterally concerning for acute pulmonary edema. Multifocal pneumonia  could also have a similar appearance. Correlate with clinical  symptoms.  3.  Emphysematous changes in the lungs.  4.  Mediastinal and hilar lymphadenopathy, suspected to be reactive.  5.  Cardiomegaly with multivessel coronary artery calcifications.    YADIRA TINSLEY MD       Disclaimer: This note consists of symbols derived from keyboarding, dictation and/or voice recognition software. As a result, there may be errors in the script that have gone undetected. Please consider this when interpreting information found in this chart.

## 2020-01-04 ENCOUNTER — APPOINTMENT (OUTPATIENT)
Dept: PHYSICAL THERAPY | Facility: CLINIC | Age: 70
End: 2020-01-04
Attending: HOSPITALIST
Payer: COMMERCIAL

## 2020-01-04 LAB
ANION GAP SERPL CALCULATED.3IONS-SCNC: 7 MMOL/L (ref 3–14)
BACTERIA SPEC CULT: ABNORMAL
BACTERIA SPEC CULT: ABNORMAL
BASOPHILS # BLD AUTO: 0 10E9/L (ref 0–0.2)
BASOPHILS NFR BLD AUTO: 0.4 %
BUN SERPL-MCNC: 13 MG/DL (ref 7–30)
CALCIUM SERPL-MCNC: 9.3 MG/DL (ref 8.5–10.1)
CHLORIDE SERPL-SCNC: 108 MMOL/L (ref 94–109)
CO2 SERPL-SCNC: 22 MMOL/L (ref 20–32)
CREAT SERPL-MCNC: 0.75 MG/DL (ref 0.66–1.25)
CRP SERPL-MCNC: 91.3 MG/L (ref 0–8)
DIFFERENTIAL METHOD BLD: ABNORMAL
EOSINOPHIL # BLD AUTO: 0.6 10E9/L (ref 0–0.7)
EOSINOPHIL NFR BLD AUTO: 7.2 %
ERYTHROCYTE [DISTWIDTH] IN BLOOD BY AUTOMATED COUNT: 17 % (ref 10–15)
GFR SERPL CREATININE-BSD FRML MDRD: >90 ML/MIN/{1.73_M2}
GLUCOSE BLDC GLUCOMTR-MCNC: 108 MG/DL (ref 70–99)
GLUCOSE BLDC GLUCOMTR-MCNC: 110 MG/DL (ref 70–99)
GLUCOSE BLDC GLUCOMTR-MCNC: 113 MG/DL (ref 70–99)
GLUCOSE BLDC GLUCOMTR-MCNC: 130 MG/DL (ref 70–99)
GLUCOSE BLDC GLUCOMTR-MCNC: 132 MG/DL (ref 70–99)
GLUCOSE SERPL-MCNC: 101 MG/DL (ref 70–99)
GRAM STN SPEC: ABNORMAL
HCT VFR BLD AUTO: 32.5 % (ref 40–53)
HGB BLD-MCNC: 9.5 G/DL (ref 13.3–17.7)
IMM GRANULOCYTES # BLD: 0.1 10E9/L (ref 0–0.4)
IMM GRANULOCYTES NFR BLD: 1.1 %
LYMPHOCYTES # BLD AUTO: 0.9 10E9/L (ref 0.8–5.3)
LYMPHOCYTES NFR BLD AUTO: 11.6 %
Lab: ABNORMAL
MCH RBC QN AUTO: 22.4 PG (ref 26.5–33)
MCHC RBC AUTO-ENTMCNC: 29.2 G/DL (ref 31.5–36.5)
MCV RBC AUTO: 77 FL (ref 78–100)
MONOCYTES # BLD AUTO: 0.6 10E9/L (ref 0–1.3)
MONOCYTES NFR BLD AUTO: 7.1 %
NEUTROPHILS # BLD AUTO: 5.8 10E9/L (ref 1.6–8.3)
NEUTROPHILS NFR BLD AUTO: 72.6 %
NRBC # BLD AUTO: 0 10*3/UL
NRBC BLD AUTO-RTO: 0 /100
PLATELET # BLD AUTO: 365 10E9/L (ref 150–450)
POTASSIUM SERPL-SCNC: 3.9 MMOL/L (ref 3.4–5.3)
RBC # BLD AUTO: 4.24 10E12/L (ref 4.4–5.9)
SODIUM SERPL-SCNC: 137 MMOL/L (ref 133–144)
SPECIMEN SOURCE: ABNORMAL
SPECIMEN SOURCE: ABNORMAL
WBC # BLD AUTO: 7.9 10E9/L (ref 4–11)

## 2020-01-04 PROCEDURE — 97161 PT EVAL LOW COMPLEX 20 MIN: CPT | Mod: GP | Performed by: PHYSICAL THERAPIST

## 2020-01-04 PROCEDURE — 12000000 ZZH R&B MED SURG/OB

## 2020-01-04 PROCEDURE — 36415 COLL VENOUS BLD VENIPUNCTURE: CPT | Performed by: HOSPITALIST

## 2020-01-04 PROCEDURE — 25000125 ZZHC RX 250: Performed by: HOSPITALIST

## 2020-01-04 PROCEDURE — 25000132 ZZH RX MED GY IP 250 OP 250 PS 637: Performed by: HOSPITALIST

## 2020-01-04 PROCEDURE — 40000274 ZZH STATISTIC RCP CONSULT EA 30 MIN

## 2020-01-04 PROCEDURE — 80048 BASIC METABOLIC PNL TOTAL CA: CPT | Performed by: HOSPITALIST

## 2020-01-04 PROCEDURE — 85025 COMPLETE CBC W/AUTO DIFF WBC: CPT | Performed by: HOSPITALIST

## 2020-01-04 PROCEDURE — 25000128 H RX IP 250 OP 636: Performed by: HOSPITALIST

## 2020-01-04 PROCEDURE — 97112 NEUROMUSCULAR REEDUCATION: CPT | Mod: GP | Performed by: PHYSICAL THERAPIST

## 2020-01-04 PROCEDURE — 86140 C-REACTIVE PROTEIN: CPT | Performed by: HOSPITALIST

## 2020-01-04 PROCEDURE — 94640 AIRWAY INHALATION TREATMENT: CPT

## 2020-01-04 PROCEDURE — 25000131 ZZH RX MED GY IP 250 OP 636 PS 637: Performed by: HOSPITALIST

## 2020-01-04 PROCEDURE — 00000146 ZZHCL STATISTIC GLUCOSE BY METER IP

## 2020-01-04 PROCEDURE — 99233 SBSQ HOSP IP/OBS HIGH 50: CPT | Performed by: HOSPITALIST

## 2020-01-04 PROCEDURE — 40000275 ZZH STATISTIC RCP TIME EA 10 MIN

## 2020-01-04 PROCEDURE — 94640 AIRWAY INHALATION TREATMENT: CPT | Mod: 76

## 2020-01-04 RX ORDER — IPRATROPIUM BROMIDE AND ALBUTEROL SULFATE 2.5; .5 MG/3ML; MG/3ML
3 SOLUTION RESPIRATORY (INHALATION) EVERY 4 HOURS PRN
Status: DISCONTINUED | OUTPATIENT
Start: 2020-01-04 | End: 2020-01-07 | Stop reason: HOSPADM

## 2020-01-04 RX ORDER — BUDESONIDE 0.5 MG/2ML
0.5 INHALANT ORAL 2 TIMES DAILY PRN
Status: DISCONTINUED | OUTPATIENT
Start: 2020-01-04 | End: 2020-01-07 | Stop reason: HOSPADM

## 2020-01-04 RX ADMIN — BUDESONIDE 0.5 MG: 0.5 INHALANT RESPIRATORY (INHALATION) at 08:06

## 2020-01-04 RX ADMIN — IPRATROPIUM BROMIDE AND ALBUTEROL SULFATE 3 ML: .5; 3 SOLUTION RESPIRATORY (INHALATION) at 08:06

## 2020-01-04 RX ADMIN — ASPIRIN 325 MG: 325 TABLET, DELAYED RELEASE ORAL at 09:19

## 2020-01-04 RX ADMIN — IPRATROPIUM BROMIDE AND ALBUTEROL SULFATE 3 ML: .5; 3 SOLUTION RESPIRATORY (INHALATION) at 11:45

## 2020-01-04 RX ADMIN — METOPROLOL SUCCINATE 100 MG: 100 TABLET, EXTENDED RELEASE ORAL at 21:32

## 2020-01-04 RX ADMIN — ATORVASTATIN CALCIUM 40 MG: 40 TABLET, FILM COATED ORAL at 09:19

## 2020-01-04 RX ADMIN — INSULIN GLARGINE 65 UNITS: 100 INJECTION, SOLUTION SUBCUTANEOUS at 21:32

## 2020-01-04 RX ADMIN — CEFTRIAXONE 2 G: 2 INJECTION, POWDER, FOR SOLUTION INTRAMUSCULAR; INTRAVENOUS at 13:59

## 2020-01-04 RX ADMIN — METOPROLOL SUCCINATE 100 MG: 100 TABLET, EXTENDED RELEASE ORAL at 09:19

## 2020-01-04 RX ADMIN — AZITHROMYCIN MONOHYDRATE 250 MG: 250 TABLET ORAL at 09:19

## 2020-01-04 ASSESSMENT — ACTIVITIES OF DAILY LIVING (ADL)
ADLS_ACUITY_SCORE: 12

## 2020-01-04 ASSESSMENT — MIFFLIN-ST. JEOR: SCORE: 1743.79

## 2020-01-04 NOTE — PLAN OF CARE
"Patient hospitalized for pneumonia on 1/1. IV rocephin and po Zithromax.    Pertinent assessments: Alert and oriented. Lung sounds diminished, c/o TERAN, infrequent nonproductive cough. Bowel sounds active and audible, LBM 1/3, passing gas, patient states there is blood in his stool. Mod carb diet. Up independently. 3L O2 with oxymask BP (!) 145/64   Pulse 75   Temp 95.7  F (35.4  C) (Oral)   Resp 18   Ht 1.753 m (5' 9\")   Wt 98.4 kg (216 lb 14.4 oz)   SpO2 92%   BMI 32.03 kg/m      Major Shift Events:   Discharge/Transfer Needs: TBD    "

## 2020-01-04 NOTE — PLAN OF CARE
A&O x4.  VSS; IV - SL.  Code Status: FULL.  Ambulatory Status: Pt up independently. Alarms OFF. Ambulated in room.  Pain: Denies.  Resp: LS diminished, on 3 LPM NC.  GI: Denies nausea. BS normoactive. Passing gas. LBM 1/4.  Diet: Regular.  : Voiding spontaneously.  Skin: Bruised.  Tele:  SR.  Tx: IV Rocephin & PO Zithromax.  Consults/Following: PT, OT, & GI.  Disposition: TBD.

## 2020-01-04 NOTE — PROGRESS NOTES
01/04/20 1435   Quick Adds   Type of Visit Initial PT Evaluation   Living Environment   Living Environment Comment Pt lives in [facility] with wife, with all needs met on main level.  Pt reports indp with all functional mobility. Pt reports working out of home.    Self-Care   Usual Activity Tolerance moderate   Current Activity Tolerance moderate   Regular Exercise No   Equipment Currently Used at Home none   Functional Level Prior   Ambulation 0-->independent   Transferring 0-->independent   Toileting 0-->independent   Bathing 0-->independent   Fall history within last six months no   General Information   Onset of Illness/Injury or Date of Surgery - Date 01/02/20   Referring Physician Bnei Contreras MD   Patient/Family Goals Statement To go home   Pertinent History of Current Problem (include personal factors and/or comorbidities that impact the POC) Pt is a 69 year old male who has a past medical history of Aortic root dilatation, Atrial flutter with ablation on 2017, CAD with CABG LIMA to LAD, SVG to circumflex and SVG to PDA (2015), Cardiomyopathy, HTN, Hyperlipidemia, Microalbuminuria, Obesity, and Type 2 diabetes mellitus with diabetic nephropathy, with long-term current use of insulin and high resistance. Pt presents with new onset of acute respiratory failure.    Cognitive Status Examination   Orientation orientation to person, place and time   Level of Consciousness alert   Follows Commands and Answers Questions able to follow multistep instructions   Personal Safety and Judgment intact   Memory intact   Pain Assessment   Patient Currently in Pain No   Range of Motion (ROM)   ROM Comment WFL   Strength   Strength Comments LE 5/5   Bed Mobility   Bed Mobility Comments ind   Transfer Skills   Transfer Comments ind   Gait   Gait Comments ind w/o AD   Balance   Balance Comments good seated and standing w/perturbations   General Therapy Interventions   Planned Therapy Interventions neuromuscular  "re-education;balance training;strengthening   Clinical Impression   Criteria for Skilled Therapeutic Intervention yes, treatment indicated   PT Diagnosis impaired functional mobility   Influenced by the following impairments activity tolerance   Functional limitations due to impairments impaired ambulation   Clinical Presentation Stable/Uncomplicated   Clinical Presentation Rationale Pt is medically stable   Clinical Decision Making (Complexity) Low complexity   Therapy Frequency Daily   Predicted Duration of Therapy Intervention (days/wks) 1 day   Anticipated Discharge Disposition Home   Risk & Benefits of therapy have been explained Yes   Patient, Family & other staff in agreement with plan of care Yes   [facility] \"6 Clicks\"   2016, Trustees of [facility], under license to [employer].  All rights reserved.   6 Clicks Short Forms Basic Mobility Inpatient Short Form   [facility]  \"6 Clicks\" V.2 Basic Mobility Inpatient Short Form   1. Turning from your back to your side while in a flat bed without using bedrails? 4 - None   2. Moving from lying on your back to sitting on the side of a flat bed without using bedrails? 4 - None   3. Moving to and from a bed to a chair (including a wheelchair)? 4 - None   4. Standing up from a chair using your arms (e.g., wheelchair, or bedside chair)? 4 - None   5. To walk in hospital room? 4 - None   6. Climbing 3-5 steps with a railing? 4 - None   Basic Mobility Raw Score (Score out of 24.Lower scores equate to lower levels of function) 24   Total Evaluation Time   Total Evaluation Time (Minutes) 7     "

## 2020-01-04 NOTE — PLAN OF CARE
PT: Order received and evaluation completed. Pt is a 69 year old male who has a past medical history of Aortic root dilatation, Atrial flutter with ablation on 2017, CAD with CABG LIMA to LAD, SVG to circumflex and SVG to PDA (2015), Cardiomyopathy, HTN, Hyperlipidemia, Microalbuminuria, Obesity, and Type 2 diabetes mellitus with diabetic nephropathy, with long-term current use of insulin and high resistance. Pt presents with new onset of acute respiratory failure. Pt lives in [facility] with wife, with all needs met on main level.  Pt reports indp with all functional mobility. Pt reports working out of home.     Discharge Planner PT   Patient plan for discharge: Home  Current status: Instructed patient to ambulate around room on 3L 02 with Ind and no AD, pt with no LOB and stable step through gait patterning. Pt declined stairs as he has no difficulty performing and no need to perform in house. PT anticipates no problem with this. Instructed patient in the following static standing balance exercises: NBOS, NBOS w/perturbations, NBOS eyes closed, tandem stance. Pt with no LOB using ankle strategy to stabilize.   Barriers to return to prior living situation: none once medically stable  Recommendations for discharge: Home  Rationale for recommendations: Pt demonstrated ind with mobility during evaluation and performed higher level balance activities well without any LOB.        Entered by: Stuart Chen 01/04/2020 3:12 PM    Physical Therapy Discharge Summary    Reason for therapy discharge:    All goals and outcomes met, no further needs identified.    Progress towards therapy goal(s). See goals on Care Plan in [facility] electronic health record for goal details.  Goals met    Therapy recommendation(s):    No further therapy is recommended.

## 2020-01-04 NOTE — PLAN OF CARE
Acute Resp. Failure  Vitals:    Temp: 99.4  F (37.4  C) Temp src: Oral BP: (!) 140/52   Heart Rate: 85 Resp: 28 SpO2: 95 % O2 Device: Oxymask Oxygen Delivery: 4 LPM   Neuro: A&Ox4  Lungs: Diminished with fine crackles, infrequent non productive cough  Cardiac: Tele: SR  GI: Per pt one BM with blood streaks in it.   : WDL  Skin: Scattered bruising   IV: PIV SL  Labs: On K and Mg protocol, K:3.6, M.6, Hgb:9.1/9.7, sputum pending   Diet: Carb count and mod carb diet   Pain: denied   Activity: ind in room up to bathroom x2  Plan: Zitromax and IV rocephin, wean O2 and monitor stools

## 2020-01-04 NOTE — PROGRESS NOTES
[facility]    Hospitalist Progress Note    Date of Service (when I saw the patient): 01/04/2020  Provider:  Beni Contreras MD   Text Page  7am - 6PM       Assessment & Plan   Adan Ruffin is a 69 year old male who presents with new onset of respiratory symptoms including cough, shortness of breath, sore throat, severe documented hypoxemia in the 70s, CT of the chest with bilateral groundglass infiltrates, no leukocytosis, no toxemic aspect, elevated CRP, no chest pain, elevated NT proBNP.     1.  Acute hypoxemic respiratory failure. With presence of bilateral ground glass infiltrates, most likely etiology should be a viral infection.  With current epidemic season influenza A-B, RSV or other respiratory virus are suspected the main culprits.  He tested negative for the rapid test.  In the light of his many comorbidities I sent for a resp PCR test with negative results for influenza and RSV.  According to the patient he has not had flu shot this year, he is not clear about his immunization status for pneumococcus.  Main intervention is symptomatic treatment, oxygen supplementation to keep oxygen saturation 92% of higher.  2.  Possible community-acquired pneumonia with bilateral infiltrate.  Not totally convincing of bacterial infection, however continue treatment with Rocephin/Zithromax per protocol as a started in the emergency department.  Gram staining of the sputum, sputum culture negative, pneumococcal antigen in urine negative and blood cultures are in process.  CRP 22.3. Procalcitonin is nondetectable.    3.  Elevated NT proBNP.  No complaints and symptomatology of left sided heart failure.  I believe that abnormal level of BNP is secondary to RV strain in the setting of bilateral infiltrates of the lungs and probably underlying pulmonary hypertension.  Echocardiogram of September of this year reported normal LVEF, no evidence of diastolic dysfunction and no valvular  abnormalities.  Echocardiogram follow-up 1/3.  Report.   Mild aortic root dilatation.  LVEF is estimated at 55-60%.  RV is mildly dilated.  RVr systolic function is normal.  Mild-moderate SABINO.    4.  Type 2 diabetes mellitus on insulin.  Very resistant to insulin apparently given the high doses needed.  On the other hand his A1c on admission is 7.7 which is remarkable.  He is following a ketogenic diet.  At home only he is using the long-acting insulin and metformin, he seldom use rapid acting insulin before meals.  Blood sugar values have been normal and relatively low since admission.  Metformin on hold due to current respiratory issues, hypoxemia and high risk for lactic acidosis.  Order moderate CHO.  He has 70 units of Lantus a.m. and 65 units of Lantus p.m.  At this point I am going to continue only with the p.m. dose and adjust based on the glycemic curve.  Patient has lost 35 pounds since August just dieting and his insulin dose has been decreasing significantly.  High intensity sliding scale for correction.     5.  Essential hypertension.  On metoprolol.  6.  Coronary artery disease status post CABG.  Continue metoprolol, aspirin and atorvastatin.  7.  Hyperlipidemia.  Atorvastatin.  8.  History of atrial flutter status post ablation.  Normal sinus rhythm on admission.  9.  Obesity with BMI 33.  10. History of ulcerative colitis.  Bloody diarrhea, intermittent episodes. He was having bloody diarrhea before this respiratory infection started.  GI consult requested.     11. Microcytic anemia of MANSI, suspect chronic losses. Add Fe supp on discharge.  12. Right shoulder pain. New onset, no trauma documented. Heat pad is helping.     Orders.  -Inpatient care  -Cardiac telemetry.  -Oxygen supplementation to keep saturation 92 or higher.     DVT Prophylaxis: Pneumatic Compression Devices  Code Status: Full Code    Disposition: Expected discharge in 2 days once not hypoxemic, breathing at baseline.    Interval  History   Patient is still having episodes of hypoxemia resting at bed and worsening when he goes to the bathroom.  Having oxygen supplementation through facial mask continuously.  No fever documented after the admission.  He is having bloody diarrhea intermittently, he has a history of ulcerative colitis and this is a recurrent problem.  Not on any medication for ulcerative colitis.  I am requesting GI consult.  Since last night for unclear reason he has been pain in his right shoulder with limited ROM, he is having some relief using heat pad.    -Data reviewed today: I reviewed all new labs and imaging results over the last 24 hours. I personally reviewed the EKG tracing showing Normal sinus rhythm in the monitor..    Physical Exam   Temp: 98.1  F (36.7  C) Temp src: Axillary BP: (Abnormal) 162/73 Pulse: 75 Heart Rate: 80 Resp: 24 SpO2: 94 % O2 Device: Oxymask Oxygen Delivery: 4 LPM  Vitals:    01/02/20 1423 01/03/20 0605 01/04/20 0557   Weight: 100.7 kg (221 lb 14.4 oz) 98.4 kg (216 lb 14.4 oz) 97.8 kg (215 lb 11.2 oz)     Vital Signs with Ranges  Temp:  [95.7  F (35.4  C)-99.4  F (37.4  C)] 98.1  F (36.7  C)  Pulse:  [75] 75  Heart Rate:  [74-88] 80  Resp:  [18-28] 24  BP: (130-162)/(51-73) 162/73  SpO2:  [92 %-97 %] 94 %  I/O last 3 completed shifts:  In: 990 [P.O.:990]  Out: 1975 [Urine:1975]    GEN: Obese.  Alert, oriented x 3, appears comfortable, NAD.  HEENT:  Normocephalic/atraumatic, no scleral icterus, no nasal discharge, mouth moist.  CV:  Regular rate and rhythm, no murmur or JVD.  S1 + S2 noted, no S3 or S4.  LUNGS:   Sounds diminished at to auscultation bilaterally without rales/rhonchi/wheezing/retractions.  Symmetric chest rise on inhalation noted.  ABD:  Active bowel sounds, soft, non-tender/non-distended.  No rebound/guarding/rigidity.  EXT:  No edema or cyanosis.  No joint synovitis noted.  SKIN:  Dry to touch, no exanthems noted in the visualized areas.       Medications       aspirin  325 mg  Oral Daily     atorvastatin  40 mg Oral Daily     azithromycin  250 mg Oral Daily     budesonide  0.5 mg Nebulization BID     cefTRIAXone  2 g Intravenous Q24H     insulin aspart  1-10 Units Subcutaneous TID AC     insulin aspart  1-7 Units Subcutaneous At Bedtime     insulin aspart   Subcutaneous TID w/meals     insulin glargine  65 Units Subcutaneous At Bedtime     ipratropium - albuterol 0.5 mg/2.5 mg/3 mL  3 mL Nebulization 4x daily     metoprolol succinate ER  100 mg Oral BID     senna-docusate  1 tablet Oral BID    Or     senna-docusate  2 tablet Oral BID     sodium chloride (PF)  3 mL Intracatheter Q8H       Data   Recent Labs   Lab 20  0815 20  0625 20  1028   WBC 7.9 7.1 7.3   HGB 9.5* 9.1* 9.7*   MCV 77* 77* 78    333 371   INR  --   --  1.04    138 140   POTASSIUM 3.9 3.6 4.0   CHLORIDE 108 109 110*   CO2 22 23 19*   BUN 13 13 22   CR 0.75 0.84 0.86   ANIONGAP 7 6 11   AURE 9.3 8.8 9.0   * 74 106*   ALBUMIN  --  2.7* 3.1*   PROTTOTAL  --  6.9 7.7   BILITOTAL  --  0.8 0.6   ALKPHOS  --  125 150   ALT  --  24 32   AST  --  29 42   TROPI  --   --  <0.015       Recent Results (from the past 24 hour(s))   Echocardiogram Complete    Narrative    [id number]  [id number]  [id number]  [id number]^TEAGAN^RIGO^F           [facility]  Echocardiography Laboratory  [address] [place] [address]        Name: EDUARDO PARKINSON  MRN: [id number]  : 1950  Study Date: 2020 01:38 PM  Age: 69 yrs  Gender: Male  Patient Location: [facility]  Reason For Study: Heart Failure, Unspecified  Ordering Physician: RIGO CANDELARIA  Referring Physician: LEWIS BYRNE  Performed By: Taty Hudson     BSA: 2.2 m2  Height: 69 in  Weight: 221 lb  HR: 79  BP: 135/53 mmHg  _____________________________________________________________________________  __        Procedure  Complete Portable Echo Adult. Optison (NDC #[id number]) given intravenously.  Technically difficult  study.  _____________________________________________________________________________  __        Interpretation Summary     Mild aortic root dilatation.  The visual ejection fraction is estimated at 55-60%.  The right ventricle is mildly dilated.  The right ventricular systolic function is normal.  There is mild-moderate biatrial enlargement.  Compared with prior, no major changes.  The study was technically difficult. Contrast was used without apparent  complications.  _____________________________________________________________________________  __        Left Ventricle  The left ventricle is normal in structure, function and size. There is  moderate concentric left ventricular hypertrophy. Left ventricular diastolic  function is indeterminate. The visual ejection fraction is estimated at 55-  60%. Septal motion is consistent with conduction abnormality.     Right Ventricle  The right ventricle is mildly dilated. The right ventricular systolic function  is normal.     Atria  There is mild-moderate biatrial enlargement.     Mitral Valve  There is mild mitral annular calcification. The mitral valve leaflets are  mildly thickened.        Tricuspid Valve  Normal tricuspid valve.     Aortic Valve  The aortic valve is normal in structure and function.     Pulmonic Valve  The pulmonic valve is not well seen, but is grossly normal.     Vessels  Mild aortic root dilatation. The ascending aorta is Borderline dilated.  Dilation of the inferior vena cava is present with abnormal respiratory  variation in diameter.     Pericardium  There is no pericardial effusion.        Rhythm  Sinus rhythm was noted.  _____________________________________________________________________________  __  MMode/2D Measurements & Calculations  IVSd: 1.5 cm     LVIDd: 4.8 cm  LVIDs: 3.1 cm  LVPWd: 1.5 cm  FS: 36.4 %  LV mass(C)d: 312.1 grams  LV mass(C)dI: 144.8 grams/m2  Ao root diam: 4.0 cm  LA dimension: 4.4 cm  asc Aorta Diam: 3.7 cm  LA/Ao:  1.1  LVOT diam: 2.5 cm  LVOT area: 4.9 cm2  LA Volume (BP): 73.1 ml  LA Volume Index (BP): 33.8 ml/m2  RWT: 0.63           Doppler Measurements & Calculations  MV E max blaine: 112.0 cm/sec  MV A max blaine: 61.6 cm/sec  MV E/A: 1.8  MV dec time: 0.22 sec  Ao V2 max: 141.2 cm/sec  Ao max P.0 mmHg  Ao V2 mean: 107.3 cm/sec  Ao mean P.0 mmHg  Ao V2 VTI: 28.7 cm  TANJA(I,D): 3.7 cm2  TANJA(V,D): 3.5 cm2  LV V1 max P.1 mmHg  LV V1 max: 100.9 cm/sec  LV V1 VTI: 21.3 cm  CO(LVOT): 8.2 l/min  CI(LVOT): 3.8 l/min/m2  SV(LVOT): 105.3 ml  SI(LVOT): 48.9 ml/m2  PA acc time: 0.12 sec  AV Blaine Ratio (DI): 0.71  TANJA Index (cm2/m2): 1.7  E/E' av.2  Lateral E/e': 9.4  Medial E/e': 14.9              _____________________________________________________________________________  __        Report approved by: Asher Meneses 2020 03:11 PM          Disclaimer: This note consists of symbols derived from keyboarding, dictation and/or voice recognition software. As a result, there may be errors in the script that have gone undetected. Please consider this when interpreting information found in this chart.

## 2020-01-04 NOTE — PROGRESS NOTES
"[facility] RCAT     Date: 1/4/20  Admission Dx: acute resp failure  Pulmonary History: none  Home Nebulizer/MDI Use: none  Home Oxygen: none  Acuity Level (RCAT flow sheet): 4  Aerosol Therapy initiated: Duoneb Q6 prn, Pulmicort BID prn      Pulmonary Hygiene initiated: NA      Volume Expansion initiated: IS      Current Oxygen Requirements: 3lpm oxymask  Current SpO2: 94%    Re-evaluation date: 1/7/19    Patient Education: Patient aware of medication benefits and interactions.      See \"RT Assessments\" flow sheet for patient assessment scoring and Acuity Level Details.             "

## 2020-01-04 NOTE — PLAN OF CARE
Discharge Planner OT   Patient plan for discharge: home  Current status: pt admitted with acute respiratory failure. Pt able to work with PT this afternoon and demonstrated appropriate strength and safety for return home, SBA-independent with transfers, mobility and self cares  Barriers to return to prior living situation: defer to PT  Recommendations for discharge: defer to PT  Rationale for recommendations: no IP OT needs identified, order will be completed.       Entered by: Kamille Nolasco 01/04/2020 3:26 PM

## 2020-01-04 NOTE — PLAN OF CARE
Pt admitted with PNA.  VSS.  Lungs diminished, crackles in the bases.  Starting to cough up a little sputum.  SOB with activity. Oxygen at 3 L, sats mid 90's and de sats with activity.  Independent with ambulation. Tele: SR.  Denies pain.

## 2020-01-04 NOTE — PROGRESS NOTES
[facility]    Consult received for colitis.    Patient seen once by our group > 3 years ago for colonoscopy (6/20/16), therefore is unassigned.    Please contact Dr. Faust who is on this weekend for unassigned GI consults.     Manan Wilhelm DO   [facility]  Cell [phone]

## 2020-01-05 LAB
CRP SERPL-MCNC: 71.3 MG/L (ref 0–8)
GLUCOSE BLDC GLUCOMTR-MCNC: 114 MG/DL (ref 70–99)
GLUCOSE BLDC GLUCOMTR-MCNC: 129 MG/DL (ref 70–99)
GLUCOSE BLDC GLUCOMTR-MCNC: 206 MG/DL (ref 70–99)
GLUCOSE BLDC GLUCOMTR-MCNC: 231 MG/DL (ref 70–99)
GLUCOSE BLDC GLUCOMTR-MCNC: 89 MG/DL (ref 70–99)

## 2020-01-05 PROCEDURE — 25000132 ZZH RX MED GY IP 250 OP 250 PS 637: Performed by: HOSPITALIST

## 2020-01-05 PROCEDURE — 36415 COLL VENOUS BLD VENIPUNCTURE: CPT | Performed by: HOSPITALIST

## 2020-01-05 PROCEDURE — 25000128 H RX IP 250 OP 636: Performed by: HOSPITALIST

## 2020-01-05 PROCEDURE — 00000146 ZZHCL STATISTIC GLUCOSE BY METER IP

## 2020-01-05 PROCEDURE — 12000000 ZZH R&B MED SURG/OB

## 2020-01-05 PROCEDURE — 40000556 ZZH STATISTIC PERIPHERAL IV START W US GUIDANCE

## 2020-01-05 PROCEDURE — 25000132 ZZH RX MED GY IP 250 OP 250 PS 637: Performed by: INTERNAL MEDICINE

## 2020-01-05 PROCEDURE — 25000131 ZZH RX MED GY IP 250 OP 636 PS 637: Performed by: HOSPITALIST

## 2020-01-05 PROCEDURE — 86140 C-REACTIVE PROTEIN: CPT | Performed by: HOSPITALIST

## 2020-01-05 PROCEDURE — 99233 SBSQ HOSP IP/OBS HIGH 50: CPT | Performed by: HOSPITALIST

## 2020-01-05 RX ORDER — MAGNESIUM CARB/ALUMINUM HYDROX 105-160MG
296 TABLET,CHEWABLE ORAL ONCE
Status: DISCONTINUED | OUTPATIENT
Start: 2020-01-06 | End: 2020-01-05 | Stop reason: CLARIF

## 2020-01-05 RX ORDER — POLYETHYLENE GLYCOL 3350 17 G/17G
238 POWDER, FOR SOLUTION ORAL ONCE
Status: COMPLETED | OUTPATIENT
Start: 2020-01-05 | End: 2020-01-05

## 2020-01-05 RX ORDER — ATORVASTATIN CALCIUM 40 MG/1
40 TABLET, FILM COATED ORAL EVERY EVENING
Status: DISCONTINUED | OUTPATIENT
Start: 2020-01-05 | End: 2020-01-07 | Stop reason: HOSPADM

## 2020-01-05 RX ORDER — LIDOCAINE 4 G/G
1-2 PATCH TOPICAL
Status: DISCONTINUED | OUTPATIENT
Start: 2020-01-05 | End: 2020-01-07 | Stop reason: HOSPADM

## 2020-01-05 RX ORDER — MAGNESIUM CARB/ALUMINUM HYDROX 105-160MG
296 TABLET,CHEWABLE ORAL ONCE
Status: COMPLETED | OUTPATIENT
Start: 2020-01-06 | End: 2020-01-06

## 2020-01-05 RX ADMIN — ASPIRIN 325 MG: 325 TABLET, DELAYED RELEASE ORAL at 10:07

## 2020-01-05 RX ADMIN — ACETAMINOPHEN 650 MG: 325 TABLET, FILM COATED ORAL at 20:39

## 2020-01-05 RX ADMIN — METOPROLOL SUCCINATE 100 MG: 100 TABLET, EXTENDED RELEASE ORAL at 20:39

## 2020-01-05 RX ADMIN — POLYETHYLENE GLYCOL 3350 238 G: 17 POWDER, FOR SOLUTION ORAL at 15:58

## 2020-01-05 RX ADMIN — INSULIN ASPART 4 UNITS: 100 INJECTION, SOLUTION INTRAVENOUS; SUBCUTANEOUS at 17:35

## 2020-01-05 RX ADMIN — INSULIN GLARGINE 65 UNITS: 100 INJECTION, SOLUTION SUBCUTANEOUS at 21:20

## 2020-01-05 RX ADMIN — CEFTRIAXONE 2 G: 2 INJECTION, POWDER, FOR SOLUTION INTRAMUSCULAR; INTRAVENOUS at 15:53

## 2020-01-05 RX ADMIN — AZITHROMYCIN MONOHYDRATE 250 MG: 250 TABLET ORAL at 10:07

## 2020-01-05 RX ADMIN — METOPROLOL SUCCINATE 100 MG: 100 TABLET, EXTENDED RELEASE ORAL at 10:07

## 2020-01-05 RX ADMIN — ATORVASTATIN CALCIUM 40 MG: 40 TABLET, FILM COATED ORAL at 20:39

## 2020-01-05 ASSESSMENT — ACTIVITIES OF DAILY LIVING (ADL)
ADLS_ACUITY_SCORE: 12

## 2020-01-05 ASSESSMENT — MIFFLIN-ST. JEOR: SCORE: 1760.12

## 2020-01-05 NOTE — PLAN OF CARE
Pt states slept well. Denies coughing, lungs diminished, sats 94% on 3lpm via mask. Up independent in  room. No stools this shift.

## 2020-01-05 NOTE — PLAN OF CARE
Patient up independently. Wearing 3L via oxymask. LS diminished. Denies pain. Denies nausea. Having loose bloody stools, per patient has been happening over the  last month. Receiving Rocephin and Zithromax. Discharge TBD.

## 2020-01-05 NOTE — PLAN OF CARE
A&O x4.  VSS; IV infiltrated, page sent to Flyer RN to see if they can place one.  Code Status: FULL.  Ambulatory Status: Pt up independently. Alarms OFF. Ambulated in room.  Pain: Denies.  Resp: LS diminished, on 3LPM oxymask.  GI: Denies nausea. BS normoactive. Passing gas. LBM 1/4.  Diet: Clear liquid until midnight, then NPO.  : Voiding spontaneously.  Skin: Bruised.  Tele:  SR.  Tx: IV Rocephin, PO Zithromax, & scheduled nebs. Bowel prep will start this evening for colonoscopy tomorrow morning.  Consults/Following: PT, OT, & GI.  Disposition: 1-2 days.

## 2020-01-05 NOTE — PROGRESS NOTES
He has a history of proctitis and has been bleeding for over a month. I'll set him up for a colonoscopy tomorrow morning. Thank you.

## 2020-01-06 LAB
ANION GAP SERPL CALCULATED.3IONS-SCNC: 7 MMOL/L (ref 3–14)
BASOPHILS # BLD AUTO: 0 10E9/L (ref 0–0.2)
BASOPHILS NFR BLD AUTO: 0.6 %
BUN SERPL-MCNC: 16 MG/DL (ref 7–30)
CALCIUM SERPL-MCNC: 9.5 MG/DL (ref 8.5–10.1)
CHLORIDE SERPL-SCNC: 108 MMOL/L (ref 94–109)
CO2 SERPL-SCNC: 22 MMOL/L (ref 20–32)
COLONOSCOPY: NORMAL
CREAT SERPL-MCNC: 0.7 MG/DL (ref 0.66–1.25)
CRP SERPL-MCNC: 52.8 MG/L (ref 0–8)
DIFFERENTIAL METHOD BLD: ABNORMAL
EOSINOPHIL # BLD AUTO: 0.6 10E9/L (ref 0–0.7)
EOSINOPHIL NFR BLD AUTO: 8.8 %
ERYTHROCYTE [DISTWIDTH] IN BLOOD BY AUTOMATED COUNT: 16.9 % (ref 10–15)
GFR SERPL CREATININE-BSD FRML MDRD: >90 ML/MIN/{1.73_M2}
GLUCOSE BLDC GLUCOMTR-MCNC: 121 MG/DL (ref 70–99)
GLUCOSE BLDC GLUCOMTR-MCNC: 149 MG/DL (ref 70–99)
GLUCOSE BLDC GLUCOMTR-MCNC: 149 MG/DL (ref 70–99)
GLUCOSE BLDC GLUCOMTR-MCNC: 94 MG/DL (ref 70–99)
GLUCOSE BLDC GLUCOMTR-MCNC: 97 MG/DL (ref 70–99)
GLUCOSE BLDC GLUCOMTR-MCNC: 99 MG/DL (ref 70–99)
GLUCOSE SERPL-MCNC: 91 MG/DL (ref 70–99)
HCT VFR BLD AUTO: 32.4 % (ref 40–53)
HGB BLD-MCNC: 9.5 G/DL (ref 13.3–17.7)
IMM GRANULOCYTES # BLD: 0.1 10E9/L (ref 0–0.4)
IMM GRANULOCYTES NFR BLD: 1.1 %
LYMPHOCYTES # BLD AUTO: 0.7 10E9/L (ref 0.8–5.3)
LYMPHOCYTES NFR BLD AUTO: 10.8 %
MCH RBC QN AUTO: 22.6 PG (ref 26.5–33)
MCHC RBC AUTO-ENTMCNC: 29.3 G/DL (ref 31.5–36.5)
MCV RBC AUTO: 77 FL (ref 78–100)
MONOCYTES # BLD AUTO: 0.6 10E9/L (ref 0–1.3)
MONOCYTES NFR BLD AUTO: 8.8 %
NEUTROPHILS # BLD AUTO: 4.5 10E9/L (ref 1.6–8.3)
NEUTROPHILS NFR BLD AUTO: 69.9 %
NRBC # BLD AUTO: 0 10*3/UL
NRBC BLD AUTO-RTO: 0 /100
PLATELET # BLD AUTO: 380 10E9/L (ref 150–450)
POTASSIUM SERPL-SCNC: 3.7 MMOL/L (ref 3.4–5.3)
RBC # BLD AUTO: 4.21 10E12/L (ref 4.4–5.9)
SODIUM SERPL-SCNC: 137 MMOL/L (ref 133–144)
WBC # BLD AUTO: 6.5 10E9/L (ref 4–11)

## 2020-01-06 PROCEDURE — 12000000 ZZH R&B MED SURG/OB

## 2020-01-06 PROCEDURE — 25000132 ZZH RX MED GY IP 250 OP 250 PS 637: Performed by: INTERNAL MEDICINE

## 2020-01-06 PROCEDURE — 25000132 ZZH RX MED GY IP 250 OP 250 PS 637: Performed by: HOSPITALIST

## 2020-01-06 PROCEDURE — 00000146 ZZHCL STATISTIC GLUCOSE BY METER IP

## 2020-01-06 PROCEDURE — 25000128 H RX IP 250 OP 636: Performed by: INTERNAL MEDICINE

## 2020-01-06 PROCEDURE — 85025 COMPLETE CBC W/AUTO DIFF WBC: CPT | Performed by: HOSPITALIST

## 2020-01-06 PROCEDURE — 25000128 H RX IP 250 OP 636: Performed by: HOSPITALIST

## 2020-01-06 PROCEDURE — 88305 TISSUE EXAM BY PATHOLOGIST: CPT | Mod: 26 | Performed by: INTERNAL MEDICINE

## 2020-01-06 PROCEDURE — 86140 C-REACTIVE PROTEIN: CPT | Performed by: HOSPITALIST

## 2020-01-06 PROCEDURE — 0DBK8ZZ EXCISION OF ASCENDING COLON, VIA NATURAL OR ARTIFICIAL OPENING ENDOSCOPIC: ICD-10-PCS | Performed by: INTERNAL MEDICINE

## 2020-01-06 PROCEDURE — 80048 BASIC METABOLIC PNL TOTAL CA: CPT | Performed by: HOSPITALIST

## 2020-01-06 PROCEDURE — 99233 SBSQ HOSP IP/OBS HIGH 50: CPT | Performed by: HOSPITALIST

## 2020-01-06 PROCEDURE — 25000131 ZZH RX MED GY IP 250 OP 636 PS 637: Performed by: HOSPITALIST

## 2020-01-06 PROCEDURE — 36415 COLL VENOUS BLD VENIPUNCTURE: CPT | Performed by: HOSPITALIST

## 2020-01-06 PROCEDURE — 0DBP8ZX EXCISION OF RECTUM, VIA NATURAL OR ARTIFICIAL OPENING ENDOSCOPIC, DIAGNOSTIC: ICD-10-PCS | Performed by: INTERNAL MEDICINE

## 2020-01-06 PROCEDURE — 45380 COLONOSCOPY AND BIOPSY: CPT | Performed by: INTERNAL MEDICINE

## 2020-01-06 PROCEDURE — 45385 COLONOSCOPY W/LESION REMOVAL: CPT | Performed by: INTERNAL MEDICINE

## 2020-01-06 PROCEDURE — 99153 MOD SED SAME PHYS/QHP EA: CPT | Performed by: INTERNAL MEDICINE

## 2020-01-06 PROCEDURE — 0DBN8ZX EXCISION OF SIGMOID COLON, VIA NATURAL OR ARTIFICIAL OPENING ENDOSCOPIC, DIAGNOSTIC: ICD-10-PCS | Performed by: INTERNAL MEDICINE

## 2020-01-06 PROCEDURE — G0500 MOD SEDAT ENDO SERVICE >5YRS: HCPCS | Performed by: INTERNAL MEDICINE

## 2020-01-06 PROCEDURE — 88305 TISSUE EXAM BY PATHOLOGIST: CPT | Performed by: INTERNAL MEDICINE

## 2020-01-06 RX ORDER — FLUMAZENIL 0.1 MG/ML
0.2 INJECTION, SOLUTION INTRAVENOUS
Status: ACTIVE | OUTPATIENT
Start: 2020-01-06 | End: 2020-01-07

## 2020-01-06 RX ORDER — NALOXONE HYDROCHLORIDE 0.4 MG/ML
.1-.4 INJECTION, SOLUTION INTRAMUSCULAR; INTRAVENOUS; SUBCUTANEOUS
Status: DISCONTINUED | OUTPATIENT
Start: 2020-01-06 | End: 2020-01-07 | Stop reason: HOSPADM

## 2020-01-06 RX ORDER — HYDROXYZINE HYDROCHLORIDE 10 MG/1
10 TABLET, FILM COATED ORAL 3 TIMES DAILY PRN
Status: DISCONTINUED | OUTPATIENT
Start: 2020-01-06 | End: 2020-01-07 | Stop reason: HOSPADM

## 2020-01-06 RX ORDER — FENTANYL CITRATE 50 UG/ML
INJECTION, SOLUTION INTRAMUSCULAR; INTRAVENOUS PRN
Status: DISCONTINUED | OUTPATIENT
Start: 2020-01-06 | End: 2020-01-06 | Stop reason: HOSPADM

## 2020-01-06 RX ORDER — CYCLOBENZAPRINE HCL 10 MG
10 TABLET ORAL 3 TIMES DAILY PRN
Status: DISCONTINUED | OUTPATIENT
Start: 2020-01-06 | End: 2020-01-07 | Stop reason: HOSPADM

## 2020-01-06 RX ORDER — LIDOCAINE 40 MG/G
CREAM TOPICAL
Status: DISCONTINUED | OUTPATIENT
Start: 2020-01-06 | End: 2020-01-06 | Stop reason: HOSPADM

## 2020-01-06 RX ORDER — MESALAMINE 4 G/60ML
4 SUSPENSION RECTAL AT BEDTIME
Status: DISCONTINUED | OUTPATIENT
Start: 2020-01-06 | End: 2020-01-07 | Stop reason: HOSPADM

## 2020-01-06 RX ADMIN — LIDOCAINE 1 PATCH: 560 PATCH PERCUTANEOUS; TOPICAL; TRANSDERMAL at 20:48

## 2020-01-06 RX ADMIN — METOPROLOL SUCCINATE 100 MG: 100 TABLET, EXTENDED RELEASE ORAL at 20:47

## 2020-01-06 RX ADMIN — ACETAMINOPHEN 650 MG: 325 TABLET, FILM COATED ORAL at 09:20

## 2020-01-06 RX ADMIN — ATORVASTATIN CALCIUM 40 MG: 40 TABLET, FILM COATED ORAL at 20:48

## 2020-01-06 RX ADMIN — METOPROLOL SUCCINATE 100 MG: 100 TABLET, EXTENDED RELEASE ORAL at 08:57

## 2020-01-06 RX ADMIN — ACETAMINOPHEN 650 MG: 325 TABLET, FILM COATED ORAL at 05:13

## 2020-01-06 RX ADMIN — CYCLOBENZAPRINE HYDROCHLORIDE 10 MG: 10 TABLET, FILM COATED ORAL at 16:16

## 2020-01-06 RX ADMIN — ACETAMINOPHEN 650 MG: 325 TABLET, FILM COATED ORAL at 18:22

## 2020-01-06 RX ADMIN — AZITHROMYCIN MONOHYDRATE 250 MG: 250 TABLET ORAL at 08:57

## 2020-01-06 RX ADMIN — CEFTRIAXONE 2 G: 2 INJECTION, POWDER, FOR SOLUTION INTRAMUSCULAR; INTRAVENOUS at 13:48

## 2020-01-06 RX ADMIN — LIDOCAINE 1 PATCH: 560 PATCH PERCUTANEOUS; TOPICAL; TRANSDERMAL at 00:07

## 2020-01-06 RX ADMIN — INSULIN GLARGINE 65 UNITS: 100 INJECTION, SOLUTION SUBCUTANEOUS at 21:24

## 2020-01-06 RX ADMIN — MAGNESIUM CITRATE 296 ML: 1.75 LIQUID ORAL at 06:39

## 2020-01-06 RX ADMIN — MESALAMINE 4 G: 4 ENEMA RECTAL at 21:24

## 2020-01-06 RX ADMIN — ACETAMINOPHEN 650 MG: 325 TABLET, FILM COATED ORAL at 13:49

## 2020-01-06 ASSESSMENT — MIFFLIN-ST. JEOR: SCORE: 1735.17

## 2020-01-06 ASSESSMENT — ACTIVITIES OF DAILY LIVING (ADL)
ADLS_ACUITY_SCORE: 13
ADLS_ACUITY_SCORE: 13
ADLS_ACUITY_SCORE: 12

## 2020-01-06 NOTE — PLAN OF CARE
Patient up independently. LS diminished. On 3L via NC. Denies SOB. C/o headache/neck pain. Gave Tylenol, somewhat helpful. Bowel prep given for colonoscopy tomorrow.Receiving Rocephin and Zithromax. GI following. SR per tele tech.  Discharge TBD.

## 2020-01-06 NOTE — PLAN OF CARE
VSS. Able to wean oxygen to 1.5 lpm nc w/ sats in the low 90's. A&Ox4. Pt had an episode where he became very upset due to the pulse ox beeping, writer saw patient hitting machine w/ pulse ox chord. Writer then asked pt to please stop, and explained rationale. Pt then became upset with writer. After some rest, pt became calm and cooperative. Pt up ind. Regular diet, first meal after colonoscopy. BS 97,121,99. Tele: SR 70's. Prn tylenol x2 for shoulder pain, pt stated that pain started in Right chest, moved to shoulder, into neck. Burning sharp pain beginning of shift, now a bruising pain. Flexeril/atarax added.

## 2020-01-06 NOTE — PLAN OF CARE
Pt remains hospitalized for PNA. NPO @ 0000 for colonoscopy this AM. Up independently, voiding without difficulty. Loose, watery stools x 4, not much blood noticed by pt. PIV saline locked, continues oral Zithromax and Rocephin. Tylenol x 1 for HA, lidocaine patch to right side of neck for c/o pain/cramping. Currently on 3L NC, on tele monitoring. BG 94.

## 2020-01-06 NOTE — PROGRESS NOTES
[facility]    Hospitalist Progress Note  Name: Adan Ruffin    MRN: [id number]  Provider:  Babak Cid DO, MPH  Date of Service: 01/06/2020    Summary of Stay: Adan Ruffin is a 69 year old male with a history of community-acquired pneumonia, colitis, hypertension, coronary artery disease status post CABG, hypertension, atrial flutter status post ablation who presented with new onset cough, shortness of breath, sore throat, and hypoxemia in the 70s.  CT of the chest with bilateral groundglass infiltrates.  Thought to have possible community-acquired pneumonia.  Started on azithromycin and ceftriaxone.  Hypoxia and symptoms improving.  Also noting about 1 month of bloody stools.  GI consulted and performed colonoscopy today.  Found to have 30 cm of proctitis with 2 small polyps which were biopsied.  Started on mesalamine enemas for 1 month.     1. Acute hypoxemic respiratory failure. With presence of bilateral ground glass infiltrates, most likely etiology would be a viral infection.  Influenza and RSV PCR negative.  According to the patient he has not had flu shot this year, he is not clear about his immunization status for pneumococcus.  Continue to wean O2, requirements are improving.    2. Possible community-acquired pneumonia with bilateral infiltrate: Not totally convinced of bacterial infection, however continue treatment with Rocephin/Zithromax.  Gram staining of the sputum and sputum culture negative.  Also negative pneumococcal antigen in urine.  Procalcitonin is nondetectable.  Possible viral process at play.    3. Elevated NT proBNP: No complaints and symptomatology of left sided heart failure.  I believe that abnormal level of BNP is secondary to RV strain in the setting of bilateral infiltrates of the lungs and probably underlying pulmonary hypertension.  Echocardiogram of September of this year reported normal LVEF, no evidence of diastolic dysfunction and no valvular  abnormalities.  Echocardiogram 1/3:  Mild aortic root dilatation.  LVEF is estimated at 55-60%.  RV is mildly dilated.  RVr systolic function is normal.  Mild-moderate SABINO.     4. Type 2 diabetes mellitus on insulin.  Very resistant to insulin apparently given the high doses needed.  On the other hand his A1c on admission is 7.7 which is remarkable.  He is following a ketogenic diet.  At home only he is using the long-acting insulin and metformin, he seldom use rapid acting insulin before meals.  Blood sugar values have been normal and relatively low since admission.  Metformin on hold due to current respiratory issues, hypoxemia and high risk for lactic acidosis.  Order moderate CHO.  He has 70 units of Lantus a.m. and 65 units of Lantus p.m.  At this point I am going to continue only with the p.m. dose and adjust based on the glycemic curve.  Patient has lost 35 pounds since August just dieting and his insulin dose has been decreasing significantly.  High intensity sliding scale for correction.     5. Essential hypertension: BP OK.  Continue metoprolol.    6. Coronary artery disease status post CABG: Continue metoprolol, aspirin and atorvastatin.    7. History of atrial flutter status post ablation: Normal sinus rhythm on admission.    8. History of proctitis and hematochezia: Care everywhere, biopsy done 2010, he had wrongfully reported ulcerative colitis.  He was having bloody diarrhea before this respiratory infection started.  GI consult and colonoscopy done this morning with 30 cm proctitis identified with 2 polyps and biopsies performed.  Started on 1 month of mesalamine enemas.       9. Microcytic anemia with suspected iron deficiency and chronic smoldering GI bleed: Add Fe supp on discharge.    10. Right shoulder pain: Intermittent. New onset, no trauma documented.  Monitor symptoms closely.    DVT Prophylaxis: Pneumatic Compression Devices  Code Status: Full Code  Diet: Regular Diet Adult    Fleming  Catheter: not present  Disposition: Expected discharge in 2 days to home. Goals prior to discharge include wean O2.   Family updated today: Yes      Interval History   Assumed care from previous hospitalist. The history was fully reviewed.  The patient reports doing well. No chest pain but shortness of breath improved. No nausea, vomiting, diarrhea, constipation. No fevers. No other specific complaints identified.     -Data reviewed today: I personally reviewed all new labs and imaging results over the last 24 hours.     Physical Exam   Temp: 96.4  F (35.8  C) Temp src: Oral BP: 134/70 Pulse: 70 Heart Rate: 64 Resp: 15 SpO2: 92 % O2 Device: Nasal cannula Oxygen Delivery: 2 LPM  Vitals:    01/04/20 0557 01/05/20 0549 01/06/20 0200   Weight: 97.8 kg (215 lb 11.2 oz) 99.5 kg (219 lb 4.8 oz) 97 kg (213 lb 12.8 oz)     Vital Signs with Ranges  Temp:  [95.6  F (35.3  C)-98  F (36.7  C)] 96.4  F (35.8  C)  Pulse:  [63-72] 70  Heart Rate:  [63-78] 64  Resp:  [9-23] 15  BP: (105-155)/() 134/70  SpO2:  [90 %-97 %] 92 %  I/O last 3 completed shifts:  In: 480 [P.O.:480]  Out: 650 [Urine:650]    GENERAL: No apparent distress. Awake, alert, and fully oriented.  HEENT: Normocephalic, atraumatic. Extraocular movements intact.  CARDIOVASCULAR: Regular rate and rhythm without murmurs or rubs. No S3.  PULMONARY: Clear bilaterally.  GASTROINTESTINAL: Soft, non-tender, non-distended. Bowel sounds normoactive.   EXTREMITIES: No cyanosis or clubbing. Trace edema.  NEUROLOGICAL: CN 2-12 grossly intact, no focal neurological deficits.  DERMATOLOGICAL: No rash, ulcer, bruising, nor jaundice.     Medications     - MEDICATION INSTRUCTIONS -         aspirin  325 mg Oral Daily     atorvastatin  40 mg Oral QPM     cefTRIAXone  2 g Intravenous Q24H     insulin aspart  1-10 Units Subcutaneous TID AC     insulin aspart  1-7 Units Subcutaneous At Bedtime     insulin aspart   Subcutaneous TID w/meals     insulin glargine  65 Units Subcutaneous  At Bedtime     lidocaine  1-2 patch Transdermal Q24h    And     lidocaine   Transdermal Q8H     mesalamine  4 g Rectal At Bedtime     metoprolol succinate ER  100 mg Oral BID     senna-docusate  1 tablet Oral BID    Or     senna-docusate  2 tablet Oral BID     sodium chloride (PF)  3 mL Intracatheter Q8H     Data     Laboratory:  Recent Labs   Lab 01/06/20  0632 01/04/20  0815 01/03/20  0625   WBC 6.5 7.9 7.1   HGB 9.5* 9.5* 9.1*   HCT 32.4* 32.5* 31.6*   MCV 77* 77* 77*    365 333     Recent Labs   Lab 01/06/20  0632 01/04/20  0815 01/03/20  0625    137 138   POTASSIUM 3.7 3.9 3.6   CHLORIDE 108 108 109   CO2 22 22 23   ANIONGAP 7 7 6   GLC 91 101* 74   BUN 16 13 13   CR 0.70 0.75 0.84   GFRESTIMATED >90 >90 >90   GFRESTBLACK >90 >90 >90   AURE 9.5 9.3 8.8     Recent Labs   Lab 01/03/20  2300 01/03/20  0706   CULT Canceled, Test credited  >10 Squamous epithelial cells/low power field indicates oral contamination. Please   recollect.  *  Notification of test cancellation was given to  AMARILYS PANTOJA RN [id number] 01.4.2020 CF   Canceled, Test credited  >10 Squamous epithelial cells/low power field indicates oral contamination. Please   recollect.  *  Notification of test cancellation was given to  Mo Hall RN [id number] 1.3.20 @4135 AEM         Imaging:  No results found for this or any previous visit (from the past 24 hour(s)).      Babak Cid DO MPH  [facility] Hospitalist  [address] [place] [address]  Pager: [phone]  01/06/2020

## 2020-01-07 VITALS
HEART RATE: 70 BPM | RESPIRATION RATE: 20 BRPM | OXYGEN SATURATION: 95 % | DIASTOLIC BLOOD PRESSURE: 66 MMHG | BODY MASS INDEX: 31.22 KG/M2 | WEIGHT: 210.8 LBS | TEMPERATURE: 96.7 F | HEIGHT: 69 IN | SYSTOLIC BLOOD PRESSURE: 139 MMHG

## 2020-01-07 LAB
ANION GAP SERPL CALCULATED.3IONS-SCNC: 6 MMOL/L (ref 3–14)
BUN SERPL-MCNC: 13 MG/DL (ref 7–30)
CALCIUM SERPL-MCNC: 9.3 MG/DL (ref 8.5–10.1)
CHLORIDE SERPL-SCNC: 106 MMOL/L (ref 94–109)
CO2 SERPL-SCNC: 24 MMOL/L (ref 20–32)
COPATH REPORT: NORMAL
CREAT SERPL-MCNC: 0.75 MG/DL (ref 0.66–1.25)
ERYTHROCYTE [DISTWIDTH] IN BLOOD BY AUTOMATED COUNT: 16.7 % (ref 10–15)
GFR SERPL CREATININE-BSD FRML MDRD: >90 ML/MIN/{1.73_M2}
GLUCOSE BLDC GLUCOMTR-MCNC: 103 MG/DL (ref 70–99)
GLUCOSE BLDC GLUCOMTR-MCNC: 122 MG/DL (ref 70–99)
GLUCOSE BLDC GLUCOMTR-MCNC: 54 MG/DL (ref 70–99)
GLUCOSE BLDC GLUCOMTR-MCNC: 69 MG/DL (ref 70–99)
GLUCOSE BLDC GLUCOMTR-MCNC: 77 MG/DL (ref 70–99)
GLUCOSE SERPL-MCNC: 54 MG/DL (ref 70–99)
HCT VFR BLD AUTO: 32 % (ref 40–53)
HGB BLD-MCNC: 9.3 G/DL (ref 13.3–17.7)
MCH RBC QN AUTO: 22 PG (ref 26.5–33)
MCHC RBC AUTO-ENTMCNC: 29.1 G/DL (ref 31.5–36.5)
MCV RBC AUTO: 76 FL (ref 78–100)
PLATELET # BLD AUTO: 395 10E9/L (ref 150–450)
POTASSIUM SERPL-SCNC: 3.6 MMOL/L (ref 3.4–5.3)
RBC # BLD AUTO: 4.22 10E12/L (ref 4.4–5.9)
SODIUM SERPL-SCNC: 136 MMOL/L (ref 133–144)
WBC # BLD AUTO: 6.4 10E9/L (ref 4–11)

## 2020-01-07 PROCEDURE — 25000132 ZZH RX MED GY IP 250 OP 250 PS 637: Performed by: HOSPITALIST

## 2020-01-07 PROCEDURE — 99239 HOSP IP/OBS DSCHRG MGMT >30: CPT | Performed by: INTERNAL MEDICINE

## 2020-01-07 PROCEDURE — 80048 BASIC METABOLIC PNL TOTAL CA: CPT | Performed by: HOSPITALIST

## 2020-01-07 PROCEDURE — 00000146 ZZHCL STATISTIC GLUCOSE BY METER IP

## 2020-01-07 PROCEDURE — 85027 COMPLETE CBC AUTOMATED: CPT | Performed by: HOSPITALIST

## 2020-01-07 PROCEDURE — 36415 COLL VENOUS BLD VENIPUNCTURE: CPT | Performed by: HOSPITALIST

## 2020-01-07 RX ORDER — MESALAMINE 4 G/60ML
4 SUSPENSION RECTAL AT BEDTIME
Qty: 30 ENEMA | Refills: 0 | Status: SHIPPED | OUTPATIENT
Start: 2020-01-07 | End: 2020-04-07

## 2020-01-07 RX ADMIN — ACETAMINOPHEN 650 MG: 325 TABLET, FILM COATED ORAL at 09:32

## 2020-01-07 RX ADMIN — ACETAMINOPHEN 650 MG: 325 TABLET, FILM COATED ORAL at 00:03

## 2020-01-07 RX ADMIN — METOPROLOL SUCCINATE 100 MG: 100 TABLET, EXTENDED RELEASE ORAL at 08:08

## 2020-01-07 RX ADMIN — ASPIRIN 325 MG: 325 TABLET, DELAYED RELEASE ORAL at 08:08

## 2020-01-07 RX ADMIN — SALINE NASAL SPRAY 1 SPRAY: 1.5 SOLUTION NASAL at 08:10

## 2020-01-07 RX ADMIN — CYCLOBENZAPRINE HYDROCHLORIDE 10 MG: 10 TABLET, FILM COATED ORAL at 00:03

## 2020-01-07 ASSESSMENT — ACTIVITIES OF DAILY LIVING (ADL)
ADLS_ACUITY_SCORE: 12

## 2020-01-07 ASSESSMENT — MIFFLIN-ST. JEOR: SCORE: 1721.56

## 2020-01-07 NOTE — PLAN OF CARE
Ambulatory Status:  Pt up independently  VS:  vss  Pain:  tylenol and flexeril for Right sided neck/shoulder pain  Resp: LS diminished on 1.5L of O2  GI:  denies nausea.  regular diet.  BS active.  Passing flatus.  Last BM 1/6.  Tx:  Rocephin  Labs:  BG 77 (juice given) recheck 103  Consults: GI  Disposition:  tbd

## 2020-01-07 NOTE — SIGNIFICANT EVENT
Morning lab draw 0643- BS 54, no notification to nurse.  0745- BS check 54, pt A&O. pt offered glucose gel, refused, pt received 4 oz of apple juice   0800 BS check 69, another 4 oz of apple juice provided  0819 bs check of 122 no further interventions.  0850 Md notified

## 2020-01-07 NOTE — DISCHARGE SUMMARY
[facility]  Hospitalist Discharge Summary       Date of Admission:  1/2/2020  Date of Discharge:  1/7/2020  Discharging Provider: Demetris Hayes DO      Discharge Diagnoses     1.  Acute hypoxic respiratory failure.  Due to pneumonia.  Initially requiring supplemental oxygen.  Resolved during hospital stay.    2.  Community-acquired pneumonia.  Bacterial versus viral.  Has completed a course of 5 days of azithromycin.  Has been on IV ceftriaxone during hospital stay.  Convert to oral Augmentin 875/125 mg twice a day for the next 2 days.    3.  Proctitis.  Was seen in consultation by gastroenterology during hospital stay.  Did have colonoscopy done on 1/6.  Has been started on mesalamine enemas.  Does need mesalamine enema daily for the next month.  Follow-up with gastroenterology in the outpatient setting at that time.    4.  Diabetes mellitus.  Blood sugars under good control in the outpatient setting.  Have been somewhat variable during hospital stay.  He has recently lost 35 pounds intentionally.  Likely has lower insulin need at this time.  Restart metformin.  Decrease home dose of Lantus to 40 units twice a day.  He only uses a short acting insulins rarely.  Follow-up with his primary care physician in the next week for further adjustments as needed.    5.  Hypertension.  Continue metoprolol.    6.  Coronary artery disease.  Continue aspirin, atorvastatin, and metoprolol.    7.  Hyperlipidemia.  Continue atorvastatin.        Follow-ups Needed After Discharge   Follow-up Appointments     Follow-up and recommended labs and tests       Follow up with primary care provider, Lauro Galloway, within 7 days   for hospital follow- up.  The following labs/tests are recommended: BMP in   7 days.             Discharge Disposition   Discharged to home  Condition at discharge: Stable    Hospital Course   Adan Ruffin is a 69 year old male with a history of community-acquired pneumonia, colitis,  hypertension, coronary artery disease status post CABG, hypertension, atrial flutter status post ablation who presented with new onset cough, shortness of breath, sore throat, and hypoxemia in the 70s.  CT of the chest with bilateral groundglass infiltrates.  Thought to have possible community-acquired pneumonia.  Started on azithromycin and ceftriaxone.  Hypoxia and symptoms improving.  Also noting about 1 month of bloody stools.  GI consulted and performed colonoscopy 1/6.  Found to have 30 cm of proctitis with 2 small polyps which were biopsied.  Started on mesalamine enemas for 1 month.  Blood sugars have been variable during hospital stay.  He has recently lost 35 pounds intentionally.  Lantus dosing decreased during hospital stay.  Make further adjustments to Lantus to 40 units subcutaneously twice a day at time of discharge.  He is rarely using short acting insulins in the outpatient setting.  Restart metformin.  Monitor blood sugars and follow-up with primary care physician within the next 1 week.    Consultations This Hospital Stay   SOCIAL WORK IP CONSULT  PHYSICAL THERAPY ADULT IP CONSULT  OCCUPATIONAL THERAPY ADULT IP CONSULT  CARE COORDINATOR IP CONSULT  GASTROENTEROLOGY IP CONSULT    Code Status   Full Code    Time Spent on this Encounter   I spent 40 minutes with Mr. Ruffin and working on discharge on 1/7/2020.       Demetris Hayes, DO  [facility]  ______________________________________________________________________    Physical Exam   Vital Signs: Temp: 96.7  F (35.9  C) Temp src: Oral BP: 139/66 Pulse: 70 Heart Rate: 72 Resp: 20 SpO2: 95 % O2 Device: None (Room air) Oxygen Delivery: 1.5 LPM  Weight: 210 lbs 12.8 oz  Gen:  NAD, A&Ox3.  Eyes:  PERRL, sclera anicteric.  OP:  MMM, no lesions.  Neck:  Supple.  CV:  Regular, no murmurs.  Lung:  CTA b/l, normal effort.  Ab:  +BS, soft.  Skin:  Warm, dry to touch.  No rash.  Ext:  No pitting edema LE b/l.         Primary Care Physician    Lauro Galloway    Discharge Orders      Reason for your hospital stay    pneumonia     Follow-up and recommended labs and tests     Follow up with primary care provider, Lauro Galloway, within 7 days for hospital follow- up.  The following labs/tests are recommended: BMP in 7 days.     Activity    Your activity upon discharge: activity as tolerated     Full Code     Diet    Follow this diet upon discharge: Moderate consistent carbohydrate (5222-9932 mervat / 4-6 CHO units per meal)         Discharge Medications   Current Discharge Medication List      START taking these medications    Details   amoxicillin-clavulanate (AUGMENTIN) 875-125 MG tablet Take 1 tablet by mouth 2 times daily for 2 days  Qty: 4 tablet, Refills: 0    Associated Diagnoses: Acute hypoxemic respiratory failure (H)      mesalamine (ROWASA) 4 g enema Place 1 enema (4 g) rectally At Bedtime  Qty: 30 enema, Refills: 0    Associated Diagnoses: Acute hypoxemic respiratory failure (H)         CONTINUE these medications which have CHANGED    Details   insulin glargine (LANTUS SOLOSTAR) 100 UNIT/ML pen Inject 40 Units Subcutaneous 2 times daily Lantus Solostar Pen  Qty: 15 mL, Refills: 0    Associated Diagnoses: Acute hypoxemic respiratory failure (H)         CONTINUE these medications which have NOT CHANGED    Details   aspirin (ASA) 325 MG EC tablet Take 325 mg by mouth daily      atorvastatin (LIPITOR) 40 MG tablet Take 1 tablet (40 mg) by mouth daily  Qty: 90 tablet, Refills: 3    Associated Diagnoses: Hyperlipidemia LDL goal <70      insulin lispro (HUMALOG KWIKPEN) 100 UNIT/ML (1 unit dial) pen Inject Subcutaneous 3 times daily (before meals) 10 units insulin per 1 carb unit (for example, takes 10 units for 1 slice of bread)      metFORMIN (GLUCOPHAGE) 1000 MG tablet TAKE 1 TABLET BY MOUTH TWICE DAILY WITH MEALS  Qty: 180 tablet, Refills: 0    Associated Diagnoses: Type 2 diabetes mellitus with diabetic nephropathy, with long-term current  use of insulin (H)      metoprolol succinate ER (TOPROL XL) 100 MG 24 hr tablet Take 1 tablet (100 mg) by mouth 2 times daily  Qty: 180 tablet, Refills: 0    Associated Diagnoses: Typical atrial flutter (H)      ACE NOT PRESCRIBED, INTENTIONAL, 1 each daily ACE Inhibitor not prescribed due to Intolerance  Qty: 0 each, Refills: 0    Associated Diagnoses: Type II or unspecified type diabetes mellitus without mention of complication, not stated as uncontrolled      blood glucose (ACCU-CHEK SMARTVIEW) test strip USE TO TEST BLOOD SUGAR FOUR TIMES DAILY OR AS DIRECTED  Qty: 400 strip, Refills: 2    Associated Diagnoses: Type 2 diabetes mellitus with diabetic nephropathy, with long-term current use of insulin (H)      blood glucose monitoring (ACCU-CHEK FASTCLIX) lancets Use to test blood sugar 4 times daily or as directed.  Qty: 200 each, Refills: 11    Associated Diagnoses: Type 2 diabetes mellitus with diabetic nephropathy, with long-term current use of insulin (H)      !! insulin pen needle (NOVOFINE) 30G X 8 MM Use 4-5 daily or as directed.  Qty: 300 each, Refills: 1    Associated Diagnoses: Type 2 diabetes mellitus with diabetic nephropathy, with long-term current use of insulin (H)      !! NOVOFINE 30 30G X 8 MM insulin pen needle USE 4 TO 5 DAILY OR AS DIRECTED  Qty: 400 each, Refills: 1    Associated Diagnoses: Type 2 diabetes mellitus with diabetic nephropathy, with long-term current use of insulin (H)       !! - Potential duplicate medications found. Please discuss with provider.        Allergies   Allergies   Allergen Reactions     Tetracycline      PN: LW Reaction: RASH     Codeine Rash     Mild rash     Simvastatin Other (See Comments)     Leg pain

## 2020-01-07 NOTE — PLAN OF CARE
Pt is discharging to home w/ transportation from his wife. Discharge instructions provided. All questions answered. All belongings w/ pt.

## 2020-01-07 NOTE — PLAN OF CARE
A&O x4.  VSS; IV - SL.  Code Status: FULL.  Ambulatory Status: Pt up independently. Alarms OFF. Ambulated in room.  Pain: Rated 7/10 in R shoulder and lower neck, received PRN Tylenol and Flexeril, which was effective. Lidocaine patch in place on neck.  Resp: LS diminished, on 1.5 LPM NC.  GI: Denies nausea. BS hypoactive. Passing gas. LBM 1/6.  Diet: Regular.  : Voiding spontaneously.  Skin: Bruised.  Tele:  SR.  Tx: Rowasa enema nightly, Rocephin, scheduled nebs, & IS.  Consults/Following: PT, OT, & GI.  Disposition: 2 days per MD note.

## 2020-01-08 ENCOUNTER — TELEPHONE (OUTPATIENT)
Dept: INTERNAL MEDICINE | Facility: CLINIC | Age: 70
End: 2020-01-08

## 2020-01-08 ENCOUNTER — PATIENT OUTREACH (OUTPATIENT)
Dept: CARE COORDINATION | Facility: CLINIC | Age: 70
End: 2020-01-08

## 2020-01-08 DIAGNOSIS — J96.01 ACUTE HYPOXEMIC RESPIRATORY FAILURE (H): Primary | ICD-10-CM

## 2020-01-08 ASSESSMENT — ACTIVITIES OF DAILY LIVING (ADL): DEPENDENT_IADLS:: INDEPENDENT

## 2020-01-08 NOTE — PROGRESS NOTES
Transition Communication Hand-off for Care Transitions to Next Level of Care Provider    Name: Adan Ruffin  : 1950  MRN #: [id number]  Primary Care Provider: Lauro Galloway  Primary Care MD Name: emeterio  Primary Clinic: [address]  [facility] [address]  Primary Care Clinic Name: [facility]  Reason for Hospitalization:  Acute respiratory failure with hypoxia (H) [J96.01]  Admit Date/Time: 2020 10:15 AM  Discharge Date: 2020  Payor Source: Payor: BCBS / Plan: BCBS OUT OF STATE / Product Type: Indemnity /   Readmission Assessment Measure (GURU) Risk Score/category: Average        Reason for Communication Hand-off Referral: Admission diagnoses: PN    Discharge Plan: Home   Concern for non-adherence with plan of care: No   Discharge Needs Assessment:  Needs      Most Recent Value   Equipment Currently Used at Home  none   # of Referrals Placed by [facility]  Internal Clinic Care Coordination        Follow-up specialty is recommended: No    Follow-up plan:    Future Appointments   Date Time Provider Department Center   2020 11:00 AM Lauro Galloway MD [facility] [place]     Any outstanding tests or procedures:  No. Recommend a repeat BMP in 7 days.       Recommendations:   Pt is admitted with PNA.  We did discuss the PNA action care plan.  He has had DM for around 20 years.  His goal has always been to keep his a1c less than 7.0.  He has mostly achieved this goal.  However, over the last 2-3 years it has been more difficult for him.  He has recently lost 35 lbs with being on the Keto Diet.  He does check his blood glucose 2-4 times a day.  His last a1c was 7.7 as of 20, which is trending downward.      Recommendation are home with a follow up appointment and BMP at that appt.     Kristine Alvarado RN    Sent by Jerrica Shelley RN, BSN, CPHN, CM    AVS/Discharge Summary is the source of truth; this is a helpful guide for improved communication of patient story

## 2020-01-08 NOTE — PROGRESS NOTES
Clinic Care Coordination Contact    Clinic Care Coordination Contact  OUTREACH    Referral Information:  Referral Source: IP Handoff    Primary Diagnosis: Respiratory Disorders - other(Pneumonia)    Chief Complaint   Patient presents with     Clinic Care Coordination - Initial       CC SW called patient to follow up on recent Hospitalization. Patient had pneumonia/ Acute hypoxic respiratory failure due to pneumonia.     Patient also experienced Proctitis during hospitalization and was seen by gastroenterology during hospital stay. He had a colonoscopy on 1/6. He will continue mesalamine enemas daily for the next month.     Patient states that he is feeling much better now, he has some residual weakness, but is slowly feeling that he is gaining strength.        [place] Utilization:   Clinic Utilization: [facility]   Difficulty keeping appointments: No  Compliance Concerns: No  No-Show Concerns: No  No PCP office visit in Past Year: No  Utilization    Last refreshed: 1/8/2020  9:17 AM:  Hospital Admissions 1           Last refreshed: 1/8/2020  9:17 AM:  ED Visits 0           Last refreshed: 1/8/2020  9:17 AM:  No Show Count (past year) 0              Current as of: 1/8/2020  9:17 AM                Clinical Concerns:  Current Medical Concerns:   Patient Active Problem List   Diagnosis     CAD (coronary artery disease)     Anxiety     Hyperlipidemia LDL goal <70     HTN (hypertension)     Advanced directives, counseling/discussion     Microalbuminuria     Type 2 diabetes mellitus with diabetic nephropathy, with long-term current use of insulin (H)     Morbid obesity (H)     Atrial flutter (H)     Aortic root dilatation (H)     Acute hypoxemic respiratory failure (H)       Current Behavioral Concerns: None noted    Education Provided to patient: CC SW role and utilization of CC. Patient declined need  Pain  Pain (GOAL): No      Medication Management:  Self.   Patient to come into clinic to see   Nilesh for discussion of diabetic med management and adjustments.     Functional Status:  Dependent ADLs:Independent  Dependent IADLs: Independent  Bed or wheelchair confined: No  Mobility Status: Independent  Fallen 2 or more times in the past year?: No  Any fall with injury in the past year?: No    Living Situation:  Current living arrangement: I live in a private home with spouse  Type of residence: Private home - stairs  Patient has friends and coworkers who support him.         Diet/Exercise/Sleep:  Diet:: Diabetic diet  Inadequate nutrition (GOAL):: No  Food Insecurity: No  Tube Feeding: No    Transportation:  Transportation means: Regular car     Psychosocial:  Presybeterian or spiritual beliefs that impact treatment: No  Mental health DX: No  Mental health management concern (GOAL): No  Informal Support system: Spouse     Financial/Insurance:   Financial/Insurance concerns (GOAL):: No       Resources and Interventions:  Current Resources:      Community Resources: None  Supplies used at home: (UNK)  Equipment Currently Used at Home: (UNK)    Advance Care Plan/Directive  Advanced Care Plans/Directives on file: No  Advanced Care Plan/Directive Status Not Applicable    Referrals Placed: None       Future Appointments              In 5 days Lauro Galloway MD [place], [place]          Plan: Patient to see Dr. Galloway for a follow up post hospitalization where he will have follow up labs and discussion of diabetic med adjustment.   No further outreaches will be made at this time unless a new referral is made or a change in the pt's status occurs. Patient was provided with [facility] contact information and encouraged to call with any questions or concerns.    Patient to follow up with Gastroenterology in one month.     [facility] H[facility]TAD[facility] explained that if patient needed anything to please call [facility].     KIRSTIE Gallagher  Clinic Care Coordinator  [phone]  [email or web address]

## 2020-01-08 NOTE — TELEPHONE ENCOUNTER
IP F/U    Date: 1-7-20  Diagnosis: acute respiratory failure with hypoxia, acute hypoxemic respiratory failure  Is patient active in care coordination? Yes - Route to Care Coordination (P [id number])  Was patient in TCU? No    Next 5 appointments (look out 90 days)    Jan 13, 2020 11:00 AM CST  Office Visit with Lauro [facility]ay MD  [facility] [address] [place] [id number] [phone]          See patient outreach encounter 1-8-20.

## 2020-01-13 ENCOUNTER — OFFICE VISIT (OUTPATIENT)
Dept: INTERNAL MEDICINE | Facility: CLINIC | Age: 70
End: 2020-01-13
Payer: COMMERCIAL

## 2020-01-13 VITALS
DIASTOLIC BLOOD PRESSURE: 60 MMHG | WEIGHT: 220 LBS | OXYGEN SATURATION: 92 % | BODY MASS INDEX: 32.58 KG/M2 | HEART RATE: 87 BPM | RESPIRATION RATE: 16 BRPM | SYSTOLIC BLOOD PRESSURE: 120 MMHG | TEMPERATURE: 97.3 F | HEIGHT: 69 IN

## 2020-01-13 DIAGNOSIS — Z79.4 TYPE 2 DIABETES MELLITUS WITH DIABETIC NEPHROPATHY, WITH LONG-TERM CURRENT USE OF INSULIN (H): ICD-10-CM

## 2020-01-13 DIAGNOSIS — J18.9 PNEUMONIA OF BOTH LUNGS DUE TO INFECTIOUS ORGANISM, UNSPECIFIED PART OF LUNG: ICD-10-CM

## 2020-01-13 DIAGNOSIS — Z09 HOSPITAL DISCHARGE FOLLOW-UP: Primary | ICD-10-CM

## 2020-01-13 DIAGNOSIS — E11.21 TYPE 2 DIABETES MELLITUS WITH DIABETIC NEPHROPATHY, WITH LONG-TERM CURRENT USE OF INSULIN (H): ICD-10-CM

## 2020-01-13 DIAGNOSIS — I25.10 CORONARY ARTERY DISEASE INVOLVING NATIVE CORONARY ARTERY OF NATIVE HEART WITHOUT ANGINA PECTORIS: ICD-10-CM

## 2020-01-13 DIAGNOSIS — Z23 NEED FOR PROPHYLACTIC VACCINATION AND INOCULATION AGAINST INFLUENZA: ICD-10-CM

## 2020-01-13 DIAGNOSIS — K62.89 PROCTITIS: ICD-10-CM

## 2020-01-13 DIAGNOSIS — I10 ESSENTIAL HYPERTENSION: ICD-10-CM

## 2020-01-13 LAB
ERYTHROCYTE [DISTWIDTH] IN BLOOD BY AUTOMATED COUNT: 16.8 % (ref 10–15)
HCT VFR BLD AUTO: 32.3 % (ref 40–53)
HGB BLD-MCNC: 9.6 G/DL (ref 13.3–17.7)
MCH RBC QN AUTO: 22.1 PG (ref 26.5–33)
MCHC RBC AUTO-ENTMCNC: 29.7 G/DL (ref 31.5–36.5)
MCV RBC AUTO: 74 FL (ref 78–100)
PLATELET # BLD AUTO: 485 10E9/L (ref 150–450)
RBC # BLD AUTO: 4.34 10E12/L (ref 4.4–5.9)
WBC # BLD AUTO: 7.2 10E9/L (ref 4–11)

## 2020-01-13 PROCEDURE — 90662 IIV NO PRSV INCREASED AG IM: CPT | Performed by: INTERNAL MEDICINE

## 2020-01-13 PROCEDURE — 85027 COMPLETE CBC AUTOMATED: CPT | Performed by: INTERNAL MEDICINE

## 2020-01-13 PROCEDURE — 99495 TRANSJ CARE MGMT MOD F2F 14D: CPT | Mod: 25 | Performed by: INTERNAL MEDICINE

## 2020-01-13 PROCEDURE — 36415 COLL VENOUS BLD VENIPUNCTURE: CPT | Performed by: INTERNAL MEDICINE

## 2020-01-13 PROCEDURE — G0009 ADMIN PNEUMOCOCCAL VACCINE: HCPCS | Performed by: INTERNAL MEDICINE

## 2020-01-13 ASSESSMENT — MIFFLIN-ST. JEOR: SCORE: 1763.29

## 2020-01-13 NOTE — PROGRESS NOTES
Subjective     Adan Ruffin is a 69 year old male who presents to clinic today for the following health issues:    HPI       Hospital Follow-up Visit:    Hospital/Nursing Home/IP Rehab Facility: [facility]  Date of Admission: 01/02/2020  Date of Discharge: 01/07/2020  Reason(s) for Admission: Acute hypoxemic respiratory failure, Pulmonary infiltrates            Problems taking medications regularly:  None       Medication changes since discharge: None       Problems adhering to non-medication therapy:  None    Summary of hospitalization:  [facility] discharge summary reviewed  Diagnostic Tests/Treatments reviewed.  Follow up needed: none  Other Healthcare Providers Involved in Patient s Care:         None  Update since discharge: improved.     Post Discharge Medication Reconciliation: discharge medications reconciled, continue medications without change.  Plan of care communicated with patient     Coding guidelines for this visit:  Type of Medical   Decision Making Face-to-Face Visit       within 7 Days of discharge Face-to-Face Visit        within 14 days of discharge   Moderate Complexity [id number]   High Complexity [id number]          Patient is seen for a follow up visit.  Recently hospitalized for SOB, acute respiratory failure related to bilateral pneumonia.   Treated with antibiotics in the hospital and finished oral antibiotic at home.   Initially needed oxygen treatment, currently off oxygen. Has improved. Mild SOB on exertion. No chest pain, no fever. Mild cough, non productive.   Has h/o ischemic heart disease, asymptomatic regarding chest pains, SOB,palpitations. Has good compliance with treatment, diet and exercise.  ECHO done showed preserved EF.   Has H/O DM. On diet , exercise and Insulin and Metformin. Blood sugars are controlled. No parestesias. No hypoglycemias.  Dose of Lantus was reduced as pt lost weight with diet.   Has h/o HTN. on medical treatment. BP has been  controlled. No side effects from medications. No CP, HA, dizziness. good compliance with medications and low salt diet.  Has developed symptoms of diarrhea and blood in the stools. Had a colonoscopy showing proctitis. Treated with mesalamine enemas. Improved. Had mild anemia, no ongoing bleeding.         Patient Active Problem List   Diagnosis     CAD (coronary artery disease)     Anxiety     Hyperlipidemia LDL goal <70     HTN (hypertension)     Advanced directives, counseling/discussion     Microalbuminuria     Type 2 diabetes mellitus with diabetic nephropathy, with long-term current use of insulin (H)     Morbid obesity (H)     Atrial flutter (H)     Aortic root dilatation (H)     Acute hypoxemic respiratory failure (H)     Past Surgical History:   Procedure Laterality Date     BYPASS GRAFT ARTERY CORONARY N/A 2015    bypass LAD, OM, PDA        COLONOSCOPY N/A 2016    Procedure: COLONOSCOPY;  Surgeon: Marcela Schwartz MD;  Location: [facility]     COLONOSCOPY N/A 2020    Procedure: Colonoscopy, With Polypectomy And Biopsy;  Surgeon: Herson Faust MD;  Location: [facility]     EXCISE PILONIDAL CYST, SIMPLE  1975     HEART CATH, ANGIOPLASTY  3-    3 vessel disease-occluded RCA, stenosis LM-to have CABG     removal of skin cancer       TONSILLECTOMY & ADENOIDECTOMY  1950       Social History     Tobacco Use     Smoking status: Former Smoker     Packs/day: 1.50     Years: 29.00     Pack years: 43.50     Types: Cigarettes     Start date:      Last attempt to quit: 2000     Years since quittin.0     Smokeless tobacco: Never Used   Substance Use Topics     Alcohol use: Yes     Alcohol/week: 0.0 standard drinks     Comment: 1 mixed drink a night     Family History   Problem Relation Age of Onset     Breast Cancer Mother      Cerebrovascular Disease Mother      Hyperlipidemia Father      Cancer Father         lung, unrelated to smoking     Cancer Paternal Grandmother      Colon  "Cancer No family hx of          Current Outpatient Medications   Medication Sig Dispense Refill     aspirin (ASA) 325 MG EC tablet Take 325 mg by mouth daily       atorvastatin (LIPITOR) 40 MG tablet Take 1 tablet (40 mg) by mouth daily 90 tablet 3     insulin glargine (LANTUS SOLOSTAR) 100 UNIT/ML pen Inject 40 Units Subcutaneous 2 times daily Lantus Solostar Pen (Patient taking differently: Inject 50 Units Subcutaneous 2 times daily Lantus Solostar Pen) 15 mL 0     insulin lispro (HUMALOG KWIKPEN) 100 UNIT/ML (1 unit dial) pen Inject Subcutaneous 3 times daily (before meals) 10 units insulin per 1 carb unit (for example, takes 10 units for 1 slice of bread)       mesalamine (ROWASA) 4 g enema Place 1 enema (4 g) rectally At Bedtime 30 enema 0     metFORMIN (GLUCOPHAGE) 1000 MG tablet TAKE 1 TABLET BY MOUTH TWICE DAILY WITH MEALS 180 tablet 0     metoprolol succinate ER (TOPROL XL) 100 MG 24 hr tablet Take 1 tablet (100 mg) by mouth 2 times daily 180 tablet 0     ACE NOT PRESCRIBED, INTENTIONAL, 1 each daily ACE Inhibitor not prescribed due to Intolerance (Patient not taking: Reported on 10/11/2019) 0 each 0     blood glucose (ACCU-CHEK SMARTVIEW) test strip USE TO TEST BLOOD SUGAR FOUR TIMES DAILY OR AS DIRECTED 400 strip 2     blood glucose monitoring (ACCU-CHEK FASTCLIX) lancets Use to test blood sugar 4 times daily or as directed. 200 each 11     insulin pen needle (NOVOFINE) 30G X 8 MM Use 4-5 daily or as directed. 300 each 1     NOVOFINE 30 30G X 8 MM insulin pen needle USE 4 TO 5 DAILY OR AS DIRECTED 400 each 1         Reviewed and updated as needed this visit by Provider         Review of Systems   ROS COMP: Constitutional, HEENT, cardiovascular, pulmonary, GI, , musculoskeletal, neuro, skin, endocrine and psych systems are negative, except as otherwise noted.      Objective    Ht 1.753 m (5' 9\")   BMI 31.13 kg/m    Body mass index is 31.13 kg/m .  Physical Exam   GENERAL: healthy, alert and no " "distress  NECK: no adenopathy, no asymmetry, masses, or scars and thyroid normal to palpation  RESP: lungs clear to auscultation - no rales, rhonchi or wheezes, mild base crackles   CV: regular rate and rhythm, normal S1 S2, no S3 or S4, no murmur, click or rub, no peripheral edema and peripheral pulses strong  ABDOMEN: soft, nontender, no hepatosplenomegaly, no masses and bowel sounds normal  MS: no gross musculoskeletal defects noted, no edema  SKIN: no suspicious lesions or rashes  NEURO: Normal strength and tone, mentation intact and speech normal    Diagnostic Test Results:  Labs reviewed in Epic        Assessment & Plan   Problem List Items Addressed This Visit     CAD (coronary artery disease)    HTN (hypertension)    Type 2 diabetes mellitus with diabetic nephropathy, with long-term current use of insulin (H)      Other Visit Diagnoses     Hospital discharge follow-up    -  Primary    Proctitis        Relevant Orders    GASTROENTEROLOGY ADULT REF CONSULT ONLY    CBC with platelets    Pneumonia of both lungs due to infectious organism, unspecified part of lung             Improved symptoms of SOB, Pneumonia   Follow up with GI   Assess CBC  Continue treatment  Follow up CXR next visit         BMI:   Estimated body mass index is 32.49 kg/m  as calculated from the following:    Height as of this encounter: 1.753 m (5' 9\").    Weight as of this encounter: 99.8 kg (220 lb).   Weight management plan: Discussed healthy diet and exercise guidelines        See Patient Instructions  Return in about 3 months (around 4/13/2020) for Physical Exam.    Lauro Galloway MD  [facility]        "

## 2020-01-13 NOTE — LETTER
January 15, 2020      Adan SANIA Laly  [address] [place] [address]        Dear ,    We are writing to inform you of your test results.    Mild anemia, improved.   Follow up in 3 months as planned.     Resulted Orders   CBC with platelets   Result Value Ref Range    WBC 7.2 4.0 - 11.0 10e9/L    RBC Count 4.34 (L) 4.4 - 5.9 10e12/L    Hemoglobin 9.6 (L) 13.3 - 17.7 g/dL    Hematocrit 32.3 (L) 40.0 - 53.0 %    MCV 74 (L) 78 - 100 fl    MCH 22.1 (L) 26.5 - 33.0 pg    MCHC 29.7 (L) 31.5 - 36.5 g/dL      Comment:      Results confirmed by repeat test    RDW 16.8 (H) 10.0 - 15.0 %    Platelet Count 485 (H) 150 - 450 10e9/L       If you have any questions or concerns, please call the clinic at the number listed above.       Sincerely,        Lauro Galloway MD

## 2020-01-13 NOTE — NURSING NOTE
"Vital signs:  Temp: 97.3  F (36.3  C) Temp src: Oral BP: 120/60 Pulse: 87   Resp: 16 SpO2: 92 %     Height: 175.3 cm (5' 9\") Weight: 99.8 kg (220 lb)  Estimated body mass index is 32.49 kg/m  as calculated from the following:    Height as of this encounter: 1.753 m (5' 9\").    Weight as of this encounter: 99.8 kg (220 lb).          "

## 2020-01-14 ENCOUNTER — TELEPHONE (OUTPATIENT)
Dept: INTERNAL MEDICINE | Facility: CLINIC | Age: 70
End: 2020-01-14

## 2020-01-14 NOTE — TELEPHONE ENCOUNTER
[place] [employer] of Public Safety  Diabetes report  Dr. Galloway's in basket   Call pt for : [phone], send copy to abstraction

## 2020-01-21 ENCOUNTER — MYC MEDICAL ADVICE (OUTPATIENT)
Dept: INTERNAL MEDICINE | Facility: CLINIC | Age: 70
End: 2020-01-21

## 2020-01-21 ENCOUNTER — HOSPITAL ENCOUNTER (OUTPATIENT)
Dept: GENERAL RADIOLOGY | Facility: CLINIC | Age: 70
Discharge: HOME | End: 2020-01-21
Attending: INTERNAL MEDICINE | Admitting: INTERNAL MEDICINE
Payer: COMMERCIAL

## 2020-01-21 DIAGNOSIS — R91.8 MULTILOBAR LUNG INFILTRATE: ICD-10-CM

## 2020-01-21 DIAGNOSIS — R06.02 SOB (SHORTNESS OF BREATH): Primary | ICD-10-CM

## 2020-01-21 DIAGNOSIS — R06.02 SOB (SHORTNESS OF BREATH): ICD-10-CM

## 2020-01-21 PROCEDURE — 71046 X-RAY EXAM CHEST 2 VIEWS: CPT

## 2020-01-21 NOTE — TELEPHONE ENCOUNTER
Message received from pt reporting that he doesn't feel like he is improving like he should be from his pneumonia (was hospitalized at [facility] 1/2/2020-1/7/2020, discharged with Augmentin 875mg BID X 2 days, then saw Dr. Galloway for hospital f/u on 1/13/2020).     He is still experiencing bouts of shortness of breath, especially with minimal exertion-would like to know what Dr. Galloway recommends?

## 2020-01-22 ENCOUNTER — HOSPITAL ENCOUNTER (OUTPATIENT)
Dept: CT IMAGING | Facility: CLINIC | Age: 70
Discharge: HOME | End: 2020-01-22
Attending: INTERNAL MEDICINE | Admitting: INTERNAL MEDICINE
Payer: COMMERCIAL

## 2020-01-22 DIAGNOSIS — R06.02 SOB (SHORTNESS OF BREATH): ICD-10-CM

## 2020-01-22 DIAGNOSIS — R91.8 MULTILOBAR LUNG INFILTRATE: ICD-10-CM

## 2020-01-22 PROCEDURE — 25000128 H RX IP 250 OP 636: Performed by: INTERNAL MEDICINE

## 2020-01-22 PROCEDURE — 71260 CT THORAX DX C+: CPT

## 2020-01-22 PROCEDURE — 25000125 ZZHC RX 250: Performed by: INTERNAL MEDICINE

## 2020-01-22 RX ORDER — IOPAMIDOL 755 MG/ML
500 INJECTION, SOLUTION INTRAVASCULAR ONCE
Status: COMPLETED | OUTPATIENT
Start: 2020-01-22 | End: 2020-01-22

## 2020-01-22 RX ADMIN — SODIUM CHLORIDE 60 ML: 9 INJECTION, SOLUTION INTRAVENOUS at 12:33

## 2020-01-22 RX ADMIN — IOPAMIDOL 80 ML: 755 INJECTION, SOLUTION INTRAVENOUS at 12:33

## 2020-01-23 ENCOUNTER — MYC MEDICAL ADVICE (OUTPATIENT)
Dept: INTERNAL MEDICINE | Facility: CLINIC | Age: 70
End: 2020-01-23

## 2020-01-23 DIAGNOSIS — R06.02 SOB (SHORTNESS OF BREATH): ICD-10-CM

## 2020-01-23 DIAGNOSIS — Z12.11 SPECIAL SCREENING FOR MALIGNANT NEOPLASMS, COLON: Primary | ICD-10-CM

## 2020-01-23 LAB
ERYTHROCYTE [DISTWIDTH] IN BLOOD BY AUTOMATED COUNT: 17.3 % (ref 10–15)
HCT VFR BLD AUTO: 32.7 % (ref 40–53)
HGB BLD-MCNC: 9.7 G/DL (ref 13.3–17.7)
MCH RBC QN AUTO: 21.1 PG (ref 26.5–33)
MCHC RBC AUTO-ENTMCNC: 29.7 G/DL (ref 31.5–36.5)
MCV RBC AUTO: 71 FL (ref 78–100)
PLATELET # BLD AUTO: 432 10E9/L (ref 150–450)
RBC # BLD AUTO: 4.6 10E12/L (ref 4.4–5.9)
WBC # BLD AUTO: 7.2 10E9/L (ref 4–11)

## 2020-01-23 PROCEDURE — 85027 COMPLETE CBC AUTOMATED: CPT | Performed by: INTERNAL MEDICINE

## 2020-01-23 PROCEDURE — 36415 COLL VENOUS BLD VENIPUNCTURE: CPT | Performed by: INTERNAL MEDICINE

## 2020-01-23 NOTE — TELEPHONE ENCOUNTER
See his [employer] message. Dr Faust performed pts Colonoscopy. He doesn't have office down in this area.     Can pt see [facility] in [place]? They see pts for IBD. Or does he need to see [facility], they have offices in [place] or [place].

## 2020-01-28 ENCOUNTER — HOSPITAL ENCOUNTER (INPATIENT)
Facility: CLINIC | Age: 70
LOS: 4 days | Discharge: HOME | End: 2020-02-01
Attending: EMERGENCY MEDICINE | Admitting: HOSPITALIST
Payer: COMMERCIAL

## 2020-01-28 ENCOUNTER — APPOINTMENT (OUTPATIENT)
Dept: GENERAL RADIOLOGY | Facility: CLINIC | Age: 70
End: 2020-01-28
Attending: EMERGENCY MEDICINE
Payer: COMMERCIAL

## 2020-01-28 ENCOUNTER — TRANSFERRED RECORDS (OUTPATIENT)
Dept: HEALTH INFORMATION MANAGEMENT | Facility: CLINIC | Age: 70
End: 2020-01-28

## 2020-01-28 DIAGNOSIS — R91.8 PULMONARY INFILTRATES: ICD-10-CM

## 2020-01-28 DIAGNOSIS — I50.9 ACUTE CONGESTIVE HEART FAILURE, UNSPECIFIED HEART FAILURE TYPE (H): ICD-10-CM

## 2020-01-28 DIAGNOSIS — R09.02 HYPOXIA: ICD-10-CM

## 2020-01-28 DIAGNOSIS — J96.01 ACUTE RESPIRATORY FAILURE WITH HYPOXIA (H): Primary | ICD-10-CM

## 2020-01-28 DIAGNOSIS — R60.0 LEG EDEMA: ICD-10-CM

## 2020-01-28 DIAGNOSIS — R79.89 ELEVATED TROPONIN: ICD-10-CM

## 2020-01-28 DIAGNOSIS — R06.02 SHORTNESS OF BREATH: ICD-10-CM

## 2020-01-28 LAB
ANION GAP SERPL CALCULATED.3IONS-SCNC: 8 MMOL/L (ref 3–14)
BASOPHILS # BLD AUTO: 0 10E9/L (ref 0–0.2)
BASOPHILS NFR BLD AUTO: 0.4 %
BUN SERPL-MCNC: 25 MG/DL (ref 7–30)
CALCIUM SERPL-MCNC: 9.7 MG/DL (ref 8.5–10.1)
CHLORIDE SERPL-SCNC: 108 MMOL/L (ref 94–109)
CO2 SERPL-SCNC: 23 MMOL/L (ref 20–32)
CREAT SERPL-MCNC: 0.85 MG/DL (ref 0.66–1.25)
DIFFERENTIAL METHOD BLD: ABNORMAL
EOSINOPHIL # BLD AUTO: 0.5 10E9/L (ref 0–0.7)
EOSINOPHIL NFR BLD AUTO: 5.6 %
ERYTHROCYTE [DISTWIDTH] IN BLOOD BY AUTOMATED COUNT: 17.5 % (ref 10–15)
GFR SERPL CREATININE-BSD FRML MDRD: 90 ML/MIN/{1.73_M2}
GLUCOSE BLDC GLUCOMTR-MCNC: 197 MG/DL (ref 70–99)
GLUCOSE BLDC GLUCOMTR-MCNC: 91 MG/DL (ref 70–99)
GLUCOSE SERPL-MCNC: 107 MG/DL (ref 70–99)
HCT VFR BLD AUTO: 32.3 % (ref 40–53)
HGB BLD-MCNC: 9.8 G/DL (ref 13.3–17.7)
IMM GRANULOCYTES # BLD: 0.1 10E9/L (ref 0–0.4)
IMM GRANULOCYTES NFR BLD: 1.1 %
INTERPRETATION ECG - MUSE: NORMAL
LYMPHOCYTES # BLD AUTO: 1.3 10E9/L (ref 0.8–5.3)
LYMPHOCYTES NFR BLD AUTO: 13.4 %
MCH RBC QN AUTO: 22 PG (ref 26.5–33)
MCHC RBC AUTO-ENTMCNC: 30.3 G/DL (ref 31.5–36.5)
MCV RBC AUTO: 72 FL (ref 78–100)
MONOCYTES # BLD AUTO: 0.7 10E9/L (ref 0–1.3)
MONOCYTES NFR BLD AUTO: 7.6 %
NEUTROPHILS # BLD AUTO: 6.8 10E9/L (ref 1.6–8.3)
NEUTROPHILS NFR BLD AUTO: 71.9 %
NRBC # BLD AUTO: 0 10*3/UL
NRBC BLD AUTO-RTO: 0 /100
NT-PROBNP SERPL-MCNC: 2506 PG/ML (ref 0–900)
PLATELET # BLD AUTO: 410 10E9/L (ref 150–450)
POTASSIUM SERPL-SCNC: 4.3 MMOL/L (ref 3.4–5.3)
RBC # BLD AUTO: 4.46 10E12/L (ref 4.4–5.9)
SODIUM SERPL-SCNC: 139 MMOL/L (ref 133–144)
TROPONIN I SERPL-MCNC: 0.1 UG/L (ref 0–0.04)
WBC # BLD AUTO: 9.4 10E9/L (ref 4–11)

## 2020-01-28 PROCEDURE — 84484 ASSAY OF TROPONIN QUANT: CPT | Performed by: EMERGENCY MEDICINE

## 2020-01-28 PROCEDURE — 99223 1ST HOSP IP/OBS HIGH 75: CPT | Mod: AI | Performed by: HOSPITALIST

## 2020-01-28 PROCEDURE — 25000128 H RX IP 250 OP 636: Performed by: EMERGENCY MEDICINE

## 2020-01-28 PROCEDURE — 85025 COMPLETE CBC W/AUTO DIFF WBC: CPT | Performed by: EMERGENCY MEDICINE

## 2020-01-28 PROCEDURE — 25000131 ZZH RX MED GY IP 250 OP 636 PS 637: Performed by: HOSPITALIST

## 2020-01-28 PROCEDURE — 71046 X-RAY EXAM CHEST 2 VIEWS: CPT

## 2020-01-28 PROCEDURE — 93005 ELECTROCARDIOGRAM TRACING: CPT

## 2020-01-28 PROCEDURE — 12000000 ZZH R&B MED SURG/OB

## 2020-01-28 PROCEDURE — 96374 THER/PROPH/DIAG INJ IV PUSH: CPT

## 2020-01-28 PROCEDURE — 80048 BASIC METABOLIC PNL TOTAL CA: CPT | Performed by: EMERGENCY MEDICINE

## 2020-01-28 PROCEDURE — 83880 ASSAY OF NATRIURETIC PEPTIDE: CPT | Performed by: EMERGENCY MEDICINE

## 2020-01-28 PROCEDURE — 25000128 H RX IP 250 OP 636: Performed by: HOSPITALIST

## 2020-01-28 PROCEDURE — 99285 EMERGENCY DEPT VISIT HI MDM: CPT | Mod: 25

## 2020-01-28 PROCEDURE — 25000132 ZZH RX MED GY IP 250 OP 250 PS 637: Performed by: EMERGENCY MEDICINE

## 2020-01-28 PROCEDURE — 25000132 ZZH RX MED GY IP 250 OP 250 PS 637: Performed by: HOSPITALIST

## 2020-01-28 PROCEDURE — 00000146 ZZHCL STATISTIC GLUCOSE BY METER IP

## 2020-01-28 RX ORDER — METHYLPREDNISOLONE SODIUM SUCCINATE 125 MG/2ML
1 INJECTION, POWDER, LYOPHILIZED, FOR SOLUTION INTRAMUSCULAR; INTRAVENOUS EVERY 8 HOURS
Status: DISCONTINUED | OUTPATIENT
Start: 2020-01-28 | End: 2020-01-29

## 2020-01-28 RX ORDER — FUROSEMIDE 10 MG/ML
60 INJECTION INTRAMUSCULAR; INTRAVENOUS ONCE
Status: COMPLETED | OUTPATIENT
Start: 2020-01-28 | End: 2020-01-28

## 2020-01-28 RX ORDER — LIDOCAINE 40 MG/G
CREAM TOPICAL
Status: DISCONTINUED | OUTPATIENT
Start: 2020-01-28 | End: 2020-02-01 | Stop reason: HOSPADM

## 2020-01-28 RX ORDER — METOPROLOL SUCCINATE 50 MG/1
100 TABLET, EXTENDED RELEASE ORAL 2 TIMES DAILY
Status: DISCONTINUED | OUTPATIENT
Start: 2020-01-28 | End: 2020-02-01 | Stop reason: HOSPADM

## 2020-01-28 RX ORDER — DEXTROSE MONOHYDRATE 25 G/50ML
25-50 INJECTION, SOLUTION INTRAVENOUS
Status: DISCONTINUED | OUTPATIENT
Start: 2020-01-28 | End: 2020-02-01 | Stop reason: HOSPADM

## 2020-01-28 RX ORDER — ACETAMINOPHEN 325 MG/1
650 TABLET ORAL EVERY 4 HOURS PRN
Status: DISCONTINUED | OUTPATIENT
Start: 2020-01-28 | End: 2020-02-01 | Stop reason: HOSPADM

## 2020-01-28 RX ORDER — ASPIRIN 81 MG/1
324 TABLET, CHEWABLE ORAL ONCE
Status: DISCONTINUED | OUTPATIENT
Start: 2020-01-28 | End: 2020-02-01 | Stop reason: HOSPADM

## 2020-01-28 RX ORDER — ATORVASTATIN CALCIUM 40 MG/1
40 TABLET, FILM COATED ORAL EVERY EVENING
Status: DISCONTINUED | OUTPATIENT
Start: 2020-01-28 | End: 2020-02-01 | Stop reason: HOSPADM

## 2020-01-28 RX ORDER — MESALAMINE 4 G/60ML
4 SUSPENSION RECTAL AT BEDTIME
Status: DISCONTINUED | OUTPATIENT
Start: 2020-01-28 | End: 2020-02-01 | Stop reason: HOSPADM

## 2020-01-28 RX ORDER — NALOXONE HYDROCHLORIDE 0.4 MG/ML
.1-.4 INJECTION, SOLUTION INTRAMUSCULAR; INTRAVENOUS; SUBCUTANEOUS
Status: DISCONTINUED | OUTPATIENT
Start: 2020-01-28 | End: 2020-02-01 | Stop reason: HOSPADM

## 2020-01-28 RX ORDER — NICOTINE POLACRILEX 4 MG
15-30 LOZENGE BUCCAL
Status: DISCONTINUED | OUTPATIENT
Start: 2020-01-28 | End: 2020-02-01 | Stop reason: HOSPADM

## 2020-01-28 RX ADMIN — MESALAMINE 4 G: 4 ENEMA RECTAL at 21:51

## 2020-01-28 RX ADMIN — ATORVASTATIN CALCIUM 40 MG: 40 TABLET, FILM COATED ORAL at 20:14

## 2020-01-28 RX ADMIN — FUROSEMIDE 60 MG: 10 INJECTION, SOLUTION INTRAMUSCULAR; INTRAVENOUS at 16:32

## 2020-01-28 RX ADMIN — METHYLPREDNISOLONE SODIUM SUCCINATE 62.5 MG: 125 INJECTION, POWDER, FOR SOLUTION INTRAMUSCULAR; INTRAVENOUS at 20:17

## 2020-01-28 RX ADMIN — METOPROLOL SUCCINATE 100 MG: 50 TABLET, EXTENDED RELEASE ORAL at 20:14

## 2020-01-28 RX ADMIN — INSULIN GLARGINE 45 UNITS: 100 INJECTION, SOLUTION SUBCUTANEOUS at 21:51

## 2020-01-28 ASSESSMENT — ACTIVITIES OF DAILY LIVING (ADL): ADLS_ACUITY_SCORE: 12

## 2020-01-28 ASSESSMENT — MIFFLIN-ST. JEOR
SCORE: 1740.61
SCORE: 1717.48

## 2020-01-28 NOTE — H&P
[facility]    History and Physical  Hospitalist       Date of Admission:  1/28/2020    Assessment & Plan   Adan Ruffin is a 69 year old male  with history of uncontrolled type 2 diabetes, hyperlipidemia, hypertension, CAD s/p CABG in 4/15, ischemic cardiomyopathy,?  Diastolic dysfunction on recent echo, atrial flutter s/p ablation in 11/17, aortic root dilatation, recent hospitalization for pneumonia between 1/2/2020- 1/7/2020 at [facility] who presents with persistent and worsening shortness of breath over last few weeks.  He is found to have persistent bilateral pulmonary infiltrates and is admitted as inpatient for further evaluation.    Acute hypoxic respiratory failure  Bilateral lung infiltrates possibly postinflammatory organizing pneumonitis  Mild mediastinal and bilateral hilar adenopathy  Emphysema  Admitted at [facility] between 1/2/2020- 1/7/2020.  Found to have multifocal bilateral lung infiltrates on chest CT and treated for pneumonia with IV azithromycin and ceftriaxone, transition to Augmentin at discharge.  Weaned off oxygen prior to discharge.  Had a repeat chest CT with IV contrast on 1/22/2020 that showed persistent multifocal bilateral lung infiltrates with slight improvement along with persistent mild mediastinal and bilateral hilar adenopathy.  Patient has had progressively worsening dyspnea over last few weeks.  Seen by Dr. Merary Benoit at [facility] on 1/28/2020.  Advised admission for IV steroids and further pulmonary work-up for possible postinflammatory organizing pneumonitis.  Spirometry on 1/28/2020 showed restrictive pattern with severely decreased DLCO.  -Admit as inpatient  -Start on IV Solu-Medrol 1 mg/kg every 8 hours as recommended by pulmonologist  -Inpatient pulmonary consult  -Wean oxygen as tolerated.  Keep O2 sats greater than 89%.  -PRN albuterol nebulizers    CAD s/p CABG in 2015  History of ischemic  cardiomyopathy  Possible diastolic dysfunction, moderate concentric LVH noted on recent echo  Elevated BNP  Mildly elevated troponin  Hypertension  Hyperlipidemia  Patient noted to have bilateral leg edema, however he has not noted it.  Weight appears to have increased 5 pounds since last hospitalization.  Echo on 1/3/2020 showed EF at 55 to 60%, mildly dilated RV with normal systolic function, moderate concentric LVH with indeterminate diastolic function.  Mild aortic root dilatation.  Technically difficult study.  Received 60 mg IV Lasix in the ED given elevated BNP. Mildly elevated troponin and elevated BNP could be result of heart strain secondary to lung pathology.  No chest pain.  -Recheck BMP tomorrow.  Have not ordered any more diuretic.  Further diuresis per daytime provider.  -Continue PTA aspirin, atorvastatin, Toprol-XL    Proctitis  Anemia secondary to recent lower GI bleed secondary to proctitis  Recently diagnosed on colonoscopy on 1/6/2020.  Had been having bloody stools since December.  Bloody stools have now resolved.  Still has about half the course of mesalamine enemas left.   Hemoglobin stable at baseline between 9 and 10.  - Continue on PTA mesalamine enemas.    Type 2 diabetes, uncontrolled  Last A1c was 7.7 on 1/2/2020.  He is on very high insulin doses and this was decreased during last hospitalization.  He is recently lost a significant amount of weight [40 pounds] since last August.  -Continue on PTA Lantus 50 units in the morning and 45 units at bedtime  -Placed on prandial insulin 1 is to 5 units carbohydrates.  [PTA was on 10 units insulin for 1 unit carbohydrate].  -Placed on high-dose insulin sliding scale.  -Hold metformin.  -Have placed on decreased insulin regimen compared to PTA doses.  Will need to titrate as required.    DVT Prophylaxis: Pneumatic Compression Devices  Code Status: Full Code  Expected discharge: 2 - 3 days, recommended to prior living arrangement once Respiratory  status stabilized, pulmonary work-up completed.    Marlene Guzman MD    Primary Care Physician   Lauro Galloway    Chief Complaint   Shortness of breath    History is obtained from the patient and patient's wife    History of Present Illness   Adan Ruffin is a 69 year old male with history of uncontrolled type 2 diabetes, hyperlipidemia, hypertension, CAD s/p CABG in 4/15, ischemic cardiomyopathy,?  Diastolic dysfunction on recent echo, atrial flutter s/p ablation in 11/17, aortic root dilatation, recent hospitalization for pneumonia between 1/2/2020- 1/7/2020 at [facility] who presents with persistent and worsening shortness of breath over last few weeks.  As mentioned earlier, patient was recently hospitalized at [facility] with shortness of breath that had been going on since early December.  He was found to have acute hypoxic respiratory failure, chest CT showing significant bilateral groundglass opacities concerning for multifocal pneumonia.  He was treated with IV ceftriaxone and azithromycin and converted to oral Augmentin at discharge.  He was able to be weaned off oxygen prior to discharge.  During this hospitalization he was also evaluated for complaint of 1 month of bloody stools.  He was seen by GI and underwent colonoscopy and was found to have 30 cm of proctitis.  He was started on mesalamine enemas for 1 month.  Insulin doses were also decreased during the stay as patient had lost a significant amount of weight intentionally over last few months.  After discharge, patient notes that he initially felt better but then later started having exertional shortness of breath that steadily worsened over the last few weeks and over the last few days has noted shortness of breath even at rest.  He denies any chest pain, cough, fevers, chills, sweats.  He does not think that his legs are swollen.  He states that he has lost 40 pounds since last August by following a keto diet.  He  denies any abdominal pain and blood in stools has improved.  He notes his O2 saturations have been in the 80s recently but he was able to bring it up with deep breathing but of late this has persistently been in the 80s.    Today he saw a pulmonologist, Dr. Merary vazquez with [facility].  Spirometry was done and he was noted to have severely decreased DLCO and restrictive pattern.  He also had repeat chest CT with IV contrast on 1/22/2020 that again showed the bilateral significant groundglass opacities with some improvement compared to chest CT earlier in the month.  The pulmonologist in the clinic today recommended admission and and further pulmonary work-up given his persistent bilateral infiltrates.  She also recommended starting on IV Solu-Medrol 1 mg/kg IV every 8 hours for?  Postinflammatory organizing pneumonitis, pulmonary consultation to consider further evaluation with lung biopsy/EBUS with node sampling.  In the ED patient was noted to be at 85% O2 sat on room air.  Chest x-ray showed scattered pulmonary infiltrates throughout both lungs with no appreciable change compared to prior.  Labs showed normal WBC, hemoglobin 9.8, BNP elevated to 2506, normal BMP, mildly elevated troponin at 0.09, EKG with no acute ischemic changes.  Patient was given 60 mg IV Lasix in the ED and is admitted to the hospital service for further management.    Past Medical History    I have reviewed this patient's medical history and updated it with pertinent information if needed.   Past Medical History:   Diagnosis Date     Aortic root dilatation (H)      Atrial flutter (H) 10/02/2017    ablation 11/26/2017     CAD (coronary artery disease) 03/13/2015    CABG 4/7/2015 by Dr. Johan Hall: LIMA to LAD, SVG to circumflex, SVG to PDA     Cardiomyopathy (H) 10/02/2017    tachycardia-induced with EF 40-45%, improved once back to NSR     HTN (hypertension)      Hyperlipidaemia      Microalbuminuria 6/27/2016     Obesity  3/13/2015     Type 2 diabetes mellitus with diabetic nephropathy, with long-term current use of insulin (H) 2016       Past Surgical History   I have reviewed this patient's surgical history and updated it with pertinent information if needed.  Past Surgical History:   Procedure Laterality Date     BYPASS GRAFT ARTERY CORONARY N/A 2015    bypass LAD, OM, PDA        COLONOSCOPY N/A 2016    Procedure: COLONOSCOPY;  Surgeon: Marcela Schwartz MD;  Location: [facility]     COLONOSCOPY N/A 2020    Procedure: Colonoscopy, With Polypectomy And Biopsy;  Surgeon: Herson Faust MD;  Location: [facility]     EXCISE PILONIDAL CYST, SIMPLE       HEART CATH, ANGIOPLASTY  3-    3 vessel disease-occluded RCA, stenosis LM-to have CABG     removal of skin cancer       TONSILLECTOMY & ADENOIDECTOMY  1950       Prior to Admission Medications   Prior to Admission Medications   Prescriptions Last Dose Informant Patient Reported? Taking?   ACE NOT PRESCRIBED, INTENTIONAL,   No No   Si each daily ACE Inhibitor not prescribed due to Intolerance   Patient not taking: Reported on 10/11/2019   NOVOFINE 30 30G X 8 MM insulin pen needle   No No   Sig: USE 4 TO 5 DAILY OR AS DIRECTED   aspirin (ASA) 325 MG EC tablet 2020 at am Self Yes Yes   Sig: Take 325 mg by mouth daily   atorvastatin (LIPITOR) 40 MG tablet 2020 at pm Self No Yes   Sig: Take 1 tablet (40 mg) by mouth daily   blood glucose (ACCU-CHEK SMARTVIEW) test strip   No No   Sig: USE TO TEST BLOOD SUGAR FOUR TIMES DAILY OR AS DIRECTED   blood glucose monitoring (ACCU-CHEK FASTCLIX) lancets   No No   Sig: Use to test blood sugar 4 times daily or as directed.   insulin glargine (LANTUS PEN) 100 UNIT/ML pen 2020 at am Self Yes Yes   Sig: Inject 50 Units Subcutaneous every morning   insulin glargine (LANTUS PEN) 100 UNIT/ML pen 2020 at pm Self Yes Yes   Sig: Inject 45 Units Subcutaneous every evening   insulin lispro (HUMALOG KWIKPEN)  100 UNIT/ML (1 unit dial) pen Past Week at Unknown time Self Yes Yes   Sig: Inject Subcutaneous 3 times daily (before meals) 10 units insulin per 1 carb unit (for example, takes 10 units for 1 slice of bread)   insulin pen needle (NOVOFINE) 30G X 8 MM   No No   Sig: Use 4-5 daily or as directed.   mesalamine (ROWASA) 4 g enema 1/27/2020 at hs Self No Yes   Sig: Place 1 enema (4 g) rectally At Bedtime   metFORMIN (GLUCOPHAGE) 1000 MG tablet 1/28/2020 at am Self No Yes   Sig: TAKE 1 TABLET BY MOUTH TWICE DAILY WITH MEALS   metoprolol succinate ER (TOPROL XL) 100 MG 24 hr tablet 1/28/2020 at am Self No Yes   Sig: Take 1 tablet (100 mg) by mouth 2 times daily      Facility-Administered Medications: None     Allergies   Allergies   Allergen Reactions     Tetracycline      PN: LW Reaction: RASH     Codeine Rash     Mild rash     Simvastatin Other (See Comments)     Leg pain       Social History   I have reviewed this patient's social history and updated it with pertinent information if needed. Adan LUI Ruffin  reports that he quit smoking about 20 years ago. His smoking use included cigarettes. He started smoking about 49 years ago. He has a 43.50 pack-year smoking history. He has never used smokeless tobacco. He reports current alcohol use. He reports that he does not use drugs.    Family History   I have reviewed this patient's family history and updated it with pertinent information if needed.   Family History   Problem Relation Age of Onset     Breast Cancer Mother      Cerebrovascular Disease Mother      Hyperlipidemia Father      Cancer Father         lung, unrelated to smoking     Cancer Paternal Grandmother      Colon Cancer No family hx of        Review of Systems   The 10 point Review of Systems is negative other than noted in the HPI or here.    Physical Exam   Temp: 97.8  F (36.6  C) Temp src: Oral BP: (!) 146/81 Pulse: 84 Heart Rate: 80 Resp: 17 SpO2: 95 % O2 Device: Nasal cannula Oxygen Delivery: 6  LPM  Vital Signs with Ranges  Temp:  [97.8  F (36.6  C)] 97.8  F (36.6  C)  Pulse:  [84-91] 84  Heart Rate:  [80] 80  Resp:  [15-18] 17  BP: (146-148)/(73-81) 146/81  SpO2:  [85 %-95 %] 95 %  215 lbs 0 oz    Constitutional: Awake, alert, cooperative, no apparent distress.  Eyes: no icterus, EOMs intact  HEENT: Moist mucous membranes  Respiratory: Bilateral crackles greater in lower lung fields, nonlabored breathing  Cardiovascular: Regular rate and rhythm, normal S1 and S2, and no murmur noted, bilateral 1+ pitting leg edema noted  GI: Soft, non-distended, non-tender, normal bowel sounds.  Skin: No rashes, no cyanosis  Musculoskeletal: No joint swelling, erythema or tenderness.  Neurologic: Alert, oriented and engages in appropriate conversation, no facial asymmetry, moving all extremities, fluent speech  Psychiatric: Calm and pleasant, no obvious anxiety or depression.     Data   Data reviewed today:  I personally reviewed EKG with result as noted above and CXR with result as noted above  Recent Labs   Lab 01/28/20  1547 01/23/20  1603   WBC 9.4 7.2   HGB 9.8* 9.7*   MCV 72* 71*    432     --    POTASSIUM 4.3  --    CHLORIDE 108  --    CO2 23  --    BUN 25  --    CR 0.85  --    ANIONGAP 8  --    AURE 9.7  --    *  --    TROPI 0.099*  --        Recent Results (from the past 24 hour(s))   Chest XR,  PA & LAT    Narrative    XR CHEST 2 VW  1/28/2020 4:09 PM       INDICATION: shortness of breath  COMPARISON: CT 1/22/2020       Impression    IMPRESSION: Again seen are scattered pulmonary infiltrates throughout  both lungs. No appreciable change when compared to previous. Previous  sternotomy. No pleural effusion.    TIFFANY MARTINEZ MD

## 2020-01-28 NOTE — ED TRIAGE NOTES
Pt comes in from [facility] and states he needs to be evaluated for low saturation. Pt. States he had pneumonia earlier this month and it may be back.

## 2020-01-28 NOTE — ED PROVIDER NOTES
"  History     Chief Complaint:  Shortness of Breath      HPI   Adan Ruffin is a 69 year old male with a history of aortic root dilation, coronary artery disease, and diabetes who presents with shortness of breath. The patient was discharged from [facility] on 1/7 for respiratory failure from pneumonia. Following discharge his cough improved but noticed increasing shortness of breath. A family member owns a pulse oximeter and he noted he was saturating in the low 90s for a few weeks but would go up if he breathed faster. Yesterday he noted he could not increase his saturation even with increasing his breath and he came to the Emergency Department. This is atypical as he does not use oxygen at home.  He endorses a small cough and chills. There is more swelling throughout his legs but the patient denies weight changes. The patient states that he wakes up after a few hours of sleep experiencing shortness of breath and feels \"winded.\" His breathing is improved when sitting upright and exacerbated by lifting and exercise. The patient notes frequency in urination. He denies chest pain, nausea, diaphoresis, fever, and abdominal pain. He denies street drug use.     Allergies:  Tetracycline  Codeine  Simvastatin    Medications:    Atorvastatin  Humalog  Mesalamine  Metformin  Metoprolol    Past Medical History:    Aortic root dilation  Atrial flutter  Coronary artery disease   Hypertension   Hyperlipidemia   Microalbuminuria  Obesity   Diabetes     Past Surgical History:    Left coronary artery bypass  Angioplasty heart   Tonsillectomy     Family History:    Mother: breast cancer, cerebrovascular disease   Father: hyperlipidemia, cancer     Social History:  The patient was accompanied to the ED by wife.  Smoking Status: Former smoker   Smokeless Tobacco: Never Used  Alcohol Use: Yes  Drug Use: No  PCP: Lauro Galloway  Marital Status:       Review of Systems   All other systems reviewed and are " "negative.      Physical Exam     Patient Vitals for the past 24 hrs:   BP Temp Temp src Pulse Heart Rate Resp SpO2 Height Weight   01/28/20 1623 -- -- -- -- 80 17 -- -- --   01/28/20 1622 -- -- -- -- -- -- 95 % -- --   01/28/20 1620 (!) 146/81 -- -- 84 -- -- (!) 89 % -- --   01/28/20 1559 -- -- -- -- -- 15 92 % -- --   01/28/20 1522 (!) 148/73 97.8  F (36.6  C) Oral 91 -- 18 (!) 85 % 1.753 m (5' 9\") 97.5 kg (215 lb)       Physical Exam  General: Resting on the bed.  Oxygen nasal cannula in place.   Head: No obvious trauma to head.  Ears, Nose, Throat:  External ears normal.  Nose normal.    Eyes:  Conjunctivae clear.  Pupils are equal, round, and reactive.   Neck: Normal range of motion.  Neck supple.   CV: Regular rate and rhythm.  No murmurs.      Respiratory: Effort normal and breath sounds with crackles in bases.  No focal wheezing.    Gastrointestinal: Soft.  No distension. There is no tenderness.    Musculoskeletal: pitting lower extremity edema   Neuro: Alert. Moving all extremities appropriately.  Normal speech.    Skin: Skin is warm and dry.  No rash noted.     Emergency Department Course   ECG:  ECG taken at 1540, ECG read at 1544  Normal sinus rhythm  ST and T wave abnormality, consider anterior ischemia  Abnormal  ECG  New T wave inversion V2 V3 artifact at baseline  Rate 87 bpm. HI interval 154 ms. QRS duration 92 ms. QT/QTc 390/469 ms. P-R-T axes 69 59 45.    Imaging:  Radiology findings were communicated with the patient who voiced understanding of the findings.    Chest XR,  PA & LAT  Again seen are scattered pulmonary infiltrates throughout  both lungs. No appreciable change when compared to previous. Previous  sternotomy. No pleural effusion.  Reading per radiology    Laboratory:  Laboratory findings were communicated with the patient who voiced understanding of the findings.    CBC: WBC 9.4, HGB 9.8 (L),   BMP: glucose 107 (H) o/w WNL (Creatinine 0.85)  Troponin (Collected 1547): 0.099 " (H)  BNP: 2,506 (H)    Interventions:  1632 Lasix 60 mg IV    Emergency Department Course:  Nursing notes and vitals reviewed.    1630 I performed an exam of the patient as documented above.     IV was inserted and blood was drawn for laboratory testing, results above.    The patient was sent for a Chest XR while in the emergency department, results above.     1643  I spoke with Dr. Guzman of the Hospitalist service from [facility] regarding patient's presentation, findings, and plan of care.    1647 I returned to update the patient about their plan for admit.     Findings and plan explained to the Patient who consents to admission. Discussed the patient with Dr. Guzman, who will admit the patient to a [facility] bed for further monitoring, evaluation, and treatment.    Impression & Plan      Medical Decision Making:  Adan Ruffin is a 69 year old male who presents to the emergency department today for evaluation of shortness of breath.  Patient is hypoxic off on room air.  Broad differential was pursued including not limited to CHF, pneumonia, PE, acute coronary syndrome, arrhythmia, electrolyte, metabolic, renal disease, anemia, etc.  Overall patient does appear to be somewhat short of breath but is saturating well on nasal cannula 6 L.  CBC shows no leukocytosis.  Patient does have chronic ongoing anemia, unchanged.  No fevers, normal white count, do not suspect infectious etiology based on no infectious signs or symptoms.  BMP shows no acute electrolyte, metabolic, renal dysfunction.  EKG shows sinus rhythm with new T wave inversions in V2 V3, but otherwise no acute ischemic changes.  No other focal ST changes.  Troponin is minimally elevated at 0.099 which I suspect this is demand in the setting of CHF exacerbation.  BNP is markedly elevated at 2500 as well.  Clinically patient has pitting lower extremity edema and chest x-ray shows evidence of suspected infiltrates.  There is pulmonary infiltrates noted in  both lungs which I suspect most likely is pulmonary edema as opposed to infiltrates given the overall appearance of fluid overload.  Patient has no infectious signs or symptoms as noted above.  Clinically seems most consistent with CHF exacerbation.  Lasix administered in the ED.  Patient took aspirin at home.  Do not think that heparinization is required at this time as patient has no chest pain and suspect troponin elevation is likely demand related.  Discussed with hospitalist and plan for admission.  Discussed patient will be admitted to the [facility].      Diagnosis:    ICD-10-CM    1. Pulmonary infiltrates R91.8    2. Leg edema R60.0    3. Acute congestive heart failure, unspecified heart failure type (H) I50.9    4. Shortness of breath R06.02    5. Hypoxia R09.02    6. Elevated troponin R79.89        Disposition:   The patient is admitted into the care of Dr. Guzman.    Scribe Disclosure:  NOREEN, Ayah Junior, am serving as a scribe at 3:57 PM on 1/28/2020 to document services personally performed by Geovanna Martinez MD based on my observations and the provider's statements to me.      EMERGENCY DEPARTMENT       Geovanna Martinez MD  01/28/20 0464

## 2020-01-28 NOTE — PHARMACY-ADMISSION MEDICATION HISTORY
Pharmacy Medication History  Admission medication history interview status for the 1/28/2020  admission is complete. See EPIC admission navigator for prior to admission medications     Medication history sources: Spoke w/ patient.  Reviewed recent fill history through [employer].    Medication history source reliability: Moderate  Adherence assessment: Moderate    Significant changes made to the medication list:  - Updated Lantus dosing.    Medication reconciliation completed by provider prior to medication history? No    Time spent in this activity: 15 minutes      Prior to Admission medications    Medication Sig Last Dose Taking? Auth Provider   aspirin (ASA) 325 MG EC tablet Take 325 mg by mouth daily 1/28/2020 at am Yes Unknown, Entered By History   atorvastatin (LIPITOR) 40 MG tablet Take 1 tablet (40 mg) by mouth daily 1/27/2020 at pm Yes Lauro Galloway MD   insulin glargine (LANTUS PEN) 100 UNIT/ML pen Inject 50 Units Subcutaneous every morning 1/28/2020 at am Yes Unknown, Entered By History   insulin glargine (LANTUS PEN) 100 UNIT/ML pen Inject 45 Units Subcutaneous every evening 1/27/2020 at pm Yes Unknown, Entered By History   insulin lispro (HUMALOG KWIKPEN) 100 UNIT/ML (1 unit dial) pen Inject Subcutaneous 3 times daily (before meals) 10 units insulin per 1 carb unit (for example, takes 10 units for 1 slice of bread) Past Week at Unknown time Yes Reported, Patient   mesalamine (ROWASA) 4 g enema Place 1 enema (4 g) rectally At Bedtime 1/27/2020 at hs Yes Demetris Hayes,    metFORMIN (GLUCOPHAGE) 1000 MG tablet TAKE 1 TABLET BY MOUTH TWICE DAILY WITH MEALS 1/28/2020 at am Yes Lauro Galloway MD   metoprolol succinate ER (TOPROL XL) 100 MG 24 hr tablet Take 1 tablet (100 mg) by mouth 2 times daily 1/28/2020 at am Yes Harvey Rosas MD   ACE NOT PRESCRIBED, INTENTIONAL, 1 each daily ACE Inhibitor not prescribed due to Intolerance  Patient not taking: Reported on 10/11/2019    Lauro Galloway MD   blood glucose (ACCU-CHEK SMARTVIEW) test strip USE TO TEST BLOOD SUGAR FOUR TIMES DAILY OR AS DIRECTED   Mayda Pham MD   blood glucose monitoring (ACCU-CHEK FASTCLIX) lancets Use to test blood sugar 4 times daily or as directed.   Mayda Pham MD   insulin pen needle (NOVOFINE) 30G X 8 MM Use 4-5 daily or as directed.   Marilee Tolentino MD   NOVOFINE 30 30G X 8 MM insulin pen needle USE 4 TO 5 DAILY OR AS DIRECTED   Lauro Galloway MD Carolyn Dahlman, PharmD, BCPS

## 2020-01-28 NOTE — ED NOTES
"[facility]  ED Nurse Handoff Report    ED Chief complaint: Shortness of Breath      ED Diagnosis:   Final diagnoses:   Pulmonary infiltrates   Leg edema   Acute congestive heart failure, unspecified heart failure type (H)   Shortness of breath   Hypoxia   Elevated troponin       Code Status: Full Code    Allergies:   Allergies   Allergen Reactions     Tetracycline      PN: LW Reaction: RASH     Codeine Rash     Mild rash     Simvastatin Other (See Comments)     Leg pain       Patient Story: Pt presents from home with complaints of SOB and hypoxia. Pt was seen in clinic today and o2 levels 86%. Pt was sent here for further eval and here o2 85%. Pt states he feels SOB with any exertion. Pt has crackles to lung, worse on R side and swelling on legs. Pt BNP elevated and trop mildly elevated as well. Pt not given ASA in ED because he took a dose at home. Paperwork stated pt has hx of emphysema which he declined and when pt told he has signs of CHF today which he states \"I don't believe.\" VSS. Pt on 5 liters NC and o2 93%. Pt given 60 mg lasix in ED.  Focused Assessment:  A/o x4, independent, crackles present to lungs    Treatments and/or interventions provided: Lasix, o2  Patient's response to treatments and/or interventions: tolerating well    To be done/followed up on inpatient unit:  titrate 02, monitor I and O    Does this patient have any cognitive concerns?: none    Activity level - Baseline/Home:  Independent  Activity Level - Current:   Independent    Patient's Preferred language: English   Needed?: No    Isolation: None  Infection: Not Applicable  Bariatric?: No    Vital Signs:   Vitals:    01/28/20 1559 01/28/20 1620 01/28/20 1622 01/28/20 1623   BP:  (!) 146/81     Pulse:  84     Resp: 15   17   Temp:       TempSrc:       SpO2: 92% (!) 89% 95%    Weight:       Height:           Cardiac Rhythm:Cardiac Rhythm: Normal sinus rhythm    Was the PSS-3 completed:   Yes  What interventions " are required if any?               Family Comments: family at bedside   OBS brochure/video discussed/provided to patient/family: N/A              Name of person given brochure if not patient: n/a              Relationship to patient: n/a    For the majority of the shift this patient's behavior was Green.   Behavioral interventions performed were none.    ED NURSE PHONE NUMBER: *[phone]

## 2020-01-29 LAB
ANION GAP SERPL CALCULATED.3IONS-SCNC: 6 MMOL/L (ref 3–14)
BUN SERPL-MCNC: 24 MG/DL (ref 7–30)
CALCIUM SERPL-MCNC: 9.5 MG/DL (ref 8.5–10.1)
CHLORIDE SERPL-SCNC: 104 MMOL/L (ref 94–109)
CO2 SERPL-SCNC: 24 MMOL/L (ref 20–32)
CREAT SERPL-MCNC: 0.78 MG/DL (ref 0.66–1.25)
GFR SERPL CREATININE-BSD FRML MDRD: >90 ML/MIN/{1.73_M2}
GLUCOSE BLDC GLUCOMTR-MCNC: 213 MG/DL (ref 70–99)
GLUCOSE BLDC GLUCOMTR-MCNC: 231 MG/DL (ref 70–99)
GLUCOSE BLDC GLUCOMTR-MCNC: 273 MG/DL (ref 70–99)
GLUCOSE BLDC GLUCOMTR-MCNC: 284 MG/DL (ref 70–99)
GLUCOSE BLDC GLUCOMTR-MCNC: 300 MG/DL (ref 70–99)
GLUCOSE BLDC GLUCOMTR-MCNC: 334 MG/DL (ref 70–99)
GLUCOSE SERPL-MCNC: 258 MG/DL (ref 70–99)
POTASSIUM SERPL-SCNC: 4.3 MMOL/L (ref 3.4–5.3)
SODIUM SERPL-SCNC: 134 MMOL/L (ref 133–144)

## 2020-01-29 PROCEDURE — 36415 COLL VENOUS BLD VENIPUNCTURE: CPT | Performed by: HOSPITALIST

## 2020-01-29 PROCEDURE — 99232 SBSQ HOSP IP/OBS MODERATE 35: CPT | Performed by: HOSPITALIST

## 2020-01-29 PROCEDURE — 25000132 ZZH RX MED GY IP 250 OP 250 PS 637: Performed by: INTERNAL MEDICINE

## 2020-01-29 PROCEDURE — 25000132 ZZH RX MED GY IP 250 OP 250 PS 637: Performed by: HOSPITALIST

## 2020-01-29 PROCEDURE — 25000131 ZZH RX MED GY IP 250 OP 636 PS 637: Performed by: HOSPITALIST

## 2020-01-29 PROCEDURE — 80048 BASIC METABOLIC PNL TOTAL CA: CPT | Performed by: HOSPITALIST

## 2020-01-29 PROCEDURE — 12000000 ZZH R&B MED SURG/OB

## 2020-01-29 PROCEDURE — 00000146 ZZHCL STATISTIC GLUCOSE BY METER IP

## 2020-01-29 PROCEDURE — 25000128 H RX IP 250 OP 636: Performed by: HOSPITALIST

## 2020-01-29 RX ORDER — PREDNISONE 20 MG/1
60 TABLET ORAL DAILY
Status: DISCONTINUED | OUTPATIENT
Start: 2020-01-29 | End: 2020-02-01 | Stop reason: HOSPADM

## 2020-01-29 RX ORDER — FUROSEMIDE 40 MG
40 TABLET ORAL DAILY
Status: DISCONTINUED | OUTPATIENT
Start: 2020-01-30 | End: 2020-02-01 | Stop reason: HOSPADM

## 2020-01-29 RX ORDER — LEVOFLOXACIN 500 MG/1
500 TABLET, FILM COATED ORAL DAILY
Status: DISCONTINUED | OUTPATIENT
Start: 2020-01-29 | End: 2020-02-01 | Stop reason: HOSPADM

## 2020-01-29 RX ADMIN — INSULIN ASPART 3 UNITS: 100 INJECTION, SOLUTION INTRAVENOUS; SUBCUTANEOUS at 13:06

## 2020-01-29 RX ADMIN — LEVOFLOXACIN 500 MG: 500 TABLET, FILM COATED ORAL at 11:15

## 2020-01-29 RX ADMIN — ASPIRIN 325 MG: 325 TABLET, COATED ORAL at 08:34

## 2020-01-29 RX ADMIN — METOPROLOL SUCCINATE 100 MG: 50 TABLET, EXTENDED RELEASE ORAL at 08:34

## 2020-01-29 RX ADMIN — METOPROLOL SUCCINATE 100 MG: 50 TABLET, EXTENDED RELEASE ORAL at 21:14

## 2020-01-29 RX ADMIN — PREDNISONE 60 MG: 20 TABLET ORAL at 13:10

## 2020-01-29 RX ADMIN — INSULIN ASPART 9 UNITS: 100 INJECTION, SOLUTION INTRAVENOUS; SUBCUTANEOUS at 18:41

## 2020-01-29 RX ADMIN — INSULIN GLARGINE 50 UNITS: 100 INJECTION, SOLUTION SUBCUTANEOUS at 08:35

## 2020-01-29 RX ADMIN — ATORVASTATIN CALCIUM 40 MG: 40 TABLET, FILM COATED ORAL at 21:14

## 2020-01-29 RX ADMIN — INSULIN ASPART 2 UNITS: 100 INJECTION, SOLUTION INTRAVENOUS; SUBCUTANEOUS at 09:23

## 2020-01-29 RX ADMIN — MESALAMINE 4 G: 4 ENEMA RECTAL at 21:11

## 2020-01-29 RX ADMIN — METHYLPREDNISOLONE SODIUM SUCCINATE 62.5 MG: 125 INJECTION, POWDER, FOR SOLUTION INTRAMUSCULAR; INTRAVENOUS at 05:34

## 2020-01-29 RX ADMIN — INSULIN GLARGINE 45 UNITS: 100 INJECTION, SOLUTION SUBCUTANEOUS at 21:15

## 2020-01-29 ASSESSMENT — ACTIVITIES OF DAILY LIVING (ADL)
ADLS_ACUITY_SCORE: 12
ADLS_ACUITY_SCORE: 12
ADLS_ACUITY_SCORE: 13
ADLS_ACUITY_SCORE: 13
ADLS_ACUITY_SCORE: 12
ADLS_ACUITY_SCORE: 12

## 2020-01-29 NOTE — PROGRESS NOTES
RECEIVING UNIT ED HANDOFF REVIEW    ED Nurse Handoff Report was reviewed by: Vciki Maharaj RN on January 28, 2020 at 7:09 PM

## 2020-01-29 NOTE — PLAN OF CARE
Ray was admitted to station 88 on the evening of 1/28 through the ED. VSS on 4L of O2 via nasal cannula. Lung sounds diminished with poor air movement in the bases. No crackles or cough noted. He is dyspneic with exertion. He denies discomfort. Received IV steroids. Slept poorly. Tele:.

## 2020-01-29 NOTE — CONSULTS
Pulmonology Consultation      Adan Ruffin MRN# [id number]   YOB: 1950 Age: 69 year old   Date of Admission: 1/28/2020     Reason for consult: I was asked by Dr Guzman to evaluate this patient for infiltrates and hypoxemia.           Assessment and Plan:   Bilateral infiltrates, inflammatory but with recent pneumonia clinically and small areas of consolidation in left base, can't r/o persistent infection.   Hypoxemia due to infiltrates, with subacute worsening after intitial improvement on antibiotics and with worsening hypoxemia and CT findings, likely due to an organizing pneumonia.   Trace effusions  Pedal edema  Elevated troponin, likely due to hypoxemia PTA.   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 He requires oxygen now at rest, has a bump in troponin, and at this time is not a good candidate for bronchoscopy. I have already recommended empiric steroids for  and also suggest resumption of antibiotics covering aspiration type organisms, given the basilar distribution of the patchy small areas of remaining consolidation. Levaquin po 500 mg x 5 days would be reasonable with close monitoring of glucose given his already high glucose level and steroid therapy.  He will need a repeat high resolution CT scan in the next week if not better or in 3-4 weeks if improving on CXR. Would need to see significant improvement on CXR before discharge. Would change to a long slow taper of po steroids if he is improving after the next 4 days or so. Diuresis may help. He had trace  "bilateral effusions, pedal edema, and elevated BNP as well as troponin.     Dr Cedeño is rounding until tomorrow, our on call will follow thru next Monday.     Please call with any questions. Discussed with patient at bedside. He is already feeling better after diuresis and steroids but is still hypoxemic. Would expect 3-4 days before improvement if infiltrates are indeed .     KRGRomerMD  [facility]  [phone] pager                   Chief Complaint:   Dyspnea increased after hospital discharge and oxygen saturations were in the 80%s at home    History is obtained from the patient and electronic health record and my interview with him yesterday at [facility].         History of Present Illness:   This patient is a 69 year old male who presents with subacute onset of increasing dyspnea worse this week than last week just after discharge from a hospital stay for bilateral pneumonia treated with antibiotics. He has emphysema but did not require steroids during the previous hospital stay.  He was discharged feeling better and not requiring oxygen but sats at home have often been in the 80%s and he has felt more SOB this week than last.     We are asked to see him in consultation for his infiltrates and hypoxemia. He has a paucity of cough, no sputum, no fever, no new chest pain, and no NVD. His primary sent him to see me in clinic yesterday and I sent him to the ED for hypoxemia with sat 86% at rest on RA. His PFTS were highly abnormal and showed restriction and impaired gas transfer. BS were decreased on exam. He had pedal edema.  In the ED his BNP was 2506 and his troponin was 0.099. he did get lasix yesterday. His CXR is unchanged from discharge, shoing basilar predominant infiltrates. His last CT was 1/22.     His hx is well summarized in Dr Guzman\"s H&P note:     \"Adan Ruffin is a 69 year old male  with history of uncontrolled type 2 diabetes, hyperlipidemia, hypertension, CAD s/p CABG in " 4/15, ischemic cardiomyopathy,?  Diastolic dysfunction on recent echo, atrial flutter s/p ablation in 11/17, aortic root dilatation, recent hospitalization for pneumonia between 1/2/2020- 1/7/2020 at [facility] who presents with persistent and worsening shortness of breath over last few weeks.  He is found to have persistent bilateral pulmonary infiltrates and is admitted as inpatient for further evaluation.     Acute hypoxic respiratory failure  Bilateral lung infiltrates possibly postinflammatory organizing pneumonitis  Mild mediastinal and bilateral hilar adenopathy  Emphysema  Admitted at [facility] between 1/2/2020- 1/7/2020.  Found to have multifocal bilateral lung infiltrates on chest CT and treated for pneumonia with IV azithromycin and ceftriaxone, transition to Augmentin at discharge.  Weaned off oxygen prior to discharge.  Had a repeat chest CT with IV contrast on 1/22/2020 that showed persistent multifocal bilateral lung infiltrates with slight improvement along with persistent mild mediastinal and bilateral hilar adenopathy.  Patient has had progressively worsening dyspnea over last few weeks.  Seen by Dr. Merary Benoit at [facility] on 1/28/2020.  Advised admission for IV steroids and further pulmonary work-up for possible postinflammatory organizing pneumonitis.  Spirometry on 1/28/2020 showed restrictive pattern with severely decreased DLCO.  -Admit as inpatient  -Start on IV Solu-Medrol 1 mg/kg every 8 hours as recommended by pulmonologist  -Inpatient pulmonary consult  -Wean oxygen as tolerated.  Keep O2 sats greater than 89%.  -PRN albuterol nebulizers     CAD s/p CABG in 2015  History of ischemic cardiomyopathy  Possible diastolic dysfunction, moderate concentric LVH noted on recent echo  Elevated BNP  Mildly elevated troponin  Hypertension  Hyperlipidemia  Patient noted to have bilateral leg edema, however he has not noted it.  Weight appears to have increased 5  "pounds since last hospitalization.  Echo on 1/3/2020 showed EF at 55 to 60%, mildly dilated RV with normal systolic function, moderate concentric LVH with indeterminate diastolic function.  Mild aortic root dilatation.  Technically difficult study.  Received 60 mg IV Lasix in the ED given elevated BNP. Mildly elevated troponin and elevated BNP could be result of heart strain secondary to lung pathology.  No chest pain.  -Recheck BMP tomorrow.  Have not ordered any more diuretic.  Further diuresis per daytime provider.  -Continue PTA aspirin, atorvastatin, Toprol-XL     Proctitis  Anemia secondary to recent lower GI bleed secondary to proctitis  Recently diagnosed on colonoscopy on 1/6/2020.  Had been having bloody stools since December.  Bloody stools have now resolved.  Still has about half the course of mesalamine enemas left.   Hemoglobin stable at baseline between 9 and 10.  - Continue on PTA mesalamine enemas.     Type 2 diabetes, uncontrolled  Last A1c was 7.7 on 1/2/2020.  He is on very high insulin doses and this was decreased during last hospitalization.  He is recently lost a significant amount of weight [40 pounds] since last August.  -Continue on PTA Lantus 50 units in the morning and 45 units at bedtime  -Placed on prandial insulin 1 is to 5 units carbohydrates.  [PTA was on 10 units insulin for 1 unit carbohydrate].  -Placed on high-dose insulin sliding scale.  -Hold metformin.  -Have placed on decreased insulin regimen compared to PTA doses.  Will need to titrate as required.     DVT Prophylaxis: Pneumatic Compression Devices  Code Status: Full Code  Expected discharge: 2 - 3 days, recommended to prior living arrangement once Respiratory status stabilized, pulmonary work-up completed.     Marlene Guzman MD\"              Past Medical History:     Past Medical History:   Diagnosis Date     Aortic root dilatation (H)      Atrial flutter (H) 10/02/2017    ablation 11/26/2017     CAD (coronary artery " disease) 2015    CABG 2015 by Dr. Johan Hall: LIMA to LAD, SVG to circumflex, SVG to PDA     Cardiomyopathy (H) 10/02/2017    tachycardia-induced with EF 40-45%, improved once back to NSR     HTN (hypertension)      Hyperlipidaemia      Microalbuminuria 2016     Obesity 3/13/2015     Type 2 diabetes mellitus with diabetic nephropathy, with long-term current use of insulin (H) 2016             Past Surgical History:     Past Surgical History:   Procedure Laterality Date     BYPASS GRAFT ARTERY CORONARY N/A 2015    bypass LAD, OM, PDA        COLONOSCOPY N/A 2016    Procedure: COLONOSCOPY;  Surgeon: Marcela Schwartz MD;  Location: [facility]     COLONOSCOPY N/A 2020    Procedure: Colonoscopy, With Polypectomy And Biopsy;  Surgeon: Herson Faust MD;  Location:  GI     EXCISE PILONIDAL CYST, SIMPLE  1975     HEART CATH, ANGIOPLASTY  3-    3 vessel disease-occluded RCA, stenosis LM-to have CABG     removal of skin cancer       TONSILLECTOMY & ADENOIDECTOMY  1950               Social History:     Social History     Tobacco Use     Smoking status: Former Smoker     Packs/day: 1.50     Years: 29.00     Pack years: 43.50     Types: Cigarettes     Start date:      Last attempt to quit: 2000     Years since quittin.0     Smokeless tobacco: Never Used   Substance Use Topics     Alcohol use: Yes     Alcohol/week: 0.0 standard drinks     Comment: 1 mixed drink a night             Family History:     Family History   Problem Relation Age of Onset     Breast Cancer Mother      Cerebrovascular Disease Mother      Hyperlipidemia Father      Cancer Father         lung, unrelated to smoking     Cancer Paternal Grandmother      Colon Cancer No family hx of              Immunizations:     Immunization History   Administered Date(s) Administered     Influenza (High Dose) 3 valent vaccine 2018, 2020     Influenza (IIV3) PF 11/10/2015     Influenza Vaccine IM >  6 months Valent IIV4 09/08/2016     Pneumo Conj 13-V (2010&after) 09/08/2016     Pneumococcal 23 valent 09/06/2018     Tdap (Adacel,Boostrix) 06/14/2010             Allergies:     Allergies   Allergen Reactions     Tetracycline      PN: LW Reaction: RASH     Codeine Rash     Mild rash     Simvastatin Other (See Comments)     Leg pain             Medications:     Current Facility-Administered Medications Ordered in Epic   Medication Dose Route Frequency Last Rate Last Dose     acetaminophen (TYLENOL) tablet 650 mg  650 mg Oral Q4H PRN         aspirin (ASA) chewable tablet 324 mg  324 mg Oral Once         aspirin (ASA) EC tablet 325 mg  325 mg Oral Daily   325 mg at 01/29/20 0834     atorvastatin (LIPITOR) tablet 40 mg  40 mg Oral QPM   40 mg at 01/28/20 2014     glucose gel 15-30 g  15-30 g Oral Q15 Min PRN        Or     dextrose 50 % injection 25-50 mL  25-50 mL Intravenous Q15 Min PRN        Or     glucagon injection 1 mg  1 mg Subcutaneous Q15 Min PRN         insulin aspart (NovoLOG) inj (RAPID ACTING)   Subcutaneous TID w/meals   2 Units at 01/29/20 0923     insulin aspart (NovoLOG) inj (RAPID ACTING)  1-10 Units Subcutaneous TID AC   4 Units at 01/29/20 0923     insulin aspart (NovoLOG) inj (RAPID ACTING)  1-7 Units Subcutaneous At Bedtime         insulin glargine (LANTUS PEN) injection 45 Units  45 Units Subcutaneous At Bedtime   45 Units at 01/28/20 2151     insulin glargine (LANTUS PEN) injection 50 Units  50 Units Subcutaneous QAM AC   50 Units at 01/29/20 0835     lidocaine (LMX4) cream   Topical Q1H PRN         lidocaine 1 % 0.1-1 mL  0.1-1 mL Other Q1H PRN         melatonin tablet 1 mg  1 mg Oral At Bedtime PRN         mesalamine (ROWASA) enema 4 g  4 g Rectal At Bedtime   4 g at 01/28/20 2151     methylPREDNISolone sodium succinate (solu-MEDROL) injection 100 mg  1 mg/kg Intravenous Q8H   62.5 mg at 01/29/20 0534     metoprolol succinate ER (TOPROL-XL) 24 hr tablet 100 mg  100 mg Oral BID   100 mg at  01/29/20 0834     naloxone (NARCAN) injection 0.1-0.4 mg  0.1-0.4 mg Intravenous Q2 Min PRN         sodium chloride (PF) 0.9% PF flush 3 mL  3 mL Intracatheter q1 min prn         sodium chloride (PF) 0.9% PF flush 3 mL  3 mL Intracatheter Q8H   3 mL at 01/29/20 0534     No current [facility]-ordered outpatient medications on file.             Review of Systems:   The 5 point Review of Systems is negative other than noted in the HPI            Physical Exam:     Vitals were reviewed  Patient Vitals for the past 12 hrs:   BP Temp Temp src Heart Rate Resp SpO2   01/29/20 0809 119/51 96.7  F (35.9  C) Oral 79 18 92 %   01/29/20 0411 115/58 -- -- 75 18 95 %     Pleasant alert man nad ox3 looks better rested than yesterday and says he slept well last night.   Diffusely decreased no wheezes rales rhonchi  RRR slightly slow  Truncal obesity but no guarding  Pale dry skin  2+ edema is improved from yesterday.            Data:     Lab Results   Component Value Date    WBC 9.4 01/28/2020    WBC 7.2 01/23/2020    WBC 7.2 01/13/2020    HGB 9.8 (L) 01/28/2020    HGB 9.7 (L) 01/23/2020    HGB 9.6 (L) 01/13/2020    HCT 32.3 (L) 01/28/2020    HCT 32.7 (L) 01/23/2020    HCT 32.3 (L) 01/13/2020     01/28/2020     01/23/2020     (H) 01/13/2020     01/29/2020     01/28/2020     01/07/2020    POTASSIUM 4.3 01/29/2020    POTASSIUM 4.3 01/28/2020    POTASSIUM 3.6 01/07/2020    CHLORIDE 104 01/29/2020    CHLORIDE 108 01/28/2020    CHLORIDE 106 01/07/2020    CO2 24 01/29/2020    CO2 23 01/28/2020    CO2 24 01/07/2020    BUN 24 01/29/2020    BUN 25 01/28/2020    BUN 13 01/07/2020    CR 0.78 01/29/2020    CR 0.85 01/28/2020    CR 0.75 01/07/2020     (H) 01/29/2020     (H) 01/28/2020    GLC 54 (L) 01/07/2020    DD 2.0 (H) 01/02/2020    NTBNPI 2,506 (H) 01/28/2020    NTBNPI 1,222 (H) 01/02/2020    NTBNPI 1,367 (H) 04/09/2015    NTBNP 931 (H) 10/20/2017    NTBNP 331 (H) 10/02/2017    NTBNP 59  2017    TROPI 0.099 (H) 2020    TROPI <0.015 2020    TROPI 1.099 (HH) 2015    AST 29 2020    AST 42 2020    AST 42 10/11/2019    ALT 24 2020    ALT 32 2020    ALT 37 10/11/2019    ALKPHOS 125 2020    ALKPHOS 150 2020    ALKPHOS 144 10/11/2019    BILITOTAL 0.8 2020    BILITOTAL 0.6 2020    BILITOTAL 0.4 10/11/2019    INR 1.04 2020    INR 0.97 10/19/2017    INR 1.30 (H) 2015       All imaging studies reviewed by me.     Echocardiogram Complete   Order: [id number]   Status:  Edited Result - FINAL   Visible to patient:  Yes (MyChart) Next appt:  2020 at 04:20 PM in Internal Medicine (Lauro Galloway MD)   Details     Reading Physician Reading Date Result Priority   Ip, Humberto Ponce MD 1/3/2020       Narrative & Impression     [id number]  [id number]  [id number]  [id number]^TEAGAN^RIGO^F           [facility]  Echocardiography Laboratory  [address] [place] [address]        Name: EDUARDO PARKINSON  MRN: [id number]  : 1950  Study Date: 2020 01:38 PM  Age: 69 yrs  Gender: Male  Patient [facility]: [facility]  Reason For Study: Heart Failure, Unspecified  Ordering Physician: RIGO CANDELARIA  Referring Physician: LEWIS BYRNE  Performed By: Taty Hudson     BSA: 2.2 m2  Height: 69 in  Weight: 221 lb  HR: 79  BP: 135/53 mmHg  _____________________________________________________________________________  __        Procedure  Complete Portable Echo Adult. Optison (NDC #2371-4110) given intravenously.  Technically difficult study.  _____________________________________________________________________________  __        Interpretation Summary     Mild aortic root dilatation.  The visual ejection fraction is estimated at 55-60%.  The right ventricle is mildly dilated.  The right ventricular systolic function is normal.  There is mild-moderate biatrial enlargement.  Compared with prior, no major  changes.  The study was technically difficult. Contrast was used without apparent  complications.  _____________________________________________________________________________  __        Left Ventricle  The left ventricle is normal in structure, function and size. There is  moderate concentric left ventricular hypertrophy. Left ventricular diastolic  function is indeterminate. The visual ejection fraction is estimated at 55-  60%. Septal motion is consistent with conduction abnormality.     Right Ventricle  The right ventricle is mildly dilated. The right ventricular systolic function  is normal.     Atria  There is mild-moderate biatrial enlargement.     Mitral Valve  There is mild mitral annular calcification. The mitral valve leaflets are  mildly thickened.        Tricuspid Valve  Normal tricuspid valve.     Aortic Valve  The aortic valve is normal in structure and function.     Pulmonic Valve  The pulmonic valve is not well seen, but is grossly normal.     Vessels  Mild aortic root dilatation. The ascending aorta is Borderline dilated.  Dilation of the inferior vena cava is present with abnormal respiratory  variation in diameter.     Pericardium  There is no pericardial effusion.        Rhythm  Sinus rhythm was noted.  _____________________________________________________________________________  __  MMode/2D Measurements & Calculations  IVSd: 1.5 cm     LVIDd: 4.8 cm  LVIDs: 3.1 cm  LVPWd: 1.5 cm  FS: 36.4 %  LV mass(C)d: 312.1 grams  LV mass(C)dI: 144.8 grams/m2  Ao root diam: 4.0 cm  LA dimension: 4.4 cm  asc Aorta Diam: 3.7 cm  LA/Ao: 1.1  LVOT diam: 2.5 cm  LVOT area: 4.9 cm2  LA Volume (BP): 73.1 ml  LA Volume Index (BP): 33.8 ml/m2  RWT: 0.63           Doppler Measurements & Calculations  MV E max dylan: 112.0 cm/sec  MV A max dylan: 61.6 cm/sec  MV E/A: 1.8  MV dec time: 0.22 sec  Ao V2 max: 141.2 cm/sec  Ao max P.0 mmHg  Ao V2 mean: 107.3 cm/sec  Ao mean P.0 mmHg  Ao V2 VTI: 28.7 cm  TANJA(I,D): 3.7  cm2  TANJA(V,D): 3.5 cm2  LV V1 max P.1 mmHg  LV V1 max: 100.9 cm/sec  LV V1 VTI: 21.3 cm  CO(LVOT): 8.2 l/min  CI(LVOT): 3.8 l/min/m2  SV(LVOT): 105.3 ml  SI(LVOT): 48.9 ml/m2  PA acc time: 0.12 sec  AV Blaine Ratio (DI): 0.71  TANJA Index (cm2/m2): 1.7  E/E' av.2  Lateral E/e': 9.4  Medial E/e': 14.9              _____________________________________________________________________________  __                 CT CHEST WITH CONTRAST   2020 12:40 PM      HISTORY: Shortness of breath.     TECHNIQUE: 80mL Isovue-370 IV were administered. After contrast  administration, volumetric helical sections were acquired from the  thoracic inlet to the upper abdomen. Coronal images were also  reconstructed. Radiation dose for this scan was reduced using  automated exposure control, adjustment of the mA and/or kV according  to patient size, or iterative reconstruction technique.     COMPARISON: CT of the chest performed 2020.     FINDINGS: Emphysematous changes are again noted throughout both lungs.  Patchy mixed airspace, groundglass, and interstitial opacities  throughout both lungs have improved slightly compared to the previous  exam. No pleural or pericardial effusions. Atherosclerotic  calcification of the thoracic aorta and coronary arteries. Cardiac  enlargement. Prior midline sternotomy. Mild mediastinal and bilateral  hilar adenopathy is not significantly changed. For example, an  enlarged left paratracheal lymph node is unchanged, measuring 2.4 x  1.3 cm (series 2 image 23). Mild nodular thickening of both adrenal  glands is unchanged. Limited views of the upper abdomen are otherwise  unremarkable.                                                                      IMPRESSION:   1. Patchy mixed opacities throughout both lungs have improved slightly  compared to 2020. A resolving bilateral pneumonia could have this  appearance. Clinical correlation and continued follow-up is  recommended to ensure  complete resolution.  2. Emphysema.  3. Mild mediastinal and bilateral hilar adenopathy is not  significantly changed.         PIYUSH TRAN MD   Order-Level Documents:     There are no order-level documents.   Lab and Collection     CT Chest w Contrast (Order: [id number]) - 1/22/2020

## 2020-01-29 NOTE — PROGRESS NOTES
[facility]    Hospitalist Progress Note      Assessment & Plan   Adan Ruffin is a 69 year old male  with history of uncontrolled type 2 diabetes, hyperlipidemia, hypertension, CAD s/p CABG in 4/15, ischemic cardiomyopathy,?  Diastolic dysfunction on recent echo, atrial flutter s/p ablation in 11/17, aortic root dilatation, recent hospitalization for pneumonia between 1/2/2020- 1/7/2020 at [facility] who presents with persistent and worsening shortness of breath over last few weeks.  He is found to have persistent bilateral pulmonary infiltrates and is admitted as inpatient for further evaluation.     Acute hypoxic respiratory failure  Bilateral lung infiltrates possibly postinflammatory organizing pneumonitis  Mild mediastinal and bilateral hilar adenopathy  Emphysema  Admitted at [facility] between 1/2/2020- 1/7/2020.  Found to have multifocal bilateral lung infiltrates on chest CT and treated for pneumonia with IV azithromycin and ceftriaxone, transition to Augmentin at discharge.  Weaned off oxygen prior to discharge.  Had a repeat chest CT with IV contrast on 1/22/2020 that showed persistent multifocal bilateral lung infiltrates with slight improvement along with persistent mild mediastinal and bilateral hilar adenopathy.  Patient has had progressively worsening dyspnea over last few weeks.  Seen by Dr. Merary Benoit at [facility] on 1/28/2020.  Advised admission for IV steroids and further pulmonary work-up for possible postinflammatory organizing pneumonitis.  Spirometry on 1/28/2020 showed restrictive pattern with severely decreased DLCO.  -Appreciate pulmonology consult  -Changed IV steroid to po prednisone (this had somewhat to do with pt's anxiety over blood sugars, also clinical improvement)  -Wean oxygen as tolerated.  Keep O2 sats greater than 89%.  -PRN nebulizers  -Levofloxacin x 5 days per pulm     CAD s/p CABG in 2015  History of ischemic  cardiomyopathy  Possible diastolic dysfunction, moderate concentric LVH noted on recent echo  Elevated BNP  Mildly elevated troponin  Hypertension  Hyperlipidemia  Patient noted to have bilateral leg edema, however he has not noted it.  Weight appears to have increased 5 pounds since last hospitalization.  Echo on 1/3/2020 showed EF at 55 to 60%, mildly dilated RV with normal systolic function, moderate concentric LVH with indeterminate diastolic function.  Mild aortic root dilatation.  Technically difficult study.  Received 60 mg IV Lasix in the ED given elevated BNP. Mildly elevated troponin and elevated BNP could be result of heart strain secondary to lung pathology.  No chest pain.  -40 mg po lasix daily starting tomorrow  -Continue PTA aspirin, atorvastatin, Toprol-XL     Proctitis  Anemia secondary to recent lower GI bleed secondary to proctitis  Recently diagnosed on colonoscopy on 1/6/2020.  Had been having bloody stools since December.  Bloody stools have now resolved.  Still has about half the course of mesalamine enemas left.   Hemoglobin stable at baseline between 9 and 10.  - Continue on PTA mesalamine enemas.     Type 2 diabetes, uncontrolled  Last A1c was 7.7 on 1/2/2020.  He is on very high insulin doses and this was decreased during last hospitalization.  He is recently lost a significant amount of weight [40 pounds] since last August.  -Continue on PTA Lantus 50 units in the morning and 45 units at bedtime  -Placed on prandial insulin 1 is to 5 units carbohydrates.  [PTA was on 10 units insulin for 1 unit carbohydrate].  -Placed on high-dose insulin sliding scale.  -Hold metformin.  -Patient was extremely preoccupied with his blood sugars today. I was called to talk to him several times about it. We made some adjustments and backed off on steroids. Will continue to monitor.    DVT Prophylaxis: Pneumatic Compression Devices  Code Status: Full Code  Expected discharge: 2 - 3 days, recommended to  prior living arrangement once O2 use less than 0 liters/minute.    Luiza Carbajal MD      Interval History   Patient feels better than when he arrived. He is on oxygen at home. He says he did improve some from previous pneumonia, but then worsened again. Was happy about plan from pulmonologist. Denied chest pain, N/V, abd pain. We discussed his keto diet - he has lost about 40 lbs in the last 6 mos. Tried to adjust diet order to give him more options. Pt was upset about his high blood sugars and continued to ask for modifications in coverage throughout the day.    -Data reviewed today: I reviewed all new labs and imaging results over the last 24 hours. I personally reviewed no images or EKG's today.    Physical Exam   Temp: 97.3  F (36.3  C) Temp src: Oral BP: 133/67 Pulse: 81 Heart Rate: 77 Resp: 18 SpO2: 90 % O2 Device: Nasal cannula Oxygen Delivery: 4 LPM  Vitals:    01/28/20 1522 01/28/20 1941   Weight: 97.5 kg (215 lb) 95.2 kg (209 lb 14.4 oz)     Vital Signs with Ranges  Temp:  [96  F (35.6  C)-97.8  F (36.6  C)] 97.3  F (36.3  C)  Pulse:  [81-91] 81  Heart Rate:  [70-90] 77  Resp:  [14-18] 18  BP: (110-156)/(51-92) 133/67  SpO2:  [85 %-99 %] 90 %  No intake/output data recorded.    Constitutional: Awake, alert, cooperative, no apparent distress.  Eyes: no icterus, EOMs intact  HEENT: Moist mucous membranes  Respiratory: Bilateral crackles greater in lower lung fields, nonlabored breathing  Cardiovascular: Regular rate and rhythm, normal S1 and S2, and no murmur noted, trace pitting edema  GI: Soft, non-distended, non-tender, normal bowel sounds.  Skin: No rashes, no cyanosis  Musculoskeletal: No joint swelling, erythema or tenderness.  Neurologic: Alert, oriented and engages in appropriate conversation, no facial asymmetry, moving all extremities, fluent speech  Psychiatric: Calm and pleasant, no obvious anxiety or depression.     Medications       aspirin  324 mg Oral Once     aspirin  325 mg Oral Daily      atorvastatin  40 mg Oral QPM     insulin aspart   Subcutaneous TID w/meals     insulin aspart  1-10 Units Subcutaneous TID AC     insulin aspart  1-7 Units Subcutaneous At Bedtime     insulin glargine  45 Units Subcutaneous At Bedtime     insulin glargine  50 Units Subcutaneous QAM AC     levofloxacin  500 mg Oral Daily     mesalamine  4 g Rectal At Bedtime     metoprolol succinate ER  100 mg Oral BID     predniSONE  60 mg Oral Daily     sodium chloride (PF)  3 mL Intracatheter Q8H       Data   Recent Labs   Lab 01/29/20  0846 01/28/20  1547 01/23/20  1603   WBC  --  9.4 7.2   HGB  --  9.8* 9.7*   MCV  --  72* 71*   PLT  --  410 432    139  --    POTASSIUM 4.3 4.3  --    CHLORIDE 104 108  --    CO2 24 23  --    BUN 24 25  --    CR 0.78 0.85  --    ANIONGAP 6 8  --    AURE 9.5 9.7  --    * 107*  --    TROPI  --  0.099*  --        Recent Results (from the past 24 hour(s))   Chest XR,  PA & LAT    Narrative    XR CHEST 2 VW  1/28/2020 4:09 PM       INDICATION: shortness of breath  COMPARISON: CT 1/22/2020       Impression    IMPRESSION: Again seen are scattered pulmonary infiltrates throughout  both lungs. No appreciable change when compared to previous. Previous  sternotomy. No pleural effusion.    TIFFANY MARTINEZ MD

## 2020-01-30 ENCOUNTER — APPOINTMENT (OUTPATIENT)
Dept: PHYSICAL THERAPY | Facility: CLINIC | Age: 70
End: 2020-01-30
Attending: HOSPITALIST
Payer: COMMERCIAL

## 2020-01-30 LAB
GLUCOSE BLDC GLUCOMTR-MCNC: 194 MG/DL (ref 70–99)
GLUCOSE BLDC GLUCOMTR-MCNC: 218 MG/DL (ref 70–99)
GLUCOSE BLDC GLUCOMTR-MCNC: 234 MG/DL (ref 70–99)
GLUCOSE BLDC GLUCOMTR-MCNC: 238 MG/DL (ref 70–99)
GLUCOSE BLDC GLUCOMTR-MCNC: 253 MG/DL (ref 70–99)

## 2020-01-30 PROCEDURE — 25000132 ZZH RX MED GY IP 250 OP 250 PS 637: Performed by: HOSPITALIST

## 2020-01-30 PROCEDURE — 25000131 ZZH RX MED GY IP 250 OP 636 PS 637: Performed by: HOSPITALIST

## 2020-01-30 PROCEDURE — 25000132 ZZH RX MED GY IP 250 OP 250 PS 637: Performed by: INTERNAL MEDICINE

## 2020-01-30 PROCEDURE — 97161 PT EVAL LOW COMPLEX 20 MIN: CPT | Mod: GP | Performed by: PHYSICAL THERAPIST

## 2020-01-30 PROCEDURE — 99232 SBSQ HOSP IP/OBS MODERATE 35: CPT | Performed by: HOSPITALIST

## 2020-01-30 PROCEDURE — 97110 THERAPEUTIC EXERCISES: CPT | Mod: GP | Performed by: PHYSICAL THERAPIST

## 2020-01-30 PROCEDURE — 12000000 ZZH R&B MED SURG/OB

## 2020-01-30 PROCEDURE — 00000146 ZZHCL STATISTIC GLUCOSE BY METER IP

## 2020-01-30 RX ADMIN — INSULIN ASPART 2 UNITS: 100 INJECTION, SOLUTION INTRAVENOUS; SUBCUTANEOUS at 13:09

## 2020-01-30 RX ADMIN — MESALAMINE 4 G: 4 ENEMA RECTAL at 21:41

## 2020-01-30 RX ADMIN — ATORVASTATIN CALCIUM 40 MG: 40 TABLET, FILM COATED ORAL at 21:41

## 2020-01-30 RX ADMIN — LEVOFLOXACIN 500 MG: 500 TABLET, FILM COATED ORAL at 09:32

## 2020-01-30 RX ADMIN — FUROSEMIDE 40 MG: 40 TABLET ORAL at 09:32

## 2020-01-30 RX ADMIN — INSULIN ASPART: 100 INJECTION, SOLUTION INTRAVENOUS; SUBCUTANEOUS at 18:36

## 2020-01-30 RX ADMIN — METOPROLOL SUCCINATE 100 MG: 50 TABLET, EXTENDED RELEASE ORAL at 21:41

## 2020-01-30 RX ADMIN — PREDNISONE 60 MG: 20 TABLET ORAL at 09:32

## 2020-01-30 RX ADMIN — METOPROLOL SUCCINATE 100 MG: 50 TABLET, EXTENDED RELEASE ORAL at 09:32

## 2020-01-30 RX ADMIN — Medication 1 SPRAY: at 01:36

## 2020-01-30 RX ADMIN — INSULIN GLARGINE 50 UNITS: 100 INJECTION, SOLUTION SUBCUTANEOUS at 09:30

## 2020-01-30 RX ADMIN — ASPIRIN 325 MG: 325 TABLET, COATED ORAL at 09:32

## 2020-01-30 RX ADMIN — INSULIN GLARGINE 45 UNITS: 100 INJECTION, SOLUTION SUBCUTANEOUS at 21:41

## 2020-01-30 RX ADMIN — INSULIN ASPART 1 UNITS: 100 INJECTION, SOLUTION INTRAVENOUS; SUBCUTANEOUS at 09:31

## 2020-01-30 ASSESSMENT — MIFFLIN-ST. JEOR: SCORE: 1736.98

## 2020-01-30 ASSESSMENT — ACTIVITIES OF DAILY LIVING (ADL)
ADLS_ACUITY_SCORE: 12
ADLS_ACUITY_SCORE: 13
ADLS_ACUITY_SCORE: 12

## 2020-01-30 NOTE — PLAN OF CARE
A/O x 4, vss, a-febrile, denies pain/sob, on oxygen at 2.5 L NC, up independently to the bathroom, diuresing well with lasix, patient c/o weakness, PT consulted to see patient, pulmonology following, blood sugars elevated, patient on steroids, using IS to 2000, continue to monitor.

## 2020-01-30 NOTE — PROGRESS NOTES
"PULMONOLOGY PROGRESS NOTE  Date of service: 2020  [facility]    CC/Reason for Visit: pulmonary infiltrates, hypoxia    SUBJECTIVE      HPI: Events of last night reviewed. Stable night. No new respiratory problems. States breathing is somewhat better today.    ROS: A Problem Pertinent review of systems was negative except for items noted in HPI.    Past Medical, Family, and Social/Substance History has been reviewed: No interval changes.    OBJECTIVE   /54   Pulse 73   Temp 97.6  F (36.4  C) (Oral)   Resp 16   Ht 1.753 m (5' 9\")   Wt 97.2 kg (214 lb 3.2 oz)   SpO2 95%   BMI 31.63 kg/m   2.5 L/min     Temp (24hrs), Av  F (36.1  C), Min:95.8  F (35.4  C), Max:98.1  F (36.7  C)       Intake/Output Summary (Last 24 hours) at 2020 0957  Last data filed at 2020 0600  Gross per 24 hour   Intake 1400 ml   Output 800 ml   Net 600 ml     Patient Vitals for the past 96 hrs:   Weight   20 0123 97.2 kg (214 lb 3.2 oz)   20 1941 95.2 kg (209 lb 14.4 oz)   20 1522 97.5 kg (215 lb)       CONSTITUTIONAL/GENERAL APPEARANCE: Alert. No Apparent Distress.  PSYCHIATRIC: Pleasant and appropriate mood and affect. Oriented x 3.  EARS, NOSE,THROAT,MOUTH: External ears and nose overall normal. Normal oral mucosa.   NECK: Neck appearance normal. No neck masses and the thyroid is not enlarged.   RESPIRATORY: Non-labored effort. Decreased BS, few crackles, no wheezes.  CARDIOVASCULAR: S1, S2, regular rate and rhythm.      LABORATORY ASSESSMENT    No lab results found in last 7 days.  Recent Labs   Lab 20  1547 20  1603   WBC 9.4 7.2   RBC 4.46 4.60   HGB 9.8* 9.7*   HCT 32.3* 32.7*   MCV 72* 71*   MCH 22.0* 21.1*   MCHC 30.3* 29.7*   RDW 17.5* 17.3*    432     Recent Labs   Lab 20  0846 20  1547    139   POTASSIUM 4.3 4.3   CHLORIDE 104 108   CO2 24 23   ANIONGAP 6 8   BUN 24 25   CR 0.78 0.85   GFRESTIMATED >90 90   GFRESTBLACK >90 >90   AURE " 9.5 9.7     No lab results found in last 7 days.  No lab results found in last 7 days.    Additional labs and/or comments:    IMAGING      CXR 1/28 -  IMPRESSION: Again seen are scattered pulmonary infiltrates throughout  both lungs. No appreciable change when compared to previous. Previous  sternotomy. No pleural effusion.    CT Chest 1/22 -  IMPRESSION:   1. Patchy mixed opacities throughout both lungs have improved slightly  compared to 1/2/2020. A resolving bilateral pneumonia could have this  appearance. Clinical correlation and continued follow-up is  recommended to ensure complete resolution.  2. Emphysema.  3. Mild mediastinal and bilateral hilar adenopathy is not  significantly changed.    PFT & OTHER TESTING       ASSESSMENT / PLAN      Active Problems:    Acute respiratory failure with hypoxia (H)      ASSESSMENT:  69-year-old male former smoker with multiple medical problems, recent episode of pneumonia, admitted for shortness of breath and hypoxia.  Patient was hospitalized at [facility] from 1/2-1/7/2020 for multifocal community-acquired pneumonia.  He was treated with azithromycin and Rocephin and discharged on a course of Augmentin.  He was weaned off of oxygen prior to discharge.  Follow-up CT scan of the chest 1/22 showed patchy mixed opacities bilaterally that had improved slightly when compared to the prior study done 1/2/2020.  Patient continued to have shortness of breath and was noted to be hypoxic at home.  He was seen in follow-up by Dr. Donahue on 1/28 and was subsequently admitted for possible postpneumonic organizing pneumonia.  Chest x-ray on admission again showed scattered pulmonary infiltrates bilaterally.  He has been started on steroid therapy and appears clinically improved at this time.  Would continue with present therapy for now.  Respiratory status stable on low-flow oxygen.    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 Adjust oxygen, keep SaO2 > 90%  2. Antibiotics - Levaquin  3. Steroids - Prednisone  4. Follow CXR.      Lloyd Cedeño MD    [facility]  [phone] (pager)  [phone] (office)

## 2020-01-30 NOTE — PROGRESS NOTES
[facility]    Hospitalist Progress Note      Assessment & Plan   Adan Ruffin is a 69 year old male  with history of uncontrolled type 2 diabetes, hyperlipidemia, hypertension, CAD s/p CABG in 4/15, ischemic cardiomyopathy,?  Diastolic dysfunction on recent echo, atrial flutter s/p ablation in 11/17, aortic root dilatation, recent hospitalization for pneumonia between 1/2/2020- 1/7/2020 at [facility] who presents with persistent and worsening shortness of breath over last few weeks.  He is found to have persistent bilateral pulmonary infiltrates and is admitted as inpatient for further evaluation.     Acute hypoxic respiratory failure  Bilateral lung infiltrates possibly postinflammatory organizing pneumonitis  Mild mediastinal and bilateral hilar adenopathy  Emphysema  Admitted at [facility] between 1/2/2020- 1/7/2020.  Found to have multifocal bilateral lung infiltrates on chest CT and treated for pneumonia with IV azithromycin and ceftriaxone, transition to Augmentin at discharge.  Weaned off oxygen prior to discharge.  Had a repeat chest CT with IV contrast on 1/22/2020 that showed persistent multifocal bilateral lung infiltrates with slight improvement along with persistent mild mediastinal and bilateral hilar adenopathy.  Patient has had progressively worsening dyspnea over last few weeks.  Seen by Dr. Merary Benoit at [facility] on 1/28/2020.  Advised admission for IV steroids and further pulmonary work-up for possible postinflammatory organizing pneumonitis.  Spirometry on 1/28/2020 showed restrictive pattern with severely decreased DLCO.  Patient is not on oxygen at home.  - Appreciate pulmonology consult  - Changed IV steroid to po prednisone (this had somewhat to do with pt's anxiety over blood sugars, also clinical improvement)  - Wean oxygen as tolerated.  Keep O2 sats greater than 89%.  - PRN nebulizers  - Levofloxacin x 5 days per pulm     CAD s/p CABG  in 2015  History of ischemic cardiomyopathy  Possible diastolic dysfunction, moderate concentric LVH noted on recent echo  Elevated BNP  Mildly elevated troponin  Hypertension  Hyperlipidemia  Patient noted to have bilateral leg edema, however he has not noted it.  Weight appears to have increased 5 pounds since last hospitalization.  Echo on 1/3/2020 showed EF at 55 to 60%, mildly dilated RV with normal systolic function, moderate concentric LVH with indeterminate diastolic function.  Mild aortic root dilatation.  Technically difficult study.  Received 60 mg IV Lasix in the ED given elevated BNP. Mildly elevated troponin and elevated BNP could be result of heart strain secondary to lung pathology.  No chest pain.  -40 mg po lasix daily starting tomorrow x 3 doses - will assess for any improvement  -Continue PTA aspirin, atorvastatin, Toprol-XL     Proctitis  Anemia secondary to recent lower GI bleed secondary to proctitis  Recently diagnosed on colonoscopy on 1/6/2020.  Had been having bloody stools since December.  Bloody stools have now resolved.  Still has about half the course of mesalamine enemas left.   Hemoglobin stable at baseline between 9 and 10.  - Continue on PTA mesalamine enemas.     Type 2 diabetes, uncontrolled  Last A1c was 7.7 on 1/2/2020.  He is on very high insulin doses and this was decreased during last hospitalization.  He is recently lost a significant amount of weight [40 pounds] since last August.  -Continue on PTA Lantus 50 units in the morning and 45 units at bedtime  -Placed on prandial insulin 1 is to 5 units carbohydrates.  [PTA was on 10 units insulin for 1 unit carbohydrate].  -high-dose insulin sliding scale.  -Hold metformin.  -Patient is still preoccupied with his blood sugars but has given up on the hospital dealing with glycemic control appropriately.    DVT Prophylaxis: Pneumatic Compression Devices  Code Status: Full Code  Expected discharge: 2 - 3 days, recommended to prior  living arrangement once oxygen needs improved.     Luiza Carbajal MD      Interval History   Patient is upset about his blood sugars still being high. He clarified that he is not on oxygen at home and never has been. Lives at home with his wife. Part of the problem after his last admission was that his oxygen levels would drop after activity. He is requesting PT here with measurement of oxygen levels with activity.    -Data reviewed today: I reviewed all new labs and imaging results over the last 24 hours. I personally reviewed no images or EKG's today.    Physical Exam   Temp: 97.6  F (36.4  C) Temp src: Oral BP: 116/54 Pulse: 73 Heart Rate: 70 Resp: 16 SpO2: 95 % O2 Device: Nasal cannula Oxygen Delivery: 2.5 LPM  Vitals:    01/28/20 1522 01/28/20 1941 01/30/20 0123   Weight: 97.5 kg (215 lb) 95.2 kg (209 lb 14.4 oz) 97.2 kg (214 lb 3.2 oz)     Vital Signs with Ranges  Temp:  [95.8  F (35.4  C)-98.1  F (36.7  C)] 97.6  F (36.4  C)  Pulse:  [73-76] 73  Heart Rate:  [70-96] 70  Resp:  [16-18] 16  BP: (116-179)/(54-87) 116/54  SpO2:  [93 %-95 %] 95 %  I/O last 3 completed shifts:  In: 1640 [P.O.:1640]  Out: 800 [Urine:800]    Constitutional: Awake, alert, cooperative, no apparent distress.  Eyes: no icterus, EOMs intact  HEENT: Moist mucous membranes  Respiratory: clear bilaterally. No wheezing.  Cardiovascular: Regular rate and rhythm, normal S1 and S2, and no murmur noted, trace pitting edema  GI: Soft, non-distended, non-tender, normal bowel sounds.  Skin: No rashes, no cyanosis  Musculoskeletal: No joint swelling, erythema or tenderness.  Neurologic: Alert, oriented and engages in appropriate conversation, no facial asymmetry, moving all extremities, fluent speech  Psychiatric: Calm and pleasant, no obvious anxiety or depression.     Medications       aspirin  324 mg Oral Once     aspirin  325 mg Oral Daily     atorvastatin  40 mg Oral QPM     furosemide  40 mg Oral Daily     insulin aspart   Subcutaneous TID  w/meals     insulin aspart  1-10 Units Subcutaneous TID AC     insulin aspart  1-7 Units Subcutaneous At Bedtime     insulin glargine  45 Units Subcutaneous At Bedtime     insulin glargine  50 Units Subcutaneous QAM AC     levofloxacin  500 mg Oral Daily     mesalamine  4 g Rectal At Bedtime     metoprolol succinate ER  100 mg Oral BID     predniSONE  60 mg Oral Daily     sodium chloride (PF)  3 mL Intracatheter Q8H       Data   Recent Labs   Lab 01/29/20  0846 01/28/20  1547 01/23/20  1603   WBC  --  9.4 7.2   HGB  --  9.8* 9.7*   MCV  --  72* 71*   PLT  --  410 432    139  --    POTASSIUM 4.3 4.3  --    CHLORIDE 104 108  --    CO2 24 23  --    BUN 24 25  --    CR 0.78 0.85  --    ANIONGAP 6 8  --    AURE 9.5 9.7  --    * 107*  --    TROPI  --  0.099*  --        No results found for this or any previous visit (from the past 24 hour(s)).

## 2020-01-30 NOTE — PROGRESS NOTES
01/30/20 1456   Quick Adds   Type of Visit Initial PT Evaluation   Living Environment   Lives With spouse   Living Arrangements house   Home Accessibility stairs to enter home;stairs within home   Number of Stairs, Main Entrance 2   Stair Railings, Main Entrance railing on left side (ascending)   Number of Stairs, Within Home, Primary other (see comments)  (12 to basement)   Transportation Anticipated family or friend will provide  (patient drives at baseline)   Self-Care   Usual Activity Tolerance moderate   Current Activity Tolerance moderate   Regular Exercise No   Equipment Currently Used at Home none   Activity/Exercise/Self-Care Comment normally independent with mobility; still works   Functional Level Prior   Ambulation 0-->independent   Transferring 0-->independent   Toileting 0-->independent   Bathing 0-->independent   Communication 0-->understands/communicates without difficulty   Swallowing 0-->swallows foods/liquids without difficulty   Cognition 0 - no cognition issues reported   Fall history within last six months no   Which of the above functional risks had a recent onset or change? none   Prior Functional Level Comment patient with recent decreased activity tolerance   General Information   Onset of Illness/Injury or Date of Surgery - Date 01/28/20   Referring Physician Luiza Carbajal MD   Patient/Family Goals Statement return home   Pertinent History of Current Problem (include personal factors and/or comorbidities that impact the POC) per chart: Adan Ruffin is a 69 year old male  with history of uncontrolled type 2 diabetes, hyperlipidemia, hypertension, CAD s/p CABG in 4/15, ischemic cardiomyopathy,?  Diastolic dysfunction on recent echo, atrial flutter s/p ablation in 11/17, aortic root dilatation, recent hospitalization for pneumonia between 1/2/2020- 1/7/2020 at [facility] who presents with persistent and worsening shortness of breath over last few weeks.  He is found  to have persistent bilateral pulmonary infiltrates and is admitted as inpatient for further evaluation; see medical record for further information   Precautions/Limitations oxygen therapy device and L/min  (2L)   Heart Disease Risk Factors Diabetes;Medical history   General Observations patient sitting at EOB   Cognitive Status Examination   Orientation orientation to person, place and time   Level of Consciousness alert   Follows Commands and Answers Questions 100% of the time   Personal Safety and Judgment intact   Memory intact   Pain Assessment   Patient Currently in Pain No   Range of Motion (ROM)   ROM Comment WFL   Strength   Strength Comments WFL   Bed Mobility   Bed Mobility Comments independent   Transfer Skills   Transfer Comments independent   Gait   Gait Comments independent with decreased activity tolerance   Balance   Balance Comments intact   General Therapy Interventions   Planned Therapy Interventions progressive activity/exercise   Intervention Comments 1x eval and treatment   Clinical Impression   Criteria for Skilled Therapeutic Intervention yes, treatment indicated   PT Diagnosis impaired activity tolerance   Influenced by the following impairments desats after activity; decreased activity tolerance   Functional limitations due to impairments impaired ability to ambulate longer distances/community distances   Clinical Presentation Evolving/Changing   Clinical Presentation Rationale clinical judgement;   Clinical Decision Making (Complexity) Low complexity   Therapy Frequency Other (see comments)  (1x eval and treatment)   Predicted Duration of Therapy Intervention (days/wks) 1x eval and treatment   Anticipated Equipment Needs at Discharge other (see comments)  (oximeter; O2?)   Anticipated Discharge Disposition Home;Home with Outpatient Therapy  (OP Pulmonary rehab?)   Risk & Benefits of therapy have been explained Yes   Patient, Family & other staff in agreement with plan of care Yes   [employer]  "[facility] TM \"6 Clicks\"   2016, [employer] of [employer], under license to [employer].  All rights reserved.   6 Clicks Short Forms Basic Mobility Inpatient Short Form   [facility]  \"6 Clicks\" V.2 Basic Mobility Inpatient Short Form   1. Turning from your back to your side while in a flat bed without using bedrails? 4 - None   2. Moving from lying on your back to sitting on the side of a flat bed without using bedrails? 4 - None   3. Moving to and from a bed to a chair (including a wheelchair)? 4 - None   4. Standing up from a chair using your arms (e.g., wheelchair, or bedside chair)? 4 - None   5. To walk in hospital room? 4 - None   6. Climbing 3-5 steps with a railing? 4 - None   Basic Mobility Raw Score (Score out of 24.Lower scores equate to lower levels of function) 24   Total Evaluation Time   Total Evaluation Time (Minutes) 10     "

## 2020-01-30 NOTE — PLAN OF CARE
Patient is A/O x4. Independent. VSS, weaned to 2.5L O2 from 4 this AM. Introduced IS and Aerobika to patient today. No C/O SOB, minimal TERAN. Lung sounds clear throughout, but diminished. Denies pain. Tele NSR. BG checks. Insulin coverage given. Mod-carb diet, tolerating well. Discharge plans pending.

## 2020-01-30 NOTE — PLAN OF CARE
Discharge Planner PT   Patient plan for discharge: return home  Current status: PT: Order received; 1x evaluation and treatment completed; Patient desats with activity per order. At baseline patient lives with his spouse in a home with steps to enter and steps to basement level; Wasn't using O2 at home; still works as a  from home; no use of an assistive device; no recent falls. On eval patient noted to be saturating at 95% on 2L in sitting; HR 57; ambulated x 100 feet in hallway; mild/mod SOB; O2 sat initially 93% after stopping walking and then decreased to 90-91%; took 1.5-2 minutes to recover to 94%; ambulated another 100 feet and sats initially 96% but after sitting x 15-30 sec dropped to 89-90%; took 1.5-2 minutes to recover to 98%;encouraged to do a walking program in hallway while hospitalized and build in rest breaks after 100 feet; Time spent in education about his breathing, monitoring of O2 sats here in hospital and at home and progression of activity; May also benefit from a pulmonary rehab program when medically stable; current goals met.  Barriers to return to prior living situation: none anticipated  Recommendations for discharge: Home with OP pulmonary rehab  Rationale for recommendations: Patient would benefit from a continued walking program while hospitalized with monitoring of O2 sats and build in rest breaks; would possibly benefit from OP Pulmonary rehab when more medically stable.       Entered by: Sara Galvan 01/30/2020 3:31 PM

## 2020-01-30 NOTE — PLAN OF CARE
Lung sounds improving. Maintains O2 sats on 2.5L. Reports improvement with exertional dyspnea. Tele NSR. Up independently. Reports feeling frustrated with continued high BG. Plan pending.

## 2020-01-30 NOTE — PLAN OF CARE
Physical Therapy Discharge Summary    Reason for therapy discharge:    All goals and outcomes met, no further needs identified.    Progress towards therapy goal(s). See goals on Care Plan in [facility] electronic health record for goal details.  Goals met    Therapy recommendation(s):    Continue home exercise program.

## 2020-01-31 LAB
GLUCOSE BLDC GLUCOMTR-MCNC: 103 MG/DL (ref 70–99)
GLUCOSE BLDC GLUCOMTR-MCNC: 137 MG/DL (ref 70–99)
GLUCOSE BLDC GLUCOMTR-MCNC: 142 MG/DL (ref 70–99)
GLUCOSE BLDC GLUCOMTR-MCNC: 276 MG/DL (ref 70–99)

## 2020-01-31 PROCEDURE — 25000131 ZZH RX MED GY IP 250 OP 636 PS 637: Performed by: HOSPITALIST

## 2020-01-31 PROCEDURE — 12000000 ZZH R&B MED SURG/OB

## 2020-01-31 PROCEDURE — 25000132 ZZH RX MED GY IP 250 OP 250 PS 637: Performed by: INTERNAL MEDICINE

## 2020-01-31 PROCEDURE — 25000132 ZZH RX MED GY IP 250 OP 250 PS 637: Performed by: HOSPITALIST

## 2020-01-31 PROCEDURE — 99232 SBSQ HOSP IP/OBS MODERATE 35: CPT | Performed by: HOSPITALIST

## 2020-01-31 PROCEDURE — 00000146 ZZHCL STATISTIC GLUCOSE BY METER IP

## 2020-01-31 RX ADMIN — ATORVASTATIN CALCIUM 40 MG: 40 TABLET, FILM COATED ORAL at 20:04

## 2020-01-31 RX ADMIN — FUROSEMIDE 40 MG: 40 TABLET ORAL at 08:57

## 2020-01-31 RX ADMIN — LEVOFLOXACIN 500 MG: 500 TABLET, FILM COATED ORAL at 08:57

## 2020-01-31 RX ADMIN — MESALAMINE 4 G: 4 ENEMA RECTAL at 22:29

## 2020-01-31 RX ADMIN — INSULIN ASPART 20 UNITS: 100 INJECTION, SOLUTION INTRAVENOUS; SUBCUTANEOUS at 08:59

## 2020-01-31 RX ADMIN — ASPIRIN 325 MG: 325 TABLET, COATED ORAL at 08:57

## 2020-01-31 RX ADMIN — INSULIN ASPART 13 UNITS: 100 INJECTION, SOLUTION INTRAVENOUS; SUBCUTANEOUS at 16:54

## 2020-01-31 RX ADMIN — PREDNISONE 60 MG: 20 TABLET ORAL at 08:57

## 2020-01-31 RX ADMIN — INSULIN ASPART 10 UNITS: 100 INJECTION, SOLUTION INTRAVENOUS; SUBCUTANEOUS at 12:48

## 2020-01-31 RX ADMIN — METOPROLOL SUCCINATE 100 MG: 50 TABLET, EXTENDED RELEASE ORAL at 20:04

## 2020-01-31 RX ADMIN — INSULIN GLARGINE 50 UNITS: 100 INJECTION, SOLUTION SUBCUTANEOUS at 09:00

## 2020-01-31 RX ADMIN — METOPROLOL SUCCINATE 100 MG: 50 TABLET, EXTENDED RELEASE ORAL at 08:57

## 2020-01-31 RX ADMIN — INSULIN GLARGINE 45 UNITS: 100 INJECTION, SOLUTION SUBCUTANEOUS at 22:25

## 2020-01-31 ASSESSMENT — ACTIVITIES OF DAILY LIVING (ADL)
ADLS_ACUITY_SCORE: 12
ADLS_ACUITY_SCORE: 12
ADLS_ACUITY_SCORE: 13
ADLS_ACUITY_SCORE: 12

## 2020-01-31 ASSESSMENT — MIFFLIN-ST. JEOR: SCORE: 1717.03

## 2020-01-31 NOTE — PROGRESS NOTES
[facility]    Hospitalist Progress Note      Assessment & Plan   Adan Ruffin is a 69 year old male  with history of uncontrolled type 2 diabetes, hyperlipidemia, hypertension, CAD s/p CABG in 4/15, ischemic cardiomyopathy,?  Diastolic dysfunction on recent echo, atrial flutter s/p ablation in 11/17, aortic root dilatation, recent hospitalization for pneumonia between 1/2/2020- 1/7/2020 at [facility] who presents with persistent and worsening shortness of breath over last few weeks.  He is found to have persistent bilateral pulmonary infiltrates and is admitted as inpatient for further evaluation.     Acute hypoxic respiratory failure  Bilateral lung infiltrates possibly postinflammatory organizing pneumonitis  Mild mediastinal and bilateral hilar adenopathy  Emphysema  Admitted at [facility] between 1/2/2020- 1/7/2020.  Found to have multifocal bilateral lung infiltrates on chest CT and treated for pneumonia with IV azithromycin and ceftriaxone, transition to Augmentin at discharge.  Weaned off oxygen prior to discharge.  Had a repeat chest CT with IV contrast on 1/22/2020 that showed persistent multifocal bilateral lung infiltrates with slight improvement along with persistent mild mediastinal and bilateral hilar adenopathy.  Patient has had progressively worsening dyspnea over last few weeks.  Seen by Dr. Merary Benoit at [facility] on 1/28/2020.  Advised admission for IV steroids and further pulmonary work-up for possible postinflammatory organizing pneumonitis.  Spirometry on 1/28/2020 showed restrictive pattern with severely decreased DLCO.  Patient is not on oxygen at home.  - Appreciate pulmonology consult  - Prednisone taper on discharge per pulm: 40 mg/d x 7 days, 30 mg/d x 7 days, 20 mg/d until seen in follow-up with Dr. Benoit in 3-4 weeks.  - Wean oxygen as tolerated.  Keep O2 sats greater than 89%.   - PRN nebulizers  - Levofloxacin x 5 days per pulm  (today is day 3/5)  - We discussed dispo today. Pt is hoping to go home off oxygen. He has travel planned for 2/10 and most important is that he can go on that trip and that he not go home and be rehospitalized again. He was down to 1L NC today.      CAD s/p CABG in 2015  History of ischemic cardiomyopathy  Possible diastolic dysfunction, moderate concentric LVH noted on recent echo  Elevated BNP  Mildly elevated troponin  Hypertension  Hyperlipidemia  Patient noted to have bilateral leg edema, however he has not noted it.  Weight appears to have increased 5 pounds since last hospitalization.  Echo on 1/3/2020 showed EF at 55 to 60%, mildly dilated RV with normal systolic function, moderate concentric LVH with indeterminate diastolic function.  Received 60 mg IV Lasix in the ED given elevated BNP. Mildly elevated troponin and elevated BNP could be result of heart strain secondary to lung pathology.  No chest pain.  - 40 mg po lasix daily while in the hospital in case this might help.   - Check BMP tomorrow.  - Continue PTA aspirin, atorvastatin, Toprol-XL     Proctitis  Anemia secondary to recent lower GI bleed secondary to proctitis  Recently diagnosed on colonoscopy on 1/6/2020.  Had been having bloody stools since December.  Bloody stools have now resolved.  Still has about half the course of mesalamine enemas left.   Hemoglobin stable at baseline between 9 and 10.  - Continue on PTA mesalamine enemas.  - pt says stools no longer bloody.  - Discussed anemia with patient. His wife is concerned about it. Suggested follow up outpatient. Will check iron studies but told pt would hold off on supplement until GI follow up.     Type 2 diabetes, uncontrolled  Last A1c was 7.7 on 1/2/2020.  He is on very high insulin doses and this was decreased during last hospitalization.  He is recently lost a significant amount of weight [40 pounds] since last August.  -Continue on PTA Lantus 50 units in the morning and 45 units at  bedtime  -Placed on prandial insulin 1 is to 5 units carbohydrates.  [PTA was on 10 units insulin for 1 unit carbohydrate].  -high-dose insulin sliding scale.  -Hold metformin.  -Glycemic control has improved on po steroid.    DVT Prophylaxis: Pneumatic Compression Devices  Code Status: Full Code  Expected discharge: 1-2 days, recommended to prior living arrangement once oxygen needs improved.     Luiza Carbajal MD      Interval History   Patient is happy that blood sugars improved today. He did work with PT yesterday and oxygenation dropped after activity like it did at home and took some time to recover. He brought up that his wife was concerned about his anemia. I reassured him that Hgb has been stable throughout the month and that he could f/u on this and start iron supplementation at some point outpatient, but that I would wait for his GI follow up and discuss there. Discussed whether he is anxious to get home vs stay inpt. Pt would like to try to get off oxygen before going home. More importantly he would like to go on the vacation he had planned with his wife in 10 days so he wants to make sure he is actually better before discharge this time.     He is doing his best to follow his keto diet inpatient. Ambulating on his own and with PT.    -Data reviewed today: I reviewed all new labs and imaging results over the last 24 hours. I personally reviewed no images or EKG's today.    Physical Exam   Temp: 96.2  F (35.7  C) Temp src: Axillary BP: 116/67   Heart Rate: 71 Resp: 18 SpO2: (!) 85 %(after walk in downs) O2 Device: None (Room air) Oxygen Delivery: 1 LPM  Vitals:    01/28/20 1941 01/30/20 0123 01/31/20 0611   Weight: 95.2 kg (209 lb 14.4 oz) 97.2 kg (214 lb 3.2 oz) 95.2 kg (209 lb 12.8 oz)     Vital Signs with Ranges  Temp:  [96.2  F (35.7  C)-96.7  F (35.9  C)] 96.2  F (35.7  C)  Heart Rate:  [71-78] 71  Resp:  [18] 18  BP: (116-141)/(67-68) 116/67  SpO2:  [85 %-97 %] 85 %  I/O last 3 completed  shifts:  In: -   Out: 1150 [Urine:1150]    Constitutional: Awake, alert, cooperative, no apparent distress.  Eyes: no icterus, EOMs intact  HEENT: Moist mucous membranes  Respiratory: clear bilaterally. No wheezing.  Cardiovascular: Regular rate and rhythm, normal S1 and S2, and no murmur noted, trace pitting edema  GI: Soft, non-distended, non-tender, normal bowel sounds.  Skin: No rashes, no cyanosis  Musculoskeletal: No joint swelling, erythema or tenderness.  Neurologic: Alert, oriented and engages in appropriate conversation, no facial asymmetry, moving all extremities, fluent speech  Psychiatric: Calm and pleasant, no obvious anxiety or depression.     Medications       aspirin  324 mg Oral Once     aspirin  325 mg Oral Daily     atorvastatin  40 mg Oral QPM     furosemide  40 mg Oral Daily     insulin aspart   Subcutaneous TID w/meals     insulin aspart  1-10 Units Subcutaneous TID AC     insulin aspart  1-7 Units Subcutaneous At Bedtime     insulin glargine  45 Units Subcutaneous At Bedtime     insulin glargine  50 Units Subcutaneous QAM AC     levofloxacin  500 mg Oral Daily     mesalamine  4 g Rectal At Bedtime     metoprolol succinate ER  100 mg Oral BID     predniSONE  60 mg Oral Daily     sodium chloride (PF)  3 mL Intracatheter Q8H       Data   Recent Labs   Lab 01/29/20  0846 01/28/20  1547   WBC  --  9.4   HGB  --  9.8*   MCV  --  72*   PLT  --  410    139   POTASSIUM 4.3 4.3   CHLORIDE 104 108   CO2 24 23   BUN 24 25   CR 0.78 0.85   ANIONGAP 6 8   AURE 9.5 9.7   * 107*   TROPI  --  0.099*       No results found for this or any previous visit (from the past 24 hour(s)).

## 2020-01-31 NOTE — PROGRESS NOTES
"PULMONOLOGY PROGRESS NOTE  Date of service: 2020  [facility]    CC/Reason for Visit: pulmonary infiltrates, hypoxia    SUBJECTIVE      HPI: Events reviewed since last seen. Stable night. No new respiratory problems. States breathing is better today. Patient has been up walking around the nursing station.    ROS: A Problem Pertinent review of systems was negative except for items noted in HPI.    Past Medical, Family, and Social/Substance History has been reviewed: No interval changes.    OBJECTIVE   BP (!) 141/68   Pulse 73   Temp 96.4  F (35.8  C) (Oral)   Resp 18   Ht 1.753 m (5' 9\")   Wt 95.2 kg (209 lb 12.8 oz)   SpO2 95%   BMI 30.98 kg/m   2 L/min     Temp (24hrs), Av  F (36.1  C), Min:95.8  F (35.4  C), Max:98.1  F (36.7  C)       Intake/Output Summary (Last 24 hours) at 2020 0957  Last data filed at 2020 0600  Gross per 24 hour   Intake 1400 ml   Output 800 ml   Net 600 ml     Patient Vitals for the past 96 hrs:   Weight   20 0611 95.2 kg (209 lb 12.8 oz)   20 0123 97.2 kg (214 lb 3.2 oz)   20 1941 95.2 kg (209 lb 14.4 oz)   20 1522 97.5 kg (215 lb)       CONSTITUTIONAL/GENERAL APPEARANCE: Alert. No Apparent Distress.  PSYCHIATRIC: Pleasant and appropriate mood and affect. Oriented x 3.  EARS, NOSE,THROAT,MOUTH: External ears and nose overall normal. Normal oral mucosa.   NECK: Neck appearance normal. No neck masses and the thyroid is not enlarged.   RESPIRATORY: Non-labored effort. Decreased BS, few crackles, no wheezes.  CARDIOVASCULAR: S1, S2, regular rate and rhythm.      LABORATORY ASSESSMENT    No lab results found in last 7 days.  Recent Labs   Lab 20  1547   WBC 9.4   RBC 4.46   HGB 9.8*   HCT 32.3*   MCV 72*   MCH 22.0*   MCHC 30.3*   RDW 17.5*        Recent Labs   Lab 20  0846 01/28/20  1547    139   POTASSIUM 4.3 4.3   CHLORIDE 104 108   CO2 24 23   ANIONGAP 6 8   BUN 24 25   CR 0.78 0.85   GFRESTIMATED >90 " 90   GFRESTBLACK >90 >90   AURE 9.5 9.7     No lab results found in last 7 days.  No lab results found in last 7 days.    Additional labs and/or comments:    IMAGING      CXR 1/28 -  IMPRESSION: Again seen are scattered pulmonary infiltrates throughout  both lungs. No appreciable change when compared to previous. Previous  sternotomy. No pleural effusion.    CT Chest 1/22 -  IMPRESSION:   1. Patchy mixed opacities throughout both lungs have improved slightly  compared to 1/2/2020. A resolving bilateral pneumonia could have this  appearance. Clinical correlation and continued follow-up is  recommended to ensure complete resolution.  2. Emphysema.  3. Mild mediastinal and bilateral hilar adenopathy is not  significantly changed.    PFT & OTHER TESTING       ASSESSMENT / PLAN      Active Problems:    Acute respiratory failure with hypoxia (H)      ASSESSMENT:  69-year-old male former smoker with multiple medical problems, recent episode of pneumonia, admitted for shortness of breath and hypoxia.  Patient was hospitalized at [facility] from 1/2-1/7/2020 for multifocal community-acquired pneumonia.  He was treated with azithromycin and Rocephin and discharged on a course of Augmentin.  He was weaned off of oxygen prior to discharge.  Follow-up CT scan of the chest 1/22 showed patchy mixed opacities bilaterally that had improved slightly when compared to the prior study done 1/2/2020.  Patient continued to have shortness of breath and was noted to be hypoxic at home.  He was seen in follow-up by Dr. Donahue on 1/28 and was subsequently admitted for possible postpneumonic organizing pneumonia.  Chest x-ray on admission again showed scattered pulmonary infiltrates bilaterally.  He has been started on steroid therapy and appears to be slowly improving. Would continue with present therapy for now.  Respiratory status stable on low-flow oxygen.    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 Adjust oxygen, keep SaO2 > 90% - may need Home O2.  2. Antibiotics - Levaquin  3. Recommend Prednisone taper: 40 mg/d x 7 days, 30 mg/d x 7 days, 20 mg/d until seen in follow-up with Dr. Benoit in 3-4 weeks.  4. Increase activity and IS.  5. D/C planning.    Case discussed with Dr. Carbajal.    No further suggestions at this time. Dr. Mckeon will see pt in follow-up on Monday if still in the hospital. Please call if needed over the WE.      Lloyd Cedeño MD    [facility] / [facility]  [phone] (pager)  [phone] (office)

## 2020-01-31 NOTE — PLAN OF CARE
No events overnight. Breathing effort has eased, but he does become dyspneic with exertion. Lung sounds improving. BGs remain high, receiving coverage per sliding scale. Please note, 2am BG was measured to be 111. The meter did not connect to Epic.     Up independently. Reports he sits to urinate and isn't able to measure his urine. He is up frequently to urinate.   He's on PO Levaquin and PO prednisone. Probable plan to discharge to home, probably with OP pulmonology rehab.

## 2020-01-31 NOTE — PLAN OF CARE
A&Ox4. VSS on 1LPM NC; weaned from 2LPM NC. Sats dropped to 85% on RA when walking halls. Tele NSR. Up independently. Mod carb/keto diet. Lung sounds clear throughout and diminished in bases. CMS intact. Denies pain. IV SL. Continue steroids and plan for possible discharge tomorrow. Continue to monitor.

## 2020-02-01 VITALS
WEIGHT: 209.8 LBS | SYSTOLIC BLOOD PRESSURE: 150 MMHG | HEART RATE: 73 BPM | OXYGEN SATURATION: 92 % | TEMPERATURE: 95.6 F | BODY MASS INDEX: 31.07 KG/M2 | HEIGHT: 69 IN | RESPIRATION RATE: 16 BRPM | DIASTOLIC BLOOD PRESSURE: 70 MMHG

## 2020-02-01 LAB
ANION GAP SERPL CALCULATED.3IONS-SCNC: 5 MMOL/L (ref 3–14)
BUN SERPL-MCNC: 24 MG/DL (ref 7–30)
CALCIUM SERPL-MCNC: 9.5 MG/DL (ref 8.5–10.1)
CHLORIDE SERPL-SCNC: 108 MMOL/L (ref 94–109)
CO2 SERPL-SCNC: 25 MMOL/L (ref 20–32)
CREAT SERPL-MCNC: 0.82 MG/DL (ref 0.66–1.25)
ERYTHROCYTE [DISTWIDTH] IN BLOOD BY AUTOMATED COUNT: 18.4 % (ref 10–15)
FERRITIN SERPL-MCNC: 37 NG/ML (ref 26–388)
FOLATE SERPL-MCNC: 5.9 NG/ML
GFR SERPL CREATININE-BSD FRML MDRD: >90 ML/MIN/{1.73_M2}
GLUCOSE BLDC GLUCOMTR-MCNC: 178 MG/DL (ref 70–99)
GLUCOSE BLDC GLUCOMTR-MCNC: 274 MG/DL (ref 70–99)
GLUCOSE BLDC GLUCOMTR-MCNC: 76 MG/DL (ref 70–99)
GLUCOSE BLDC GLUCOMTR-MCNC: 88 MG/DL (ref 70–99)
GLUCOSE SERPL-MCNC: 100 MG/DL (ref 70–99)
HCT VFR BLD AUTO: 34.9 % (ref 40–53)
HGB BLD-MCNC: 10.5 G/DL (ref 13.3–17.7)
IRON SATN MFR SERPL: 4 % (ref 15–46)
IRON SERPL-MCNC: 16 UG/DL (ref 35–180)
MCH RBC QN AUTO: 21.3 PG (ref 26.5–33)
MCHC RBC AUTO-ENTMCNC: 30.1 G/DL (ref 31.5–36.5)
MCV RBC AUTO: 71 FL (ref 78–100)
PLATELET # BLD AUTO: 455 10E9/L (ref 150–450)
POTASSIUM SERPL-SCNC: 3.4 MMOL/L (ref 3.4–5.3)
RBC # BLD AUTO: 4.94 10E12/L (ref 4.4–5.9)
SODIUM SERPL-SCNC: 138 MMOL/L (ref 133–144)
TIBC SERPL-MCNC: 356 UG/DL (ref 240–430)
TRANSFERRIN SERPL-MCNC: 280 MG/DL (ref 210–360)
VIT B12 SERPL-MCNC: 500 PG/ML (ref 193–986)
WBC # BLD AUTO: 9.8 10E9/L (ref 4–11)

## 2020-02-01 PROCEDURE — 25000132 ZZH RX MED GY IP 250 OP 250 PS 637: Performed by: INTERNAL MEDICINE

## 2020-02-01 PROCEDURE — 25000131 ZZH RX MED GY IP 250 OP 636 PS 637: Performed by: HOSPITALIST

## 2020-02-01 PROCEDURE — 99239 HOSP IP/OBS DSCHRG MGMT >30: CPT | Performed by: HOSPITALIST

## 2020-02-01 PROCEDURE — 84466 ASSAY OF TRANSFERRIN: CPT | Performed by: HOSPITALIST

## 2020-02-01 PROCEDURE — 82746 ASSAY OF FOLIC ACID SERUM: CPT | Performed by: HOSPITALIST

## 2020-02-01 PROCEDURE — 82607 VITAMIN B-12: CPT | Performed by: HOSPITALIST

## 2020-02-01 PROCEDURE — 80048 BASIC METABOLIC PNL TOTAL CA: CPT | Performed by: HOSPITALIST

## 2020-02-01 PROCEDURE — 83550 IRON BINDING TEST: CPT | Performed by: HOSPITALIST

## 2020-02-01 PROCEDURE — 85027 COMPLETE CBC AUTOMATED: CPT | Performed by: HOSPITALIST

## 2020-02-01 PROCEDURE — 00000146 ZZHCL STATISTIC GLUCOSE BY METER IP

## 2020-02-01 PROCEDURE — 82728 ASSAY OF FERRITIN: CPT | Performed by: HOSPITALIST

## 2020-02-01 PROCEDURE — 36415 COLL VENOUS BLD VENIPUNCTURE: CPT | Performed by: HOSPITALIST

## 2020-02-01 PROCEDURE — 25000132 ZZH RX MED GY IP 250 OP 250 PS 637: Performed by: HOSPITALIST

## 2020-02-01 PROCEDURE — 83540 ASSAY OF IRON: CPT | Performed by: HOSPITALIST

## 2020-02-01 RX ORDER — FUROSEMIDE 20 MG
20 TABLET ORAL DAILY
Qty: 3 TABLET | Refills: 0 | Status: SHIPPED | OUTPATIENT
Start: 2020-02-02 | End: 2020-02-05

## 2020-02-01 RX ORDER — LEVOFLOXACIN 500 MG/1
500 TABLET, FILM COATED ORAL DAILY
Qty: 1 TABLET | Refills: 0 | Status: SHIPPED | OUTPATIENT
Start: 2020-02-02 | End: 2020-02-05

## 2020-02-01 RX ORDER — PREDNISONE 10 MG/1
20-40 TABLET ORAL DAILY
Qty: 73 TABLET | Refills: 0 | Status: SHIPPED | OUTPATIENT
Start: 2020-02-01 | End: 2020-12-09

## 2020-02-01 RX ADMIN — PREDNISONE 60 MG: 20 TABLET ORAL at 09:36

## 2020-02-01 RX ADMIN — INSULIN ASPART 10 UNITS: 100 INJECTION, SOLUTION INTRAVENOUS; SUBCUTANEOUS at 11:55

## 2020-02-01 RX ADMIN — INSULIN ASPART 3 UNITS: 100 INJECTION, SOLUTION INTRAVENOUS; SUBCUTANEOUS at 09:37

## 2020-02-01 RX ADMIN — INSULIN GLARGINE 50 UNITS: 100 INJECTION, SOLUTION SUBCUTANEOUS at 09:37

## 2020-02-01 RX ADMIN — METOPROLOL SUCCINATE 100 MG: 50 TABLET, EXTENDED RELEASE ORAL at 09:37

## 2020-02-01 RX ADMIN — FUROSEMIDE 40 MG: 40 TABLET ORAL at 09:37

## 2020-02-01 RX ADMIN — ASPIRIN 325 MG: 325 TABLET, COATED ORAL at 09:37

## 2020-02-01 RX ADMIN — LEVOFLOXACIN 500 MG: 500 TABLET, FILM COATED ORAL at 09:37

## 2020-02-01 ASSESSMENT — ACTIVITIES OF DAILY LIVING (ADL)
ADLS_ACUITY_SCORE: 12

## 2020-02-01 NOTE — PLAN OF CARE
Shift Note: A&O, up independently, Mod cho diet, sliding scale plus carb counting for insulin, O2 weaned down to RA satting low 90s, pt states he is not SOB, LS diminished in bases, tele NSR, plan for discharge tomorrow

## 2020-02-01 NOTE — DISCHARGE SUMMARY
[facility]    Discharge Summary  Hospitalist    Date of Admission:  1/28/2020  Date of Discharge:  2/1/2020  Discharging Provider: Zo Villalobos DO  Date of Service (when I saw the patient): 02/01/20    Discharge Diagnoses   Acute hypoxic respiratory failure  Bilateral lung infiltrates possibly postinflammatory organizing pneumonitis  Mild mediastinal and bilateral hilar adenopathy  Emphysema  CAD s/p CABG in 2015  History of ischemic cardiomyopathy  Possible diastolic dysfunction, moderate concentric LVH noted on recent echo  Elevated BNP  Type 2 MI secondary to demand due to lung pathology  Hypertension  Hyperlipidemia  Proctitis  Anemia secondary to recent lower GI bleed secondary to proctitis  Iron deficiency  Type 2 diabetes, uncontrolled    History of Present Illness   Adan Ruffin is an 69 year old male who presented with persistent shortness of breath and bilateral pulmonary infiltrates, advised to present to hospital by pulmonology for further workup.    Hospital Course   Adan Ruffin was admitted on 1/28/2020.  The following problems were addressed during his hospitalization:    Acute hypoxic respiratory failure  Bilateral lung infiltrates possibly postinflammatory organizing pneumonitis  Mild mediastinal and bilateral hilar adenopathy  Emphysema  Admitted at [facility] between 1/2-1/7/2020.  Found to have multifocal bilateral lung infiltrates on chest CT and treated for pneumonia with IV azithromycin and ceftriaxone, transitioned to Augmentin at discharge.  Weaned off oxygen prior to discharge. Repeat chest CT with IV contrast on 1/22/20 showed persistent multifocal bilateral lung infiltrates with slight improvement along with persistent mild mediastinal and bilateral hilar adenopathy. Reported progressively worsening dyspnea over last few weeks.  Seen by Dr. Merary Benoit at [facility] on 1/28/2020.  Advised admission for IV steroids and further pulmonary work-up  for possible postinflammatory organizing pneumonitis. Spirometry on 1/28/2020 showed restrictive pattern with severely decreased DLCO. He was noted to have O2 sat 85% on room air on admission.  - Appreciate pulmonology consult  - Prednisone taper on discharge per pulm: 40 mg/d x 7 days, 30 mg/d x 7 days, 20 mg/d until seen in follow-up with Dr. Benoit in 3-4 weeks.  - Weaned off O2 1/31 and did not require O2 with ambulation, feels much better  - Levofloxacin x 5 days per pulm (today is day 4/5), discharged home with one pill left.  - Follow up with PCP within 7 days for hospital follow-up.     CAD s/p CABG in 2015  History of ischemic cardiomyopathy  Possible diastolic dysfunction, moderate concentric LVH noted on recent echo  Elevated BNP  Type 2 MI secondary to demand due to lung pathology as above  Hypertension  Hyperlipidemia  Patient noted to have bilateral leg edema, however he did not notice it. Weight increase by 5lbs.  Echo on 1/3/2020 showed EF at 55 to 60%, mildly dilated RV with normal systolic function, moderate concentric LVH with indeterminate diastolic function. Thought that edema might have been secondary to additional lung strain. Edema and weight improved with lasix (209lbs on discharge)  - Continue 3 more days of lasix at 20mg daily as he still has edema in lower extremities  - Follow up with PCP if there is further indication for lasix.  - Continue PTA aspirin, atorvastatin, Toprol-XL     Proctitis  Anemia secondary to recent lower GI bleed secondary to proctitis  Iron deficiency  Recently diagnosed on colonoscopy on 1/6/2020.  Had been having bloody stools since December, but have now resolved with initiation of mesalamine enemas. Hemoglobin stable at baseline between 9 and 10. Iron level 16 with low saturation and low normal ferritin.  - Continue on PTA mesalamine enemas.  - Due to constipating nature of iron, plan for him to follow up with GI regarding proctitis, before recommending he start  EVERY OTHER day iron supplements as he may want to take stool softener on these days.     Type 2 diabetes, uncontrolled  Last A1c was 7.7 on 1/2/2020.  He is on very high insulin doses and this was decreased during last hospitalization.  He recently lost a significant amount of weight [40 pounds] since August.  -Continue on PTA Lantus 50 units in the morning and 45 units at bedtime, carb counting and metformin.  - BGs well controlled even while on steroid doses inpatient, should continue to be reasonable on discharge as he continues taper.    Zo Villalobos, DO    Significant Results and Procedures   See below    Pending Results   These results will be followed up by PCP  Unresulted Labs Ordered in the Past 30 Days of this Admission     Date and Time Order Name Status Description    2/1/2020 0001 Folate In process     2/1/2020 0001 Vitamin B12 In process     2/1/2020 0001 Transferrin In process           Code Status   Full Code       Primary Care Physician   Lauro Galloway    Physical Exam   Temp: 95.6  F (35.3  C) Temp src: Oral BP: (!) 150/70 Pulse: 73 Heart Rate: 77 Resp: 16 SpO2: 92 %(while walking halls) O2 Device: None (Room air) Oxygen Delivery: 1/2 LPM  Vitals:    01/28/20 1941 01/30/20 0123 01/31/20 0611   Weight: 95.2 kg (209 lb 14.4 oz) 97.2 kg (214 lb 3.2 oz) 95.2 kg (209 lb 12.8 oz)     Vital Signs with Ranges  Temp:  [95.6  F (35.3  C)-96.3  F (35.7  C)] 95.6  F (35.3  C)  Pulse:  [72-73] 73  Heart Rate:  [76-77] 77  Resp:  [16-18] 16  BP: (139-159)/(53-74) 150/70  SpO2:  [87 %-98 %] 92 %  I/O last 3 completed shifts:  In: 2290 [P.O.:2290]  Out: -     Constitutional: Awake, alert, cooperative, no apparent distress.  Eyes: Conjunctiva and pupils examined and normal.  HEENT: Moist mucous membranes, normal dentition.  Respiratory: Clear to auscultation bilaterally, no crackles or wheezing.  Cardiovascular: Regular rate and rhythm, normal S1 and S2, and no murmur noted.  GI: Soft, non-distended,  non-tender, normal bowel sounds.  Lymph/Hematologic: No anterior cervical or supraclavicular adenopathy.  Skin: No rashes, no cyanosis, +1 edema in bilateral ankles  Musculoskeletal: No joint swelling, erythema or tenderness.  Neurologic: Cranial nerves 2-12 intact, normal strength and sensation.  Psychiatric: Alert, oriented to person, place and time, no obvious anxiety or depression.    Discharge Disposition   Discharged to home  Condition at discharge: Stable    Consultations This Hospital Stay   PULMONARY IP CONSULT  PHYSICAL THERAPY ADULT IP CONSULT    Time Spent on this Encounter   IZo DO, personally saw the patient today and spent greater than 30 minutes discharging this patient.    Discharge Orders      Reason for your hospital stay    Respiratory failure requiring oxygen.     Follow-up and recommended labs and tests     Follow up with primary care provider, Lauro Galloway, within 7 days for hospital follow- up.  The following labs/tests are recommended: CBC, BMP.    Follow up with pulmonology, Dr Benoit, in 3-4 weeks.    Follow up with gastroenterology on February 8th. Hold off starting iron supplementation until you follow up with them.     Activity    Your activity upon discharge: activity as tolerated     Discharge Instructions    1. You will complete a 5 day course of antibiotics on 2/2/20    2. You will be on a steroid taper until you follow up with Dr Benoit. Take 4 tabs (40mg) once daily for one week. Then take 3 tabs (30mg) for one week. Then you will take 20mg once daily until you see Dr Benoit in the office. You will have over 2 weeks supply of the 10mg tablets so you should be able to make it to the appointment with her before running out.    3. Continue to take lasix once daily for the next 3 days as you were receiving this in the hospital and it helped your swelling. Defer further use of this medication to your PCP.    4. Your iron level is low, you would benefit from iron  supplement every OTHER day. This tends to be constipating, so please discuss this with your GI physician before starting this regimen.    3.     Full Code     Diet    Follow this diet upon discharge: 3914-2773 Calories: Moderate Consistent CHO (4-6 CHO units/meal)     Discharge Medications   Discharge Medication List as of 2/1/2020  1:10 PM      START taking these medications    Details   furosemide (LASIX) 20 MG tablet Take 1 tablet (20 mg) by mouth daily, Disp-3 tablet, R-0, E-PrescribeFuture refills by PCP Dr. Lauro Galloway with phone number [phone].      levofloxacin (LEVAQUIN) 500 MG tablet Take 1 tablet (500 mg) by mouth daily, Disp-1 tablet, R-0, E-Prescribe      predniSONE (DELTASONE) 10 MG tablet Take 2-4 tablets (20-40 mg) by mouth daily Take 40mg (4 tabs) x7 days, 30mg (3 tabs) x7 days, then 20mg (2 tabs) until follow up, Disp-73 tablet, R-0, E-PrescribeFuture refills by PCP Dr. Lauro Galloway with phone number [phone].         CONTINUE these medications which have NOT CHANGED    Details   aspirin (ASA) 325 MG EC tablet Take 325 mg by mouth daily, Historical      atorvastatin (LIPITOR) 40 MG tablet Take 1 tablet (40 mg) by mouth daily, Disp-90 tablet, R-3, E-Prescribe      !! insulin glargine (LANTUS PEN) 100 UNIT/ML pen Inject 50 Units Subcutaneous every morning, HistoricalIf Lantus is not covered by insurance, may substitute Basaglar at same dose and frequency.        !! insulin glargine (LANTUS PEN) 100 UNIT/ML pen Inject 45 Units Subcutaneous every evening, HistoricalIf Lantus is not covered by insurance, may substitute Basaglar at same dose and frequency.        insulin lispro (HUMALOG KWIKPEN) 100 UNIT/ML (1 unit dial) pen Inject Subcutaneous 3 times daily (before meals) 10 units insulin per 1 carb unit (for example, takes 10 units for 1 slice of bread), Historical      mesalamine (ROWASA) 4 g enema Place 1 enema (4 g) rectally At Bedtime, Disp-30 enema, R-0, E-Prescribe       metFORMIN (GLUCOPHAGE) 1000 MG tablet TAKE 1 TABLET BY MOUTH TWICE DAILY WITH MEALS, Disp-180 tablet, R-0, E-Prescribe      metoprolol succinate ER (TOPROL XL) 100 MG 24 hr tablet Take 1 tablet (100 mg) by mouth 2 times daily, Disp-180 tablet, R-0, E-Prescribe      ACE NOT PRESCRIBED, INTENTIONAL, 1 each daily ACE Inhibitor not prescribed due to Intolerance, Disp-0 each, R-0, No Print Out      blood glucose (ACCU-CHEK SMARTVIEW) test strip USE TO TEST BLOOD SUGAR FOUR TIMES DAILY OR AS DIRECTED, Disp-400 strip, R-2, E-Prescribe      blood glucose monitoring (ACCU-CHEK FASTCLIX) lancets Use to test blood sugar 4 times daily or as directed., Disp-200 each, R-11, E-Prescribe      !! insulin pen needle (NOVOFINE) 30G X 8 MM Use 4-5 daily or as directed.Disp-300 each, [id number]      !! NOVOFINE 30 30G X 8 MM insulin pen needle USE 4 TO 5 DAILY OR AS DIRECTEDDisp-400 each, [id number]       !! - Potential duplicate medications found. Please discuss with provider.        Allergies   Allergies   Allergen Reactions     Tetracycline      PN: LW Reaction: RASH     Codeine Rash     Mild rash     Simvastatin Other (See Comments)     Leg pain     Data   Most Recent 3 CBC's:  Recent Labs   Lab Test 02/01/20  0736 01/28/20  1547 01/23/20  1603   WBC 9.8 9.4 7.2   HGB 10.5* 9.8* 9.7*   MCV 71* 72* 71*   * 410 432      Most Recent 3 BMP's:  Recent Labs   Lab Test 02/01/20  0736 01/29/20  0846 01/28/20  1547    134 139   POTASSIUM 3.4 4.3 4.3   CHLORIDE 108 104 108   CO2 25 24 23   BUN 24 24 25   CR 0.82 0.78 0.85   ANIONGAP 5 6 8   AURE 9.5 9.5 9.7   * 258* 107*     Most Recent 2 LFT's:  Recent Labs   Lab Test 01/03/20  0625 01/02/20  1028   AST 29 42   ALT 24 32   ALKPHOS 125 150   BILITOTAL 0.8 0.6     Most Recent INR's and Anticoagulation Dosing History:  Anticoagulation Dose History     Recent Dosing and Labs Latest Ref Rng & Units 3/10/2015 4/7/2015 4/7/2015 10/19/2017 1/2/2020    INR 0.86 - 1.14  0.91 1.39(H) 1.30(H) 0.97 1.04        Most Recent 3 Troponin's:  Recent Labs   Lab Test 01/28/20  1547 01/02/20  1028 04/09/15  0415   TROPI 0.099* <0.015 1.099*     Most Recent Cholesterol Panel:  Recent Labs   Lab Test 06/27/18  0809   CHOL 146   LDL 73   HDL 42   TRIG 154*     Most Recent 6 Bacteria Isolates From Any Culture (See EPIC Reports for Culture Details):  Recent Labs   Lab Test 01/03/20  2300 01/03/20  0706 08/25/16  1625   CULT Canceled, Test credited  >10 Squamous epithelial cells/low power field indicates oral contamination. Please   recollect.  *  Notification of test cancellation was given to  AMARILYS PANTOJA RN [id number] 0555 01.4.2020 CF   Canceled, Test credited  >10 Squamous epithelial cells/low power field indicates oral contamination. Please   recollect.  *  Notification of test cancellation was given to  Mo Hall RN [id number] 1.3.20 @6629 AEM   No Beta Streptococcus isolated     Most Recent TSH, T4 and A1c Labs:  Recent Labs   Lab Test 01/02/20  1028  10/02/17  1454   TSH  --   --  1.54   A1C 7.7*   < > 8.3*    < > = values in this interval not displayed.     Results for orders placed or performed during the hospital encounter of 01/28/20   Chest XR,  PA & LAT    Narrative    XR CHEST 2 VW  1/28/2020 4:09 PM       INDICATION: shortness of breath  COMPARISON: CT 1/22/2020       Impression    IMPRESSION: Again seen are scattered pulmonary infiltrates throughout  both lungs. No appreciable change when compared to previous. Previous  sternotomy. No pleural effusion.    TIFFANY MARTINEZ MD

## 2020-02-01 NOTE — PLAN OF CARE
A&Ox4. Tele NSR. VSS on RA. Oxygen sats stable while ambulating halls. Lung sounds clear, diminished in bases. Denies SOB. Denies pain. Discharge instructions and medications reviewed at the bedside with pt and spouse; understanding verbalized. Discharge home.

## 2020-02-01 NOTE — PLAN OF CARE
Primary Diagnosis: Acute Resp Failure  Orientation: A/O x4  Aggression Stop Light: Green  Mobility: Independent  Pain Management: Denies  Diet: Mod Cho  Bowel/Bladder: Continent up to BR  Abnormal Lab/Assessments: Elevated BG due to steroids  Drain/Device/Wound: O2 weaned down to RA   Consults: Pulmonology  D/C Day/Goals/Place: Plan for discharge today     Shift Note: A&O, VSS, slight HTN. Up independently.  at HS with coverage. 0200 BG= 178. O2 weaned down to RA satting mid-upper 90s, pt states he is not SOB, LS diminished in bases, tele NSR, plan for discharge today

## 2020-02-03 ENCOUNTER — TELEPHONE (OUTPATIENT)
Dept: INTERNAL MEDICINE | Facility: CLINIC | Age: 70
End: 2020-02-03

## 2020-02-03 NOTE — TELEPHONE ENCOUNTER
IP F/U    Date: 2/1/20  Diagnosis: Pulmonary Infiltrates, Acute Respiratory Failure With Hypoxia (H)  Is patient active in care coordination? No  Was patient in TCU? No    Next 5 appointments (look out 90 days)    Feb 05, 2020 10:20 AM CST  Office Visit with Lauro Galloway MD  [facility] ([facility]) [address]  [facility] [address]  [phone]   Apr 13, 2020  4:20 PM CDT  PHYSICAL with Lauro Galloway MD  [facility] ([facility]) [address]  [facility] [address]  [phone]

## 2020-02-04 NOTE — TELEPHONE ENCOUNTER
"Hospital/TCU/ED for chronic condition Discharge Protocol    \"Hi, my name is Jeannie Villalobos RN, a registered nurse, and I am calling from [facility].  I am calling to follow up and see how things are going for you after your recent emergency visit/hospital/TCU stay.\"    Tell me how you are doing now that you are home?\" better, walking around and feeling better.       Discharge Instructions    \"Let's review your discharge instructions.  What is/are the follow-up recommendations?  Pt. Response: Follow up with primary care provider, Lauro Galloway, within 7 days for hospital follow- up.  The following labs/tests are recommended: CBC, BMP.   Follow up with pulmonology, Dr Benoit, in 3-4 weeks.   Follow up with gastroenterology on February 7th. Hold off starting iron supplementation until you follow up with them.    \"Has an appointment with your primary care provider been scheduled?\"   Yes. (confirm)  Next 5 appointments (look out 90 days)    Feb 05, 2020 10:20 AM CST  Office Visit with Lauro Galloway MD  [facility] ([facility] [address] [facility] [address]  [phone]   Apr 13, 2020  4:20 PM CDT  PHYSICAL with Lauro Galloway MD  [facility] ([facility]) [address]  [facility] [address]  [phone]          \"When you see the provider, I would recommend that you bring your medications with you.\"    Medications    \"Tell me what changed about your medicines when you discharged?\"    Changes to chronic meds?    2 or more - [facility] referral needed- patient will be seeing primary care provider tomorrow to discuss medications.     \"What questions do you have about your medications?\"    None     New diagnoses of heart failure, COPD, diabetes, or MI?    No     On insulin: \"Did you start on insulin in the hospital or did you have your insulin dose changed?\"  No         Post Discharge Medication " "Reconciliation Status: discharge medications reconciled and changed, per note/orders (see AVS).    Was MTM referral placed (*Make sure to put transitions as reason for referral)?   Yes - \"The Medication Therapy  will call you within the next few days to schedule an appointment with an MTM pharmacist to discuss your medicines and make sure they are working as well as they possibly can for you. This visit can be done in a [facility] clinic or on the phone and is at no charge to you.\"    Call Summary    \"What questions or concerns do you have about your recent visit and your follow-up care?\"     none    \"If you have questions or things don't continue to improve, we encourage you contact us through the main clinic number (give number).  Even if the clinic is not open, triage nurses are available 24/7 to help you.     We would like you to know that our clinic has extended hours (provide information).  We also have urgent care (provide details on closest location and hours/contact info)\"      \"Thank you for your time and take care!\"             "

## 2020-02-05 ENCOUNTER — OFFICE VISIT (OUTPATIENT)
Dept: INTERNAL MEDICINE | Facility: CLINIC | Age: 70
End: 2020-02-05
Payer: COMMERCIAL

## 2020-02-05 VITALS
TEMPERATURE: 97.8 F | RESPIRATION RATE: 12 BRPM | SYSTOLIC BLOOD PRESSURE: 118 MMHG | DIASTOLIC BLOOD PRESSURE: 60 MMHG | BODY MASS INDEX: 30.96 KG/M2 | HEART RATE: 68 BPM | WEIGHT: 209 LBS | OXYGEN SATURATION: 91 % | HEIGHT: 69 IN

## 2020-02-05 DIAGNOSIS — I25.10 CORONARY ARTERY DISEASE INVOLVING NATIVE CORONARY ARTERY OF NATIVE HEART WITHOUT ANGINA PECTORIS: ICD-10-CM

## 2020-02-05 DIAGNOSIS — R60.0 LEG EDEMA: ICD-10-CM

## 2020-02-05 DIAGNOSIS — R91.8 PULMONARY INFILTRATES: ICD-10-CM

## 2020-02-05 DIAGNOSIS — Z79.4 TYPE 2 DIABETES MELLITUS WITH DIABETIC NEPHROPATHY, WITH LONG-TERM CURRENT USE OF INSULIN (H): ICD-10-CM

## 2020-02-05 DIAGNOSIS — I10 ESSENTIAL HYPERTENSION: ICD-10-CM

## 2020-02-05 DIAGNOSIS — E11.21 TYPE 2 DIABETES MELLITUS WITH DIABETIC NEPHROPATHY, WITH LONG-TERM CURRENT USE OF INSULIN (H): ICD-10-CM

## 2020-02-05 DIAGNOSIS — Z09 HOSPITAL DISCHARGE FOLLOW-UP: Primary | ICD-10-CM

## 2020-02-05 PROCEDURE — 99495 TRANSJ CARE MGMT MOD F2F 14D: CPT | Performed by: INTERNAL MEDICINE

## 2020-02-05 RX ORDER — FUROSEMIDE 20 MG
20 TABLET ORAL DAILY
Qty: 30 TABLET | Refills: 0 | Status: SHIPPED | OUTPATIENT
Start: 2020-02-05 | End: 2020-09-30

## 2020-02-05 ASSESSMENT — MIFFLIN-ST. JEOR: SCORE: 1713.4

## 2020-02-05 NOTE — PROGRESS NOTES
Subjective     Adan Ruffin is a 69 year old male who presents to clinic today for the following health issues:    [facility]       Hospital Follow-up Visit:    Hospital/Nursing Home/IP Rehab Facility: [facility]  Date of Admission: 01/28/2020  Date of Discharge: 02/01/2020  Reason(s) for Admission: Acute respiratory failure with Hypoxia, Pulmonary infiltrates, leg edema, SOB, Acute CHF            Problems taking medications regularly:  None       Medication changes since discharge: None       Problems adhering to non-medication therapy:  None    Summary of hospitalization:  [facility] discharge summary reviewed  Diagnostic Tests/Treatments reviewed.  Follow up needed: clinic visit   Other Healthcare Providers Involved in Patient s Care:         Specialist appointment - pulmonary, GI   Update since discharge: improved.       Post Discharge Medication Reconciliation: discharge medications reconciled, continue medications without change.  Plan of care communicated with patient     Coding guidelines for this visit:  Type of Medical   Decision Making Face-to-Face Visit       within 7 Days of discharge Face-to-Face Visit        within 14 days of discharge   Moderate Complexity [id number]   High Complexity [id number]            Patient is seen for a follow up visit.  Recently hospitalized for respiratory failure with hypoxia. Had persistent pulmonary infiltrates after being treated for pneumonia one month ago.  Has finished antibiotics. Repeated chest CT showed no improvement of lung infiltrates, mild LA. No fever, cough, chest pain. Has been SOB on exertion.   Required oxygen, now if off it.   Seen by pulmonary. Started on IV steroids in the hospital for post infective organizing pneumonia. Currently is on tapering Prednisone. Will be seeing pulmonary for follow up.   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 Has follow up with GI set up.   Has mild anemia related to  GI bleed. Now resolved. No abdominal pain.   Had LE edema, treated with Lasix, now is off Lasix, mild edema persists.       PROBLEMS TO ADD ON...  Has h/o HTN. on medical treatment. BP has been controlled. No side effects from medications. No CP, HA, dizziness. good compliance with medications and low salt diet.  Has H/O DM. On diet , exercise and PO Metformin and Insulin. Blood sugars are controlled. No parestesias. No hypoglycemias.  Has h/o ischemic heart disease, asymptomatic regarding chest pains, SOB,palpitations. Has good compliance with treatment, diet and exercise.      Patient Active Problem List   Diagnosis     CAD (coronary artery disease)     Anxiety     Hyperlipidemia LDL goal <70     HTN (hypertension)     Advanced directives, counseling/discussion     Microalbuminuria     Type 2 diabetes mellitus with diabetic nephropathy, with long-term current use of insulin (H)     Morbid obesity (H)     Atrial flutter (H)     Aortic root dilatation (H)     Acute hypoxemic respiratory failure (H)     Acute respiratory failure with hypoxia (H)     Past Surgical History:   Procedure Laterality Date     BYPASS GRAFT ARTERY CORONARY N/A 2015    bypass LAD, OM, PDA        COLONOSCOPY N/A 2016    Procedure: COLONOSCOPY;  Surgeon: Marcela Schwartz MD;  Location:  GI     COLONOSCOPY N/A 2020    Procedure: Colonoscopy, With Polypectomy And Biopsy;  Surgeon: Herson Faust MD;  Location:  GI     EXCISE PILONIDAL CYST, SIMPLE       HEART CATH, ANGIOPLASTY  3-    3 vessel disease-occluded RCA, stenosis LM-to have CABG     removal of skin cancer       TONSILLECTOMY & ADENOIDECTOMY         Social History     Tobacco Use     Smoking status: Former Smoker     Packs/day: 1.50     Years: 29.00     Pack years: 43.50     Types: Cigarettes     Start date:      Last attempt to quit: 2000     Years since quittin.1     Smokeless tobacco: Never Used   Substance Use Topics      Alcohol use: Yes     Alcohol/week: 0.0 standard drinks     Comment: 1 mixed drink a night     Family History   Problem Relation Age of Onset     Breast Cancer Mother      Cerebrovascular Disease Mother      Hyperlipidemia Father      Cancer Father         lung, unrelated to smoking     Cancer Paternal Grandmother      Colon Cancer No family hx of          Current Outpatient Medications   Medication Sig Dispense Refill     aspirin (ASA) 325 MG EC tablet Take 325 mg by mouth daily       atorvastatin (LIPITOR) 40 MG tablet Take 1 tablet (40 mg) by mouth daily 90 tablet 3     furosemide (LASIX) 20 MG tablet Take 1 tablet (20 mg) by mouth daily 30 tablet 0     insulin glargine (LANTUS PEN) 100 UNIT/ML pen Inject 50 Units Subcutaneous every morning       insulin glargine (LANTUS PEN) 100 UNIT/ML pen Inject 45 Units Subcutaneous every evening       insulin lispro (HUMALOG KWIKPEN) 100 UNIT/ML (1 unit dial) pen Inject Subcutaneous 3 times daily (before meals) 10 units insulin per 1 carb unit (for example, takes 10 units for 1 slice of bread)       mesalamine (ROWASA) 4 g enema Place 1 enema (4 g) rectally At Bedtime 30 enema 0     metFORMIN (GLUCOPHAGE) 1000 MG tablet TAKE 1 TABLET BY MOUTH TWICE DAILY WITH MEALS 180 tablet 0     metoprolol succinate ER (TOPROL XL) 100 MG 24 hr tablet Take 1 tablet (100 mg) by mouth 2 times daily 180 tablet 0     NOVOFINE 30 30G X 8 MM insulin pen needle USE 4 TO 5 DAILY OR AS DIRECTED 400 each 1     predniSONE (DELTASONE) 10 MG tablet Take 2-4 tablets (20-40 mg) by mouth daily Take 40mg (4 tabs) x7 days, 30mg (3 tabs) x7 days, then 20mg (2 tabs) until follow up 73 tablet 0     ACE NOT PRESCRIBED, INTENTIONAL, 1 each daily ACE Inhibitor not prescribed due to Intolerance (Patient not taking: Reported on 10/11/2019) 0 each 0     blood glucose (ACCU-CHEK SMARTVIEW) test strip USE TO TEST BLOOD SUGAR FOUR TIMES DAILY OR AS DIRECTED 400 strip 2     blood glucose monitoring (ACCU-CHEK FASTCLIX)  "lancets Use to test blood sugar 4 times daily or as directed. 200 each 11     insulin pen needle (NOVOFINE) 30G X 8 MM Use 4-5 daily or as directed. 300 each 1         Reviewed and updated as needed this visit by Provider         Review of Systems   ROS COMP: Constitutional, HEENT, cardiovascular, pulmonary, GI, , musculoskeletal, neuro, skin, endocrine and psych systems are negative, except as otherwise noted.      Objective    /60   Pulse 68   Temp 97.8  F (36.6  C) (Oral)   Resp 12   Ht 1.753 m (5' 9\")   Wt 94.8 kg (209 lb)   SpO2 91%   BMI 30.86 kg/m    Body mass index is 30.86 kg/m .  Physical Exam     O2 Sat is 89 post ambulation, recovers to 91 % with rest.    GENERAL: frail, pale, weak appearing, alert and no distress  EYES: Eyes grossly normal to inspection, PERRL and conjunctivae and sclerae normal  HENT: ear canals and TM's normal, nose and mouth without ulcers or lesions  NECK: no adenopathy, no asymmetry, masses, or scars and thyroid normal to palpation  RESP: lungs clear to auscultation - no rales, rhonchi or wheezes  CV: regular rate and rhythm, normal S1 S2, no S3 or S4, no murmur, click or rub  ABDOMEN: soft, nontender, no hepatosplenomegaly, no masses and bowel sounds normal  MS: no gross musculoskeletal defects noted, 1+ LE edema  SKIN: no suspicious lesions or rashes  NEURO: Normal strength and tone, mentation intact and speech normal    Diagnostic Test Results:  Labs reviewed in Epic        Assessment & Plan   Problem List Items Addressed This Visit     CAD (coronary artery disease)    HTN (hypertension)    Type 2 diabetes mellitus with diabetic nephropathy, with long-term current use of insulin (H)      Other Visit Diagnoses     Hospital discharge follow-up    -  Primary    Leg edema        Relevant Medications    furosemide (LASIX) 20 MG tablet    Pulmonary infiltrates             Continue prednisone , follow up with pulmonary  Cont mesalamine enemas, follow up with GI   Start " on daily Lasix for 1 month, follow up with lab in a month   Cont Insulin and Metformin, monitor glucose   Cont cardiac medications - BB, ASA, statin       See Patient Instructions  Return in about 4 weeks (around 3/4/2020) for Routine Visit.    Lauro Galloway MD  [facility]

## 2020-02-05 NOTE — TELEPHONE ENCOUNTER
".  Requested Prescriptions   Pending Prescriptions Disp Refills     furosemide (LASIX) 20 MG tablet [Pharmacy Med Name: FUROSEMIDE 20MG TABLETS] 90 tablet      Sig: TAKE 1 TABLET(20 MG) BY MOUTH DAILY   Last Written Prescription Date:  02/05/2020  Last Fill Quantity: 30,  # refills: 0   Last office visit: 2/5/2020 with prescribing provider:     Future Office Visit:   Next 5 appointments (look out 90 days)    Apr 13, 2020  4:20 PM CDT  PHYSICAL with Lauro Galloway MD  [facility] ([facility]) [address]  [facility] [address]  [phone]           Diuretics (Including Combos) Protocol Passed - 2/5/2020 11:14 AM        Passed - Blood pressure under 140/90 in past 12 months     BP Readings from Last 3 Encounters:   02/05/20 118/60   02/01/20 (!) 150/70   01/13/20 120/60                 Passed - Recent (12 mo) or future (30 days) visit within the authorizing provider's specialty     Patient has had an office visit with the authorizing provider or a provider within the authorizing providers department within the previous 12 mos or has a future within next 30 days. See \"Patient Info\" tab in inbasket, or \"Choose Columns\" in Meds & Orders section of the refill encounter.              Passed - Medication is active on med list        Passed - Patient is age 18 or older        Passed - Normal serum creatinine on file in past 12 months     Recent Labs   Lab Test 02/01/20  0736   CR 0.82              Passed - Normal serum potassium on file in past 12 months     Recent Labs   Lab Test 02/01/20  0736   POTASSIUM 3.4                    Passed - Normal serum sodium on file in past 12 months     Recent Labs   Lab Test 02/01/20  0736                 "

## 2020-02-05 NOTE — NURSING NOTE
"Vital signs:  Temp: 97.8  F (36.6  C) Temp src: Oral BP: 118/60 Pulse: 68   Resp: 12 SpO2: 91 %     Height: 175.3 cm (5' 9\") Weight: 94.8 kg (209 lb)  Estimated body mass index is 30.86 kg/m  as calculated from the following:    Height as of this encounter: 1.753 m (5' 9\").    Weight as of this encounter: 94.8 kg (209 lb).          "

## 2020-02-06 ENCOUNTER — ALLIED HEALTH/NURSE VISIT (OUTPATIENT)
Dept: PHARMACY | Facility: CLINIC | Age: 70
End: 2020-02-06
Payer: COMMERCIAL

## 2020-02-06 DIAGNOSIS — J96.01 ACUTE RESPIRATORY FAILURE WITH HYPOXIA (H): Primary | ICD-10-CM

## 2020-02-06 DIAGNOSIS — Z79.4 TYPE 2 DIABETES MELLITUS WITH DIABETIC NEPHROPATHY, WITH LONG-TERM CURRENT USE OF INSULIN (H): ICD-10-CM

## 2020-02-06 DIAGNOSIS — I25.10 CORONARY ARTERY DISEASE INVOLVING NATIVE CORONARY ARTERY OF NATIVE HEART WITHOUT ANGINA PECTORIS: ICD-10-CM

## 2020-02-06 DIAGNOSIS — E78.5 HYPERLIPIDEMIA LDL GOAL <70: ICD-10-CM

## 2020-02-06 DIAGNOSIS — E11.21 TYPE 2 DIABETES MELLITUS WITH DIABETIC NEPHROPATHY, WITH LONG-TERM CURRENT USE OF INSULIN (H): ICD-10-CM

## 2020-02-06 DIAGNOSIS — I10 ESSENTIAL HYPERTENSION: ICD-10-CM

## 2020-02-06 DIAGNOSIS — K52.9 COLITIS: ICD-10-CM

## 2020-02-06 PROCEDURE — 99605 MTMS BY PHARM NP 15 MIN: CPT | Performed by: PHARMACIST

## 2020-02-06 PROCEDURE — 99607 MTMS BY PHARM ADDL 15 MIN: CPT | Performed by: PHARMACIST

## 2020-02-06 RX ORDER — FUROSEMIDE 20 MG
TABLET ORAL
Qty: 90 TABLET | Refills: 0 | OUTPATIENT
Start: 2020-02-06

## 2020-02-06 NOTE — PROGRESS NOTES
SUBJECTIVE/OBJECTIVE:                Adan Ruffin is a 69 year old male coming in for a transitions of care visit.  He was discharged from [facility] on 2/1/20 for respiratory failure.     Chief Complaint: No major concerns or questions today.    Allergies/ADRs: Tetracycline  Tobacco:  reports that he quit smoking about 20 years ago. His smoking use included cigarettes. He started smoking about 49 years ago. He has a 43.50 pack-year smoking history. He has never used smokeless tobacco.  Alcohol: 1 drink daily in the evening  Caffeine: Daily in Excedrin (130 mg caffeine)   PMH: Reviewed in Epic    Medication Adherence/Access:  no issues reported    Acute Respiratory Failure: Pt reports breathing and overall health has improved since discharge. Current medication includes prednisone taper. Current taper is:  40 mg for 7 days, then 30 mg for 7 days, then 20 mg until follow-up. Finishing up his week of 40 mg. Currently using a pill box to make sure he stays on track for his taper. Patient reports no side effects.     Hypertension/CAD: Current medications include metoprolol succinate 100 mg BID, furosemide 20 mg daily.   BP Readings from Last 3 Encounters:   02/05/20 118/60   02/01/20 (!) 150/70   01/13/20 120/60     Hyperlipidemia/CVD/HA: Current therapy includes Atorvastatin 40mg once daily.  Pt reports no significant myalgias or other side effects.  The 10-year ASCVD risk score (Brad MARK Jr., et al., 2013) is: 24.4%    Values used to calculate the score:      Age: 69 years      Sex: Male      Is Non- : No      Diabetic: Yes      Tobacco smoker: No      Systolic Blood Pressure: 118 mmHg      Is BP treated: Yes      HDL Cholesterol: 42 mg/dL      Total Cholesterol: 146 mg/dL  Additionally, patient is taking Excedrin (250mg 250 per 65 1 tablet daily, taking an additional tablet as needed for headache.. Patient reports no issues of bleeding or bruising.     Diabetes:  Pt currently  taking Lantus U-100 50 units QAM and 45 units QPM, along with insulin lispro (carb counting). Patient is also taking metformin 1000 mg BID. Pt is not experiencing side effects.  SMBG: four times daily.   Ranges (patient reported): 100s-low 200s  Patient is not experiencing hypoglycemia  Recent symptoms of high blood sugar? none  ACEi/ARB: No.   Aspirin: 500 mg daily  Urine Albumin:   Lab Results   Component Value Date    UMALCR 283.44 (H) 06/13/2018     Hemoglobin A1C   Date Value Ref Range Status   01/02/2020 7.7 (H) 0 - 5.6 % Final     Comment:     Normal <5.7% Prediabetes 5.7-6.4%  Diabetes 6.5% or higher - adopted from ADA   consensus guidelines.       Colitis/Proctitis: Current medications include mesalamine 4 g enema at bedtime. He reports he will be following up with colorectal soon.    Today's Vitals: There were no vitals taken for this visit.- phone visit    ASSESSMENT:                 Medication Adherence: good, one issue as discussed below.    Acute Respiratory Failure: Improving.  Patient to continue follow-up with pulmonology.    Hypertension/CAD: Stable.    Hyperlipidemia/CVD/HA: Needs improvement.  Patient would benefit from taking aspirin 325 mg daily as directed and as per med list.  Discussed with patient that ideally he could use Excedrin tension headache (aspirin free) as needed for headaches.    Diabetes:  Unimproved. Pt would benefit from continuing current therapy, adjusting Novolog at follow up with his providers if needed due to prednisone use.    Colitis/Proctitis: Stable.    PLAN:                  Post Discharge Medication Reconciliation Status: discharge medications reconciled, continue medications without change.    1. Pt to take aspirin 325mg daily instead of Excedrin, taking Excedrin Tension Headache (APAP/Caffiene) as needed for headache.    2. No other major concerns related to discharge.    I spent 30 minutes with this patient today. All changes were made via collaborative practice  agreement with Dr. Galloway. A copy of the visit note was provided to the patient's primary care provider.    Will follow up upon patient request, as he is not covered for ongoing MTM services.    The patient was mailed a summary of these recommendations as an after visit summary.    Bhargavi Alford PharmD, [facility]  Medication Therapy Management Provider  Phone: [phone]  [email or web address]

## 2020-02-06 NOTE — PATIENT INSTRUCTIONS
Recommendations from today's MTM visit:                                                    MTM (medication therapy management) is a service provided by a clinical pharmacist designed to help you get the most of out of your medicines.     1. Avoid taking Excedrin Migraine Relief daily and switch to aspirin 325 mg daily. This is to reduce your bleed risk and to preserve your kidney function.     2. You could take Excedrine Tension Headache (acetaminophen and caffeine) when you do have a headache. You may experience some rebound headache for the first week of stopping regular use of caffeine.    It was great to speak with you today.  I value your experience and would be very thankful for your time with providing feedback on our clinic survey. You may receive a survey via email or text message in the next few days.     Next MTM visit: As needed- call anytime    To schedule another MTM appointment, please call the clinic directly or you may call the MTM scheduling line at [phone] or toll-free at [phone].     My Clinical Pharmacist's contact information:                                                      It was a pleasure talking with you today!  Please feel free to contact me with any questions or concerns you have.      Bhargavi Alford, [place], BCACP  Medication Therapy Management Provider  Phone: [phone]  [email or web address]

## 2020-02-07 ENCOUNTER — TRANSFERRED RECORDS (OUTPATIENT)
Dept: HEALTH INFORMATION MANAGEMENT | Facility: CLINIC | Age: 70
End: 2020-02-07

## 2020-02-21 ENCOUNTER — TRANSFERRED RECORDS (OUTPATIENT)
Dept: HEALTH INFORMATION MANAGEMENT | Facility: CLINIC | Age: 70
End: 2020-02-21

## 2020-02-24 DIAGNOSIS — Z79.4 TYPE 2 DIABETES MELLITUS WITH DIABETIC NEPHROPATHY, WITH LONG-TERM CURRENT USE OF INSULIN (H): Primary | ICD-10-CM

## 2020-02-24 DIAGNOSIS — E11.21 TYPE 2 DIABETES MELLITUS WITH DIABETIC NEPHROPATHY, WITH LONG-TERM CURRENT USE OF INSULIN (H): Primary | ICD-10-CM

## 2020-02-27 RX ORDER — INSULIN GLARGINE 100 [IU]/ML
INJECTION, SOLUTION SUBCUTANEOUS
Qty: 60 ML | Refills: 1 | Status: SHIPPED | OUTPATIENT
Start: 2020-02-27 | End: 2020-05-06

## 2020-02-27 NOTE — TELEPHONE ENCOUNTER
"Routing refill request to provider for review/approval because:  Medication is reported/historical    Per review of chart:  Last hospital discharge summary stated the following:    \"Type 2 diabetes, uncontrolled  Last A1c was 7.7 on 1/2/2020.  He is on very high insulin doses and this was decreased during last hospitalization.  He recently lost a significant amount of weight [40 pounds] since August.  -Continue on PTA Lantus 50 units in the morning and 45 units at bedtime, carb counting and metformin.  - BGs well controlled even while on steroid doses inpatient, should continue to be reasonable on discharge as he continues taper.\"           "

## 2020-03-03 ENCOUNTER — HOSPITAL ENCOUNTER (OUTPATIENT)
Dept: CT IMAGING | Facility: CLINIC | Age: 70
Discharge: HOME | End: 2020-03-03
Attending: INTERNAL MEDICINE | Admitting: INTERNAL MEDICINE
Payer: COMMERCIAL

## 2020-03-03 DIAGNOSIS — R91.8 OTHER NONSPECIFIC ABNORMAL FINDING OF LUNG FIELD: ICD-10-CM

## 2020-03-03 DIAGNOSIS — R94.2 ABNORMAL RESULTS OF PULMONARY FUNCTION STUDIES: ICD-10-CM

## 2020-03-03 DIAGNOSIS — R59.9 ENLARGED LYMPH NODES: ICD-10-CM

## 2020-03-03 PROCEDURE — 71250 CT THORAX DX C-: CPT

## 2020-03-17 ENCOUNTER — TRANSFERRED RECORDS (OUTPATIENT)
Dept: HEALTH INFORMATION MANAGEMENT | Facility: CLINIC | Age: 70
End: 2020-03-17

## 2020-04-06 ENCOUNTER — MYC MEDICAL ADVICE (OUTPATIENT)
Dept: INTERNAL MEDICINE | Facility: CLINIC | Age: 70
End: 2020-04-06

## 2020-04-06 DIAGNOSIS — K52.9 COLITIS: Primary | ICD-10-CM

## 2020-04-06 DIAGNOSIS — J96.01 ACUTE HYPOXEMIC RESPIRATORY FAILURE (H): ICD-10-CM

## 2020-04-07 RX ORDER — MESALAMINE 4 G/60ML
4 SUSPENSION RECTAL AT BEDTIME
Qty: 30 ENEMA | Refills: 0 | Status: SHIPPED | OUTPATIENT
Start: 2020-04-07 | End: 2020-09-30

## 2020-04-09 ENCOUNTER — TRANSFERRED RECORDS (OUTPATIENT)
Dept: HEALTH INFORMATION MANAGEMENT | Facility: CLINIC | Age: 70
End: 2020-04-09

## 2020-05-06 DIAGNOSIS — Z79.4 TYPE 2 DIABETES MELLITUS WITH DIABETIC NEPHROPATHY, WITH LONG-TERM CURRENT USE OF INSULIN (H): ICD-10-CM

## 2020-05-06 DIAGNOSIS — E11.21 TYPE 2 DIABETES MELLITUS WITH DIABETIC NEPHROPATHY, WITH LONG-TERM CURRENT USE OF INSULIN (H): ICD-10-CM

## 2020-05-06 RX ORDER — INSULIN GLARGINE 100 [IU]/ML
INJECTION, SOLUTION SUBCUTANEOUS
Qty: 60 ML | Refills: 1 | Status: SHIPPED | OUTPATIENT
Start: 2020-05-06 | End: 2020-09-30

## 2020-05-11 NOTE — PROGRESS NOTES
[facility]    Hospitalist Progress Note    Date of Service (when I saw the patient): 01/05/2020  Provider:  Beni Contreras MD   Text Page  7am - 6PM       Assessment & Plan   Adan Ruffin is a 69 year old male who presents with new onset of respiratory symptoms including cough, shortness of breath, sore throat, severe documented hypoxemia in the 70s, CT of the chest with bilateral groundglass infiltrates, no leukocytosis, no toxemic aspect, elevated CRP, no chest pain, elevated NT proBNP.     1.  Acute hypoxemic respiratory failure. With presence of bilateral ground glass infiltrates, most likely etiology should be a viral infection.  With current epidemic season influenza A-B, RSV or other respiratory virus are suspected the main culprits.  He tested negative for the rapid test.  In the light of his many comorbidities I sent for a resp PCR test with negative results for influenza and RSV.  According to the patient he has not had flu shot this year, he is not clear about his immunization status for pneumococcus.  Main intervention is symptomatic treatment, oxygen supplementation to keep oxygen saturation 92% of higher.  2.  Possible community-acquired pneumonia with bilateral infiltrate.  Not totally convincing of bacterial infection, however continue treatment with Rocephin/Zithromax per protocol as a started in the emergency department.  Gram staining of the sputum, sputum culture negative, pneumococcal antigen in urine negative and blood cultures are in process.  CRP 22.3 peaked up to 91.3 and now 71.2. Procalcitonin is nondetectable.    3.  Elevated NT proBNP.  No complaints and symptomatology of left sided heart failure.  I believe that abnormal level of BNP is secondary to RV strain in the setting of bilateral infiltrates of the lungs and probably underlying pulmonary hypertension.  Echocardiogram of September of this year reported normal LVEF, no evidence of diastolic dysfunction and no  valvular abnormalities.  Echocardiogram follow-up 1/3.  Report.   Mild aortic root dilatation.  LVEF is estimated at 55-60%.  RV is mildly dilated.  RVr systolic function is normal.  Mild-moderate SABINO.    4.  Type 2 diabetes mellitus on insulin.  Very resistant to insulin apparently given the high doses needed.  On the other hand his A1c on admission is 7.7 which is remarkable.  He is following a ketogenic diet.  At home only he is using the long-acting insulin and metformin, he seldom use rapid acting insulin before meals.  Blood sugar values have been normal and relatively low since admission.  Metformin on hold due to current respiratory issues, hypoxemia and high risk for lactic acidosis.  Order moderate CHO.  He has 70 units of Lantus a.m. and 65 units of Lantus p.m.  At this point I am going to continue only with the p.m. dose and adjust based on the glycemic curve.  Patient has lost 35 pounds since August just dieting and his insulin dose has been decreasing significantly.  High intensity sliding scale for correction.     5.  Essential hypertension.  On metoprolol.  6.  Coronary artery disease status post CABG.  Continue metoprolol, aspirin and atorvastatin.  7.  Hyperlipidemia.  Atorvastatin.  8.  History of atrial flutter status post ablation.  Normal sinus rhythm on admission.  9.  Obesity with BMI 33.  10. History of proctitis. Care everywhere, biopsy done 2010, he had wrongfully reported UC.  Bloody diarrhea, intermittent episodes. He was having bloody diarrhea before this respiratory infection started.  GI consult requested. Colonoscopy tomorrow.     11. Microcytic anemia of MANSI, suspect chronic losses. Add Fe supp on discharge.  12. Right shoulder pain. Resolved. New onset, no trauma documented. Heat pad helped.     Orders.  -Inpatient care  -Cardiac telemetry.  -Oxygen supplementation to keep saturation 92 or higher.     DVT Prophylaxis: Pneumatic Compression Devices  Code Status: Full  Code    Disposition: Expected discharge in 2 days once not hypoxemic, breathing at baseline.    Interval History   Patient still needs oxygen supplement to keep oxygen saturation above the 90s, he can desat when he has minimal activity in the room..  Having oxygen supplementation through facial mask continuously.  No fever documented after the admission.  He is having bloody diarrhea intermittently, seen by Dr. Ninoska Nogueira today who plans to do scope tomorrow, patient will have preparation tonight.  Better, no other issues.    -Data reviewed today: I reviewed all new labs and imaging results over the last 24 hours. I personally reviewed the EKG tracing showing Normal sinus rhythm in the monitor..    Physical Exam   Temp: 97.6  F (36.4  C) Temp src: Oral BP: 136/65   Heart Rate: 77 Resp: 18 SpO2: 90 % O2 Device: Oxymask Oxygen Delivery: 3 LPM  Vitals:    01/03/20 0605 01/04/20 0557 01/05/20 0549   Weight: 98.4 kg (216 lb 14.4 oz) 97.8 kg (215 lb 11.2 oz) 99.5 kg (219 lb 4.8 oz)     Vital Signs with Ranges  Temp:  [95.5  F (35.3  C)-99.8  F (37.7  C)] 97.6  F (36.4  C)  Heart Rate:  [72-87] 77  Resp:  [18-20] 18  BP: (136-158)/(55-72) 136/65  SpO2:  [90 %-97 %] 90 %  I/O last 3 completed shifts:  In: 480 [P.O.:480]  Out: 2000 [Urine:2000]    GEN: Obese.  Alert, oriented x 3, appears comfortable, NAD.  HEENT:  Normocephalic/atraumatic, no scleral icterus, no nasal discharge, mouth moist.  CV:  Regular rate and rhythm, no murmur or JVD.  S1 + S2 noted, no S3 or S4.  LUNGS:   Sounds diminished at to auscultation bilaterally without rales/rhonchi/wheezing/retractions.  Symmetric chest rise on inhalation noted.  ABD:  Active bowel sounds, soft, non-tender/non-distended.  No rebound/guarding/rigidity.  EXT:  No edema or cyanosis.  No joint synovitis noted.  SKIN:  Dry to touch, no exanthems noted in the visualized areas.       Medications       aspirin  325 mg Oral Daily     atorvastatin  40 mg Oral QPM     azithromycin   250 mg Oral Daily     cefTRIAXone  2 g Intravenous Q24H     insulin aspart  1-10 Units Subcutaneous TID AC     insulin aspart  1-7 Units Subcutaneous At Bedtime     insulin aspart   Subcutaneous TID w/meals     insulin glargine  65 Units Subcutaneous At Bedtime     [START ON 1/6/2020] magnesium citrate  296 mL Oral Once     metoprolol succinate ER  100 mg Oral BID     polyethylene glycol  238 g Oral Once     senna-docusate  1 tablet Oral BID    Or     senna-docusate  2 tablet Oral BID     sodium chloride (PF)  3 mL Intracatheter Q8H       Data   Recent Labs   Lab 01/04/20  0815 01/03/20  0625 01/02/20  1028   WBC 7.9 7.1 7.3   HGB 9.5* 9.1* 9.7*   MCV 77* 77* 78    333 371   INR  --   --  1.04    138 140   POTASSIUM 3.9 3.6 4.0   CHLORIDE 108 109 110*   CO2 22 23 19*   BUN 13 13 22   CR 0.75 0.84 0.86   ANIONGAP 7 6 11   AURE 9.3 8.8 9.0   * 74 106*   ALBUMIN  --  2.7* 3.1*   PROTTOTAL  --  6.9 7.7   BILITOTAL  --  0.8 0.6   ALKPHOS  --  125 150   ALT  --  24 32   AST  --  29 42   TROPI  --   --  <0.015       No results found for this or any previous visit (from the past 24 hour(s)).    Disclaimer: This note consists of symbols derived from keyboarding, dictation and/or voice recognition software. As a result, there may be errors in the script that have gone undetected. Please consider this when interpreting information found in this chart.       Other

## 2020-05-18 DIAGNOSIS — E11.21 TYPE 2 DIABETES MELLITUS WITH DIABETIC NEPHROPATHY, WITH LONG-TERM CURRENT USE OF INSULIN (H): ICD-10-CM

## 2020-05-18 DIAGNOSIS — Z79.4 TYPE 2 DIABETES MELLITUS WITH DIABETIC NEPHROPATHY, WITH LONG-TERM CURRENT USE OF INSULIN (H): ICD-10-CM

## 2020-05-20 NOTE — TELEPHONE ENCOUNTER
Pending Prescriptions:                       Disp   Refills    NOVOFINE 30 30G X 8 MM insulin pen needle*                    Sig: USE 4 TO 5 TIMES DAILY OR AS DIRECTED      Prescription approved per [facility] [place].

## 2020-05-29 DIAGNOSIS — Z79.4 TYPE 2 DIABETES MELLITUS WITH DIABETIC NEPHROPATHY, WITH LONG-TERM CURRENT USE OF INSULIN (H): ICD-10-CM

## 2020-05-29 DIAGNOSIS — E11.21 TYPE 2 DIABETES MELLITUS WITH DIABETIC NEPHROPATHY, WITH LONG-TERM CURRENT USE OF INSULIN (H): ICD-10-CM

## 2020-06-01 ENCOUNTER — DOCUMENTATION ONLY (OUTPATIENT)
Dept: CARDIOLOGY | Facility: CLINIC | Age: 70
End: 2020-06-01

## 2020-06-01 DIAGNOSIS — I48.3 TYPICAL ATRIAL FLUTTER (H): ICD-10-CM

## 2020-06-01 NOTE — TELEPHONE ENCOUNTER
"Metformin refill request.   Prescription approved per [facility] Refill Protocol.      Last OV on 2/5/20    Requested Prescriptions   Pending Prescriptions Disp Refills     metFORMIN (GLUCOPHAGE) 1000 MG tablet [Pharmacy Med Name: METFORMIN 1000MG TABLETS] 180 tablet 0     Sig: TAKE 1 TABLET BY MOUTH TWICE DAILY WITH MEALS       Biguanide Agents Passed - 5/29/2020  4:17 PM        Passed - Patient is age 10 or older        Passed - Patient has documented A1c within the specified period of time.     If HgbA1C is 8 or greater, it needs to be on file within the past 3 months.  If less than 8, must be on file within the past 6 months.     Recent Labs   Lab Test 01/02/20  1028   A1C 7.7*             Passed - Patient's CR is NOT>1.4 OR Patient's EGFR is NOT<45 within past 12 mos.     Recent Labs   Lab Test 02/01/20  0736   GFRESTIMATED >90   GFRESTBLACK >90       Recent Labs   Lab Test 02/01/20  0736   CR 0.82             Passed - Patient does NOT have a diagnosis of CHF.        Passed - Medication is active on med list        Passed - Recent (6 mo) or future (30 days) visit within the authorizing provider's specialty     Patient had office visit in the last 6 months or has a visit in the next 30 days with authorizing provider or within the authorizing provider's specialty.  See \"Patient Info\" tab in inbasket, or \"Choose Columns\" in Meds & Orders section of the refill encounter.                 "

## 2020-06-01 NOTE — PROGRESS NOTES
Message from refill team:  received a refill request from [facility] on [address] in [place] for Metoprolol ER Succinate 100 mg twice daily. It is unclear what the follow up visit plan is. Can you please review and send the refill authorization accordingly? Thank you! Sveta     Per phone note from [employer] in October the next step was a CTa angiogram coronary. This was not scheduled.  The patient was admitted x2 in 2020 for pneumonia and respiratory failure.    Will message [employer] to review for next cardiology testing or assessment and verify if current toprol XL dose is correct.        6/2/2020 reply from [employer]:  Refill is ok. Let's schedule him with me, Sharonda, or Dr. Rosas to see how he is doing (2-3 weeks). It sounds I was not convince his TERAN was angina equivalent. After the visit we can decide if we need to pursue any thing further.      1500 refill for toprol XL 100mg BID escripted.   Order placed for BETHANY visit with [employer] for discharge follow up. Message to scheduling to contact patient.

## 2020-06-02 RX ORDER — METOPROLOL SUCCINATE 100 MG/1
100 TABLET, EXTENDED RELEASE ORAL 2 TIMES DAILY
Qty: 180 TABLET | Refills: 1 | Status: SHIPPED | OUTPATIENT
Start: 2020-06-02 | End: 2020-11-17

## 2020-06-08 DIAGNOSIS — E78.5 HYPERLIPIDEMIA LDL GOAL <70: ICD-10-CM

## 2020-06-09 RX ORDER — ATORVASTATIN CALCIUM 40 MG/1
TABLET, FILM COATED ORAL
Qty: 90 TABLET | Refills: 3 | Status: SHIPPED | OUTPATIENT
Start: 2020-06-09 | End: 2021-04-02

## 2020-06-30 ENCOUNTER — VIRTUAL VISIT (OUTPATIENT)
Dept: CARDIOLOGY | Facility: CLINIC | Age: 70
End: 2020-06-30
Attending: PHYSICIAN ASSISTANT
Payer: COMMERCIAL

## 2020-06-30 DIAGNOSIS — R06.09 DOE (DYSPNEA ON EXERTION): ICD-10-CM

## 2020-06-30 DIAGNOSIS — I25.10 CORONARY ARTERY DISEASE INVOLVING NATIVE CORONARY ARTERY OF NATIVE HEART WITHOUT ANGINA PECTORIS: Primary | ICD-10-CM

## 2020-06-30 DIAGNOSIS — I10 ESSENTIAL HYPERTENSION: ICD-10-CM

## 2020-06-30 DIAGNOSIS — Z86.79 HISTORY OF ATRIAL FLUTTER: ICD-10-CM

## 2020-06-30 DIAGNOSIS — Z95.1 S/P CABG (CORONARY ARTERY BYPASS GRAFT): ICD-10-CM

## 2020-06-30 PROCEDURE — 99213 OFFICE O/P EST LOW 20 MIN: CPT | Mod: 95 | Performed by: PHYSICIAN ASSISTANT

## 2020-06-30 NOTE — LETTER
6/30/2020    Lauro Galloway MD  [address] [facility] [address]    RE: Adan Ruffin       Dear Colleague,    I had the pleasure of seeing Adan Ruffin in the [facility].    Primary Cardiologist: Dr. Rosas    Reason for Phone Visit: Routine follow up    HPI:  This a very pleasant 69-year-old gentleman with past medical history notable for TERAN (felt to be multifactorial; follows with pulm and GI), coronary artery bypass grafting in 2015 (LIMA to the LAD, vein graft to the circumflex, vein graft to the PDA), hypertension, type 2 diabetes mellitus, hyperlipidemia, history of atrial flutter (status post ablation in 2017 performed by Dr. Laguna; Eliquis was discontinued subsequently as he had no recurrence of it), obesity, and distant history of tobacco use.    He continues to have TERAN but this is much better now. He has gained some weight but he tells me he stopped Keto diet and this is most likely from that. He denies any chest pain, orthopnea, PND, peripheral edema (had it for a week after last hospital stay but now very minimal), palpitations, lightheadedness, or presyncope.     A/P:   This a very pleasant 69-year-old gentleman with past medical history notable for:    # TERAN (improving; felt to be multifactorial- nuclear scan normal; admitted with pneumonia and follows with pulm for post infectious process and pulm nodules; receiving steroid therapy, PFT's showed significant restrictive pattern which is improving with steroids; also had significant anemia for which he was referred to GI- feel he may have ulcerative colitis)  # CAD (bypass grafting in 2015 (LIMA to the LAD, vein graft to the circumflex, vein graft to the PDA); sxs were central chest pressure)  # HTN (controlled in recent past; unable to check BP at home)  # Hyperlipidemia (LDL 73 on atorvastatin 40 mg; overdue for repeat lipid)  # Hx of atrial flutter (s/p ablation in 2017 performed by Dr. Laguna; Eliquis was  discontinued subsequently as he had no recurrence of it)  # Obesity (did lose close to 50 Ibs with diet per patient)  # Distant history of tobacco use  # T2DM (insulin dependent; hA1c 7.7%)    He appears to be doing well from cardiac standpoint. His TERAN appears to be multifactorial. Stress test in recent past was normal. He does not have signs of heart failure. We will continue with same medications. He will follow up with Dr. Rosas in 6 months. We will repeat lipid and ALT at that time as he is overdue.    Call Duration: 8 minutes    This visit is being conducted as a virtual visit due to the emphasis on mitigation of the COVID-19 virus pandemic. The clinician has decided that the risk of an in-office visit outweighs the benefit for this patient.     Magno Sandoval PA-C   6/30/2020  Pager: [phone]      Thank you for allowing me to participate in the care of your patient.    Sincerely,     Magno Sandoval PA-C     [facility]

## 2020-06-30 NOTE — PROGRESS NOTES
"  The patient has been notified of following:     \"This telephone visit will be conducted via a call between you and your physician/provider. We have found that certain health care needs can be provided without the need for a physical exam.  This service lets us provide the care you need with a short phone conversation.  If a prescription is necessary we can send it directly to your pharmacy.  If lab work is needed we can place an order for that and you can then stop by our lab to have the test done at a later time.    Telephone visits are billed at different rates depending on your insurance coverage. During this emergency period, for some insurers they may be billed the same as an in-person visit.  Please reach out to your insurance provider with any questions.    If during the course of the call the physician/provider feels a telephone visit is not appropriate, you will not be charged for this service.\"    Patient has given verbal consent for Telephone visit?  Yes    What phone number would you like to be contacted at? [phone]    How would you like to obtain your AVS? MyChart     Some shortness of with exertion     Blood pressure: does not monitor at home   Pulse:  Weight: 220 #    -----------------------------------------------------------------------------------------------------------    Primary Cardiologist: Dr. Rosas    Reason for Phone Visit: Routine follow up    HPI:  This a very pleasant 69-year-old gentleman with past medical history notable for TERAN (felt to be multifactorial; follows with pulm and GI), coronary artery bypass grafting in 2015 (LIMA to the LAD, vein graft to the circumflex, vein graft to the PDA), hypertension, type 2 diabetes mellitus, hyperlipidemia, history of atrial flutter (status post ablation in 2017 performed by Dr. Laguna; Eliquis was discontinued subsequently as he had no recurrence of it), obesity, and distant history of tobacco use.    He continues to have TERAN but this is " much better now. He has gained some weight but he tells me he stopped Keto diet and this is most likely from that. He denies any chest pain, orthopnea, PND, peripheral edema (had it for a week after last hospital stay but now very minimal), palpitations, lightheadedness, or presyncope.     A/P:   This a very pleasant 69-year-old gentleman with past medical history notable for:    # TERAN (improving; felt to be multifactorial- nuclear scan normal; admitted with pneumonia and follows with pulm for post infectious process and pulm nodules; receiving steroid therapy, PFT's showed significant restrictive pattern which is improving with steroids; also had significant anemia for which he was referred to GI- feel he may have ulcerative colitis)  # CAD (bypass grafting in 2015 (LIMA to the LAD, vein graft to the circumflex, vein graft to the PDA); sxs were central chest pressure)  # HTN (controlled in recent past; unable to check BP at home)  # Hyperlipidemia (LDL 73 on atorvastatin 40 mg; overdue for repeat lipid)  # Hx of atrial flutter (s/p ablation in 2017 performed by Dr. Laguna; Eliquis was discontinued subsequently as he had no recurrence of it)  # Obesity (did lose close to 50 Ibs with diet per patient)  # Distant history of tobacco use  # T2DM (insulin dependent; hA1c 7.7%)    He appears to be doing well from cardiac standpoint. His TERAN appears to be multifactorial. Stress test in recent past was normal. He does not have signs of heart failure. We will continue with same medications. He will follow up with Dr. Rosas in 6 months. We will repeat lipid and ALT at that time as he is overdue.    Call Duration: 8 minutes    This visit is being conducted as a virtual visit due to the emphasis on mitigation of the COVID-19 virus pandemic. The clinician has decided that the risk of an in-office visit outweighs the benefit for this patient.     Magno Sandoval PA-C   6/30/2020  Pager: [phone]

## 2020-06-30 NOTE — PATIENT INSTRUCTIONS
Today's Plan:   No changes today. Follow up with Dr. Rosas in 6 months.     If you have questions or concerns please call my nurse team at [phone].     Scheduling phone number: [phone]  Reminder: Please bring in all current medications, over the counter supplements and vitamin bottles to your next appointment.    It was a pleasure seeing you today!     Magno Sandoval PA-C  6/30/2020

## 2020-07-18 ENCOUNTER — MYC MEDICAL ADVICE (OUTPATIENT)
Dept: INTERNAL MEDICINE | Facility: CLINIC | Age: 70
End: 2020-07-18

## 2020-07-18 DIAGNOSIS — R23.9 SKIN CHANGE: Primary | ICD-10-CM

## 2020-07-20 NOTE — TELEPHONE ENCOUNTER
Please see patient's mychart message and picture attached below.    Schedule an appointment for eval or referral to derm to evaluate mole?    Please advise, thanks.  Routed to covering provider.

## 2020-07-21 NOTE — TELEPHONE ENCOUNTER
[employer] message sent to patient advising him to see Dermatology and provided referral information.

## 2020-08-26 DIAGNOSIS — Z79.4 TYPE 2 DIABETES MELLITUS WITH DIABETIC NEPHROPATHY, WITH LONG-TERM CURRENT USE OF INSULIN (H): ICD-10-CM

## 2020-08-26 DIAGNOSIS — E11.21 TYPE 2 DIABETES MELLITUS WITH DIABETIC NEPHROPATHY, WITH LONG-TERM CURRENT USE OF INSULIN (H): ICD-10-CM

## 2020-08-27 NOTE — TELEPHONE ENCOUNTER
Routing refill request to provider for review/approval because:  Patient needs to be seen because:  Due for appt and overdue for A1C, last one in 4/2019 was 7.9    Pt has appt scheduled for 9/30/20

## 2020-09-10 DIAGNOSIS — E11.21 TYPE 2 DIABETES MELLITUS WITH DIABETIC NEPHROPATHY, WITH LONG-TERM CURRENT USE OF INSULIN (H): ICD-10-CM

## 2020-09-10 DIAGNOSIS — Z79.4 TYPE 2 DIABETES MELLITUS WITH DIABETIC NEPHROPATHY, WITH LONG-TERM CURRENT USE OF INSULIN (H): ICD-10-CM

## 2020-09-10 NOTE — TELEPHONE ENCOUNTER
"Requested Prescriptions   Pending Prescriptions Disp Refills     blood glucose (ACCU-CHEK SMARTVIEW) test strip  Last Written Prescription Date:  02/08/19  Last Fill Quantity: 400,  # refills: 2   Last office visit: 2/5/2020 with prescribing provider:  02/05/20   Future Office Visit:   Next 5 appointments (look out 90 days)    Sep 30, 2020  1:20 PM CDT  PHYSICAL with Lauro Galloway MD  [facility] [address]  [facility] [address]  [phone]                400 strip 2     Sig: USE TO TEST BLOOD SUGAR FOUR TIMES DAILY OR AS DIRECTED       Diabetic Supplies Protocol Passed - 9/10/2020  2:32 PM        Passed - Medication is active on med list        Passed - Patient is 18 years of age or older        Passed - Recent (6 mo) or future (30 days) visit within the authorizing provider's specialty     Patient had office visit in the last 6 months or has a visit in the next 30 days with authorizing provider.  See \"Patient Info\" tab in inbasket, or \"Choose Columns\" in Meds & Orders section of the refill encounter.                 "

## 2020-09-11 RX ORDER — BLOOD SUGAR DIAGNOSTIC
STRIP MISCELLANEOUS
Qty: 400 STRIP | Refills: 0 | Status: SHIPPED | OUTPATIENT
Start: 2020-09-11 | End: 2020-12-17

## 2020-09-11 NOTE — TELEPHONE ENCOUNTER
Pt has upcoming appt.   Prescription approved per [facility] Refill Protocol.

## 2020-09-16 ENCOUNTER — MYC MEDICAL ADVICE (OUTPATIENT)
Dept: INTERNAL MEDICINE | Facility: CLINIC | Age: 70
End: 2020-09-16

## 2020-09-30 ENCOUNTER — OFFICE VISIT (OUTPATIENT)
Dept: INTERNAL MEDICINE | Facility: CLINIC | Age: 70
End: 2020-09-30
Payer: COMMERCIAL

## 2020-09-30 VITALS
WEIGHT: 233.4 LBS | BODY MASS INDEX: 34.57 KG/M2 | OXYGEN SATURATION: 94 % | DIASTOLIC BLOOD PRESSURE: 82 MMHG | HEIGHT: 69 IN | RESPIRATION RATE: 13 BRPM | SYSTOLIC BLOOD PRESSURE: 152 MMHG | TEMPERATURE: 98.3 F | HEART RATE: 74 BPM

## 2020-09-30 DIAGNOSIS — Z12.5 SCREENING FOR PROSTATE CANCER: ICD-10-CM

## 2020-09-30 DIAGNOSIS — Z23 NEED FOR VACCINATION: ICD-10-CM

## 2020-09-30 DIAGNOSIS — Z23 NEED FOR PROPHYLACTIC VACCINATION AND INOCULATION AGAINST INFLUENZA: ICD-10-CM

## 2020-09-30 DIAGNOSIS — I10 ESSENTIAL HYPERTENSION: ICD-10-CM

## 2020-09-30 DIAGNOSIS — E11.21 TYPE 2 DIABETES MELLITUS WITH DIABETIC NEPHROPATHY, WITH LONG-TERM CURRENT USE OF INSULIN (H): ICD-10-CM

## 2020-09-30 DIAGNOSIS — I25.10 CORONARY ARTERY DISEASE INVOLVING NATIVE CORONARY ARTERY OF NATIVE HEART WITHOUT ANGINA PECTORIS: ICD-10-CM

## 2020-09-30 DIAGNOSIS — K51.911 ULCERATIVE COLITIS WITH RECTAL BLEEDING, UNSPECIFIED LOCATION (H): ICD-10-CM

## 2020-09-30 DIAGNOSIS — Z00.00 ENCOUNTER FOR PREVENTATIVE ADULT HEALTH CARE EXAMINATION: Primary | ICD-10-CM

## 2020-09-30 DIAGNOSIS — Z79.4 TYPE 2 DIABETES MELLITUS WITH DIABETIC NEPHROPATHY, WITH LONG-TERM CURRENT USE OF INSULIN (H): ICD-10-CM

## 2020-09-30 PROCEDURE — 99397 PER PM REEVAL EST PAT 65+ YR: CPT | Mod: 25 | Performed by: INTERNAL MEDICINE

## 2020-09-30 PROCEDURE — 90472 IMMUNIZATION ADMIN EACH ADD: CPT | Performed by: INTERNAL MEDICINE

## 2020-09-30 PROCEDURE — 90662 IIV NO PRSV INCREASED AG IM: CPT | Performed by: INTERNAL MEDICINE

## 2020-09-30 PROCEDURE — 90750 HZV VACC RECOMBINANT IM: CPT | Performed by: INTERNAL MEDICINE

## 2020-09-30 PROCEDURE — 99213 OFFICE O/P EST LOW 20 MIN: CPT | Mod: 25 | Performed by: INTERNAL MEDICINE

## 2020-09-30 PROCEDURE — 90471 IMMUNIZATION ADMIN: CPT | Performed by: INTERNAL MEDICINE

## 2020-09-30 RX ORDER — LOSARTAN POTASSIUM 25 MG/1
25 TABLET ORAL DAILY
Qty: 90 TABLET | Refills: 3 | Status: SHIPPED | OUTPATIENT
Start: 2020-09-30 | End: 2020-11-18

## 2020-09-30 RX ORDER — INSULIN LISPRO 100 [IU]/ML
40 INJECTION, SOLUTION INTRAVENOUS; SUBCUTANEOUS
Qty: 30 ML | Refills: 3 | Status: SHIPPED | OUTPATIENT
Start: 2020-09-30 | End: 2020-10-05

## 2020-09-30 RX ORDER — BALSALAZIDE DISODIUM 750 MG/1
CAPSULE ORAL
COMMUNITY
Start: 2020-07-13 | End: 2023-10-04

## 2020-09-30 ASSESSMENT — ENCOUNTER SYMPTOMS
FREQUENCY: 1
HEADACHES: 0
SHORTNESS OF BREATH: 1
CHILLS: 0
DIARRHEA: 1
HEMATURIA: 0
HEMATOCHEZIA: 1
NERVOUS/ANXIOUS: 0
SORE THROAT: 0
HEARTBURN: 0
EYE PAIN: 0
NAUSEA: 0
DIZZINESS: 0
ARTHRALGIAS: 0
CONSTIPATION: 0
PALPITATIONS: 0
PARESTHESIAS: 0
WEAKNESS: 0
FEVER: 0
DYSURIA: 0
ABDOMINAL PAIN: 0
JOINT SWELLING: 0
COUGH: 0
MYALGIAS: 0

## 2020-09-30 ASSESSMENT — MIFFLIN-ST. JEOR: SCORE: 1824.08

## 2020-09-30 ASSESSMENT — ACTIVITIES OF DAILY LIVING (ADL): CURRENT_FUNCTION: NO ASSISTANCE NEEDED

## 2020-09-30 NOTE — PROGRESS NOTES
"SUBJECTIVE:   Adan Ruffin is a 69 year old male who presents for Preventive Visit.      Patient has been advised of split billing requirements and indicates understanding: Yes   Are you in the first 12 months of your Medicare coverage?  No    Healthy Habits:     In general, how would you rate your overall health?  Fair    Frequency of exercise:  1 day/week    Duration of exercise:  15-30 minutes    Do you usually eat at least 4 servings of fruit and vegetables a day, include whole grains    & fiber and avoid regularly eating high fat or \"junk\" foods?  No    Taking medications regularly:  Yes    Medication side effects:  Other    Ability to successfully perform activities of daily living:  No assistance needed    Home Safety:  No safety concerns identified    Hearing Impairment:  No hearing concerns    In the past 6 months, have you been bothered by leaking of urine? Yes    In general, how would you rate your overall mental or emotional health?  Good      PHQ-2 Total Score: 1    Additional concerns today:  No    Do you feel safe in your environment? Yes    Have you ever done Advance Care Planning? (For example, a Health Directive, POLST, or a discussion with a medical provider or your loved ones about your wishes): Yes, advance care planning is on file.      Fall risk  Fallen 2 or more times in the past year?: No  Any fall with injury in the past year?: No    Cognitive Screening   1) Repeat 3 items (Leader, Season, Table)    2) Clock draw: NORMAL  3) 3 item recall: Recalls 3 objects  Results: 3 items recalled: COGNITIVE IMPAIRMENT LESS LIKELY    Mini-CogTM Copyright DAVIS Willis. Licensed by the author for use in [facility]; reprinted with permission ([email or web address]). All rights reserved.      Do you have sleep apnea, excessive snoring or daytime drowsiness?: no    Reviewed and updated as needed this visit by clinical staff         Reviewed and updated as needed this visit by Provider        Social History "     Tobacco Use     Smoking status: Former Smoker     Packs/day: 1.50     Years: 29.00     Pack years: 43.50     Types: Cigarettes     Start date:      Last attempt to quit: 2000     Years since quittin.7     Smokeless tobacco: Never Used   Substance Use Topics     Alcohol use: Yes     Alcohol/week: 0.0 standard drinks     Comment: 1 mixed drink a night     If you drink alcohol do you typically have >3 drinks per day or >7 drinks per week? No    Alcohol Use 2020   Prescreen: >3 drinks/day or >7 drinks/week? No   Prescreen: >3 drinks/day or >7 drinks/week? -   No flowsheet data found.        PROBLEMS TO ADD ON...  Has H/O DM. On diet , exercise and Insulin + Metformin. Blood sugars are controlled. No parestesias. No hypoglycemias.  Has h/o HTN. on medical treatment. BP has been controlled. No side effects from medications. No CP, HA, dizziness. good compliance with medications and low salt diet.  Has h/o ischemic heart disease, asymptomatic regarding chest pains, SOB,palpitations. Has good compliance with treatment, diet and exercise.  Has been diagnosed with UC, presented with blood in the stools, diarrhea, anemia. Follows with GI. Improved symptoms on treatment.     Current providers sharing in care for this patient include:   Patient Care Team:  Lauro Galloway MD as PCP - General (Internal Medicine)  Lauro Galloway MD as Assigned PCP  Bhargavi Alford [facility] as Pharmacist (Pharmacist)    The following health maintenance items are reviewed in Epic and correct as of today:  Health Maintenance   Topic Date Due     HF ACTION PLAN  1950     HEPATITIS B IMMUNIZATION (1 of 3 - Risk 3-dose series) 10/17/1971     ZOSTER IMMUNIZATION (1 of 2) 10/17/2002     DIABETIC FOOT EXAM  2018     MICROALBUMIN  2019     LIPID  2019     EYE EXAM  2019     MEDICARE ANNUAL WELLNESS VISIT  2019     PHQ-2  2020     DTAP/TDAP/TD IMMUNIZATION (2 - Td) 2020     A1C  " 07/02/2020     BMP  08/01/2020     ADVANCE CARE PLANNING  08/19/2020     INFLUENZA VACCINE (1) 09/01/2020     ALT  01/03/2021     FALL RISK ASSESSMENT  01/08/2021     CBC  02/01/2021     COLORECTAL CANCER SCREENING  01/06/2030     TSH W/FREE T4 REFLEX  Completed     HEPATITIS C SCREENING  Completed     PNEUMOCOCCAL IMMUNIZATION 65+ LOW/MEDIUM RISK  Completed     AORTIC ANEURYSM SCREENING (SYSTEM ASSIGNED)  Completed     IPV IMMUNIZATION  Aged Out     MENINGITIS IMMUNIZATION  Aged Out     Lab work is in process  Labs reviewed in EPIC      Review of Systems   Constitutional: Negative for chills and fever.   HENT: Negative for congestion, ear pain, hearing loss and sore throat.    Eyes: Positive for visual disturbance. Negative for pain.   Respiratory: Positive for shortness of breath. Negative for cough.    Cardiovascular: Negative for chest pain, palpitations and peripheral edema.   Gastrointestinal: Positive for diarrhea and hematochezia. Negative for abdominal pain, constipation, heartburn and nausea.   Genitourinary: Positive for frequency, impotence and urgency. Negative for discharge, dysuria, genital sores and hematuria.   Musculoskeletal: Negative for arthralgias, joint swelling and myalgias.   Skin: Negative for rash.   Neurological: Negative for dizziness, weakness, headaches and paresthesias.   Psychiatric/Behavioral: Negative for mood changes. The patient is not nervous/anxious.          OBJECTIVE:   There were no vitals taken for this visit. Estimated body mass index is 30.86 kg/m  as calculated from the following:    Height as of 2/5/20: 1.753 m (5' 9\").    Weight as of 2/5/20: 94.8 kg (209 lb).  Physical Exam  GENERAL: obese, alert and no distress  EYES: Eyes grossly normal to inspection, PERRL and conjunctivae and sclerae normal  HENT: ear canals and TM's normal, nose and mouth without ulcers or lesions  NECK: no adenopathy, no asymmetry, masses, or scars and thyroid normal to palpation  RESP: lungs " clear to auscultation - no rales, rhonchi or wheezes  CV: regular rate and rhythm, normal S1 S2, no S3 or S4, no murmur, click or rub, no peripheral edema and peripheral pulses strong  ABDOMEN: soft,obese,nontender, no hepatosplenomegaly, no masses and bowel sounds normal, 3cm umbilical hernia, non tender   MS: no gross musculoskeletal defects noted, 1+ LE edema  SKIN: no suspicious lesions or rashes  NEURO: Normal strength and tone, mentation intact and speech normal  PSYCH: mentation appears normal, affect normal/bright    Diagnostic Test Results:  Labs reviewed in Epic    ASSESSMENT / PLAN:       ICD-10-CM    1. Encounter for preventative adult health care examination  Z00.00 CBC with platelets     Comprehensive metabolic panel     Lipid panel reflex to direct LDL Fasting     TSH with free T4 reflex     Hemoglobin A1c     Albumin Random Urine Quantitative with Creat Ratio   2. Type 2 diabetes mellitus with diabetic nephropathy, with long-term current use of insulin (H)  E11.21 insulin lispro (HUMALOG KWIKPEN) 100 UNIT/ML (1 unit dial) KWIKPEN    Z79.4 insulin glargine (LANTUS SOLOSTAR) 100 UNIT/ML pen     Hemoglobin A1c     OFFICE/OUTPT VISIT,EST,LEVL III   3. Essential hypertension  I10 losartan (COZAAR) 25 MG tablet     CBC with platelets     Comprehensive metabolic panel     Lipid panel reflex to direct LDL Fasting     TSH with free T4 reflex     Albumin Random Urine Quantitative with Creat Ratio     OFFICE/OUTPT VISIT,EST,LEVL III   4. Screening for prostate cancer  Z12.5 Prostate spec antigen screen   5. Coronary artery disease involving native coronary artery of native heart without angina pectoris  I25.10 OFFICE/OUTPT VISIT,EST,LEVL III   6. Ulcerative colitis with rectal bleeding, unspecified location (H)  K51.911 OFFICE/OUTPT VISIT,EST,LEVL III       Assess lab  Cont treatment   Start Losartan for HTN  Follow up with GI and Cardiology     Patient has been advised of split billing requirements and  "indicates understanding: Yes  COUNSELING:  Reviewed preventive health counseling, as reflected in patient instructions       Regular exercise       Healthy diet/nutrition       Vision screening       Colon cancer screening       Prostate cancer screening    Estimated body mass index is 30.86 kg/m  as calculated from the following:    Height as of 2/5/20: 1.753 m (5' 9\").    Weight as of 2/5/20: 94.8 kg (209 lb).    Weight management plan: Discussed healthy diet and exercise guidelines    He reports that he quit smoking about 20 years ago. His smoking use included cigarettes. He started smoking about 49 years ago. He has a 43.50 pack-year smoking history. He has never used smokeless tobacco.      Appropriate preventive services were discussed with this patient, including applicable screening as appropriate for cardiovascular disease, diabetes, osteopenia/osteoporosis, and glaucoma.  As appropriate for age/gender, discussed screening for colorectal cancer, prostate cancer, breast cancer, and cervical cancer. Checklist reviewing preventive services available has been given to the patient.    Reviewed patients plan of care and provided an AVS. The Intermediate Care Plan ( asthma action plan, low back pain action plan, and migraine action plan) for Adan meets the Care Plan requirement. This Care Plan has been established and reviewed with the Patient.    Counseling Resources:  ATP IV Guidelines  Pooled Cohorts Equation Calculator  Breast Cancer Risk Calculator  Breast Cancer: Medication to Reduce Risk  FRAX Risk Assessment  ICSI Preventive Guidelines  Dietary Guidelines for Americans, 2010  USDA's MyPlate  ASA Prophylaxis  Lung CA Screening    Lauro Galloway MD  [facility]    Identified Health Risks:  "

## 2020-09-30 NOTE — NURSING NOTE
"BP (!) 152/82   Pulse 74   Temp 98.3  F (36.8  C) (Oral)   Resp 13   Ht 1.753 m (5' 9\")   Wt 105.9 kg (233 lb 6.4 oz)   SpO2 94%   BMI 34.47 kg/m    Patient in for Medicare Visit.  Rosario Barger CMA    "

## 2020-10-02 DIAGNOSIS — Z79.4 TYPE 2 DIABETES MELLITUS WITH DIABETIC NEPHROPATHY, WITH LONG-TERM CURRENT USE OF INSULIN (H): ICD-10-CM

## 2020-10-02 DIAGNOSIS — E11.21 TYPE 2 DIABETES MELLITUS WITH DIABETIC NEPHROPATHY, WITH LONG-TERM CURRENT USE OF INSULIN (H): ICD-10-CM

## 2020-10-05 RX ORDER — INSULIN LISPRO 100 [IU]/ML
40 INJECTION, SOLUTION INTRAVENOUS; SUBCUTANEOUS
Qty: 30 ML | Refills: 3 | Status: SHIPPED | OUTPATIENT
Start: 2020-10-05 | End: 2021-01-15

## 2020-10-05 NOTE — TELEPHONE ENCOUNTER
Provider approval needed.     Last OV on 9/30/20    Lab Results   Component Value Date    A1C 7.7 01/02/2020    A1C 7.9 04/01/2019    A1C 8.4 09/20/2018    A1C 9.9 06/13/2018    A1C 8.3 10/02/2017

## 2020-10-15 DIAGNOSIS — Z79.4 TYPE 2 DIABETES MELLITUS WITH DIABETIC NEPHROPATHY, WITH LONG-TERM CURRENT USE OF INSULIN (H): ICD-10-CM

## 2020-10-15 DIAGNOSIS — Z00.00 ENCOUNTER FOR PREVENTATIVE ADULT HEALTH CARE EXAMINATION: ICD-10-CM

## 2020-10-15 DIAGNOSIS — I10 ESSENTIAL HYPERTENSION: ICD-10-CM

## 2020-10-15 DIAGNOSIS — E11.21 TYPE 2 DIABETES MELLITUS WITH DIABETIC NEPHROPATHY, WITH LONG-TERM CURRENT USE OF INSULIN (H): ICD-10-CM

## 2020-10-15 DIAGNOSIS — Z12.5 SCREENING FOR PROSTATE CANCER: ICD-10-CM

## 2020-10-15 LAB
ERYTHROCYTE [DISTWIDTH] IN BLOOD BY AUTOMATED COUNT: 12.6 % (ref 10–15)
HBA1C MFR BLD: 9 % (ref 0–5.6)
HCT VFR BLD AUTO: 47.7 % (ref 40–53)
HGB BLD-MCNC: 16.4 G/DL (ref 13.3–17.7)
MCH RBC QN AUTO: 35.5 PG (ref 26.5–33)
MCHC RBC AUTO-ENTMCNC: 34.4 G/DL (ref 31.5–36.5)
MCV RBC AUTO: 103 FL (ref 78–100)
PLATELET # BLD AUTO: 199 10E9/L (ref 150–450)
RBC # BLD AUTO: 4.62 10E12/L (ref 4.4–5.9)
WBC # BLD AUTO: 8.6 10E9/L (ref 4–11)

## 2020-10-15 PROCEDURE — 85027 COMPLETE CBC AUTOMATED: CPT | Performed by: INTERNAL MEDICINE

## 2020-10-15 PROCEDURE — 83036 HEMOGLOBIN GLYCOSYLATED A1C: CPT | Performed by: INTERNAL MEDICINE

## 2020-10-15 PROCEDURE — 82043 UR ALBUMIN QUANTITATIVE: CPT | Performed by: INTERNAL MEDICINE

## 2020-10-15 PROCEDURE — 80061 LIPID PANEL: CPT | Performed by: INTERNAL MEDICINE

## 2020-10-15 PROCEDURE — 84443 ASSAY THYROID STIM HORMONE: CPT | Performed by: INTERNAL MEDICINE

## 2020-10-15 PROCEDURE — 36415 COLL VENOUS BLD VENIPUNCTURE: CPT | Performed by: INTERNAL MEDICINE

## 2020-10-15 PROCEDURE — G0103 PSA SCREENING: HCPCS | Performed by: INTERNAL MEDICINE

## 2020-10-15 PROCEDURE — 80053 COMPREHEN METABOLIC PANEL: CPT | Performed by: INTERNAL MEDICINE

## 2020-10-16 LAB
ALBUMIN SERPL-MCNC: 3 G/DL (ref 3.4–5)
ALP SERPL-CCNC: 107 U/L (ref 40–150)
ALT SERPL W P-5'-P-CCNC: 35 U/L (ref 0–70)
ANION GAP SERPL CALCULATED.3IONS-SCNC: 8 MMOL/L (ref 3–14)
AST SERPL W P-5'-P-CCNC: 39 U/L (ref 0–45)
BILIRUB SERPL-MCNC: 0.5 MG/DL (ref 0.2–1.3)
BUN SERPL-MCNC: 30 MG/DL (ref 7–30)
CALCIUM SERPL-MCNC: 10 MG/DL (ref 8.5–10.1)
CHLORIDE SERPL-SCNC: 104 MMOL/L (ref 94–109)
CHOLEST SERPL-MCNC: 146 MG/DL
CO2 SERPL-SCNC: 24 MMOL/L (ref 20–32)
CREAT SERPL-MCNC: 0.88 MG/DL (ref 0.66–1.25)
CREAT UR-MCNC: 64 MG/DL
GFR SERPL CREATININE-BSD FRML MDRD: 88 ML/MIN/{1.73_M2}
GLUCOSE SERPL-MCNC: 288 MG/DL (ref 70–99)
HDLC SERPL-MCNC: 67 MG/DL
LDLC SERPL CALC-MCNC: 55 MG/DL
MICROALBUMIN UR-MCNC: 1090 MG/L
MICROALBUMIN/CREAT UR: 1716.54 MG/G CR (ref 0–17)
NONHDLC SERPL-MCNC: 79 MG/DL
POTASSIUM SERPL-SCNC: 5.3 MMOL/L (ref 3.4–5.3)
PROT SERPL-MCNC: 6.8 G/DL (ref 6.8–8.8)
PSA SERPL-ACNC: 0.95 UG/L (ref 0–4)
SODIUM SERPL-SCNC: 136 MMOL/L (ref 133–144)
TRIGL SERPL-MCNC: 120 MG/DL
TSH SERPL DL<=0.005 MIU/L-ACNC: 1.2 MU/L (ref 0.4–4)

## 2020-10-19 ENCOUNTER — TRANSFERRED RECORDS (OUTPATIENT)
Dept: HEALTH INFORMATION MANAGEMENT | Facility: CLINIC | Age: 70
End: 2020-10-19

## 2020-11-17 DIAGNOSIS — I48.3 TYPICAL ATRIAL FLUTTER (H): ICD-10-CM

## 2020-11-17 RX ORDER — METOPROLOL SUCCINATE 100 MG/1
100 TABLET, EXTENDED RELEASE ORAL 2 TIMES DAILY
Qty: 180 TABLET | Refills: 0 | Status: SHIPPED | OUTPATIENT
Start: 2020-11-17 | End: 2021-02-16

## 2020-11-18 ENCOUNTER — MYC REFILL (OUTPATIENT)
Dept: INTERNAL MEDICINE | Facility: CLINIC | Age: 70
End: 2020-11-18

## 2020-11-18 DIAGNOSIS — I10 ESSENTIAL HYPERTENSION: ICD-10-CM

## 2020-11-20 RX ORDER — LOSARTAN POTASSIUM 25 MG/1
25 TABLET ORAL DAILY
Qty: 90 TABLET | Refills: 3 | Status: SHIPPED | OUTPATIENT
Start: 2020-11-20 | End: 2020-11-23

## 2020-11-23 RX ORDER — LOSARTAN POTASSIUM 50 MG/1
50 TABLET ORAL DAILY
Qty: 90 TABLET | Refills: 3 | Status: SHIPPED | OUTPATIENT
Start: 2020-11-23 | End: 2021-02-12

## 2020-11-23 NOTE — TELEPHONE ENCOUNTER
Dr. Galloway doubled the dose of losartan (COZAAR) after his lab results on 10/15/20 to 50 mg daily. The new RX is back to 25 mg daily. Which dose does Dr Galloway want the patient to take. Please advise

## 2020-11-23 NOTE — TELEPHONE ENCOUNTER
Per 10/15 result note:    Lauro Galloway MD   10/16/2020  2:49 PM CDT      Not well controlled diabetes, increased albumin in the urine.   Try to improve diet, exercise. Increase Losartan to 50 mg.   How much Lantus is he taking?     Please advise if this should be changed to a 50 mg tablet. Please send updated prescription.

## 2020-11-30 DIAGNOSIS — Z79.4 TYPE 2 DIABETES MELLITUS WITH DIABETIC NEPHROPATHY, WITH LONG-TERM CURRENT USE OF INSULIN (H): ICD-10-CM

## 2020-11-30 DIAGNOSIS — E11.21 TYPE 2 DIABETES MELLITUS WITH DIABETIC NEPHROPATHY, WITH LONG-TERM CURRENT USE OF INSULIN (H): ICD-10-CM

## 2020-12-01 ENCOUNTER — TRANSFERRED RECORDS (OUTPATIENT)
Dept: HEALTH INFORMATION MANAGEMENT | Facility: CLINIC | Age: 70
End: 2020-12-01

## 2020-12-01 LAB — RETINOPATHY: NEGATIVE

## 2020-12-02 DIAGNOSIS — E11.21 TYPE 2 DIABETES MELLITUS WITH DIABETIC NEPHROPATHY, WITH LONG-TERM CURRENT USE OF INSULIN (H): ICD-10-CM

## 2020-12-02 DIAGNOSIS — Z79.4 TYPE 2 DIABETES MELLITUS WITH DIABETIC NEPHROPATHY, WITH LONG-TERM CURRENT USE OF INSULIN (H): ICD-10-CM

## 2020-12-03 RX ORDER — LANCETS
EACH MISCELLANEOUS
Qty: 400 EACH | Refills: 3 | Status: SHIPPED | OUTPATIENT
Start: 2020-12-03 | End: 2021-05-18

## 2020-12-07 ENCOUNTER — MYC MEDICAL ADVICE (OUTPATIENT)
Dept: INTERNAL MEDICINE | Facility: CLINIC | Age: 70
End: 2020-12-07

## 2020-12-09 ENCOUNTER — OFFICE VISIT (OUTPATIENT)
Dept: INTERNAL MEDICINE | Facility: CLINIC | Age: 70
End: 2020-12-09
Payer: COMMERCIAL

## 2020-12-09 ENCOUNTER — ANCILLARY PROCEDURE (OUTPATIENT)
Dept: GENERAL RADIOLOGY | Facility: CLINIC | Age: 70
End: 2020-12-09
Attending: INTERNAL MEDICINE
Payer: COMMERCIAL

## 2020-12-09 VITALS
HEIGHT: 69 IN | TEMPERATURE: 97.8 F | BODY MASS INDEX: 35.25 KG/M2 | DIASTOLIC BLOOD PRESSURE: 68 MMHG | RESPIRATION RATE: 20 BRPM | WEIGHT: 238 LBS | OXYGEN SATURATION: 92 % | HEART RATE: 86 BPM | SYSTOLIC BLOOD PRESSURE: 130 MMHG

## 2020-12-09 DIAGNOSIS — R22.42 LOCALIZED SWELLING OF LEFT FOOT: ICD-10-CM

## 2020-12-09 DIAGNOSIS — M25.552 HIP PAIN, LEFT: ICD-10-CM

## 2020-12-09 DIAGNOSIS — Z23 NEED FOR SHINGLES VACCINE: ICD-10-CM

## 2020-12-09 DIAGNOSIS — M25.552 HIP PAIN, LEFT: Primary | ICD-10-CM

## 2020-12-09 DIAGNOSIS — I10 ESSENTIAL HYPERTENSION: ICD-10-CM

## 2020-12-09 LAB — ERYTHROCYTE [SEDIMENTATION RATE] IN BLOOD BY WESTERGREN METHOD: 18 MM/H (ref 0–20)

## 2020-12-09 PROCEDURE — 73502 X-RAY EXAM HIP UNI 2-3 VIEWS: CPT | Performed by: RADIOLOGY

## 2020-12-09 PROCEDURE — 99214 OFFICE O/P EST MOD 30 MIN: CPT | Mod: 25 | Performed by: INTERNAL MEDICINE

## 2020-12-09 PROCEDURE — 84550 ASSAY OF BLOOD/URIC ACID: CPT | Performed by: INTERNAL MEDICINE

## 2020-12-09 PROCEDURE — 36415 COLL VENOUS BLD VENIPUNCTURE: CPT | Performed by: INTERNAL MEDICINE

## 2020-12-09 PROCEDURE — 85652 RBC SED RATE AUTOMATED: CPT | Performed by: INTERNAL MEDICINE

## 2020-12-09 PROCEDURE — 73630 X-RAY EXAM OF FOOT: CPT | Mod: LT | Performed by: RADIOLOGY

## 2020-12-09 PROCEDURE — 90750 HZV VACC RECOMBINANT IM: CPT | Performed by: INTERNAL MEDICINE

## 2020-12-09 PROCEDURE — 90471 IMMUNIZATION ADMIN: CPT | Performed by: INTERNAL MEDICINE

## 2020-12-09 ASSESSMENT — MIFFLIN-ST. JEOR: SCORE: 1839.94

## 2020-12-09 NOTE — PROGRESS NOTES
Subjective     Adan Ruffin is a 70 year old male who presents to clinic today for the following health issues:    left hip 2 months increased in day past 2 weeks 8/10 pain level    HPI     Presents with left hip pain for 2-3 months. Initially had symptoms every 2-3 days, gradually getting more frequent, now has pain several times a day.   Pain is in the left lateral hip area. No radiation. No skin changes. No leg weakness or numbness. No injury. Worse with standing.   Has had also left foot swelling and redness, for a month. No pain, no difficulty walking.   Has h/o HTN. on medical treatment. BP has been controlled. No side effects from medications. No CP, HA, dizziness. good compliance with medications and low salt diet.  Has DM, on insulin. Not controlled. Has increased lantus dose to 72 units bid, slightly improved glucose.               Review of Systems   Constitutional, HEENT, cardiovascular, pulmonary, gi and gu systems are negative, except as otherwise noted.      Objective    There were no vitals taken for this visit.  There is no height or weight on file to calculate BMI.  Physical Exam   GENERAL: healthy, alert and no distress  MS: no gross musculoskeletal defects noted, trace LE edema  Left hip trochanteric bursa palpatory tender   Normal ROM of the hip    Left foot mild dorsal edema and erythema, normal sensory exam, slightly increased skin temperature     SKIN: no suspicious lesions or rashes  NEURO: Normal strength and tone, mentation intact and speech normal    No results found for this or any previous visit (from the past 24 hour(s)).        Assessment & Plan   Problem List Items Addressed This Visit     HTN (hypertension)      Other Visit Diagnoses     Hip pain, left    -  Primary    Relevant Orders    XR Hip Left 2-3 Views    Uric acid    Erythrocyte sedimentation rate auto    Localized swelling of left foot        Relevant Orders    XR Foot Left G/E 3 Views    Uric acid    Erythrocyte  sedimentation rate auto    Need for shingles vaccine             Assess X rays and lab work   Likely trochanteric bursitis  Possible foot gout   PRN Tylenol, may need steroid injection   Cont treatment          See Patient Instructions  Return in about 4 weeks (around 1/6/2021) for follow up on acute problem if persists.    Lauro Galloway MD  [facility]

## 2020-12-10 LAB — URATE SERPL-MCNC: 6.8 MG/DL (ref 3.5–7.2)

## 2020-12-16 DIAGNOSIS — Z79.4 TYPE 2 DIABETES MELLITUS WITH DIABETIC NEPHROPATHY, WITH LONG-TERM CURRENT USE OF INSULIN (H): ICD-10-CM

## 2020-12-16 DIAGNOSIS — E11.21 TYPE 2 DIABETES MELLITUS WITH DIABETIC NEPHROPATHY, WITH LONG-TERM CURRENT USE OF INSULIN (H): ICD-10-CM

## 2020-12-16 NOTE — TELEPHONE ENCOUNTER
"Requested Prescriptions   Pending Prescriptions Disp Refills     blood glucose (ACCU-CHEK SMARTVIEW) test strip  Last Written Prescription Date:  09/11/20  Last Fill Quantity: 400,  # refills: 0   Last office visit: 12/9/2020 with prescribing provider:  12/09/20   Future Office Visit:   Next 5 appointments (look out 90 days)    Feb 12, 2021  3:15 PM  Return Visit with Harvey Rosas MD  [facility] ([address] [address]  [phone] OPT 2                400 strip 0     Sig: USE TO TEST BLOOD SUGAR FOUR TIMES DAILY OR AS DIRECTED       Diabetic Supplies Protocol Passed - 12/16/2020  9:39 AM        Passed - Medication is active on med list        Passed - Patient is 18 years of age or older        Passed - Recent (6 mo) or future (30 days) visit within the authorizing provider's specialty     Patient had office visit in the last 6 months or has a visit in the next 30 days with authorizing provider.  See \"Patient Info\" tab in inbasket, or \"Choose Columns\" in Meds & Orders section of the refill encounter.                 "

## 2020-12-17 RX ORDER — BLOOD SUGAR DIAGNOSTIC
STRIP MISCELLANEOUS
Qty: 400 STRIP | Refills: 0 | Status: SHIPPED | OUTPATIENT
Start: 2020-12-17 | End: 2021-03-26

## 2021-01-15 DIAGNOSIS — E11.21 TYPE 2 DIABETES MELLITUS WITH DIABETIC NEPHROPATHY, WITH LONG-TERM CURRENT USE OF INSULIN (H): ICD-10-CM

## 2021-01-15 DIAGNOSIS — Z79.4 TYPE 2 DIABETES MELLITUS WITH DIABETIC NEPHROPATHY, WITH LONG-TERM CURRENT USE OF INSULIN (H): ICD-10-CM

## 2021-01-15 RX ORDER — INSULIN LISPRO 100 [IU]/ML
40 INJECTION, SOLUTION INTRAVENOUS; SUBCUTANEOUS
Qty: 30 ML | Refills: 3 | Status: SHIPPED | OUTPATIENT
Start: 2021-01-15 | End: 2022-08-10

## 2021-01-15 NOTE — TELEPHONE ENCOUNTER
Pending Prescriptions:                       Disp   Refills    insulin lispro (HUMALOG KWIKPEN) 100 UNIT/*30 mL  3        Sig: Inject 40 Units Subcutaneous 3 times daily (before           meals) 10 units insulin per 1 carb unit (for           example, takes 10 units for 1 slice of bread)    Routing refill request to provider for review/approval because:  Patient fails protocol    Lab Results   Component Value Date    A1C 9.0 10/15/2020    A1C 7.7 01/02/2020    A1C 7.9 04/01/2019    A1C 8.4 09/20/2018    A1C 9.9 06/13/2018

## 2021-02-04 DIAGNOSIS — I25.10 CORONARY ARTERY DISEASE INVOLVING NATIVE CORONARY ARTERY OF NATIVE HEART WITHOUT ANGINA PECTORIS: ICD-10-CM

## 2021-02-04 DIAGNOSIS — I10 ESSENTIAL HYPERTENSION: ICD-10-CM

## 2021-02-04 DIAGNOSIS — R06.09 DOE (DYSPNEA ON EXERTION): ICD-10-CM

## 2021-02-04 DIAGNOSIS — Z86.79 HISTORY OF ATRIAL FLUTTER: ICD-10-CM

## 2021-02-04 DIAGNOSIS — Z95.1 S/P CABG (CORONARY ARTERY BYPASS GRAFT): ICD-10-CM

## 2021-02-04 LAB
ALT SERPL W P-5'-P-CCNC: 24 U/L (ref 0–70)
CHOLEST SERPL-MCNC: 129 MG/DL
HDLC SERPL-MCNC: 50 MG/DL
LDLC SERPL CALC-MCNC: 49 MG/DL
NONHDLC SERPL-MCNC: 79 MG/DL
TRIGL SERPL-MCNC: 149 MG/DL

## 2021-02-04 PROCEDURE — 36415 COLL VENOUS BLD VENIPUNCTURE: CPT | Performed by: INTERNAL MEDICINE

## 2021-02-04 PROCEDURE — 84460 ALANINE AMINO (ALT) (SGPT): CPT | Performed by: INTERNAL MEDICINE

## 2021-02-04 PROCEDURE — 80061 LIPID PANEL: CPT | Performed by: INTERNAL MEDICINE

## 2021-02-12 ENCOUNTER — OFFICE VISIT (OUTPATIENT)
Dept: CARDIOLOGY | Facility: CLINIC | Age: 71
End: 2021-02-12
Attending: PHYSICIAN ASSISTANT
Payer: COMMERCIAL

## 2021-02-12 VITALS
SYSTOLIC BLOOD PRESSURE: 173 MMHG | OXYGEN SATURATION: 95 % | DIASTOLIC BLOOD PRESSURE: 82 MMHG | WEIGHT: 236.7 LBS | HEIGHT: 69 IN | HEART RATE: 64 BPM | BODY MASS INDEX: 35.06 KG/M2

## 2021-02-12 DIAGNOSIS — I10 ESSENTIAL HYPERTENSION: ICD-10-CM

## 2021-02-12 DIAGNOSIS — Z86.79 HISTORY OF ATRIAL FLUTTER: ICD-10-CM

## 2021-02-12 DIAGNOSIS — R06.09 DOE (DYSPNEA ON EXERTION): ICD-10-CM

## 2021-02-12 DIAGNOSIS — I25.10 CORONARY ARTERY DISEASE INVOLVING NATIVE CORONARY ARTERY OF NATIVE HEART WITHOUT ANGINA PECTORIS: ICD-10-CM

## 2021-02-12 DIAGNOSIS — Z95.1 S/P CABG (CORONARY ARTERY BYPASS GRAFT): ICD-10-CM

## 2021-02-12 PROCEDURE — 99212 OFFICE O/P EST SF 10 MIN: CPT | Performed by: INTERNAL MEDICINE

## 2021-02-12 RX ORDER — LOSARTAN POTASSIUM 50 MG/1
100 TABLET ORAL DAILY
Qty: 90 TABLET | Refills: 3 | Status: SHIPPED | OUTPATIENT
Start: 2021-02-12 | End: 2021-04-02

## 2021-02-12 RX ORDER — FERROUS SULFATE 325(65) MG
325 TABLET ORAL
COMMUNITY
End: 2021-04-02

## 2021-02-12 ASSESSMENT — MIFFLIN-ST. JEOR: SCORE: 1834.04

## 2021-02-12 NOTE — LETTER
2/12/2021    Lauro Galloway MD  [address] [facility] [address]    RE: Adan Magallonrd       Dear Colleague,    I had the pleasure of seeing Adan Ruffin in the [facility].    HPI and Plan:     Mr. Ruffin comes in for annual followup.  No complaints of CP, palpitation, syncope.  He has chronic venous stasis changes.  Some TERAN unchanged.  Echo last year within normal limits.  Compliant with meds.    Plan increase Cozaar to 100 mg daily  Plan follow up in 1 year with echo  Vascular physician if wants treatment for venous disease.      Orders Placed This Encounter   Medications     ferrous sulfate (FEROSUL) 325 (65 Fe) MG tablet     Sig: Take 325 mg by mouth daily (with breakfast)     losartan (COZAAR) 50 MG tablet     Sig: Take 2 tablets (100 mg) by mouth daily     Dispense:  90 tablet     Refill:  3     Medications Discontinued During This Encounter   Medication Reason     losartan (COZAAR) 50 MG tablet          Encounter Diagnoses   Name Primary?     Coronary artery disease involving native coronary artery of native heart without angina pectoris      S/P CABG (coronary artery bypass graft)      Essential hypertension      TERAN (dyspnea on exertion)      History of atrial flutter        CURRENT MEDICATIONS:  Current Outpatient Medications   Medication Sig Dispense Refill     atorvastatin (LIPITOR) 40 MG tablet TAKE 1 TABLET(40 MG) BY MOUTH DAILY 90 tablet 3     balsalazide (COLAZAL) 750 MG capsule TK FIVE C PO IN THE MORNING AND FOUR C PO IN THE EVENING       blood glucose (ACCU-CHEK SMARTVIEW) test strip USE TO TEST BLOOD SUGAR FOUR TIMES DAILY OR AS DIRECTED 400 strip 0     blood glucose monitoring (ACCU-CHEK FASTCLIX) lancets Use to test blood sugar 4 times daily or as directed. 400 each 3     ferrous sulfate (FEROSUL) 325 (65 Fe) MG tablet Take 325 mg by mouth daily (with breakfast)       insulin glargine (LANTUS SOLOSTAR) 100 UNIT/ML pen  Inject 70 Units Subcutaneous 2 times daily 70/70 63 mL 3     insulin lispro (HUMALOG KWIKPEN) 100 UNIT/ML (1 unit dial) KWIKPEN Inject 40 Units Subcutaneous 3 times daily (before meals) 10 units insulin per 1 carb unit (for example, takes 10 units for 1 slice of bread) 30 mL 3     losartan (COZAAR) 50 MG tablet Take 2 tablets (100 mg) by mouth daily 90 tablet 3     metFORMIN (GLUCOPHAGE) 1000 MG tablet TAKE 1 TABLET BY MOUTH TWICE DAILY WITH MEALS 180 tablet 0     metoprolol succinate ER (TOPROL XL) 100 MG 24 hr tablet Take 1 tablet (100 mg) by mouth 2 times daily 180 tablet 0     NOVOFINE 30 30G X 8 MM insulin pen needle USE 4 TO 5 TIMES DAILY OR AS DIRECTED 200 each 6     ACE NOT PRESCRIBED, INTENTIONAL, 1 each daily ACE Inhibitor not prescribed due to Intolerance (Patient not taking: Reported on 12/9/2020) 0 each 0     aspirin-acetaminophen-caffeine (EXCEDRIN MIGRAINE) 250-250-65 MG tablet Take 2 tablets by mouth every morning         ALLERGIES     Allergies   Allergen Reactions     Tetracycline      PN: LW Reaction: RASH     Codeine Rash     Mild rash     Simvastatin Other (See Comments)     Leg pain       PAST MEDICAL HISTORY:  Past Medical History:   Diagnosis Date     Aortic root dilatation (H)      Atrial flutter (H) 10/02/2017    ablation 11/26/2017     CAD (coronary artery disease) 03/13/2015    CABG 4/7/2015 by Dr. Johan Hall: LIMA to LAD, SVG to circumflex, SVG to PDA     Cardiomyopathy (H) 10/02/2017    tachycardia-induced with EF 40-45%, improved once back to NSR     HTN (hypertension)      Hyperlipidaemia      Microalbuminuria 6/27/2016     Obesity 3/13/2015     Type 2 diabetes mellitus with diabetic nephropathy, with long-term current use of insulin (H) 11/2/2016       PAST SURGICAL HISTORY:  Past Surgical History:   Procedure Laterality Date     BYPASS GRAFT ARTERY CORONARY N/A 4/7/2015    bypass LAD, OM, PDA        COLONOSCOPY N/A 6/20/2016    Procedure: COLONOSCOPY;  Surgeon: Marcela Schwartz  MD Sue;  Location:  GI     COLONOSCOPY N/A 2020    Procedure: Colonoscopy, With Polypectomy And Biopsy;  Surgeon: Herson Faust MD;  Location:  GI     EXCISE PILONIDAL CYST, SIMPLE  1975     HEART CATH, ANGIOPLASTY  3-    3 vessel disease-occluded RCA, stenosis LM-to have CABG     removal of skin cancer       TONSILLECTOMY & ADENOIDECTOMY  1950       FAMILY HISTORY:  Family History   Problem Relation Age of Onset     Breast Cancer Mother      Cerebrovascular Disease Mother      Hyperlipidemia Father      Cancer Father         lung, unrelated to smoking     Cancer Paternal Grandmother      Diabetes Brother      No Known Problems Sister      Colon Cancer No family hx of        SOCIAL HISTORY:  Social History     Socioeconomic History     Marital status:      Spouse name: None     Number of children: None     Years of education: None     Highest education level: None   Occupational History     None   Social Needs     Financial resource strain: None     Food insecurity     Worry: None     Inability: None     Transportation needs     Medical: None     Non-medical: None   Tobacco Use     Smoking status: Former Smoker     Packs/day: 1.50     Years: 29.00     Pack years: 43.50     Types: Cigarettes     Start date:      Quit date: 2000     Years since quittin.1     Smokeless tobacco: Never Used   Substance and Sexual Activity     Alcohol use: Yes     Alcohol/week: 0.0 standard drinks     Comment: 1 mixed drink a night     Drug use: No     Sexual activity: Not Currently     Partners: Female   Lifestyle     Physical activity     Days per week: None     Minutes per session: None     Stress: None   Relationships     Social connections     Talks on phone: None     Gets together: None     Attends Christianity service: None     Active member of club or organization: None     Attends meetings of clubs or organizations: None     Relationship status: None     Intimate partner violence      "Fear of current or ex partner: None     Emotionally abused: None     Physically abused: None     Forced sexual activity: None   Other Topics Concern     Parent/sibling w/ CABG, MI or angioplasty before 65F 55M? Not Asked      Service Not Asked     Blood Transfusions Not Asked     Caffeine Concern No     Comment: none     Occupational Exposure Not Asked     Hobby Hazards Not Asked     Sleep Concern No     Stress Concern Yes     Comment: sometimes at home     Weight Concern Yes     Comment: would like to lose weight     Special Diet No     Back Care Not Asked     Exercise No     Bike Helmet Not Asked     Seat Belt Yes     Self-Exams Not Asked   Social History Narrative     None       Review of Systems:  Skin:  Negative     Eyes:  Positive for glasses  ENT:  Negative    Respiratory:  Positive for dyspnea on exertion  Cardiovascular:  Negative for;palpitations;chest pain;lightheadedness;dizziness;fatigue edema;Positive for  Gastroenterology: Negative    Genitourinary:  Positive for urinary frequency;decreased urinary stream  Musculoskeletal:  Positive for joint pain;back pain  Neurologic:  Negative    Psychiatric:  Positive for anxiety  Heme/Lymph/Imm:  Negative    Endocrine:  Positive for diabetes    Physical Exam:  Vitals: BP (!) 173/82 (BP Location: Right arm, Patient Position: Sitting, Cuff Size: Adult Large)   Pulse 64   Ht 1.753 m (5' 9\")   Wt 107.4 kg (236 lb 11.2 oz)   SpO2 95%   BMI 34.95 kg/m      Constitutional:  cooperative, alert and oriented, well developed, well nourished, in no acute distress        Skin:  warm and dry to the touch, no apparent skin lesions or masses noted surgical scars well-healed        Head:  no masses or lesions        Eyes:  conjunctivae and lids unremarkable;sclera white        Lymph:      ENT:  dentition good        Neck:           Respiratory:  normal breath sounds, clear to auscultation, normal A-P diameter, normal symmetry, normal respiratory excursion, no use of " accessory muscles         Cardiac: regular rhythm, normal S1/S2, no S3 or S4, apical impulse not displaced, no murmurs, gallops or rubs                                                         GI:           Extremities and Muscular Skeletal:  no deformities, clubbing, cyanosis, erythema observed              Neurological:           Psych:         Recent Lab Results:  LIPID RESULTS:  Lab Results   Component Value Date    CHOL 129 02/04/2021    HDL 50 02/04/2021    LDL 49 02/04/2021    TRIG 149 02/04/2021    CHOLHDLRATIO 3.5 08/19/2015       LIVER ENZYME RESULTS:  Lab Results   Component Value Date    AST 39 10/15/2020    ALT 24 02/04/2021       CBC RESULTS:  Lab Results   Component Value Date    WBC 8.6 10/15/2020    RBC 4.62 10/15/2020    HGB 16.4 10/15/2020    HCT 47.7 10/15/2020     (H) 10/15/2020    MCH 35.5 (H) 10/15/2020    MCHC 34.4 10/15/2020    RDW 12.6 10/15/2020     10/15/2020       BMP RESULTS:  Lab Results   Component Value Date     10/15/2020    POTASSIUM 5.3 10/15/2020    CHLORIDE 104 10/15/2020    CO2 24 10/15/2020    ANIONGAP 8 10/15/2020     (H) 10/15/2020    BUN 30 10/15/2020    CR 0.88 10/15/2020    GFRESTIMATED 88 10/15/2020    GFRESTBLACK >90 10/15/2020    AURE 10.0 10/15/2020        A1C RESULTS:  Lab Results   Component Value Date    A1C 9.0 (H) 10/15/2020       INR RESULTS:  Lab Results   Component Value Date    INR 1.04 01/02/2020    INR 0.97 10/19/2017       Thank you for allowing me to participate in the care of your patient.    Sincerely,     LEWIS BYRNE MD     [facility]

## 2021-02-12 NOTE — PROGRESS NOTES
HPI and Plan:     Mr. Ruffin comes in for annual followup.  No complaints of CP, palpitation, syncope.  He has chronic venous stasis changes.  Some TERAN unchanged.  Echo last year within normal limits.  Compliant with meds.    Plan increase Cozaar to 100 mg daily  Plan follow up in 1 year with echo  Vascular physician if wants treatment for venous disease.      Orders Placed This Encounter   Medications     ferrous sulfate (FEROSUL) 325 (65 Fe) MG tablet     Sig: Take 325 mg by mouth daily (with breakfast)     losartan (COZAAR) 50 MG tablet     Sig: Take 2 tablets (100 mg) by mouth daily     Dispense:  90 tablet     Refill:  3     Medications Discontinued During This Encounter   Medication Reason     losartan (COZAAR) 50 MG tablet          Encounter Diagnoses   Name Primary?     Coronary artery disease involving native coronary artery of native heart without angina pectoris      S/P CABG (coronary artery bypass graft)      Essential hypertension      TERAN (dyspnea on exertion)      History of atrial flutter        CURRENT MEDICATIONS:  Current Outpatient Medications   Medication Sig Dispense Refill     atorvastatin (LIPITOR) 40 MG tablet TAKE 1 TABLET(40 MG) BY MOUTH DAILY 90 tablet 3     balsalazide (COLAZAL) 750 MG capsule TK FIVE C PO IN THE MORNING AND FOUR C PO IN THE EVENING       blood glucose (ACCU-CHEK SMARTVIEW) test strip USE TO TEST BLOOD SUGAR FOUR TIMES DAILY OR AS DIRECTED 400 strip 0     blood glucose monitoring (ACCU-CHEK FASTCLIX) lancets Use to test blood sugar 4 times daily or as directed. 400 each 3     ferrous sulfate (FEROSUL) 325 (65 Fe) MG tablet Take 325 mg by mouth daily (with breakfast)       insulin glargine (LANTUS SOLOSTAR) 100 UNIT/ML pen Inject 70 Units Subcutaneous 2 times daily 70/70 63 mL 3     insulin lispro (HUMALOG KWIKPEN) 100 UNIT/ML (1 unit dial) KWIKPEN Inject 40 Units Subcutaneous 3 times daily (before meals) 10 units insulin per 1 carb unit (for example, takes 10 units for 1  slice of bread) 30 mL 3     losartan (COZAAR) 50 MG tablet Take 2 tablets (100 mg) by mouth daily 90 tablet 3     metFORMIN (GLUCOPHAGE) 1000 MG tablet TAKE 1 TABLET BY MOUTH TWICE DAILY WITH MEALS 180 tablet 0     metoprolol succinate ER (TOPROL XL) 100 MG 24 hr tablet Take 1 tablet (100 mg) by mouth 2 times daily 180 tablet 0     NOVOFINE 30 30G X 8 MM insulin pen needle USE 4 TO 5 TIMES DAILY OR AS DIRECTED 200 each 6     ACE NOT PRESCRIBED, INTENTIONAL, 1 each daily ACE Inhibitor not prescribed due to Intolerance (Patient not taking: Reported on 12/9/2020) 0 each 0     aspirin-acetaminophen-caffeine (EXCEDRIN MIGRAINE) 250-250-65 MG tablet Take 2 tablets by mouth every morning         ALLERGIES     Allergies   Allergen Reactions     Tetracycline      PN: LW Reaction: RASH     Codeine Rash     Mild rash     Simvastatin Other (See Comments)     Leg pain       PAST MEDICAL HISTORY:  Past Medical History:   Diagnosis Date     Aortic root dilatation (H)      Atrial flutter (H) 10/02/2017    ablation 11/26/2017     CAD (coronary artery disease) 03/13/2015    CABG 4/7/2015 by Dr. Johan Hall: LIMA to LAD, SVG to circumflex, SVG to PDA     Cardiomyopathy (H) 10/02/2017    tachycardia-induced with EF 40-45%, improved once back to NSR     HTN (hypertension)      Hyperlipidaemia      Microalbuminuria 6/27/2016     Obesity 3/13/2015     Type 2 diabetes mellitus with diabetic nephropathy, with long-term current use of insulin (H) 11/2/2016       PAST SURGICAL HISTORY:  Past Surgical History:   Procedure Laterality Date     BYPASS GRAFT ARTERY CORONARY N/A 4/7/2015    bypass LAD, OM, PDA        COLONOSCOPY N/A 6/20/2016    Procedure: COLONOSCOPY;  Surgeon: Marcela Schwartz MD;  Location:  GI     COLONOSCOPY N/A 1/6/2020    Procedure: Colonoscopy, With Polypectomy And Biopsy;  Surgeon: Herson Faust MD;  Location:  GI     EXCISE PILONIDAL CYST, SIMPLE  1975     HEART CATH, ANGIOPLASTY  3-    3 vessel  disease-occluded RCA, stenosis LM-to have CABG     removal of skin cancer       TONSILLECTOMY & ADENOIDECTOMY  1950       FAMILY HISTORY:  Family History   Problem Relation Age of Onset     Breast Cancer Mother      Cerebrovascular Disease Mother      Hyperlipidemia Father      Cancer Father         lung, unrelated to smoking     Cancer Paternal Grandmother      Diabetes Brother      No Known Problems Sister      Colon Cancer No family hx of        SOCIAL HISTORY:  Social History     Socioeconomic History     Marital status:      Spouse name: None     Number of children: None     Years of education: None     Highest education level: None   Occupational History     None   Social Needs     Financial resource strain: None     Food insecurity     Worry: None     Inability: None     Transportation needs     Medical: None     Non-medical: None   Tobacco Use     Smoking status: Former Smoker     Packs/day: 1.50     Years: 29.00     Pack years: 43.50     Types: Cigarettes     Start date:      Quit date: 2000     Years since quittin.1     Smokeless tobacco: Never Used   Substance and Sexual Activity     Alcohol use: Yes     Alcohol/week: 0.0 standard drinks     Comment: 1 mixed drink a night     Drug use: No     Sexual activity: Not Currently     Partners: Female   Lifestyle     Physical activity     Days per week: None     Minutes per session: None     Stress: None   Relationships     Social connections     Talks on phone: None     Gets together: None     Attends Mandaen service: None     Active member of club or organization: None     Attends meetings of clubs or organizations: None     Relationship status: None     Intimate partner violence     Fear of current or ex partner: None     Emotionally abused: None     Physically abused: None     Forced sexual activity: None   Other Topics Concern     Parent/sibling w/ CABG, MI or angioplasty before 65F 55M? Not Asked      Service Not Asked      "Blood Transfusions Not Asked     Caffeine Concern No     Comment: none     Occupational Exposure Not Asked     Hobby Hazards Not Asked     Sleep Concern No     Stress Concern Yes     Comment: sometimes at home     Weight Concern Yes     Comment: would like to lose weight     Special Diet No     Back Care Not Asked     Exercise No     Bike Helmet Not Asked     Seat Belt Yes     Self-Exams Not Asked   Social History Narrative     None       Review of Systems:  Skin:  Negative     Eyes:  Positive for glasses  ENT:  Negative    Respiratory:  Positive for dyspnea on exertion  Cardiovascular:  Negative for;palpitations;chest pain;lightheadedness;dizziness;fatigue edema;Positive for  Gastroenterology: Negative    Genitourinary:  Positive for urinary frequency;decreased urinary stream  Musculoskeletal:  Positive for joint pain;back pain  Neurologic:  Negative    Psychiatric:  Positive for anxiety  Heme/Lymph/Imm:  Negative    Endocrine:  Positive for diabetes    Physical Exam:  Vitals: BP (!) 173/82 (BP Location: Right arm, Patient Position: Sitting, Cuff Size: Adult Large)   Pulse 64   Ht 1.753 m (5' 9\")   Wt 107.4 kg (236 lb 11.2 oz)   SpO2 95%   BMI 34.95 kg/m      Constitutional:  cooperative, alert and oriented, well developed, well nourished, in no acute distress        Skin:  warm and dry to the touch, no apparent skin lesions or masses noted surgical scars well-healed        Head:  no masses or lesions        Eyes:  conjunctivae and lids unremarkable;sclera white        Lymph:      ENT:  dentition good        Neck:           Respiratory:  normal breath sounds, clear to auscultation, normal A-P diameter, normal symmetry, normal respiratory excursion, no use of accessory muscles         Cardiac: regular rhythm, normal S1/S2, no S3 or S4, apical impulse not displaced, no murmurs, gallops or rubs                                                         GI:           Extremities and Muscular Skeletal:  no " deformities, clubbing, cyanosis, erythema observed              Neurological:           Psych:         Recent Lab Results:  LIPID RESULTS:  Lab Results   Component Value Date    CHOL 129 02/04/2021    HDL 50 02/04/2021    LDL 49 02/04/2021    TRIG 149 02/04/2021    CHOLHDLRATIO 3.5 08/19/2015       LIVER ENZYME RESULTS:  Lab Results   Component Value Date    AST 39 10/15/2020    ALT 24 02/04/2021       CBC RESULTS:  Lab Results   Component Value Date    WBC 8.6 10/15/2020    RBC 4.62 10/15/2020    HGB 16.4 10/15/2020    HCT 47.7 10/15/2020     (H) 10/15/2020    MCH 35.5 (H) 10/15/2020    MCHC 34.4 10/15/2020    RDW 12.6 10/15/2020     10/15/2020       BMP RESULTS:  Lab Results   Component Value Date     10/15/2020    POTASSIUM 5.3 10/15/2020    CHLORIDE 104 10/15/2020    CO2 24 10/15/2020    ANIONGAP 8 10/15/2020     (H) 10/15/2020    BUN 30 10/15/2020    CR 0.88 10/15/2020    GFRESTIMATED 88 10/15/2020    GFRESTBLACK >90 10/15/2020    AURE 10.0 10/15/2020        A1C RESULTS:  Lab Results   Component Value Date    A1C 9.0 (H) 10/15/2020       INR RESULTS:  Lab Results   Component Value Date    INR 1.04 01/02/2020    INR 0.97 10/19/2017           CC  Lauro Galloway MD  [address] [place] [address]

## 2021-02-16 DIAGNOSIS — I48.3 TYPICAL ATRIAL FLUTTER (H): ICD-10-CM

## 2021-02-16 RX ORDER — METOPROLOL SUCCINATE 100 MG/1
100 TABLET, EXTENDED RELEASE ORAL 2 TIMES DAILY
Qty: 180 TABLET | Refills: 3 | Status: SHIPPED | OUTPATIENT
Start: 2021-02-16 | End: 2022-05-11

## 2021-02-25 ENCOUNTER — MYC MEDICAL ADVICE (OUTPATIENT)
Dept: INTERNAL MEDICINE | Facility: CLINIC | Age: 71
End: 2021-02-25

## 2021-02-25 DIAGNOSIS — M54.50 ACUTE MIDLINE LOW BACK PAIN WITHOUT SCIATICA: Primary | ICD-10-CM

## 2021-02-25 RX ORDER — BACLOFEN 10 MG/1
10 TABLET ORAL 3 TIMES DAILY
Qty: 30 TABLET | Refills: 0 | Status: SHIPPED | OUTPATIENT
Start: 2021-02-25 | End: 2021-04-02

## 2021-02-25 NOTE — TELEPHONE ENCOUNTER
See his [employer] message. REports having Back pain.     He was having hip pain in December.     Please advise if should have OV.

## 2021-02-25 NOTE — TELEPHONE ENCOUNTER
Will start on muscle relaxant and to take Ibuprofen or tylenol tid. Will refer to PT.   Needs to be seen for assessment if note better.

## 2021-02-28 NOTE — PROGRESS NOTES
[facility]: Physical Therapy Initial Evaluation   Mar 1, 2021    Subjective:   Chief Complaint: Back, Hip, Shoulder Pain   Pain: both hips (buttocks), in the low back (near midline), sometimes in both shoulders (related to impaired posture)   Numbness/Tingling: None   Weakness: None   Stiffness: not without pain   Other: Feels like his right knee sometimes swells up and becomes painful  New/Recurrent/Chronic: Mix (some chronic, some more acute)  DOI/onset: hip and back pain started up last Friday ;    Referral Date: 2021 - Lauro Galloway MD  Mechanism of onset: unknown, sudden onset,  PMH/surgical history/trauma:    - Diabetes   - Heart Problems (triple bypass surgeries, 2015)   - HTN   - Overweight   - Rhizotomy (10-20 years ago)  General health as reported by patient:  Fair   Medications: Cardiac, HTN, Muscle Relaxants,   Occupation:  Job duties: computer work, prolonged sitting,   Previous Treatment (Effect): OTC (helps) ; sleeping excessively (helped) ; heating pad (helpful) ;   Imagin2020 - Left hip X-Ray IMPRESSION: Joint space well-preserved. No fracture or AVN.  AM/PM: no general trend  Quality of Pain: intense,   Pain: 8/10 at present, 9/10 at worst  Worse: seems to migrate, patient is not able to identify any specific tasks that directly impacts the pain  Better: generally better when more active,   Progression of Symptoms since onset: gotten somewhat better, potentially healed a little bit  Sleeping: generally has difficulty with sleeping, the pain makes this more difficult. Wakes up 2-3 times per night (sleeps about 3 hours at a time)    Current Functional Status: SEE GOALS FLOWSHEET   - working - difficulty with concentrating with the given level of pain   Previous Functional Status: No restrictions  Current HEP/exercise regimen: None  Transportation to Therapy: Independent with transportation  Live with Others: Wife- [place], 1 cat, 1 dog,   Red  Flags:   - Patient denies the following: Fever ; Weakness ; Numbness/Tingling ; Change in Bowel or Bladder ; Chest Pain ; Unexplained Weight Loss ; Rashes or Lesions of the Skin ; Non-Healing Wounds ;   - Patient reports the following: Pain with Cough / Sneeze / Laughing (maybe) ; Rashes or Lesions of the Skin ; Edema of the Feet ;     Patient's goal(s): Get these various sensitivities to not happen.       Objective:    Posture: increased anterior pelvic tilt    Gait: decreased time on RLE, R foot not fully under hip, left hip drop.     SL Stance: challenge at tandem stance bilaterally      Lumbar AROM: (Major, Moderate, Minimal or Nil loss)  Movement Loss Ezio Mod Min Nil Comments   Flexion  x      Extension x x   cc   Side Gliding L  x x  cc   Side Gliding R  x x  cc       Neurological:    Motor Deficit:  Myotomes R L   L1-2 (hip flexion) 5 5   L3 (knee extension) 5 5   L4 (ankle DF) 5 5   L5 (g. toe ext) 5 5   S1 (knee flex) 5 4     Reflexes:    R L   Patellar 0+ 0+   Achilles 0+ 0+     Lumbar Mobility/Spring Testing: Reactive contraction with PA pressure    Palpation: no tenderness in the glutes, hamstrings, greater trochanter, or lumbar paraspinals. Does have some tenderness just superior to the greater trochanter.     Other:   - SHANELL 7/10  - Patient made an off-hand comment about having some difficult with remembering things.           Therapist Impression/Differential Diagnosis:   Abraham presents to physical therapy with a primary complaint of bilateral low back, bilateral hip, and bilateral shoulder pain. His referral is for the low back, so the lumbar and hip regions were the focus of examination today. Clinical findings include abnormalities with lumbar ROM, posture, gait, and balance. Rehab will focus on addressing established deficits and exploring relevent regions for additional deficits. At this time, there is not a strong clinic correlation between the findings and the report of pain. As always, if not  improving or is worsening, patient will be referred back to provider.       Assessment/Plan:    Patient is a 70 year old male with lumbar and both sides hip complaints.    Patient has the following significant findings with corresponding treatment plan.                Referring Diagnosis: Acute midline low back pain without sciatica     Pain -  hot/cold therapy, manual therapy, splint/taping/bracing/orthotics, self management, education and home program  Decreased ROM/flexibility - manual therapy, therapeutic exercise, therapeutic activity and home program  Decreased joint mobility - manual therapy, therapeutic exercise, therapeutic activity and home program  Decreased strength - therapeutic exercise, therapeutic activities and home program  Impaired balance - neuro re-education, therapeutic activities and home program  Impaired gait - gait training and home program  Decreased function - therapeutic activities and home program  Impaired posture - neuro re-education, therapeutic activities and home program        Therapy Evaluation Codes:   1) History comprised of:   Personal factors that impact the plan of care:      Cognition and Profession.    Comorbidity factors that impact the plan of care are:      Diabetes, Heart problems and Overweight.     Medications impacting care: Muscle relaxant.  2) Examination of Body Systems comprised of:   Body structures and functions that impact the plan of care:      Hip and Lumbar spine.   Activity limitations that impact the plan of care are:      Working and Sleeping.  3) Clinical presentation characteristics are:   Unstable/Unpredictable.  4) Decision-Making    Moderate complexity using standardized patient assessment instrument and/or measureable assessment of functional outcome.  Cumulative Therapy Evaluation is: Moderate complexity.    Previous and current functional limitations:  (See Goal Flow Sheet for this information)    Short term and Long term goals: (See Goal Flow  Sheet for this information)     Communication ability:  Patient appears to be able to clearly communicate and understand verbal and written communication and follow directions correctly.  Treatment Explanation - The following has been discussed with the patient:   RX ordered/plan of care  Anticipated outcomes  Possible risks and side effects  This patient would benefit from PT intervention to resume normal activities.   Rehab potential is fair.    Frequency:  1 X week, once daily  Duration:  for 8 weeks   Discharge Plan:  Achieve all LTG.  Independent in home treatment program.  Reach maximal therapeutic benefit.    Please refer to the daily flowsheet for treatment today, total treatment time and time spent performing 1:1 timed codes.

## 2021-03-01 ENCOUNTER — THERAPY VISIT (OUTPATIENT)
Dept: PHYSICAL THERAPY | Facility: CLINIC | Age: 71
End: 2021-03-01
Payer: COMMERCIAL

## 2021-03-01 DIAGNOSIS — M54.50 ACUTE MIDLINE LOW BACK PAIN WITHOUT SCIATICA: ICD-10-CM

## 2021-03-01 DIAGNOSIS — Z79.4 TYPE 2 DIABETES MELLITUS WITH DIABETIC NEPHROPATHY, WITH LONG-TERM CURRENT USE OF INSULIN (H): ICD-10-CM

## 2021-03-01 DIAGNOSIS — E11.21 TYPE 2 DIABETES MELLITUS WITH DIABETIC NEPHROPATHY, WITH LONG-TERM CURRENT USE OF INSULIN (H): ICD-10-CM

## 2021-03-01 PROCEDURE — 97110 THERAPEUTIC EXERCISES: CPT | Mod: GP | Performed by: PHYSICAL THERAPIST

## 2021-03-01 PROCEDURE — 97162 PT EVAL MOD COMPLEX 30 MIN: CPT | Mod: GP | Performed by: PHYSICAL THERAPIST

## 2021-03-02 ENCOUNTER — ANCILLARY PROCEDURE (OUTPATIENT)
Dept: GENERAL RADIOLOGY | Facility: CLINIC | Age: 71
End: 2021-03-02
Attending: INTERNAL MEDICINE
Payer: COMMERCIAL

## 2021-03-02 ENCOUNTER — TELEPHONE (OUTPATIENT)
Dept: INTERNAL MEDICINE | Facility: CLINIC | Age: 71
End: 2021-03-02

## 2021-03-02 DIAGNOSIS — R05.9 COUGH: Primary | ICD-10-CM

## 2021-03-02 DIAGNOSIS — J20.9 ACUTE BRONCHITIS, UNSPECIFIED ORGANISM: ICD-10-CM

## 2021-03-02 DIAGNOSIS — R05.9 COUGH: ICD-10-CM

## 2021-03-02 PROCEDURE — 71046 X-RAY EXAM CHEST 2 VIEWS: CPT | Performed by: RADIOLOGY

## 2021-03-02 RX ORDER — AZITHROMYCIN 250 MG/1
TABLET, FILM COATED ORAL
Qty: 6 TABLET | Refills: 0 | Status: SHIPPED | OUTPATIENT
Start: 2021-03-02 | End: 2021-03-07

## 2021-03-02 NOTE — TELEPHONE ENCOUNTER
"Pt states starting this AM cough. New cough today.   Felt like phlegm in chest.   Coughing up Mucus, about 1/4 teaspoon 1/4 of blood, deep red blood. Pure blood.     He states it builds up and has to cough it out.    Can hear wheezing, rattling when pt is talking over the phone.     Has had Pain issue with back. Lower back pain.     No short of breath. No fever.     He has Night sweats, but using heating pad due to pain.     Pain in neck this AM. Difficulty getting out of bed. Mild now. Shoulders are sore.   He feels like the pain is \"migrating\".   Low back pain for 2 weeks.     He had pneumonia about a year ago.     No loss of smell of taste. No sore throat. No headaches.   No exposure to COVID.     Please advise.   "

## 2021-03-02 NOTE — TELEPHONE ENCOUNTER
Routing refill request to provider for review/approval because:    A1C  Lab Results   Component Value Date    A1C 9.0 10/15/2020    A1C 7.7 01/02/2020    A1C 7.9 04/01/2019    A1C 8.4 09/20/2018    A1C 9.9 06/13/2018

## 2021-03-02 NOTE — TELEPHONE ENCOUNTER
Call to pt and advised. He agrees. Transferred to scheduling.     Will keep open to watch for Xray results.

## 2021-03-02 NOTE — TELEPHONE ENCOUNTER
Patient calling. Today he has been coughing up blood every time he coughs. No fever, no chest pain. No SOB.  Please advise if he should be seen or go to ED. Ok to call and karolina [phone]

## 2021-03-03 PROBLEM — M54.50 ACUTE MIDLINE LOW BACK PAIN WITHOUT SCIATICA: Status: ACTIVE | Noted: 2021-03-03

## 2021-03-11 ENCOUNTER — IMMUNIZATION (OUTPATIENT)
Dept: NURSING | Facility: CLINIC | Age: 71
End: 2021-03-11
Payer: COMMERCIAL

## 2021-03-11 PROCEDURE — 0001A PR COVID VAC PFIZER DIL RECON 30 MCG/0.3 ML IM: CPT

## 2021-03-11 PROCEDURE — 91300 PR COVID VAC PFIZER DIL RECON 30 MCG/0.3 ML IM: CPT

## 2021-03-16 ENCOUNTER — THERAPY VISIT (OUTPATIENT)
Dept: PHYSICAL THERAPY | Facility: CLINIC | Age: 71
End: 2021-03-16
Payer: COMMERCIAL

## 2021-03-16 ENCOUNTER — TRANSFERRED RECORDS (OUTPATIENT)
Dept: HEALTH INFORMATION MANAGEMENT | Facility: CLINIC | Age: 71
End: 2021-03-16

## 2021-03-16 DIAGNOSIS — M54.50 ACUTE MIDLINE LOW BACK PAIN WITHOUT SCIATICA: ICD-10-CM

## 2021-03-16 PROCEDURE — 97140 MANUAL THERAPY 1/> REGIONS: CPT | Mod: GP | Performed by: PHYSICAL THERAPIST

## 2021-03-16 PROCEDURE — 97110 THERAPEUTIC EXERCISES: CPT | Mod: GP | Performed by: PHYSICAL THERAPIST

## 2021-03-22 ENCOUNTER — THERAPY VISIT (OUTPATIENT)
Dept: PHYSICAL THERAPY | Facility: CLINIC | Age: 71
End: 2021-03-22
Payer: COMMERCIAL

## 2021-03-22 DIAGNOSIS — M54.50 ACUTE MIDLINE LOW BACK PAIN WITHOUT SCIATICA: ICD-10-CM

## 2021-03-22 PROCEDURE — 97112 NEUROMUSCULAR REEDUCATION: CPT | Mod: GP | Performed by: PHYSICAL THERAPIST

## 2021-03-22 PROCEDURE — 97110 THERAPEUTIC EXERCISES: CPT | Mod: GP | Performed by: PHYSICAL THERAPIST

## 2021-03-25 DIAGNOSIS — E11.21 TYPE 2 DIABETES MELLITUS WITH DIABETIC NEPHROPATHY, WITH LONG-TERM CURRENT USE OF INSULIN (H): ICD-10-CM

## 2021-03-25 DIAGNOSIS — Z79.4 TYPE 2 DIABETES MELLITUS WITH DIABETIC NEPHROPATHY, WITH LONG-TERM CURRENT USE OF INSULIN (H): ICD-10-CM

## 2021-03-26 RX ORDER — BLOOD SUGAR DIAGNOSTIC
STRIP MISCELLANEOUS
Qty: 400 STRIP | Refills: 0 | Status: SHIPPED | OUTPATIENT
Start: 2021-03-26 | End: 2022-03-01

## 2021-03-26 NOTE — TELEPHONE ENCOUNTER
Pending Prescriptions:                       Disp   Refills    blood glucose (ACCU-CHEK SMARTVIEW) test *[address]*0            Sig: USE TO TEST BLOOD SUGAR FOUR TIMES DAILY OR AS           DIRECTED    Prescription approved per [facility] Refill Protocol.

## 2021-04-01 ENCOUNTER — MEDICAL CORRESPONDENCE (OUTPATIENT)
Dept: HEALTH INFORMATION MANAGEMENT | Facility: CLINIC | Age: 71
End: 2021-04-01

## 2021-04-01 ENCOUNTER — IMMUNIZATION (OUTPATIENT)
Dept: NURSING | Facility: CLINIC | Age: 71
End: 2021-04-01
Attending: INTERNAL MEDICINE
Payer: COMMERCIAL

## 2021-04-01 DIAGNOSIS — K51.30 ULCERATIVE (CHRONIC) PROCTOSIGMOIDITIS (H): Primary | ICD-10-CM

## 2021-04-01 PROCEDURE — 0002A PR COVID VAC PFIZER DIL RECON 30 MCG/0.3 ML IM: CPT

## 2021-04-01 PROCEDURE — 91300 PR COVID VAC PFIZER DIL RECON 30 MCG/0.3 ML IM: CPT

## 2021-04-02 ENCOUNTER — OFFICE VISIT (OUTPATIENT)
Dept: INTERNAL MEDICINE | Facility: CLINIC | Age: 71
End: 2021-04-02
Payer: COMMERCIAL

## 2021-04-02 VITALS
SYSTOLIC BLOOD PRESSURE: 159 MMHG | WEIGHT: 232 LBS | HEART RATE: 74 BPM | HEIGHT: 69 IN | OXYGEN SATURATION: 96 % | RESPIRATION RATE: 18 BRPM | DIASTOLIC BLOOD PRESSURE: 75 MMHG | TEMPERATURE: 98.7 F | BODY MASS INDEX: 34.36 KG/M2

## 2021-04-02 DIAGNOSIS — E11.21 TYPE 2 DIABETES MELLITUS WITH DIABETIC NEPHROPATHY, WITH LONG-TERM CURRENT USE OF INSULIN (H): ICD-10-CM

## 2021-04-02 DIAGNOSIS — I10 ESSENTIAL HYPERTENSION: Primary | ICD-10-CM

## 2021-04-02 DIAGNOSIS — Z79.4 TYPE 2 DIABETES MELLITUS WITH DIABETIC NEPHROPATHY, WITH LONG-TERM CURRENT USE OF INSULIN (H): ICD-10-CM

## 2021-04-02 DIAGNOSIS — E78.5 HYPERLIPIDEMIA LDL GOAL <70: ICD-10-CM

## 2021-04-02 DIAGNOSIS — K51.911 ULCERATIVE COLITIS WITH RECTAL BLEEDING, UNSPECIFIED LOCATION (H): ICD-10-CM

## 2021-04-02 LAB — HBA1C MFR BLD: 7.9 % (ref 0–5.6)

## 2021-04-02 PROCEDURE — 36415 COLL VENOUS BLD VENIPUNCTURE: CPT | Performed by: INTERNAL MEDICINE

## 2021-04-02 PROCEDURE — 99214 OFFICE O/P EST MOD 30 MIN: CPT | Performed by: INTERNAL MEDICINE

## 2021-04-02 PROCEDURE — 83036 HEMOGLOBIN GLYCOSYLATED A1C: CPT | Performed by: INTERNAL MEDICINE

## 2021-04-02 RX ORDER — LOSARTAN POTASSIUM 50 MG/1
100 TABLET ORAL DAILY
Qty: 90 TABLET | Refills: 3 | Status: CANCELLED | OUTPATIENT
Start: 2021-04-02

## 2021-04-02 RX ORDER — PREDNISONE 5 MG/1
TABLET ORAL
COMMUNITY
Start: 2021-03-25 | End: 2021-04-02

## 2021-04-02 RX ORDER — LOSARTAN POTASSIUM 100 MG/1
100 TABLET ORAL DAILY
Qty: 90 TABLET | Refills: 3 | Status: SHIPPED | OUTPATIENT
Start: 2021-04-02 | End: 2022-04-25

## 2021-04-02 RX ORDER — MESALAMINE 4 G/60 ML
KIT RECTAL
COMMUNITY
Start: 2021-03-30 | End: 2023-10-04

## 2021-04-02 RX ORDER — ATORVASTATIN CALCIUM 40 MG/1
40 TABLET, FILM COATED ORAL DAILY
Qty: 90 TABLET | Refills: 3 | Status: SHIPPED | OUTPATIENT
Start: 2021-04-02 | End: 2022-04-25

## 2021-04-02 ASSESSMENT — MIFFLIN-ST. JEOR: SCORE: 1812.73

## 2021-04-02 NOTE — LETTER
April 5, 2021      Adan Ruffin  [id number] [place] [address]        Dear ,    We are writing to inform you of your test results.    Acceptable diabetic control. Still needs to keep good diet, exercise, follow up in 6 months.     Resulted Orders   Hemoglobin A1c   Result Value Ref Range    Hemoglobin A1C 7.9 (H) 0 - 5.6 %      Comment:      Normal <5.7% Prediabetes 5.7-6.4%  Diabetes 6.5% or higher - adopted from ADA   consensus guidelines.         If you have any questions or concerns, please call the clinic at the number listed above.       Sincerely,      Lauro Galloway MD

## 2021-04-02 NOTE — PROGRESS NOTES
"    Assessment & Plan     Hyperlipidemia LDL goal <70  Continue statin, diet, exercise   - atorvastatin (LIPITOR) 40 MG tablet; Take 1 tablet (40 mg) by mouth daily    Essential hypertension  Increase Losartan to 100 mg, monitor BP   - losartan (COZAAR) 100 MG tablet; Take 1 tablet (100 mg) by mouth daily    Type 2 diabetes mellitus with diabetic nephropathy, with long-term current use of insulin (H)  Assess DM control   - metFORMIN (GLUCOPHAGE) 1000 MG tablet; TAKE 1 TABLET BY MOUTH TWICE DAILY WITH MEALS  - Hemoglobin A1c    Ulcerative colitis with rectal bleeding, unspecified location (H)  Pending sigmoidoscopy, follows with GI                BMI:   Estimated body mass index is 34.26 kg/m  as calculated from the following:    Height as of this encounter: 1.753 m (5' 9\").    Weight as of this encounter: 105.2 kg (232 lb).   Weight management plan: Discussed healthy diet and exercise guidelines    See Patient Instructions    Return in about 6 months (around 10/2/2021) for Physical Exam.    Lauro Galloway MD  [facility] KATERYNA Rosario is a 70 year old who presents for the following health issues   Patient is being seen for an Diabetes check.  HPI     Diabetes Follow-up  Has H/O DM. On diet , exercise and oral treatment . Blood sugars are controlled. No parestesias. No hypoglycemias.    How often are you checking your blood sugar? Three times daily  Blood sugar testing frequency justification:  Uncontrolled diabetes  What time of day are you checking your blood sugars (select all that apply)?  Before meals  Have you had any blood sugars above 200?  Yes , occasionally  Have you had any blood sugars below 70?  No    What symptoms do you notice when your blood sugar is low?  None    What concerns do you have today about your diabetes? None     Do you have any of these symptoms? (Select all that apply)  Redness of the leg/slightly swelling        Hyperlipidemia Follow-Up  Has H/O " "hyperlipidemia. On medical treatment and diet. No side effects. No muscle weakness, myalgias or upset stomach.       Are you regularly taking any medication or supplement to lower your cholesterol?   Yes- atorvastatin    Are you having muscle aches or other side effects that you think could be caused by your cholesterol lowering medication?  No    Hypertension Follow-up  Has h/o HTN. on medical treatment. BP has not been controlled. No side effects from medications. No CP, HA, dizziness. good compliance with medications and low salt diet.      Do you check your blood pressure regularly outside of the clinic? No     Are you following a low salt diet? No    Are your blood pressures ever more than 140 on the top number (systolic) OR more   than 90 on the bottom number (diastolic), for example 140/90? N/A    BP Readings from Last 2 Encounters:   04/02/21 (!) 159/75   02/12/21 (!) 173/82     Hemoglobin A1C (%)   Date Value   10/15/2020 9.0 (H)   01/02/2020 7.7 (H)     LDL Cholesterol Calculated (mg/dL)   Date Value   02/04/2021 49   10/15/2020 55             Review of Systems   Constitutional, HEENT, cardiovascular, pulmonary, gi and gu systems are negative, except as otherwise noted.      Objective    BP (!) 159/75 (BP Location: Left arm, Patient Position: Sitting, Cuff Size: Adult Large)   Pulse 74   Temp 98.7  F (37.1  C) (Oral)   Resp 18   Ht 1.753 m (5' 9\")   Wt 105.2 kg (232 lb)   SpO2 96%   BMI 34.26 kg/m    Body mass index is 34.26 kg/m .  Physical Exam   GENERAL: healthy, alert and no distress  NECK: no adenopathy, no asymmetry, masses, or scars and thyroid normal to palpation  RESP: lungs clear to auscultation - no rales, rhonchi or wheezes  CV: regular rate and rhythm, normal S1 S2, no S3 or S4, no murmur, click or rub, no peripheral edema and peripheral pulses strong  ABDOMEN: soft, nontender, no hepatosplenomegaly, no masses and bowel sounds normal  MS: no gross musculoskeletal defects noted, trace LE " edema, chronic stasis dermatitis changes of ankles

## 2021-04-07 ENCOUNTER — TRANSFERRED RECORDS (OUTPATIENT)
Dept: HEALTH INFORMATION MANAGEMENT | Facility: CLINIC | Age: 71
End: 2021-04-07

## 2021-04-19 ENCOUNTER — TRANSFERRED RECORDS (OUTPATIENT)
Dept: HEALTH INFORMATION MANAGEMENT | Facility: CLINIC | Age: 71
End: 2021-04-19

## 2021-04-22 DIAGNOSIS — Z79.4 TYPE 2 DIABETES MELLITUS WITH DIABETIC NEPHROPATHY, WITH LONG-TERM CURRENT USE OF INSULIN (H): ICD-10-CM

## 2021-04-22 DIAGNOSIS — E11.21 TYPE 2 DIABETES MELLITUS WITH DIABETIC NEPHROPATHY, WITH LONG-TERM CURRENT USE OF INSULIN (H): ICD-10-CM

## 2021-05-17 DIAGNOSIS — Z79.4 TYPE 2 DIABETES MELLITUS WITH DIABETIC NEPHROPATHY, WITH LONG-TERM CURRENT USE OF INSULIN (H): ICD-10-CM

## 2021-05-17 DIAGNOSIS — E11.21 TYPE 2 DIABETES MELLITUS WITH DIABETIC NEPHROPATHY, WITH LONG-TERM CURRENT USE OF INSULIN (H): ICD-10-CM

## 2021-05-18 RX ORDER — LANCETS
EACH MISCELLANEOUS
Qty: 400 EACH | Refills: 3 | Status: SHIPPED | OUTPATIENT
Start: 2021-05-18 | End: 2022-08-10

## 2021-06-08 DIAGNOSIS — E11.21 TYPE 2 DIABETES MELLITUS WITH DIABETIC NEPHROPATHY, WITH LONG-TERM CURRENT USE OF INSULIN (H): ICD-10-CM

## 2021-06-08 DIAGNOSIS — Z79.4 TYPE 2 DIABETES MELLITUS WITH DIABETIC NEPHROPATHY, WITH LONG-TERM CURRENT USE OF INSULIN (H): ICD-10-CM

## 2021-07-06 ENCOUNTER — TELEPHONE (OUTPATIENT)
Dept: CARDIOLOGY | Facility: CLINIC | Age: 71
End: 2021-07-06

## 2021-07-20 ENCOUNTER — TELEPHONE (OUTPATIENT)
Dept: CARDIOLOGY | Facility: CLINIC | Age: 71
End: 2021-07-20

## 2021-09-07 ENCOUNTER — MYC MEDICAL ADVICE (OUTPATIENT)
Dept: INTERNAL MEDICINE | Facility: CLINIC | Age: 71
End: 2021-09-07

## 2021-09-21 PROBLEM — M54.50 ACUTE MIDLINE LOW BACK PAIN WITHOUT SCIATICA: Status: RESOLVED | Noted: 2021-03-03 | Resolved: 2021-09-21

## 2021-09-21 NOTE — PROGRESS NOTES
DISCHARGE REPORT    Updated as of September 21, 2021  Progress reporting period is from Mar 1, 2021 to Mar 22, 2021.       SUBJECTIVE  Subjective changes noted by patient:  Unknown. Patient has failed to return to clinic.    Current pain level is unknown. Patient has failed to return to clinic.  .     Initial Pain level: 9/10.   Changes in function:  Unknown. Patient has failed to return to clinic.  Adverse reaction to treatment or activity: Unknown. Patient has failed to return to clinic.    OBJECTIVE  Changes noted in objective findings:  Patient has failed to return to therapy so current objective findings are unknown.    ASSESSMENT/PLAN  Updated problem list and treatment plan: Diagnosis 1:  Acute midline low back pain without sciatica   STG/LTGs have been met or progress has been made towards goals:  Unknown. Patient has failed to return to clinic.  Assessment of Progress: The patient has not returned to therapy. Current status is unknown.  Self Management Plans:  Patient has been instructed in a home treatment program.  Patient continues to require the following intervention to meet STG and LTG's:  Unknown. Patient has failed to return to clinic.    Recommendations:  Unable to make an accurate recommendation at this time. Patient has failed to return to clinic.    Please refer to the daily flowsheet for treatment today, total treatment time and time spent performing 1:1 timed codes.

## 2021-10-15 ENCOUNTER — TRANSFERRED RECORDS (OUTPATIENT)
Dept: HEALTH INFORMATION MANAGEMENT | Facility: CLINIC | Age: 71
End: 2021-10-15
Payer: COMMERCIAL

## 2021-10-19 ENCOUNTER — OFFICE VISIT (OUTPATIENT)
Dept: INTERNAL MEDICINE | Facility: CLINIC | Age: 71
End: 2021-10-19
Payer: COMMERCIAL

## 2021-10-19 VITALS
DIASTOLIC BLOOD PRESSURE: 70 MMHG | WEIGHT: 232 LBS | TEMPERATURE: 97.6 F | SYSTOLIC BLOOD PRESSURE: 160 MMHG | BODY MASS INDEX: 34.26 KG/M2 | HEART RATE: 64 BPM

## 2021-10-19 DIAGNOSIS — I10 PRIMARY HYPERTENSION: ICD-10-CM

## 2021-10-19 DIAGNOSIS — Z12.5 SCREENING FOR PROSTATE CANCER: ICD-10-CM

## 2021-10-19 DIAGNOSIS — Z79.4 TYPE 2 DIABETES MELLITUS WITH DIABETIC NEPHROPATHY, WITH LONG-TERM CURRENT USE OF INSULIN (H): Primary | ICD-10-CM

## 2021-10-19 DIAGNOSIS — E11.21 TYPE 2 DIABETES MELLITUS WITH DIABETIC NEPHROPATHY, WITH LONG-TERM CURRENT USE OF INSULIN (H): Primary | ICD-10-CM

## 2021-10-19 DIAGNOSIS — E78.5 HYPERLIPIDEMIA LDL GOAL <70: ICD-10-CM

## 2021-10-19 DIAGNOSIS — R05.9 COUGH: ICD-10-CM

## 2021-10-19 DIAGNOSIS — Z23 NEED FOR PROPHYLACTIC VACCINATION AND INOCULATION AGAINST INFLUENZA: ICD-10-CM

## 2021-10-19 LAB
ERYTHROCYTE [DISTWIDTH] IN BLOOD BY AUTOMATED COUNT: 12.7 % (ref 10–15)
HBA1C MFR BLD: 6.4 % (ref 0–5.6)
HCT VFR BLD AUTO: 48.4 % (ref 40–53)
HGB BLD-MCNC: 16.5 G/DL (ref 13.3–17.7)
MCH RBC QN AUTO: 34 PG (ref 26.5–33)
MCHC RBC AUTO-ENTMCNC: 34.1 G/DL (ref 31.5–36.5)
MCV RBC AUTO: 100 FL (ref 78–100)
PLATELET # BLD AUTO: 217 10E3/UL (ref 150–450)
RBC # BLD AUTO: 4.86 10E6/UL (ref 4.4–5.9)
WBC # BLD AUTO: 6.1 10E3/UL (ref 4–11)

## 2021-10-19 PROCEDURE — 90471 IMMUNIZATION ADMIN: CPT | Performed by: INTERNAL MEDICINE

## 2021-10-19 PROCEDURE — 36415 COLL VENOUS BLD VENIPUNCTURE: CPT | Performed by: INTERNAL MEDICINE

## 2021-10-19 PROCEDURE — 90662 IIV NO PRSV INCREASED AG IM: CPT | Performed by: INTERNAL MEDICINE

## 2021-10-19 PROCEDURE — 91300 PR COVID VAC PFIZER DIL RECON 30 MCG/0.3 ML IM: CPT | Performed by: INTERNAL MEDICINE

## 2021-10-19 PROCEDURE — 85027 COMPLETE CBC AUTOMATED: CPT | Performed by: INTERNAL MEDICINE

## 2021-10-19 PROCEDURE — G0103 PSA SCREENING: HCPCS | Performed by: INTERNAL MEDICINE

## 2021-10-19 PROCEDURE — 84443 ASSAY THYROID STIM HORMONE: CPT | Performed by: INTERNAL MEDICINE

## 2021-10-19 PROCEDURE — 99214 OFFICE O/P EST MOD 30 MIN: CPT | Mod: 25 | Performed by: INTERNAL MEDICINE

## 2021-10-19 PROCEDURE — 80053 COMPREHEN METABOLIC PANEL: CPT | Performed by: INTERNAL MEDICINE

## 2021-10-19 PROCEDURE — 0004A PR COVID VAC PFIZER DIL RECON 30 MCG/0.3 ML IM: CPT | Performed by: INTERNAL MEDICINE

## 2021-10-19 PROCEDURE — 83036 HEMOGLOBIN GLYCOSYLATED A1C: CPT | Performed by: INTERNAL MEDICINE

## 2021-10-19 PROCEDURE — 82043 UR ALBUMIN QUANTITATIVE: CPT | Performed by: INTERNAL MEDICINE

## 2021-10-19 PROCEDURE — 80061 LIPID PANEL: CPT | Performed by: INTERNAL MEDICINE

## 2021-10-19 RX ORDER — ALBUTEROL SULFATE 90 UG/1
2 AEROSOL, METERED RESPIRATORY (INHALATION) EVERY 6 HOURS
Qty: 1 EACH | Refills: 0 | Status: SHIPPED | OUTPATIENT
Start: 2021-10-19 | End: 2022-07-11

## 2021-10-19 RX ORDER — HYDROCHLOROTHIAZIDE 12.5 MG/1
12.5 TABLET ORAL DAILY
Qty: 90 TABLET | Refills: 3 | Status: SHIPPED | OUTPATIENT
Start: 2021-10-19 | End: 2022-06-01

## 2021-10-19 NOTE — PROGRESS NOTES
"    Assessment & Plan     Cough  Bronchospasm, wheezing   - albuterol (PROAIR HFA/PROVENTIL HFA/VENTOLIN HFA) 108 (90 Base) MCG/ACT inhaler; Inhale 2 puffs into the lungs every 6 hours    Primary hypertension  Not controlled  Add hydrochlorothiazide   - hydrochlorothiazide (HYDRODIURIL) 12.5 MG tablet; Take 1 tablet (12.5 mg) by mouth daily  - CBC with platelets  - Comprehensive metabolic panel (BMP + Alb, Alk Phos, ALT, AST, Total. Bili, TP)  - TSH with free T4 reflex    Type 2 diabetes mellitus with diabetic nephropathy, with long-term current use of insulin (H)  Assess lab, improved control   - Hemoglobin A1c  - Albumin Random Urine Quantitative with Creat Ratio    Hyperlipidemia LDL goal <70  Assess lipids, on statin   - Lipid panel reflex to direct LDL Non-fasting    Screening for prostate cancer  Assess lab  - PSA, screen             BMI:   Estimated body mass index is 34.26 kg/m  as calculated from the following:    Height as of 4/2/21: 1.753 m (5' 9\").    Weight as of this encounter: 105.2 kg (232 lb).   Weight management plan: Discussed healthy diet and exercise guidelines    See Patient Instructions    Return in about 6 months (around 4/19/2022) for Physical Exam.    Lauro Galloway MD  [facility] KATERYNA Rosario is a 71 year old who presents for the following health issues     HPI                                                                                                                                                                                                                                                                                                                                                                                                                                                                                                                                                                                                                             "                                                                   Diabetes Follow-up  Has H/O DM. On diet , exercise and insulin. Blood sugars are controlled. No parestesias. Occasional  hypoglycemias.    How often are you checking your blood sugar? Two times daily  Blood sugar testing frequency justification:  Frequent hypoglycemia  What time of day are you checking your blood sugars (select all that apply)?  At bedtime and before breakfast   Have you had any blood sugars above 200?  Yes very rare   Have you had any blood sugars below 70?  Yes 49 this am     What symptoms do you notice when your blood sugar is low?  Other: lightheaded and nauseated     What concerns do you have today about your diabetes? None     Do you have any of these symptoms? (Select all that apply)  No numbness or tingling in feet.  No redness, sores or blisters on feet.  No complaints of excessive thirst.  No reports of blurry vision.  No significant changes to weight.        Hyperlipidemia Follow-Up  Has H/O hyperlipidemia. On medical treatment and diet. No side effects. No muscle weakness, myalgias or upset stomach.       Are you regularly taking any medication or supplement to lower your cholesterol?   Yes- atorvastatin     Are you having muscle aches or other side effects that you think could be caused by your cholesterol lowering medication?  No    Hypertension Follow-up  Has h/o HTN. on medical treatment. BP has not  been controlled. No side effects from medications. No CP, HA, dizziness. good compliance with medications and low salt diet.      Do you check your blood pressure regularly outside of the clinic? No     Are you following a low salt diet? No    Are your blood pressures ever more than 140 on the top number (systolic) OR more   than 90 on the bottom number (diastolic), for example 140/90? No       Has been coughing, for several days, after taking apple  vinegar for weight loss. Has mild wheezing, no fever.     BP  Readings from Last 2 Encounters:   10/19/21 (!) 160/70   04/02/21 (!) 159/75     Hemoglobin A1C (%)   Date Value   04/02/2021 7.9 (H)   10/15/2020 9.0 (H)     LDL Cholesterol Calculated (mg/dL)   Date Value   02/04/2021 49   10/15/2020 55         How many servings of fruits and vegetables do you eat daily?  0-1    On average, how many sweetened beverages do you drink each day (Examples: soda, juice, sweet tea, etc.  Do NOT count diet or artificially sweetened beverages)?   0    How many days per week do you exercise enough to make your heart beat faster? 3 or less    How many minutes a day do you exercise enough to make your heart beat faster? 30 - 60    How many days per week do you miss taking your medication? 0      Review of Systems   Constitutional, HEENT, cardiovascular, pulmonary, GI, , musculoskeletal, neuro, skin, endocrine and psych systems are negative, except as otherwise noted.      Objective    BP (!) 160/70   Pulse 64   Temp 97.6  F (36.4  C) (Oral)   Wt 105.2 kg (232 lb)   BMI 34.26 kg/m    Body mass index is 34.26 kg/m .  Physical Exam   GENERAL: obese, alert and no distress  EYES: Eyes grossly normal to inspection, PERRL and conjunctivae and sclerae normal  HENT: ear canals and TM's normal, nose and mouth without ulcers or lesions  NECK: no adenopathy, no asymmetry, masses, or scars and thyroid normal to palpation  RESP: lungs clear to auscultation - no rales, rhonchi . +  wheezes  CV: regular rate and rhythm, normal S1 S2, no S3 or S4, no murmur, click or rub, no peripheral edema and peripheral pulses strong  ABDOMEN: soft,obese, nontender, no hepatosplenomegaly, no masses and bowel sounds normal  MS: no gross musculoskeletal defects noted, no edema    Office Visit on 04/02/2021   Component Date Value Ref Range Status     Hemoglobin A1C 04/02/2021 7.9* 0 - 5.6 % Final    Comment: Normal <5.7% Prediabetes 5.7-6.4%  Diabetes 6.5% or higher - adopted from ADA   consensus guidelines.

## 2021-10-20 LAB
ALBUMIN SERPL-MCNC: 3.2 G/DL (ref 3.4–5)
ALP SERPL-CCNC: 109 U/L (ref 40–150)
ALT SERPL W P-5'-P-CCNC: 28 U/L (ref 0–70)
ANION GAP SERPL CALCULATED.3IONS-SCNC: 10 MMOL/L (ref 3–14)
AST SERPL W P-5'-P-CCNC: 55 U/L (ref 0–45)
BILIRUB SERPL-MCNC: 0.4 MG/DL (ref 0.2–1.3)
BUN SERPL-MCNC: 19 MG/DL (ref 7–30)
CALCIUM SERPL-MCNC: 9.4 MG/DL (ref 8.5–10.1)
CHLORIDE BLD-SCNC: 107 MMOL/L (ref 94–109)
CHOLEST SERPL-MCNC: 131 MG/DL
CO2 SERPL-SCNC: 20 MMOL/L (ref 20–32)
CREAT SERPL-MCNC: 0.84 MG/DL (ref 0.66–1.25)
FASTING STATUS PATIENT QL REPORTED: NO
GFR SERPL CREATININE-BSD FRML MDRD: 89 ML/MIN/1.73M2
GLUCOSE BLD-MCNC: 111 MG/DL (ref 70–99)
HDLC SERPL-MCNC: 61 MG/DL
LDLC SERPL CALC-MCNC: 46 MG/DL
NONHDLC SERPL-MCNC: 70 MG/DL
POTASSIUM BLD-SCNC: 4.6 MMOL/L (ref 3.4–5.3)
PROT SERPL-MCNC: 7.3 G/DL (ref 6.8–8.8)
PSA SERPL-MCNC: 1.25 UG/L (ref 0–4)
SODIUM SERPL-SCNC: 137 MMOL/L (ref 133–144)
TRIGL SERPL-MCNC: 121 MG/DL
TSH SERPL DL<=0.005 MIU/L-ACNC: 1.69 MU/L (ref 0.4–4)

## 2021-10-21 LAB
CREAT UR-MCNC: 43 MG/DL
MICROALBUMIN UR-MCNC: 1510 MG/L
MICROALBUMIN/CREAT UR: 3511.63 MG/G CR (ref 0–17)

## 2021-10-30 ENCOUNTER — MYC MEDICAL ADVICE (OUTPATIENT)
Dept: INTERNAL MEDICINE | Facility: CLINIC | Age: 71
End: 2021-10-30
Payer: COMMERCIAL

## 2021-10-30 DIAGNOSIS — E11.21 TYPE 2 DIABETES MELLITUS WITH DIABETIC NEPHROPATHY, WITH LONG-TERM CURRENT USE OF INSULIN (H): Primary | ICD-10-CM

## 2021-10-30 DIAGNOSIS — Z79.4 TYPE 2 DIABETES MELLITUS WITH DIABETIC NEPHROPATHY, WITH LONG-TERM CURRENT USE OF INSULIN (H): Primary | ICD-10-CM

## 2021-11-05 NOTE — TELEPHONE ENCOUNTER
Patient sent message back stating [facility] in [place] would work.      Referral for that group pended.  Please sign if appropriate.  Thanks.

## 2021-11-19 ENCOUNTER — MYC MEDICAL ADVICE (OUTPATIENT)
Dept: INTERNAL MEDICINE | Facility: CLINIC | Age: 71
End: 2021-11-19
Payer: COMMERCIAL

## 2021-11-19 DIAGNOSIS — Z79.4 TYPE 2 DIABETES MELLITUS WITH DIABETIC NEPHROPATHY, WITH LONG-TERM CURRENT USE OF INSULIN (H): ICD-10-CM

## 2021-11-19 DIAGNOSIS — E11.21 TYPE 2 DIABETES MELLITUS WITH DIABETIC NEPHROPATHY, WITH LONG-TERM CURRENT USE OF INSULIN (H): ICD-10-CM

## 2021-12-19 ENCOUNTER — HEALTH MAINTENANCE LETTER (OUTPATIENT)
Age: 71
End: 2021-12-19

## 2022-01-01 NOTE — NURSING NOTE
"Chief Complaint   Patient presents with     Physical       Initial /76  Pulse 120  Temp 98.5  F (36.9  C) (Oral)  Ht 5' 9\" (1.753 m)  Wt 240 lb (108.9 kg)  SpO2 91%  BMI 35.44 kg/m2 Estimated body mass index is 35.44 kg/(m^2) as calculated from the following:    Height as of this encounter: 5' 9\" (1.753 m).    Weight as of this encounter: 240 lb (108.9 kg).  Medication Reconciliation: complete   Faustina Edgar MA      " PT TAKEN TO BED 10

## 2022-02-17 PROBLEM — I48.92 ATRIAL FLUTTER (H): Status: ACTIVE | Noted: 2017-10-02

## 2022-02-28 DIAGNOSIS — Z79.4 TYPE 2 DIABETES MELLITUS WITH DIABETIC NEPHROPATHY, WITH LONG-TERM CURRENT USE OF INSULIN (H): ICD-10-CM

## 2022-02-28 DIAGNOSIS — E11.21 TYPE 2 DIABETES MELLITUS WITH DIABETIC NEPHROPATHY, WITH LONG-TERM CURRENT USE OF INSULIN (H): ICD-10-CM

## 2022-03-01 RX ORDER — BLOOD SUGAR DIAGNOSTIC
STRIP MISCELLANEOUS
Qty: 400 STRIP | Refills: 0 | Status: SHIPPED | OUTPATIENT
Start: 2022-03-01 | End: 2023-08-06

## 2022-03-17 ENCOUNTER — TRANSFERRED RECORDS (OUTPATIENT)
Dept: HEALTH INFORMATION MANAGEMENT | Facility: CLINIC | Age: 72
End: 2022-03-17
Payer: COMMERCIAL

## 2022-03-17 LAB
CREATININE (EXTERNAL): 0.84 MG/DL (ref 0.76–1.27)
GFR ESTIMATED (EXTERNAL): 94 ML/MIN/1.73

## 2022-04-04 ENCOUNTER — MEDICAL CORRESPONDENCE (OUTPATIENT)
Dept: HEALTH INFORMATION MANAGEMENT | Facility: CLINIC | Age: 72
End: 2022-04-04
Payer: COMMERCIAL

## 2022-04-15 ENCOUNTER — HOSPITAL ENCOUNTER (OUTPATIENT)
Dept: ULTRASOUND IMAGING | Facility: CLINIC | Age: 72
Discharge: HOME | End: 2022-04-15
Attending: INTERNAL MEDICINE | Admitting: INTERNAL MEDICINE
Payer: COMMERCIAL

## 2022-04-15 DIAGNOSIS — N18.30 CHRONIC KIDNEY DISEASE, STAGE 3 (H): ICD-10-CM

## 2022-04-15 PROCEDURE — 76770 US EXAM ABDO BACK WALL COMP: CPT

## 2022-04-25 ENCOUNTER — MYC MEDICAL ADVICE (OUTPATIENT)
Dept: INTERNAL MEDICINE | Facility: CLINIC | Age: 72
End: 2022-04-25
Payer: COMMERCIAL

## 2022-04-25 DIAGNOSIS — E78.5 HYPERLIPIDEMIA LDL GOAL <70: ICD-10-CM

## 2022-04-25 DIAGNOSIS — I10 ESSENTIAL HYPERTENSION: ICD-10-CM

## 2022-04-25 RX ORDER — LOSARTAN POTASSIUM 100 MG/1
100 TABLET ORAL DAILY
Qty: 90 TABLET | Refills: 0 | Status: SHIPPED | OUTPATIENT
Start: 2022-04-25 | End: 2022-07-27

## 2022-04-25 RX ORDER — ATORVASTATIN CALCIUM 40 MG/1
40 TABLET, FILM COATED ORAL DAILY
Qty: 90 TABLET | Refills: 0 | Status: SHIPPED | OUTPATIENT
Start: 2022-04-25 | End: 2022-07-27

## 2022-04-25 NOTE — TELEPHONE ENCOUNTER
Medication is being filled for 1 time refill only due to:  Patient needs to be seen because due this month for diabetes appt. .

## 2022-05-11 DIAGNOSIS — I48.3 TYPICAL ATRIAL FLUTTER (H): ICD-10-CM

## 2022-05-11 DIAGNOSIS — I10 ESSENTIAL HYPERTENSION: Primary | ICD-10-CM

## 2022-05-11 RX ORDER — METOPROLOL SUCCINATE 100 MG/1
100 TABLET, EXTENDED RELEASE ORAL 2 TIMES DAILY
Qty: 180 TABLET | Refills: 3 | Status: SHIPPED | OUTPATIENT
Start: 2022-05-11 | End: 2023-07-06

## 2022-06-01 ENCOUNTER — OFFICE VISIT (OUTPATIENT)
Dept: INTERNAL MEDICINE | Facility: CLINIC | Age: 72
End: 2022-06-01
Payer: COMMERCIAL

## 2022-06-01 VITALS
DIASTOLIC BLOOD PRESSURE: 68 MMHG | WEIGHT: 220 LBS | SYSTOLIC BLOOD PRESSURE: 149 MMHG | HEIGHT: 69 IN | OXYGEN SATURATION: 94 % | TEMPERATURE: 98.2 F | BODY MASS INDEX: 32.58 KG/M2 | HEART RATE: 75 BPM

## 2022-06-01 DIAGNOSIS — I77.810 THORACIC AORTIC ECTASIA (H): ICD-10-CM

## 2022-06-01 DIAGNOSIS — Z79.4 TYPE 2 DIABETES MELLITUS WITH DIABETIC NEPHROPATHY, WITH LONG-TERM CURRENT USE OF INSULIN (H): Primary | ICD-10-CM

## 2022-06-01 DIAGNOSIS — E66.01 MORBID OBESITY (H): ICD-10-CM

## 2022-06-01 DIAGNOSIS — I10 PRIMARY HYPERTENSION: ICD-10-CM

## 2022-06-01 DIAGNOSIS — E11.21 TYPE 2 DIABETES MELLITUS WITH DIABETIC NEPHROPATHY, WITH LONG-TERM CURRENT USE OF INSULIN (H): Primary | ICD-10-CM

## 2022-06-01 DIAGNOSIS — N18.31 STAGE 3A CHRONIC KIDNEY DISEASE (H): ICD-10-CM

## 2022-06-01 DIAGNOSIS — R80.9 MICROALBUMINURIA: ICD-10-CM

## 2022-06-01 DIAGNOSIS — I48.0 PAROXYSMAL ATRIAL FIBRILLATION (H): ICD-10-CM

## 2022-06-01 PROBLEM — J96.01 ACUTE RESPIRATORY FAILURE WITH HYPOXIA (H): Status: RESOLVED | Noted: 2020-01-28 | Resolved: 2022-06-01

## 2022-06-01 PROBLEM — J96.01 ACUTE HYPOXEMIC RESPIRATORY FAILURE (H): Status: RESOLVED | Noted: 2020-01-02 | Resolved: 2022-06-01

## 2022-06-01 PROBLEM — K51.911 ULCERATIVE COLITIS WITH RECTAL BLEEDING, UNSPECIFIED LOCATION (H): Status: RESOLVED | Noted: 2020-09-30 | Resolved: 2022-06-01

## 2022-06-01 LAB — HBA1C MFR BLD: 7.1 % (ref 0–5.6)

## 2022-06-01 PROCEDURE — 36415 COLL VENOUS BLD VENIPUNCTURE: CPT | Performed by: INTERNAL MEDICINE

## 2022-06-01 PROCEDURE — 99214 OFFICE O/P EST MOD 30 MIN: CPT | Performed by: INTERNAL MEDICINE

## 2022-06-01 PROCEDURE — 83036 HEMOGLOBIN GLYCOSYLATED A1C: CPT | Performed by: INTERNAL MEDICINE

## 2022-06-01 RX ORDER — HYDROCHLOROTHIAZIDE 25 MG/1
25 TABLET ORAL DAILY
Qty: 90 TABLET | Refills: 3 | Status: SHIPPED | OUTPATIENT
Start: 2022-06-01 | End: 2023-08-07

## 2022-06-01 NOTE — PROGRESS NOTES
"  Assessment & Plan     Primary hypertension  Increase hydrochlorothiazide dose to 25 mg   - hydrochlorothiazide (HYDRODIURIL) 25 MG tablet; Take 1 tablet (25 mg) by mouth daily    Type 2 diabetes mellitus with diabetic nephropathy, with long-term current use of insulin (H)  Assess lab work   - Hemoglobin A1c    Microalbuminuria  Keep weight loss, protein 1 g per kg    Stage 3a chronic kidney disease (H)  Monitor renal function, seeing nephrology     Thoracic aortic ectasia (H)  BP control     Paroxysmal atrial fibrillation (H)  No recurrence     Morbid obesity (H)  Weight loss                BMI:   Estimated body mass index is 32.49 kg/m  as calculated from the following:    Height as of this encounter: 1.753 m (5' 9\").    Weight as of this encounter: 99.8 kg (220 lb).   Weight management plan: Discussed healthy diet and exercise guidelines    See Patient Instructions    Return in about 6 months (around 12/1/2022) for Physical Exam.    Lauro Galloway MD  [facility] KATERYNA Rosario is a 71 year old who presents for the following health issues     HPI     Diabetes Follow-up  Has H/O DM. On diet , exercise and insulin + metformin. Blood sugars are controlled. No parestesias. No hypoglycemias.    How often are you checking your blood sugar? Three times daily  Blood sugar testing frequency justification:  Uncontrolled diabetes  What time of day are you checking your blood sugars (select all that apply)?  Before and after meals  Have you had any blood sugars above 200?  Yes   Have you had any blood sugars below 70?  No    What symptoms do you notice when your blood sugar is low?  None    What concerns do you have today about your diabetes? None     Do you have any of these symptoms? (Select all that apply)  Weight loss (12lbs)    Have you had a diabetic eye exam in the last 12 months? Yes          Hyperlipidemia Follow-Up  Has H/O hyperlipidemia. On medical treatment and diet. No side " "effects. No muscle weakness, myalgias or upset stomach.       Are you regularly taking any medication or supplement to lower your cholesterol?   Yes- Atorvastatin    Are you having muscle aches or other side effects that you think could be caused by your cholesterol lowering medication?  No    Hypertension Follow-up  Has h/o HTN. on medical treatment. BP has not been well controlled. No side effects from medications. No CP, HA, dizziness. good compliance with medications and low salt diet.      Do you check your blood pressure regularly outside of the clinic? Yes     Are you following a low salt diet? No    Are your blood pressures ever more than 140 on the top number (systolic) OR more   than 90 on the bottom number (diastolic), for example 140/90? Yes, occasional elevated readings    BP Readings from Last 2 Encounters:   06/01/22 (!) 149/68   10/19/21 (!) 160/70     Hemoglobin A1C POCT (%)   Date Value   04/02/2021 7.9 (H)   10/15/2020 9.0 (H)     Hemoglobin A1C (%)   Date Value   10/19/2021 6.4 (H)     LDL Cholesterol Calculated (mg/dL)   Date Value   10/19/2021 46   02/04/2021 49   10/15/2020 55     Has history of paroxysmal atrial fibrillation. On  rate control medications. Asymptonatic - no chest pains , palpitations,  no side effects from medications.  Has h/o CRF. Has significant proteinuria. On Losartan. Monitoring BP, BG, medications, avoiding OTC NSAIDs. Needs periodic recheck of kidney function.        Review of Systems   Constitutional, HEENT, cardiovascular, pulmonary, gi and gu systems are negative, except as otherwise noted.      Objective    BP (!) 149/68 (BP Location: Right arm, Patient Position: Sitting, Cuff Size: Adult Large)   Pulse 75   Temp 98.2  F (36.8  C) (Oral)   Ht 1.753 m (5' 9\")   Wt 99.8 kg (220 lb)   SpO2 94%   BMI 32.49 kg/m    Body mass index is 32.49 kg/m .  Physical Exam   GENERAL: healthy, alert and no distress  EYES: Eyes grossly normal to inspection, PERRL and " conjunctivae and sclerae normal  HENT: ear canals and TM's normal, nose and mouth without ulcers or lesions  NECK: no adenopathy, no asymmetry, masses, or scars and thyroid normal to palpation  RESP: lungs clear to auscultation - no rales, rhonchi or wheezes  CV: regular rate and rhythm, normal S1 S2, no S3 or S4, no murmur, click or rub,  peripheral pulses strong  ABDOMEN: soft, nontender, no hepatosplenomegaly, no masses and bowel sounds normal  MS: no gross musculoskeletal defects noted, trace LE edema  SKIN: no suspicious lesions or rashes  NEURO: Normal strength and tone, mentation intact and speech normal    Transferred Records on 03/17/2022   Component Date Value Ref Range Status     Creatinine (External) 03/17/2022 0.84  0.76 - 1.27 mg/dL Final     GFR Estimated (External) 03/17/2022 94  >59 mL/min/1.73 Final

## 2022-06-03 ENCOUNTER — HOSPITAL ENCOUNTER (OUTPATIENT)
Dept: CARDIOLOGY | Facility: CLINIC | Age: 72
Discharge: HOME | End: 2022-06-03
Attending: INTERNAL MEDICINE | Admitting: INTERNAL MEDICINE
Payer: COMMERCIAL

## 2022-06-03 DIAGNOSIS — R06.09 DOE (DYSPNEA ON EXERTION): ICD-10-CM

## 2022-06-03 DIAGNOSIS — I10 ESSENTIAL HYPERTENSION: ICD-10-CM

## 2022-06-03 DIAGNOSIS — Z95.1 S/P CABG (CORONARY ARTERY BYPASS GRAFT): ICD-10-CM

## 2022-06-03 DIAGNOSIS — Z86.79 HISTORY OF ATRIAL FLUTTER: ICD-10-CM

## 2022-06-03 DIAGNOSIS — I25.10 CORONARY ARTERY DISEASE INVOLVING NATIVE CORONARY ARTERY OF NATIVE HEART WITHOUT ANGINA PECTORIS: ICD-10-CM

## 2022-06-03 LAB — LVEF ECHO: NORMAL

## 2022-06-03 PROCEDURE — 93306 TTE W/DOPPLER COMPLETE: CPT

## 2022-06-03 PROCEDURE — 93306 TTE W/DOPPLER COMPLETE: CPT | Mod: 26 | Performed by: INTERNAL MEDICINE

## 2022-06-10 ENCOUNTER — OFFICE VISIT (OUTPATIENT)
Dept: CARDIOLOGY | Facility: CLINIC | Age: 72
End: 2022-06-10
Payer: COMMERCIAL

## 2022-06-10 VITALS
HEIGHT: 69 IN | DIASTOLIC BLOOD PRESSURE: 81 MMHG | WEIGHT: 220 LBS | SYSTOLIC BLOOD PRESSURE: 187 MMHG | HEART RATE: 64 BPM | BODY MASS INDEX: 32.58 KG/M2

## 2022-06-10 DIAGNOSIS — I25.10 CORONARY ARTERY DISEASE INVOLVING NATIVE CORONARY ARTERY OF NATIVE HEART WITHOUT ANGINA PECTORIS: Primary | ICD-10-CM

## 2022-06-10 DIAGNOSIS — Z86.79 HISTORY OF ATRIAL FLUTTER: ICD-10-CM

## 2022-06-10 DIAGNOSIS — Z95.1 S/P CABG (CORONARY ARTERY BYPASS GRAFT): ICD-10-CM

## 2022-06-10 DIAGNOSIS — R06.09 DOE (DYSPNEA ON EXERTION): ICD-10-CM

## 2022-06-10 DIAGNOSIS — I10 ESSENTIAL HYPERTENSION: ICD-10-CM

## 2022-06-10 PROCEDURE — 99214 OFFICE O/P EST MOD 30 MIN: CPT | Performed by: INTERNAL MEDICINE

## 2022-06-10 NOTE — LETTER
6/10/2022    Lauro Galloway MD  [address] [facility] [address]    RE: Adan Ruffin       Dear Colleague,     I had the pleasure of seeing Adan Ruffin in the [facility].  HPI and Plan:     Abraham Garza is a pleasant [age]-year-old gentleman with history coronary disease history of atrial arrhythmia status post A. fib ablation history of coronary artery bypass surgery who is here for routine follow-up.  He has noes cardiovascular specific complaints.  He denies any chest pain chest pressure shortness of breath.  He does really very little exercise.  He has been compliant with his medications.  He checks his blood pressure at home and states that has been in the 140s to 150s systolic.  He gets very anxious when walking [place] the [address] and parking here at [facility].    We talked about it would probably be best for him to actually follow-up in [place] which is much easier for him and less stressful.  I want to have BETHANY check his blood pressure in 1 month's time his hydrochlorothiazide just got increased.  If necessary we could add a calcium channel blocker such as Norvasc to his medical regimen.  I reviewed his echocardiogram I reviewed his laboratory stress testing.  There is no angina or evidence of heart failure.  We also talked about weight loss and more regular exercise.    Today's clinic visit entailed:  Prescription drug management  No LOS data to display   Time spent doing chart review, history and exam, documentation and further activities per the note  Provider  Link to [employer] Help Grid     The level of medical decision making during this visit was of moderate complexity.      No orders of the defined types were placed in this encounter.    No orders of the defined types were placed in this encounter.    There are no discontinued medications.      Encounter Diagnoses   Name Primary?     Coronary artery disease involving native coronary artery of native heart without  angina pectoris Yes     Essential hypertension      TERAN (dyspnea on exertion)      History of atrial flutter      S/P CABG (coronary artery bypass graft)        CURRENT MEDICATIONS:  Current Outpatient Medications   Medication Sig Dispense Refill     albuterol (PROAIR HFA/PROVENTIL HFA/VENTOLIN HFA) 108 (90 Base) MCG/ACT inhaler Inhale 2 puffs into the lungs every 6 hours 1 each 0     aspirin-acetaminophen-caffeine (EXCEDRIN MIGRAINE) 250-250-65 MG tablet Take 1 tablet by mouth every morning        atorvastatin (LIPITOR) 40 MG tablet Take 1 tablet (40 mg) by mouth daily 90 tablet 0     balsalazide (COLAZAL) 750 MG capsule TK FIVE C PO IN THE MORNING AND FOUR C PO IN THE EVENING       blood glucose (ACCU-CHEK SMARTVIEW) test strip USE TO TEAT BLOOD SUGAR FOUR TIMES DAILY OR AS DIRECTED 400 strip 0     blood glucose monitoring (ACCU-CHEK FASTCLIX) lancets Use to test blood sugar 4 times daily or as directed. 400 each 3     hydrochlorothiazide (HYDRODIURIL) 25 MG tablet Take 1 tablet (25 mg) by mouth daily 90 tablet 3     insulin lispro (HUMALOG KWIKPEN) 100 UNIT/ML (1 unit dial) KWIKPEN Inject 40 Units Subcutaneous 3 times daily (before meals) 10 units insulin per 1 carb unit (for example, takes 10 units for 1 slice of bread) 30 mL 3     insulin pen needle (NOVOFINE 30) 30G X 8 MM miscellaneous USE 4 TO 5 TIMES DAILY OR AS DIRECTED 500 each 3     LANTUS SOLOSTAR 100 UNIT/ML soln INJECT 72 UNITS UNDER THE SKIN TWICE DAILY 75 mL 3     losartan (COZAAR) 100 MG tablet Take 1 tablet (100 mg) by mouth daily 90 tablet 0     Mesalamine-Cleanser 4 g KIT INSERT 60MLS RECTALLY EVERY NIGHT AT BEDTIME       metFORMIN (GLUCOPHAGE) 1000 MG tablet TAKE 1 TABLET BY MOUTH TWICE DAILY WITH MEALS 180 tablet 1     metoprolol succinate ER (TOPROL XL) 100 MG 24 hr tablet Take 1 tablet (100 mg) by mouth 2 times daily 180 tablet 3       ALLERGIES     Allergies   Allergen Reactions     Tetracycline      PN: LW Reaction: RASH     Codeine Rash      Mild rash     Simvastatin Other (See Comments)     Leg pain       PAST MEDICAL HISTORY:  Past Medical History:   Diagnosis Date     Aortic root dilatation (H)      Atrial flutter (H) 10/02/2017    ablation 11/26/2017     CAD (coronary artery disease) 03/13/2015    CABG 4/7/2015 by Dr. Johan Hall: LIMA to LAD, SVG to circumflex, SVG to PDA     Cardiomyopathy (H) 10/02/2017    tachycardia-induced with EF 40-45%, improved once back to NSR     HTN (hypertension)      Hyperlipidaemia      Microalbuminuria 6/27/2016     Obesity 3/13/2015     Type 2 diabetes mellitus with diabetic nephropathy, with long-term current use of insulin (H) 11/2/2016       PAST SURGICAL HISTORY:  Past Surgical History:   Procedure Laterality Date     BYPASS GRAFT ARTERY CORONARY N/A 4/7/2015    bypass LAD, OM, PDA        COLONOSCOPY N/A 6/20/2016    Procedure: COLONOSCOPY;  Surgeon: Marcela Schwartz MD;  Location:  GI     COLONOSCOPY N/A 1/6/2020    Procedure: Colonoscopy, With Polypectomy And Biopsy;  Surgeon: Herson Faust MD;  Location: [facility]     EXCISE PILONIDAL CYST, SIMPLE  1975     HEART CATH, ANGIOPLASTY  3-    3 vessel disease-occluded RCA, stenosis LM-to have CABG     removal of skin cancer  1992     TONSILLECTOMY & ADENOIDECTOMY  1950       FAMILY HISTORY:  Family History   Problem Relation Age of Onset     Breast Cancer Mother      Cerebrovascular Disease Mother      Hyperlipidemia Father      Cancer Father         lung, unrelated to smoking     Cancer Paternal Grandmother      Diabetes Brother      No Known Problems Sister      Colon Cancer No family hx of        SOCIAL HISTORY:  Social History     Socioeconomic History     Marital status:      Spouse name: None     Number of children: None     Years of education: None     Highest education level: None   Tobacco Use     Smoking status: Former Smoker     Packs/day: 1.50     Years: 29.00     Pack years: 43.50     Types: Cigarettes     Start date:  "1971     Quit date: 2000     Years since quittin.4     Smokeless tobacco: Never Used   Vaping Use     Vaping Use: Never used   Substance and Sexual Activity     Alcohol use: Yes     Alcohol/week: 0.0 standard drinks     Comment: 1 mixed drink a night     Drug use: No     Sexual activity: Not Currently     Partners: Female   Other Topics Concern     Caffeine Concern No     Comment: none     Sleep Concern No     Stress Concern Yes     Comment: sometimes at home     Weight Concern Yes     Comment: would like to lose weight     Special Diet No     Exercise No     Seat Belt Yes       Review of Systems:  Skin:  not assessed     Eyes:  not assessed    ENT:  not assessed    Respiratory:  Negative    Cardiovascular:  Negative    Gastroenterology: not assessed    Genitourinary:  not assessed    Musculoskeletal:  not assessed    Neurologic:  not assessed    Psychiatric:  not assessed    Heme/Lymph/Imm:  not assessed    Endocrine:  Positive for diabetes    Physical Exam:  Vitals: BP (!) 187/81   Pulse 64   Ht 1.753 m (5' 9\")   Wt 99.8 kg (220 lb)   BMI 32.49 kg/m      Constitutional:           Skin:             Head:           Eyes:           Lymph:      ENT:           Neck:           Respiratory:            Cardiac:                                                           GI:           Extremities and Muscular Skeletal:                 Neurological:           Psych:         Recent Lab Results:  LIPID RESULTS:  Lab Results   Component Value Date    CHOL 131 10/19/2021    CHOL 129 2021    HDL 61 10/19/2021    HDL 50 2021    LDL 46 10/19/2021    LDL 49 2021    TRIG 121 10/19/2021    TRIG 149 2021    CHOLHDLRATIO 3.5 2015       LIVER ENZYME RESULTS:  Lab Results   Component Value Date    AST 55 (H) 10/19/2021    AST 39 10/15/2020    ALT 28 10/19/2021    ALT 24 2021       CBC RESULTS:  Lab Results   Component Value Date    WBC 6.1 10/19/2021    WBC 8.6 10/15/2020    RBC 4.86 " 10/19/2021    RBC 4.62 10/15/2020    HGB 16.5 10/19/2021    HGB 16.4 10/15/2020    HCT 48.4 10/19/2021    HCT 47.7 10/15/2020     10/19/2021     (H) 10/15/2020    MCH 34.0 (H) 10/19/2021    MCH 35.5 (H) 10/15/2020    MCHC 34.1 10/19/2021    MCHC 34.4 10/15/2020    RDW 12.7 10/19/2021    RDW 12.6 10/15/2020     10/19/2021     10/15/2020       BMP RESULTS:  Lab Results   Component Value Date     10/19/2021     10/15/2020    POTASSIUM 4.6 10/19/2021    POTASSIUM 5.3 10/15/2020    CHLORIDE 107 10/19/2021    CHLORIDE 104 10/15/2020    CO2 20 10/19/2021    CO2 24 10/15/2020    ANIONGAP 10 10/19/2021    ANIONGAP 8 10/15/2020     (H) 10/19/2021     (H) 10/15/2020    BUN 19 10/19/2021    BUN 30 10/15/2020    CR 0.84 10/19/2021    CR 0.88 10/15/2020    GFRESTIMATED 89 10/19/2021    GFRESTIMATED 88 10/15/2020    GFRESTBLACK >90 10/15/2020    AURE 9.4 10/19/2021    AURE 10.0 10/15/2020        A1C RESULTS:  Lab Results   Component Value Date    A1C 7.1 (H) 06/01/2022    A1C 7.9 (H) 04/02/2021       INR RESULTS:  Lab Results   Component Value Date    INR 1.04 01/02/2020    INR 0.97 10/19/2017           CC  No referring provider defined for this encounter.    Thank you for allowing me to participate in the care of your patient.      Sincerely,     LEWIS BYRNE MD     [facility]  cc:   No referring provider defined for this encounter.

## 2022-06-10 NOTE — PROGRESS NOTES
HPI and Plan:     Abraham Garza is a pleasant [age]-year-old gentleman with history coronary disease history of atrial arrhythmia status post A. fib ablation history of coronary artery bypass surgery who is here for routine follow-up.  He has noes cardiovascular specific complaints.  He denies any chest pain chest pressure shortness of breath.  He does really very little exercise.  He has been compliant with his medications.  He checks his blood pressure at home and states that has been in the 140s to 150s systolic.  He gets very anxious when walking [place] the [place] and parking here at [facility].    We talked about it would probably be best for him to actually follow-up in [place] which is much easier for him and less stressful.  I want to have BETHANY check his blood pressure in 1 month's time his hydrochlorothiazide just got increased.  If necessary we could add a calcium channel blocker such as Norvasc to his medical regimen.  I reviewed his echocardiogram I reviewed his laboratory stress testing.  There is no angina or evidence of heart failure.  We also talked about weight loss and more regular exercise.    Today's clinic visit entailed:  Prescription drug management  No LOS data to display   Time spent doing chart review, history and exam, documentation and further activities per the note  Provider  Link to MDM Help Grid     The level of medical decision making during this visit was of moderate complexity.      No orders of the defined types were placed in this encounter.    No orders of the defined types were placed in this encounter.    There are no discontinued medications.      Encounter Diagnoses   Name Primary?     Coronary artery disease involving native coronary artery of native heart without angina pectoris Yes     Essential hypertension      TERAN (dyspnea on exertion)      History of atrial flutter      S/P CABG (coronary artery bypass graft)        CURRENT MEDICATIONS:  Current Outpatient Medications    Medication Sig Dispense Refill     albuterol (PROAIR HFA/PROVENTIL HFA/VENTOLIN HFA) 108 (90 Base) MCG/ACT inhaler Inhale 2 puffs into the lungs every 6 hours 1 each 0     aspirin-acetaminophen-caffeine (EXCEDRIN MIGRAINE) 250-250-65 MG tablet Take 1 tablet by mouth every morning        atorvastatin (LIPITOR) 40 MG tablet Take 1 tablet (40 mg) by mouth daily 90 tablet 0     balsalazide (COLAZAL) 750 MG capsule TK FIVE C PO IN THE MORNING AND FOUR C PO IN THE EVENING       blood glucose (ACCU-CHEK SMARTVIEW) test strip USE TO TEAT BLOOD SUGAR FOUR TIMES DAILY OR AS DIRECTED 400 strip 0     blood glucose monitoring (ACCU-CHEK FASTCLIX) lancets Use to test blood sugar 4 times daily or as directed. 400 each 3     hydrochlorothiazide (HYDRODIURIL) 25 MG tablet Take 1 tablet (25 mg) by mouth daily 90 tablet 3     insulin lispro (HUMALOG KWIKPEN) 100 UNIT/ML (1 unit dial) KWIKPEN Inject 40 Units Subcutaneous 3 times daily (before meals) 10 units insulin per 1 carb unit (for example, takes 10 units for 1 slice of bread) 30 mL 3     insulin pen needle (NOVOFINE 30) 30G X 8 MM miscellaneous USE 4 TO 5 TIMES DAILY OR AS DIRECTED 500 each 3     LANTUS SOLOSTAR 100 UNIT/ML soln INJECT 72 UNITS UNDER THE SKIN TWICE DAILY 75 mL 3     losartan (COZAAR) 100 MG tablet Take 1 tablet (100 mg) by mouth daily 90 tablet 0     Mesalamine-Cleanser 4 g KIT INSERT 60MLS RECTALLY EVERY NIGHT AT BEDTIME       metFORMIN (GLUCOPHAGE) 1000 MG tablet TAKE 1 TABLET BY MOUTH TWICE DAILY WITH MEALS 180 tablet 1     metoprolol succinate ER (TOPROL XL) 100 MG 24 hr tablet Take 1 tablet (100 mg) by mouth 2 times daily 180 tablet 3       ALLERGIES     Allergies   Allergen Reactions     Tetracycline      PN: LW Reaction: RASH     Codeine Rash     Mild rash     Simvastatin Other (See Comments)     Leg pain       PAST MEDICAL HISTORY:  Past Medical History:   Diagnosis Date     Aortic root dilatation (H)      Atrial flutter (H) 10/02/2017    ablation  2017     CAD (coronary artery disease) 2015    CABG 2015 by Dr. Johan Hall: LIMA to LAD, SVG to circumflex, SVG to PDA     Cardiomyopathy (H) 10/02/2017    tachycardia-induced with EF 40-45%, improved once back to NSR     HTN (hypertension)      Hyperlipidaemia      Microalbuminuria 2016     Obesity 3/13/2015     Type 2 diabetes mellitus with diabetic nephropathy, with long-term current use of insulin (H) 2016       PAST SURGICAL HISTORY:  Past Surgical History:   Procedure Laterality Date     BYPASS GRAFT ARTERY CORONARY N/A 2015    bypass LAD, OM, PDA        COLONOSCOPY N/A 2016    Procedure: COLONOSCOPY;  Surgeon: Marcela Schwartz MD;  Location:  GI     COLONOSCOPY N/A 2020    Procedure: Colonoscopy, With Polypectomy And Biopsy;  Surgeon: Herson Faust MD;  Location:  GI     EXCISE PILONIDAL CYST, SIMPLE  1975     HEART CATH, ANGIOPLASTY  3-    3 vessel disease-occluded RCA, stenosis LM-to have CABG     removal of skin cancer       TONSILLECTOMY & ADENOIDECTOMY  1950       FAMILY HISTORY:  Family History   Problem Relation Age of Onset     Breast Cancer Mother      Cerebrovascular Disease Mother      Hyperlipidemia Father      Cancer Father         lung, unrelated to smoking     Cancer Paternal Grandmother      Diabetes Brother      No Known Problems Sister      Colon Cancer No family hx of        SOCIAL HISTORY:  Social History     Socioeconomic History     Marital status:      Spouse name: None     Number of children: None     Years of education: None     Highest education level: None   Tobacco Use     Smoking status: Former Smoker     Packs/day: 1.50     Years: 29.00     Pack years: 43.50     Types: Cigarettes     Start date:      Quit date: 2000     Years since quittin.4     Smokeless tobacco: Never Used   Vaping Use     Vaping Use: Never used   Substance and Sexual Activity     Alcohol use: Yes     Alcohol/week: 0.0  "standard drinks     Comment: 1 mixed drink a night     Drug use: No     Sexual activity: Not Currently     Partners: Female   Other Topics Concern     Caffeine Concern No     Comment: none     Sleep Concern No     Stress Concern Yes     Comment: sometimes at home     Weight Concern Yes     Comment: would like to lose weight     Special Diet No     Exercise No     Seat Belt Yes       Review of Systems:  Skin:  not assessed     Eyes:  not assessed    ENT:  not assessed    Respiratory:  Negative    Cardiovascular:  Negative    Gastroenterology: not assessed    Genitourinary:  not assessed    Musculoskeletal:  not assessed    Neurologic:  not assessed    Psychiatric:  not assessed    Heme/Lymph/Imm:  not assessed    Endocrine:  Positive for diabetes    Physical Exam:  Vitals: BP (!) 187/81   Pulse 64   Ht 1.753 m (5' 9\")   Wt 99.8 kg (220 lb)   BMI 32.49 kg/m      Constitutional:           Skin:             Head:           Eyes:           Lymph:      ENT:           Neck:           Respiratory:            Cardiac:                                                           GI:           Extremities and Muscular Skeletal:                 Neurological:           Psych:         Recent Lab Results:  LIPID RESULTS:  Lab Results   Component Value Date    CHOL 131 10/19/2021    CHOL 129 02/04/2021    HDL 61 10/19/2021    HDL 50 02/04/2021    LDL 46 10/19/2021    LDL 49 02/04/2021    TRIG 121 10/19/2021    TRIG 149 02/04/2021    CHOLHDLRATIO 3.5 08/19/2015       LIVER ENZYME RESULTS:  Lab Results   Component Value Date    AST 55 (H) 10/19/2021    AST 39 10/15/2020    ALT 28 10/19/2021    ALT 24 02/04/2021       CBC RESULTS:  Lab Results   Component Value Date    WBC 6.1 10/19/2021    WBC 8.6 10/15/2020    RBC 4.86 10/19/2021    RBC 4.62 10/15/2020    HGB 16.5 10/19/2021    HGB 16.4 10/15/2020    HCT 48.4 10/19/2021    HCT 47.7 10/15/2020     10/19/2021     (H) 10/15/2020    MCH 34.0 (H) 10/19/2021    MCH 35.5 (H) " 10/15/2020    MCHC 34.1 10/19/2021    MCHC 34.4 10/15/2020    RDW 12.7 10/19/2021    RDW 12.6 10/15/2020     10/19/2021     10/15/2020       BMP RESULTS:  Lab Results   Component Value Date     10/19/2021     10/15/2020    POTASSIUM 4.6 10/19/2021    POTASSIUM 5.3 10/15/2020    CHLORIDE 107 10/19/2021    CHLORIDE 104 10/15/2020    CO2 20 10/19/2021    CO2 24 10/15/2020    ANIONGAP 10 10/19/2021    ANIONGAP 8 10/15/2020     (H) 10/19/2021     (H) 10/15/2020    BUN 19 10/19/2021    BUN 30 10/15/2020    CR 0.84 10/19/2021    CR 0.88 10/15/2020    GFRESTIMATED 89 10/19/2021    GFRESTIMATED 88 10/15/2020    GFRESTBLACK >90 10/15/2020    AURE 9.4 10/19/2021    AURE 10.0 10/15/2020        A1C RESULTS:  Lab Results   Component Value Date    A1C 7.1 (H) 06/01/2022    A1C 7.9 (H) 04/02/2021       INR RESULTS:  Lab Results   Component Value Date    INR 1.04 01/02/2020    INR 0.97 10/19/2017           CC  No referring provider defined for this encounter.

## 2022-06-16 ENCOUNTER — TELEPHONE (OUTPATIENT)
Dept: CARDIOLOGY | Facility: CLINIC | Age: 72
End: 2022-06-16
Payer: COMMERCIAL

## 2022-06-16 NOTE — TELEPHONE ENCOUNTER
Called patient to review recent elevated blood pressure reading.  Per [place] Community Me[place] guidelines, patients blood pressure is out of parameters and recheck blood pressure is recommended.    Last Blood Pressure: 187/81  Last Heart Rate: 55  Date: 06/10/22  Location: [facility]    Today's Blood Pressure: 135/71  Today's Heart Rate: 59  Location: Home BP    Patient reported blood pressure updated in Epic. Blood pressure falls within [place] Community Me[place] guidelines.  Patient will follow up as previously advised.     CORINA Velarde

## 2022-06-22 ENCOUNTER — MYC MEDICAL ADVICE (OUTPATIENT)
Dept: INTERNAL MEDICINE | Facility: CLINIC | Age: 72
End: 2022-06-22

## 2022-06-22 DIAGNOSIS — Z79.4 TYPE 2 DIABETES MELLITUS WITH DIABETIC NEPHROPATHY, WITH LONG-TERM CURRENT USE OF INSULIN (H): ICD-10-CM

## 2022-06-22 DIAGNOSIS — E11.21 TYPE 2 DIABETES MELLITUS WITH DIABETIC NEPHROPATHY, WITH LONG-TERM CURRENT USE OF INSULIN (H): ICD-10-CM

## 2022-06-22 NOTE — TELEPHONE ENCOUNTER
Pt asking for Metformin refill. This is  medication.     Lab Results   Component Value Date    A1C 7.1 2022    A1C 6.4 10/19/2021    A1C 7.9 2021    A1C 9.0 10/15/2020    A1C 7.7 2020    A1C 7.9 2019    A1C 8.4 2018

## 2022-07-04 SDOH — ECONOMIC STABILITY: INCOME INSECURITY: IN THE LAST 12 MONTHS, WAS THERE A TIME WHEN YOU WERE NOT ABLE TO PAY THE MORTGAGE OR RENT ON TIME?: NO

## 2022-07-04 SDOH — ECONOMIC STABILITY: TRANSPORTATION INSECURITY
IN THE PAST 12 MONTHS, HAS LACK OF TRANSPORTATION KEPT YOU FROM MEETINGS, WORK, OR FROM GETTING THINGS NEEDED FOR DAILY LIVING?: NO

## 2022-07-04 SDOH — ECONOMIC STABILITY: TRANSPORTATION INSECURITY
IN THE PAST 12 MONTHS, HAS THE LACK OF TRANSPORTATION KEPT YOU FROM MEDICAL APPOINTMENTS OR FROM GETTING MEDICATIONS?: NO

## 2022-07-04 SDOH — HEALTH STABILITY: PHYSICAL HEALTH: ON AVERAGE, HOW MANY MINUTES DO YOU ENGAGE IN EXERCISE AT THIS LEVEL?: 30 MIN

## 2022-07-04 SDOH — HEALTH STABILITY: PHYSICAL HEALTH: ON AVERAGE, HOW MANY DAYS PER WEEK DO YOU ENGAGE IN MODERATE TO STRENUOUS EXERCISE (LIKE A BRISK WALK)?: 1 DAY

## 2022-07-04 SDOH — ECONOMIC STABILITY: FOOD INSECURITY: WITHIN THE PAST 12 MONTHS, THE FOOD YOU BOUGHT JUST DIDN'T LAST AND YOU DIDN'T HAVE MONEY TO GET MORE.: NEVER TRUE

## 2022-07-04 SDOH — ECONOMIC STABILITY: FOOD INSECURITY: WITHIN THE PAST 12 MONTHS, YOU WORRIED THAT YOUR FOOD WOULD RUN OUT BEFORE YOU GOT MONEY TO BUY MORE.: NEVER TRUE

## 2022-07-04 SDOH — ECONOMIC STABILITY: INCOME INSECURITY: HOW HARD IS IT FOR YOU TO PAY FOR THE VERY BASICS LIKE FOOD, HOUSING, MEDICAL CARE, AND HEATING?: NOT VERY HARD

## 2022-07-04 ASSESSMENT — SOCIAL DETERMINANTS OF HEALTH (SDOH)
HOW OFTEN DO YOU ATTEND CHURCH OR RELIGIOUS SERVICES?: 1 TO 4 TIMES PER YEAR
IN A TYPICAL WEEK, HOW MANY TIMES DO YOU TALK ON THE PHONE WITH FAMILY, FRIENDS, OR NEIGHBORS?: ONCE A WEEK
HOW OFTEN DO YOU GET TOGETHER WITH FRIENDS OR RELATIVES?: ONCE A WEEK

## 2022-07-04 ASSESSMENT — LIFESTYLE VARIABLES
HOW OFTEN DO YOU HAVE A DRINK CONTAINING ALCOHOL: 4 OR MORE TIMES A WEEK
HOW OFTEN DO YOU HAVE SIX OR MORE DRINKS ON ONE OCCASION: NEVER
HOW MANY STANDARD DRINKS CONTAINING ALCOHOL DO YOU HAVE ON A TYPICAL DAY: 1 OR 2
AUDIT-C TOTAL SCORE: 4
SKIP TO QUESTIONS 9-10: 1

## 2022-07-11 ENCOUNTER — OFFICE VISIT (OUTPATIENT)
Dept: FAMILY MEDICINE | Facility: CLINIC | Age: 72
End: 2022-07-11
Payer: COMMERCIAL

## 2022-07-11 VITALS
RESPIRATION RATE: 12 BRPM | SYSTOLIC BLOOD PRESSURE: 110 MMHG | HEIGHT: 69 IN | DIASTOLIC BLOOD PRESSURE: 70 MMHG | HEART RATE: 62 BPM | TEMPERATURE: 97.5 F | OXYGEN SATURATION: 97 % | WEIGHT: 220 LBS | BODY MASS INDEX: 32.58 KG/M2

## 2022-07-11 DIAGNOSIS — Z01.818 PREOP GENERAL PHYSICAL EXAM: Primary | ICD-10-CM

## 2022-07-11 PROCEDURE — 99213 OFFICE O/P EST LOW 20 MIN: CPT | Performed by: FAMILY MEDICINE

## 2022-07-11 RX ORDER — ASPIRIN 325 MG
TABLET, DELAYED RELEASE (ENTERIC COATED) ORAL
COMMUNITY
End: 2023-07-05 | Stop reason: ALTCHOICE

## 2022-07-11 NOTE — PROGRESS NOTES
[facility]  [id number] [place] [id number]  Phone: [phone]  Primary Provider: Lauro Galloway  Pre-op Performing Provider: MIMI COYNE      PREOPERATIVE EVALUATION:  Today's date: 7/11/2022    Adan Ruffin is a 71 year old male who presents for a preoperative evaluation.    Surgical Information:  Surgery/Procedure: Right and Left cataract surgery   Surgery Location: [facility]   Surgeon: Shanta Bae OD  Surgery Date: 07/19/2022 & 07/26/2022  Time of Surgery: TBD   Where patient plans to recover: At home with family  Fax number for surgical facility: [phone]    Type of Anesthesia Anticipated: to be determined    Assessment & Plan     The proposed surgical procedure is considered LOW risk.    Preop general physical exam  Check below for recommendations.           Risks and Recommendations:  The patient has the following additional risks and recommendations for perioperative complications:  Cardiovascular:   - last stress test in 2019 was negative for ischemia.  Diabetes:  - Patient is on insulin therapy; diabetic NPO guidelines provided and discussed.    Medication Instructions:   - Bleeding risk is low for this procedure (e.g. dental, skin, cataract).   - ACE/ARB: May be continued on the day of surgery.    - Beta Blockers: Continue taking on the day of surgery.   - Diuretics: May continue due to heart failure.   - Statins: Continue taking on the day of surgery.    - Long acting insulin (e.g. glargine, detemir): Take 80% of the usual evening or morning dose before surgery.   - short acting insulin (e.g. regular, lispro, aspart): HOLD on the morning of surgery.    - metformin: HOLD day of surgery.    RECOMMENDATION:  APPROVAL GIVEN to proceed with proposed procedure, without further diagnostic evaluation.                      Subjective     HPI related to upcoming procedure: cataract for both eyes.     Preop Questions 7/4/2022   1. Have you ever  had a heart attack or stroke? No   2. Have you ever had surgery on your heart or blood vessels, such as a stent placement, a coronary artery bypass, or surgery on an artery in your head, neck, heart, or legs? YES - CABG in 2015.   3. Do you have chest pain with activity? No   4. Do you have a history of  heart failure? No   5. Do you currently have a cold, bronchitis or symptoms of other infection? No   6. Do you have a cough, shortness of breath, or wheezing?    7. Do you or anyone in your family have previous history of blood clots? No   8. Do you or does anyone in your family have a serious bleeding problem such as prolonged bleeding following surgeries or cuts? No   9. Have you ever had problems with anemia or been told to take iron pills? YES - in the past. Resolved.    10. Have you had any abnormal blood loss such as black, tarry or bloody stools? No   11. Have you ever had a blood transfusion? No-    12. Are you willing to have a blood transfusion if it is medically needed before, during, or after your surgery? Yes   13. Have you or any of your relatives ever had problems with anesthesia? No   14. Do you have sleep apnea, excessive snoring or daytime drowsiness? No   15. Do you have any artifical heart valves or other implanted medical devices like a pacemaker, defibrillator, or continuous glucose monitor? No   16. Do you have artificial joints? No   17. Are you allergic to latex? No       Health Care Directive:  Patient does not have a Health Care Directive or Living Will: Discussed advance care planning with patient; information given to patient to review.    Preoperative Review of :   reviewed - no record of controlled substances prescribed.      Status of Chronic Conditions:  A-FIB - Patient has a longstanding history of chronic A-fib currently on rate control. Current treatment regimen includes Aspirin for stroke prevention and denies significant symptoms of lightheadedness, palpitations or  dyspnea.     CAD - Patient has a longstanding history of moderate-severe CAD. Patient denies recent chest pain or NTG use, denies exercise induced dyspnea or PND. Last Stress test 2019, EKG 2020.     DIABETES - Patient has a longstanding history of DiabetesType Type II . Patient is being treated with oral agents and insulin injections and denies significant side effects. Control has been good. Complicating factors include but are not limited to: CAD/PVD.       Review of Systems  CONSTITUTIONAL: NEGATIVE for fever, chills, change in weight  ENT/MOUTH: NEGATIVE for ear, mouth and throat problems  RESP: NEGATIVE for significant cough or SOB  CV: NEGATIVE for chest pain, palpitations or peripheral edema  MUSCULOSKELETAL: left hp pain.    Patient Active Problem List    Diagnosis Date Noted     Stage 3a chronic kidney disease (H) 06/01/2022     Priority: Medium     Aortic root dilatation (H)      Priority: Medium     Morbid obesity (H) 09/09/2018     Priority: Medium     Atrial flutter (H) 10/02/2017     Priority: Medium     Type 2 diabetes mellitus with diabetic nephropathy, with long-term current use of insulin (H) 11/02/2016     Priority: Medium     Microalbuminuria 06/27/2016     Priority: Medium     Advanced directives, counseling/discussion 08/19/2015     Priority: Medium     Discussed Advance Directive planning with patient; information given to patient to review.         Hyperlipidemia LDL goal <70      Priority: Medium     HTN (hypertension)      Priority: Medium     Anxiety 04/15/2015     Priority: Medium     CAD (coronary artery disease) 03/13/2015     Priority: Medium     CABG 4/7/2015 by Dr. Johan Hall: LIMA to LAD, SVG to circumflex, SVG to PDA          Past Medical History:   Diagnosis Date     Aortic root dilatation (H)      Atrial flutter (H) 10/02/2017    ablation 11/26/2017     CAD (coronary artery disease) 03/13/2015    CABG 4/7/2015 by Dr. Johan Hall: LIMA to LAD, SVG to circumflex, SVG to PDA      Cardiomyopathy (H) 10/02/2017    tachycardia-induced with EF 40-45%, improved once back to NSR     HTN (hypertension)      Hyperlipidaemia      Microalbuminuria 6/27/2016     Obesity 3/13/2015     Type 2 diabetes mellitus with diabetic nephropathy, with long-term current use of insulin (H) 11/2/2016     Past Surgical History:   Procedure Laterality Date     BYPASS GRAFT ARTERY CORONARY N/A 4/7/2015    bypass LAD, OM, PDA        COLONOSCOPY N/A 6/20/2016    Procedure: COLONOSCOPY;  Surgeon: Marcela Schwartz MD;  Location:  GI     COLONOSCOPY N/A 1/6/2020    Procedure: Colonoscopy, With Polypectomy And Biopsy;  Surgeon: Herson Faust MD;  Location:  GI     EXCISE PILONIDAL CYST, SIMPLE  1975     HEART CATH, ANGIOPLASTY  3-    3 vessel disease-occluded RCA, stenosis LM-to have CABG     removal of skin cancer  1992     TONSILLECTOMY & ADENOIDECTOMY  1950     Current Outpatient Medications   Medication Sig Dispense Refill     albuterol (PROAIR HFA/PROVENTIL HFA/VENTOLIN HFA) 108 (90 Base) MCG/ACT inhaler Inhale 2 puffs into the lungs every 6 hours 1 each 0     aspirin (ECPIRIN) 325 MG EC tablet Take 325 mg by mouth daily       aspirin-acetaminophen-caffeine (EXCEDRIN MIGRAINE) 250-250-65 MG tablet Take 1 tablet by mouth every morning        atorvastatin (LIPITOR) 40 MG tablet Take 1 tablet (40 mg) by mouth daily 90 tablet 0     balsalazide (COLAZAL) 750 MG capsule TK FIVE C PO IN THE MORNING AND FOUR C PO IN THE EVENING       blood glucose (ACCU-CHEK SMARTVIEW) test strip USE TO TEAT BLOOD SUGAR FOUR TIMES DAILY OR AS DIRECTED 400 strip 0     blood glucose monitoring (ACCU-CHEK FASTCLIX) lancets Use to test blood sugar 4 times daily or as directed. 400 each 3     hydrochlorothiazide (HYDRODIURIL) 25 MG tablet Take 1 tablet (25 mg) by mouth daily 90 tablet 3     insulin lispro (HUMALOG KWIKPEN) 100 UNIT/ML (1 unit dial) KWIKPEN Inject 40 Units Subcutaneous 3 times daily (before meals) 10 units  "insulin per 1 carb unit (for example, takes 10 units for 1 slice of bread) 30 mL 3     insulin pen needle (NOVOFINE 30) 30G X 8 MM miscellaneous USE 4 TO 5 TIMES DAILY OR AS DIRECTED 500 each 3     LANTUS SOLOSTAR 100 UNIT/ML soln INJECT 72 UNITS UNDER THE SKIN TWICE DAILY 75 mL 3     losartan (COZAAR) 100 MG tablet Take 1 tablet (100 mg) by mouth daily 90 tablet 0     Mesalamine-Cleanser 4 g KIT INSERT 60MLS RECTALLY EVERY NIGHT AT BEDTIME       metFORMIN (GLUCOPHAGE) 1000 MG tablet TAKE 1 TABLET BY MOUTH TWICE DAILY WITH MEALS 180 tablet 1     metoprolol succinate ER (TOPROL XL) 100 MG 24 hr tablet Take 1 tablet (100 mg) by mouth 2 times daily 180 tablet 3       Allergies   Allergen Reactions     Tetracycline      PN: LW Reaction: RASH     Codeine Rash     Mild rash     Simvastatin Other (See Comments)     Leg pain        Social History     Tobacco Use     Smoking status: Former Smoker     Packs/day: 1.50     Years: 29.00     Pack years: 43.50     Types: Cigarettes     Start date:      Quit date: 2000     Years since quittin.5     Smokeless tobacco: Never Used   Substance Use Topics     Alcohol use: Yes     Alcohol/week: 0.0 standard drinks     Comment: 1 mixed drink a night       History   Drug Use No         Objective     /70 (BP Location: Right arm, Patient Position: Sitting, Cuff Size: Adult Large)   Pulse 62   Temp 97.5  F (36.4  C) (Oral)   Resp 12   Ht 1.753 m (5' 9\")   Wt 99.8 kg (220 lb)   SpO2 97%   BMI 32.49 kg/m      Physical Exam  GENERAL APPEARANCE: healthy, alert and no distress  HENT: ear canals and TM's normal and nose and mouth without ulcers or lesions  RESP: lungs clear to auscultation - no rales, rhonchi or wheezes  CV: regular rate and rhythm, normal S1 S2, no S3 or S4 and no murmur, click or rub   ABDOMEN: soft, nontender, without hepatosplenomegaly or masses and umbilical hernia noticed.   NEURO: Normal strength and tone and mentation intact    Recent Labs   Lab " Test 06/01/22  1435 10/19/21  0957 04/02/21  0940 10/15/20  0947   HGB  --  16.5  --  16.4   PLT  --  217  --  199   NA  --  137  --  136   POTASSIUM  --  4.6  --  5.3   CR  --  0.84  --  0.88   A1C 7.1* 6.4*   < > 9.0*    < > = values in this interval not displayed.        Diagnostics:  No labs were ordered during this visit.   His last A1c was within normal on 6/2022.  No EKG required for low risk surgery (cataract, skin procedure, breast biopsy, etc).    Revised Cardiac Risk Index (RCRI):  The patient has the following serious cardiovascular risks for perioperative complications:   - Coronary Artery Disease (MI, positive stress test, angina, Qs on EKG) = 1 point   - Diabetes Mellitus (on Insulin) = 1 point     RCRI Interpretation: 0 points: Class I (very low risk - 0.4% complication rate) as the surgery is low risk.           Signed Electronically by: Sarina Aceves MD  Copy of this evaluation report is provided to requesting physician.

## 2022-07-11 NOTE — PATIENT INSTRUCTIONS
Preparing for Your Surgery  Getting started  A nurse will call you to review your health history and instructions. They will give you an arrival time based on your scheduled surgery time. Please be ready to share:    Your doctor's clinic name and phone number    Your medical, surgical and anesthesia history    A list of allergies and sensitivities    A list of medicines, including herbal treatments and over-the-counter drugs    Whether the patient has a legal guardian (ask how to send us the papers in advance)  Please tell us if you're pregnant--or if there's any chance you might be pregnant. Some surgeries may injure a fetus (unborn baby), so they require a pregnancy test. Surgeries that are safe for a fetus don't always need a test, and you can choose whether to have one.   If you have a child who's having surgery, please ask for a copy of Preparing for Your Child's Surgery.    Preparing for surgery    Within 30 days of surgery: Have a pre-op exam (sometimes called an H&P, or History and Physical). This can be done at a clinic or pre-operative center.  ? If you're having a , you may not need this exam. Talk to your care team.    At your pre-op exam, talk to your care team about all medicines you take. If you need to stop any medicines before surgery, ask when to start taking them again.  ? We do this for your safety. Many medicines can make you bleed too much during surgery. Some change how well surgery (anesthesia) drugs work.    Call your insurance company to let them know you're having surgery. (If you don't have insurance, call [phone].)    Call your clinic if there's any change in your health. This includes signs of a cold or flu (sore throat, runny nose, cough, rash, fever). It also includes a scrape or scratch near the surgery site.    If you have questions on the day of surgery, call your hospital or surgery center.  COVID testing  You may need to be tested for COVID-19 before having  surgery. If so, we will give you instructions.  Eating and drinking guidelines  For your safety: Unless your surgeon tells you otherwise, follow the guidelines below.    Eat and drink as usual until 8 hours before surgery. After that, no food or milk.    Drink clear liquids until 2 hours before surgery. These are liquids you can see through, like water, Gatorade and Propel Water. You may also have black coffee and tea (no cream or milk).    Nothing by mouth within 2 hours of surgery. This includes gum, candy and breath mints.    If you drink alcohol: Stop drinking it the night before surgery.    If your care team tells you to take medicine on the morning of surgery, it's okay to take it with a sip of water.  Preventing infection    Shower or bathe the night before and morning of your surgery. Follow the instructions your clinic gave you. (If no instructions, use regular soap.)    Don't shave or clip hair near your surgery site. We'll remove the hair if needed.    Don't smoke or vape the morning of surgery. You may chew nicotine gum up to 2 hours before surgery. A nicotine patch is okay.  ? Note: Some surgeries require you to completely quit smoking and nicotine. Check with your surgeon.    Your care team will make every effort to keep you safe from infection. We will:  ? Clean our hands often with soap and water (or an alcohol-based hand rub).  ? Clean the skin at your surgery site with a special soap that kills germs.  ? Give you a special gown to keep you warm. (Cold raises the risk of infection.)  ? Wear special hair covers, masks, gowns and gloves during surgery.  ? Give antibiotic medicine, if prescribed. Not all surgeries need antibiotics.  What to bring on the day of surgery    Photo ID and insurance card    Copy of your health care directive, if you have one    Glasses and hearing aides (bring cases)  ? You can't wear contacts during surgery    Inhaler and eye drops, if you use them (tell us about these when  you arrive)    CPAP machine or breathing device, if you use them    A few personal items, if spending the night    If you have . . .  ? A pacemaker, ICD (cardiac defibrillator) or other implant: Bring the ID card.  ? An implanted stimulator: Bring the remote control.  ? A legal guardian: Bring a copy of the certified (court-stamped) guardianship papers.  Please remove any jewelry, including body piercings. Leave jewelry and other valuables at home.  If you're going home the day of surgery    You must have a responsible adult drive you home. They should stay with you overnight as well.    If you don't have someone to stay with you, and you aren't safe to go home alone, we may keep you overnight. Insurance often won't pay for this.  After surgery  If it's hard to control your pain or you need more pain medicine, please call your surgeon's office.  Questions?   If you have any questions for your care team, list them here: _________________________________________________________________________________________________________________________________________________________________________ ____________________________________ ____________________________________ ____________________________________  For informational purposes only. Not to replace the advice of your health care provider. Copyright   2003, 2019 [facility]. All rights reserved. Clinically reviewed by Merry Hernandez MD. [employer] [id number] - REV 07/21.

## 2022-07-20 ENCOUNTER — OFFICE VISIT (OUTPATIENT)
Dept: CARDIOLOGY | Facility: CLINIC | Age: 72
End: 2022-07-20
Payer: COMMERCIAL

## 2022-07-20 VITALS
OXYGEN SATURATION: 92 % | BODY MASS INDEX: 32.35 KG/M2 | DIASTOLIC BLOOD PRESSURE: 70 MMHG | WEIGHT: 218.4 LBS | HEART RATE: 59 BPM | HEIGHT: 69 IN | SYSTOLIC BLOOD PRESSURE: 150 MMHG

## 2022-07-20 DIAGNOSIS — Z95.1 S/P CABG (CORONARY ARTERY BYPASS GRAFT): ICD-10-CM

## 2022-07-20 DIAGNOSIS — I10 ESSENTIAL HYPERTENSION: Primary | ICD-10-CM

## 2022-07-20 DIAGNOSIS — E11.21 TYPE 2 DIABETES MELLITUS WITH DIABETIC NEPHROPATHY, WITH LONG-TERM CURRENT USE OF INSULIN (H): ICD-10-CM

## 2022-07-20 DIAGNOSIS — Z79.4 TYPE 2 DIABETES MELLITUS WITH DIABETIC NEPHROPATHY, WITH LONG-TERM CURRENT USE OF INSULIN (H): ICD-10-CM

## 2022-07-20 DIAGNOSIS — I25.10 CORONARY ARTERY DISEASE INVOLVING NATIVE CORONARY ARTERY OF NATIVE HEART WITHOUT ANGINA PECTORIS: ICD-10-CM

## 2022-07-20 DIAGNOSIS — E78.5 HYPERLIPIDEMIA LDL GOAL <70: ICD-10-CM

## 2022-07-20 PROCEDURE — 99214 OFFICE O/P EST MOD 30 MIN: CPT | Performed by: NURSE PRACTITIONER

## 2022-07-20 RX ORDER — AMLODIPINE BESYLATE 5 MG/1
5 TABLET ORAL DAILY
Qty: 90 TABLET | Refills: 3 | Status: SHIPPED | OUTPATIENT
Start: 2022-07-20 | End: 2023-09-28

## 2022-07-20 NOTE — PROGRESS NOTES
Cardiology Clinic Progress Note  Adan Ruffin MRN# [id number]   YOB: 1950 Age: 71 year old     Reason For Visit:  1 month follow-up   Primary Cardiologist:   Dr. Rosas          History of Presenting Illness:      Adan Ruffin is a pleasant 71 year old patient who carries a past medical history significant for coronary artery disease status post CABG x3 in 2015 using a LIMA to the LAD, vein graft to the circumflex and vein graft to the PDA, atrial flutter ablation in 2017, hypertension, hyperlipidemia, and type 2 diabetes.    He was last seen on 6/10/2022 for routine follow-up with Dr. Rosas.  Please refer to that office visit for more complete HPI.  He was noted to be moderately hypertensive and recommended to follow-up in 1 month as his hydrochlorothiazide was recently uptitrated.     He offers no cardiac complaint, denies chest pain, shortness of breath, PND, orthopnea, presyncope, syncope, edema, heart racing, or palpitations.  Recent echocardiogram shows a normal ejection fraction estimated at 60 to 65%, normal RV size and function, mild to moderate LVH, and a mildly dilated aortic root measuring 4.1 cm.  Last nuclear stress test in 2019 was negative for ischemia.    Blood pressure is moderately elevated at 156/76 with repeat reading reduced to 150/70.  Lipid panel is excellent with a total cholesterol of 131, HDL 61, LDL 46, and triglycerides 121  BMI 32.25    Remains compliant with all medications.                   Assessment and Plan:     1.  Hypertension -uncontrolled.  Add low-dose amlodipine 5 mg daily to current regimen of metoprolol, hydrochlorothiazide and losartan.  Monitor blood pressures twice daily with a goal of less than 140/90.  Strict low-sodium diet.    2.  Coronary artery disease -status post CABG x3 in 2015 using a LIMA to the LAD, vein graft to the circumflex, and vein graft to the PDA.  He does not endorse any ischemic symptoms.  Continue on current medical  therapy.    3.  Hyperlipidemia -LDL at goal, continue atorvastatin  4.  Type 2 diabetes -A1c 7.1, managed on insulin and metformin.                  Thank you for allowing me to participate in this delightful patient's care. I have recommended he follow up in 1 year with BETHANY or sooner if needed.       Sharonda Ross, SANDRA CNP         Review of Systems:     Review of Systems:  Skin:  not assessed     Eyes:  Positive for visual blurring  ENT:  not assessed    Respiratory:  Negative    Cardiovascular:  Negative    Gastroenterology: not assessed    Genitourinary:  not assessed    Musculoskeletal:  not assessed    Neurologic:  not assessed    Psychiatric:  not assessed    Heme/Lymph/Imm:  not assessed    Endocrine:  Positive for diabetes              Physical Exam:     GEN:  In general, this is a overweight male in no acute distress.  HEENT:  Pupils equal, round. Sclerae nonicteric. Clear oropharynx. Mucous membranes moist.  NECK: Supple, no masses appreciated. Trachea midline.  No JVD   C/V:  Regular rate and rhythm, no murmur, rub or gallop. No S3 or RV heave.   RESP: Respirations are unlabored. No use of accessory muscles. Clear to auscultation bilaterally without wheezing, rales, or rhonchi.  GI: Abdomen soft, nontender, nondistended.   EXTREM: No LE edema. No cyanosis or clubbing.  NEURO: Alert and oriented, cooperative. No obvious focal deficits.   PSYCH: Normal affect.  SKIN: Warm and dry. No rashes or petechiae appreciated.          Past Medical History:     Past Medical History:   Diagnosis Date     Aortic root dilatation (H)      Atrial flutter (H) 10/02/2017    ablation 11/26/2017     CAD (coronary artery disease) 03/13/2015    CABG 4/7/2015 by Dr. Johan Hall: LIMA to LAD, SVG to circumflex, SVG to PDA     Cardiomyopathy (H) 10/02/2017    tachycardia-induced with EF 40-45%, improved once back to NSR     HTN (hypertension)      Hyperlipidaemia      Microalbuminuria 6/27/2016     Obesity 3/13/2015     Type  2 diabetes mellitus with diabetic nephropathy, with long-term current use of insulin (H) 11/2/2016              Past Surgical History:     Past Surgical History:   Procedure Laterality Date     BYPASS GRAFT ARTERY CORONARY N/A 4/7/2015    bypass LAD, OM, PDA        COLONOSCOPY N/A 6/20/2016    Procedure: COLONOSCOPY;  Surgeon: Marcela Schwartz MD;  Location: [facility]     COLONOSCOPY N/A 1/6/2020    Procedure: Colonoscopy, With Polypectomy And Biopsy;  Surgeon: Herson Faust MD;  Location: [facility]     EXCISE PILONIDAL CYST, SIMPLE  1975     HEART CATH, ANGIOPLASTY  3-    3 vessel disease-occluded RCA, stenosis LM-to have CABG     removal of skin cancer  1992     TONSILLECTOMY & ADENOIDECTOMY  1950              Allergies:   Tetracycline, Codeine, and Simvastatin       Data:   All laboratory data reviewed:    LAST CHOLESTEROL:  Lab Results   Component Value Date    CHOL 131 10/19/2021    CHOL 129 02/04/2021     Lab Results   Component Value Date    HDL 61 10/19/2021    HDL 50 02/04/2021     Lab Results   Component Value Date    LDL 46 10/19/2021    LDL 49 02/04/2021     Lab Results   Component Value Date    TRIG 121 10/19/2021    TRIG 149 02/04/2021     Lab Results   Component Value Date    CHOLHDLRATIO 3.5 08/19/2015       LAST BMP:  Lab Results   Component Value Date     10/19/2021     10/15/2020      Lab Results   Component Value Date    POTASSIUM 4.6 10/19/2021    POTASSIUM 5.3 10/15/2020     Lab Results   Component Value Date    CHLORIDE 107 10/19/2021    CHLORIDE 104 10/15/2020     Lab Results   Component Value Date    AURE 9.4 10/19/2021    AURE 10.0 10/15/2020     Lab Results   Component Value Date    CO2 20 10/19/2021    CO2 24 10/15/2020     Lab Results   Component Value Date    BUN 19 10/19/2021    BUN 30 10/15/2020     Lab Results   Component Value Date    CR 0.84 10/19/2021    CR 0.88 10/15/2020     Lab Results   Component Value Date     10/19/2021     10/15/2020        LAST CBC:  Lab Results   Component Value Date    WBC 6.1 10/19/2021    WBC 8.6 10/15/2020     Lab Results   Component Value Date    RBC 4.86 10/19/2021    RBC 4.62 10/15/2020     Lab Results   Component Value Date    HGB 16.5 10/19/2021    HGB 16.4 10/15/2020     Lab Results   Component Value Date    HCT 48.4 10/19/2021    HCT 47.7 10/15/2020     Lab Results   Component Value Date     10/19/2021     10/15/2020     Lab Results   Component Value Date    MCH 34.0 10/19/2021    MCH 35.5 10/15/2020     Lab Results   Component Value Date    MCHC 34.1 10/19/2021    MCHC 34.4 10/15/2020     Lab Results   Component Value Date    RDW 12.7 10/19/2021    RDW 12.6 10/15/2020     Lab Results   Component Value Date     10/19/2021     10/15/2020

## 2022-07-20 NOTE — LETTER
7/20/2022    Lauro Galloway MD  [address] [facility] [id number]    RE: Adan Ruffin       Dear Colleague,     I had the pleasure of seeing Adan Ruffin in the [facility].  Cardiology Clinic Progress Note  Adan Ruffin MRN# [id number]   YOB: 1950 Age: 71 year old     Reason For Visit:  1 month follow-up   Primary Cardiologist:   Dr. Rosas          History of Presenting Illness:      Adan Ruffin is a pleasant 71 year old patient who carries a past medical history significant for coronary artery disease status post CABG x3 in 2015 using a LIMA to the LAD, vein graft to the circumflex and vein graft to the PDA, atrial flutter ablation in 2017, hypertension, hyperlipidemia, and type 2 diabetes.    He was last seen on 6/10/2022 for routine follow-up with Dr. Rosas.  Please refer to that office visit for more complete HPI.  He was noted to be moderately hypertensive and recommended to follow-up in 1 month as his hydrochlorothiazide was recently uptitrated.     He offers no cardiac complaint, denies chest pain, shortness of breath, PND, orthopnea, presyncope, syncope, edema, heart racing, or palpitations.  Recent echocardiogram shows a normal ejection fraction estimated at 60 to 65%, normal RV size and function, mild to moderate LVH, and a mildly dilated aortic root measuring 4.1 cm.  Last nuclear stress test in 2019 was negative for ischemia.    Blood pressure is moderately elevated at 156/76 with repeat reading reduced to 150/70.  Lipid panel is excellent with a total cholesterol of 131, HDL 61, LDL 46, and triglycerides 121  BMI 32.25    Remains compliant with all medications.                   Assessment and Plan:     1.  Hypertension -uncontrolled.  Add low-dose amlodipine 5 mg daily to current regimen of metoprolol, hydrochlorothiazide and losartan.  Monitor blood pressures twice daily with a goal of less than 140/90.  Strict low-sodium diet.    2.   Coronary artery disease -status post CABG x3 in 2015 using a LIMA to the LAD, vein graft to the circumflex, and vein graft to the PDA.  He does not endorse any ischemic symptoms.  Continue on current medical therapy.    3.  Hyperlipidemia -LDL at goal, continue atorvastatin  4.  Type 2 diabetes -A1c 7.1, managed on insulin and metformin.                  Thank you for allowing me to participate in this delightful patient's care. I have recommended he follow up in 1 year with BETHANY or sooner if needed.       Sharonda Ross, APRN CNP         Review of Systems:     Review of Systems:  Skin:  not assessed     Eyes:  Positive for visual blurring  ENT:  not assessed    Respiratory:  Negative    Cardiovascular:  Negative    Gastroenterology: not assessed    Genitourinary:  not assessed    Musculoskeletal:  not assessed    Neurologic:  not assessed    Psychiatric:  not assessed    Heme/Lymph/Imm:  not assessed    Endocrine:  Positive for diabetes              Physical Exam:     GEN:  In general, this is a overweight male in no acute distress.  HEENT:  Pupils equal, round. Sclerae nonicteric. Clear oropharynx. Mucous membranes moist.  NECK: Supple, no masses appreciated. Trachea midline.  No JVD   C/V:  Regular rate and rhythm, no murmur, rub or gallop. No S3 or RV heave.   RESP: Respirations are unlabored. No use of accessory muscles. Clear to auscultation bilaterally without wheezing, rales, or rhonchi.  GI: Abdomen soft, nontender, nondistended.   EXTREM: No LE edema. No cyanosis or clubbing.  NEURO: Alert and oriented, cooperative. No obvious focal deficits.   PSYCH: Normal affect.  SKIN: Warm and dry. No rashes or petechiae appreciated.          Past Medical History:     Past Medical History:   Diagnosis Date     Aortic root dilatation (H)      Atrial flutter (H) 10/02/2017    ablation 11/26/2017     CAD (coronary artery disease) 03/13/2015    CABG 4/7/2015 by Dr. Johan Hall: LIMA to LAD, SVG to circumflex, SVG to  PDA     Cardiomyopathy (H) 10/02/2017    tachycardia-induced with EF 40-45%, improved once back to NSR     HTN (hypertension)      Hyperlipidaemia      Microalbuminuria 6/27/2016     Obesity 3/13/2015     Type 2 diabetes mellitus with diabetic nephropathy, with long-term current use of insulin (H) 11/2/2016              Past Surgical History:     Past Surgical History:   Procedure Laterality Date     BYPASS GRAFT ARTERY CORONARY N/A 4/7/2015    bypass LAD, OM, PDA        COLONOSCOPY N/A 6/20/2016    Procedure: COLONOSCOPY;  Surgeon: Marcela Schwartz MD;  Location:  GI     COLONOSCOPY N/A 1/6/2020    Procedure: Colonoscopy, With Polypectomy And Biopsy;  Surgeon: Herson Faust MD;  Location:  GI     EXCISE PILONIDAL CYST, SIMPLE  1975     HEART CATH, ANGIOPLASTY  3-    3 vessel disease-occluded RCA, stenosis LM-to have CABG     removal of skin cancer  1992     TONSILLECTOMY & ADENOIDECTOMY  1950              Allergies:   Tetracycline, Codeine, and Simvastatin       Data:   All laboratory data reviewed:    LAST CHOLESTEROL:  Lab Results   Component Value Date    CHOL 131 10/19/2021    CHOL 129 02/04/2021     Lab Results   Component Value Date    HDL 61 10/19/2021    HDL 50 02/04/2021     Lab Results   Component Value Date    LDL 46 10/19/2021    LDL 49 02/04/2021     Lab Results   Component Value Date    TRIG 121 10/19/2021    TRIG 149 02/04/2021     Lab Results   Component Value Date    CHOLHDLRATIO 3.5 08/19/2015       LAST BMP:  Lab Results   Component Value Date     10/19/2021     10/15/2020      Lab Results   Component Value Date    POTASSIUM 4.6 10/19/2021    POTASSIUM 5.3 10/15/2020     Lab Results   Component Value Date    CHLORIDE 107 10/19/2021    CHLORIDE 104 10/15/2020     Lab Results   Component Value Date    AURE 9.4 10/19/2021    AURE 10.0 10/15/2020     Lab Results   Component Value Date    CO2 20 10/19/2021    CO2 24 10/15/2020     Lab Results   Component Value Date     BUN 19 10/19/2021    BUN 30 10/15/2020     Lab Results   Component Value Date    CR 0.84 10/19/2021    CR 0.88 10/15/2020     Lab Results   Component Value Date     10/19/2021     10/15/2020       LAST CBC:  Lab Results   Component Value Date    WBC 6.1 10/19/2021    WBC 8.6 10/15/2020     Lab Results   Component Value Date    RBC 4.86 10/19/2021    RBC 4.62 10/15/2020     Lab Results   Component Value Date    HGB 16.5 10/19/2021    HGB 16.4 10/15/2020     Lab Results   Component Value Date    HCT 48.4 10/19/2021    HCT 47.7 10/15/2020     Lab Results   Component Value Date     10/19/2021     10/15/2020     Lab Results   Component Value Date    MCH 34.0 10/19/2021    MCH 35.5 10/15/2020     Lab Results   Component Value Date    MCHC 34.1 10/19/2021    MCHC 34.4 10/15/2020     Lab Results   Component Value Date    RDW 12.7 10/19/2021    RDW 12.6 10/15/2020     Lab Results   Component Value Date     10/19/2021     10/15/2020       Thank you for allowing me to participate in the care of your patient.      Sincerely,     SANDRA Abernathy [facility]  cc:   Harvey Rosas MD  [address]  [place] [address]

## 2022-07-20 NOTE — PATIENT INSTRUCTIONS
Thanks for participating in a office visit with the [facility] clinic today.    Blood pressures not well controlled   Add low dose amlodipine 5 mg daily  Continue all other medications  Encourage exercise, weight loss, and low salt diet.     Follow up in 1 year with BETHANY or sooner if needed     Please call my nurse at  [phone] with any questions or concerns.    Scheduling phone number: [phone]  Reminder: Please bring in all current medications, over the counter supplements and vitamin bottles to your next appointment.

## 2022-07-24 DIAGNOSIS — E78.5 HYPERLIPIDEMIA LDL GOAL <70: ICD-10-CM

## 2022-07-24 DIAGNOSIS — I10 ESSENTIAL HYPERTENSION: ICD-10-CM

## 2022-07-27 RX ORDER — LOSARTAN POTASSIUM 100 MG/1
TABLET ORAL
Qty: 90 TABLET | Refills: 0 | Status: SHIPPED | OUTPATIENT
Start: 2022-07-27 | End: 2022-08-10

## 2022-07-27 RX ORDER — ATORVASTATIN CALCIUM 40 MG/1
TABLET, FILM COATED ORAL
Qty: 90 TABLET | Refills: 0 | Status: SHIPPED | OUTPATIENT
Start: 2022-07-27 | End: 2022-08-10

## 2022-08-08 DIAGNOSIS — E78.5 HYPERLIPIDEMIA LDL GOAL <70: ICD-10-CM

## 2022-08-08 DIAGNOSIS — I10 ESSENTIAL HYPERTENSION: ICD-10-CM

## 2022-08-09 DIAGNOSIS — E11.21 TYPE 2 DIABETES MELLITUS WITH DIABETIC NEPHROPATHY, WITH LONG-TERM CURRENT USE OF INSULIN (H): ICD-10-CM

## 2022-08-09 DIAGNOSIS — Z79.4 TYPE 2 DIABETES MELLITUS WITH DIABETIC NEPHROPATHY, WITH LONG-TERM CURRENT USE OF INSULIN (H): ICD-10-CM

## 2022-08-10 RX ORDER — LANCETS
EACH MISCELLANEOUS
Qty: 400 EACH | Refills: 3 | Status: SHIPPED | OUTPATIENT
Start: 2022-08-10 | End: 2023-08-28

## 2022-08-10 RX ORDER — LOSARTAN POTASSIUM 100 MG/1
TABLET ORAL
Qty: 90 TABLET | Refills: 1 | Status: SHIPPED | OUTPATIENT
Start: 2022-08-10 | End: 2023-09-28

## 2022-08-10 RX ORDER — INSULIN LISPRO 100 [IU]/ML
INJECTION, SOLUTION INTRAVENOUS; SUBCUTANEOUS
Qty: 45 ML | Refills: 3 | Status: SHIPPED | OUTPATIENT
Start: 2022-08-10 | End: 2023-10-04

## 2022-08-10 RX ORDER — ATORVASTATIN CALCIUM 40 MG/1
TABLET, FILM COATED ORAL
Qty: 90 TABLET | Refills: 1 | Status: SHIPPED | OUTPATIENT
Start: 2022-08-10 | End: 2023-09-28

## 2022-08-10 NOTE — TELEPHONE ENCOUNTER
Provider approval needed.     BP Readings from Last 3 Encounters:   07/20/22 (!) 150/70   07/11/22 110/70   06/10/22 (!) 187/81

## 2022-08-11 ENCOUNTER — MYC MEDICAL ADVICE (OUTPATIENT)
Dept: INTERNAL MEDICINE | Facility: CLINIC | Age: 72
End: 2022-08-11

## 2022-10-16 ENCOUNTER — HEALTH MAINTENANCE LETTER (OUTPATIENT)
Age: 72
End: 2022-10-16

## 2022-10-19 ENCOUNTER — TELEPHONE (OUTPATIENT)
Dept: CARDIOLOGY | Facility: CLINIC | Age: 72
End: 2022-10-19

## 2022-10-19 NOTE — TELEPHONE ENCOUNTER
[facility] Measures Blood Pressure guideline reviewed.  Patients recent blood pressure is outside of guideline parameters.  Called pt to review, no answer.  Left voicemail message asking patient to check their blood pressure using a home blood pressure cuff or by going to a [facility].  Patient instructed to then call [phone] ([place]) and leave a message with their name, date of birth, and blood pressure reading that was completed within the last 24 hours and where it was completed.  Will await call back for further review.    10/19/22 @ 11:12 COLLEEN Cody CMA

## 2022-10-20 NOTE — TELEPHONE ENCOUNTER
Patent was called by MA team per [place] community measures protocol to assess BP. BP reading was 147/77. LOV with Sharonda Ross patient had uncontrolled HTN. Amlodipine 5 mg  was added to current medication regimen of metoprolol, hydrochlorothiazide, and losartan. Monitor blood pressures twice daily with a goal of less than 140/90 was recommended.    Will route to provider for review and recommendations.  Aliya OLIVER RN  10/20/22 at 11:49 AM

## 2022-10-20 NOTE — TELEPHONE ENCOUNTER
Patient returned call and left voicemail message with update blood pressure reading.      Last Blood Pressure: 150  Last Heart Rate: 70  Date: 7/20/22  Location: [facility]    Today's Blood Pressure: 147/77  Today's Heart Rate: 53  Location: Home BP    Patient reported blood pressure updated in Epic. Blood pressure continues to fall outside of the [place] Community Measures guidelines.  Message sent to primary cardiology team for further review.

## 2022-10-27 ENCOUNTER — TRANSFERRED RECORDS (OUTPATIENT)
Dept: HEALTH INFORMATION MANAGEMENT | Facility: CLINIC | Age: 72
End: 2022-10-27

## 2022-12-03 ENCOUNTER — HEALTH MAINTENANCE LETTER (OUTPATIENT)
Age: 72
End: 2022-12-03

## 2022-12-09 ENCOUNTER — TELEPHONE (OUTPATIENT)
Dept: INTERNAL MEDICINE | Facility: CLINIC | Age: 72
End: 2022-12-09

## 2022-12-09 NOTE — TELEPHONE ENCOUNTER
Patient Quality Outreach      Summary:    Patient has the following on his problem list/HM:   Diabetes    Last A1C:  Lab Results   Component Value Date    A1C 7.1 2022    A1C 6.4 10/19/2021    A1C 7.9 2021    A1C 9.0 10/15/2020       Last LDL:    Lab Results   Component Value Date    LDL 46 10/19/2021    LDL 49 2021       Is the patient on a Statin? Yes          Is the patient on Aspirin? Yes    Medications     HMG CoA Reductase Inhibitors     atorvastatin (LIPITOR) 40 MG tablet       Salicylates     aspirin (ASA) 325 MG EC tablet             Last three blood pressure readings:  BP Readings from Last 3 Encounters:   22 (!) 150/70   22 110/70   06/10/22 (!) 18781          Tobacco Use      Smoking status: Former        Packs/day: 1.50        Years: 29.00        Pack years: 43.5        Types: Cigarettes        Start date:         Quit date: 2000        Years since quittin.9      Smokeless tobacco: Never          Hypertension   Last three blood pressure readings:  BP Readings from Last 3 Encounters:   22 (!) 150/70   22 110/70   06/10/22 (!) 18781     Blood pressure: Failed    HTN Guidelines:  ? 139/89     Patient is due/failing the following:   Diabetes -  A1C, LDL (Fasting), Eye Exam, Microalbumin, Diabetic Follow-Up Visit and Foot Exam  Hypertension -  BP check  Physical Annual Wellness Visit    Type of outreach:    Sent [facility] message.    Questions for provider review:    None                                                                                                                                     Jacque Fuller MA       Chart routed to .

## 2023-01-07 ASSESSMENT — ENCOUNTER SYMPTOMS
NERVOUS/ANXIOUS: 0
CONSTIPATION: 0
PARESTHESIAS: 0
PALPITATIONS: 0
MYALGIAS: 0
SORE THROAT: 0
CHILLS: 0
HEMATURIA: 0
SHORTNESS OF BREATH: 0
ARTHRALGIAS: 1
HEMATOCHEZIA: 0
EYE PAIN: 0
DYSURIA: 0
DIZZINESS: 0
HEARTBURN: 0
FEVER: 0
ABDOMINAL PAIN: 0
HEADACHES: 0
JOINT SWELLING: 0
NAUSEA: 0
WEAKNESS: 0
COUGH: 0
DIARRHEA: 0
FREQUENCY: 1

## 2023-01-07 ASSESSMENT — ACTIVITIES OF DAILY LIVING (ADL): CURRENT_FUNCTION: NO ASSISTANCE NEEDED

## 2023-01-13 ENCOUNTER — OFFICE VISIT (OUTPATIENT)
Dept: INTERNAL MEDICINE | Facility: CLINIC | Age: 73
End: 2023-01-13
Payer: COMMERCIAL

## 2023-01-13 VITALS
HEART RATE: 101 BPM | WEIGHT: 213.6 LBS | BODY MASS INDEX: 31.64 KG/M2 | SYSTOLIC BLOOD PRESSURE: 139 MMHG | RESPIRATION RATE: 18 BRPM | DIASTOLIC BLOOD PRESSURE: 58 MMHG | HEIGHT: 69 IN | OXYGEN SATURATION: 97 % | TEMPERATURE: 97.6 F

## 2023-01-13 DIAGNOSIS — E66.01 MORBID OBESITY (H): ICD-10-CM

## 2023-01-13 DIAGNOSIS — R79.89 LFT ELEVATION: ICD-10-CM

## 2023-01-13 DIAGNOSIS — Z13.220 SCREENING FOR HYPERLIPIDEMIA: ICD-10-CM

## 2023-01-13 DIAGNOSIS — I49.9 IRREGULAR HEART BEAT: ICD-10-CM

## 2023-01-13 DIAGNOSIS — I77.810 THORACIC AORTIC ECTASIA (H): ICD-10-CM

## 2023-01-13 DIAGNOSIS — E78.5 HYPERLIPIDEMIA LDL GOAL <70: ICD-10-CM

## 2023-01-13 DIAGNOSIS — Z23 NEED FOR PROPHYLACTIC VACCINATION AND INOCULATION AGAINST INFLUENZA: ICD-10-CM

## 2023-01-13 DIAGNOSIS — N18.31 STAGE 3A CHRONIC KIDNEY DISEASE (H): ICD-10-CM

## 2023-01-13 DIAGNOSIS — E11.21 TYPE 2 DIABETES MELLITUS WITH DIABETIC NEPHROPATHY, WITH LONG-TERM CURRENT USE OF INSULIN (H): ICD-10-CM

## 2023-01-13 DIAGNOSIS — I48.0 PAROXYSMAL ATRIAL FIBRILLATION (H): ICD-10-CM

## 2023-01-13 DIAGNOSIS — I25.10 CORONARY ARTERY DISEASE INVOLVING NATIVE CORONARY ARTERY WITHOUT ANGINA PECTORIS, UNSPECIFIED WHETHER NATIVE OR TRANSPLANTED HEART: ICD-10-CM

## 2023-01-13 DIAGNOSIS — Z23 NEED FOR VACCINATION: ICD-10-CM

## 2023-01-13 DIAGNOSIS — Z00.00 ENCOUNTER FOR PREVENTATIVE ADULT HEALTH CARE EXAMINATION: Primary | ICD-10-CM

## 2023-01-13 DIAGNOSIS — Z12.5 SCREENING FOR PROSTATE CANCER: ICD-10-CM

## 2023-01-13 DIAGNOSIS — Z79.4 TYPE 2 DIABETES MELLITUS WITH DIABETIC NEPHROPATHY, WITH LONG-TERM CURRENT USE OF INSULIN (H): ICD-10-CM

## 2023-01-13 LAB
ALBUMIN SERPL BCG-MCNC: 4.2 G/DL (ref 3.5–5.2)
ALBUMIN UR-MCNC: >=300 MG/DL
ALP SERPL-CCNC: 108 U/L (ref 40–129)
ALT SERPL W P-5'-P-CCNC: 51 U/L (ref 10–50)
ANION GAP SERPL CALCULATED.3IONS-SCNC: 15 MMOL/L (ref 7–15)
APPEARANCE UR: CLEAR
AST SERPL W P-5'-P-CCNC: 57 U/L (ref 10–50)
BILIRUB SERPL-MCNC: 0.5 MG/DL
BILIRUB UR QL STRIP: NEGATIVE
BUN SERPL-MCNC: 30.3 MG/DL (ref 8–23)
CALCIUM SERPL-MCNC: 10.4 MG/DL (ref 8.8–10.2)
CHLORIDE SERPL-SCNC: 98 MMOL/L (ref 98–107)
CHOLEST SERPL-MCNC: 168 MG/DL
COLOR UR AUTO: YELLOW
CREAT SERPL-MCNC: 1.2 MG/DL (ref 0.67–1.17)
DEPRECATED HCO3 PLAS-SCNC: 22 MMOL/L (ref 22–29)
ERYTHROCYTE [DISTWIDTH] IN BLOOD BY AUTOMATED COUNT: 12.8 % (ref 10–15)
GFR SERPL CREATININE-BSD FRML MDRD: 65 ML/MIN/1.73M2
GLUCOSE SERPL-MCNC: 122 MG/DL (ref 70–99)
GLUCOSE UR STRIP-MCNC: 100 MG/DL
HBA1C MFR BLD: 8 % (ref 0–5.6)
HCT VFR BLD AUTO: 44.9 % (ref 40–53)
HDLC SERPL-MCNC: 74 MG/DL
HGB BLD-MCNC: 15.2 G/DL (ref 13.3–17.7)
HGB UR QL STRIP: ABNORMAL
KETONES UR STRIP-MCNC: NEGATIVE MG/DL
LDLC SERPL CALC-MCNC: 64 MG/DL
LEUKOCYTE ESTERASE UR QL STRIP: NEGATIVE
MCH RBC QN AUTO: 35 PG (ref 26.5–33)
MCHC RBC AUTO-ENTMCNC: 33.9 G/DL (ref 31.5–36.5)
MCV RBC AUTO: 104 FL (ref 78–100)
NITRATE UR QL: NEGATIVE
NONHDLC SERPL-MCNC: 94 MG/DL
PH UR STRIP: 5.5 [PH] (ref 5–7)
PLATELET # BLD AUTO: 210 10E3/UL (ref 150–450)
POTASSIUM SERPL-SCNC: 4.9 MMOL/L (ref 3.4–5.3)
PROT SERPL-MCNC: 7.3 G/DL (ref 6.4–8.3)
PSA SERPL-MCNC: 1.22 NG/ML (ref 0–6.5)
RBC # BLD AUTO: 4.34 10E6/UL (ref 4.4–5.9)
RBC #/AREA URNS AUTO: NORMAL /HPF
SODIUM SERPL-SCNC: 135 MMOL/L (ref 136–145)
SP GR UR STRIP: 1.02 (ref 1–1.03)
TRIGL SERPL-MCNC: 152 MG/DL
TSH SERPL DL<=0.005 MIU/L-ACNC: 1.59 UIU/ML (ref 0.3–4.2)
UROBILINOGEN UR STRIP-ACNC: 0.2 E.U./DL
WBC # BLD AUTO: 6.1 10E3/UL (ref 4–11)
WBC #/AREA URNS AUTO: NORMAL /HPF

## 2023-01-13 PROCEDURE — 81001 URINALYSIS AUTO W/SCOPE: CPT | Performed by: INTERNAL MEDICINE

## 2023-01-13 PROCEDURE — 83036 HEMOGLOBIN GLYCOSYLATED A1C: CPT | Performed by: INTERNAL MEDICINE

## 2023-01-13 PROCEDURE — G0402 INITIAL PREVENTIVE EXAM: HCPCS | Performed by: INTERNAL MEDICINE

## 2023-01-13 PROCEDURE — 80061 LIPID PANEL: CPT | Performed by: INTERNAL MEDICINE

## 2023-01-13 PROCEDURE — 36415 COLL VENOUS BLD VENIPUNCTURE: CPT | Performed by: INTERNAL MEDICINE

## 2023-01-13 PROCEDURE — 99213 OFFICE O/P EST LOW 20 MIN: CPT | Mod: 25 | Performed by: INTERNAL MEDICINE

## 2023-01-13 PROCEDURE — 90662 IIV NO PRSV INCREASED AG IM: CPT | Performed by: INTERNAL MEDICINE

## 2023-01-13 PROCEDURE — 90472 IMMUNIZATION ADMIN EACH ADD: CPT | Performed by: INTERNAL MEDICINE

## 2023-01-13 PROCEDURE — G0403 EKG FOR INITIAL PREVENT EXAM: HCPCS | Performed by: INTERNAL MEDICINE

## 2023-01-13 PROCEDURE — 85027 COMPLETE CBC AUTOMATED: CPT | Performed by: INTERNAL MEDICINE

## 2023-01-13 PROCEDURE — 90714 TD VACC NO PRESV 7 YRS+ IM: CPT | Performed by: INTERNAL MEDICINE

## 2023-01-13 PROCEDURE — G0103 PSA SCREENING: HCPCS | Performed by: INTERNAL MEDICINE

## 2023-01-13 PROCEDURE — 80053 COMPREHEN METABOLIC PANEL: CPT | Performed by: INTERNAL MEDICINE

## 2023-01-13 PROCEDURE — 84443 ASSAY THYROID STIM HORMONE: CPT | Performed by: INTERNAL MEDICINE

## 2023-01-13 PROCEDURE — G0008 ADMIN INFLUENZA VIRUS VAC: HCPCS | Performed by: INTERNAL MEDICINE

## 2023-01-13 RX ORDER — ATORVASTATIN CALCIUM 40 MG/1
40 TABLET, FILM COATED ORAL DAILY
Qty: 90 TABLET | Refills: 1 | Status: CANCELLED | OUTPATIENT
Start: 2023-01-13

## 2023-01-13 ASSESSMENT — ENCOUNTER SYMPTOMS
CONSTIPATION: 0
HEADACHES: 0
SORE THROAT: 0
FEVER: 0
DIZZINESS: 0
SHORTNESS OF BREATH: 0
PALPITATIONS: 0
PARESTHESIAS: 0
HEARTBURN: 0
NERVOUS/ANXIOUS: 0
HEMATOCHEZIA: 0
JOINT SWELLING: 0
EYE PAIN: 0
MYALGIAS: 0
DIARRHEA: 0
ABDOMINAL PAIN: 0
NAUSEA: 0
ARTHRALGIAS: 1
WEAKNESS: 0
COUGH: 0
CHILLS: 0
DYSURIA: 0
FREQUENCY: 1
HEMATURIA: 0

## 2023-01-13 ASSESSMENT — ACTIVITIES OF DAILY LIVING (ADL): CURRENT_FUNCTION: NO ASSISTANCE NEEDED

## 2023-01-13 ASSESSMENT — PAIN SCALES - GENERAL: PAINLEVEL: MILD PAIN (2)

## 2023-01-13 NOTE — NURSING NOTE
Prior to immunization administration, verified patients identity using patient s name and date of birth. Please see Immunization Activity for additional information.     Screening Questionnaire for Adult Immunization    Are you sick today?   No   Do you have allergies to medications, food, a vaccine component or latex?   No   Have you ever had a serious reaction after receiving a vaccination?   No   Do you have a long-term health problem with heart, lung, kidney, or metabolic disease (e.g., diabetes), asthma, a blood disorder, no spleen, complement component deficiency, a cochlear implant, or a spinal fluid leak?  Are you on long-term aspirin therapy?   yes   Do you have cancer, leukemia, HIV/AIDS, or any other immune system problem?   No   Do you have a parent, brother, or sister with an immune system problem?   No   In the past 3 months, have you taken medications that affect  your immune system, such as prednisone, other steroids, or anticancer drugs; drugs for the treatment of rheumatoid arthritis, Crohn s disease, or psoriasis; or have you had radiation treatments?   No   Have you had a seizure, or a brain or other nervous system problem?   No   During the past year, have you received a transfusion of blood or blood    products, or been given immune (gamma) globulin or antiviral drug?   No   For women: Are you pregnant or is there a chance you could become       pregnant during the next month?   No   Have you received any vaccinations in the past 4 weeks?   No     Immunization questionnaire was positive for at least one answer.  Notified provider.        Per orders of Dr. Galloway, injection of Td given by Jacque Rock CMA. Patient instructed to remain in clinic for 15 minutes afterwards, and to report any adverse reaction to me immediately.       Screening performed by Jacque Rock CMA on 1/13/2023 at 11:28 AM.

## 2023-01-13 NOTE — LETTER
January 16, 2023      Abraham Ruffin  [address] [place] [address]        Dear ,    We are writing to inform you of your test results. Your labs show slightly decreased kidney function and borderline elevated liver enzymes.   Try to keep hydrated, avoid use of NSAIDs, recheck in 1 month CMP.   Worsened diabetic control. Try to improve diet and exercise.   Rest of the results are normal.       Resulted Orders   Lipid panel reflex to direct LDL Non-fasting   Result Value Ref Range    Cholesterol 168 <200 mg/dL    Triglycerides 152 (H) <150 mg/dL    Direct Measure HDL 74 >=40 mg/dL    LDL Cholesterol Calculated 64 <=100 mg/dL    Non HDL Cholesterol 94 <130 mg/dL    Narrative    Cholesterol  Desirable:  <200 mg/dL    Triglycerides  Normal:  Less than 150 mg/dL  Borderline High:  150-199 mg/dL  High:  200-499 mg/dL  Very High:  Greater than or equal to 500 mg/dL    Direct Measure HDL  Female:  Greater than or equal to 50 mg/dL   Male:  Greater than or equal to 40 mg/dL    LDL Cholesterol  Desirable:  <100mg/dL  Above Desirable:  100-129 mg/dL   Borderline High:  130-159 mg/dL   High:  160-189 mg/dL   Very High:  >= 190 mg/dL    Non HDL Cholesterol  Desirable:  130 mg/dL  Above Desirable:  130-159 mg/dL  Borderline High:  160-189 mg/dL  High:  190-219 mg/dL  Very High:  Greater than or equal to 220 mg/dL   CBC with Platelets   Result Value Ref Range    WBC Count 6.1 4.0 - 11.0 10e3/uL    RBC Count 4.34 (L) 4.40 - 5.90 10e6/uL    Hemoglobin 15.2 13.3 - 17.7 g/dL    Hematocrit 44.9 40.0 - 53.0 %     (H) 78 - 100 fL    MCH 35.0 (H) 26.5 - 33.0 pg    MCHC 33.9 31.5 - 36.5 g/dL    RDW 12.8 10.0 - 15.0 %    Platelet Count 210 150 - 450 10e3/uL   HEMOGLOBIN A1C   Result Value Ref Range    Hemoglobin A1C 8.0 (H) 0.0 - 5.6 %   Comprehensive metabolic panel (BMP + Alb, Alk Phos, ALT, AST, Total. Bili, TP)   Result Value Ref Range    Sodium 135 (L) 136 - 145 mmol/L    Potassium 4.9 3.4 - 5.3 mmol/L     Chloride 98 98 - 107 mmol/L    Carbon Dioxide (CO2) 22 22 - 29 mmol/L    Anion Gap 15 7 - 15 mmol/L    Urea Nitrogen 30.3 (H) 8.0 - 23.0 mg/dL    Creatinine 1.20 (H) 0.67 - 1.17 mg/dL    Calcium 10.4 (H) 8.8 - 10.2 mg/dL    Glucose 122 (H) 70 - 99 mg/dL    Alkaline Phosphatase 108 40 - 129 U/L    AST 57 (H) 10 - 50 U/L    ALT 51 (H) 10 - 50 U/L    Protein Total 7.3 6.4 - 8.3 g/dL    Albumin 4.2 3.5 - 5.2 g/dL    Bilirubin Total 0.5 <=1.2 mg/dL    GFR Estimate 65 >60 mL/min/1.73m2      Comment:      Effective December 21, 2021 eGFRcr in adults is calculated using the 2021 CKD-EPI creatinine equation which includes age and gender (Antwan et al., NE, DOI: 10.1056/KXOWso0603767)   TSH with free T4 reflex   Result Value Ref Range    TSH 1.59 0.30 - 4.20 uIU/mL   PSA, screen   Result Value Ref Range    Prostate Specific Antigen Screen 1.22 0.00 - 6.50 ng/mL    Narrative    This result is obtained using the Roche Elecsys total PSA method on the barber e801 immunoassay analyzer. Results obtained with different assay methods or kits cannot be used interchangeably.   UA with Microscopic reflex to Culture - lab collect   Result Value Ref Range    Color Urine Yellow Colorless, Straw, Light Yellow, Yellow    Appearance Urine Clear Clear    Glucose Urine 100 (A) Negative mg/dL    Bilirubin Urine Negative Negative    Ketones Urine Negative Negative mg/dL    Specific Gravity Urine 1.020 1.003 - 1.035    Blood Urine Trace (A) Negative    pH Urine 5.5 5.0 - 7.0    Protein Albumin Urine >=300 (A) Negative mg/dL    Urobilinogen Urine 0.2 0.2, 1.0 E.U./dL    Nitrite Urine Negative Negative    Leukocyte Esterase Urine Negative Negative   Urine Microscopic   Result Value Ref Range    RBC Urine 0-2 0-2 /HPF /HPF    WBC Urine 0-5 0-5 /HPF /HPF    Narrative    Urine Culture not indicated       If you have any questions or concerns, please call the clinic at the number listed above.       Sincerely,      Lauro Galloway MD        radha

## 2023-01-13 NOTE — PROGRESS NOTES
"SUBJECTIVE:   Abraham is a 72 year old who presents for Preventive Visit.    Patient has been advised of split billing requirements and indicates understanding: Yes  Are you in the first 12 months of your Medicare coverage?  Yes,  Visual Acuity:  Right Eye: 20/25   Left Eye: 20/25  Both Eyes: 20/20    Healthy Habits:     In general, how would you rate your overall health?  Good    Frequency of exercise:  1 day/week    Do you usually eat at least 4 servings of fruit and vegetables a day, include whole grains    & fiber and avoid regularly eating high fat or \"junk\" foods?  No    Taking medications regularly:  Yes    Medication side effects:  None    Ability to successfully perform activities of daily living:  No assistance needed    Home Safety:  No safety concerns identified    Hearing Impairment:  No hearing concerns    In the past 6 months, have you been bothered by leaking of urine?  No    In general, how would you rate your overall mental or emotional health?  Good      PHQ-2 Total Score: 0    Additional concerns today:  Yes      Have you ever done Advance Care Planning? (For example, a Health Directive, POLST, or a discussion with a medical provider or your loved ones about your wishes): No, advance care planning information given to patient to review.  Advanced care planning was discussed at today's visit.       Fall risk  Fallen 2 or more times in the past year?: No  Any fall with injury in the past year?: No    Cognitive Screening   1) Repeat 3 items (Leader, Season, Table)    2) Clock draw: NORMAL  3) 3 item recall: Recalls 2 objects   Results: NORMAL clock, 1-2 items recalled: COGNITIVE IMPAIRMENT LESS LIKELY    Mini-CogTM Copyright DAVIS Willis. Licensed by the author for use in [facility]; reprinted with permission (suellen@.[facility]). All rights reserved.      Do you have sleep apnea, excessive snoring or daytime drowsiness?: yes    Reviewed and updated as needed this visit by clinical staff   Tobacco  " Allergies  Meds  Problems  Med Hx  Surg Hx  Fam Hx          Reviewed and updated as needed this visit by Provider                 Social History     Tobacco Use     Smoking status: Former     Packs/day: 1.50     Years: 29.00     Pack years: 43.50     Types: Cigarettes     Start date:      Quit date: 2000     Years since quittin.0     Smokeless tobacco: Never   Substance Use Topics     Alcohol use: Yes     Alcohol/week: 0.0 standard drinks     Comment: 1 mixed drink a night         Alcohol Use 2023   Prescreen: >3 drinks/day or >7 drinks/week? No   Prescreen: >3 drinks/day or >7 drinks/week? -           PROBLEMS TO ADD ON...  Has h/o HTN. on medical treatment. BP has been controlled. No side effects from medications. No CP, HA, dizziness. good compliance with medications and low salt diet.  Has h/o ischemic heart disease, asymptomatic regarding chest pains, SOB,palpitations. Has good compliance with treatment, diet and exercise.  Has H/O hyperlipidemia. On medical treatment and diet. No side effects. No muscle weakness, myalgias or upset stomach.   Has H/O DM. On diet , exercise and oral treatment. Blood sugars are controlled. No parestesias. No hypoglycemias.      Current providers sharing in care for this patient include:   Patient Care Team:  Lauro Galloway MD as PCP - General (Internal Medicine)  Lauro Galloway MD as Assigned PCP  Bhargavi Alford [facility] as Pharmacist (Pharmacist)  Sharonda Ross APRN CNP as Assigned Heart and Vascular Provider    The following health maintenance items are reviewed in Epic and correct as of today:  Health Maintenance   Topic Date Due     HF ACTION PLAN  Never done     DIABETIC FOOT EXAM  2018     DTAP/TDAP/TD IMMUNIZATION (2 - Td or Tdap) 2020     EYE EXAM  2021     COVID-19 Vaccine (4 - Booster for Pfizer series) 2021     BMP  2022     INFLUENZA VACCINE (1) 2022     ALT  10/19/2022     LIPID  10/19/2022  "    MICROALBUMIN  10/19/2022     CBC  10/19/2022     A1C  12/01/2022     HEMOGLOBIN  10/19/2022     ANNUAL REVIEW OF HM ORDERS  07/11/2023     MEDICARE ANNUAL WELLNESS VISIT  01/13/2024     FALL RISK ASSESSMENT  01/13/2024     ADVANCE CARE PLANNING  01/13/2028     COLORECTAL CANCER SCREENING  01/06/2030     TSH W/FREE T4 REFLEX  Completed     HEPATITIS C SCREENING  Completed     PHQ-2 (once per calendar year)  Completed     Pneumococcal Vaccine: 65+ Years  Completed     URINALYSIS  Completed     ZOSTER IMMUNIZATION  Completed     AORTIC ANEURYSM SCREENING (SYSTEM ASSIGNED)  Completed     IPV IMMUNIZATION  Aged Out     MENINGITIS IMMUNIZATION  Aged Out     Lab work is in process  Labs reviewed in EPIC          Review of Systems   Constitutional: Negative for chills and fever.   HENT: Negative for congestion, ear pain, hearing loss and sore throat.    Eyes: Negative for pain and visual disturbance.   Respiratory: Negative for cough and shortness of breath.    Cardiovascular: Negative for chest pain, palpitations and peripheral edema.   Gastrointestinal: Negative for abdominal pain, constipation, diarrhea, heartburn, hematochezia and nausea.   Genitourinary: Positive for frequency and impotence. Negative for dysuria, genital sores, hematuria, penile discharge and urgency.   Musculoskeletal: Positive for arthralgias. Negative for joint swelling and myalgias.   Skin: Negative for rash.   Neurological: Negative for dizziness, weakness, headaches and paresthesias.   Psychiatric/Behavioral: Negative for mood changes. The patient is not nervous/anxious.          OBJECTIVE:   /58 (BP Location: Left arm, Cuff Size: Adult Regular)   Pulse 101   Temp 97.6  F (36.4  C) (Tympanic)   Resp 18   Ht 1.753 m (5' 9\")   Wt 96.9 kg (213 lb 9.6 oz)   SpO2 97%   BMI 31.54 kg/m   Estimated body mass index is 31.54 kg/m  as calculated from the following:    Height as of this encounter: 1.753 m (5' 9\").    Weight as of this " encounter: 96.9 kg (213 lb 9.6 oz).  Physical Exam  GENERAL: healthy, alert and no distress, overweight  EYES: Eyes grossly normal to inspection, PERRL and conjunctivae and sclerae normal  HENT: ear canals and TM's normal, nose and mouth without ulcers or lesions  NECK: no adenopathy, no asymmetry, masses, or scars and thyroid normal to palpation  RESP: lungs clear to auscultation - no rales, rhonchi or wheezes  CV: irregular rate and rhythm, normal S1 S2, no S3 or S4, no murmur, click or rub, no peripheral edema and peripheral pulses strong  ABDOMEN: soft, nontender, no hepatosplenomegaly, no masses and bowel sounds normal  MS: no gross musculoskeletal defects noted, no edema  SKIN: no suspicious lesions or rashes  NEURO: Normal strength and tone, mentation intact and speech normal  PSYCH: mentation appears normal, affect normal/bright    Diagnostic Test Results:  Labs reviewed in Epic    ASSESSMENT / PLAN:       ICD-10-CM    1. Encounter for preventative adult health care examination  Z00.00 Comprehensive metabolic panel (BMP + Alb, Alk Phos, ALT, AST, Total. Bili, TP)     TSH with free T4 reflex     PSA, screen     UA with Microscopic reflex to Culture - lab collect     EKG 12-lead complete w/read - Clinics      2. Type 2 diabetes mellitus with diabetic nephropathy, with long-term current use of insulin (H)  E11.21 HEMOGLOBIN A1C    Z79.4 Comprehensive metabolic panel (BMP + Alb, Alk Phos, ALT, AST, Total. Bili, TP)     UA with Microscopic reflex to Culture - lab collect     OFFICE/OUTPT VISIT,EST,LEVL III      3. Hyperlipidemia LDL goal <70  E78.5 Comprehensive metabolic panel (BMP + Alb, Alk Phos, ALT, AST, Total. Bili, TP)     OFFICE/OUTPT VISIT,EST,LEVL III      4. Screening for hyperlipidemia  Z13.220 Lipid panel reflex to direct LDL Non-fasting      5. Coronary artery disease involving native coronary artery without angina pectoris, unspecified whether native or transplanted heart  I25.10 Lipid panel reflex  "to direct LDL Non-fasting     OFFICE/OUTPT VISIT,EST,LEVL III      6. Stage 3a chronic kidney disease (H)  N18.31 CBC with Platelets     OFFICE/OUTPT VISIT,EST,LEVL III      7. Irregular heart beat  I49.9 TSH with free T4 reflex     EKG 12-lead complete w/read - Clinics     OFFICE/OUTPT VISIT,EST,LEVL III      8. Screening for prostate cancer  Z12.5 PSA, screen          Assess lab work   Cont treatment  Monitor renal function and DM  Assess EKG for recurrent A fib     Patient has been advised of split billing requirements and indicates understanding: Yes      COUNSELING:  Reviewed preventive health counseling, as reflected in patient instructions       Regular exercise       Healthy diet/nutrition       Vision screening       Hearing screening       Colon cancer screening       Prostate cancer screening      BMI:   Estimated body mass index is 31.54 kg/m  as calculated from the following:    Height as of this encounter: 1.753 m (5' 9\").    Weight as of this encounter: 96.9 kg (213 lb 9.6 oz).   Weight management plan: Discussed healthy diet and exercise guidelines      He reports that he quit smoking about 23 years ago. His smoking use included cigarettes. He started smoking about 52 years ago. He has a 43.50 pack-year smoking history. He has never used smokeless tobacco.      Appropriate preventive services were discussed with this patient, including applicable screening as appropriate for cardiovascular disease, diabetes, osteopenia/osteoporosis, and glaucoma.  As appropriate for age/gender, discussed screening for colorectal cancer, prostate cancer, breast cancer, and cervical cancer. Checklist reviewing preventive services available has been given to the patient.    Reviewed patients plan of care and provided an AVS. The Intermediate Care Plan ( asthma action plan, low back pain action plan, and migraine action plan) for Adan meets the Care Plan requirement. This Care Plan has been established and reviewed " with the Patient.          Lauro Galloway MD  [facility]    Identified Health Risks:

## 2023-02-07 ENCOUNTER — OFFICE VISIT (OUTPATIENT)
Dept: INTERNAL MEDICINE | Facility: CLINIC | Age: 73
End: 2023-02-07
Payer: COMMERCIAL

## 2023-02-07 VITALS
DIASTOLIC BLOOD PRESSURE: 62 MMHG | WEIGHT: 199 LBS | HEIGHT: 68 IN | OXYGEN SATURATION: 94 % | BODY MASS INDEX: 30.16 KG/M2 | TEMPERATURE: 98.2 F | RESPIRATION RATE: 24 BRPM | SYSTOLIC BLOOD PRESSURE: 104 MMHG | HEART RATE: 51 BPM

## 2023-02-07 DIAGNOSIS — Z79.4 TYPE 2 DIABETES MELLITUS WITH DIABETIC NEPHROPATHY, WITH LONG-TERM CURRENT USE OF INSULIN (H): ICD-10-CM

## 2023-02-07 DIAGNOSIS — E11.21 TYPE 2 DIABETES MELLITUS WITH DIABETIC NEPHROPATHY, WITH LONG-TERM CURRENT USE OF INSULIN (H): ICD-10-CM

## 2023-02-07 DIAGNOSIS — Z23 NEED FOR PNEUMOCOCCAL VACCINATION: ICD-10-CM

## 2023-02-07 DIAGNOSIS — J98.8 RESPIRATORY INFECTION: Primary | ICD-10-CM

## 2023-02-07 LAB
FLUAV AG SPEC QL IA: NEGATIVE
FLUBV AG SPEC QL IA: NEGATIVE

## 2023-02-07 PROCEDURE — 99214 OFFICE O/P EST MOD 30 MIN: CPT | Mod: CS | Performed by: FAMILY MEDICINE

## 2023-02-07 PROCEDURE — 87804 INFLUENZA ASSAY W/OPTIC: CPT | Performed by: FAMILY MEDICINE

## 2023-02-07 PROCEDURE — U0005 INFEC AGEN DETEC AMPLI PROBE: HCPCS | Performed by: FAMILY MEDICINE

## 2023-02-07 PROCEDURE — U0003 INFECTIOUS AGENT DETECTION BY NUCLEIC ACID (DNA OR RNA); SEVERE ACUTE RESPIRATORY SYNDROME CORONAVIRUS 2 (SARS-COV-2) (CORONAVIRUS DISEASE [COVID-19]), AMPLIFIED PROBE TECHNIQUE, MAKING USE OF HIGH THROUGHPUT TECHNOLOGIES AS DESCRIBED BY CMS-2020-01-R: HCPCS | Performed by: FAMILY MEDICINE

## 2023-02-07 RX ORDER — AZITHROMYCIN 250 MG/1
TABLET, FILM COATED ORAL
Qty: 6 TABLET | Refills: 0 | Status: SHIPPED | OUTPATIENT
Start: 2023-02-07 | End: 2023-02-12

## 2023-02-07 NOTE — PROGRESS NOTES
"    (J98.8) Respiratory infection  (primary encounter diagnosis)  Comment:   Respiratory illness with symptoms as noted.  Question of COVID or influenza.  He is probably out of the realm of contagion by this time.  Also has evidence of upper respiratory infection so we will begin antibiotic.  No sign of respiratory compromise.  Plan: Symptomatic COVID-19 Virus (Coronavirus) by PCR        Nose, Influenza A & B Antigen - Clinic Collect,        azithromycin (ZITHROMAX) 250 MG tablet            (Z23) Need for pneumococcal vaccination  Comment:   Went over his immunizations.  See below.  Plan:   Return when feeling better.  Nurse only.  He would be due for pneumonia vaccination and COVID booster.      (E11.21,  Z79.4) Type 2 diabetes mellitus with diabetic nephropathy, with long-term current use of insulin (H)  Comment:   Recent A1c in January was 8.0.  Plan:   He will follow-up with primary.      CHIEF COMPLAINT    Respiratory congestion.  Tiredness.      HISTORY    He was in [place] and developed some illness.  Onset was about 5 days ago.  Traveled by airplane.    Symptoms were cough and at times he felt a little confused.  He had sore throat.  He had some achiness.  Did not notice fever.  Cough is basically nonproductive.    He is influenza and COVID vaccinated.    He had primary care checkup back in January 2023.  He was told he was due for some immunizations.  He does not remember the exact ones.  I would prefer him to come back when he is feeling better.      REVIEW OF SYSTEMS    No high fever.  No headache.  No wheezing or short of breath.  No chest pain.  No vomiting or diarrhea.  No edema.      EXAM  /62   Pulse 51   Temp 98.2  F (36.8  C) (Tympanic)   Resp 24   Ht 1.721 m (5' 7.75\")   Wt 90.3 kg (199 lb)   SpO2 94%   BMI 30.48 kg/m      NAD.  Sclera nonicteric.  Posterior pharynx not inflamed but there is some yellowish upper respiratory drainage.  No cervical adenopathy or mass.  Lungs are clear.  " Moving air well.  Cardiac RSR with occasional premature, normal rate, no murmur.  Extremities without edema.

## 2023-02-07 NOTE — PATIENT INSTRUCTIONS
Take prescribed medication as directed.    Rest and keep up fluids.      Make Nurse only appointment in 2-4 weeks    Pneumonia vaccine and Covid booster.

## 2023-02-08 LAB — SARS-COV-2 RNA RESP QL NAA+PROBE: NEGATIVE

## 2023-03-16 ENCOUNTER — LAB (OUTPATIENT)
Dept: LAB | Facility: CLINIC | Age: 73
End: 2023-03-16
Payer: COMMERCIAL

## 2023-03-16 DIAGNOSIS — R79.89 LFT ELEVATION: ICD-10-CM

## 2023-03-16 DIAGNOSIS — N18.31 STAGE 3A CHRONIC KIDNEY DISEASE (H): ICD-10-CM

## 2023-03-16 DIAGNOSIS — E11.21 TYPE 2 DIABETES MELLITUS WITH DIABETIC NEPHROPATHY, WITH LONG-TERM CURRENT USE OF INSULIN (H): ICD-10-CM

## 2023-03-16 DIAGNOSIS — Z79.4 TYPE 2 DIABETES MELLITUS WITH DIABETIC NEPHROPATHY, WITH LONG-TERM CURRENT USE OF INSULIN (H): ICD-10-CM

## 2023-03-16 LAB
ALBUMIN SERPL BCG-MCNC: 4.4 G/DL (ref 3.5–5.2)
ALP SERPL-CCNC: 108 U/L (ref 40–129)
ALT SERPL W P-5'-P-CCNC: 27 U/L (ref 10–50)
ANION GAP SERPL CALCULATED.3IONS-SCNC: 13 MMOL/L (ref 7–15)
AST SERPL W P-5'-P-CCNC: 53 U/L (ref 10–50)
BILIRUB SERPL-MCNC: 0.7 MG/DL
BUN SERPL-MCNC: 17.2 MG/DL (ref 8–23)
CALCIUM SERPL-MCNC: 10.4 MG/DL (ref 8.8–10.2)
CHLORIDE SERPL-SCNC: 94 MMOL/L (ref 98–107)
CREAT SERPL-MCNC: 0.71 MG/DL (ref 0.67–1.17)
CREAT UR-MCNC: 112 MG/DL
DEPRECATED HCO3 PLAS-SCNC: 26 MMOL/L (ref 22–29)
GFR SERPL CREATININE-BSD FRML MDRD: >90 ML/MIN/1.73M2
GLUCOSE SERPL-MCNC: 203 MG/DL (ref 70–99)
MICROALBUMIN UR-MCNC: 1896 MG/L
MICROALBUMIN/CREAT UR: 1692.86 MG/G CR (ref 0–17)
POTASSIUM SERPL-SCNC: 4.3 MMOL/L (ref 3.4–5.3)
PROT SERPL-MCNC: 7.8 G/DL (ref 6.4–8.3)
SODIUM SERPL-SCNC: 133 MMOL/L (ref 136–145)

## 2023-03-16 PROCEDURE — 82570 ASSAY OF URINE CREATININE: CPT

## 2023-03-16 PROCEDURE — 82043 UR ALBUMIN QUANTITATIVE: CPT

## 2023-03-16 PROCEDURE — 36415 COLL VENOUS BLD VENIPUNCTURE: CPT

## 2023-03-16 PROCEDURE — 80053 COMPREHEN METABOLIC PANEL: CPT

## 2023-03-16 NOTE — LETTER
March 17, 2023      Abraham LUI Laly  [address] [place] [address]        Dear ,    We are writing to inform you of your test results.    Your labs show elevated albumin in the urine. Improved liver tests and kidney function.   I recommend to assess blood and urine protein fractions.   Try to keep good diabetic diet and exercise.       Resulted Orders   Comprehensive metabolic panel (BMP + Alb, Alk Phos, ALT, AST, Total. Bili, TP)   Result Value Ref Range    Sodium 133 (L) 136 - 145 mmol/L    Potassium 4.3 3.4 - 5.3 mmol/L    Chloride 94 (L) 98 - 107 mmol/L    Carbon Dioxide (CO2) 26 22 - 29 mmol/L    Anion Gap 13 7 - 15 mmol/L    Urea Nitrogen 17.2 8.0 - 23.0 mg/dL    Creatinine 0.71 0.67 - 1.17 mg/dL    Calcium 10.4 (H) 8.8 - 10.2 mg/dL    Glucose 203 (H) 70 - 99 mg/dL    Alkaline Phosphatase 108 40 - 129 U/L    AST 53 (H) 10 - 50 U/L    ALT 27 10 - 50 U/L    Protein Total 7.8 6.4 - 8.3 g/dL    Albumin 4.4 3.5 - 5.2 g/dL    Bilirubin Total 0.7 <=1.2 mg/dL    GFR Estimate >90 >60 mL/min/1.73m2      Comment:      eGFR calculated using 2021 CKD-EPI equation.   Albumin Random Urine Quantitative with Creat Ratio   Result Value Ref Range    Creatinine Urine mg/dL 112.0 mg/dL      Comment:      The reference ranges have not been established in urine creatinine. The results should be integrated into the clinical context for interpretation.    Albumin Urine mg/L 1,896.0 mg/L      Comment:      The reference ranges have not been established in urine albumin. The results should be integrated into the clinical context for interpretation.    Albumin Urine mg/g Cr 1,692.86 (H) 0.00 - 17.00 mg/g Cr      Comment:      Microalbuminuria is defined as an albumin:creatinine ratio of 17 to 299 for males and 25 to 299 for females. A ratio of albumin:creatinine of 300 or higher is indicative of overt proteinuria.  Due to biologic variability, positive results should be confirmed by a second, first-morning random or  24-hour timed urine specimen. If there is discrepancy, a third specimen is recommended. When 2 out of 3 results are in the microalbuminuria range, this is evidence for incipient nephropathy and warrants increased efforts at glucose control, blood pressure control, and institution of therapy with an angiotensin-converting-enzyme (ACE) inhibitor (if the patient can tolerate it).         If you have any questions or concerns, please call the clinic at the number listed above.       Sincerely,      Lauro Galloway MD        radha

## 2023-04-05 DIAGNOSIS — Z79.4 TYPE 2 DIABETES MELLITUS WITH DIABETIC NEPHROPATHY, WITH LONG-TERM CURRENT USE OF INSULIN (H): ICD-10-CM

## 2023-04-05 DIAGNOSIS — E11.21 TYPE 2 DIABETES MELLITUS WITH DIABETIC NEPHROPATHY, WITH LONG-TERM CURRENT USE OF INSULIN (H): ICD-10-CM

## 2023-07-05 ENCOUNTER — OFFICE VISIT (OUTPATIENT)
Dept: CARDIOLOGY | Facility: CLINIC | Age: 73
End: 2023-07-05
Attending: NURSE PRACTITIONER
Payer: COMMERCIAL

## 2023-07-05 VITALS
HEART RATE: 67 BPM | WEIGHT: 208 LBS | DIASTOLIC BLOOD PRESSURE: 64 MMHG | OXYGEN SATURATION: 96 % | HEIGHT: 68 IN | BODY MASS INDEX: 31.52 KG/M2 | SYSTOLIC BLOOD PRESSURE: 134 MMHG

## 2023-07-05 DIAGNOSIS — I25.10 CORONARY ARTERY DISEASE INVOLVING NATIVE CORONARY ARTERY OF NATIVE HEART WITHOUT ANGINA PECTORIS: Primary | ICD-10-CM

## 2023-07-05 DIAGNOSIS — Z79.4 TYPE 2 DIABETES MELLITUS WITH DIABETIC NEPHROPATHY, WITH LONG-TERM CURRENT USE OF INSULIN (H): ICD-10-CM

## 2023-07-05 DIAGNOSIS — E78.5 HYPERLIPIDEMIA LDL GOAL <70: ICD-10-CM

## 2023-07-05 DIAGNOSIS — E11.21 TYPE 2 DIABETES MELLITUS WITH DIABETIC NEPHROPATHY, WITH LONG-TERM CURRENT USE OF INSULIN (H): ICD-10-CM

## 2023-07-05 DIAGNOSIS — I77.819 AORTIC DILATATION (H): ICD-10-CM

## 2023-07-05 DIAGNOSIS — I10 ESSENTIAL HYPERTENSION: ICD-10-CM

## 2023-07-05 PROCEDURE — 99214 OFFICE O/P EST MOD 30 MIN: CPT | Performed by: NURSE PRACTITIONER

## 2023-07-05 NOTE — PROGRESS NOTES
Cardiology Clinic Progress Note  Adan Ruffin MRN# [id number]   YOB: 1950 Age: 71 year old     Reason For Visit:  Annual follow-up   Primary Cardiologist:   Dr. Rosas          History of Presenting Illness:      Adan Ruffin is a pleasant 72 year old patient who carries a past medical history significant for coronary artery disease status post CABG x3 in 2015 using a LIMA to the LAD, vein graft to the circumflex and vein graft to the PDA, atrial flutter ablation in 2017, hypertension, hyperlipidemia, and type 2 diabetes.    He returns to the office today for an annual follow-up. He offers no cardiac complaint, denies chest pain, shortness of breath, PND, orthopnea, presyncope, syncope, edema, heart racing, or palpitations.  Over the last year, he has made a significant changes in his diet, primarily keto.  He is down approximately 40 pounds over the last 18 months.      His last cardiac work-up consisted of an echocardiogram in 2022 showing a normal ejection fraction estimated at 60 to 65%, normal RV size and function, mild to moderate LVH, and a mildly dilated aortic root measuring 4.1 cm.  Last nuclear stress test in 2019 was negative for ischemia.    Blood pressure is well controlled at 134/64, managed on amlodipine, hydrochlorothiazide, losartan, metoprolol.  Last BMP showed a sodium of 133, potassium 4.3, BUN 17.2, creatinine 0.71, and GFR greater than 90  CBC was unremarkable.  A1c 8.0  Lipid panel showed a total cholesterol of 168, HDL 74, LDL 64, triglycerides 152.    He does not smoke.  Drinks 1 cocktail every evening  Remains compliant with all medications.                   Assessment and Plan:     1.  Hypertension -well-controlled, continue amlodipine, metoprolol, hydrochlorothiazide and losartan.  Monitor blood pressures daily with a goal of less than 140/90.  Strict low-sodium diet.    2.  Coronary artery disease -status post CABG x3 in 2015 using a LIMA to the LAD, vein graft to  the circumflex, and vein graft to the PDA.  He does not endorse any ischemic symptoms.  Continue on current medical therapy.  Encourage exercise, and weight loss.    3.  Hyperlipidemia -LDL at goal, continue atorvastatin.  Due for fasting lipid panel in January  4.  Type 2 diabetes -A1c 8.0, managed on insulin and metformin.  Managed by his PCP  5.  Aortic root dilation -4.1 cm.  Follow-up echocardiogram in 1 year for surveillance monitoring.         Thank you for allowing me to participate in this delightful patient's care. I have recommended he follow up in 1 year with BETHANY or sooner if needed.       Sharonda Ross, APRN CNP         Review of Systems:     Review of Systems:  Skin:        Eyes:       ENT:       Respiratory:  Negative    Cardiovascular:    Positive for;fatigue  Gastroenterology:      Genitourinary:       Musculoskeletal:       Neurologic:       Psychiatric:       Heme/Lymph/Imm:       Endocrine:                   Physical Exam:     GEN:  In general, this is a overweight male in no acute distress.  HEENT:  Pupils equal, round. Sclerae nonicteric. Clear oropharynx. Mucous membranes moist.  NECK:  No JVD   C/V:  Regular rate and rhythm, no murmur, rub or gallop. No S3 or RV heave.   RESP: Respirations are unlabored. No use of accessory muscles. Clear to auscultation bilaterally without wheezing, rales, or rhonchi.  GI: Obese abdomen   EXTREM: No LE edema. No cyanosis or clubbing.  NEURO: Alert and oriented, cooperative. No obvious focal deficits.   PSYCH: Normal affect.  SKIN: Warm and dry. No rashes or petechiae appreciated.          Past Medical History:     Past Medical History:   Diagnosis Date     Aortic root dilatation (H)      Atrial flutter (H) 10/02/2017    ablation 11/26/2017     CAD (coronary artery disease) 03/13/2015    CABG 4/7/2015 by Dr. Johan Hall: LIMA to LAD, SVG to circumflex, SVG to PDA     Cardiomyopathy (H) 10/02/2017    tachycardia-induced with EF 40-45%, improved once back  to NSR     HTN (hypertension)      Hyperlipidaemia      Microalbuminuria 6/27/2016     Obesity 3/13/2015     Type 2 diabetes mellitus with diabetic nephropathy, with long-term current use of insulin (H) 11/2/2016              Past Surgical History:     Past Surgical History:   Procedure Laterality Date     BYPASS GRAFT ARTERY CORONARY N/A 4/7/2015    bypass LAD, OM, PDA        COLONOSCOPY N/A 6/20/2016    Procedure: COLONOSCOPY;  Surgeon: Marcela Schwartz MD;  Location: [facility]     COLONOSCOPY N/A 1/6/2020    Procedure: Colonoscopy, With Polypectomy And Biopsy;  Surgeon: Herson Faust MD;  Location: [facility]     EXCISE PILONIDAL CYST, SIMPLE  1975     HEART CATH, ANGIOPLASTY  3-    3 vessel disease-occluded RCA, stenosis LM-to have CABG     removal of skin cancer  1992     TONSILLECTOMY & ADENOIDECTOMY  1950              Allergies:   Tetracycline, Codeine, and Simvastatin       Data:   All laboratory data reviewed:    LAST CHOLESTEROL:  Lab Results   Component Value Date    CHOL 131 10/19/2021    CHOL 129 02/04/2021     Lab Results   Component Value Date    HDL 61 10/19/2021    HDL 50 02/04/2021     Lab Results   Component Value Date    LDL 46 10/19/2021    LDL 49 02/04/2021     Lab Results   Component Value Date    TRIG 121 10/19/2021    TRIG 149 02/04/2021     Lab Results   Component Value Date    CHOLHDLRATIO 3.5 08/19/2015       LAST BMP:  Lab Results   Component Value Date     10/19/2021     10/15/2020      Lab Results   Component Value Date    POTASSIUM 4.6 10/19/2021    POTASSIUM 5.3 10/15/2020     Lab Results   Component Value Date    CHLORIDE 107 10/19/2021    CHLORIDE 104 10/15/2020     Lab Results   Component Value Date    AURE 9.4 10/19/2021    AURE 10.0 10/15/2020     Lab Results   Component Value Date    CO2 20 10/19/2021    CO2 24 10/15/2020     Lab Results   Component Value Date    BUN 19 10/19/2021    BUN 30 10/15/2020     Lab Results   Component Value Date    CR 0.84  10/19/2021    CR 0.88 10/15/2020     Lab Results   Component Value Date     10/19/2021     10/15/2020       LAST CBC:  Lab Results   Component Value Date    WBC 6.1 10/19/2021    WBC 8.6 10/15/2020     Lab Results   Component Value Date    RBC 4.86 10/19/2021    RBC 4.62 10/15/2020     Lab Results   Component Value Date    HGB 16.5 10/19/2021    HGB 16.4 10/15/2020     Lab Results   Component Value Date    HCT 48.4 10/19/2021    HCT 47.7 10/15/2020     Lab Results   Component Value Date     10/19/2021     10/15/2020     Lab Results   Component Value Date    MCH 34.0 10/19/2021    MCH 35.5 10/15/2020     Lab Results   Component Value Date    MCHC 34.1 10/19/2021    MCHC 34.4 10/15/2020     Lab Results   Component Value Date    RDW 12.7 10/19/2021    RDW 12.6 10/15/2020     Lab Results   Component Value Date     10/19/2021     10/15/2020

## 2023-07-05 NOTE — LETTER
7/5/2023    Lauro Galloway MD  [address] [facility] [address]    RE: Adan Ruffin       Dear Colleague,     I had the pleasure of seeing Adan Ruffin in the [facility].  Cardiology Clinic Progress Note  Adan Ruffin MRN# [id number]   YOB: 1950 Age: 71 year old     Reason For Visit:  Annual follow-up   Primary Cardiologist:   Dr. Rosas          History of Presenting Illness:      Adan Ruffin is a pleasant 72 year old patient who carries a past medical history significant for coronary artery disease status post CABG x3 in 2015 using a LIMA to the LAD, vein graft to the circumflex and vein graft to the PDA, atrial flutter ablation in 2017, hypertension, hyperlipidemia, and type 2 diabetes.    He returns to the office today for an annual follow-up. He offers no cardiac complaint, denies chest pain, shortness of breath, PND, orthopnea, presyncope, syncope, edema, heart racing, or palpitations.  Over the last year, he has made a significant changes in his diet, primarily keto.  He is down approximately 40 pounds over the last 18 months.      His last cardiac work-up consisted of an echocardiogram in 2022 showing a normal ejection fraction estimated at 60 to 65%, normal RV size and function, mild to moderate LVH, and a mildly dilated aortic root measuring 4.1 cm.  Last nuclear stress test in 2019 was negative for ischemia.    Blood pressure is well controlled at 134/64, managed on amlodipine, hydrochlorothiazide, losartan, metoprolol.  Last BMP showed a sodium of 133, potassium 4.3, BUN 17.2, creatinine 0.71, and GFR greater than 90  CBC was unremarkable.  A1c 8.0  Lipid panel showed a total cholesterol of 168, HDL 74, LDL 64, triglycerides 152.    He does not smoke.  Drinks 1 cocktail every evening  Remains compliant with all medications.                   Assessment and Plan:     1.  Hypertension -well-controlled, continue amlodipine, metoprolol,  hydrochlorothiazide and losartan.  Monitor blood pressures daily with a goal of less than 140/90.  Strict low-sodium diet.    2.  Coronary artery disease -status post CABG x3 in 2015 using a LIMA to the LAD, vein graft to the circumflex, and vein graft to the PDA.  He does not endorse any ischemic symptoms.  Continue on current medical therapy.  Encourage exercise, and weight loss.    3.  Hyperlipidemia -LDL at goal, continue atorvastatin.  Due for fasting lipid panel in January  4.  Type 2 diabetes -A1c 8.0, managed on insulin and metformin.  Managed by his PCP  5.  Aortic root dilation -4.1 cm.  Follow-up echocardiogram in 1 year for surveillance monitoring.         Thank you for allowing me to participate in this delightful patient's care. I have recommended he follow up in 1 year with BETHANY or sooner if needed.       SANDRA Abernathy CNP         Review of Systems:     Review of Systems:  Skin:        Eyes:       ENT:       Respiratory:  Negative    Cardiovascular:    Positive for;fatigue  Gastroenterology:      Genitourinary:       Musculoskeletal:       Neurologic:       Psychiatric:       Heme/Lymph/Imm:       Endocrine:                   Physical Exam:     GEN:  In general, this is a overweight male in no acute distress.  HEENT:  Pupils equal, round. Sclerae nonicteric. Clear oropharynx. Mucous membranes moist.  NECK:  No JVD   C/V:  Regular rate and rhythm, no murmur, rub or gallop. No S3 or RV heave.   RESP: Respirations are unlabored. No use of accessory muscles. Clear to auscultation bilaterally without wheezing, rales, or rhonchi.  GI: Obese abdomen   EXTREM: No LE edema. No cyanosis or clubbing.  NEURO: Alert and oriented, cooperative. No obvious focal deficits.   PSYCH: Normal affect.  SKIN: Warm and dry. No rashes or petechiae appreciated.          Past Medical History:     Past Medical History:   Diagnosis Date    Aortic root dilatation (H)     Atrial flutter (H) 10/02/2017    ablation 11/26/2017     CAD (coronary artery disease) 03/13/2015    CABG 4/7/2015 by Dr. Johan Hall: LIMA to LAD, SVG to circumflex, SVG to PDA    Cardiomyopathy (H) 10/02/2017    tachycardia-induced with EF 40-45%, improved once back to NSR    HTN (hypertension)     Hyperlipidaemia     Microalbuminuria 6/27/2016    Obesity 3/13/2015    Type 2 diabetes mellitus with diabetic nephropathy, with long-term current use of insulin (H) 11/2/2016              Past Surgical History:     Past Surgical History:   Procedure Laterality Date    BYPASS GRAFT ARTERY CORONARY N/A 4/7/2015    bypass LAD, OM, PDA       COLONOSCOPY N/A 6/20/2016    Procedure: COLONOSCOPY;  Surgeon: Marcela Schwartz MD;  Location: [facility]    COLONOSCOPY N/A 1/6/2020    Procedure: Colonoscopy, With Polypectomy And Biopsy;  Surgeon: Herson Faust MD;  Location:  GI    EXCISE PILONIDAL CYST, SIMPLE  1975    HEART CATH, ANGIOPLASTY  3-    3 vessel disease-occluded RCA, stenosis LM-to have CABG    removal of skin cancer  1992    TONSILLECTOMY & ADENOIDECTOMY  1950              Allergies:   Tetracycline, Codeine, and Simvastatin       Data:   All laboratory data reviewed:    LAST CHOLESTEROL:  Lab Results   Component Value Date    CHOL 131 10/19/2021    CHOL 129 02/04/2021     Lab Results   Component Value Date    HDL 61 10/19/2021    HDL 50 02/04/2021     Lab Results   Component Value Date    LDL 46 10/19/2021    LDL 49 02/04/2021     Lab Results   Component Value Date    TRIG 121 10/19/2021    TRIG 149 02/04/2021     Lab Results   Component Value Date    CHOLHDLRATIO 3.5 08/19/2015       LAST BMP:  Lab Results   Component Value Date     10/19/2021     10/15/2020      Lab Results   Component Value Date    POTASSIUM 4.6 10/19/2021    POTASSIUM 5.3 10/15/2020     Lab Results   Component Value Date    CHLORIDE 107 10/19/2021    CHLORIDE 104 10/15/2020     Lab Results   Component Value Date    AURE 9.4 10/19/2021    AURE 10.0 10/15/2020     Lab  Results   Component Value Date    CO2 20 10/19/2021    CO2 24 10/15/2020     Lab Results   Component Value Date    BUN 19 10/19/2021    BUN 30 10/15/2020     Lab Results   Component Value Date    CR 0.84 10/19/2021    CR 0.88 10/15/2020     Lab Results   Component Value Date     10/19/2021     10/15/2020       LAST CBC:  Lab Results   Component Value Date    WBC 6.1 10/19/2021    WBC 8.6 10/15/2020     Lab Results   Component Value Date    RBC 4.86 10/19/2021    RBC 4.62 10/15/2020     Lab Results   Component Value Date    HGB 16.5 10/19/2021    HGB 16.4 10/15/2020     Lab Results   Component Value Date    HCT 48.4 10/19/2021    HCT 47.7 10/15/2020     Lab Results   Component Value Date     10/19/2021     10/15/2020     Lab Results   Component Value Date    MCH 34.0 10/19/2021    MCH 35.5 10/15/2020     Lab Results   Component Value Date    MCHC 34.1 10/19/2021    MCHC 34.4 10/15/2020     Lab Results   Component Value Date    RDW 12.7 10/19/2021    RDW 12.6 10/15/2020     Lab Results   Component Value Date     10/19/2021     10/15/2020               Thank you for allowing me to participate in the care of your patient.      Sincerely,     SANDRA Abernathy CNP     [facility]  cc:   SANDRA Abernathy CNP  [address] [place] [address]

## 2023-07-05 NOTE — PATIENT INSTRUCTIONS
Thanks for participating in a office visit with the [facility] today.    Doing well on a cardiac standpoint   Blood pressures well controlled   Cholesterol levels stable - due for follow up labs in January.   Alcohol in strict moderation.   Echocardiogram in 1 year     Follow up in 1 year with BETHANY    Please call my nurse at  [phone] with any questions or concerns.    Scheduling phone number: [phone]  Reminder: Please bring in all current medications, over the counter supplements and vitamin bottles to your next appointment.

## 2023-07-06 DIAGNOSIS — I10 ESSENTIAL HYPERTENSION: ICD-10-CM

## 2023-07-06 DIAGNOSIS — I48.3 TYPICAL ATRIAL FLUTTER (H): ICD-10-CM

## 2023-07-06 RX ORDER — METOPROLOL SUCCINATE 100 MG/1
100 TABLET, EXTENDED RELEASE ORAL 2 TIMES DAILY
Qty: 180 TABLET | Refills: 4 | Status: SHIPPED | OUTPATIENT
Start: 2023-07-06 | End: 2024-09-24

## 2023-08-06 ENCOUNTER — MYC MEDICAL ADVICE (OUTPATIENT)
Dept: INTERNAL MEDICINE | Facility: CLINIC | Age: 73
End: 2023-08-06
Payer: COMMERCIAL

## 2023-08-06 ENCOUNTER — MYC REFILL (OUTPATIENT)
Dept: INTERNAL MEDICINE | Facility: CLINIC | Age: 73
End: 2023-08-06
Payer: COMMERCIAL

## 2023-08-06 DIAGNOSIS — Z79.4 TYPE 2 DIABETES MELLITUS WITH DIABETIC NEPHROPATHY, WITH LONG-TERM CURRENT USE OF INSULIN (H): ICD-10-CM

## 2023-08-06 DIAGNOSIS — I10 PRIMARY HYPERTENSION: ICD-10-CM

## 2023-08-06 DIAGNOSIS — E11.21 TYPE 2 DIABETES MELLITUS WITH DIABETIC NEPHROPATHY, WITH LONG-TERM CURRENT USE OF INSULIN (H): ICD-10-CM

## 2023-08-07 RX ORDER — HYDROCHLOROTHIAZIDE 25 MG/1
25 TABLET ORAL DAILY
Qty: 90 TABLET | Refills: 1 | Status: SHIPPED | OUTPATIENT
Start: 2023-08-07 | End: 2023-10-04

## 2023-08-07 NOTE — TELEPHONE ENCOUNTER
Refill request for hydrochlorothiazide  Prescription approved per [facility] Refill Protocol.      BP Readings from Last 3 Encounters:   07/05/23 134/64   02/07/23 104/62   01/13/23 139/58      Creatinine   Date Value Ref Range Status   03/16/2023 0.71 0.67 - 1.17 mg/dL Final   10/15/2020 0.88 0.66 - 1.25 mg/dL Final

## 2023-08-08 RX ORDER — BLOOD SUGAR DIAGNOSTIC
STRIP MISCELLANEOUS
Qty: 400 STRIP | Refills: 0 | Status: SHIPPED | OUTPATIENT
Start: 2023-08-08 | End: 2023-08-30 | Stop reason: ALTCHOICE

## 2023-08-26 ENCOUNTER — HEALTH MAINTENANCE LETTER (OUTPATIENT)
Age: 73
End: 2023-08-26

## 2023-08-28 DIAGNOSIS — Z79.4 TYPE 2 DIABETES MELLITUS WITH DIABETIC NEPHROPATHY, WITH LONG-TERM CURRENT USE OF INSULIN (H): ICD-10-CM

## 2023-08-28 DIAGNOSIS — E11.21 TYPE 2 DIABETES MELLITUS WITH DIABETIC NEPHROPATHY, WITH LONG-TERM CURRENT USE OF INSULIN (H): ICD-10-CM

## 2023-08-28 RX ORDER — LANCETS
EACH MISCELLANEOUS
Qty: 400 EACH | Refills: 0 | Status: SHIPPED | OUTPATIENT
Start: 2023-08-28 | End: 2023-08-30 | Stop reason: ALTCHOICE

## 2023-08-28 NOTE — TELEPHONE ENCOUNTER
Medication refilled x 1 due to future appointment scheduled.    Appointments in Next Year      Oct 04, 2023  9:30 AM  (Arrive by 9:10 AM)  Provider Visit with Lauro Galloway MD  [facility] ([facility] ) [phone]

## 2023-08-30 ENCOUNTER — MYC MEDICAL ADVICE (OUTPATIENT)
Dept: INTERNAL MEDICINE | Facility: CLINIC | Age: 73
End: 2023-08-30
Payer: COMMERCIAL

## 2023-08-30 DIAGNOSIS — Z79.4 TYPE 2 DIABETES MELLITUS WITH DIABETIC NEPHROPATHY, WITH LONG-TERM CURRENT USE OF INSULIN (H): Primary | ICD-10-CM

## 2023-08-30 DIAGNOSIS — E11.21 TYPE 2 DIABETES MELLITUS WITH DIABETIC NEPHROPATHY, WITH LONG-TERM CURRENT USE OF INSULIN (H): Primary | ICD-10-CM

## 2023-08-30 RX ORDER — BLOOD-GLUCOSE METER
EACH MISCELLANEOUS
Qty: 1 KIT | Refills: 0 | Status: SHIPPED | OUTPATIENT
Start: 2023-08-30

## 2023-08-30 RX ORDER — BLOOD-GLUCOSE METER
1 KIT MISCELLANEOUS 4 TIMES DAILY
Qty: 400 EACH | Refills: 3 | Status: SHIPPED | OUTPATIENT
Start: 2023-08-30

## 2023-08-30 RX ORDER — BLOOD-GLUCOSE CONTROL, NORMAL
EACH MISCELLANEOUS
Qty: 1 EACH | Refills: 3 | Status: SHIPPED | OUTPATIENT
Start: 2023-08-30

## 2023-08-30 RX ORDER — BLOOD-GLUCOSE CONTROL, LOW
EACH MISCELLANEOUS
Qty: 1 EACH | Refills: 3 | Status: SHIPPED | OUTPATIENT
Start: 2023-08-30

## 2023-08-30 RX ORDER — GLUCOSAMINE HCL/CHONDROITIN SU 500-400 MG
CAPSULE ORAL
Qty: 400 EACH | Refills: 3 | Status: SHIPPED | OUTPATIENT
Start: 2023-08-30

## 2023-08-30 NOTE — TELEPHONE ENCOUNTER
Faxed in [employer] supplies and kit.   Prescription approved per [facility] Refill Protocol.

## 2023-09-19 ENCOUNTER — MEDICAL CORRESPONDENCE (OUTPATIENT)
Dept: HEALTH INFORMATION MANAGEMENT | Facility: CLINIC | Age: 73
End: 2023-09-19

## 2023-09-28 DIAGNOSIS — I10 ESSENTIAL HYPERTENSION: ICD-10-CM

## 2023-09-28 DIAGNOSIS — E11.21 TYPE 2 DIABETES MELLITUS WITH DIABETIC NEPHROPATHY, WITH LONG-TERM CURRENT USE OF INSULIN (H): ICD-10-CM

## 2023-09-28 DIAGNOSIS — Z79.4 TYPE 2 DIABETES MELLITUS WITH DIABETIC NEPHROPATHY, WITH LONG-TERM CURRENT USE OF INSULIN (H): ICD-10-CM

## 2023-09-28 DIAGNOSIS — E78.5 HYPERLIPIDEMIA LDL GOAL <70: ICD-10-CM

## 2023-09-28 RX ORDER — ATORVASTATIN CALCIUM 40 MG/1
40 TABLET, FILM COATED ORAL DAILY
Qty: 90 TABLET | Refills: 1 | Status: SHIPPED | OUTPATIENT
Start: 2023-09-28 | End: 2023-10-04

## 2023-09-28 RX ORDER — LOSARTAN POTASSIUM 100 MG/1
100 TABLET ORAL DAILY
Qty: 90 TABLET | Refills: 1 | Status: SHIPPED | OUTPATIENT
Start: 2023-09-28 | End: 2023-10-04

## 2023-09-28 RX ORDER — AMLODIPINE BESYLATE 5 MG/1
5 TABLET ORAL DAILY
Qty: 90 TABLET | Refills: 3 | Status: SHIPPED | OUTPATIENT
Start: 2023-09-28

## 2023-09-28 NOTE — TELEPHONE ENCOUNTER
Upcoming appointment    Next 5 appointments (look out 90 days)      Oct 04, 2023  9:30 AM  (Arrive by 9:10 AM)  Provider Visit with Lauro Galloway MD  [facility] ([facility] ) [address]  [address]  [facility] [id number] [phone]

## 2023-10-01 ENCOUNTER — TRANSFERRED RECORDS (OUTPATIENT)
Dept: MULTI SPECIALTY CLINIC | Facility: CLINIC | Age: 73
End: 2023-10-01
Payer: COMMERCIAL

## 2023-10-01 LAB — RETINOPATHY: NORMAL

## 2023-10-04 ENCOUNTER — OFFICE VISIT (OUTPATIENT)
Dept: INTERNAL MEDICINE | Facility: CLINIC | Age: 73
End: 2023-10-04
Payer: COMMERCIAL

## 2023-10-04 VITALS
OXYGEN SATURATION: 93 % | SYSTOLIC BLOOD PRESSURE: 144 MMHG | DIASTOLIC BLOOD PRESSURE: 64 MMHG | RESPIRATION RATE: 20 BRPM | TEMPERATURE: 98.2 F | WEIGHT: 208.4 LBS | HEIGHT: 68 IN | BODY MASS INDEX: 31.58 KG/M2 | HEART RATE: 103 BPM

## 2023-10-04 DIAGNOSIS — I10 PRIMARY HYPERTENSION: ICD-10-CM

## 2023-10-04 DIAGNOSIS — E78.5 HYPERLIPIDEMIA LDL GOAL <70: ICD-10-CM

## 2023-10-04 DIAGNOSIS — Z79.4 TYPE 2 DIABETES MELLITUS WITH DIABETIC NEPHROPATHY, WITH LONG-TERM CURRENT USE OF INSULIN (H): ICD-10-CM

## 2023-10-04 DIAGNOSIS — I10 ESSENTIAL HYPERTENSION: ICD-10-CM

## 2023-10-04 DIAGNOSIS — E11.21 TYPE 2 DIABETES MELLITUS WITH DIABETIC NEPHROPATHY, WITH LONG-TERM CURRENT USE OF INSULIN (H): ICD-10-CM

## 2023-10-04 DIAGNOSIS — E83.52 HYPERCALCEMIA: Primary | ICD-10-CM

## 2023-10-04 PROBLEM — E66.01 MORBID OBESITY (H): Status: RESOLVED | Noted: 2018-09-09 | Resolved: 2023-10-04

## 2023-10-04 LAB — HBA1C MFR BLD: 7.5 % (ref 0–5.6)

## 2023-10-04 PROCEDURE — G0008 ADMIN INFLUENZA VIRUS VAC: HCPCS | Performed by: INTERNAL MEDICINE

## 2023-10-04 PROCEDURE — 90662 IIV NO PRSV INCREASED AG IM: CPT | Performed by: INTERNAL MEDICINE

## 2023-10-04 PROCEDURE — 83036 HEMOGLOBIN GLYCOSYLATED A1C: CPT | Performed by: INTERNAL MEDICINE

## 2023-10-04 PROCEDURE — 36415 COLL VENOUS BLD VENIPUNCTURE: CPT | Performed by: INTERNAL MEDICINE

## 2023-10-04 PROCEDURE — 99214 OFFICE O/P EST MOD 30 MIN: CPT | Mod: 25 | Performed by: INTERNAL MEDICINE

## 2023-10-04 PROCEDURE — 82043 UR ALBUMIN QUANTITATIVE: CPT | Performed by: INTERNAL MEDICINE

## 2023-10-04 PROCEDURE — 82570 ASSAY OF URINE CREATININE: CPT | Performed by: INTERNAL MEDICINE

## 2023-10-04 PROCEDURE — 80053 COMPREHEN METABOLIC PANEL: CPT | Performed by: INTERNAL MEDICINE

## 2023-10-04 RX ORDER — HYDROCHLOROTHIAZIDE 25 MG/1
25 TABLET ORAL DAILY
Qty: 90 TABLET | Refills: 3 | Status: SHIPPED | OUTPATIENT
Start: 2023-10-04

## 2023-10-04 RX ORDER — INSULIN GLARGINE 100 [IU]/ML
INJECTION, SOLUTION SUBCUTANEOUS
Qty: 75 ML | Refills: 3 | Status: SHIPPED | OUTPATIENT
Start: 2023-10-04 | End: 2024-01-23

## 2023-10-04 RX ORDER — INSULIN LISPRO 100 [IU]/ML
INJECTION, SOLUTION INTRAVENOUS; SUBCUTANEOUS
Qty: 45 ML | Refills: 3 | Status: SHIPPED | OUTPATIENT
Start: 2023-10-04

## 2023-10-04 RX ORDER — MESALAMINE 1000 MG/1
1000 SUPPOSITORY RECTAL AT BEDTIME
COMMUNITY
Start: 2023-10-04

## 2023-10-04 RX ORDER — ATORVASTATIN CALCIUM 40 MG/1
40 TABLET, FILM COATED ORAL DAILY
Qty: 90 TABLET | Refills: 3 | Status: SHIPPED | OUTPATIENT
Start: 2023-10-04

## 2023-10-04 RX ORDER — LOSARTAN POTASSIUM 100 MG/1
100 TABLET ORAL DAILY
Qty: 90 TABLET | Refills: 3 | Status: SHIPPED | OUTPATIENT
Start: 2023-10-04

## 2023-10-04 ASSESSMENT — PAIN SCALES - GENERAL: PAINLEVEL: NO PAIN (0)

## 2023-10-04 NOTE — LETTER
October 9, 2023      Abraham LUI Laly  [address] [place] [address]        Dear ,    We are writing to inform you of your test results.    Elevated protein in the urine and elevated calcium. Recommend to assess further lab work , will order.   Improved diabetic control. Rest of the results are normal.     Resulted Orders   HEMOGLOBIN A1C   Result Value Ref Range    Hemoglobin A1C 7.5 (H) 0.0 - 5.6 %      Comment:      Normal <5.7%   Prediabetes 5.7-6.4%    Diabetes 6.5% or higher     Note: Adopted from ADA consensus guidelines.   Comprehensive metabolic panel (BMP + Alb, Alk Phos, ALT, AST, Total. Bili, TP)   Result Value Ref Range    Sodium 133 (L) 135 - 145 mmol/L      Comment:      Reference intervals for this test were updated on 09/26/2023 to more accurately reflect our healthy population. There may be differences in the flagging of prior results with similar values performed with this method. Interpretation of those prior results can be made in the context of the updated reference intervals.     Potassium 4.6 3.4 - 5.3 mmol/L    Carbon Dioxide (CO2) 26 22 - 29 mmol/L    Anion Gap 12 7 - 15 mmol/L    Urea Nitrogen 15.6 8.0 - 23.0 mg/dL    Creatinine 0.91 0.67 - 1.17 mg/dL    GFR Estimate >90 >60 mL/min/1.73m2    Calcium 10.6 (H) 8.8 - 10.2 mg/dL    Chloride 95 (L) 98 - 107 mmol/L    Glucose 134 (H) 70 - 99 mg/dL    Alkaline Phosphatase 97 40 - 129 U/L    AST 52 (H) 0 - 45 U/L      Comment:      Reference intervals for this test were updated on 6/12/2023 to more accurately reflect our healthy population. There may be differences in the flagging of prior results with similar values performed with this method. Interpretation of those prior results can be made in the context of the updated reference intervals.    ALT 48 0 - 70 U/L      Comment:      Reference intervals for this test were updated on 6/12/2023 to more accurately reflect our healthy population. There may be differences in the  flagging of prior results with similar values performed with this method. Interpretation of those prior results can be made in the context of the updated reference intervals.      Protein Total 7.6 6.4 - 8.3 g/dL    Albumin 4.5 3.5 - 5.2 g/dL    Bilirubin Total 0.6 <=1.2 mg/dL   Albumin Random Urine Quantitative with Creat Ratio   Result Value Ref Range    Creatinine Urine mg/dL 38.9 mg/dL      Comment:      The reference ranges have not been established in urine creatinine. The results should be integrated into the clinical context for interpretation.    Albumin Urine mg/L 711.0 mg/L      Comment:      The reference ranges have not been established in urine albumin. The results should be integrated into the clinical context for interpretation.    Albumin Urine mg/g Cr 1,827.76 (H) 0.00 - 17.00 mg/g Cr      Comment:      Microalbuminuria is defined as an albumin:creatinine ratio of 17 to 299 for males and 25 to 299 for females. A ratio of albumin:creatinine of 300 or higher is indicative of overt proteinuria.  Due to biologic variability, positive results should be confirmed by a second, first-morning random or 24-hour timed urine specimen. If there is discrepancy, a third specimen is recommended. When 2 out of 3 results are in the microalbuminuria range, this is evidence for incipient nephropathy and warrants increased efforts at glucose control, blood pressure control, and institution of therapy with an angiotensin-converting-enzyme (ACE) inhibitor (if the patient can tolerate it).         If you have any questions or concerns, please call the clinic at the number listed above.       Sincerely,      Lauro Galloway MD

## 2023-10-04 NOTE — PROGRESS NOTES
"  Assessment & Plan     Hyperlipidemia LDL goal <70  Continue statin , diet, exercise   - atorvastatin (LIPITOR) 40 MG tablet; Take 1 tablet (40 mg) by mouth daily    Primary hypertension  Controlled, monitor   - hydrochlorothiazide (HYDRODIURIL) 25 MG tablet; Take 1 tablet (25 mg) by mouth daily    Type 2 diabetes mellitus with diabetic nephropathy, with long-term current use of insulin (H)  Assess lab work   - HEMOGLOBIN A1C  - insulin glargine (LANTUS SOLOSTAR) 100 UNIT/ML pen; INJECT 72 UNITS UNDER THE SKIN TWICE DAILY Strength: 100 UNIT/ML  - insulin lispro (HUMALOG KWIKPEN) 100 UNIT/ML (1 unit dial) KWIKPEN; INJECT 40 UNITS UNDER THE SKIN THREE TIMES DAILY BEFORE MEALS. 10 UNITS PER 1 CARB  - metFORMIN (GLUCOPHAGE) 1000 MG tablet; Take 1 tablet (1,000 mg) by mouth 2 times daily (with meals)  - Comprehensive metabolic panel (BMP + Alb, Alk Phos, ALT, AST, Total. Bili, TP)  - Albumin Random Urine Quantitative with Creat Ratio    Essential hypertension  Refilled medications   - losartan (COZAAR) 100 MG tablet; Take 1 tablet (100 mg) by mouth daily  - Comprehensive metabolic panel (BMP + Alb, Alk Phos, ALT, AST, Total. Bili, TP)  - Albumin Random Urine Quantitative with Creat Ratio             BMI:   Estimated body mass index is 31.92 kg/m  as calculated from the following:    Height as of this encounter: 1.721 m (5' 7.75\").    Weight as of this encounter: 94.5 kg (208 lb 6.4 oz).   Weight management plan: Discussed healthy diet and exercise guidelines  Blood sugar testing frequency justification:  Uncontrolled diabetes  See Patient Instructions    Lauro Galloway MD  [facility] 72 [facility], presenting for the following health issues:  Follow Up (diabetes)        10/4/2023     9:09 AM   Additional Questions   Roomed by Erika Siddiqui   Accompanied by self         10/4/2023     9:09 AM   Patient Reported Additional Medications   Patient reports taking the " following new medications none       History of Present Illness       Diabetes:   He presents for follow up of diabetes.  He is checking home blood glucose three times daily.   He checks blood glucose before meals and at bedtime.  Blood glucose is sometimes over 200 and sometimes under 70. He is aware of hypoglycemia symptoms including confusion.    He has no concerns regarding his diabetes at this time.   He is not experiencing numbness or burning in feet, excessive thirst, blurry vision, weight changes or redness, sores or blisters on feet. The patient has had a diabetic eye exam in the last 12 months. Eye exam performed on 10/2023. Location of last eye exam [facility].        He eats 2-3 servings of fruits and vegetables daily.He consumes 0 sweetened beverage(s) daily.He exercises with enough effort to increase his heart rate 9 or less minutes per day.  He exercises with enough effort to increase his heart rate 3 or less days per week. He is missing 1 dose(s) of medications per week.  He is not taking prescribed medications regularly due to remembering to take.         Diabetes Follow-up    How often are you checking your blood sugar? Two times daily  Blood sugar testing frequency justification:  Adjustment of medication(s)  What time of day are you checking your blood sugars (select all that apply)?  Before meals  Have you had any blood sugars above 200?  Yes   Have you had any blood sugars below 70?  Yes   What symptoms do you notice when your blood sugar is low?  Dizzy, Confusion, and Other: cold/hot  What concerns do you have today about your diabetes? None   Do you have any of these symptoms? (Select all that apply)  No numbness or tingling in feet.  No redness, sores or blisters on feet.  No complaints of excessive thirst.  No reports of blurry vision.  No significant changes to weight.  Have you had a diabetic eye exam in the last 12 months? Yes- Date of last eye exam: 10/10/22,  Location: [facility]  [place]        BP Readings from Last 2 Encounters:   10/04/23 (!) 144/64   07/05/23 134/64     Hemoglobin A1C (%)   Date Value   01/13/2023 8.0 (H)   06/01/2022 7.1 (H)   04/02/2021 7.9 (H)   10/15/2020 9.0 (H)     LDL Cholesterol Calculated (mg/dL)   Date Value   01/13/2023 64   10/19/2021 46   02/04/2021 49   10/15/2020 55             Has h/o HTN. on medical treatment. BP has been controlled. No side effects from medications. No CP, HA, dizziness. good compliance with medications and low salt diet.  Has H/O hyperlipidemia. On medical treatment and diet. No side effects. No muscle weakness, myalgias or upset stomach.   Has had elevated microalbumin and creatinine on his last check. Needs recheck.       Review of Systems   Constitutional, HEENT, cardiovascular, pulmonary, GI, , musculoskeletal, neuro, skin, endocrine and psych systems are negative, except as otherwise noted.      Objective    BP (!) 144/64 (BP Location: Left arm, Patient Position: Sitting, Cuff Size: Adult Regular)   Pulse 103   Temp 98.2  F (36.8  C) (Oral)   Resp 20   Ht 1.721 m (5' 7.75\")   Wt 94.5 kg (208 lb 6.4 oz)   SpO2 93%   BMI 31.92 kg/m    Body mass index is 31.92 kg/m .  Physical Exam   GENERAL: healthy, alert and no distress  EYES: Eyes grossly normal to inspection, PERRL and conjunctivae and sclerae normal  HENT: ear canals and TM's normal, nose and mouth without ulcers or lesions  NECK: no adenopathy, no asymmetry, masses, or scars and thyroid normal to palpation  RESP: lungs clear to auscultation - no rales, rhonchi or wheezes  CV: regular rate and rhythm, normal S1 S2, no S3 or S4, no murmur, click or rub, no peripheral edema and peripheral pulses strong  ABDOMEN: soft, nontender, no hepatosplenomegaly, no masses and bowel sounds normal  MS: no gross musculoskeletal defects noted, no edema    Lab on 03/16/2023   Component Date Value Ref Range Status    Sodium 03/16/2023 133 (L)  136 - 145 mmol/L Final    Potassium " 03/16/2023 4.3  3.4 - 5.3 mmol/L Final    Chloride 03/16/2023 94 (L)  98 - 107 mmol/L Final    Carbon Dioxide (CO2) 03/16/2023 26  22 - 29 mmol/L Final    Anion Gap 03/16/2023 13  7 - 15 mmol/L Final    Urea Nitrogen 03/16/2023 17.2  8.0 - 23.0 mg/dL Final    Creatinine 03/16/2023 0.71  0.67 - 1.17 mg/dL Final    Calcium 03/16/2023 10.4 (H)  8.8 - 10.2 mg/dL Final    Glucose 03/16/2023 203 (H)  70 - 99 mg/dL Final    Alkaline Phosphatase 03/16/2023 108  40 - 129 U/L Final    AST 03/16/2023 53 (H)  10 - 50 U/L Final    ALT 03/16/2023 27  10 - 50 U/L Final    Protein Total 03/16/2023 7.8  6.4 - 8.3 g/dL Final    Albumin 03/16/2023 4.4  3.5 - 5.2 g/dL Final    Bilirubin Total 03/16/2023 0.7  <=1.2 mg/dL Final    GFR Estimate 03/16/2023 >90  >60 mL/min/1.73m2 Final    eGFR calculated using 2021 CKD-EPI equation.    Creatinine Urine mg/dL 03/16/2023 112.0  mg/dL Final    The reference ranges have not been established in urine creatinine. The results should be integrated into the clinical context for interpretation.    Albumin Urine mg/L 03/16/2023 1,896.0  mg/L Final    The reference ranges have not been established in urine albumin. The results should be integrated into the clinical context for interpretation.    Albumin Urine mg/g Cr 03/16/2023 1,692.86 (H)  0.00 - 17.00 mg/g Cr Final    Microalbuminuria is defined as an albumin:creatinine ratio of 17 to 299 for males and 25 to 299 for females. A ratio of albumin:creatinine of 300 or higher is indicative of overt proteinuria.  Due to biologic variability, positive results should be confirmed by a second, first-morning random or 24-hour timed urine specimen. If there is discrepancy, a third specimen is recommended. When 2 out of 3 results are in the microalbuminuria range, this is evidence for incipient nephropathy and warrants increased efforts at glucose control, blood pressure control, and institution of therapy with an angiotensin-converting-enzyme (ACE) inhibitor  (if the patient can tolerate it).

## 2023-10-05 LAB
ALBUMIN SERPL BCG-MCNC: 4.5 G/DL (ref 3.5–5.2)
ALP SERPL-CCNC: 97 U/L (ref 40–129)
ALT SERPL W P-5'-P-CCNC: 48 U/L (ref 0–70)
ANION GAP SERPL CALCULATED.3IONS-SCNC: 12 MMOL/L (ref 7–15)
AST SERPL W P-5'-P-CCNC: 52 U/L (ref 0–45)
BILIRUB SERPL-MCNC: 0.6 MG/DL
BUN SERPL-MCNC: 15.6 MG/DL (ref 8–23)
CALCIUM SERPL-MCNC: 10.6 MG/DL (ref 8.8–10.2)
CHLORIDE SERPL-SCNC: 95 MMOL/L (ref 98–107)
CREAT SERPL-MCNC: 0.91 MG/DL (ref 0.67–1.17)
CREAT UR-MCNC: 38.9 MG/DL
DEPRECATED HCO3 PLAS-SCNC: 26 MMOL/L (ref 22–29)
EGFRCR SERPLBLD CKD-EPI 2021: >90 ML/MIN/1.73M2
GLUCOSE SERPL-MCNC: 134 MG/DL (ref 70–99)
MICROALBUMIN UR-MCNC: 711 MG/L
MICROALBUMIN/CREAT UR: 1827.76 MG/G CR (ref 0–17)
POTASSIUM SERPL-SCNC: 4.6 MMOL/L (ref 3.4–5.3)
PROT SERPL-MCNC: 7.6 G/DL (ref 6.4–8.3)
SODIUM SERPL-SCNC: 133 MMOL/L (ref 135–145)

## 2023-10-23 ENCOUNTER — MYC MEDICAL ADVICE (OUTPATIENT)
Dept: CARDIOLOGY | Facility: CLINIC | Age: 73
End: 2023-10-23
Payer: COMMERCIAL

## 2023-10-24 ENCOUNTER — MYC MEDICAL ADVICE (OUTPATIENT)
Dept: INTERNAL MEDICINE | Facility: CLINIC | Age: 73
End: 2023-10-24
Payer: COMMERCIAL

## 2023-11-03 ENCOUNTER — TRANSFERRED RECORDS (OUTPATIENT)
Dept: HEALTH INFORMATION MANAGEMENT | Facility: CLINIC | Age: 73
End: 2023-11-03
Payer: COMMERCIAL

## 2023-12-26 NOTE — TELEPHONE ENCOUNTER
"Requested Prescriptions   Pending Prescriptions Disp Refills     LANTUS SOLOSTAR 100 UNIT/ML soln [Pharmacy Med Name: LANTUS   SOLOSTAR PEN INJ 3ML]    Last Written Prescription Date:  NA  Last Fill Quantity: -,  # refills: -   Last office visit: 2/5/2020 with prescribing provider:  Nilesh   Future Office Visit:   Next 5 appointments (look out 90 days)    Apr 13, 2020  4:20 PM CDT  [facility] [address]  [facility] [id number] [phone]          60 mL      Sig: INJECT 90 UNITS UNDER THE SKIN EVERY MORNING AND 80 UNITS UNDER THE SKIN EVERY EVENING.       Long Acting Insulin Protocol Failed - 2/24/2020  5:37 PM        Failed - LDL on file in past 12 months     Recent Labs   Lab Test 06/27/18  0809   LDL 73             Failed - Microalbumin on file in past 12 months     Recent Labs   Lab Test 06/13/18  1618   MICROL 428   UMALCR 283.44*             Passed - Blood pressure less than 140/90 in past 6 months     BP Readings from Last 3 Encounters:   02/05/20 118/60   02/01/20 (!) 150/70   01/13/20 120/60                 Passed - Serum creatinine on file in past 12 months     Recent Labs   Lab Test 02/01/20  0736   CR 0.82             Passed - HgbA1C in past 3 or 6 months     If HgbA1C is 8 or greater, it needs to be on file within the past 3 months.  If less than 8, must be on file within the past 6 months.     Recent Labs   Lab Test 01/02/20  1028   A1C 7.7*             Passed - Medication is active on med list        Passed - Patient is age 18 or older        Passed - Recent (6 mo) or future (30 days) visit within the authorizing provider's specialty     Patient had office visit in the last 6 months or has a visit in the next 30 days with authorizing provider or within the authorizing provider's specialty.  See \"Patient Info\" tab in inbasket, or \"Choose Columns\" in Meds & Orders section of the refill encounter.        " Opt out

## 2024-01-11 ENCOUNTER — TELEPHONE (OUTPATIENT)
Dept: INTERNAL MEDICINE | Facility: CLINIC | Age: 74
End: 2024-01-11
Payer: COMMERCIAL

## 2024-01-11 NOTE — TELEPHONE ENCOUNTER
Patient Quality Outreach    Patient is due for the following:   Diabetes -  Foot Exam      Topic Date Due    COVID-19 Vaccine (4 - 2023-24 season) 09/01/2023       Next Steps:   Patient has upcoming appointment, these items will be addressed at that time.    Type of outreach:    none      Questions for provider review:    None           Radha Sosa MA  Chart routed to none.

## 2024-01-23 ENCOUNTER — MYC REFILL (OUTPATIENT)
Dept: INTERNAL MEDICINE | Facility: CLINIC | Age: 74
End: 2024-01-23
Payer: COMMERCIAL

## 2024-01-23 DIAGNOSIS — E11.21 TYPE 2 DIABETES MELLITUS WITH DIABETIC NEPHROPATHY, WITH LONG-TERM CURRENT USE OF INSULIN (H): ICD-10-CM

## 2024-01-23 DIAGNOSIS — Z79.4 TYPE 2 DIABETES MELLITUS WITH DIABETIC NEPHROPATHY, WITH LONG-TERM CURRENT USE OF INSULIN (H): ICD-10-CM

## 2024-01-23 RX ORDER — INSULIN GLARGINE 100 [IU]/ML
INJECTION, SOLUTION SUBCUTANEOUS
Qty: 75 ML | Refills: 3 | OUTPATIENT
Start: 2024-01-23

## 2024-02-05 ENCOUNTER — TRANSFERRED RECORDS (OUTPATIENT)
Dept: HEALTH INFORMATION MANAGEMENT | Facility: CLINIC | Age: 74
End: 2024-02-05
Payer: COMMERCIAL

## 2024-02-27 ENCOUNTER — TRANSFERRED RECORDS (OUTPATIENT)
Dept: HEALTH INFORMATION MANAGEMENT | Facility: CLINIC | Age: 74
End: 2024-02-27
Payer: COMMERCIAL

## 2024-03-17 ENCOUNTER — MYC MEDICAL ADVICE (OUTPATIENT)
Dept: INTERNAL MEDICINE | Facility: CLINIC | Age: 74
End: 2024-03-17
Payer: COMMERCIAL

## 2024-03-17 DIAGNOSIS — Z79.4 TYPE 2 DIABETES MELLITUS WITH DIABETIC NEPHROPATHY, WITH LONG-TERM CURRENT USE OF INSULIN (H): Primary | ICD-10-CM

## 2024-03-17 DIAGNOSIS — E11.21 TYPE 2 DIABETES MELLITUS WITH DIABETIC NEPHROPATHY, WITH LONG-TERM CURRENT USE OF INSULIN (H): Primary | ICD-10-CM

## 2024-03-18 RX ORDER — LANCETS
EACH MISCELLANEOUS
Qty: 400 EACH | Refills: 6 | Status: SHIPPED | OUTPATIENT
Start: 2024-03-18

## 2024-03-23 ENCOUNTER — HEALTH MAINTENANCE LETTER (OUTPATIENT)
Age: 74
End: 2024-03-23

## 2024-03-25 NOTE — TELEPHONE ENCOUNTER
We are sending orders for generic glucometer , to be covered by insurance.   What brand is preferred by his plan? So we can send the required items.

## 2024-03-25 NOTE — TELEPHONE ENCOUNTER
Requested patient to call insurance to identify which glucometer brand is covered.     Aleena ARELLANO

## 2024-03-28 RX ORDER — BLOOD-GLUCOSE METER
EACH MISCELLANEOUS
Qty: 1 KIT | Refills: 0 | Status: SHIPPED | OUTPATIENT
Start: 2024-03-28

## 2024-03-28 NOTE — TELEPHONE ENCOUNTER
Can we specify [employer] brand on patients prescription for diabetic supplies so insurance will cover

## 2024-04-04 ENCOUNTER — APPOINTMENT (OUTPATIENT)
Dept: CT IMAGING | Facility: CLINIC | Age: 74
End: 2024-04-04
Attending: STUDENT IN AN ORGANIZED HEALTH CARE EDUCATION/TRAINING PROGRAM
Payer: COMMERCIAL

## 2024-04-04 ENCOUNTER — HOSPITAL ENCOUNTER (EMERGENCY)
Facility: CLINIC | Age: 74
Discharge: HOME | End: 2024-04-04
Attending: STUDENT IN AN ORGANIZED HEALTH CARE EDUCATION/TRAINING PROGRAM | Admitting: STUDENT IN AN ORGANIZED HEALTH CARE EDUCATION/TRAINING PROGRAM
Payer: COMMERCIAL

## 2024-04-04 VITALS
WEIGHT: 207.89 LBS | DIASTOLIC BLOOD PRESSURE: 81 MMHG | BODY MASS INDEX: 30.79 KG/M2 | OXYGEN SATURATION: 95 % | HEIGHT: 69 IN | RESPIRATION RATE: 16 BRPM | HEART RATE: 85 BPM | TEMPERATURE: 97.3 F | SYSTOLIC BLOOD PRESSURE: 160 MMHG

## 2024-04-04 DIAGNOSIS — S16.1XXA STRAIN OF NECK MUSCLE, INITIAL ENCOUNTER: ICD-10-CM

## 2024-04-04 PROCEDURE — 250N000013 HC RX MED GY IP 250 OP 250 PS 637: Performed by: STUDENT IN AN ORGANIZED HEALTH CARE EDUCATION/TRAINING PROGRAM

## 2024-04-04 PROCEDURE — 72125 CT NECK SPINE W/O DYE: CPT

## 2024-04-04 PROCEDURE — 99284 EMERGENCY DEPT VISIT MOD MDM: CPT | Mod: 25

## 2024-04-04 PROCEDURE — 70450 CT HEAD/BRAIN W/O DYE: CPT

## 2024-04-04 RX ORDER — LIDOCAINE 4 G/G
1 PATCH TOPICAL ONCE
Status: DISCONTINUED | OUTPATIENT
Start: 2024-04-04 | End: 2024-04-04 | Stop reason: HOSPADM

## 2024-04-04 RX ORDER — CYCLOBENZAPRINE HCL 10 MG
10 TABLET ORAL ONCE
Status: COMPLETED | OUTPATIENT
Start: 2024-04-04 | End: 2024-04-04

## 2024-04-04 RX ORDER — CYCLOBENZAPRINE HCL 10 MG
10 TABLET ORAL 3 TIMES DAILY PRN
Qty: 20 TABLET | Refills: 0 | Status: SHIPPED | OUTPATIENT
Start: 2024-04-04 | End: 2024-04-10

## 2024-04-04 RX ORDER — IBUPROFEN 600 MG/1
600 TABLET, FILM COATED ORAL ONCE
Status: COMPLETED | OUTPATIENT
Start: 2024-04-04 | End: 2024-04-04

## 2024-04-04 RX ADMIN — CYCLOBENZAPRINE 10 MG: 10 TABLET, FILM COATED ORAL at 17:48

## 2024-04-04 RX ADMIN — IBUPROFEN 600 MG: 600 TABLET, FILM COATED ORAL at 16:38

## 2024-04-04 RX ADMIN — LIDOCAINE 1 PATCH: 4 PATCH TOPICAL at 17:47

## 2024-04-04 ASSESSMENT — COLUMBIA-SUICIDE SEVERITY RATING SCALE - C-SSRS
1. IN THE PAST MONTH, HAVE YOU WISHED YOU WERE DEAD OR WISHED YOU COULD GO TO SLEEP AND NOT WAKE UP?: NO
6. HAVE YOU EVER DONE ANYTHING, STARTED TO DO ANYTHING, OR PREPARED TO DO ANYTHING TO END YOUR LIFE?: NO
2. HAVE YOU ACTUALLY HAD ANY THOUGHTS OF KILLING YOURSELF IN THE PAST MONTH?: NO

## 2024-04-04 ASSESSMENT — ACTIVITIES OF DAILY LIVING (ADL)
ADLS_ACUITY_SCORE: 38
ADLS_ACUITY_SCORE: 38

## 2024-04-04 NOTE — ED PROVIDER NOTES
History     Chief Complaint:  Neck Pain    The history is provided by the patient.      Adan Ruffin is a 73 year old male with a history of CAD, hypertension, hyperlipidemia, atrial fibrillation, stage 3a CKD, and type 2 diabetes mellitus who presents to the ED for evaluation of neck pain. The patient reports that he started having neck pain along his spine 2 days ago. He denies having any recent trauma, falls, or injuries. It is hard for him to describe what the pain feels like. The pain is constant. Nothing makes it feel better or worse. He tried taking aspirin, which provided insignificant relief. He states that his range of motion is at baseline. He denies having headache, nausea, vomiting, speech changes, fever, cough, or sore throat. He doesn't having any numbness or weakness in his extremities. He notes that he has had a constant hand tremor for over a year. He had 1 episode of diarrhea today. He is not taking any blood thinners. He has a history of hypertension, but didn't take his blood pressure medication today.     Independent Historian:   None - Patient Only    Review of External Notes:   Reviewed office visit from earlier today     Medications:    Amlodipine  Aspirin-acetaminophen-caffeine  Atorvastatin  Hydrochlorothiazide  Insulin glargine  Insulin lispro  Losartan  Mesalamine  Metformin  Metoprolol succinate  Dicyclomine    Past Medical History:    Aortic root dilation  Atrial fibrillation  CAD  Cardiomyopathy  Hypertension  Hyperlipidemia   Microalbuminuria  Obesity  Type 2 diabetes mellitus  Anxiety  Stage 3a CKD  Anemia  Malignant neoplasm of skin  Chronic ulcerative colitis  Atrial flutter    Past Surgical History:    Coronary artery bypass  Colonoscopy X 2  Pilonidal cyst excision  Angioplasty   Skin cancer removal   Tonsillectomy & adenoidectomy     Physical Exam   Patient Vitals for the past 24 hrs:   BP Temp Temp src Pulse Resp SpO2 Height Weight   04/04/24 1605 (!) 160/81 97.3  F (36.3  " C) Temporal 85 16 95 % 1.753 m (5' 9\") 94.3 kg (207 lb 14.3 oz)     Physical Exam  General: Alert and cooperative with exam. Patient in no apparent distress. Normal mentation.  Head:  Scalp is NC/AT  Eyes:  No scleral icterus, PERRL  ENT:  The external nose and ears are normal. T  Neck:  Normal range of motion without rigidity. No C spine tenderness  CV:  Regular rate and rhythm    No pathologic murmur   Resp:  Breath sounds are clear bilaterally    Non-labored, no retractions or accessory muscle use  GI:  Abdomen is soft, no distension, no tenderness. No peritoneal signs  MS:  Full ROM of back with full flexion and extension without pain. No thoracic or lumbar spinal pain.   Skin:  Warm and dry, No rash or lesions noted.  Neuro:  Oriented x 3. No gross motor deficits.     Emergency Department Course   Imaging:  CT Cervical Spine w/o Contrast   Final Result   IMPRESSION:   HEAD CT:   1.  No acute intracranial process.      CERVICAL SPINE CT:   1.  No CT evidence for acute fracture or post traumatic subluxation.      Head CT w/o contrast   Final Result   IMPRESSION:   HEAD CT:   1.  No acute intracranial process.      CERVICAL SPINE CT:   1.  No CT evidence for acute fracture or post traumatic subluxation.        Emergency Department Course & Assessments:    Interventions:  Medications   ibuprofen (ADVIL/MOTRIN) tablet 600 mg (600 mg Oral $Given 4/4/24 1638)   cyclobenzaprine (FLEXERIL) tablet 10 mg (10 mg Oral $Given 4/4/24 1748)     Assessments:  1610 I obtained history and examined the patient as noted above.   1739 I rechecked the patient and explained CT findings.     Independent Interpretation (X-rays, CTs, rhythm strip):  None    Consultations/Discussion of Management or Tests:  None      Social Determinants of Health affecting care:   None    Disposition:  The patient was discharged.     Impression & Plan    Medical Decision Making:  Adan Ruffin is a 73 year old male who presents with 3 days of neck " pain.  He denies any fever, neurologic changes, speech disturbances.  He has abnormal gait.  Has been taking aspirin with mild relief.  On exam, patient has full range of motion of the cervical spine without difficulty or worsening pain.  No C-spine tenderness.  He additionally does not have any thoracic or lumbar spinal tenderness and has full range of motion of his back with full flexion and extension.  He denies severe headache.  No recent trauma or injury to the neck.  He does not have any symptoms to suggest cauda equina syndrome or spinal epidural abscess.  Provided patient with ibuprofen and sent to imaging for further evaluation with CT head and CT C-spine.  Imaging was completed and does not show any evidence of acute intracranial process or acute fracture/subluxation.  Suspect likely muscular strain as contributory to his symptoms.  After ibuprofen, patient does feel some improvement in symptoms.  Encouraged him to alternate between ice and heat to help loosen musculature, try lidocaine patches as needed likely purchased over-the-counter, Rx for Flexeril sent to pharmacy.  Also encouraged him to continue ibuprofen and Tylenol at home.  Follow-up with PCP within the week for reassessment and to ensure that pain is well-controlled.  Reasons to return to ER discussed.  Patient is safe for discharge home.    Diagnosis:    ICD-10-CM    1. Strain of neck muscle, initial encounter  S16.1XXA            Discharge Medications:  Discharge Medication List as of 4/4/2024  5:49 PM        START taking these medications    Details   cyclobenzaprine (FLEXERIL) 10 MG tablet Take 1 tablet (10 mg) by mouth 3 times daily as needed for muscle spasms, Disp-20 tablet, R-0, E-Prescribe           Scribe Disclosure:  I, Khoi Mckay, am serving as a scribe at 5:10 PM on 4/4/2024 to document services personally performed by Minal Martell PA-C, based on my observations and the provider's statements to me.   4/4/2024   Minal Martell  CHOCO FIGUEROA Lauren R, PA-C  04/04/24 2045

## 2024-04-04 NOTE — ED TRIAGE NOTES
Triage delayed d/t pt being in the bathroom. Pt had neck pain starting 3 days ago. Went to [facility] and sent here for imaging. Pt denies radiculopathy. Pt freely moving neck in triage.

## 2024-04-04 NOTE — DISCHARGE INSTRUCTIONS
Continue NSAIDs for ongoing neck pain.  This ibuprofen, Advil, Aleve, etc.  You also take additional Tylenol with this.  I have prescribed a muscle relaxer that also can help with ongoing neck pain.  Muscle relaxers can cause drowsiness so I recommend trying prior to bed.  Do not operate heavy machinery or drive a car after taking.  Look into purchasing lidocaine patches over-the-counter.  This can be applied to the neck and help with ongoing neck pain as well.    Please follow-up with your primary care provider for reassessment in 1 week to ensure symptoms are improving/well-controlled.  If any symptoms worsen, return to ER.

## 2024-04-10 ENCOUNTER — OFFICE VISIT (OUTPATIENT)
Dept: INTERNAL MEDICINE | Facility: CLINIC | Age: 74
End: 2024-04-10
Payer: COMMERCIAL

## 2024-04-10 VITALS
BODY MASS INDEX: 30.2 KG/M2 | HEIGHT: 69 IN | WEIGHT: 203.9 LBS | TEMPERATURE: 98 F | HEART RATE: 71 BPM | OXYGEN SATURATION: 97 % | SYSTOLIC BLOOD PRESSURE: 123 MMHG | DIASTOLIC BLOOD PRESSURE: 72 MMHG | RESPIRATION RATE: 16 BRPM

## 2024-04-10 DIAGNOSIS — Z79.4 TYPE 2 DIABETES MELLITUS WITH DIABETIC NEPHROPATHY, WITH LONG-TERM CURRENT USE OF INSULIN (H): ICD-10-CM

## 2024-04-10 DIAGNOSIS — E78.5 HYPERLIPIDEMIA LDL GOAL <70: ICD-10-CM

## 2024-04-10 DIAGNOSIS — K42.9 UMBILICAL HERNIA WITHOUT OBSTRUCTION AND WITHOUT GANGRENE: ICD-10-CM

## 2024-04-10 DIAGNOSIS — I10 PRIMARY HYPERTENSION: ICD-10-CM

## 2024-04-10 DIAGNOSIS — R79.89 LFT ELEVATION: ICD-10-CM

## 2024-04-10 DIAGNOSIS — Z00.00 ENCOUNTER FOR PREVENTATIVE ADULT HEALTH CARE EXAMINATION: Primary | ICD-10-CM

## 2024-04-10 DIAGNOSIS — E83.52 HYPERCALCEMIA: ICD-10-CM

## 2024-04-10 DIAGNOSIS — Z09 HOSPITAL DISCHARGE FOLLOW-UP: ICD-10-CM

## 2024-04-10 DIAGNOSIS — R20.2 PARESTHESIA: ICD-10-CM

## 2024-04-10 DIAGNOSIS — N18.31 STAGE 3A CHRONIC KIDNEY DISEASE (H): ICD-10-CM

## 2024-04-10 DIAGNOSIS — Z11.59 ENCOUNTER FOR SCREENING FOR OTHER VIRAL DISEASES: ICD-10-CM

## 2024-04-10 DIAGNOSIS — E11.21 TYPE 2 DIABETES MELLITUS WITH DIABETIC NEPHROPATHY, WITH LONG-TERM CURRENT USE OF INSULIN (H): ICD-10-CM

## 2024-04-10 LAB
ALBUMIN SERPL BCG-MCNC: 4.3 G/DL (ref 3.5–5.2)
ALBUMIN UR-MCNC: 100 MG/DL
ALP SERPL-CCNC: 112 U/L (ref 40–150)
ALT SERPL W P-5'-P-CCNC: 99 U/L (ref 0–70)
ANION GAP SERPL CALCULATED.3IONS-SCNC: 14 MMOL/L (ref 7–15)
APPEARANCE UR: CLEAR
AST SERPL W P-5'-P-CCNC: 140 U/L (ref 0–45)
BACTERIA #/AREA URNS HPF: ABNORMAL /HPF
BILIRUB SERPL-MCNC: 0.5 MG/DL
BILIRUB UR QL STRIP: NEGATIVE
BUN SERPL-MCNC: 22.8 MG/DL (ref 8–23)
CA-I BLD-MCNC: 5.3 MG/DL (ref 4.4–5.2)
CALCIUM SERPL-MCNC: 10.9 MG/DL (ref 8.8–10.2)
CHLORIDE SERPL-SCNC: 87 MMOL/L (ref 98–107)
CHOLEST SERPL-MCNC: 141 MG/DL
COLOR UR AUTO: YELLOW
CREAT SERPL-MCNC: 1.32 MG/DL (ref 0.67–1.17)
DEPRECATED HCO3 PLAS-SCNC: 24 MMOL/L (ref 22–29)
EGFRCR SERPLBLD CKD-EPI 2021: 58 ML/MIN/1.73M2
ERYTHROCYTE [DISTWIDTH] IN BLOOD BY AUTOMATED COUNT: 12.1 % (ref 10–15)
FASTING STATUS PATIENT QL REPORTED: NO
GLUCOSE SERPL-MCNC: 129 MG/DL (ref 70–99)
GLUCOSE UR STRIP-MCNC: NEGATIVE MG/DL
HBA1C MFR BLD: 7.1 % (ref 0–5.6)
HCT VFR BLD AUTO: 39.9 % (ref 40–53)
HDLC SERPL-MCNC: 81 MG/DL
HGB BLD-MCNC: 14.2 G/DL (ref 13.3–17.7)
HGB UR QL STRIP: NEGATIVE
HYALINE CASTS #/AREA URNS LPF: ABNORMAL /LPF
KETONES UR STRIP-MCNC: NEGATIVE MG/DL
LDLC SERPL CALC-MCNC: 38 MG/DL
LEUKOCYTE ESTERASE UR QL STRIP: NEGATIVE
MCH RBC QN AUTO: 35.1 PG (ref 26.5–33)
MCHC RBC AUTO-ENTMCNC: 35.6 G/DL (ref 31.5–36.5)
MCV RBC AUTO: 99 FL (ref 78–100)
NITRATE UR QL: NEGATIVE
NONHDLC SERPL-MCNC: 60 MG/DL
PH UR STRIP: 5.5 [PH] (ref 5–7)
PHOSPHATE SERPL-MCNC: 3.9 MG/DL (ref 2.5–4.5)
PLATELET # BLD AUTO: 202 10E3/UL (ref 150–450)
POTASSIUM SERPL-SCNC: 4.7 MMOL/L (ref 3.4–5.3)
PROT SERPL-MCNC: 7.5 G/DL (ref 6.4–8.3)
PTH-INTACT SERPL-MCNC: 21 PG/ML (ref 15–65)
RBC # BLD AUTO: 4.04 10E6/UL (ref 4.4–5.9)
RBC #/AREA URNS AUTO: ABNORMAL /HPF
SODIUM SERPL-SCNC: 125 MMOL/L (ref 135–145)
SP GR UR STRIP: 1.01 (ref 1–1.03)
SPERM #/AREA URNS HPF: PRESENT /HPF
SQUAMOUS #/AREA URNS AUTO: ABNORMAL /LPF
TRIGL SERPL-MCNC: 110 MG/DL
TSH SERPL DL<=0.005 MIU/L-ACNC: 2.13 UIU/ML (ref 0.3–4.2)
UROBILINOGEN UR STRIP-ACNC: 0.2 E.U./DL
VIT B12 SERPL-MCNC: 389 PG/ML (ref 232–1245)
VIT D+METAB SERPL-MCNC: 23 NG/ML (ref 20–50)
WBC # BLD AUTO: 6.1 10E3/UL (ref 4–11)
WBC #/AREA URNS AUTO: ABNORMAL /HPF
YEAST #/AREA URNS HPF: ABNORMAL /HPF

## 2024-04-10 PROCEDURE — 82607 VITAMIN B-12: CPT | Performed by: INTERNAL MEDICINE

## 2024-04-10 PROCEDURE — 82330 ASSAY OF CALCIUM: CPT | Performed by: INTERNAL MEDICINE

## 2024-04-10 PROCEDURE — G0439 PPPS, SUBSEQ VISIT: HCPCS | Performed by: INTERNAL MEDICINE

## 2024-04-10 PROCEDURE — 81001 URINALYSIS AUTO W/SCOPE: CPT | Performed by: INTERNAL MEDICINE

## 2024-04-10 PROCEDURE — 36415 COLL VENOUS BLD VENIPUNCTURE: CPT | Performed by: INTERNAL MEDICINE

## 2024-04-10 PROCEDURE — 80061 LIPID PANEL: CPT | Performed by: INTERNAL MEDICINE

## 2024-04-10 PROCEDURE — 85027 COMPLETE CBC AUTOMATED: CPT | Performed by: INTERNAL MEDICINE

## 2024-04-10 PROCEDURE — 83970 ASSAY OF PARATHORMONE: CPT | Performed by: INTERNAL MEDICINE

## 2024-04-10 PROCEDURE — 82306 VITAMIN D 25 HYDROXY: CPT | Performed by: INTERNAL MEDICINE

## 2024-04-10 PROCEDURE — 83036 HEMOGLOBIN GLYCOSYLATED A1C: CPT | Performed by: INTERNAL MEDICINE

## 2024-04-10 PROCEDURE — 80053 COMPREHEN METABOLIC PANEL: CPT | Performed by: INTERNAL MEDICINE

## 2024-04-10 PROCEDURE — 84100 ASSAY OF PHOSPHORUS: CPT | Performed by: INTERNAL MEDICINE

## 2024-04-10 PROCEDURE — 84443 ASSAY THYROID STIM HORMONE: CPT | Performed by: INTERNAL MEDICINE

## 2024-04-10 PROCEDURE — 99214 OFFICE O/P EST MOD 30 MIN: CPT | Mod: 25 | Performed by: INTERNAL MEDICINE

## 2024-04-10 ASSESSMENT — PAIN SCALES - GENERAL: PAINLEVEL: MODERATE PAIN (4)

## 2024-04-10 NOTE — PROGRESS NOTES
"Preventive Care Visit  [facility]  Lauro Galloway MD, Internal Medicine  Apr 10, 2024      Assessment & Plan     Umbilical hernia without obstruction and without gangrene  Refer to surgery   - Adult General Surg Referral; Future  - OFFICE/OUTPT VISIT,EST,LEVL III    Encounter for preventative adult health care examination  advised regular aerobic activity, low cholesterol, low salt diet, wearing seat belt,  self examinations, sunscreen protection.Obtain screening cholesterol, immunizations reviewed.    - Lipid panel reflex to direct LDL Non-fasting  - CBC with platelets  - Comprehensive metabolic panel (BMP + Alb, Alk Phos, ALT, AST, Total. Bili, TP)  - TSH with free T4 reflex  - Hemoglobin A1c  - UA with Microscopic reflex to Culture - lab collect  - Vitamin B12    Type 2 diabetes mellitus with diabetic nephropathy, with long-term current use of insulin (H)  Assess diabetic control   - Hemoglobin A1c  - OFFICE/OUTPT VISIT,EST,LEVL III    Primary hypertension  Controlled, continue treatment   - CBC with platelets  - Comprehensive metabolic panel (BMP + Alb, Alk Phos, ALT, AST, Total. Bili, TP)  - TSH with free T4 reflex  - UA with Microscopic reflex to Culture - lab collect  - OFFICE/OUTPT VISIT,EST,LEVL III    Hyperlipidemia LDL goal <70  Continue statin, assess lipids   - Lipid panel reflex to direct LDL Non-fasting  - OFFICE/OUTPT VISIT,EST,LEVL III    Stage 3a chronic kidney disease (H)  Monitor, keep hydrated, avoid NSAID use   - OFFICE/OUTPT VISIT,EST,LEVL III    Paresthesia  Assess for B12 deficiency   - Vitamin B12  - OFFICE/OUTPT VISIT,EST,LEVL III    Hospital discharge follow-up  Neck pain improved, reviewed neck and head CT, no concerns   Neck exercises provided             BMI  Estimated body mass index is 30.11 kg/m  as calculated from the following:    Height as of this encounter: 1.753 m (5' 9\").    Weight as of this encounter: 92.5 kg (203 lb 14.4 oz).         See " "Patient Instructions    Nannette Rosario is a 73 year old, presenting for the following:  RECHECK        4/10/2024     1:55 PM   Additional Questions   Roomed by Jacque LUI   Accompanied by wife, nick          History of Present Illness       Diabetes:   He presents for follow up of diabetes.  He is checking home blood glucose two times daily.   He checks blood glucose before and after meals and at bedtime.  Blood glucose is sometimes over 200 and sometimes under 70. He is aware of hypoglycemia symptoms including shakiness and dizziness.   He is concerned about other.    He is not experiencing numbness or burning in feet, excessive thirst, blurry vision, weight changes or redness, sores or blisters on feet.           Headaches:   Since the patient's last clinic visit, headaches are: worsened  The patient is getting headaches:  Each day for last week  He is able to do normal daily activities when he has a migraine.  The patient is taking the following rescue/relief medications:  Ibuprofen (Advil, Motrin) and Excedrin   Patient states \"The relief is inconsistent\" from the rescue/relief medications.   The patient is taking the following medications to prevent migraines:  No medications to prevent migraines  In the past 4 weeks, the patient has gone to an Urgent Care or Emergency Room 1 time times due to headaches.    He eats 0-1 servings of fruits and vegetables daily.He consumes 0 sweetened beverage(s) daily.He exercises with enough effort to increase his heart rate 9 or less minutes per day.  He exercises with enough effort to increase his heart rate 3 or less days per week. He is missing 1 dose(s) of medications per week.  He is not taking prescribed medications regularly due to remembering to take.        Patient is seen for a follow up visit.  Seen in ER for posterior neck and head pain. Negative CT scans.   Normal labs.   Symptoms improved with treatment - Tylenol and muscle relaxant.   No neurologic symptoms. "     Has H/O DM. On diet , exercise and insulin + metformin. Blood sugars are controlled. No parestesias. No hypoglycemias.  Has h/o HTN. on medical treatment. BP has been controlled. No side effects from medications. No CP, HA, dizziness. good compliance with medications and low salt diet.  Has h/o ischemic heart disease, asymptomatic regarding chest pains, SOB,palpitations. Has good compliance with treatment, diet and exercise.  Has H/O hyperlipidemia. On medical treatment and diet. No side effects. No muscle weakness, myalgias or upset stomach.   Has h/o CRF. Monitoring BP, BG, medications, avoiding OTC NSAIDs. Needs periodic recheck of kidney function.    Concern for feeling of cold hands, numbness, tingling of the hands.     Has umbilical hernia, getting more protruding recently, no pain.           1/7/2023   General Health   How would you rate your overall physical health? Good         1/7/2023   Nutrition   At least 4 servings of fruits and vegetables/day No         1/7/2023   Exercise   Frequency of exercise: 1 day/week         10/3/2023   Social Factors   Worry food won't last until get money to buy more No   Food not last or not have enough money for food? No   Do you have housing?  Yes   Are you worried about losing your housing? No   Lack of transportation? No   Unable to get utilities (heat,electricity)? No         4/10/2024   Fall Risk   Fallen 2 or more times in the past year? No   Trouble with walking or balance? Yes   Gait Speed Test (Document in seconds) 4.03   Gait Speed Test Interpretation Less than or equal to 5.00 seconds - PASS          1/7/2023   Activities of Daily Living- Home Safety   Needs help with the following daily activites NO assistance is needed   Safety concerns in the home None of the above         9/6/2018   Dental   Dentist two times every year? No         1/7/2023   Hearing Screening   Hearing concerns? No concerns            No data to display                     Today's PHQ-2  Score:       4/10/2024     1:31 PM   PHQ-2 (  [employer])   Q1: Little interest or pleasure in doing things 0   Q2: Feeling down, depressed or hopeless 0   PHQ-2 Score 0   Q1: Little interest or pleasure in doing things Not at all   Q2: Feeling down, depressed or hopeless Not at all   PHQ-2 Score 0           2023   Substance Use   Alcohol more than 3/day or more than 7/wk No     Social History     Tobacco Use    Smoking status: Former     Current packs/day: 0.00     Average packs/day: 1.5 packs/day for 29.0 years (43.5 ttl pk-yrs)     Types: Cigarettes     Start date:      Quit date: 2000     Years since quittin.2    Smokeless tobacco: Never   Vaping Use    Vaping status: Never Used   Substance Use Topics    Alcohol use: Yes     Alcohol/week: 0.0 standard drinks of alcohol     Comment: 1 mixed drink a night    Drug use: No       ASCVD Risk   The 10-year ASCVD risk score (Sergey CAGE, et al., 2019) is: 29.4%    Values used to calculate the score:      Age: 73 years      Sex: Male      Is Non- : No      Diabetic: Yes      Tobacco smoker: No      Systolic Blood Pressure: 123 mmHg      Is BP treated: Yes      HDL Cholesterol: 74 mg/dL      Total Cholesterol: 168 mg/dL            Reviewed and updated as needed this visit by Provider                    Lab work is in process  Labs reviewed in EPIC  Current providers sharing in care for this patient include:  Patient Care Team:  Lauro Galloway MD as PCP - General (Internal Medicine)  Lauro Galloway MD as Assigned PCP  Bhargavi Alford [facility] as Pharmacist (Pharmacist)  Sharonda Ross APRN CNP as Assigned Heart and Vascular Provider    The following health maintenance items are reviewed in Epic and correct as of today:  Health Maintenance   Topic Date Due    RSV VACCINE (Pregnancy & 60+) (1 - 1-dose 60+ series) Never done    DIABETIC FOOT EXAM  2018    COVID-19 Vaccine (2023- season) 2023     "LIPID  01/13/2024    A1C  04/04/2024    HEMOGLOBIN  01/13/2024    EYE EXAM  10/01/2024    BMP  10/04/2024    MICROALBUMIN  10/04/2024    ANNUAL REVIEW OF HM ORDERS  10/04/2024    COLORECTAL CANCER SCREENING  02/06/2025    MEDICARE ANNUAL WELLNESS VISIT  04/10/2025    FALL RISK ASSESSMENT  04/10/2025    ADVANCE CARE PLANNING  01/13/2028    DTAP/TDAP/TD IMMUNIZATION (3 - Td or Tdap) 01/13/2033    HEPATITIS C SCREENING  Completed    PHQ-2 (once per calendar year)  Completed    INFLUENZA VACCINE  Completed    Pneumococcal Vaccine: 65+ Years  Completed    URINALYSIS  Completed    ZOSTER IMMUNIZATION  Completed    AORTIC ANEURYSM SCREENING (SYSTEM ASSIGNED)  Completed    IPV IMMUNIZATION  Aged Out    HPV IMMUNIZATION  Aged Out    MENINGITIS IMMUNIZATION  Aged Out    RSV MONOCLONAL ANTIBODY  Aged Out         Review of Systems  Constitutional, HEENT, cardiovascular, pulmonary, GI, , musculoskeletal, neuro, skin, endocrine and psych systems are negative, except as otherwise noted.     Objective    Exam  /72 (BP Location: Left arm, Cuff Size: Adult Regular)   Pulse 71   Temp 98  F (36.7  C) (Tympanic)   Resp 16   Ht 1.753 m (5' 9\")   Wt 92.5 kg (203 lb 14.4 oz)   SpO2 97%   BMI 30.11 kg/m     Estimated body mass index is 30.11 kg/m  as calculated from the following:    Height as of this encounter: 1.753 m (5' 9\").    Weight as of this encounter: 92.5 kg (203 lb 14.4 oz).    Physical Exam  GENERAL: alert and no distress, overweight   EYES: Eyes grossly normal to inspection, PERRL and conjunctivae and sclerae normal  HENT: ear canals and TM's normal, nose and mouth without ulcers or lesions  NECK: no adenopathy, no asymmetry, masses, or scars  RESP: lungs clear to auscultation - no rales, rhonchi or wheezes  CV: regular rate and rhythm, normal S1 S2, no S3 or S4, no murmur, click or rub, no peripheral edema  ABDOMEN: soft, nontender, no hepatosplenomegaly, no masses and bowel sounds normal, reducible non tender " umbilical hernia   MS: no gross musculoskeletal defects noted, no edema  SKIN: no suspicious lesions or rashes  NEURO: Normal strength and tone, mentation intact and speech normal  PSYCH: mentation appears normal, affect normal/bright         No data to display                  Mini- Cog - normal clock draw  Remembers 2 out of 3 words - village, kitchen, baby    Falls screening - no falls reported past year       Signed Electronically by: Lauro Galloway MD

## 2024-04-10 NOTE — PROGRESS NOTES
"  {PROVIDER CHARTING PREFERENCE:367601}    Nannette Rosario is a 73 year old, presenting for the following health issues:  RECHECK        4/10/2024     1:55 PM   Additional Questions   Roomed by Jacque LUI   Accompanied by wife, nick     History of Present Illness       Diabetes:   He presents for follow up of diabetes.  He is checking home blood glucose two times daily.   He checks blood glucose before and after meals and at bedtime.  Blood glucose is sometimes over 200 and sometimes under 70. He is aware of hypoglycemia symptoms including shakiness and dizziness.   He is concerned about other.    He is not experiencing numbness or burning in feet, excessive thirst, blurry vision, weight changes or redness, sores or blisters on feet.           Headaches:   Since the patient's last clinic visit, headaches are: worsened  The patient is getting headaches:  Each day for last week  He is able to do normal daily activities when he has a migraine.  The patient is taking the following rescue/relief medications:  Ibuprofen (Advil, Motrin) and Excedrin   Patient states \"The relief is inconsistent\" from the rescue/relief medications.   The patient is taking the following medications to prevent migraines:  No medications to prevent migraines  In the past 4 weeks, the patient has gone to an Urgent Care or Emergency Room 1 time times due to headaches.    He eats 0-1 servings of fruits and vegetables daily.He consumes 0 sweetened beverage(s) daily.He exercises with enough effort to increase his heart rate 9 or less minutes per day.  He exercises with enough effort to increase his heart rate 3 or less days per week. He is missing 1 dose(s) of medications per week.  He is not taking prescribed medications regularly due to remembering to take.     Wife states that patient is not sleeping well, has had a shake x1 year and since ED visit he has no energy    ED/UC Followup:    Facility:  [facility]  Date of visit: 04/04/2024  Reason for " "visit: strain of neck muscle  Current Status: still has pain, has trouble sleeping  {additonal problems for provider to add (Optional):357706}    {ROS Picklists (Optional):181348}      Objective    /72 (BP Location: Left arm, Cuff Size: Adult Regular)   Pulse 71   Temp 98  F (36.7  C) (Tympanic)   Resp 16   Ht 1.753 m (5' 9\")   Wt 92.5 kg (203 lb 14.4 oz)   SpO2 97%   BMI 30.11 kg/m    Body mass index is 30.11 kg/m .  Physical Exam   {Exam List (Optional):805909}    {Diagnostic Test Results (Optional):497713}        Signed Electronically by: Lauro Galloway MD  {Email feedback regarding this note to [email or web address]   :[id number]}  "

## 2024-04-11 ENCOUNTER — OFFICE VISIT (OUTPATIENT)
Dept: SURGERY | Facility: CLINIC | Age: 74
End: 2024-04-11
Payer: COMMERCIAL

## 2024-04-11 VITALS
WEIGHT: 203 LBS | HEIGHT: 69 IN | HEART RATE: 76 BPM | DIASTOLIC BLOOD PRESSURE: 74 MMHG | SYSTOLIC BLOOD PRESSURE: 112 MMHG | BODY MASS INDEX: 30.07 KG/M2 | RESPIRATION RATE: 16 BRPM | OXYGEN SATURATION: 99 %

## 2024-04-11 DIAGNOSIS — K42.0 INCARCERATED UMBILICAL HERNIA: Primary | ICD-10-CM

## 2024-04-11 PROCEDURE — 99204 OFFICE O/P NEW MOD 45 MIN: CPT | Performed by: SURGERY

## 2024-04-11 RX ORDER — SULFASALAZINE 500 MG/1
500 TABLET ORAL 2 TIMES DAILY
COMMUNITY

## 2024-04-11 RX ORDER — FOLIC ACID 1 MG/1
1000 TABLET ORAL DAILY
COMMUNITY

## 2024-04-11 NOTE — LETTER
2024       Adan Ruffin  [address] [place] [address]     RE: [id number]  : 1950    Adan Ruffin has been scheduled for surgery on 24 at 8:30 am at [facility] with Dr Srniath Armando.  The hospital is located at [address] in [place].    Please check in at the Surgery reception desk at 6:30 am. This is located in the back of the hospital on the East side, just past the Emergency Room entrance.     DO NOT EAT OR DRINK ANYTHING 8 HOURS BEFORE YOUR ARRIVAL TIME.   You may have sips of clear liquids up until 2 hours before your arrival time. If you have been advised to take your medication, please do this early in the morning with just sips of clear liquid.     Hospital regulations require an updated pre-operative examination to be completed within 30 days of the procedure. This can be done by your primary care provider. Please ask them to fax documentation to [phone]. We also recommend you bring a copy with you.     You should shower before your surgery with Hibiclens or Exidine soap.  This can be found at your local pharmacy or you can pick it up from our office for free.  Please call our office if you have any questions.     You will be required to have an Adult (friend or family member) drive you home after your surgery and arrange for an adult to stay with you until the next morning.     You will receive several calls from our staff 3-7 days prior to your scheduled procedure with further details and to answer any questions you may have.    It is sometimes necessary to adjust the surgery schedule due to emergencies and additions to the schedule.  If your surgery is affected by this, we greatly appreciate your flexibility and understanding in this matter    It is best if you call regarding post-operative questions between the hours of 8:00 am & 3:00 pm Monday-Friday, so you have access to the daytime care team that know you  best.  Prescription refills are accepted during regular office hours only.    Please do not bring any Disability or FMLA papers to the hospital.  They need to be either faxed ([phone]), mailed or hand delivered to our office by you or a family member for completion.  Please allow 14 business days to complete paperwork.        If you have questions or concerns, please contact our office at [phone].

## 2024-04-11 NOTE — LETTER
Showering Before Surgery      Your surgeon has asked you to take 2 showers before surgery.    Why is this important?  It is normal for bacteria (germs) to be on your skin. The skin protects us from these germs. When you have surgery, we cut the skin. Sometimes germs get into the cuts and cause infection (illness caused by germs). By following the instructions below and using special soap, you will lower the number of germs on your skin. This decreases your chance of infection.  Special soap  Buy or get 8 ounces of antiseptic surgical soap called 4% CHG. Common name brands of this soap are Hibiclens and Exidine.  You can find it at your local pharmacy, clinic or retail store. If you have trouble, ask your pharmacist to help you find the right substitute.  A note about shaving:  Do not shave within 12 inches of your incision (surgical cut) area for at least 3 days before surgery. Shaving can make small cuts in the skin. This puts you at a higher risk of infection.  Items you will need for each shower:  1 newly washed towel  4 ounces of one of the above soaps  Clean pajamas or clothes to change into    Follow these instructions:  Follow these steps the evening before surgery and the morning of surgery.  Wash your hair and body with your regular shampoo and soap. Make sure you rinse the shampoo and soap from your hair and body.  Using clean hands, apply about 2 ounces of soap gently on your skin from your ear lobes to your toes. Use on your groin area last. Do not use this soap on your face or head. If you get any soap in your eyes, ears or mouth, rinse right away.  Repeat step 2. It is very important to let the soap stay on your skin for at least 1 minute.    Rinse well and dry off using a clean towel.    If you feel any tingling, itching or other irritation, rinse right away. It is normal to feel some coolness on the skin after using the antiseptic soap. Your skin may feel a bit dry after the shower, but do not use  any lotions, creams or moisturizers. Do not use hair spray or other products in your hair.  5.  Dress in freshly washed clothes or pajamas. Use fresh pillowcases and sheets on your bed.    Repeat these steps the morning of surgery.  If you have any questions about showering or an allergy to CHG soap, please call your surgery center.    For informational purposes only. Not to replace the advice of your health care provider. Copyright   2012 [employer] [facility]. All rights reserved. Clinically reviewed by [employer]. [employer] 822701 - REV 12

## 2024-04-11 NOTE — LETTER
April 11, 2024          Lauro Galloway MD  [address] [place] [address]      RE:   Adan Ruffin 1950      Dear Colleague,    Thank you for referring your patient, Adan Ruffin, to [facility] at [place] location. Please see a copy of my visit note below.    New Patient Office Visit      Assessment:    Adan Ruffin is a 73 year old male with a Primary, incarcerated umbilical hernia.    Plan:    Robotic assisted laparoscopic incarcerated umbilical hernia repair with mesh.   We will schedule surgery at the patient's convenience.   Will need preop H&P    We have discussed observation, reduction techniques and importance, incarceration and strangulation signs, symptoms and importance as well as need to seek emergency treatment.  With watchful waiting likely 20-30% of patients will go on to have elective surgery and 5% will need emergency surgery for their ventral hernia.    We have discussed hernia repair with mesh in detail, including benefits, alternatives, complications, incision, scar, mesh, infection, anesthesia, bleeding, blood transfusion, DVT, PE, hernia recurrence, lifting and activity limits after surgery.  All questions have been answered to the best of my ability.    He has been given literature to review.     HPI:  Adan Ruffin is a 73 year old male who presents for evaluation of a lump in the monica-umbilical region.  He first noticed a lump 2 years ago. He describes an inciting event:  No    He does have pain and states it feels aching, pressure, and cramping. He describes exacerbating factors including walking, prolonged standing, extrending, and flexing.    Nausea/vomitting/bloating:  Yes    Previous surgery in this location:  No     Previous herniorrhaphy:  No      Retired    No FH of bleeding or clotting disorders, or reactions to anesthesia    ROS:  The 10 point review of systems is negative other than noted in the HPI and/or below.    PE:    Vitals:  "/74   Pulse 76   Resp 16   Ht 1.753 m (5' 9\")   Wt 92.1 kg (203 lb)   SpO2 99%   BMI 29.98 kg/m    BMI= Body mass index is 29.98 kg/m .    General: Generally appears well.  Psych: Alert and Oriented.  Normal affect  Neurological: non-focal, moves extremities symmetrically, grossly normal strength and sensation  Eyes: Sclera clear  ENT: mucous membranes moist, external ears and nose normal  Respiratory:   Breathing comfortably on room air  Cardiovascular:  Regular Rate, normal peripheral pulses  GI: Abdomen Soft Hernia:  umbilical, unable to palpated fascia endges due to incarceration, moderately tender  MSK: extremities without edema  Lymphatic/Hematologic/Immune:  No femoral lymphadenopathy.  Integumentary:  No rashes    Data reviewed    Again, thank you for allowing me to participate in the care of your patient.      Sincerely,      Srinath Armando MD      "

## 2024-04-11 NOTE — PROGRESS NOTES
New Patient Office Visit      Assessment:    Adan Ruffin is a 73 year old male with a Primary, incarcerated umbilical hernia.    Plan:    Robotic assisted laparoscopic incarcerated umbilical hernia repair with mesh.   We will schedule surgery at the patient's convenience.   Will need preop H&P      We have discussed observation, reduction techniques and importance, incarceration and strangulation signs, symptoms and importance as well as need to seek emergency treatment.  With watchful waiting likely 20-30% of patients will go on to have elective surgery and 5% will need emergency surgery for their ventral hernia.    We have discussed hernia repair with mesh in detail, including benefits, alternatives, complications, incision, scar, mesh, infection, anesthesia, bleeding, blood transfusion, DVT, PE, hernia recurrence, lifting and activity limits after surgery.  All questions have been answered to the best of my ability.    He has been given literature to review.     HPI:  Adan Ruffin is a 73 year old male who presents for evaluation of a lump in the monica-umbilical region.  He first noticed a lump 2 years ago. He describes an inciting event:  No    He does have pain and states it feels aching, pressure, and cramping. He describes exacerbating factors including walking, prolonged standing, extrending, and flexing.    Nausea/vomitting/bloating:  Yes    Previous surgery in this location:  No     Previous herniorrhaphy:  No     Past Medical History:  Past Medical History:   Diagnosis Date    Aortic root dilatation (H24)     Atrial flutter (H) 10/02/2017    ablation 11/26/2017    CAD (coronary artery disease) 03/13/2015    CABG 4/7/2015 by Dr. Johan Hall: LIMA to LAD, SVG to circumflex, SVG to PDA    Cardiomyopathy (H) 10/02/2017    tachycardia-induced with EF 40-45%, improved once back to NSR    HTN (hypertension)     Hyperlipidaemia     Microalbuminuria 6/27/2016    Obesity 3/13/2015    Type 2 diabetes  "mellitus with diabetic nephropathy, with long-term current use of insulin (H) 2016       Past Surgical History:  Past Surgical History:   Procedure Laterality Date    BYPASS GRAFT ARTERY CORONARY N/A 2015    bypass LAD, OM, PDA       COLONOSCOPY N/A 2016    Procedure: COLONOSCOPY;  Surgeon: Marcela Schwartz MD;  Location: [facility]    COLONOSCOPY N/A 2020    Procedure: Colonoscopy, With Polypectomy And Biopsy;  Surgeon: Herson Faust MD;  Location: [facility]    EXCISE PILONIDAL CYST, SIMPLE      HEART CATH, ANGIOPLASTY  3-    3 vessel disease-occluded RCA, stenosis LM-to have CABG    removal of skin cancer      TONSILLECTOMY & ADENOIDECTOMY  1950        Social History:  Social History     Tobacco Use    Smoking status: Former     Current packs/day: 0.00     Average packs/day: 1.5 packs/day for 29.0 years (43.5 ttl pk-yrs)     Types: Cigarettes     Start date:      Quit date: 2000     Years since quittin.2    Smokeless tobacco: Never   Vaping Use    Vaping status: Never Used   Substance Use Topics    Alcohol use: Yes     Alcohol/week: 0.0 standard drinks of alcohol     Comment: 1 mixed drink a night    Drug use: No      Retired    Family History:  Family History   Problem Relation Age of Onset    Breast Cancer Mother     Cerebrovascular Disease Mother     Hyperlipidemia Father     Cancer Father         lung, unrelated to smoking    Cancer Paternal Grandmother     Diabetes Brother     No Known Problems Sister     Colon Cancer No family hx of      No FH of bleeding or clotting disorders, or reactions to anesthesia    ROS:  The 10 point review of systems is negative other than noted in the HPI and/or below.    PE:    Vitals: /74   Pulse 76   Resp 16   Ht 1.753 m (5' 9\")   Wt 92.1 kg (203 lb)   SpO2 99%   BMI 29.98 kg/m    BMI= Body mass index is 29.98 kg/m .    General: Generally appears well.  Psych: Alert and Oriented.  Normal affect  Neurological: " non-focal, moves extremities symmetrically, grossly normal strength and sensation  Eyes: Sclera clear  ENT: mucous membranes moist, external ears and nose normal  Respiratory:   Breathing comfortably on room air  Cardiovascular:  Regular Rate, normal peripheral pulses  GI: Abdomen Soft Hernia:  umbilical, unable to palpated fascia endges due to incarceration, moderately tender  MSK: extremities without edema  Lymphatic/Hematologic/Immune:  No femoral lymphadenopathy.  Integumentary:  No rashes    Data reviewed    46 minutes spent on the date of the encounter doing chart review, history and exam, documentation and further activities as noted above      Srinath Armando MD  04/11/24 2:12 PM     Please route or send letter to:  Primary Care Provider (PCP)

## 2024-04-12 ENCOUNTER — TELEPHONE (OUTPATIENT)
Dept: SURGERY | Facility: CLINIC | Age: 74
End: 2024-04-12
Payer: COMMERCIAL

## 2024-04-12 NOTE — TELEPHONE ENCOUNTER
Type of surgery: ROBOTIC ASSISTED UMBILICAL HERNIA REPAIR   Location of surgery: [facility]  Date and time of surgery: 5-2-24, 8:30 AM  Surgeon: DR AYON  Pre-Op Appt Date: PATIENT TO SCHEDULE   Post-Op Appt Date: NA  Packet sent out:  GIVEN TO PATIENT   Pre-cert/Authorization completed:  Not Applicable  Date: 4-12-24      ROBOTIC ASSISTED UMBILICAL HERNIA REPAIR GENERAL PT INST TO HAVE H&P 90 MINS REQ PA ASSIST CJP ALW

## 2024-04-17 ENCOUNTER — OFFICE VISIT (OUTPATIENT)
Dept: INTERNAL MEDICINE | Facility: CLINIC | Age: 74
End: 2024-04-17
Payer: COMMERCIAL

## 2024-04-17 DIAGNOSIS — K42.9 UMBILICAL HERNIA WITHOUT OBSTRUCTION AND WITHOUT GANGRENE: ICD-10-CM

## 2024-04-17 DIAGNOSIS — I10 ESSENTIAL HYPERTENSION: ICD-10-CM

## 2024-04-17 DIAGNOSIS — E83.52 HYPERCALCEMIA: ICD-10-CM

## 2024-04-17 DIAGNOSIS — Z01.818 PREOP GENERAL PHYSICAL EXAM: Primary | ICD-10-CM

## 2024-04-17 DIAGNOSIS — E78.5 HYPERLIPIDEMIA LDL GOAL <70: ICD-10-CM

## 2024-04-17 DIAGNOSIS — I10 PRIMARY HYPERTENSION: ICD-10-CM

## 2024-04-17 DIAGNOSIS — Z79.4 TYPE 2 DIABETES MELLITUS WITH DIABETIC NEPHROPATHY, WITH LONG-TERM CURRENT USE OF INSULIN (H): ICD-10-CM

## 2024-04-17 DIAGNOSIS — E11.21 TYPE 2 DIABETES MELLITUS WITH DIABETIC NEPHROPATHY, WITH LONG-TERM CURRENT USE OF INSULIN (H): ICD-10-CM

## 2024-04-17 DIAGNOSIS — N18.31 STAGE 3A CHRONIC KIDNEY DISEASE (H): ICD-10-CM

## 2024-04-17 LAB
BASOPHILS # BLD AUTO: 0 10E3/UL (ref 0–0.2)
BASOPHILS NFR BLD AUTO: 0 %
EOSINOPHIL # BLD AUTO: 0.3 10E3/UL (ref 0–0.7)
EOSINOPHIL NFR BLD AUTO: 4 %
ERYTHROCYTE [DISTWIDTH] IN BLOOD BY AUTOMATED COUNT: 11.8 % (ref 10–15)
HCT VFR BLD AUTO: 38.6 % (ref 40–53)
HGB BLD-MCNC: 14.1 G/DL (ref 13.3–17.7)
IMM GRANULOCYTES # BLD: 0.1 10E3/UL
IMM GRANULOCYTES NFR BLD: 2 %
LYMPHOCYTES # BLD AUTO: 1.4 10E3/UL (ref 0.8–5.3)
LYMPHOCYTES NFR BLD AUTO: 20 %
MCH RBC QN AUTO: 36 PG (ref 26.5–33)
MCHC RBC AUTO-ENTMCNC: 36.5 G/DL (ref 31.5–36.5)
MCV RBC AUTO: 99 FL (ref 78–100)
MONOCYTES # BLD AUTO: 0.8 10E3/UL (ref 0–1.3)
MONOCYTES NFR BLD AUTO: 12 %
NEUTROPHILS # BLD AUTO: 4.3 10E3/UL (ref 1.6–8.3)
NEUTROPHILS NFR BLD AUTO: 62 %
PLATELET # BLD AUTO: 232 10E3/UL (ref 150–450)
RBC # BLD AUTO: 3.92 10E6/UL (ref 4.4–5.9)
WBC # BLD AUTO: 7 10E3/UL (ref 4–11)

## 2024-04-17 PROCEDURE — 93000 ELECTROCARDIOGRAM COMPLETE: CPT

## 2024-04-17 PROCEDURE — 36415 COLL VENOUS BLD VENIPUNCTURE: CPT

## 2024-04-17 PROCEDURE — 80048 BASIC METABOLIC PNL TOTAL CA: CPT

## 2024-04-17 PROCEDURE — 85025 COMPLETE CBC W/AUTO DIFF WBC: CPT

## 2024-04-17 PROCEDURE — 99215 OFFICE O/P EST HI 40 MIN: CPT | Mod: 25

## 2024-04-17 RX ORDER — RESPIRATORY SYNCYTIAL VIRUS VACCINE 120MCG/0.5
0.5 KIT INTRAMUSCULAR ONCE
Qty: 1 EACH | Refills: 0 | Status: CANCELLED | OUTPATIENT
Start: 2024-04-17 | End: 2024-04-17

## 2024-04-17 NOTE — PATIENT INSTRUCTIONS
Nothing to eat or drink at midnight the night before surgery     - ACE/ARB: Continue without modification (e.g., MAC anesthesia, neurosurgery, spine surgery, heart failure, or labile hypertension with risk of hypertension).- Losartan   - Beta Blockers: Continue taking on the day of surgery. Metoprolol   - Calcium Channel Blockers: May be continued on the day of surgery.- amlodipine   - Diuretics: Patient currently not taking due to low sodium levels    - Statins: Continue taking on the day of surgery. Lipitor    - Long acting insulin (e.g. glargine, detemir): Take 80% of the usual evening or morning dose before surgery. Lantus 58 units   - short acting insulin (e.g. regular, lispro, aspart): HOLD on the morning of surgery. Humalog   - metformin: HOLD day of surgery.   - ibuprofen (Advil, Motrin): HOLD 1 day before surgery. Excedrin   - Herbal medications and vitamins: HOLD 14 days prior to surgery.

## 2024-04-17 NOTE — PROGRESS NOTES
Preoperative Evaluation  [facility]  [address]  [address]  [facility] [address]  Phone: [phone]  Primary Provider: Lauro Galloway  Pre-op Performing Provider: MERCY AREVALO  Apr 17, 2024       Ray is a 73 year old, presenting for the following:  Pre-Op Exam        4/17/2024     2:43 PM   Additional Questions   Roomed by TOMI Ambriz LPN   Accompanied by self         4/17/2024     2:43 PM   Patient Reported Additional Medications   Patient reports taking the following new medications none     Surgical Information  Surgery/Procedure: HERNIORRHAPHY, UMBILICAL, ROBOT-ASSISTED, LAPAROSCOPIC, USING DA FRANCISCO XI   Surgery Location: [facility]  Surgeon: Dr. RODRIGO Armando  Surgery Date: 5-2-2024  Time of Surgery: TBD  Where patient plans to recover: At home with family  Fax number for surgical facility: Note does not need to be faxed, will be available electronically in Epic.    Assessment & Plan     The proposed surgical procedure is considered INTERMEDIATE risk.    (Z01.818) Preop general physical exam  (primary encounter diagnosis)  Comment: Patient presents to the clinic for a preop exam for a hernia and umbilical robotic assisted laparoscopic repair.  The patient has a past medical history of type 2 diabetes, hyperlipidemia, coronary artery disease, hypertension, atrial flutter, stage III chronic kidney disease.  Will update EKG at this time as well as labs.  Plan: EKG 12-lead complete w/read - Clinics, CBC with        platelets and differential, Basic metabolic         panel  (Ca, Cl, CO2, Creat, Gluc, K, Na, BUN)        EKG within normal limits and labs pending.    (K42.9) Umbilical hernia without obstruction and without gangrene  Comment: Patient presents to clinic for preop exam for umbilical hernia without obstruction that is requiring reconstructive surgery.  Plan: Pending repeat labs    (E11.21,  Z79.4) Type 2 diabetes mellitus with diabetic nephropathy, with long-term  current use of insulin (H)  Comment: Stable at this time.  Plan: Continue with medication as prescribed.  Decrease long-acting insulin to 80% the evening before surgery.  Hold short acting insulin morning of surgery as well as metformin and long-acting insulin.  Patient agrees with the plan.    (I10) Primary hypertension  Comment: Stable at this time.  Plan: Continue take medication as prescribed.    (E78.5) Hyperlipidemia LDL goal <70  Comment: Stable at this time.  Plan: Continue to take medication as prescribed.    (N18.31) Stage 3a chronic kidney disease (H)  Comment: Stable at this time.  Plan: Continue take medication as prescribed.    (E83.52) Hypercalcemia  Comment: Patient recently found to have hypercalcemia.  Will update labs today.  Pending on labs may need to delay surgery.  He recently was advised to hold his hydrochlorothiazide due to hyponatremia as well as stop any calcium supplement he was taking.  Plan: Labs pending.    (I10) Essential hypertension  Comment: Patient has a history of hypertension that is stable.  Plan: Continue with medication as prescribed.  Patient currently has been holding his hydrochlorothiazide due to hyponatremia.  Advised to continue holding that medication at this time.       -Patient does have hyponatremia, hypercalcemia and hyperkalemia at this time.  Will have patient repeat labs on April 30 to ensure that his labs are going in the right direction in preparation for his preop.  At that time will interpret labs and decide if he is medically cleared for surgery.    Update as of 05/1/2024- Patients labs have improved and he is adequate for surgery.      - No identified additional risk factors other than previously addressed    Antiplatelet or Anticoagulation Medication Instructions   - Patient is on no antiplatelet or anticoagulation medications.    Additional Medication Instructions   - ACE/ARB: Continue without modification (e.g., MAC anesthesia, neurosurgery, spine  surgery, heart failure, or labile hypertension with risk of hypertension).   - Beta Blockers: Continue taking on the day of surgery.   - Calcium Channel Blockers: May be continued on the day of surgery.   - Diuretics: Patient currently not taking due to low sodium levels    - Statins: Continue taking on the day of surgery.    - Long acting insulin (e.g. glargine, detemir): Take 80% of the usual evening or morning dose before surgery.    - short acting insulin (e.g. regular, lispro, aspart): HOLD on the morning of surgery.    - metformin: HOLD day of surgery.   - ibuprofen (Advil, Motrin): HOLD 1 day before surgery.    - Herbal medications and vitamins: HOLD 14 days prior to surgery.    Recommendation  APPROVAL GIVEN to proceed with proposed procedure, without further diagnostic evaluation.      45 minutes spent by me on the date of the encounter doing chart review, review of test results, interpretation of tests, patient visit, documentation, and discussion with other provider(s)     Subjective       HPI related to upcoming procedure: Patient is here for a preop for a umbilical hernia. He does not have shortness of breath or chest pain or heart palpitations.   Not on blood thinners.  Was just recently stopped on his hydrochlorothiazide one week ago due to low sodium.   He also had stopped his calcium because his calcium was elevated.         4/17/2024     2:32 PM   Preop Questions   1. Have you ever had a heart attack or stroke? No   2. Have you ever had surgery on your heart or blood vessels, such as a stent placement, a coronary artery bypass, or surgery on an artery in your head, neck, heart, or legs? YES - Triple bypass in 2015   3. Do you have chest pain with activity? No   4. Do you have a history of  heart failure? No   5. Do you currently have a cold, bronchitis or symptoms of other infection? No   6. Do you have a cough, shortness of breath, or wheezing? No   7. Do you or anyone in your family have previous  history of blood clots? No   8. Do you or does anyone in your family have a serious bleeding problem such as prolonged bleeding following surgeries or cuts? No   9. Have you ever had problems with anemia or been told to take iron pills? No   10. Have you had any abnormal blood loss such as black, tarry or bloody stools? No   11. Have you ever had a blood transfusion? UNKNOWN    12. Are you willing to have a blood transfusion if it is medically needed before, during, or after your surgery? Yes   13. Have you or any of your relatives ever had problems with anesthesia? No   14. Do you have sleep apnea, excessive snoring or daytime drowsiness? No   15. Do you have any artifical heart valves or other implanted medical devices like a pacemaker, defibrillator, or continuous glucose monitor? No   16. Do you have artificial joints? No   17. Are you allergic to latex? No       Health Care Directive  Patient does not have a Health Care Directive or Living Will: Discussed advance care planning with patient; however, patient declined at this time.    Preoperative Review of    reviewed - no record of controlled substances prescribed.      Status of Chronic Conditions:      DIABETES - Patient has a longstanding history of DiabetesType Type II . Patient is being treated with oral agents and insulin injections and denies significant side effects. Control has been good. Complicating factors include but are not limited to: hypertension, hyperlipidemia, and chronic kidney disease.     HYPERLIPIDEMIA - Patient has a long history of significant Hyperlipidemia requiring medication for treatment with recent good control. Patient reports no problems or side effects with the medication.     HYPERTENSION - Patient has longstanding history of HTN , currently denies any symptoms referable to elevated blood pressure. Specifically denies chest pain, palpitations, dyspnea, orthopnea, PND or peripheral edema. Blood pressure readings have been  in normal range. Current medication regimen is as listed below. Patient denies any side effects of medication.     RENAL INSUFFICIENCY - Patient has a longstanding history of moderate-severe chronic renal insufficiency. Last Cr 1.32.     Patient Active Problem List    Diagnosis Date Noted    Stage 3a chronic kidney disease (H) 06/01/2022     Priority: Medium    Aortic root dilatation (H24)      Priority: Medium    Atrial flutter (H) 10/02/2017     Priority: Medium    Type 2 diabetes mellitus with diabetic nephropathy, with long-term current use of insulin (H) 11/02/2016     Priority: Medium    Microalbuminuria 06/27/2016     Priority: Medium    Advanced directives, counseling/discussion 08/19/2015     Priority: Medium     Discussed Advance Directive planning with patient; information given to patient to review.        Hyperlipidemia LDL goal <70      Priority: Medium    HTN (hypertension)      Priority: Medium    Anxiety 04/15/2015     Priority: Medium    CAD (coronary artery disease) 03/13/2015     Priority: Medium     CABG 4/7/2015 by Dr. Johan Hall: LIMA to LAD, SVG to circumflex, SVG to PDA          Past Medical History:   Diagnosis Date    Aortic root dilatation (H24)     Atrial flutter (H) 10/02/2017    ablation 11/26/2017    CAD (coronary artery disease) 03/13/2015    CABG 4/7/2015 by Dr. Johan Hall: LIMA to LAD, SVG to circumflex, SVG to PDA    Cardiomyopathy (H) 10/02/2017    tachycardia-induced with EF 40-45%, improved once back to NSR    HTN (hypertension)     Hyperlipidaemia     Microalbuminuria 6/27/2016    Obesity 3/13/2015    Type 2 diabetes mellitus with diabetic nephropathy, with long-term current use of insulin (H) 11/2/2016     Past Surgical History:   Procedure Laterality Date    BYPASS GRAFT ARTERY CORONARY N/A 4/7/2015    bypass LAD, OM, PDA       COLONOSCOPY N/A 6/20/2016    Procedure: COLONOSCOPY;  Surgeon: Marcela Schwartz MD;  Location:  GI    COLONOSCOPY N/A 1/6/2020     Procedure: Colonoscopy, With Polypectomy And Biopsy;  Surgeon: Herson Faust MD;  Location: [facility]    EXCISE PILONIDAL CYST, SIMPLE  1975    HEART CATH, ANGIOPLASTY  3-    3 vessel disease-occluded RCA, stenosis LM-to have CABG    removal of skin cancer  1992    TONSILLECTOMY & ADENOIDECTOMY  1950     Current Outpatient Medications   Medication Sig Dispense Refill    alcohol swab prep pads Use to swab area of injection/mar 4 times daily or as needed, for blood sugar. 400 each 3    amLODIPine (NORVASC) 5 MG tablet Take 1 tablet (5 mg) by mouth daily 90 tablet 3    aspirin-acetaminophen-caffeine (EXCEDRIN MIGRAINE) 250-250-65 MG tablet Take 1 tablet by mouth daily as needed      atorvastatin (LIPITOR) 40 MG tablet Take 1 tablet (40 mg) by mouth daily 90 tablet 3    B-D ULTRA-FINE 33 LANCETS MISC 1 Lancet 4 times daily To test for blood sugar 400 each 3    blood glucose (CONTOUR NEXT TEST) test strip Use to test blood sugar 4 times daily. 400 strip 6    blood glucose calibration (CONTOUR NEXT CONTROL LOW VI SOLN) Low solution Use to calibrate blood glucose monitor as directed. 1 each 3    blood glucose calibration (CONTOUR NEXT CONTROL NORMAL VI SOLN) Normal solution Use to calibrate blood glucose monitor as needed. 1 each 3    blood glucose monitoring (CONTOUR NEXT MONITOR W/DEVICE KIT) meter device kit Use to test blood sugar 4 times daily 1 kit 0    blood glucose monitoring (ONE TOUCH ULTRA 2) meter device kit Use to test blood sugar 4 times daily or as directed. 1 kit 0    folic acid (FOLVITE) 1 MG tablet Take 1,000 mcg by mouth daily      insulin glargine (LANTUS SOLOSTAR) 100 UNIT/ML pen INJECT 72 UNITS UNDER THE SKIN TWICE DAILY Strength: 100 UNIT/ML 75 mL 3    insulin lispro (HUMALOG KWIKPEN) 100 UNIT/ML (1 unit dial) KWIKPEN INJECT 40 UNITS UNDER THE SKIN THREE TIMES DAILY BEFORE MEALS. 10 UNITS PER 1 CARB 45 mL 3    insulin pen needle (NOVOFINE 30) 30G X 8 MM miscellaneous USE 4 TO 5 TIMES DAILY  OR AS DIRECTED 500 each 3    losartan (COZAAR) 100 MG tablet Take 1 tablet (100 mg) by mouth daily 90 tablet 3    mesalamine (CANASA) 1000 MG suppository Place 1 suppository (1,000 mg) rectally At Bedtime      metFORMIN (GLUCOPHAGE) 1000 MG tablet Take 1 tablet (1,000 mg) by mouth 2 times daily (with meals) 180 tablet 3    metoprolol succinate ER (TOPROL XL) 100 MG 24 hr tablet Take 1 tablet (100 mg) by mouth 2 times daily 180 tablet 4    sulfaSALAzine (AZULFIDINE) 500 MG tablet Take 500 mg by mouth      thin (NO BRAND SPECIFIED) lancets Use to test blood sugar, 4 times daily. Use with lanceting device. Blood Glucose Monitor Brands: per insurance. 400 each 6    hydrochlorothiazide (HYDRODIURIL) 25 MG tablet Take 1 tablet (25 mg) by mouth daily (Patient not taking: Reported on 2024) 90 tablet 3       Allergies   Allergen Reactions    Tetracycline      PN: LW Reaction: RASH    PN: LW Reaction: RASH   PN: LW Reaction: RASH    Adhesive Tape Rash    Codeine Rash     Mild rash    PN: LW Reaction: RASH;tolerates percocet   Mild rash    Simvastatin Other (See Comments)     Leg pain    PN: LW Reaction: leg pain   Leg pain        Social History     Tobacco Use    Smoking status: Former     Current packs/day: 0.00     Average packs/day: 1.5 packs/day for 29.0 years (43.5 ttl pk-yrs)     Types: Cigarettes     Start date:      Quit date: 2000     Years since quittin.3    Smokeless tobacco: Never   Substance Use Topics    Alcohol use: Yes     Alcohol/week: 0.0 standard drinks of alcohol     Comment: 1 mixed drink a night     Family History   Problem Relation Age of Onset    Breast Cancer Mother     Cerebrovascular Disease Mother     Hyperlipidemia Father     Cancer Father         lung, unrelated to smoking    Cancer Paternal Grandmother     Diabetes Brother     No Known Problems Sister     Colon Cancer No family hx of      History   Drug Use No         Review of Systems    Review of Systems  Constitutional,  "HEENT, cardiovascular, pulmonary, gi and gu systems are negative, except as otherwise noted.    Objective    /69   Pulse 80   Temp 97.1  F (36.2  C) (Oral)   Ht 1.753 m (5' 9\")   Wt 91.6 kg (202 lb)   SpO2 97%   BMI 29.83 kg/m     Estimated body mass index is 29.83 kg/m  as calculated from the following:    Height as of this encounter: 1.753 m (5' 9\").    Weight as of this encounter: 91.6 kg (202 lb).  Physical Exam  GENERAL: alert and no distress  EYES: Eyes grossly normal to inspection, PERRL and conjunctivae and sclerae normal  HENT: ear canals and TM's normal, nose and mouth without ulcers or lesions  NECK: no adenopathy, no asymmetry, masses, or scars  RESP: lungs clear to auscultation - no rales, rhonchi or wheezes  CV: regular rate and rhythm, normal S1 S2, no S3 or S4, no murmur, click or rub, no peripheral edema  ABDOMEN: soft, nontender, no hepatosplenomegaly, no masses and bowel sounds normal  MS: no gross musculoskeletal defects noted, no edema  SKIN: no suspicious lesions or rashes  NEURO: Normal strength and tone, mentation intact and speech normal  PSYCH: mentation appears normal, affect normal/bright    Recent Labs   Lab Test 04/10/24  1448 10/04/23  0951 03/16/23  1138 01/13/23  1137   HGB 14.2  --   --  15.2     --   --  210   * 133*   < > 135*   POTASSIUM 4.7 4.6   < > 4.9   CR 1.32* 0.91   < > 1.20*   A1C 7.1* 7.5*  --  8.0*    < > = values in this interval not displayed.        Diagnostics  Labs pending at this time.  Results will be reviewed when available.   EKG: appears normal, NSR, normal axis, normal intervals, no acute ST/T changes c/w ischemia, no LVH by voltage criteria, unchanged from previous tracings    Revised Cardiac Risk Index (RCRI)  The patient has the following serious cardiovascular risks for perioperative complications:   - Diabetes Mellitus (on Insulin) = 1 point     RCRI Interpretation: 1 point: Class II (low risk - 0.9% complication rate)     "     Signed Electronically by: SANDRA Garcia CNP  Copy of this evaluation report is provided to requesting physician.

## 2024-04-19 LAB
ANION GAP SERPL CALCULATED.3IONS-SCNC: 13 MMOL/L (ref 7–15)
BUN SERPL-MCNC: 24.8 MG/DL (ref 8–23)
CALCIUM SERPL-MCNC: 10.5 MG/DL (ref 8.8–10.2)
CHLORIDE SERPL-SCNC: 89 MMOL/L (ref 98–107)
CREAT SERPL-MCNC: 1.14 MG/DL (ref 0.67–1.17)
DEPRECATED HCO3 PLAS-SCNC: 24 MMOL/L (ref 22–29)
EGFRCR SERPLBLD CKD-EPI 2021: 69 ML/MIN/1.73M2
GLUCOSE SERPL-MCNC: 169 MG/DL (ref 70–99)
POTASSIUM SERPL-SCNC: 5.4 MMOL/L (ref 3.4–5.3)
SODIUM SERPL-SCNC: 126 MMOL/L (ref 135–145)

## 2024-04-23 VITALS
WEIGHT: 202 LBS | TEMPERATURE: 97.1 F | OXYGEN SATURATION: 97 % | HEART RATE: 80 BPM | RESPIRATION RATE: 16 BRPM | DIASTOLIC BLOOD PRESSURE: 69 MMHG | BODY MASS INDEX: 29.92 KG/M2 | HEIGHT: 69 IN | SYSTOLIC BLOOD PRESSURE: 114 MMHG

## 2024-04-23 DIAGNOSIS — E87.1 HYPONATREMIA: Primary | ICD-10-CM

## 2024-04-26 ENCOUNTER — HOSPITAL ENCOUNTER (OUTPATIENT)
Dept: ULTRASOUND IMAGING | Facility: CLINIC | Age: 74
Discharge: HOME | End: 2024-04-26
Attending: INTERNAL MEDICINE | Admitting: INTERNAL MEDICINE
Payer: COMMERCIAL

## 2024-04-26 DIAGNOSIS — R79.89 LFT ELEVATION: ICD-10-CM

## 2024-04-26 PROCEDURE — 76705 ECHO EXAM OF ABDOMEN: CPT

## 2024-04-30 ENCOUNTER — LAB (OUTPATIENT)
Dept: LAB | Facility: CLINIC | Age: 74
End: 2024-04-30
Payer: COMMERCIAL

## 2024-04-30 DIAGNOSIS — R79.89 LFT ELEVATION: ICD-10-CM

## 2024-04-30 DIAGNOSIS — E87.1 HYPONATREMIA: ICD-10-CM

## 2024-04-30 DIAGNOSIS — E83.52 HYPERCALCEMIA: ICD-10-CM

## 2024-04-30 DIAGNOSIS — Z11.59 ENCOUNTER FOR SCREENING FOR OTHER VIRAL DISEASES: ICD-10-CM

## 2024-04-30 PROCEDURE — 82390 ASSAY OF CERULOPLASMIN: CPT

## 2024-04-30 PROCEDURE — 86708 HEPATITIS A ANTIBODY: CPT

## 2024-04-30 PROCEDURE — 86803 HEPATITIS C AB TEST: CPT

## 2024-04-30 PROCEDURE — 80053 COMPREHEN METABOLIC PANEL: CPT

## 2024-04-30 PROCEDURE — 82542 COL CHROMOTOGRAPHY QUAL/QUAN: CPT | Mod: 90

## 2024-04-30 PROCEDURE — 87340 HEPATITIS B SURFACE AG IA: CPT

## 2024-04-30 PROCEDURE — 82728 ASSAY OF FERRITIN: CPT

## 2024-04-30 PROCEDURE — 36415 COLL VENOUS BLD VENIPUNCTURE: CPT

## 2024-04-30 PROCEDURE — 99000 SPECIMEN HANDLING OFFICE-LAB: CPT

## 2024-05-01 LAB
ALBUMIN SERPL BCG-MCNC: 4.4 G/DL (ref 3.5–5.2)
ALP SERPL-CCNC: 105 U/L (ref 40–150)
ALT SERPL W P-5'-P-CCNC: 77 U/L (ref 0–70)
ANION GAP SERPL CALCULATED.3IONS-SCNC: 14 MMOL/L (ref 7–15)
AST SERPL W P-5'-P-CCNC: 72 U/L (ref 0–45)
BILIRUB SERPL-MCNC: 0.5 MG/DL
BUN SERPL-MCNC: 27.1 MG/DL (ref 8–23)
CALCIUM SERPL-MCNC: 10.1 MG/DL (ref 8.8–10.2)
CHLORIDE SERPL-SCNC: 98 MMOL/L (ref 98–107)
CREAT SERPL-MCNC: 1.07 MG/DL (ref 0.67–1.17)
DEPRECATED HCO3 PLAS-SCNC: 21 MMOL/L (ref 22–29)
EGFRCR SERPLBLD CKD-EPI 2021: 74 ML/MIN/1.73M2
FERRITIN SERPL-MCNC: 184 NG/ML (ref 31–409)
GLUCOSE SERPL-MCNC: 149 MG/DL (ref 70–99)
HAV AB SER QL IA: NONREACTIVE
HBV SURFACE AG SERPL QL IA: NONREACTIVE
HCV AB SERPL QL IA: NONREACTIVE
POTASSIUM SERPL-SCNC: 4.4 MMOL/L (ref 3.4–5.3)
PROT SERPL-MCNC: 7.6 G/DL (ref 6.4–8.3)
SODIUM SERPL-SCNC: 133 MMOL/L (ref 135–145)

## 2024-05-02 ENCOUNTER — ANESTHESIA EVENT (OUTPATIENT)
Dept: SURGERY | Facility: CLINIC | Age: 74
End: 2024-05-02
Payer: COMMERCIAL

## 2024-05-02 ENCOUNTER — HOSPITAL ENCOUNTER (OUTPATIENT)
Facility: CLINIC | Age: 74
Discharge: HOME | End: 2024-05-03
Attending: SURGERY | Admitting: SURGERY
Payer: COMMERCIAL

## 2024-05-02 ENCOUNTER — ANESTHESIA (OUTPATIENT)
Dept: SURGERY | Facility: CLINIC | Age: 74
End: 2024-05-02
Payer: COMMERCIAL

## 2024-05-02 ENCOUNTER — APPOINTMENT (OUTPATIENT)
Dept: SURGERY | Facility: PHYSICIAN GROUP | Age: 74
End: 2024-05-02
Payer: COMMERCIAL

## 2024-05-02 DIAGNOSIS — K42.0 INCARCERATED UMBILICAL HERNIA: Primary | ICD-10-CM

## 2024-05-02 PROBLEM — I49.9 ARRHYTHMIA: Status: ACTIVE | Noted: 2024-05-02

## 2024-05-02 PROBLEM — I95.81 HYPOTENSION AFTER PROCEDURE: Status: ACTIVE | Noted: 2024-05-02

## 2024-05-02 LAB
ANION GAP SERPL CALCULATED.3IONS-SCNC: 13 MMOL/L (ref 7–15)
ATRIAL RATE - MUSE: 113 BPM
BUN SERPL-MCNC: 37.2 MG/DL (ref 8–23)
CALCIUM SERPL-MCNC: 9.4 MG/DL (ref 8.8–10.2)
CERULOPLASMIN SERPL-MCNC: 33 MG/DL (ref 20–60)
CHLORIDE SERPL-SCNC: 100 MMOL/L (ref 98–107)
CREAT SERPL-MCNC: 1.32 MG/DL (ref 0.67–1.17)
DEPRECATED HCO3 PLAS-SCNC: 22 MMOL/L (ref 22–29)
DIASTOLIC BLOOD PRESSURE - MUSE: NORMAL MMHG
EGFRCR SERPLBLD CKD-EPI 2021: 58 ML/MIN/1.73M2
GLUCOSE BLDC GLUCOMTR-MCNC: 106 MG/DL (ref 70–99)
GLUCOSE BLDC GLUCOMTR-MCNC: 160 MG/DL (ref 70–99)
GLUCOSE BLDC GLUCOMTR-MCNC: 282 MG/DL (ref 70–99)
GLUCOSE BLDC GLUCOMTR-MCNC: 59 MG/DL (ref 70–99)
GLUCOSE BLDC GLUCOMTR-MCNC: 84 MG/DL (ref 70–99)
GLUCOSE SERPL-MCNC: 98 MG/DL (ref 70–99)
INTERPRETATION ECG - MUSE: NORMAL
MAGNESIUM SERPL-MCNC: 1.7 MG/DL (ref 1.7–2.3)
P AXIS - MUSE: NORMAL DEGREES
POTASSIUM SERPL-SCNC: 5.5 MMOL/L (ref 3.4–5.3)
PR INTERVAL - MUSE: NORMAL MS
QRS DURATION - MUSE: 100 MS
QT - MUSE: 434 MS
QTC - MUSE: 458 MS
R AXIS - MUSE: 46 DEGREES
SODIUM SERPL-SCNC: 135 MMOL/L (ref 135–145)
SYSTOLIC BLOOD PRESSURE - MUSE: NORMAL MMHG
T AXIS - MUSE: 47 DEGREES
TROPONIN T SERPL HS-MCNC: 18 NG/L
VENTRICULAR RATE- MUSE: 67 BPM

## 2024-05-02 PROCEDURE — 370N000017 HC ANESTHESIA TECHNICAL FEE, PER MIN: Performed by: SURGERY

## 2024-05-02 PROCEDURE — 99222 1ST HOSP IP/OBS MODERATE 55: CPT | Performed by: INTERNAL MEDICINE

## 2024-05-02 PROCEDURE — 93005 ELECTROCARDIOGRAM TRACING: CPT | Mod: XU

## 2024-05-02 PROCEDURE — 250N000025 HC SEVOFLURANE, PER MIN: Performed by: SURGERY

## 2024-05-02 PROCEDURE — 250N000009 HC RX 250: Performed by: SURGERY

## 2024-05-02 PROCEDURE — 250N000011 HC RX IP 250 OP 636: Performed by: SURGERY

## 2024-05-02 PROCEDURE — S2900 ROBOTIC SURGICAL SYSTEM: HCPCS | Performed by: SURGERY

## 2024-05-02 PROCEDURE — 999N000141 HC STATISTIC PRE-PROCEDURE NURSING ASSESSMENT: Performed by: SURGERY

## 2024-05-02 PROCEDURE — 258N000003 HC RX IP 258 OP 636: Performed by: ANESTHESIOLOGY

## 2024-05-02 PROCEDURE — 82962 GLUCOSE BLOOD TEST: CPT

## 2024-05-02 PROCEDURE — 250N000011 HC RX IP 250 OP 636

## 2024-05-02 PROCEDURE — 84484 ASSAY OF TROPONIN QUANT: CPT | Performed by: INTERNAL MEDICINE

## 2024-05-02 PROCEDURE — 272N000001 HC OR GENERAL SUPPLY STERILE: Performed by: SURGERY

## 2024-05-02 PROCEDURE — 258N000003 HC RX IP 258 OP 636

## 2024-05-02 PROCEDURE — 49592 RPR AA HRN 1ST < 3 NCR/STRN: CPT | Mod: AS | Performed by: PHYSICIAN ASSISTANT

## 2024-05-02 PROCEDURE — 360N000080 HC SURGERY LEVEL 7, PER MIN: Performed by: SURGERY

## 2024-05-02 PROCEDURE — 258N000003 HC RX IP 258 OP 636: Performed by: INTERNAL MEDICINE

## 2024-05-02 PROCEDURE — C1781 MESH (IMPLANTABLE): HCPCS | Performed by: SURGERY

## 2024-05-02 PROCEDURE — 710N000009 HC RECOVERY PHASE 1, LEVEL 1, PER MIN: Performed by: SURGERY

## 2024-05-02 PROCEDURE — 36415 COLL VENOUS BLD VENIPUNCTURE: CPT | Performed by: INTERNAL MEDICINE

## 2024-05-02 PROCEDURE — 93010 ELECTROCARDIOGRAM REPORT: CPT | Performed by: INTERNAL MEDICINE

## 2024-05-02 PROCEDURE — 80048 BASIC METABOLIC PNL TOTAL CA: CPT | Performed by: INTERNAL MEDICINE

## 2024-05-02 PROCEDURE — 83735 ASSAY OF MAGNESIUM: CPT | Performed by: INTERNAL MEDICINE

## 2024-05-02 PROCEDURE — 49592 RPR AA HRN 1ST < 3 NCR/STRN: CPT | Performed by: SURGERY

## 2024-05-02 PROCEDURE — 250N000009 HC RX 250

## 2024-05-02 PROCEDURE — 258N000001 HC RX 258: Performed by: ANESTHESIOLOGY

## 2024-05-02 PROCEDURE — 250N000012 HC RX MED GY IP 250 OP 636 PS 637: Performed by: INTERNAL MEDICINE

## 2024-05-02 PROCEDURE — 250N000013 HC RX MED GY IP 250 OP 250 PS 637: Performed by: INTERNAL MEDICINE

## 2024-05-02 DEVICE — MACROPOROUS MESH, MONOFILAMENT POLYPROPYLENE
Type: IMPLANTABLE DEVICE | Site: ABDOMEN | Status: FUNCTIONAL
Brand: PARIETENE

## 2024-05-02 RX ORDER — LIDOCAINE HYDROCHLORIDE 20 MG/ML
INJECTION, SOLUTION INFILTRATION; PERINEURAL PRN
Status: DISCONTINUED | OUTPATIENT
Start: 2024-05-02 | End: 2024-05-02

## 2024-05-02 RX ORDER — CEFAZOLIN SODIUM/WATER 2 G/20 ML
2 SYRINGE (ML) INTRAVENOUS SEE ADMIN INSTRUCTIONS
Status: DISCONTINUED | OUTPATIENT
Start: 2024-05-02 | End: 2024-05-02 | Stop reason: HOSPADM

## 2024-05-02 RX ORDER — AMOXICILLIN 250 MG
2 CAPSULE ORAL 2 TIMES DAILY PRN
Status: DISCONTINUED | OUTPATIENT
Start: 2024-05-02 | End: 2024-05-03 | Stop reason: HOSPADM

## 2024-05-02 RX ORDER — FENTANYL CITRATE 50 UG/ML
50 INJECTION, SOLUTION INTRAMUSCULAR; INTRAVENOUS EVERY 5 MIN PRN
Status: DISCONTINUED | OUTPATIENT
Start: 2024-05-02 | End: 2024-05-02

## 2024-05-02 RX ORDER — ONDANSETRON 4 MG/1
4-8 TABLET, ORALLY DISINTEGRATING ORAL EVERY 8 HOURS PRN
Qty: 10 TABLET | Refills: 0 | Status: SHIPPED | OUTPATIENT
Start: 2024-05-02

## 2024-05-02 RX ORDER — DEXAMETHASONE SODIUM PHOSPHATE 4 MG/ML
INJECTION, SOLUTION INTRA-ARTICULAR; INTRALESIONAL; INTRAMUSCULAR; INTRAVENOUS; SOFT TISSUE PRN
Status: DISCONTINUED | OUTPATIENT
Start: 2024-05-02 | End: 2024-05-02

## 2024-05-02 RX ORDER — CEFAZOLIN SODIUM/WATER 2 G/20 ML
2 SYRINGE (ML) INTRAVENOUS
Status: COMPLETED | OUTPATIENT
Start: 2024-05-02 | End: 2024-05-02

## 2024-05-02 RX ORDER — PROPOFOL 10 MG/ML
INJECTION, EMULSION INTRAVENOUS CONTINUOUS PRN
Status: DISCONTINUED | OUTPATIENT
Start: 2024-05-02 | End: 2024-05-02

## 2024-05-02 RX ORDER — NALOXONE HYDROCHLORIDE 0.4 MG/ML
0.4 INJECTION, SOLUTION INTRAMUSCULAR; INTRAVENOUS; SUBCUTANEOUS
Status: DISCONTINUED | OUTPATIENT
Start: 2024-05-02 | End: 2024-05-03 | Stop reason: HOSPADM

## 2024-05-02 RX ORDER — SODIUM CHLORIDE, SODIUM LACTATE, POTASSIUM CHLORIDE, CALCIUM CHLORIDE 600; 310; 30; 20 MG/100ML; MG/100ML; MG/100ML; MG/100ML
INJECTION, SOLUTION INTRAVENOUS CONTINUOUS
Status: ACTIVE | OUTPATIENT
Start: 2024-05-02 | End: 2024-05-03

## 2024-05-02 RX ORDER — ONDANSETRON 4 MG/1
4 TABLET, ORALLY DISINTEGRATING ORAL EVERY 6 HOURS PRN
Status: DISCONTINUED | OUTPATIENT
Start: 2024-05-02 | End: 2024-05-03 | Stop reason: HOSPADM

## 2024-05-02 RX ORDER — OXYCODONE HYDROCHLORIDE 5 MG/1
5-10 TABLET ORAL EVERY 4 HOURS PRN
Qty: 6 TABLET | Refills: 0 | Status: SHIPPED | OUTPATIENT
Start: 2024-05-02

## 2024-05-02 RX ORDER — ONDANSETRON 2 MG/ML
4 INJECTION INTRAMUSCULAR; INTRAVENOUS EVERY 6 HOURS PRN
Status: DISCONTINUED | OUTPATIENT
Start: 2024-05-02 | End: 2024-05-03 | Stop reason: HOSPADM

## 2024-05-02 RX ORDER — OXYCODONE HYDROCHLORIDE 5 MG/1
10 TABLET ORAL EVERY 4 HOURS PRN
Status: DISCONTINUED | OUTPATIENT
Start: 2024-05-02 | End: 2024-05-03 | Stop reason: HOSPADM

## 2024-05-02 RX ORDER — LIDOCAINE 40 MG/G
CREAM TOPICAL
Status: DISCONTINUED | OUTPATIENT
Start: 2024-05-02 | End: 2024-05-03 | Stop reason: HOSPADM

## 2024-05-02 RX ORDER — AMOXICILLIN 250 MG
1 CAPSULE ORAL 2 TIMES DAILY PRN
Status: DISCONTINUED | OUTPATIENT
Start: 2024-05-02 | End: 2024-05-03 | Stop reason: HOSPADM

## 2024-05-02 RX ORDER — KETOROLAC TROMETHAMINE 15 MG/ML
15 INJECTION, SOLUTION INTRAMUSCULAR; INTRAVENOUS EVERY 6 HOURS
Qty: 4 ML | Refills: 0 | Status: COMPLETED | OUTPATIENT
Start: 2024-05-02 | End: 2024-05-03

## 2024-05-02 RX ORDER — HYDROMORPHONE HCL IN WATER/PF 6 MG/30 ML
0.4 PATIENT CONTROLLED ANALGESIA SYRINGE INTRAVENOUS
Status: DISCONTINUED | OUTPATIENT
Start: 2024-05-02 | End: 2024-05-03 | Stop reason: HOSPADM

## 2024-05-02 RX ORDER — ACETAMINOPHEN 325 MG/1
975 TABLET ORAL ONCE
Status: DISCONTINUED | OUTPATIENT
Start: 2024-05-02 | End: 2024-05-02

## 2024-05-02 RX ORDER — DEXAMETHASONE SODIUM PHOSPHATE 4 MG/ML
4 INJECTION, SOLUTION INTRA-ARTICULAR; INTRALESIONAL; INTRAMUSCULAR; INTRAVENOUS; SOFT TISSUE
Status: DISCONTINUED | OUTPATIENT
Start: 2024-05-02 | End: 2024-05-02

## 2024-05-02 RX ORDER — FENTANYL CITRATE 50 UG/ML
INJECTION, SOLUTION INTRAMUSCULAR; INTRAVENOUS PRN
Status: DISCONTINUED | OUTPATIENT
Start: 2024-05-02 | End: 2024-05-02

## 2024-05-02 RX ORDER — SODIUM CHLORIDE, SODIUM LACTATE, POTASSIUM CHLORIDE, CALCIUM CHLORIDE 600; 310; 30; 20 MG/100ML; MG/100ML; MG/100ML; MG/100ML
INJECTION, SOLUTION INTRAVENOUS CONTINUOUS
Status: DISCONTINUED | OUTPATIENT
Start: 2024-05-02 | End: 2024-05-02

## 2024-05-02 RX ORDER — SODIUM CHLORIDE, SODIUM LACTATE, POTASSIUM CHLORIDE, CALCIUM CHLORIDE 600; 310; 30; 20 MG/100ML; MG/100ML; MG/100ML; MG/100ML
INJECTION, SOLUTION INTRAVENOUS CONTINUOUS
Status: DISCONTINUED | OUTPATIENT
Start: 2024-05-02 | End: 2024-05-02 | Stop reason: HOSPADM

## 2024-05-02 RX ORDER — ACETAMINOPHEN 325 MG/1
650 TABLET ORAL EVERY 4 HOURS PRN
Status: DISCONTINUED | OUTPATIENT
Start: 2024-05-02 | End: 2024-05-03 | Stop reason: HOSPADM

## 2024-05-02 RX ORDER — LIDOCAINE 40 MG/G
CREAM TOPICAL
Status: DISCONTINUED | OUTPATIENT
Start: 2024-05-02 | End: 2024-05-02 | Stop reason: HOSPADM

## 2024-05-02 RX ORDER — PROPOFOL 10 MG/ML
INJECTION, EMULSION INTRAVENOUS PRN
Status: DISCONTINUED | OUTPATIENT
Start: 2024-05-02 | End: 2024-05-02

## 2024-05-02 RX ORDER — HYDROMORPHONE HCL IN WATER/PF 6 MG/30 ML
0.2 PATIENT CONTROLLED ANALGESIA SYRINGE INTRAVENOUS
Status: DISCONTINUED | OUTPATIENT
Start: 2024-05-02 | End: 2024-05-03 | Stop reason: HOSPADM

## 2024-05-02 RX ORDER — ACETAMINOPHEN 650 MG/1
650 SUPPOSITORY RECTAL EVERY 4 HOURS PRN
Status: DISCONTINUED | OUTPATIENT
Start: 2024-05-02 | End: 2024-05-03 | Stop reason: HOSPADM

## 2024-05-02 RX ORDER — ONDANSETRON 2 MG/ML
4 INJECTION INTRAMUSCULAR; INTRAVENOUS EVERY 30 MIN PRN
Status: DISCONTINUED | OUTPATIENT
Start: 2024-05-02 | End: 2024-05-02

## 2024-05-02 RX ORDER — OXYCODONE HYDROCHLORIDE 5 MG/1
5 TABLET ORAL
Status: DISCONTINUED | OUTPATIENT
Start: 2024-05-02 | End: 2024-05-02

## 2024-05-02 RX ORDER — ACETAMINOPHEN 325 MG/1
650 TABLET ORAL EVERY 4 HOURS PRN
Qty: 100 TABLET | Refills: 0 | Status: SHIPPED | OUTPATIENT
Start: 2024-05-02

## 2024-05-02 RX ORDER — FENTANYL CITRATE 50 UG/ML
25 INJECTION, SOLUTION INTRAMUSCULAR; INTRAVENOUS
Status: DISCONTINUED | OUTPATIENT
Start: 2024-05-02 | End: 2024-05-02

## 2024-05-02 RX ORDER — OXYCODONE HYDROCHLORIDE 5 MG/1
5 TABLET ORAL EVERY 4 HOURS PRN
Status: DISCONTINUED | OUTPATIENT
Start: 2024-05-02 | End: 2024-05-03 | Stop reason: HOSPADM

## 2024-05-02 RX ORDER — NALOXONE HYDROCHLORIDE 0.4 MG/ML
0.1 INJECTION, SOLUTION INTRAMUSCULAR; INTRAVENOUS; SUBCUTANEOUS
Status: DISCONTINUED | OUTPATIENT
Start: 2024-05-02 | End: 2024-05-02

## 2024-05-02 RX ORDER — ONDANSETRON 4 MG/1
4 TABLET, ORALLY DISINTEGRATING ORAL EVERY 30 MIN PRN
Status: DISCONTINUED | OUTPATIENT
Start: 2024-05-02 | End: 2024-05-02

## 2024-05-02 RX ORDER — HYDROMORPHONE HCL IN WATER/PF 6 MG/30 ML
0.4 PATIENT CONTROLLED ANALGESIA SYRINGE INTRAVENOUS EVERY 5 MIN PRN
Status: DISCONTINUED | OUTPATIENT
Start: 2024-05-02 | End: 2024-05-02

## 2024-05-02 RX ORDER — GLYCOPYRROLATE 0.2 MG/ML
INJECTION, SOLUTION INTRAMUSCULAR; INTRAVENOUS PRN
Status: DISCONTINUED | OUTPATIENT
Start: 2024-05-02 | End: 2024-05-02

## 2024-05-02 RX ORDER — NALOXONE HYDROCHLORIDE 0.4 MG/ML
0.2 INJECTION, SOLUTION INTRAMUSCULAR; INTRAVENOUS; SUBCUTANEOUS
Status: DISCONTINUED | OUTPATIENT
Start: 2024-05-02 | End: 2024-05-03 | Stop reason: HOSPADM

## 2024-05-02 RX ORDER — FENTANYL CITRATE 50 UG/ML
25 INJECTION, SOLUTION INTRAMUSCULAR; INTRAVENOUS EVERY 5 MIN PRN
Status: DISCONTINUED | OUTPATIENT
Start: 2024-05-02 | End: 2024-05-02

## 2024-05-02 RX ORDER — BUPIVACAINE HYDROCHLORIDE AND EPINEPHRINE 2.5; 5 MG/ML; UG/ML
1-30 INJECTION, SOLUTION EPIDURAL; INFILTRATION; INTRACAUDAL; PERINEURAL ONCE
Status: DISCONTINUED | OUTPATIENT
Start: 2024-05-02 | End: 2024-05-02

## 2024-05-02 RX ORDER — ONDANSETRON 2 MG/ML
INJECTION INTRAMUSCULAR; INTRAVENOUS PRN
Status: DISCONTINUED | OUTPATIENT
Start: 2024-05-02 | End: 2024-05-02

## 2024-05-02 RX ORDER — BUPIVACAINE HYDROCHLORIDE AND EPINEPHRINE 2.5; 5 MG/ML; UG/ML
INJECTION, SOLUTION INFILTRATION; PERINEURAL PRN
Status: DISCONTINUED | OUTPATIENT
Start: 2024-05-02 | End: 2024-05-02 | Stop reason: HOSPADM

## 2024-05-02 RX ORDER — DEXTROSE MONOHYDRATE 25 G/50ML
25-50 INJECTION, SOLUTION INTRAVENOUS
Status: DISCONTINUED | OUTPATIENT
Start: 2024-05-02 | End: 2024-05-03 | Stop reason: HOSPADM

## 2024-05-02 RX ORDER — HYDROMORPHONE HCL IN WATER/PF 6 MG/30 ML
0.2 PATIENT CONTROLLED ANALGESIA SYRINGE INTRAVENOUS EVERY 5 MIN PRN
Status: DISCONTINUED | OUTPATIENT
Start: 2024-05-02 | End: 2024-05-02

## 2024-05-02 RX ORDER — NEOSTIGMINE METHYLSULFATE 1 MG/ML
VIAL (ML) INJECTION PRN
Status: DISCONTINUED | OUTPATIENT
Start: 2024-05-02 | End: 2024-05-02

## 2024-05-02 RX ORDER — NICOTINE POLACRILEX 4 MG
15-30 LOZENGE BUCCAL
Status: DISCONTINUED | OUTPATIENT
Start: 2024-05-02 | End: 2024-05-03 | Stop reason: HOSPADM

## 2024-05-02 RX ORDER — DEXTROSE MONOHYDRATE 25 G/50ML
25 INJECTION, SOLUTION INTRAVENOUS ONCE
Status: COMPLETED | OUTPATIENT
Start: 2024-05-02 | End: 2024-05-02

## 2024-05-02 RX ORDER — HYDROXYZINE HYDROCHLORIDE 10 MG/1
10 TABLET, FILM COATED ORAL EVERY 6 HOURS PRN
Qty: 30 TABLET | Refills: 0 | Status: SHIPPED | OUTPATIENT
Start: 2024-05-02

## 2024-05-02 RX ORDER — OXYCODONE HYDROCHLORIDE 5 MG/1
10 TABLET ORAL
Status: DISCONTINUED | OUTPATIENT
Start: 2024-05-02 | End: 2024-05-02

## 2024-05-02 RX ORDER — KETOROLAC TROMETHAMINE 30 MG/ML
INJECTION, SOLUTION INTRAMUSCULAR; INTRAVENOUS PRN
Status: DISCONTINUED | OUTPATIENT
Start: 2024-05-02 | End: 2024-05-02

## 2024-05-02 RX ADMIN — KETOROLAC TROMETHAMINE 15 MG: 30 INJECTION, SOLUTION INTRAMUSCULAR at 09:51

## 2024-05-02 RX ADMIN — SODIUM CHLORIDE, POTASSIUM CHLORIDE, SODIUM LACTATE AND CALCIUM CHLORIDE: 600; 310; 30; 20 INJECTION, SOLUTION INTRAVENOUS at 12:54

## 2024-05-02 RX ADMIN — Medication 2 G: at 08:26

## 2024-05-02 RX ADMIN — NEOSTIGMINE METHYLSULFATE 4 MG: 1 INJECTION, SOLUTION INTRAVENOUS at 10:02

## 2024-05-02 RX ADMIN — ROCURONIUM BROMIDE 50 MG: 50 INJECTION, SOLUTION INTRAVENOUS at 08:38

## 2024-05-02 RX ADMIN — KETOROLAC TROMETHAMINE 15 MG: 15 INJECTION, SOLUTION INTRAMUSCULAR; INTRAVENOUS at 18:03

## 2024-05-02 RX ADMIN — SODIUM CHLORIDE, POTASSIUM CHLORIDE, SODIUM LACTATE AND CALCIUM CHLORIDE: 600; 310; 30; 20 INJECTION, SOLUTION INTRAVENOUS at 06:47

## 2024-05-02 RX ADMIN — PROPOFOL 200 MG: 10 INJECTION, EMULSION INTRAVENOUS at 08:38

## 2024-05-02 RX ADMIN — PHENYLEPHRINE HYDROCHLORIDE 150 MCG: 10 INJECTION INTRAVENOUS at 08:44

## 2024-05-02 RX ADMIN — INSULIN ASPART 1 UNITS: 100 INJECTION, SOLUTION INTRAVENOUS; SUBCUTANEOUS at 17:59

## 2024-05-02 RX ADMIN — SODIUM ZIRCONIUM CYCLOSILICATE 10 G: 10 POWDER, FOR SUSPENSION ORAL at 14:54

## 2024-05-02 RX ADMIN — ONDANSETRON 4 MG: 2 INJECTION INTRAMUSCULAR; INTRAVENOUS at 08:59

## 2024-05-02 RX ADMIN — LIDOCAINE HYDROCHLORIDE 40 MG: 20 INJECTION, SOLUTION INFILTRATION; PERINEURAL at 08:38

## 2024-05-02 RX ADMIN — DEXAMETHASONE SODIUM PHOSPHATE 6 MG: 4 INJECTION, SOLUTION INTRA-ARTICULAR; INTRALESIONAL; INTRAMUSCULAR; INTRAVENOUS; SOFT TISSUE at 08:38

## 2024-05-02 RX ADMIN — PROPOFOL 50 MCG/KG/MIN: 10 INJECTION, EMULSION INTRAVENOUS at 08:42

## 2024-05-02 RX ADMIN — PHENYLEPHRINE HYDROCHLORIDE 0.5 MCG/KG/MIN: 10 INJECTION INTRAVENOUS at 08:42

## 2024-05-02 RX ADMIN — GLYCOPYRROLATE 0.7 MG: 0.2 INJECTION, SOLUTION INTRAMUSCULAR; INTRAVENOUS at 10:02

## 2024-05-02 RX ADMIN — FENTANYL CITRATE 100 MCG: 50 INJECTION INTRAMUSCULAR; INTRAVENOUS at 08:38

## 2024-05-02 RX ADMIN — PHENYLEPHRINE HYDROCHLORIDE 150 MCG: 10 INJECTION INTRAVENOUS at 09:21

## 2024-05-02 RX ADMIN — PHENYLEPHRINE HYDROCHLORIDE 150 MCG: 10 INJECTION INTRAVENOUS at 08:32

## 2024-05-02 RX ADMIN — HYDROMORPHONE HYDROCHLORIDE 0.5 MG: 1 INJECTION, SOLUTION INTRAMUSCULAR; INTRAVENOUS; SUBCUTANEOUS at 09:36

## 2024-05-02 RX ADMIN — SODIUM ZIRCONIUM CYCLOSILICATE 10 G: 10 POWDER, FOR SUSPENSION ORAL at 19:57

## 2024-05-02 RX ADMIN — KETOROLAC TROMETHAMINE 15 MG: 15 INJECTION, SOLUTION INTRAMUSCULAR; INTRAVENOUS at 22:09

## 2024-05-02 RX ADMIN — DEXTROSE MONOHYDRATE 25 ML: 25 INJECTION, SOLUTION INTRAVENOUS at 06:47

## 2024-05-02 RX ADMIN — MIDAZOLAM 1 MG: 1 INJECTION INTRAMUSCULAR; INTRAVENOUS at 08:26

## 2024-05-02 RX ADMIN — SODIUM CHLORIDE, POTASSIUM CHLORIDE, SODIUM LACTATE AND CALCIUM CHLORIDE: 600; 310; 30; 20 INJECTION, SOLUTION INTRAVENOUS at 09:00

## 2024-05-02 ASSESSMENT — ACTIVITIES OF DAILY LIVING (ADL)
ADLS_ACUITY_SCORE: 31
ADLS_ACUITY_SCORE: 31
ADLS_ACUITY_SCORE: 29
ADLS_ACUITY_SCORE: 31
ADLS_ACUITY_SCORE: 29
ADLS_ACUITY_SCORE: 29
ADLS_ACUITY_SCORE: 31
ADLS_ACUITY_SCORE: 31
ADLS_ACUITY_SCORE: 29
ADLS_ACUITY_SCORE: 31
ADLS_ACUITY_SCORE: 31
ADLS_ACUITY_SCORE: 29
ADLS_ACUITY_SCORE: 31

## 2024-05-02 NOTE — OR NURSING
Dr. Lerma informed of blood sugar of 84.  No further orders.  Also informed of low BP and will continue to monitor at this time.

## 2024-05-02 NOTE — OR NURSING
Dr Lerma informed Pt rhythm second degree type 2 heartblock with short OR interval on monitor. Dr Lerma ordered 12 lead EKG. Dr Lerma aware of 12 lead results, and is at bedside.

## 2024-05-02 NOTE — ANESTHESIA PREPROCEDURE EVALUATION
Anesthesia Pre-Procedure Evaluation    Patient: Adan Ruffin   MRN: [id number] : 1950        Procedure : Procedure(s):  HERNIORRHAPHY, UMBILICAL, ROBOT-ASSISTED, LAPAROSCOPIC, USING DA FRANCISCO XI          Past Medical History:   Diagnosis Date    Aortic root dilatation (H24)     Atrial flutter (H) 10/02/2017    ablation 2017    CAD (coronary artery disease) 2015    CABG 2015 by Dr. Johan Hall: LIMA to LAD, SVG to circumflex, SVG to PDA    Cardiomyopathy (H) 10/02/2017    tachycardia-induced with EF 40-45%, improved once back to NSR    HTN (hypertension)     Hyperlipidaemia     Microalbuminuria 2016    Obesity 2015    Type 2 diabetes mellitus with diabetic nephropathy, with long-term current use of insulin (H) 2016      Past Surgical History:   Procedure Laterality Date    BYPASS GRAFT ARTERY CORONARY N/A 2015    bypass LAD, OM, PDA       COLONOSCOPY N/A 2016    Procedure: COLONOSCOPY;  Surgeon: Marcela Schwartz MD;  Location:  GI    COLONOSCOPY N/A 2020    Procedure: Colonoscopy, With Polypectomy And Biopsy;  Surgeon: Herson Faust MD;  Location:  GI    EXCISE PILONIDAL CYST, SIMPLE      HEART CATH, ANGIOPLASTY  3-    3 vessel disease-occluded RCA, stenosis LM-to have CABG    removal of skin cancer      TONSILLECTOMY & ADENOIDECTOMY  1950      Allergies   Allergen Reactions    Tetracycline      PN: LW Reaction: RASH    PN: LW Reaction: RASH   PN: LW Reaction: RASH    Adhesive Tape Rash    Codeine Rash     Mild rash    PN: LW Reaction: RASH;tolerates percocet   Mild rash    Simvastatin Other (See Comments)     Leg pain    PN: LW Reaction: leg pain   Leg pain      Social History     Tobacco Use    Smoking status: Former     Current packs/day: 0.00     Average packs/day: 1.5 packs/day for 29.0 years (43.5 ttl pk-yrs)     Types: Cigarettes     Start date:      Quit date: 2000     Years since quittin.3    Smokeless  tobacco: Never   Substance Use Topics    Alcohol use: Yes     Alcohol/week: 0.0 standard drinks of alcohol     Comment: 1 mixed drink a night      Wt Readings from Last 1 Encounters:   05/02/24 93.3 kg (205 lb 9.6 oz)        Anesthesia Evaluation   Pt has had prior anesthetic. Type: General.        ROS/MED HX  ENT/Pulmonary:  - neg pulmonary ROS     Neurologic:  - neg neurologic ROS     Cardiovascular:     (+)  hypertension- -  CAD -  - -                                      METS/Exercise Tolerance:     Hematologic: Comments: Lab Test        04/17/24     04/10/24     01/13/23     01/03/20     01/02/20     11/27/17     10/19/17                       1536          1448          1137          0625          1028          0730          1230          WBC          7.0          6.1          6.1            < >        7.3            < >         --           HGB          14.1         14.2         15.2           < >        9.7*           < >         --           MCV          99           99           104*           < >        78             < >         --           PLT          232          202          210            < >        371            < >         --           INR           --           --           --           --          1.04          --          0.97           < > = values in this interval not displayed.                  Lab Test        05/02/24 05/02/24 05/02/24 05/02/24 04/30/24 04/17/24                       1224          1015          0710          0619          1116          1536          NA           135           --           --           --          133*         126*          POTASSIUM    5.5*          --           --           --          4.4          5.4*          CHLORIDE     100           --           --           --          98           89*           CO2          22            --           --           --          21*          24            BUN          37.2*         --           --            --          27.1*        24.8*         CR           1.32*         --           --           --          1.07         1.14          ANIONGAP     13            --           --           --          14           13            AURE          9.4           --           --           --          10.1         10.5*         GLC          98           84           106*           < >        149*         169*           < > = values in this interval not displayed.                       Musculoskeletal:       GI/Hepatic:       Renal/Genitourinary:     (+) renal disease,             Endo:     (+)  type II DM,             Obesity,       Psychiatric/Substance Use:  - neg psychiatric ROS     Infectious Disease:       Malignancy:       Other:            Physical Exam    Airway        Mallampati: II   TM distance: > 3 FB   Neck ROM: full   Mouth opening: > 3 cm    Respiratory Devices and Support         Dental       (+) Minor Abnormalities - some fillings, tiny chips      Cardiovascular   cardiovascular exam normal          Pulmonary   pulmonary exam normal                OUTSIDE LABS:  CBC:   Lab Results   Component Value Date    WBC 7.0 04/17/2024    WBC 6.1 04/10/2024    HGB 14.1 04/17/2024    HGB 14.2 04/10/2024    HCT 38.6 (L) 04/17/2024    HCT 39.9 (L) 04/10/2024     04/17/2024     04/10/2024     BMP:   Lab Results   Component Value Date     05/02/2024     (L) 04/30/2024    POTASSIUM 5.5 (H) 05/02/2024    POTASSIUM 4.4 04/30/2024    CHLORIDE 100 05/02/2024    CHLORIDE 98 04/30/2024    CO2 22 05/02/2024    CO2 21 (L) 04/30/2024    BUN 37.2 (H) 05/02/2024    BUN 27.1 (H) 04/30/2024    CR 1.32 (H) 05/02/2024    CR 1.07 04/30/2024    GLC 98 05/02/2024    GLC 84 05/02/2024     COAGS:   Lab Results   Component Value Date    PTT 32 01/02/2020    INR 1.04 01/02/2020    FIBR 278 04/07/2015     POC:   Lab Results   Component Value Date    BGM 88 02/01/2020     HEPATIC:   Lab Results   Component Value Date  "   ALBUMIN 4.4 04/30/2024    PROTTOTAL 7.6 04/30/2024    ALT 77 (H) 04/30/2024    AST 72 (H) 04/30/2024    ALKPHOS 105 04/30/2024    BILITOTAL 0.5 04/30/2024     OTHER:   Lab Results   Component Value Date    PH 7.36 01/02/2020    LACT 0.5 (L) 04/09/2015    A1C 7.1 (H) 04/10/2024    AURE 9.4 05/02/2024    PHOS 3.9 04/10/2024    MAG 1.7 05/02/2024    TSH 2.13 04/10/2024    CRP 52.8 (H) 01/06/2020    SED 18 12/09/2020       Anesthesia Plan    ASA Status:  2       Anesthesia Type: General.     - Airway: LMA   Induction: Intravenous, Propofol.           Consents    Anesthesia Plan(s) and associated risks, benefits, and realistic alternatives discussed. Questions answered and patient/representative(s) expressed understanding.     - Discussed:     - Discussed with:  Patient      - Extended Intubation/Ventilatory Support Discussed: No.      - Patient is DNR/DNI Status: No     Use of blood products discussed: No .     Postoperative Care    Pain management: IV analgesics.   PONV prophylaxis: Dexamethasone or Solumedrol, Ondansetron (or other 5HT-3)     Comments:               Humberto Lerma MD    I have reviewed the pertinent notes and labs in the chart from the past 30 days and (re)examined the patient.  Any updates or changes from those notes are reflected in this note.    # Hyperkalemia: Highest K = 5.5 mmol/L in last 2 days, will monitor as appropriate  # Hyponatremia: Lowest Na = 125 mmol/L in last 30 days, will monitor as appropriate  # Hypercalcemia: Highest Ca = 10.9 mg/dL in last 30 days, will monitor as appropriate         # DMII: A1C = 7.1 % (Ref range: 0.0 - 5.6 %) within past 6 months  # Obesity: Estimated body mass index is 30.35 kg/m  as calculated from the following:    Height as of this encounter: 1.753 m (5' 9.02\").    Weight as of this encounter: 93.3 kg (205 lb 9.6 oz).      "

## 2024-05-02 NOTE — ANESTHESIA CARE TRANSFER NOTE
Patient: Adan Ruffin    Procedure: Procedure(s):  HERNIORRHAPHY, UMBILICAL, ROBOT-ASSISTED, LAPAROSCOPIC, USING DA FRANCISCO XI       Diagnosis: Incarcerated umbilical hernia [K42.0]  Diagnosis Additional Information: No value filed.    Anesthesia Type:   No value filed.     Note:    Oropharynx: spontaneously breathing and oral airway in place  Level of Consciousness: awake and drowsy  Oxygen Supplementation: face mask  Level of Supplemental Oxygen (L/min / FiO2): 8l    Dentition: dentition unchanged  Vital Signs Stable: post-procedure vital signs reviewed and stable  Report to RN Given: handoff report given  Patient transferred to: PACU  Comments: Pt to PACU, VSS, report to RN  Handoff Report: Identifed the Patient, Identified the Reponsible Provider, Reviewed the pertinent medical history, Discussed the surgical course, Reviewed Intra-OP anesthesia mangement and issues during anesthesia, Set expectations for post-procedure period and Allowed opportunity for questions and acknowledgement of understanding      Vitals:  Vitals Value Taken Time   BP 93/63 05/02/24 1015   Temp     Pulse 78 05/02/24 1018   Resp 16 05/02/24 1018   SpO2 99 % 05/02/24 1018   Vitals shown include unfiled device data.    Electronically Signed By: SANDRA Ledezma CRNA  May 2, 2024  10:20 AM

## 2024-05-02 NOTE — ANESTHESIA PROCEDURE NOTES
Airway       Patient location during procedure: OR       Procedure Start/Stop Times: 5/2/2024 8:40 AM  Staff -        Anesthesiologist:  Humberto Lerma MD       CRNA: Amilcar Viramontes APRN CRNA       Performed By: CRNA  Consent for Airway        Urgency: elective  Indications and Patient Condition       Indications for airway management: monica-procedural       Induction type:intravenous       Mask difficulty assessment: 1 - vent by mask    Final Airway Details       Final airway type: endotracheal airway       Successful airway: ETT - single  Endotracheal Airway Details        ETT size (mm): 8.0       Cuffed: yes       Successful intubation technique: direct laryngoscopy       DL Blade Type: MAC 3       Grade View of Cords: 2       Adjucts: stylet       Position: Right       Measured from: lips       Secured at (cm): 23       Bite block used: Oral Airway    Post intubation assessment        Placement verified by: capnometry, equal breath sounds and chest rise        Number of attempts at approach: 1       Number of other approaches attempted: 0       Secured with: tape       Ease of procedure: easy       Dentition: Intact and Unchanged    Medication(s) Administered   Medication Administration Time: 5/2/2024 8:40 AM

## 2024-05-02 NOTE — ANESTHESIA POSTPROCEDURE EVALUATION
Patient: Adan Ruffin    Procedure: Procedure(s):  HERNIORRHAPHY, UMBILICAL, ROBOT-ASSISTED, LAPAROSCOPIC, USING DA FRANCISCO XI       Anesthesia Type:  No value filed.    Note:  Disposition: Outpatient   Postop Pain Control: Uneventful            Sign Out: Well controlled pain   PONV: No   Neuro/Psych: Uneventful            Sign Out: Acceptable/Baseline neuro status   Airway/Respiratory: Uneventful            Sign Out: Acceptable/Baseline resp. status   CV/Hemodynamics: Uneventful            Sign Out: Acceptable CV status; No obvious hypovolemia; No obvious fluid overload   Other NRE: NONE   DID A NON-ROUTINE EVENT OCCUR? No           Last vitals:  Vitals Value Taken Time   /81 05/02/24 1345   Temp 97.3  F (36.3  C) 05/02/24 1330   Pulse 76 05/02/24 1415   Resp 11 05/02/24 1415   SpO2 100 % 05/02/24 1411   Vitals shown include unfiled device data.    Electronically Signed By: Humberto Lerma MD  May 2, 2024  2:16 PM

## 2024-05-02 NOTE — PHARMACY-ADMISSION MEDICATION HISTORY
PTA meds completed by pre-admitting nurse (     Curt Moore, EILEEN    ). No further clarifications required by pharmacy.    Prior to Admission medications    Medication Sig Last Dose Taking? Auth Provider Long Term End Date   acetaminophen (TYLENOL) 325 MG tablet Take 2 tablets (650 mg) by mouth every 4 hours as needed for other (mild pain)  Yes Srinath Armando MD     amLODIPine (NORVASC) 5 MG tablet Take 1 tablet (5 mg) by mouth daily 5/1/2024 at 0730 Yes Sharonda Ross APRN CNP Yes    aspirin-acetaminophen-caffeine (EXCEDRIN MIGRAINE) 250-250-65 MG tablet Take 1 tablet by mouth daily as needed Past Week Yes Reported, Patient No    atorvastatin (LIPITOR) 40 MG tablet Take 1 tablet (40 mg) by mouth daily 5/1/2024 at 0730 Yes Lauro Galloway MD Yes    folic acid (FOLVITE) 1 MG tablet Take 1,000 mcg by mouth daily 5/1/2024 at 0730 Yes Reported, Patient     hydrochlorothiazide (HYDRODIURIL) 25 MG tablet Take 1 tablet (25 mg) by mouth daily 4/25/2024 Yes Lauro Galloway MD Yes    hydrOXYzine HCl (ATARAX) 10 MG tablet Take 1 tablet (10 mg) by mouth every 6 hours as needed for itching or anxiety (with pain, moderate pain)  Yes Srinath Armando MD     insulin glargine (LANTUS SOLOSTAR) 100 UNIT/ML pen INJECT 72 UNITS UNDER THE SKIN TWICE DAILY Strength: 100 UNIT/ML 5/1/2024 at 1930 Yes Lauro Galloway MD Yes    insulin lispro (HUMALOG KWIKPEN) 100 UNIT/ML (1 unit dial) KWIKPEN INJECT 40 UNITS UNDER THE SKIN THREE TIMES DAILY BEFORE MEALS. 10 UNITS PER 1 CARB More than a month Yes Lauro Galloway MD Yes    losartan (COZAAR) 100 MG tablet Take 1 tablet (100 mg) by mouth daily 5/1/2024 at 0730 Yes Lauro Galloway MD Yes    mesalamine (CANASA) 1000 MG suppository Place 1 suppository (1,000 mg) rectally At Bedtime 5/1/2024 at 2200 Yes Lauro Galloway MD     metFORMIN (GLUCOPHAGE) 1000 MG tablet Take 1 tablet (1,000 mg) by mouth 2 times daily (with meals) 5/1/2024 at 1830 Yes Lauro Galloway,  MD Yes    metoprolol succinate ER (TOPROL XL) 100 MG 24 hr tablet Take 1 tablet (100 mg) by mouth 2 times daily 5/1/2024 at 1930 Yes Sharonda Ross APRN CNP Yes    ondansetron (ZOFRAN ODT) 4 MG ODT tab Take 1-2 tablets (4-8 mg) by mouth every 8 hours as needed for nausea Dissolve ON the tongue.  Yes Srinath Armando MD     oxyCODONE (ROXICODONE) 5 MG tablet Take 1-2 tablets (5-10 mg) by mouth every 4 hours as needed for moderate to severe pain  Yes Srinath Armando MD     sulfaSALAzine (AZULFIDINE) 500 MG tablet Take 500 mg by mouth 2 times daily 5/1/2024 at 0730 Yes Reported, Patient     alcohol swab prep pads Use to swab area of injection/mar 4 times daily or as needed, for blood sugar.   Lauro Galloway MD     B-D ULTRA-FINE 33 LANCETS MISC 1 Lancet 4 times daily To test for blood sugar   Lauro Galloway MD     blood glucose (CONTOUR NEXT TEST) test strip Use to test blood sugar 4 times daily.   Lauro Galloway MD     blood glucose calibration (CONTOUR NEXT CONTROL LOW VI SOLN) Low solution Use to calibrate blood glucose monitor as directed.   Lauro Galloway MD     blood glucose calibration (CONTOUR NEXT CONTROL NORMAL VI SOLN) Normal solution Use to calibrate blood glucose monitor as needed.   Lauro Galloway MD     blood glucose monitoring (CONTOUR NEXT MONITOR W/DEVICE KIT) meter device kit Use to test blood sugar 4 times daily   Lauro Galloway MD     blood glucose monitoring (ONE TOUCH ULTRA 2) meter device kit Use to test blood sugar 4 times daily or as directed.   Lauro Galloway MD     insulin pen needle (NOVOFINE 30) 30G X 8 MM miscellaneous USE 4 TO 5 TIMES DAILY OR AS DIRECTED   Lauro Galloway MD     thin (NO BRAND SPECIFIED) lancets Use to test blood sugar, 4 times daily. Use with lanceting device. Blood Glucose Monitor Brands: per insurance.   Lauro Galloway MD

## 2024-05-02 NOTE — CONSULTS
Cardiology brief note    No presyncope or syncope.  No lightheadedness.  No bradycardia.    ECG reviewed and agree with Dr. Hendrix that it does not appear to be second-degree AV block but rather sinus with accelerated junctional and retrograde P waves.    Do not need to hold the beta-blocker from that standpoint.  Last echocardiogram 2022 with normal LV function.

## 2024-05-02 NOTE — PROVIDER NOTIFICATION
Patient's blood sugar upon arrival was 59.  No reported symptoms of hypoglycemia.  Dr. Lerma made aware and orders received to give 25 mls of D50 and recheck blood sugar 15 minutes later.  Repeat blood sugar 15 minutes after D50 given was 105 mg/dl.

## 2024-05-02 NOTE — PLAN OF CARE
ROOM # 231    Living Situation (if not independent, order SW consult): At home with spouse  Facility name: NA  : Mona, spouse    Activity level at baseline: Independent  Activity level on admit: SBA    Who will be transporting you at discharge: TBD    Patient registered to observation; given Patient Bill of Rights; given the opportunity to ask questions about observation status and their plan of care.  Patient has been oriented to the observation room, bathroom and call light is in place.    Discussed discharge goals and expectations with patient/family. Yes

## 2024-05-02 NOTE — OP NOTE
[facility] General Surgery Operative Note    Pre-operative diagnosis: Incarcerated Umbilical hernia   Post-operative diagnosis: same   Procedure: Robotic assisted laparoscopic ESTELLE incarcerated Umbilical hernia repair with mesh   Surgeon: Srinath Armando MD     Assistant(s): Brenden Fernandez PA-C  The Physician Assistant was medically necessary for their expertise in prepping, camera management, exchanging robotic instruments, passing suture and mesh through the robotic ports, and suturing   Anesthesia: general   Estimated blood loss:  Specimen:  FINDINGS:  5 cc  none  2.5cm incarcerated umbilical hernia       Indication for Procedure: This is a 73 year old male who presented to the office with a symptomatic incarcerated umbilical hernia and desired repair. We discussed approaches to management and mutually agreed on a robotic approach for this patient.    Description of procedure:  Patient was brought to the operating room, placed on the operating table in supine position. Anesthesia was induced. His  pressure points were padded and he was secured with a safety strap. A timeout was called to verify the patient, site of procedure and procedure to be performed.    The abdomen was entered with a Veress needle at Malhotra's point. Pneumoperitoneum was established. The Veress was removed and an 8mm robotic trocar was placed into the abdomen. The abdomen was surveyed and found to have no obvious injuries from needle or trocar placement.  Two additional 8mm ports were then placed in the left abdomen, after marking out the dimensions of the hernia. Care was taken to make sure ports were adequately spaced from bony structures to allow for movement.  The robot was then docked. With a monopolar scissors in the right hand and force bipolar in the left hand, the contents of the hernia sac (omentum) were reduced into the abdomen. Bleeding was controlled with cautery. Next a score tommy was made in the peritoneum approximately  6 cm superior to the hernia defect. The peritoneum was then sharp and bluntly dissected off of the anterior abdominal wall, creating a space large enough to accomodate our mesh. The perotoneum was able to be completely reduced out of the hernia.   There was a single midline hernia defect, with the largest being 2.5cm. A running #1 Strattafix was then used to close the fascial defect(s). The bipolar grasper was used to measure the space and a [id number] parietene mesh was then cut and placed into the abdomen. This laid flat against the abdominal wall. We secured the mesh in 6 quadrants with 2-0 vicryl suture.  Next the peritoneal defect as closed with a running 3-0 stratafix.  The abdominal cavity was inspected and there was adequate hemostasis. The trocars were then removed. The skin was closed with 4-0 suture. Sterile dressings were applied. At the end of the operation, all sponge, instrument, and needle counts were correct.     Srinath Armando MD

## 2024-05-02 NOTE — CONSULTS
[facility]  Hospitalist Consult     Assessment & Plan     ASSESSMENT    73M with hx of CAD s/p CABG, HTN, IDDM Type II, and pAF s/p ablation presents post-op after an elective hernia repair with an asymptomatic heart block noted on cardiac monitoring.    PLAN    Second Degree Heart Block  -Asymptomatic heart block noted on cardiac monitoring after elective surgery, not bradycardic  -EKG electronic read Mobitz Type I but actually looks like it may be Type II, in which PPM often needed  -No ZION or STD to suggest infarct as cause of new heart block but will obtain trop  PLAN  -Hold home metoprolol  -Repeat EKG  -Trop, BMP, and Mg  -Telemetry monitoring  -Echocardiogram  -Cardiology consultation    Other Issues  -Elective Hernia Repair: Post-op care per general surgery  -IDDM Type II: MDSS + Home Lantus 72U bid  -Essential HTN: Hold home meds given mild post-op hypotension  -CAD s/p CABG: Home ASA + Statin  -pAF s/p Ablation: NSR on recent EKGs. No longer on AC. Hold metop given AV block    DVT Prophy  -Per surgery team    Disposition  -Observation Unit      Bertin Carlin MD    History of Present Illness     Adan Ruffin is a 73 year old with hx of CAD s/p CABG, HTN, IDDM Type II, and pAF s/p ablation presents post-op after an elective hernia repair. Hospital medicine team consulted for asymptomatic heart block noted on cardiac monitoring post-op. Patient had an elective hernia repair completed this morning by general surgery. Heart block reported to have been noted on cardiac monitoring and follow-up EKG with second decree AV block. Patient is currently asymptomatic. Denies chest pain or SOB. No post-op pain currently. Hx of CAD s/p CABG. Also hx of pAF s/p ablation. NSR on recent EKGs. No longer on AC.     Review of Systems     A Comprehensive greater than 10 system review of systems was carried out.  Pertinent positives and negatives are noted above.  Otherwise negative for contributory  information.     Past Medical History     Past Medical History:   Diagnosis Date    Aortic root dilatation (H24)     Atrial flutter (H) 10/02/2017    ablation 11/26/2017    CAD (coronary artery disease) 03/13/2015    CABG 4/7/2015 by Dr. Johan Hall: LIMA to LAD, SVG to circumflex, SVG to PDA    Cardiomyopathy (H) 10/02/2017    tachycardia-induced with EF 40-45%, improved once back to NSR    HTN (hypertension)     Hyperlipidaemia     Microalbuminuria 06/27/2016    Obesity 03/13/2015    Type 2 diabetes mellitus with diabetic nephropathy, with long-term current use of insulin (H) 11/02/2016     Medications     Medications Prior to Admission   Medication Sig Dispense Refill Last Dose    amLODIPine (NORVASC) 5 MG tablet Take 1 tablet (5 mg) by mouth daily 90 tablet 3 5/1/2024 at 0730    aspirin-acetaminophen-caffeine (EXCEDRIN MIGRAINE) 250-250-65 MG tablet Take 1 tablet by mouth daily as needed   Past Week    atorvastatin (LIPITOR) 40 MG tablet Take 1 tablet (40 mg) by mouth daily 90 tablet 3 5/1/2024 at 0730    folic acid (FOLVITE) 1 MG tablet Take 1,000 mcg by mouth daily   5/1/2024 at 0730    hydrochlorothiazide (HYDRODIURIL) 25 MG tablet Take 1 tablet (25 mg) by mouth daily 90 tablet 3 4/25/2024    insulin glargine (LANTUS SOLOSTAR) 100 UNIT/ML pen INJECT 72 UNITS UNDER THE SKIN TWICE DAILY Strength: 100 UNIT/ML 75 mL 3 5/1/2024 at 1930    insulin lispro (HUMALOG KWIKPEN) 100 UNIT/ML (1 unit dial) KWIKPEN INJECT 40 UNITS UNDER THE SKIN THREE TIMES DAILY BEFORE MEALS. 10 UNITS PER 1 CARB 45 mL 3 More than a month    losartan (COZAAR) 100 MG tablet Take 1 tablet (100 mg) by mouth daily 90 tablet 3 5/1/2024 at 0730    mesalamine (CANASA) 1000 MG suppository Place 1 suppository (1,000 mg) rectally At Bedtime   5/1/2024 at 2200    metFORMIN (GLUCOPHAGE) 1000 MG tablet Take 1 tablet (1,000 mg) by mouth 2 times daily (with meals) 180 tablet 3 5/1/2024 at 1830    metoprolol succinate ER (TOPROL XL) 100 MG 24 hr tablet  Take 1 tablet (100 mg) by mouth 2 times daily 180 tablet 4 5/1/2024 at 1930    sulfaSALAzine (AZULFIDINE) 500 MG tablet Take 500 mg by mouth   5/1/2024 at 0730    alcohol swab prep pads Use to swab area of injection/mar 4 times daily or as needed, for blood sugar. 400 each 3     B-D ULTRA-FINE 33 LANCETS MISC 1 Lancet 4 times daily To test for blood sugar 400 each 3     blood glucose (CONTOUR NEXT TEST) test strip Use to test blood sugar 4 times daily. 400 strip 6     blood glucose calibration (CONTOUR NEXT CONTROL LOW VI SOLN) Low solution Use to calibrate blood glucose monitor as directed. 1 each 3     blood glucose calibration (CONTOUR NEXT CONTROL NORMAL VI SOLN) Normal solution Use to calibrate blood glucose monitor as needed. 1 each 3     blood glucose monitoring (CONTOUR NEXT MONITOR W/DEVICE KIT) meter device kit Use to test blood sugar 4 times daily 1 kit 0     blood glucose monitoring (ONE TOUCH ULTRA 2) meter device kit Use to test blood sugar 4 times daily or as directed. 1 kit 0     insulin pen needle (NOVOFINE 30) 30G X 8 MM miscellaneous USE 4 TO 5 TIMES DAILY OR AS DIRECTED 500 each 3     thin (NO BRAND SPECIFIED) lancets Use to test blood sugar, 4 times daily. Use with lanceting device. Blood Glucose Monitor Brands: per insurance. 400 each 6      Past Surgical History     Past Surgical History:   Procedure Laterality Date    BYPASS GRAFT ARTERY CORONARY N/A 4/7/2015    bypass LAD, OM, PDA       COLONOSCOPY N/A 6/20/2016    Procedure: COLONOSCOPY;  Surgeon: Marcela Schwartz MD;  Location:  GI    COLONOSCOPY N/A 1/6/2020    Procedure: Colonoscopy, With Polypectomy And Biopsy;  Surgeon: Herson Faust MD;  Location:  GI    EXCISE PILONIDAL CYST, SIMPLE  1975    HEART CATH, ANGIOPLASTY  3-    3 vessel disease-occluded RCA, stenosis LM-to have CABG    removal of skin cancer  1992    TONSILLECTOMY & ADENOIDECTOMY  1950     Family History     Family History   Problem Relation Age of  "Onset    Breast Cancer Mother     Cerebrovascular Disease Mother     Hyperlipidemia Father     Cancer Father         lung, unrelated to smoking    Cancer Paternal Grandmother     Diabetes Brother     No Known Problems Sister     Colon Cancer No family hx of      Allergies     Allergies   Allergen Reactions    Tetracycline      PN: LW Reaction: RASH    PN: LW Reaction: RASH   PN: LW Reaction: RASH    Adhesive Tape Rash    Codeine Rash     Mild rash    PN: LW Reaction: RASH;tolerates percocet   Mild rash    Simvastatin Other (See Comments)     Leg pain    PN: LW Reaction: leg pain   Leg pain     Social History     Social History     Tobacco Use    Smoking status: Former     Current packs/day: 0.00     Average packs/day: 1.5 packs/day for 29.0 years (43.5 ttl pk-yrs)     Types: Cigarettes     Start date:      Quit date: 2000     Years since quittin.3    Smokeless tobacco: Never   Substance Use Topics    Alcohol use: Yes     Alcohol/week: 0.0 standard drinks of alcohol     Comment: 1 mixed drink a night     Physical Exam   Blood pressure 111/54, pulse 62, temperature 97.6  F (36.4  C), temperature source Temporal, resp. rate 15, height 1.753 m (5' 9.02\"), weight 93.3 kg (205 lb 9.6 oz), SpO2 100%.    General: Ill appearing, cooperative with exam, in NAD.  HEENT: Atraumatic. No erythema in posterior pharynx.  Lymph: No cervical or inguinal lymphadenopathy.  Cardiac: RRR. Lungs Clear.  Lungs: CTAB. Nl WOB.  Abd: Non-tender. Post-operative dressing in place.  Ext: No edema. 2+ pulses.  Skin: No rashes, abrasions, or contusions.  Psych: A&Ox3. Nl affect.  Neuro: 5/5 strength. Sensation intact.    Labs & Imaging     Reviewed and Pertinent results discussed in assessment and plan.      "

## 2024-05-03 ENCOUNTER — APPOINTMENT (OUTPATIENT)
Dept: CARDIOLOGY | Facility: CLINIC | Age: 74
End: 2024-05-03
Attending: INTERNAL MEDICINE
Payer: COMMERCIAL

## 2024-05-03 VITALS
HEART RATE: 89 BPM | TEMPERATURE: 98.6 F | HEIGHT: 69 IN | OXYGEN SATURATION: 90 % | DIASTOLIC BLOOD PRESSURE: 63 MMHG | WEIGHT: 205.6 LBS | SYSTOLIC BLOOD PRESSURE: 127 MMHG | BODY MASS INDEX: 30.45 KG/M2 | RESPIRATION RATE: 16 BRPM

## 2024-05-03 LAB
ANION GAP SERPL CALCULATED.3IONS-SCNC: 13 MMOL/L (ref 7–15)
BUN SERPL-MCNC: 27.7 MG/DL (ref 8–23)
CALCIUM SERPL-MCNC: 9.2 MG/DL (ref 8.8–10.2)
CHLORIDE SERPL-SCNC: 95 MMOL/L (ref 98–107)
CREAT SERPL-MCNC: 1.08 MG/DL (ref 0.67–1.17)
DEPRECATED HCO3 PLAS-SCNC: 23 MMOL/L (ref 22–29)
EGFRCR SERPLBLD CKD-EPI 2021: 73 ML/MIN/1.73M2
ERYTHROCYTE [DISTWIDTH] IN BLOOD BY AUTOMATED COUNT: 12.4 % (ref 10–15)
GLUCOSE BLDC GLUCOMTR-MCNC: 118 MG/DL (ref 70–99)
GLUCOSE BLDC GLUCOMTR-MCNC: 145 MG/DL (ref 70–99)
GLUCOSE SERPL-MCNC: 112 MG/DL (ref 70–99)
HCT VFR BLD AUTO: 32.3 % (ref 40–53)
HGB BLD-MCNC: 11.4 G/DL (ref 13.3–17.7)
LVEF ECHO: NORMAL
MCH RBC QN AUTO: 36.2 PG (ref 26.5–33)
MCHC RBC AUTO-ENTMCNC: 35.3 G/DL (ref 31.5–36.5)
MCV RBC AUTO: 103 FL (ref 78–100)
PLATELET # BLD AUTO: 186 10E3/UL (ref 150–450)
POTASSIUM SERPL-SCNC: 3.8 MMOL/L (ref 3.4–5.3)
RBC # BLD AUTO: 3.15 10E6/UL (ref 4.4–5.9)
SODIUM SERPL-SCNC: 131 MMOL/L (ref 135–145)
WBC # BLD AUTO: 6.3 10E3/UL (ref 4–11)

## 2024-05-03 PROCEDURE — 85027 COMPLETE CBC AUTOMATED: CPT | Performed by: INTERNAL MEDICINE

## 2024-05-03 PROCEDURE — 99232 SBSQ HOSP IP/OBS MODERATE 35: CPT | Performed by: NURSE PRACTITIONER

## 2024-05-03 PROCEDURE — 93306 TTE W/DOPPLER COMPLETE: CPT | Mod: 26 | Performed by: INTERNAL MEDICINE

## 2024-05-03 PROCEDURE — 80048 BASIC METABOLIC PNL TOTAL CA: CPT | Performed by: INTERNAL MEDICINE

## 2024-05-03 PROCEDURE — 93306 TTE W/DOPPLER COMPLETE: CPT

## 2024-05-03 PROCEDURE — 250N000013 HC RX MED GY IP 250 OP 250 PS 637: Performed by: INTERNAL MEDICINE

## 2024-05-03 PROCEDURE — 82962 GLUCOSE BLOOD TEST: CPT

## 2024-05-03 PROCEDURE — 36415 COLL VENOUS BLD VENIPUNCTURE: CPT | Performed by: INTERNAL MEDICINE

## 2024-05-03 PROCEDURE — 250N000011 HC RX IP 250 OP 636: Performed by: SURGERY

## 2024-05-03 RX ADMIN — KETOROLAC TROMETHAMINE 15 MG: 15 INJECTION, SOLUTION INTRAMUSCULAR; INTRAVENOUS at 05:43

## 2024-05-03 RX ADMIN — SODIUM ZIRCONIUM CYCLOSILICATE 10 G: 10 POWDER, FOR SUSPENSION ORAL at 08:12

## 2024-05-03 ASSESSMENT — ACTIVITIES OF DAILY LIVING (ADL)
ADLS_ACUITY_SCORE: 29
ADLS_ACUITY_SCORE: 31
ADLS_ACUITY_SCORE: 29
ADLS_ACUITY_SCORE: 29
ADLS_ACUITY_SCORE: 31
ADLS_ACUITY_SCORE: 31
ADLS_ACUITY_SCORE: 33
ADLS_ACUITY_SCORE: 33
ADLS_ACUITY_SCORE: 29
ADLS_ACUITY_SCORE: 33
ADLS_ACUITY_SCORE: 33
ADLS_ACUITY_SCORE: 31

## 2024-05-03 NOTE — PROGRESS NOTES
"[facility]    Medicine Progress Note - Hospitalist Service    Date of Admission:  5/2/2024    Assessment & Plan   73M with hx of CAD s/p CABG, HTN, IDDM Type II, and pAF s/p ablation presents post-op after an elective hernia repair with an asymptomatic heart block noted on cardiac monitoring.     PLAN     Possible Second Degree Heart Block.  On further review it appears that this rhythm is SR with accelerated junctional with retrograde P waves.  No further issues.  Discussed with cardiology. Echo  -- when compared to TTE in 6/2022 global biventricular function is stable. EF 55-60%.  Flattened septum with RV volume overload.  Not hypoxic.  Denies any significant chest pain or SOB.  No significant edema.  Medically ready for discharge from surgical perspective.  No need to stop beta blocker.  Follow up with PCP PRN.      Other Issues  -Elective Hernia Repair: Post-op care per general surgery  -IDDM Type II: MDSS + Home Lantus 72U bid  -Essential HTN: Okay to resume medications.  -CAD s/p CABG: Resume ASA + Statin  -pAF s/p Ablation: NSR on recent EKGs. No longer on AC. Resume metoprolol.           Diet: Moderate Consistent Carb (60 g CHO per Meal) Diet    DVT Prophylaxis: Low Risk/Ambulatory with no VTE prophylaxis indicated  Fleming Catheter: Not present  Lines: None     Cardiac Monitoring: ACTIVE order. Indication: Acute decompensated heart failure (48 hours)  Code Status: Full Code      Clinically Significant Risk Factors Present on Admission        # Hyperkalemia: Highest K = 5.5 mmol/L in last 2 days, will monitor as appropriate           # Hypertension: Noted on problem list     # DMII: A1C = 7.1 % (Ref range: 0.0 - 5.6 %) within past 6 months    # Obesity: Estimated body mass index is 30.34 kg/m  as calculated from the following:    Height as of this encounter: 1.753 m (5' 9.02\").    Weight as of this encounter: 93.3 kg (205 lb 9.6 oz).        # History of CABG: noted on surgical history   "     Disposition Plan per clinical course.     Medically Ready for Discharge: Anticipated Today           The patient's care was discussed with the Bedside Nurse, Care Coordinator/, Patient, and Primary team.    SANDRA Leonardo [facility]  Hospitalist Service  [facility]  Securely message with [employer] (more info)  Text page via [facility] Paging/Directory   ______________________________________________________________________    Interval History   Chart reviewed.  VSS.  No acute issues at presents. Wants to discharge to go home.        Physical Exam   Vital Signs: Temp: 98.3  F (36.8  C) Temp src: Oral BP: 126/61 Pulse: 90   Resp: 20 SpO2: 90 % O2 Device: None (Room air) Oxygen Delivery: 1.5 LPM  Weight: 205 lbs 9.62 oz    GEN:   Alert, oriented x 3, appears comfortable, NAD.  NECK:   Supple ,no mass or thyromegaly   HEENT:  Normocephalic/atraumatic, no scleral icterus, no nasal discharge, mouth moist.  CV:   Regular rate and rhythm, no murmur or JVD.  S1 + S2 noted, no S3 or S4.  LUNGS:   Clear to auscultation bilaterally without rales/rhonchi/wheezing/retractions.  Symmetric chest rise on inhalation noted.  ABD:   Active bowel sounds, soft, non-tender/non-distended.  No rebound/guarding/rigidity.  EXT:   No edema.  No cyanosis.  No joint synovitis noted.  SKIN:   Dry to touch, no exanthems noted in the visualized areas.  Neurologic: Grossly intact,non focal.   Neuropsychiatric:  General: normal, calm and normal eye contact  Level of consciousness: alert / normal  Affect: normal  Orientation: oriented to self, place, time and situation     Medical Decision Making       45 MINUTES SPENT BY ME on the date of service doing chart review, history, exam, documentation & further activities per the note.      Data   ------------------------- PAST 24 HR DATA REVIEWED -----------------------------------------------    I have personally reviewed the following data over the past 24 hrs:    6.3  \    11.4 (L)   / 186     131 (L) 95 (L) 27.7 (H) /  118 (H)   3.8 23 1.08 \     Trop: N/A BNP: N/A       Imaging results reviewed over the past 24 hrs:   Recent Results (from the past 24 hour(s))   Echocardiogram Complete   Result Value    LVEF  55-60%    [facility]    [id number]  [id number]  [id number]  [id number]^JUAN^PIYUSH^VALERIE     [facility]  Echocardiography Laboratory  [address] [place] [address]     Name: EDURADO PARKINSON  MRN: [id number]  : 1950  Study Date: 2024 07:20 AM  Age: 73 yrs  Gender: Male  Patient Location: [facility]  Reason For Study: 2nd degree AV Block  Ordering Physician: PIYUSH MARTINEZ  Referring Physician: REGINA HOLDEN  Performed By: Yair Bruce RDCS     BSA: 2.1 m2  Height: 69 in  Weight: 205 lb  HR: 81  BP: 91/68 mmHg  ______________________________________________________________________________  Procedure  Complete Portable Echo Adult.  ______________________________________________________________________________  Interpretation Summary     Technically challenging study due to patient body habitus.     Left ventricular systolic function is normal. The visual ejection fraction is  55-60%. Flattened septum is consistent with RV volume overload.  Right ventricular global function is normal.  No significant valvular abnormalities.  IVC diameter and respiratory changes fall into an intermediate range  suggesting an RA pressure of 8 mmHg.     This study was compared to a TTE from 6/3/2022. Global biventricular function  is stable.  ______________________________________________________________________________  Left Ventricle  The left ventricle is normal in size. There is concentric remodeling present.  Left ventricular systolic function is normal. The visual ejection fraction is  55-60%. Left ventricular diastolic function is indeterminate. Flattened septum  is consistent with RV volume overload.     Right Ventricle  The right  ventricle is normal in size and function.     Atria  The left atrium is mildly dilated. The right atrium is mildly dilated.     Mitral Valve  There is mild mitral annular calcification. There is trace mitral  regurgitation.     Tricuspid Valve  There is mild (1+) tricuspid regurgitation. Right ventricular systolic  pressure could not be approximated due to inadequate tricuspid regurgitation.     Aortic Valve  There is mild trileaflet aortic sclerosis.     Pulmonic Valve  The pulmonic valve is not well seen, but is grossly normal.     Vessels  The aortic root is normal size. Normal size ascending aorta. IVC diameter and  respiratory changes fall into an intermediate range suggesting an RA pressure  of 8 mmHg.     Pericardium  There is no pericardial effusion.     ______________________________________________________________________________  MMode/2D Measurements & Calculations  IVSd: 1.2 cm  LVIDd: 4.7 cm  LVIDs: 2.6 cm  LVPWd: 1.1 cm  IVC diam: 2.1 cm  FS: 45.9 %  LV mass(C)d: 194.5 grams  LV mass(C)dI: 93.2 grams/m2     Ao root diam: 3.9 cm  LA dimension: 4.2 cm  asc Aorta Diam: 3.6 cm  LA/Ao: 1.1  Ao root diam index Ht(cm/m): 2.2  Ao root diam index BSA (cm/m2): 1.9  Asc Ao diam index BSA (cm/m2): 1.7  Asc Ao diam index Ht(cm/m): 2.1  LA Volume (BP): 77.9 ml  LA Volume Index (BP): 37.3 ml/m2  RV Base: 3.9 cm     RWT: 0.46  TAPSE: 1.6 cm     Doppler Measurements & Calculations  MV E max blaine: 127.0 cm/sec  MV A max blaine: 59.8 cm/sec  MV E/A: 2.1  MV dec slope: 978.9 cm/sec2  MV dec time: 0.13 sec  E/E' av.5  Lateral E/e': 8.8  Medial E/e': 18.2  RV S Blaine: 14.6 cm/sec     ______________________________________________________________________________  Report approved by: Asher Crain 2024 09:38 AM           EKG SR with accelerated junction rhythm with retrograde P waves.

## 2024-05-03 NOTE — PROGRESS NOTES
"[facility]   General Surgery Progress Note           Assessment and Plan:   Assessment:   1 Day Post-Op  s/p Procedure(s):  HERNIORRHAPHY, UMBILICAL, ROBOT-ASSISTED, LAPAROSCOPIC, USING [facility]ISCO XI        Plan:   -discharge home today  -appreciate assistance from hospitalist and cardiology  -surgery PA will follow up by telephone in 2-3 weeks         Interval History:   Feels well, no c/o. Minimal postop discomfort. Tolerating diet and voiding ok. Eager to discharge.          Physical Exam:   Blood pressure 126/61, pulse 90, temperature 98.3  F (36.8  C), temperature source Oral, resp. rate 20, height 1.753 m (5' 9.02\"), weight 93.3 kg (205 lb 9.6 oz), SpO2 90%.    I/O last 3 completed shifts:  In: 2120 [P.O.:120; I.V.:2000]  Out: 500 [Urine:500]    Abdomen:   soft, non-distended, non-tender    Inc(s) - clean, dry, intact                Data:     Recent Labs   Lab 05/03/24  0631   WBC 6.3   HGB 11.4*   HCT 32.3*   *          Christiane Garland PA-C  Text page: [phone] 8 am to 4 pm  After 4 pm, call [phone]       "

## 2024-05-03 NOTE — PLAN OF CARE
"PRIMARY DIAGNOSIS: SP Hernia Repair  OUTPATIENT/OBSERVATION GOALS TO BE MET BEFORE DISCHARGE:  ADLs back to baseline: Yes    Activity and level of assistance: Up with standby assistance.    Pain status: On scheduled IV Toradol    Return to near baseline physical activity: Yes     Discharge Planner Nurse   Safe discharge environment identified: Yes  Barriers to discharge: Yes       Entered by: Kyleigh Strange RN 05/03/2024 7:29 AM   On telemetry SR with frequent PVC's. Voiding freely but with difficulty baseline for patient. PIV on left forearm SL.   Please review provider order for any additional goals.   Nurse to notify provider when observation goals have been met and patient is ready for discharge.  Problem: Adult Inpatient Plan of Care  Goal: Plan of Care Review  Description: The Plan of Care Review/Shift note should be completed every shift.  The Outcome Evaluation is a brief statement about your assessment that the patient is improving, declining, or no change.  This information will be displayed automatically on your shift  note.  5/3/2024 0728 by Kyleigh Strange RN  Outcome: Progressing  Flowsheets (Taken 5/3/2024 0728)  Outcome Evaluation: ready to go home  Plan of Care Reviewed With: patient  Overall Patient Progress: improving  5/3/2024 0648 by Kyleigh Strange RN  Outcome: Progressing  Goal: Patient-Specific Goal (Individualized)  Description: You can add care plan individualizations to a care plan. Examples of Individualization might be:  \"Parent requests to be called daily at 9am for status\", \"I have a hard time hearing out of my right ear\", or \"Do not touch me to wake me up as it startles  me\".  5/3/2024 0728 by Kyleigh Strange RN  Outcome: Progressing  5/3/2024 0648 by Kyleigh Strange RN  Outcome: Progressing  Goal: Absence of Hospital-Acquired Illness or Injury  5/3/2024 0728 by Kyleigh Strange RN  Outcome: Progressing  5/3/2024 0648 by Kyleigh Strange RN  Outcome: " Progressing  Intervention: Identify and Manage Fall Risk  Recent Flowsheet Documentation  Taken 5/3/2024 0443 by Kyleigh Strange RN  Safety Promotion/Fall Prevention: activity supervised  Intervention: Prevent and Manage VTE (Venous Thromboembolism) Risk  Recent Flowsheet Documentation  Taken 5/3/2024 0443 by Kyleigh Strange RN  VTE Prevention/Management: SCDs (sequential compression devices) on  Intervention: Prevent Infection  Recent Flowsheet Documentation  Taken 5/3/2024 0443 by Kyleigh Strange RN  Infection Prevention:   rest/sleep promoted   hand hygiene promoted  Goal: Optimal Comfort and Wellbeing  5/3/2024 0728 by Kyleigh Strange RN  Outcome: Progressing  5/3/2024 0648 by Kyleigh Strange RN  Outcome: Progressing  Goal: Readiness for Transition of Care  5/3/2024 0728 by Kyleigh Strange RN  Outcome: Progressing  5/3/2024 0648 by Kyleigh Strange RN  Outcome: Progressing     Goal Outcome Evaluation:      Plan of Care Reviewed With: patient    Overall Patient Progress: improvingOverall Patient Progress: improving    Outcome Evaluation: ready to go home

## 2024-05-03 NOTE — PLAN OF CARE
"PRIMARY DIAGNOSIS: Hernia repair   OUTPATIENT/OBSERVATION GOALS TO BE MET BEFORE DISCHARGE:  1. Stable vital signs Yes  2. Tolerating diet:Yes  3. Pain controlled with oral pain medications:  Yes  4. Positive bowel sounds:  Yes  5. Voiding without difficulty:  Yes  6. Able to ambulate:  Yes  7. Provider specific discharge goals met:  Yes    Discharge Planner Nurse   Safe discharge environment identified: Yes  Barriers to discharge: Yes       Entered by: Dorys Packer RN 05/03/2024 9:49 AM     Please review provider order for any additional goals.   Nurse to notify provider when observation goals have been met and patient is ready for discharge.    Patient is alert and oriented x4. VS WNL and documented on the FS. Lung sounds clear and patient is on RA. Denies SOB. Active bowel sounds in all 4 quadrants. Patient voiding spontaneously. Patient states he has some lower abdominal pain, but does not want anything for pain. X3 lap sites with derma bond. No s/s of infection. SL. Mod carb diet and tolerating. BS was 118 and no insulin given. SBA when up. Patient states he is ready to discharge today.     /61 (BP Location: Right arm, Patient Position: Semi-Sebastian's, Cuff Size: Adult Regular)   Pulse 90   Temp 98.3  F (36.8  C) (Oral)   Resp 20   Ht 1.753 m (5' 9.02\")   Wt 93.3 kg (205 lb 9.6 oz)   SpO2 90%   BMI 30.34 kg/m      Problem: Adult Inpatient Plan of Care  Goal: Plan of Care Review  Description: The Plan of Care Review/Shift note should be completed every shift.  The Outcome Evaluation is a brief statement about your assessment that the patient is improving, declining, or no change.  This information will be displayed automatically on your shift  note.  Outcome: Progressing  Flowsheets (Taken 5/3/2024 0949)  Outcome Evaluation: ready to discharge today  Plan of Care Reviewed With: patient  Overall Patient Progress: improving  Goal: Patient-Specific Goal (Individualized)  Description: You can add care " "plan individualizations to a care plan. Examples of Individualization might be:  \"Parent requests to be called daily at 9am for status\", \"I have a hard time hearing out of my right ear\", or \"Do not touch me to wake me up as it startles  me\".  Outcome: Progressing  Goal: Absence of Hospital-Acquired Illness or Injury  Outcome: Progressing  Intervention: Identify and Manage Fall Risk  Recent Flowsheet Documentation  Taken 5/3/2024 0810 by Dorys Packer RN  Safety Promotion/Fall Prevention:   activity supervised   assistive device/personal items within reach   patient and family education   nonskid shoes/slippers when out of bed  Intervention: Prevent Skin Injury  Recent Flowsheet Documentation  Taken 5/3/2024 0810 by Dorys Packer RN  Body Position: position changed independently  Intervention: Prevent and Manage VTE (Venous Thromboembolism) Risk  Recent Flowsheet Documentation  Taken 5/3/2024 0810 by Dorys Packer RN  VTE Prevention/Management: SCDs (sequential compression devices) on  Intervention: Prevent Infection  Recent Flowsheet Documentation  Taken 5/3/2024 0810 by Dorys Packer RN  Infection Prevention:   rest/sleep promoted   hand hygiene promoted  Goal: Optimal Comfort and Wellbeing  Outcome: Progressing  Goal: Readiness for Transition of Care  Outcome: Progressing   Goal Outcome Evaluation:      Plan of Care Reviewed With: patient    Overall Patient Progress: improvingOverall Patient Progress: improving    Outcome Evaluation: ready to discharge today      "

## 2024-05-03 NOTE — PLAN OF CARE
"CARE FROM 1630 - 2300  PRIMARY DIAGNOSIS: Umbilical Hernia Repair  OUTPATIENT/OBSERVATION GOALS TO BE MET BEFORE DISCHARGE:  ADLs back to baseline: No    Activity and level of assistance: Up with standby assistance.    Pain status: Pain free.    Return to near baseline physical activity: No     Discharge Planner Nurse   Safe discharge environment identified: Yes  Barriers to discharge: Yes       Entered by: Mary Samson RN 05/02/2024 8:22 PM    Patient A & O x 4, Lung Sounds CTA, Bowel Sounds active, LBM 5/2. Patient is a SBA, continent of bladder x 2, voiding adequately. Patient denies pain/NV/SOB, capno in place. Abdominal 3 incisions intact, no bleeding. Tele reading SR 90, PIV to LLA infusing LR @ 100 ml/hr. Cardiology if following. Will continue to monitor and provide supportive cares.     /76 (BP Location: Left arm)   Pulse 89   Temp 97.6  F (36.4  C) (Oral)   Resp 24   Ht 1.753 m (5' 9.02\")   Wt 93.3 kg (205 lb 9.6 oz)   SpO2 98%   BMI 30.34 kg/m       Please review provider order for any additional goals.   Nurse to notify provider when observation goals have been met and patient is ready for discharge.Problem: Adult Inpatient Plan of Care  Goal: Plan of Care Review  Description: The Plan of Care Review/Shift note should be completed every shift.  The Outcome Evaluation is a brief statement about your assessment that the patient is improving, declining, or no change.  This information will be displayed automatically on your shift  note.  Outcome: Progressing  Goal: Patient-Specific Goal (Individualized)  Description: You can add care plan individualizations to a care plan. Examples of Individualization might be:  \"Parent requests to be called daily at 9am for status\", \"I have a hard time hearing out of my right ear\", or \"Do not touch me to wake me up as it startles  me\".  Outcome: Progressing  Flowsheets (Taken 5/2/2024 1700)  Anxieties, Fears or Concerns: None  Goal: Absence of " Hospital-Acquired Illness or Injury  Outcome: Progressing  Intervention: Identify and Manage Fall Risk  Recent Flowsheet Documentation  Taken 5/2/2024 1729 by Mary Samson RN  Safety Promotion/Fall Prevention:   activity supervised   lighting adjusted   mobility aid in reach   nonskid shoes/slippers when out of bed  Intervention: Prevent Skin Injury  Recent Flowsheet Documentation  Taken 5/2/2024 1729 by Mary Samson RN  Body Position: position changed independently  Intervention: Prevent and Manage VTE (Venous Thromboembolism) Risk  Recent Flowsheet Documentation  Taken 5/2/2024 1729 by Mary Samson RN  VTE Prevention/Management: SCDs (sequential compression devices) off  Intervention: Prevent Infection  Recent Flowsheet Documentation  Taken 5/2/2024 1729 by Mary Samson RN  Infection Prevention:   personal protective equipment utilized   rest/sleep promoted   hand hygiene promoted   single patient room provided  Goal: Optimal Comfort and Wellbeing  Outcome: Progressing  Goal: Readiness for Transition of Care  Outcome: Progressing  Intervention: Mutually Develop Transition Plan  Recent Flowsheet Documentation  Taken 5/2/2024 1700 by Mary Samson RN  Patient/Family Anticipates Transition to: home with family   Goal Outcome Evaluation:

## 2024-05-03 NOTE — PLAN OF CARE
PRIMARY DIAGNOSIS: SP Hernia repair  OUTPATIENT/OBSERVATION GOALS TO BE MET BEFORE DISCHARGE:  ADLs back to baseline: Yes    Activity and level of assistance: Up with standby assistance.    Pain status: Improved on scheduled IV Toradol    Return to near baseline physical activity: Yes     Discharge Planner Nurse   Safe discharge environment identified: Yes  Barriers to discharge: Yes       Entered by: Kyleigh Strange RN 05/03/2024 7:26 AM    Alert and oriented. On 1.5LPM/NC but denies any SOB.       Please review provider order for any additional goals.   Nurse to notify provider when observation goals have been met and patient is ready for discharge.  Problem: Adult Inpatient Plan of Care  Goal: Absence of Hospital-Acquired Illness or Injury  Intervention: Identify and Manage Fall Risk  Recent Flowsheet Documentation  Taken 5/3/2024 0443 by Kyleigh Strange RN  Safety Promotion/Fall Prevention: activity supervised  Intervention: Prevent and Manage VTE (Venous Thromboembolism) Risk  Recent Flowsheet Documentation  Taken 5/3/2024 0443 by Kyleigh Strange RN  VTE Prevention/Management: SCDs (sequential compression devices) on  Intervention: Prevent Infection  Recent Flowsheet Documentation  Taken 5/3/2024 0443 by Kyleigh Strange RN  Infection Prevention:   rest/sleep promoted   hand hygiene promoted

## 2024-05-03 NOTE — PLAN OF CARE
"  Problem: Adult Inpatient Plan of Care  Goal: Plan of Care Review  Description: The Plan of Care Review/Shift note should be completed every shift.  The Outcome Evaluation is a brief statement about your assessment that the patient is improving, declining, or no change.  This information will be displayed automatically on your shift  note.  5/3/2024 1240 by Dorys Packer RN  Outcome: Met  Flowsheets (Taken 5/3/2024 1240)  Outcome Evaluation: discharging  Plan of Care Reviewed With:   patient   spouse  5/3/2024 0949 by Dorys Packer RN  Outcome: Progressing  Flowsheets (Taken 5/3/2024 0949)  Outcome Evaluation: ready to discharge today  Plan of Care Reviewed With: patient  Overall Patient Progress: improving  Goal: Patient-Specific Goal (Individualized)  Description: You can add care plan individualizations to a care plan. Examples of Individualization might be:  \"Parent requests to be called daily at 9am for status\", \"I have a hard time hearing out of my right ear\", or \"Do not touch me to wake me up as it startles  me\".  5/3/2024 1240 by Dorys Packer RN  Outcome: Met  5/3/2024 0949 by Dorys Packer RN  Outcome: Progressing  Goal: Absence of Hospital-Acquired Illness or Injury  5/3/2024 1240 by Dorys Packer RN  Outcome: Met  5/3/2024 0949 by Dorys Packer RN  Outcome: Progressing  Intervention: Identify and Manage Fall Risk  Recent Flowsheet Documentation  Taken 5/3/2024 0810 by Dorys Packer RN  Safety Promotion/Fall Prevention:   activity supervised   assistive device/personal items within reach   patient and family education   nonskid shoes/slippers when out of bed  Intervention: Prevent Skin Injury  Recent Flowsheet Documentation  Taken 5/3/2024 0810 by Dorys Packer RN  Body Position: position changed independently  Intervention: Prevent and Manage VTE (Venous Thromboembolism) Risk  Recent Flowsheet Documentation  Taken 5/3/2024 0810 by Dorys Packer RN  VTE Prevention/Management: SCDs (sequential compression devices) " on  Intervention: Prevent Infection  Recent Flowsheet Documentation  Taken 5/3/2024 0810 by Dorys Packer RN  Infection Prevention:   rest/sleep promoted   hand hygiene promoted  Goal: Optimal Comfort and Wellbeing  5/3/2024 1240 by Dorys Packer RN  Outcome: Met  5/3/2024 0949 by Dorys Packer RN  Outcome: Progressing  Goal: Readiness for Transition of Care  5/3/2024 1240 by Dorys Packer RN  Outcome: Met  5/3/2024 0949 by Dorys Packer RN  Outcome: Progressing   Patient's After Visit Summary was reviewed with patient and/or spouse.   Patient verbalized understanding of After Visit Summary, recommended follow up and was given an opportunity to ask questions.   Discharge medications sent home with patient/family: Refused all pain medication. Patient states he will just take Ibuprofen and use ice.    Discharged with spouse with all belongings. Patient medically cleared to discharge. Denied pain.  No pain medications sent home with patient (sent back to pharmacy).   Goal Outcome Evaluation:      Plan of Care Reviewed With: patient, spouse    Overall Patient Progress: improvingOverall Patient Progress: improving    Outcome Evaluation: discharging

## 2024-05-04 LAB — PTH RELATED PROT SERPL-SCNC: 2.8 PMOL/L

## 2024-05-20 ENCOUNTER — TELEPHONE (OUTPATIENT)
Dept: SURGERY | Facility: CLINIC | Age: 74
End: 2024-05-20
Payer: COMMERCIAL

## 2024-05-20 NOTE — TELEPHONE ENCOUNTER
[facility] Postoperative Call Note:     Adan Ruffin was called for an update regarding his recovery. He underwent a robotic-assisted umbilical hernia repair with mesh by Dr. Armando on 5/2/24. Today he tells me he is doing well and denies any complaints. He states his wounds are healing well.    The patient states all of his questions were answered. He understands our discussion. He agrees to follow up as needed or to call our office with any concerns.    Brenden Fernandez PA-C      Please route or send letter to:  Primary Care Provider (PCP)

## 2024-07-01 ENCOUNTER — HOSPITAL ENCOUNTER (OUTPATIENT)
Dept: CARDIOLOGY | Facility: CLINIC | Age: 74
Discharge: HOME | End: 2024-07-01
Attending: NURSE PRACTITIONER | Admitting: NURSE PRACTITIONER
Payer: COMMERCIAL

## 2024-07-01 DIAGNOSIS — I25.10 CORONARY ARTERY DISEASE INVOLVING NATIVE CORONARY ARTERY OF NATIVE HEART WITHOUT ANGINA PECTORIS: ICD-10-CM

## 2024-07-01 LAB — LVEF ECHO: NORMAL

## 2024-07-01 PROCEDURE — 93325 DOPPLER ECHO COLOR FLOW MAPG: CPT | Mod: 26 | Performed by: INTERNAL MEDICINE

## 2024-07-01 PROCEDURE — 93321 DOPPLER ECHO F-UP/LMTD STD: CPT | Mod: 26 | Performed by: INTERNAL MEDICINE

## 2024-07-01 PROCEDURE — 93308 TTE F-UP OR LMTD: CPT | Mod: 26 | Performed by: INTERNAL MEDICINE

## 2024-07-01 PROCEDURE — 93325 DOPPLER ECHO COLOR FLOW MAPG: CPT

## 2024-07-15 ENCOUNTER — TRANSFERRED RECORDS (OUTPATIENT)
Dept: HEALTH INFORMATION MANAGEMENT | Facility: CLINIC | Age: 74
End: 2024-07-15
Payer: COMMERCIAL

## 2024-07-15 LAB
ALT SERPL-CCNC: 63 IU/L (ref 0–44)
AST SERPL-CCNC: 92 IU/L (ref 0–40)
CREATININE (EXTERNAL): 1.07 MG/DL (ref 0.76–1.27)
GFR ESTIMATED (EXTERNAL): 74 ML/MIN/1.73

## 2024-08-12 ENCOUNTER — TRANSFERRED RECORDS (OUTPATIENT)
Dept: HEALTH INFORMATION MANAGEMENT | Facility: CLINIC | Age: 74
End: 2024-08-12
Payer: COMMERCIAL

## 2024-08-12 LAB — RETINOPATHY: NEGATIVE

## 2024-08-14 ENCOUNTER — TELEPHONE (OUTPATIENT)
Dept: INTERNAL MEDICINE | Facility: CLINIC | Age: 74
End: 2024-08-14
Payer: COMMERCIAL

## 2024-08-14 NOTE — TELEPHONE ENCOUNTER
Forms/Letter Request    Type of form/letter: OTHER: are [employer]       Do we have the form/letter: Yes: placed in provider mailbox for signature    Who is the form from? [facility] Insurance comp    Where did/will the form come from? Patient or family brought in       When is form/letter needed by: 5-7    How would you like the form/letter returned: Mail  Is this the correct address?: No -  ATTN: [employer]  [facility]  [address]  [place] [address]  Return envelope provided      Patient Notified form requests are processed in 5-7 business days:Yes    Could we send this information to you in [email or web address] or would you prefer to receive a phone call?:   NA

## 2024-08-15 ENCOUNTER — TRANSFERRED RECORDS (OUTPATIENT)
Dept: HEALTH INFORMATION MANAGEMENT | Facility: CLINIC | Age: 74
End: 2024-08-15
Payer: COMMERCIAL

## 2024-08-15 LAB
CREATININE (EXTERNAL): 1.07 MG/DL (ref 0.76–1.27)
GFR ESTIMATED (EXTERNAL): 74 ML/MIN/1.73
HEP C HIM: NORMAL

## 2024-08-21 ENCOUNTER — TELEPHONE (OUTPATIENT)
Dept: INTERNAL MEDICINE | Facility: CLINIC | Age: 74
End: 2024-08-21
Payer: COMMERCIAL

## 2024-08-21 NOTE — TELEPHONE ENCOUNTER
Discussed with HELGA Fuentes.   He will follow up with patient. I updated him on the tests done from us.

## 2024-08-26 ENCOUNTER — OFFICE VISIT (OUTPATIENT)
Dept: CARDIOLOGY | Facility: CLINIC | Age: 74
End: 2024-08-26
Payer: COMMERCIAL

## 2024-08-26 VITALS
DIASTOLIC BLOOD PRESSURE: 77 MMHG | HEART RATE: 90 BPM | HEIGHT: 69 IN | BODY MASS INDEX: 31.27 KG/M2 | SYSTOLIC BLOOD PRESSURE: 119 MMHG | OXYGEN SATURATION: 94 % | WEIGHT: 211.1 LBS

## 2024-08-26 DIAGNOSIS — E11.21 TYPE 2 DIABETES MELLITUS WITH DIABETIC NEPHROPATHY, WITH LONG-TERM CURRENT USE OF INSULIN (H): ICD-10-CM

## 2024-08-26 DIAGNOSIS — I10 HYPERTENSION, UNSPECIFIED TYPE: ICD-10-CM

## 2024-08-26 DIAGNOSIS — Z79.4 TYPE 2 DIABETES MELLITUS WITH DIABETIC NEPHROPATHY, WITH LONG-TERM CURRENT USE OF INSULIN (H): ICD-10-CM

## 2024-08-26 DIAGNOSIS — E78.5 HYPERLIPIDEMIA LDL GOAL <70: ICD-10-CM

## 2024-08-26 DIAGNOSIS — I77.810 AORTIC ROOT DILATATION (H): ICD-10-CM

## 2024-08-26 DIAGNOSIS — I25.810 CORONARY ARTERY DISEASE INVOLVING AUTOLOGOUS ARTERY CORONARY BYPASS GRAFT WITHOUT ANGINA PECTORIS: Primary | ICD-10-CM

## 2024-08-26 PROCEDURE — 99214 OFFICE O/P EST MOD 30 MIN: CPT | Performed by: NURSE PRACTITIONER

## 2024-08-26 NOTE — PATIENT INSTRUCTIONS
Thanks for participating in a office visit with the [facility] clinic today.    Doing well on a cardiac standpoint.  Blood pressures well controlled  Reviewed last echocardiogram - stable  Continue current medical therapy  Encourage 30 min exercise daily  Alcohol in moderation   Heart healthy diet.     Follow up in 1 year with cardiologist to establish care.     Please call my nurse at   [phone]. Call with any questions or concerns.    Scheduling phone number: [phone]  Reminder: Please bring in all current medications, over the counter supplements and vitamin bottles to your next appointment.

## 2024-08-26 NOTE — LETTER
8/26/2024    Lauro Galloway MD  [address] [facility] [address]    RE: Adan Ruffin       Dear Colleague,     I had the pleasure of seeing Adan Ruffin in the [facility].  CARDIOLOGY CLINIC NOTE    PRIMARY CARDIOLOGIST  Dr. Rosas     PRIMARY CARE PHYSICIAN:  Lauro Galloway    HISTORY OF PRESENT ILLNESS:  Adan Ruffin is a very pleasant 73-year-old male with a past medical history significant for coronary artery disease status post CABG x3 in 2015 using a LIMA to the LAD, vein graft to the circumflex and vein graft to the PDA, atrial flutter ablation in 2017, hypertension, hyperlipidemia, and type 2 diabetes.     He returns to the office today for an annual follow-up. He is feeling well in a cardiac standpoint, denies chest pain, shortness of breath, PND, orthopnea, presyncope, syncope, edema, heart racing, or palpitations.      In May, he underwent a uncomplicated robotic assisted hernia repair.  He was noted to have asymptomatic heart block on cardiac monitor after surgery.  He was seen in consultation by Dr. Mcdonough who reviewed EKG and felt it did not appear to be second-degree AV block but rather sinus with accelerated junctional and retrograde P waves.  A follow-up echocardiogram showed a normal ejection fraction estimated at 55 to 60%, flattened septum consistent with RV volume overload, no significant valvular abnormalities.  Home medications were resumed.    Nuclear stress test in 2019 showed no evidence of ischemia or infarct.    Blood pressure is well-controlled at 119/77, managed on amlodipine and hydrochlorothiazide, losartan and metoprolol  Lipid panel is excellent with a total cholesterol of 141, HDL 81, LDL 38 and triglycerides 110    He does not engage in any routine exercise  He does not smoke.  Uses alcohol socially <7 drinks per week  Compliant with all medications    PAST MEDICAL HISTORY:  Past Medical History:   Diagnosis Date     Aortic root dilatation  (H24)      Atrial flutter (H) 10/02/2017    ablation 11/26/2017     CAD (coronary artery disease) 03/13/2015    CABG 4/7/2015 by Dr. Johan Hall: LIMA to LAD, SVG to circumflex, SVG to PDA     Cardiomyopathy (H) 10/02/2017    tachycardia-induced with EF 40-45%, improved once back to NSR     HTN (hypertension)      Hyperlipidaemia      Microalbuminuria 06/27/2016     Obesity 03/13/2015     Type 2 diabetes mellitus with diabetic nephropathy, with long-term current use of insulin (H) 11/02/2016       MEDICATIONS:  Current Outpatient Medications   Medication Sig Dispense Refill     acetaminophen (TYLENOL) 325 MG tablet Take 2 tablets (650 mg) by mouth every 4 hours as needed for other (mild pain) 100 tablet 0     alcohol swab prep pads Use to swab area of injection/mar 4 times daily or as needed, for blood sugar. 400 each 3     amLODIPine (NORVASC) 5 MG tablet Take 1 tablet (5 mg) by mouth daily 90 tablet 3     aspirin-acetaminophen-caffeine (EXCEDRIN MIGRAINE) 250-250-65 MG tablet Take 1 tablet by mouth daily as needed       atorvastatin (LIPITOR) 40 MG tablet Take 1 tablet (40 mg) by mouth daily 90 tablet 3     B-D ULTRA-FINE 33 LANCETS MISC 1 Lancet 4 times daily To test for blood sugar 400 each 3     blood glucose (CONTOUR NEXT TEST) test strip Use to test blood sugar 4 times daily. 400 strip 6     blood glucose calibration (CONTOUR NEXT CONTROL LOW VI SOLN) Low solution Use to calibrate blood glucose monitor as directed. 1 each 3     blood glucose calibration (CONTOUR NEXT CONTROL NORMAL VI SOLN) Normal solution Use to calibrate blood glucose monitor as needed. 1 each 3     blood glucose monitoring (CONTOUR NEXT MONITOR W/DEVICE KIT) meter device kit Use to test blood sugar 4 times daily 1 kit 0     blood glucose monitoring (ONE TOUCH ULTRA 2) meter device kit Use to test blood sugar 4 times daily or as directed. 1 kit 0     folic acid (FOLVITE) 1 MG tablet Take 1,000 mcg by mouth daily        hydrochlorothiazide (HYDRODIURIL) 25 MG tablet Take 1 tablet (25 mg) by mouth daily 90 tablet 3     hydrOXYzine HCl (ATARAX) 10 MG tablet Take 1 tablet (10 mg) by mouth every 6 hours as needed for itching or anxiety (with pain, moderate pain) 30 tablet 0     insulin glargine (LANTUS SOLOSTAR) 100 UNIT/ML pen INJECT 72 UNITS UNDER THE SKIN TWICE DAILY Strength: 100 UNIT/ML 75 mL 3     insulin lispro (HUMALOG KWIKPEN) 100 UNIT/ML (1 unit dial) KWIKPEN INJECT 40 UNITS UNDER THE SKIN THREE TIMES DAILY BEFORE MEALS. 10 UNITS PER 1 CARB 45 mL 3     insulin pen needle (NOVOFINE 30) 30G X 8 MM miscellaneous USE 4 TO 5 TIMES DAILY OR AS DIRECTED 500 each 3     losartan (COZAAR) 100 MG tablet Take 1 tablet (100 mg) by mouth daily 90 tablet 3     mesalamine (CANASA) 1000 MG suppository Place 1 suppository (1,000 mg) rectally At Bedtime       metFORMIN (GLUCOPHAGE) 1000 MG tablet Take 1 tablet (1,000 mg) by mouth 2 times daily (with meals) 180 tablet 3     metoprolol succinate ER (TOPROL XL) 100 MG 24 hr tablet Take 1 tablet (100 mg) by mouth 2 times daily 180 tablet 4     ondansetron (ZOFRAN ODT) 4 MG ODT tab Take 1-2 tablets (4-8 mg) by mouth every 8 hours as needed for nausea Dissolve ON the tongue. 10 tablet 0     oxyCODONE (ROXICODONE) 5 MG tablet Take 1-2 tablets (5-10 mg) by mouth every 4 hours as needed for moderate to severe pain 6 tablet 0     sulfaSALAzine (AZULFIDINE) 500 MG tablet Take 500 mg by mouth 2 times daily       thin (NO BRAND SPECIFIED) lancets Use to test blood sugar, 4 times daily. Use with lanceting device. Blood Glucose Monitor Brands: per insurance. 400 each 6     No current facility-administered medications for this visit.       SOCIAL HISTORY:  I have reviewed this patient's social history and updated it with pertinent information if needed. Adan SANIA Ruffin  reports that he quit smoking about 24 years ago. His smoking use included cigarettes. He started smoking about 53 years ago. He has a 43.5  pack-year smoking history. He has never used smokeless tobacco. He reports current alcohol use. He reports that he does not use drugs.    PHYSICAL EXAM:  Pulse:  [90] 90  BP: (119)/(77) 119/77  SpO2:  [94 %] 94 %  211 lbs 1.6 oz    Constitutional: alert, no distress  Respiratory: Good bilateral air entry  Cardiovascular: Regular rate and rhythm  GI: nondistended  Neuropsychiatric: appropriate affact    ASSESSMENT/PLAN:  Pertinent issues addressed/ reviewed during this cardiology visit  Coronary artery disease - status post CABG x3 in 2015 using a LIMA to the LAD, vein graft to the circumflex, and vein graft to the PDA. He does not endorse any ischemic symptoms. Continue on current medical therapy. Encourage exercise, and weight loss.   Hypertension -well-controlled continue current therapy.  Hyperlipidemia -LDL at goal, continue atorvastatin  Type 2 diabetes -on insulin and metformin.  Aortic root dilation -3.9 cm    Follow-up in 1 year to establish care with cardiologist. (Patient prefers to remain in the [place] location)    It was a pleasure seeing this patient in clinic today. Please do not hesitate to contact me with any future questions.     SANDRA Abernathy, CNP  Cardiology - [facility]  08/26/2024       The level of medical decision making during this visit was of moderate complexity.    This note was completed in part using dictation via the Dragon voice recognition software. Some word and grammatical errors may occur and must be interpreted in the appropriate clinical context.  If there are any questions pertaining to this issue, please contact me for further clarification.      Thank you for allowing me to participate in the care of your patient.      Sincerely,     SANDRA Abernathy CNP     [facility]  cc:   No referring provider defined for this encounter.

## 2024-08-26 NOTE — PROGRESS NOTES
CARDIOLOGY CLINIC NOTE    PRIMARY CARDIOLOGIST  Dr. Rosas     PRIMARY CARE PHYSICIAN:  Lauro Galloway    HISTORY OF PRESENT ILLNESS:  Adan Ruffin is a very pleasant 73-year-old male with a past medical history significant for coronary artery disease status post CABG x3 in 2015 using a LIMA to the LAD, vein graft to the circumflex and vein graft to the PDA, atrial flutter ablation in 2017, hypertension, hyperlipidemia, and type 2 diabetes.     He returns to the office today for an annual follow-up. He is feeling well in a cardiac standpoint, denies chest pain, shortness of breath, PND, orthopnea, presyncope, syncope, edema, heart racing, or palpitations.      In May, he underwent a uncomplicated robotic assisted hernia repair.  He was noted to have asymptomatic heart block on cardiac monitor after surgery.  He was seen in consultation by Dr. Mcdonough who reviewed EKG and felt it did not appear to be second-degree AV block but rather sinus with accelerated junctional and retrograde P waves.  A follow-up echocardiogram showed a normal ejection fraction estimated at 55 to 60%, flattened septum consistent with RV volume overload, no significant valvular abnormalities.  Home medications were resumed.    Nuclear stress test in 2019 showed no evidence of ischemia or infarct.    Blood pressure is well-controlled at 119/77, managed on amlodipine and hydrochlorothiazide, losartan and metoprolol  Lipid panel is excellent with a total cholesterol of 141, HDL 81, LDL 38 and triglycerides 110    He does not engage in any routine exercise  He does not smoke.  Uses alcohol socially <7 drinks per week  Compliant with all medications    PAST MEDICAL HISTORY:  Past Medical History:   Diagnosis Date    Aortic root dilatation (H24)     Atrial flutter (H) 10/02/2017    ablation 11/26/2017    CAD (coronary artery disease) 03/13/2015    CABG 4/7/2015 by Dr. Johan Hall: LIMA to LAD, SVG to circumflex, SVG to PDA    Cardiomyopathy (H)  10/02/2017    tachycardia-induced with EF 40-45%, improved once back to NSR    HTN (hypertension)     Hyperlipidaemia     Microalbuminuria 06/27/2016    Obesity 03/13/2015    Type 2 diabetes mellitus with diabetic nephropathy, with long-term current use of insulin (H) 11/02/2016       MEDICATIONS:  Current Outpatient Medications   Medication Sig Dispense Refill    acetaminophen (TYLENOL) 325 MG tablet Take 2 tablets (650 mg) by mouth every 4 hours as needed for other (mild pain) 100 tablet 0    alcohol swab prep pads Use to swab area of injection/mar 4 times daily or as needed, for blood sugar. 400 each 3    amLODIPine (NORVASC) 5 MG tablet Take 1 tablet (5 mg) by mouth daily 90 tablet 3    aspirin-acetaminophen-caffeine (EXCEDRIN MIGRAINE) 250-250-65 MG tablet Take 1 tablet by mouth daily as needed      atorvastatin (LIPITOR) 40 MG tablet Take 1 tablet (40 mg) by mouth daily 90 tablet 3    B-D ULTRA-FINE 33 LANCETS MISC 1 Lancet 4 times daily To test for blood sugar 400 each 3    blood glucose (CONTOUR NEXT TEST) test strip Use to test blood sugar 4 times daily. 400 strip 6    blood glucose calibration (CONTOUR NEXT CONTROL LOW VI SOLN) Low solution Use to calibrate blood glucose monitor as directed. 1 each 3    blood glucose calibration (CONTOUR NEXT CONTROL NORMAL VI SOLN) Normal solution Use to calibrate blood glucose monitor as needed. 1 each 3    blood glucose monitoring (CONTOUR NEXT MONITOR W/DEVICE KIT) meter device kit Use to test blood sugar 4 times daily 1 kit 0    blood glucose monitoring (ONE TOUCH ULTRA 2) meter device kit Use to test blood sugar 4 times daily or as directed. 1 kit 0    folic acid (FOLVITE) 1 MG tablet Take 1,000 mcg by mouth daily      hydrochlorothiazide (HYDRODIURIL) 25 MG tablet Take 1 tablet (25 mg) by mouth daily 90 tablet 3    hydrOXYzine HCl (ATARAX) 10 MG tablet Take 1 tablet (10 mg) by mouth every 6 hours as needed for itching or anxiety (with pain, moderate pain) 30  tablet 0    insulin glargine (LANTUS SOLOSTAR) 100 UNIT/ML pen INJECT 72 UNITS UNDER THE SKIN TWICE DAILY Strength: 100 UNIT/ML 75 mL 3    insulin lispro (HUMALOG KWIKPEN) 100 UNIT/ML (1 unit dial) KWIKPEN INJECT 40 UNITS UNDER THE SKIN THREE TIMES DAILY BEFORE MEALS. 10 UNITS PER 1 CARB 45 mL 3    insulin pen needle (NOVOFINE 30) 30G X 8 MM miscellaneous USE 4 TO 5 TIMES DAILY OR AS DIRECTED 500 each 3    losartan (COZAAR) 100 MG tablet Take 1 tablet (100 mg) by mouth daily 90 tablet 3    mesalamine (CANASA) 1000 MG suppository Place 1 suppository (1,000 mg) rectally At Bedtime      metFORMIN (GLUCOPHAGE) 1000 MG tablet Take 1 tablet (1,000 mg) by mouth 2 times daily (with meals) 180 tablet 3    metoprolol succinate ER (TOPROL XL) 100 MG 24 hr tablet Take 1 tablet (100 mg) by mouth 2 times daily 180 tablet 4    ondansetron (ZOFRAN ODT) 4 MG ODT tab Take 1-2 tablets (4-8 mg) by mouth every 8 hours as needed for nausea Dissolve ON the tongue. 10 tablet 0    oxyCODONE (ROXICODONE) 5 MG tablet Take 1-2 tablets (5-10 mg) by mouth every 4 hours as needed for moderate to severe pain 6 tablet 0    sulfaSALAzine (AZULFIDINE) 500 MG tablet Take 500 mg by mouth 2 times daily      thin (NO BRAND SPECIFIED) lancets Use to test blood sugar, 4 times daily. Use with lanceting device. Blood Glucose Monitor Brands: per insurance. 400 each 6     No current facility-administered medications for this visit.       SOCIAL HISTORY:  I have reviewed this patient's social history and updated it with pertinent information if needed. Adan LUI Laly  reports that he quit smoking about 24 years ago. His smoking use included cigarettes. He started smoking about 53 years ago. He has a 43.5 pack-year smoking history. He has never used smokeless tobacco. He reports current alcohol use. He reports that he does not use drugs.    PHYSICAL EXAM:  Pulse:  [90] 90  BP: (119)/(77) 119/77  SpO2:  [94 %] 94 %  211 lbs 1.6 oz    Constitutional: alert, no  distress  Respiratory: Good bilateral air entry  Cardiovascular: Regular rate and rhythm  GI: nondistended  Neuropsychiatric: appropriate affact    ASSESSMENT/PLAN:  Pertinent issues addressed/ reviewed during this cardiology visit  Coronary artery disease - status post CABG x3 in 2015 using a LIMA to the LAD, vein graft to the circumflex, and vein graft to the PDA. He does not endorse any ischemic symptoms. Continue on current medical therapy. Encourage exercise, and weight loss.   Hypertension -well-controlled continue current therapy.  Hyperlipidemia -LDL at goal, continue atorvastatin  Type 2 diabetes -on insulin and metformin.  Aortic root dilation -3.9 cm    Follow-up in 1 year to establish care with cardiologist. (Patient prefers to remain in the [place] location)    It was a pleasure seeing this patient in clinic today. Please do not hesitate to contact me with any future questions.     SANDRA Abernathy, CNP  Cardiology - [facility]  08/26/2024       The level of medical decision making during this visit was of moderate complexity.    This note was completed in part using dictation via the Dragon voice recognition software. Some word and grammatical errors may occur and must be interpreted in the appropriate clinical context.  If there are any questions pertaining to this issue, please contact me for further clarification.

## 2024-09-24 DIAGNOSIS — I48.3 TYPICAL ATRIAL FLUTTER (H): ICD-10-CM

## 2024-09-24 DIAGNOSIS — I10 ESSENTIAL HYPERTENSION: ICD-10-CM

## 2024-09-24 RX ORDER — METOPROLOL SUCCINATE 100 MG/1
100 TABLET, EXTENDED RELEASE ORAL 2 TIMES DAILY
Qty: 180 TABLET | Refills: 4 | Status: SHIPPED | OUTPATIENT
Start: 2024-09-24

## 2024-10-19 ENCOUNTER — HEALTH MAINTENANCE LETTER (OUTPATIENT)
Age: 74
End: 2024-10-19

## 2024-10-29 ENCOUNTER — MYC MEDICAL ADVICE (OUTPATIENT)
Dept: INTERNAL MEDICINE | Facility: CLINIC | Age: 74
End: 2024-10-29
Payer: COMMERCIAL

## 2024-10-29 NOTE — TELEPHONE ENCOUNTER
"Per last OV 4/10/24 \"Return in about 6 months (around 10/10/2024) for Routine Visit.\"    Scheduled patient for OV   Appointments in Next Year      Nov 19, 2024 2:00 PM  (Arrive by 1:40 PM)  Provider Visit with Lauro Galloway MD  [facility] ([facility] ) [phone]           PCP please advise if patient needs to complete labs prior to appointment or OK to wait till appointment time.     Summer RN 2:41 PM October 29, 2024   [facility]    "

## 2024-10-30 ENCOUNTER — TRANSFERRED RECORDS (OUTPATIENT)
Dept: HEALTH INFORMATION MANAGEMENT | Facility: CLINIC | Age: 74
End: 2024-10-30
Payer: COMMERCIAL

## 2024-11-19 ENCOUNTER — OFFICE VISIT (OUTPATIENT)
Dept: INTERNAL MEDICINE | Facility: CLINIC | Age: 74
End: 2024-11-19
Payer: COMMERCIAL

## 2024-11-19 VITALS
DIASTOLIC BLOOD PRESSURE: 77 MMHG | SYSTOLIC BLOOD PRESSURE: 125 MMHG | BODY MASS INDEX: 29.61 KG/M2 | HEART RATE: 98 BPM | OXYGEN SATURATION: 95 % | HEIGHT: 69 IN | RESPIRATION RATE: 14 BRPM | TEMPERATURE: 97.6 F | WEIGHT: 199.9 LBS

## 2024-11-19 DIAGNOSIS — Z29.11 NEED FOR VACCINATION AGAINST RESPIRATORY SYNCYTIAL VIRUS: ICD-10-CM

## 2024-11-19 DIAGNOSIS — I49.9 IRREGULAR HEART BEAT: ICD-10-CM

## 2024-11-19 DIAGNOSIS — E11.21 TYPE 2 DIABETES MELLITUS WITH DIABETIC NEPHROPATHY, WITH LONG-TERM CURRENT USE OF INSULIN (H): ICD-10-CM

## 2024-11-19 DIAGNOSIS — N18.31 STAGE 3A CHRONIC KIDNEY DISEASE (H): Primary | ICD-10-CM

## 2024-11-19 DIAGNOSIS — Z79.4 TYPE 2 DIABETES MELLITUS WITH DIABETIC NEPHROPATHY, WITH LONG-TERM CURRENT USE OF INSULIN (H): ICD-10-CM

## 2024-11-19 DIAGNOSIS — E78.5 HYPERLIPIDEMIA LDL GOAL <70: ICD-10-CM

## 2024-11-19 DIAGNOSIS — I10 PRIMARY HYPERTENSION: ICD-10-CM

## 2024-11-19 LAB
EST. AVERAGE GLUCOSE BLD GHB EST-MCNC: 212 MG/DL
HBA1C MFR BLD: 9 % (ref 0–5.6)

## 2024-11-19 PROCEDURE — 93000 ELECTROCARDIOGRAM COMPLETE: CPT | Performed by: INTERNAL MEDICINE

## 2024-11-19 PROCEDURE — 83036 HEMOGLOBIN GLYCOSYLATED A1C: CPT | Performed by: INTERNAL MEDICINE

## 2024-11-19 PROCEDURE — 99214 OFFICE O/P EST MOD 30 MIN: CPT | Performed by: INTERNAL MEDICINE

## 2024-11-19 PROCEDURE — 80053 COMPREHEN METABOLIC PANEL: CPT | Performed by: INTERNAL MEDICINE

## 2024-11-19 PROCEDURE — 36415 COLL VENOUS BLD VENIPUNCTURE: CPT | Performed by: INTERNAL MEDICINE

## 2024-11-19 ASSESSMENT — PAIN SCALES - GENERAL: PAINLEVEL_OUTOF10: NO PAIN (0)

## 2024-11-19 ASSESSMENT — ENCOUNTER SYMPTOMS: SHORTNESS OF BREATH: 1

## 2024-11-19 NOTE — LETTER
November 20, 2024      Abraham LUI Laly  [address] [place] [address]        Dear ,    We are writing to inform you of your test results.    Slightly worsened diabetic control. Try to improve diet and exercise. Continue treatment, but I recommend to add Jardiance to current medications.   Kidney function is slightly decreased and liver enzymes borderline elevated. Recommend to recheck in 3 months antibody work.     Resulted Orders   HEMOGLOBIN A1C   Result Value Ref Range    Estimated Average Glucose 212 (H) <117 mg/dL    Hemoglobin A1C 9.0 (H) 0.0 - 5.6 %      Comment:      Normal <5.7%   Prediabetes 5.7-6.4%    Diabetes 6.5% or higher     Note: Adopted from ADA consensus guidelines.    Narrative    Results confirmed by repeat test.     Comprehensive metabolic panel (BMP + Alb, Alk Phos, ALT, AST, Total. Bili, TP)   Result Value Ref Range    Sodium 133 (L) 135 - 145 mmol/L    Potassium 4.8 3.4 - 5.3 mmol/L    Carbon Dioxide (CO2) 22 22 - 29 mmol/L    Anion Gap 14 7 - 15 mmol/L    Urea Nitrogen 22.6 8.0 - 23.0 mg/dL    Creatinine 1.45 (H) 0.67 - 1.17 mg/dL    GFR Estimate 51 (L) >60 mL/min/1.73m2      Comment:      eGFR calculated using 2021 CKD-EPI equation.    Calcium 10.1 8.8 - 10.4 mg/dL      Comment:      Reference intervals for this test were updated on 7/16/2024 to reflect our healthy population more accurately. There may be differences in the flagging of prior results with similar values performed with this method. Those prior results can be interpreted in the context of the updated reference intervals.    Chloride 97 (L) 98 - 107 mmol/L    Glucose 157 (H) 70 - 99 mg/dL    Alkaline Phosphatase 176 (H) 40 - 150 U/L    AST 53 (H) 0 - 45 U/L    ALT 34 0 - 70 U/L    Protein Total 7.5 6.4 - 8.3 g/dL    Albumin 3.7 3.5 - 5.2 g/dL    Bilirubin Total 0.4 <=1.2 mg/dL       If you have any questions or concerns, please call the clinic at the number listed above.       Sincerely,      Lauro BULLOCK  MD [place]

## 2024-11-19 NOTE — PROGRESS NOTES
"  Assessment & Plan     Need for vaccination against respiratory syncytial virus    - RSV vaccine, bivalent, ABRYSVO, injection; Inject 0.5 mLs into the muscle once for 1 dose. Pharmacist administered    Stage 3a chronic kidney disease (H)  Monitor renal function  Keep hydrated     Type 2 diabetes mellitus with diabetic nephropathy, with long-term current use of insulin (H)  Assess lab   - HEMOGLOBIN A1C    Primary hypertension  Controlled on treatment   - Comprehensive metabolic panel (BMP + Alb, Alk Phos, ALT, AST, Total. Bili, TP)    Hyperlipidemia LDL goal <70  Continue statin     Irregular heart beat  Assess EKG, has h/o a fib, post ablation     - EKG 12-lead complete w/read - Clinics          BMI  Estimated body mass index is 29.52 kg/m  as calculated from the following:    Height as of this encounter: 1.753 m (5' 9\").    Weight as of this encounter: 90.7 kg (199 lb 14.4 oz).         See Patient Instructions    Nannette Rosario is a 74 year old, presenting for the following health issues:  Diabetes and Shortness of Breath (With activity X2 weeks; had a cough for about a week, but that's gone now)        11/19/2024     1:47 PM   Additional Questions   Roomed by Jacque LUI   Accompanied by n/a     Shortness of Breath    History of Present Illness       Reason for visit:  Breathing  Symptom onset:  1-2 weeks ago  Symptoms include:  Out of breath  Symptom intensity:  Moderate  Symptom progression:  Staying the same  Had these symptoms before:  No  What makes it worse:  Strenuous activity  What makes it better:  Resting and catching breath He is missing 1 dose(s) of medications per week.  He is not taking prescribed medications regularly due to remembering to take.         Diabetes Follow-up    How often are you checking your blood sugar? Three times daily  Blood sugar testing frequency justification:  Uncontrolled diabetes  What time of day are you checking your blood sugars (select all that apply)?  Before and " after meals  Have you had any blood sugars above 200?  Yes   Have you had any blood sugars below 70?  Yes   What symptoms do you notice when your blood sugar is low?  Dizzy, flushed  What concerns do you have today about your diabetes? None   Do you have any of these symptoms? (Select all that apply)  No numbness or tingling in feet.  No redness, sores or blisters on feet.  No complaints of excessive thirst.  No reports of blurry vision.  No significant changes to weight.      BP Readings from Last 2 Encounters:   11/19/24 125/77   08/26/24 119/77     Hemoglobin A1C (%)   Date Value   04/10/2024 7.1 (H)   10/04/2023 7.5 (H)   04/02/2021 7.9 (H)   10/15/2020 9.0 (H)     LDL Cholesterol Calculated (mg/dL)   Date Value   04/10/2024 38   01/13/2023 64   02/04/2021 49   10/15/2020 55         How many servings of fruits and vegetables do you eat daily?  2-3  On average, how many sweetened beverages do you drink each day (Examples: soda, juice, sweet tea, etc.  Do NOT count diet or artificially sweetened beverages)?   0  How many days per week do you exercise enough to make your heart beat faster? 3 or less  How many minutes a day do you exercise enough to make your heart beat faster? 30 - 60  How many days per week do you miss taking your medication? 1  What makes it hard for you to take your medications?  remembering to take    Has H/O DM. On diet , exercise and oral treatment. Blood sugars are controlled. No parestesias. No hypoglycemias.  Has h/o HTN. on medical treatment. BP has been controlled. No side effects from medications. No CP, HA, dizziness. good compliance with medications and low salt diet.  Has h/o ischemic heart disease, asymptomatic regarding chest pains, SOB,palpitations. Has good compliance with treatment, diet and exercise.  Has H/O hyperlipidemia. On medical treatment and diet. No side effects. No muscle weakness, myalgias or upset stomach.   Has h/o CRF. Monitoring BP, BG, medications, avoiding OTC  "NSAIDs. Needs periodic recheck of kidney function.  Has had a cold a week ago. Has symptoms of SOB on exertion. No fever, cough, sore throat. No chest pain.         Review of Systems  Constitutional, HEENT, cardiovascular, pulmonary, gi and gu systems are negative, except as otherwise noted.      Objective    /77 (BP Location: Left arm, Cuff Size: Adult Regular)   Pulse 98   Temp 97.6  F (36.4  C) (Tympanic)   Resp 14   Ht 1.753 m (5' 9\")   Wt 90.7 kg (199 lb 14.4 oz)   SpO2 95%   BMI 29.52 kg/m    Body mass index is 29.52 kg/m .  Physical Exam   GENERAL: alert and no distress, overweight   EYES: Eyes grossly normal to inspection, PERRL and conjunctivae and sclerae normal  HENT: ear canals and TM's normal, nose and mouth without ulcers or lesions  NECK: no adenopathy, no asymmetry, masses, or scars  RESP: lungs clear to auscultation - no rales, rhonchi or wheezes, mild base crackles   CV: irregular rate and rhythm, normal S1 S2, no S3 or S4, no murmur, click or rub, no peripheral edema  ABDOMEN: soft, nontender, no hepatosplenomegaly, no masses and bowel sounds normal  MS: no gross musculoskeletal defects noted, no edema    Transferred Records on 08/15/2024   Component Date Value Ref Range Status    Creatinine (External) 08/15/2024 1.07  0.76 - 1.27 mg/dL Final    GFR Estimated (External) 08/15/2024 74  >59 mL/min/1.73 Final    Hep C HIM 08/15/2024 See Scanned Document   Final           Signed Electronically by: Lauro Galloway MD    "

## 2024-11-20 LAB
ALBUMIN SERPL BCG-MCNC: 3.7 G/DL (ref 3.5–5.2)
ALP SERPL-CCNC: 176 U/L (ref 40–150)
ALT SERPL W P-5'-P-CCNC: 34 U/L (ref 0–70)
ANION GAP SERPL CALCULATED.3IONS-SCNC: 14 MMOL/L (ref 7–15)
AST SERPL W P-5'-P-CCNC: 53 U/L (ref 0–45)
BILIRUB SERPL-MCNC: 0.4 MG/DL
BUN SERPL-MCNC: 22.6 MG/DL (ref 8–23)
CALCIUM SERPL-MCNC: 10.1 MG/DL (ref 8.8–10.4)
CHLORIDE SERPL-SCNC: 97 MMOL/L (ref 98–107)
CREAT SERPL-MCNC: 1.45 MG/DL (ref 0.67–1.17)
EGFRCR SERPLBLD CKD-EPI 2021: 51 ML/MIN/1.73M2
GLUCOSE SERPL-MCNC: 157 MG/DL (ref 70–99)
HCO3 SERPL-SCNC: 22 MMOL/L (ref 22–29)
POTASSIUM SERPL-SCNC: 4.8 MMOL/L (ref 3.4–5.3)
PROT SERPL-MCNC: 7.5 G/DL (ref 6.4–8.3)
SODIUM SERPL-SCNC: 133 MMOL/L (ref 135–145)

## 2024-12-09 DIAGNOSIS — E11.21 TYPE 2 DIABETES MELLITUS WITH DIABETIC NEPHROPATHY, WITH LONG-TERM CURRENT USE OF INSULIN (H): ICD-10-CM

## 2024-12-09 DIAGNOSIS — Z79.4 TYPE 2 DIABETES MELLITUS WITH DIABETIC NEPHROPATHY, WITH LONG-TERM CURRENT USE OF INSULIN (H): ICD-10-CM

## 2024-12-10 ENCOUNTER — MYC REFILL (OUTPATIENT)
Dept: INTERNAL MEDICINE | Facility: CLINIC | Age: 74
End: 2024-12-10
Payer: COMMERCIAL

## 2024-12-10 DIAGNOSIS — E11.21 TYPE 2 DIABETES MELLITUS WITH DIABETIC NEPHROPATHY, WITH LONG-TERM CURRENT USE OF INSULIN (H): ICD-10-CM

## 2024-12-10 DIAGNOSIS — I10 PRIMARY HYPERTENSION: ICD-10-CM

## 2024-12-10 DIAGNOSIS — Z79.4 TYPE 2 DIABETES MELLITUS WITH DIABETIC NEPHROPATHY, WITH LONG-TERM CURRENT USE OF INSULIN (H): ICD-10-CM

## 2024-12-10 RX ORDER — HYDROCHLOROTHIAZIDE 25 MG/1
25 TABLET ORAL DAILY
Qty: 90 TABLET | Refills: 3 | Status: SHIPPED | OUTPATIENT
Start: 2024-12-10

## 2024-12-11 ENCOUNTER — MYC MEDICAL ADVICE (OUTPATIENT)
Dept: INTERNAL MEDICINE | Facility: CLINIC | Age: 74
End: 2024-12-11
Payer: COMMERCIAL

## 2024-12-23 DIAGNOSIS — I10 ESSENTIAL HYPERTENSION: ICD-10-CM

## 2024-12-23 RX ORDER — AMLODIPINE BESYLATE 5 MG/1
5 TABLET ORAL DAILY
Qty: 90 TABLET | Refills: 2 | Status: SHIPPED | OUTPATIENT
Start: 2024-12-23

## 2025-01-21 DIAGNOSIS — I10 ESSENTIAL HYPERTENSION: ICD-10-CM

## 2025-01-21 DIAGNOSIS — E78.5 HYPERLIPIDEMIA LDL GOAL <70: ICD-10-CM

## 2025-01-21 RX ORDER — LOSARTAN POTASSIUM 100 MG/1
100 TABLET ORAL DAILY
Qty: 90 TABLET | Refills: 3 | Status: SHIPPED | OUTPATIENT
Start: 2025-01-21

## 2025-01-21 RX ORDER — ATORVASTATIN CALCIUM 40 MG/1
40 TABLET, FILM COATED ORAL DAILY
Qty: 90 TABLET | Refills: 3 | Status: SHIPPED | OUTPATIENT
Start: 2025-01-21

## 2025-01-27 ENCOUNTER — TELEPHONE (OUTPATIENT)
Dept: PHARMACY | Facility: OTHER | Age: 75
End: 2025-01-27
Payer: COMMERCIAL

## 2025-01-27 NOTE — TELEPHONE ENCOUNTER
MT Recruitment: [facility] insurance     Referral outreach attempt #1 on January 27, 2025      Outcome: left voicemail- Call back number [phone]    Denzel Mayorga  [facility]

## 2025-02-05 DIAGNOSIS — Z79.4 TYPE 2 DIABETES MELLITUS WITH DIABETIC NEPHROPATHY, WITH LONG-TERM CURRENT USE OF INSULIN (H): ICD-10-CM

## 2025-02-05 DIAGNOSIS — E11.21 TYPE 2 DIABETES MELLITUS WITH DIABETIC NEPHROPATHY, WITH LONG-TERM CURRENT USE OF INSULIN (H): ICD-10-CM

## 2025-02-06 RX ORDER — INSULIN GLARGINE 100 [IU]/ML
INJECTION, SOLUTION SUBCUTANEOUS
Qty: 75 ML | Refills: 2 | Status: SHIPPED | OUTPATIENT
Start: 2025-02-06

## 2025-02-19 NOTE — PLAN OF CARE
[id number]. Pt is A+OX4, VSS on RA. Tele NSR. Denies pain. Some shortness of breath with activity. Dinnertime blood sugar 253. Up independently in room. Discharge plan pending.    Pa initiated 2/7/25,  sent to central med team to expedite if possible to avoid further disruption of medication, patient sent portal message with status.

## 2025-02-20 ENCOUNTER — TRANSFERRED RECORDS (OUTPATIENT)
Dept: HEALTH INFORMATION MANAGEMENT | Facility: CLINIC | Age: 75
End: 2025-02-20
Payer: COMMERCIAL

## 2025-03-01 ENCOUNTER — HOSPITAL ENCOUNTER (EMERGENCY)
Facility: CLINIC | Age: 75
Discharge: HOME | End: 2025-03-01
Attending: EMERGENCY MEDICINE
Payer: COMMERCIAL

## 2025-03-01 ENCOUNTER — APPOINTMENT (OUTPATIENT)
Dept: CT IMAGING | Facility: CLINIC | Age: 75
End: 2025-03-01
Attending: EMERGENCY MEDICINE
Payer: COMMERCIAL

## 2025-03-01 VITALS
OXYGEN SATURATION: 92 % | BODY MASS INDEX: 29.62 KG/M2 | SYSTOLIC BLOOD PRESSURE: 101 MMHG | HEART RATE: 103 BPM | HEIGHT: 69 IN | WEIGHT: 200 LBS | DIASTOLIC BLOOD PRESSURE: 73 MMHG | TEMPERATURE: 98.1 F | RESPIRATION RATE: 20 BRPM

## 2025-03-01 DIAGNOSIS — R06.09 DOE (DYSPNEA ON EXERTION): ICD-10-CM

## 2025-03-01 LAB
ANION GAP SERPL CALCULATED.3IONS-SCNC: 13 MMOL/L (ref 7–15)
ATRIAL RATE - MUSE: 110 BPM
BASOPHILS # BLD AUTO: 0 10E3/UL (ref 0–0.2)
BASOPHILS NFR BLD AUTO: 1 %
BUN SERPL-MCNC: 31.5 MG/DL (ref 8–23)
CALCIUM SERPL-MCNC: 11 MG/DL (ref 8.8–10.4)
CHLORIDE SERPL-SCNC: 97 MMOL/L (ref 98–107)
CREAT SERPL-MCNC: 1.21 MG/DL (ref 0.67–1.17)
D DIMER PPP FEU-MCNC: 1.36 UG/ML FEU (ref 0–0.5)
DIASTOLIC BLOOD PRESSURE - MUSE: NORMAL MMHG
EGFRCR SERPLBLD CKD-EPI 2021: 64 ML/MIN/1.73M2
EOSINOPHIL # BLD AUTO: 0.5 10E3/UL (ref 0–0.7)
EOSINOPHIL NFR BLD AUTO: 9 %
ERYTHROCYTE [DISTWIDTH] IN BLOOD BY AUTOMATED COUNT: 13.7 % (ref 10–15)
GLUCOSE SERPL-MCNC: 221 MG/DL (ref 70–99)
HCO3 SERPL-SCNC: 25 MMOL/L (ref 22–29)
HCT VFR BLD AUTO: 40 % (ref 40–53)
HGB BLD-MCNC: 13.7 G/DL (ref 13.3–17.7)
IMM GRANULOCYTES # BLD: 0.1 10E3/UL
IMM GRANULOCYTES NFR BLD: 1 %
INTERPRETATION ECG - MUSE: NORMAL
LYMPHOCYTES # BLD AUTO: 1.6 10E3/UL (ref 0.8–5.3)
LYMPHOCYTES NFR BLD AUTO: 25 %
MCH RBC QN AUTO: 31.2 PG (ref 26.5–33)
MCHC RBC AUTO-ENTMCNC: 34.3 G/DL (ref 31.5–36.5)
MCV RBC AUTO: 91 FL (ref 78–100)
MONOCYTES # BLD AUTO: 0.8 10E3/UL (ref 0–1.3)
MONOCYTES NFR BLD AUTO: 13 %
NEUTROPHILS # BLD AUTO: 3.3 10E3/UL (ref 1.6–8.3)
NEUTROPHILS NFR BLD AUTO: 52 %
NRBC # BLD AUTO: 0 10E3/UL
NRBC BLD AUTO-RTO: 0 /100
NT-PROBNP SERPL-MCNC: 444 PG/ML (ref 0–900)
P AXIS - MUSE: NORMAL DEGREES
PLATELET # BLD AUTO: 260 10E3/UL (ref 150–450)
POTASSIUM SERPL-SCNC: 4.3 MMOL/L (ref 3.4–5.3)
PR INTERVAL - MUSE: 84 MS
QRS DURATION - MUSE: 86 MS
QT - MUSE: 354 MS
QTC - MUSE: 479 MS
R AXIS - MUSE: 43 DEGREES
RBC # BLD AUTO: 4.39 10E6/UL (ref 4.4–5.9)
SODIUM SERPL-SCNC: 135 MMOL/L (ref 135–145)
SYSTOLIC BLOOD PRESSURE - MUSE: NORMAL MMHG
T AXIS - MUSE: -46 DEGREES
TROPONIN T SERPL HS-MCNC: 18 NG/L
TROPONIN T SERPL HS-MCNC: 20 NG/L
VENTRICULAR RATE- MUSE: 110 BPM
WBC # BLD AUTO: 6.4 10E3/UL (ref 4–11)

## 2025-03-01 PROCEDURE — 85014 HEMATOCRIT: CPT | Performed by: EMERGENCY MEDICINE

## 2025-03-01 PROCEDURE — 83880 ASSAY OF NATRIURETIC PEPTIDE: CPT | Performed by: EMERGENCY MEDICINE

## 2025-03-01 PROCEDURE — 85004 AUTOMATED DIFF WBC COUNT: CPT | Performed by: EMERGENCY MEDICINE

## 2025-03-01 PROCEDURE — 36415 COLL VENOUS BLD VENIPUNCTURE: CPT | Performed by: EMERGENCY MEDICINE

## 2025-03-01 PROCEDURE — 84132 ASSAY OF SERUM POTASSIUM: CPT | Performed by: EMERGENCY MEDICINE

## 2025-03-01 PROCEDURE — 71275 CT ANGIOGRAPHY CHEST: CPT

## 2025-03-01 PROCEDURE — 250N000011 HC RX IP 250 OP 636: Performed by: EMERGENCY MEDICINE

## 2025-03-01 PROCEDURE — 80048 BASIC METABOLIC PNL TOTAL CA: CPT | Performed by: EMERGENCY MEDICINE

## 2025-03-01 PROCEDURE — 250N000009 HC RX 250: Performed by: EMERGENCY MEDICINE

## 2025-03-01 PROCEDURE — 84484 ASSAY OF TROPONIN QUANT: CPT | Performed by: EMERGENCY MEDICINE

## 2025-03-01 PROCEDURE — 85379 FIBRIN DEGRADATION QUANT: CPT | Performed by: EMERGENCY MEDICINE

## 2025-03-01 PROCEDURE — 99285 EMERGENCY DEPT VISIT HI MDM: CPT | Mod: 25

## 2025-03-01 PROCEDURE — 93005 ELECTROCARDIOGRAM TRACING: CPT

## 2025-03-01 RX ORDER — IOPAMIDOL 755 MG/ML
73 INJECTION, SOLUTION INTRAVASCULAR ONCE
Status: COMPLETED | OUTPATIENT
Start: 2025-03-01 | End: 2025-03-01

## 2025-03-01 RX ADMIN — IOPAMIDOL 73 ML: 755 INJECTION, SOLUTION INTRAVENOUS at 14:14

## 2025-03-01 RX ADMIN — SODIUM CHLORIDE 86 ML: 9 INJECTION, SOLUTION INTRAVENOUS at 14:14

## 2025-03-01 ASSESSMENT — ACTIVITIES OF DAILY LIVING (ADL)
ADLS_ACUITY_SCORE: 49
ADLS_ACUITY_SCORE: 51
ADLS_ACUITY_SCORE: 49

## 2025-03-01 NOTE — ED PROVIDER NOTES
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  He reports shortness of breath for the last 2 weeks.  He reports this is with exertion and his wife mentions he cannot even get the mail without having to rest.  He has palpitations but no chest pain.  His wife made him go to urgent care today who referred him to the emergency department.  He does have cough and sneezing without fever or vomiting and his wife now has similar symptoms.  He initially had sore throat but this has resolved. Notably, he had diarrhea for 2 weeks after a colonoscopy recently, but his stools are starting to improve. He had travel to [place] without leg swelling. He reports pain in his legs with ambulation for about 2 months.    Independent Historian   None    Review of External Notes   Seen by PCP in November 2024. History of hypertension, disabetes, chronic kidney disease. Seen that day for shortness of breath. Today, he went to  where he said he was ill for 2 weeks with a worsening of baseline shortness of breath. He was sent here because he was tachycardic. He had a chest x-ray there, which was unremarkable.    Past Medical History     Medical History and Problem List   Benign neoplasm of rectum  Chronic ulcerative rectosigmoiditis  Proctosigmoiditis  Anxiety  Aortic root dilatation  Coronary artery disease  Hypertension  Hyperlipidemia  Incarcerated umbilical hernia  Microalbuminuria  Chronic kidney disease stage 3a  Diabetes type 2  Obesity  Ulcerative Proctitis    Medications   Amlodipine  Atorvastatin  Hydrochlorothiazide  Insulin glargine  Insulin lispro  Losartan  Metformin  Metoprolol succinate  Ondansetron  Folic acid  Mesalamine  Sulfasalazine  Aspirin 325  "mg    Surgical History   Tonsillectomy and adenoidectomy  Pilonidal cyst excision  Skin cancer removal  Heart cath, angioplasty  Herniorrhaphy    Physical Exam     Patient Vitals for the past 24 hrs:   BP Temp Temp src Pulse Resp SpO2 Height Weight   03/01/25 1658 101/73 -- -- 103 -- 92 % -- --   03/01/25 1644 -- -- -- -- -- 94 % -- --   03/01/25 1629 -- -- -- -- -- 96 % -- --   03/01/25 1614 -- -- -- -- -- 96 % -- --   03/01/25 1559 -- -- -- -- -- 96 % -- --   03/01/25 1514 -- -- -- -- -- 92 % -- --   03/01/25 1459 -- -- -- -- -- 95 % -- --   03/01/25 1344 131/72 -- -- 74 -- 94 % -- --   03/01/25 1314 100/70 -- -- 89 -- 92 % -- --   03/01/25 1300 114/77 -- -- 99 -- 94 % -- --   03/01/25 1200 -- -- -- -- -- 97 % -- --   03/01/25 1116 122/82 98.1  F (36.7  C) Temporal 109 20 92 % 1.753 m (5' 9\") 90.7 kg (200 lb)     Physical Exam  General: Well-developed and well-nourished. Well appearing elderly  man. Cooperative.  Head:  Atraumatic.  Eyes:  Conjunctivae, lids, and sclerae are normal.  ENT:    Normal nose. Moist mucous membranes.  Neck:  Supple. Normal range of motion.  CV:  Regular rate and rhythm. Normal heart sounds with no murmurs, rubs, or gallops detected.  Resp:  No respiratory distress. Clear to auscultation bilaterally without decreased breath sounds, wheezing, rales, or rhonchi.  GI:  Soft. Non-distended. Non-tender.    MS:  Normal ROM. No bilateral lower extremity edema.  Skin:  Warm. Non-diaphoretic. No pallor.  Neuro:  Awake. A&Ox3. Normal strength.  Psych: Normal mood and affect. Normal speech.  Vitals reviewed.     Diagnostics     Lab Results   Labs Ordered and Resulted from Time of ED Arrival to Time of ED Departure   D DIMER QUANTITATIVE - Abnormal       Result Value    D-Dimer Quantitative 1.36 (*)    BASIC METABOLIC PANEL - Abnormal    Sodium 135      Potassium 4.3      Chloride 97 (*)     Carbon Dioxide (CO2) 25      Anion Gap 13      Urea Nitrogen 31.5 (*)     Creatinine 1.21 (*)     " GFR Estimate 64      Calcium 11.0 (*)     Glucose 221 (*)    CBC WITH PLATELETS AND DIFFERENTIAL - Abnormal    WBC Count 6.4      RBC Count 4.39 (*)     Hemoglobin 13.7      Hematocrit 40.0      MCV 91      MCH 31.2      MCHC 34.3      RDW 13.7      Platelet Count 260      % Neutrophils 52      % Lymphocytes 25      % Monocytes 13      % Eosinophils 9      % Basophils 1      % Immature Granulocytes 1      NRBCs per 100 WBC 0      Absolute Neutrophils 3.3      Absolute Lymphocytes 1.6      Absolute Monocytes 0.8      Absolute Eosinophils 0.5      Absolute Basophils 0.0      Absolute Immature Granulocytes 0.1      Absolute NRBCs 0.0     TROPONIN T, HIGH SENSITIVITY - Normal    Troponin T, High Sensitivity 20     NT PROBNP INPATIENT - Normal    N terminal Pro BNP Inpatient 444     TROPONIN T, HIGH SENSITIVITY - Normal    Troponin T, High Sensitivity 18       Imaging   CT Chest Pulmonary Embolism w Contrast   Final Result   IMPRESSION:   1.  No pulmonary embolism.   2.  Moderate emphysema with subpleural scarring and/or fibrosis.         Exercise Stress Echocardiogram    (Results Pending)     EKG  Indication: Shortness of breath  Time: 1112  Rate 110 bpm. SD interval 84. QRS duration 86. QT/QTc 354/478.   Sinus tachycardia with short SD  Nonspecific ST abnormality  Abnormal QRS-T angle, consider primary T wave abnormality   No acute ST changes.  No change as compared to prior, dated 11/19/24.     Independent Interpretation   No large PE on independent interpretation of CT PE.    ED Course    ED Course   ED Course as of 03/01/25 1716   Sat Mar 01, 2025   1210 I obtained history and examined the patient as noted above.   1654 I updated the patient on plan for discharge with outpatient stress testing. He is comfortable with this plan. He was ambulatory to the bathroom without dyspnea.       Additional Documentation  Supportive wife  Established primary care provider    Medical Decision Making / Diagnosis   MIPS   CT for PE  was ordered because the patient had an abnormal d-dimer.    MILY Arellano is a 74 year old man presenting with 2 weeks of dyspnea on exertion with some palpitations.  He reports cough and his wife has a similar symptoms.  He also describes leg pain with ambulation for 2 months.  He is well-appearing on exam.    EKG is reassuring without acute ST changes or arrhythmias with both initial and repeat troponin being normal.  However, anginal equivalent dyspnea on exertion cannot be definitively ruled out and ultimately he was discharged with plan for stress testing.  He does not appear significantly volume overloaded and BNP is normal.  He does not have anemia or other concerning laboratory findings.  He was sent for CT PE study due to elevated D-dimer which reveals only emphysema without PNA.  He does not have wheezing to suggest COPD exacerbation.  I think it is certainly possible he has a viral illness given his wife's concurrent symptoms.    Given his reassuring workup, he is appropriate for discharge.  However, I will have him do outpatient stress testing to ensure that his dyspnea is not an anginal equivalent.  He also mentions some leg pain and I recommended he talk with his primary care provider about this as it could be claudication.  In the meantime he will return to the emergency department if he has worsening symptoms or new concerns of any kind.  All questions answered.  Amenable to discharge.    Disposition   The patient was discharged.     Diagnosis     ICD-10-CM    1. TERAN (dyspnea on exertion)  R06.09 Exercise Stress Echocardiogram           Discharge Medications   Discharge Medication List as of 3/1/2025  5:08 PM            Scribe Disclosure:  ICaitie, am serving as a scribe at 1:25 PM on 3/1/2025 to document services personally performed by Nolvia Peck MD based on my observations and the provider's statements to me.     Scribe Disclosure:  Felicity SORTO, am serving as a scribe at 1:58  PM on 3/1/2025 to document services personally performed by Nolvia Peck MD based on my observations and the provider's statements to me.         Nolvia Peck MD  03/06/25 [id number]

## 2025-03-01 NOTE — DISCHARGE INSTRUCTIONS
You will be called to arrange outpatient stress testing.  You need to follow-up with your regular doctor regarding your shortness of breath, stress test results, and also the leg pain you feel with walking.  This could be something called claudication which is a artery issue in your legs.  Your regular doctor can order further outpatient studies on this.  You need to return immediately if you have worsening symptoms or new concerns of any kind.

## 2025-03-01 NOTE — ED TRIAGE NOTES
Pt went to  this morning after feeling unwell for 2 weeks. Sent here for further work up and tachycardia. Pt denies chest pain, endorses SOB and cough. Some dizziness

## 2025-03-03 ENCOUNTER — OFFICE VISIT (OUTPATIENT)
Dept: INTERNAL MEDICINE | Facility: CLINIC | Age: 75
End: 2025-03-03
Payer: COMMERCIAL

## 2025-03-03 VITALS
BODY MASS INDEX: 29.18 KG/M2 | SYSTOLIC BLOOD PRESSURE: 113 MMHG | WEIGHT: 197 LBS | HEIGHT: 69 IN | HEART RATE: 79 BPM | DIASTOLIC BLOOD PRESSURE: 70 MMHG | RESPIRATION RATE: 20 BRPM | TEMPERATURE: 97.3 F | OXYGEN SATURATION: 97 %

## 2025-03-03 DIAGNOSIS — I73.9 PERIPHERAL VASCULAR DISEASE: ICD-10-CM

## 2025-03-03 DIAGNOSIS — N18.31 STAGE 3A CHRONIC KIDNEY DISEASE (H): ICD-10-CM

## 2025-03-03 DIAGNOSIS — R06.02 SOB (SHORTNESS OF BREATH): ICD-10-CM

## 2025-03-03 DIAGNOSIS — Z09 FOLLOW-UP EXAMINATION: Primary | ICD-10-CM

## 2025-03-03 DIAGNOSIS — K92.1 HEMATOCHEZIA: ICD-10-CM

## 2025-03-03 PROCEDURE — 3074F SYST BP LT 130 MM HG: CPT

## 2025-03-03 PROCEDURE — 3078F DIAST BP <80 MM HG: CPT

## 2025-03-03 PROCEDURE — 1126F AMNT PAIN NOTED NONE PRSNT: CPT

## 2025-03-03 PROCEDURE — 99213 OFFICE O/P EST LOW 20 MIN: CPT

## 2025-03-03 ASSESSMENT — PAIN SCALES - GENERAL: PAINLEVEL_OUTOF10: NO PAIN (0)

## 2025-03-03 NOTE — PATIENT INSTRUCTIONS
Wear Zio patch monitor for 7 days and have the stress test on 3/11/2025 and follow up with cardiology.     If the shortness of breath increases or develop chest pain.

## 2025-03-03 NOTE — PROGRESS NOTES
Assessment & Plan     Follow-up examination  SOB (shortness of breath)  - D dimer, quantitative; Future  - ZIO PATCH MAIL OUT  - Adult Cardiology Evaluation  Referral; Future  This is a follow-up for an ED visit on 3/1/2025.  Patient presented with his wife for evaluation of shortness of breath that he has had for the last 2 weeks.  Patient experiences palpitations but no chest pain.  Patient went to urgent care who referred him to ER.  CT chest was done due to an abnormal D-dimer and showed no pulmonary embolism with moderate emphysema with subpleural scarring and/or fibrosis.  EKG did not show any change from the previous EKG.  It showed sinus tachycardia with short NH, nonspecific ST abnormality, abnormal QRS-T angle consider primary T wave abnormality.  Presently patient endorses some intermittent shortness of breath with exertion, no chest pain, no chest tightness, explained to patient that the heart monitor will be in place for 7 days and by the time the 7 days are up he can see cardiology for the stress test results and heart monitor results    Hematochezia  Patient endorses intermittent hematochezia due to history of ulcerative colitis  - CBC with platelets and differential; Future    Peripheral vascular disease  Patient has peripheral vascular disease and diabetes, suggested wearing compression stockings to facilitate circulation to his bilateral lower extremities, Pt reports having pain described as aching on the lateral aspect of his right thigh and to the dorsal aspect of his calf,  Noted some varicose veins to bilateral calves   - Compression Sleeve/Stocking Order for DME - ONLY FOR DME    Stage 3a chronic kidney disease (H)  Last BMP showed urea nitrogen 31.5, GFR of 64 and creatinine of 1.21   - Basic metabolic panel  (Ca, Cl, CO2, Creat, Gluc, K, Na, BUN); Future        MED REC REQUIRED  Post Medication Reconciliation Status:       MEDICATIONS:   No orders of the defined types were placed  "in this encounter.   Medications Discontinued During This Encounter   Medication Reason    oxyCODONE (ROXICODONE) 5 MG tablet           - Continue other medications without change    Subjective   Ray is a 74 year old, presenting for the following health issues:   Follow Up ([facility] ER on 3/1/25 SOB)    HPI    This is a follow-up for an ED visit on 3/1/2025.  Patient presented with his wife for evaluation of shortness of breath that he has had for the last 2 weeks.  Patient experiences palpitations but no chest pain.  Patient went to urgent care who referred him to ER.  CT chest was done due to an abnormal D-dimer and showed no pulmonary embolism with moderate emphysema with subpleural scarring and/or fibrosis.  EKG did not show any change from the previous EKG.  It showed sinus tachycardia with short MN, nonspecific ST abnormality, abnormal QRS-T angle consider primary T wave abnormality.    Pt is present today and feels dragged out. Pt endorses some SOB. Pt denies any chest tightness. No chest pains. Pt reports having pain described as aching on the lateral aspect of his right thigh and to the dorsal aspect of his calf. Pt denies any restricted ROM to his lumbar area that reproduces the pain to his right leg. Pt reports that the stress test is on 3/11/2025. Pt still having 3-4 loose stools a day. Pt endorses hematochezia that is intermittent, but has ulcerative colitis.            Review of Systems  Constitutional, neuro, ENT, endocrine, pulmonary, cardiac, gastrointestinal, genitourinary, musculoskeletal, integument and psychiatric systems are negative, except as otherwise noted.      Objective    /70 (BP Location: Right arm, Patient Position: Sitting, Cuff Size: Adult Large)   Pulse 79   Temp 97.3  F (36.3  C) (Oral)   Resp 20   Ht 1.753 m (5' 9\")   Wt 89.4 kg (197 lb)   SpO2 97%   BMI 29.09 kg/m    Body mass index is 29.09 kg/m .  Physical Exam   GENERAL: alert and no distress  HENT: ear canals and " TM's normal, nose and mouth without ulcers or lesions  NECK: no adenopathy, no asymmetry, masses, or scars  RESP: lungs clear to auscultation - no rales, rhonchi or wheezes and decreased breath sounds bibasilar  CV: regular rates and rhythm, normal S1 S2, no S3 or S4, no murmur, click or rub, peripheral pulses strong, no peripheral edema, and varicosities to bilateral calves, pp: 1+ bilaterally  ABDOMEN: soft, nontender, without hepatosplenomegaly or masses and bowel sounds hyperactive x 4  MS: no gross musculoskeletal defects noted, no edema  SKIN: no suspicious lesions or rashes  NEURO: Normal strength and tone, mentation intact and speech normal  PSYCH: mentation appears normal, affect normal/bright            Signed Electronically by: SANDRA Morgan CNP

## 2025-03-11 ENCOUNTER — PATIENT OUTREACH (OUTPATIENT)
Dept: CARE COORDINATION | Facility: CLINIC | Age: 75
End: 2025-03-11
Payer: COMMERCIAL

## 2025-03-11 ENCOUNTER — HOSPITAL ENCOUNTER (OUTPATIENT)
Dept: CARDIOLOGY | Facility: CLINIC | Age: 75
Discharge: HOME | End: 2025-03-11
Attending: EMERGENCY MEDICINE | Admitting: EMERGENCY MEDICINE
Payer: COMMERCIAL

## 2025-03-11 DIAGNOSIS — R06.09 DOE (DYSPNEA ON EXERTION): ICD-10-CM

## 2025-03-11 PROCEDURE — 255N000002 HC RX 255 OP 636: Performed by: EMERGENCY MEDICINE

## 2025-03-11 PROCEDURE — 93325 DOPPLER ECHO COLOR FLOW MAPG: CPT | Mod: TC

## 2025-03-11 RX ADMIN — HUMAN ALBUMIN MICROSPHERES AND PERFLUTREN 3 ML: 10; .22 INJECTION, SOLUTION INTRAVENOUS at 13:19

## 2025-03-12 ENCOUNTER — MYC MEDICAL ADVICE (OUTPATIENT)
Dept: INTERNAL MEDICINE | Facility: CLINIC | Age: 75
End: 2025-03-12

## 2025-03-12 ENCOUNTER — OFFICE VISIT (OUTPATIENT)
Dept: CARDIOLOGY | Facility: CLINIC | Age: 75
End: 2025-03-12
Payer: COMMERCIAL

## 2025-03-12 VITALS
DIASTOLIC BLOOD PRESSURE: 80 MMHG | SYSTOLIC BLOOD PRESSURE: 122 MMHG | HEART RATE: 90 BPM | BODY MASS INDEX: 29.07 KG/M2 | OXYGEN SATURATION: 96 % | WEIGHT: 196.3 LBS | HEIGHT: 69 IN

## 2025-03-12 DIAGNOSIS — I48.3 TYPICAL ATRIAL FLUTTER (H): Primary | ICD-10-CM

## 2025-03-12 DIAGNOSIS — I10 BENIGN ESSENTIAL HYPERTENSION: ICD-10-CM

## 2025-03-12 DIAGNOSIS — R06.09 DOE (DYSPNEA ON EXERTION): ICD-10-CM

## 2025-03-12 DIAGNOSIS — Z79.4 TYPE 2 DIABETES MELLITUS WITH DIABETIC NEPHROPATHY, WITH LONG-TERM CURRENT USE OF INSULIN (H): ICD-10-CM

## 2025-03-12 DIAGNOSIS — I10 ESSENTIAL HYPERTENSION: ICD-10-CM

## 2025-03-12 DIAGNOSIS — E11.21 TYPE 2 DIABETES MELLITUS WITH DIABETIC NEPHROPATHY, WITH LONG-TERM CURRENT USE OF INSULIN (H): ICD-10-CM

## 2025-03-12 DIAGNOSIS — I25.810 CORONARY ARTERY DISEASE INVOLVING AUTOLOGOUS ARTERY CORONARY BYPASS GRAFT WITHOUT ANGINA PECTORIS: ICD-10-CM

## 2025-03-12 DIAGNOSIS — R06.02 SOB (SHORTNESS OF BREATH): ICD-10-CM

## 2025-03-12 NOTE — PROGRESS NOTES
"HISTORY OF PRESENT ILLNESS:  Adan Ruffin a 74 year old male with coronary artery disease, paroxysmal atrial flutter (sp ablation 2017), ulcerative proctitis, hypertension, obesity, and type 2 diabetes mellitus was evaluated on referral from the [facility] emergency room for dyspnea.    The patient has been followed in our cardiology clinic for the last 3 years by our advanced level practitioner when last seen in August 2024 he did not report any specific cardiac complaints.  On 3/1/2025 he presented to our emergency room because of worsening shortness of breath present for at least 2 weeks.  In retrospect, the patient tells me he has been short of breath since at least the fall 2024.  There is been no chest arm neck jaw or back discomfort with exertion, hemoptysis, purulent sputum, fevers, or cough.  D-dimer, BMP, CBC, and troponin levels were all unremarkable.  Chest CT showed no evidence of pulmonary embolus with changes of chronic emphysema but no definite infiltrate.  His ECG was interpreted by both the computer program and the emergency room physician as demonstrating \"sinus tachycardia with a short OR interval\".  A Zio patch monitor was ordered and the patient was scheduled for a stress echocardiogram completed 3/11/2025.  The patient completed 5 minutes and 54 seconds on a Taz protocol ( 3.4 Mets) stopping due to dyspnea.  His ejection fraction was 55 to 60% with no evidence of ischemia, however his ECG showed a regular rhythm with an abnormal P wave axis suspicious for atrial flutter.    Upon review of the patient's electrocardiograms it appears he was in atrial flutter in November 2024.  His last ECG showing a normal sinus rhythm was in April 2024 before hernia surgery in May 2024.  The patient tells me he has had an intentional 50 pound weight loss over the last 1-1/2 years.  He is scheduled to see his internist Dr. Galloway as a virtual visit on March 17.    PAST MEDICAL HISTORY  1) Coronary " disease: Status post bypass surgery 2015 with LIMA graft to LAD and vein graft to circumflex and PDA.  Nuclear stress test 2019 showing no ischemia.  Stress echo 2025 showing no ischemia but poor exercise tolerance  2) history of obesity: Intentional weight loss of 50 pounds.  Current BMI is 28.99  3) diabetes mellitus  4) hypertension  5) ulcerative proctitis hemoglobin 13.7  3/1/2025 on mesalamine/sulfaslalzine              Orders this Visit:  No orders of the defined types were placed in this encounter.    No orders of the defined types were placed in this encounter.    There are no discontinued medications.    Encounter Diagnosis   Name Primary?    SOB (shortness of breath)        CURRENT MEDICATIONS:  Current Outpatient Medications   Medication Sig Dispense Refill    acetaminophen (TYLENOL) 325 MG tablet Take 2 tablets (650 mg) by mouth every 4 hours as needed for other (mild pain) 100 tablet 0    alcohol swab prep pads Use to swab area of injection/mar 4 times daily or as needed, for blood sugar. 400 each 3    amLODIPine (NORVASC) 5 MG tablet Take 1 tablet (5 mg) by mouth daily. 90 tablet 2    aspirin-acetaminophen-caffeine (EXCEDRIN MIGRAINE) 250-250-65 MG tablet Take 1 tablet by mouth daily as needed      atorvastatin (LIPITOR) 40 MG tablet TAKE 1 TABLET(40 MG) BY MOUTH DAILY 90 tablet 3    B-D ULTRA-FINE 33 LANCETS MISC 1 Lancet 4 times daily To test for blood sugar 400 each 3    blood glucose (CONTOUR NEXT TEST) test strip Use to test blood sugar 4 times daily. 400 strip 6    blood glucose (NO BRAND SPECIFIED) test strip Use to test blood sugar 4 times daily or as directed. To accompany: Blood Glucose Monitor Brands: per insurance. 100 strip 6    blood glucose calibration (CONTOUR NEXT CONTROL LOW VI SOLN) Low solution Use to calibrate blood glucose monitor as directed. 1 each 3    blood glucose calibration (CONTOUR NEXT CONTROL NORMAL VI SOLN) Normal solution Use to calibrate blood glucose monitor as  needed. 1 each 3    blood glucose monitoring (CONTOUR NEXT MONITOR W/DEVICE KIT) meter device kit Use to test blood sugar 4 times daily 1 kit 0    blood glucose monitoring (NO BRAND SPECIFIED) meter device kit Use to test blood sugar 4 times daily or as directed. Preferred blood glucose meter OR supplies to accompany: Blood Glucose Monitor Brands: per insurance. 1 kit 0    blood glucose monitoring (ONE TOUCH ULTRA 2) meter device kit Use to test blood sugar 4 times daily or as directed. 1 kit 0    folic acid (FOLVITE) 1 MG tablet Take 1,000 mcg by mouth daily      hydrochlorothiazide (HYDRODIURIL) 25 MG tablet TAKE 1 TABLET(25 MG) BY MOUTH DAILY 90 tablet 3    hydrOXYzine HCl (ATARAX) 10 MG tablet Take 1 tablet (10 mg) by mouth every 6 hours as needed for itching or anxiety (with pain, moderate pain) 30 tablet 0    insulin glargine (LANTUS SOLOSTAR) 100 UNIT/ML pen INJECT 72 UNITS UNDER THE SKIN TWICE DAILY 75 mL 2    insulin lispro (HUMALOG KWIKPEN) 100 UNIT/ML (1 unit dial) KWIKPEN INJECT 40 UNITS UNDER THE SKIN THREE TIMES DAILY BEFORE MEALS. 10 UNITS PER 1 CARB 45 mL 3    insulin pen needle (NOVOFINE 30) 30G X 8 MM miscellaneous USE 4 TO 5 TIMES DAILY OR AS DIRECTED 500 each 3    losartan (COZAAR) 100 MG tablet TAKE 1 TABLET(100 MG) BY MOUTH DAILY 90 tablet 3    mesalamine (CANASA) 1000 MG suppository Place 1 suppository (1,000 mg) rectally At Bedtime      metFORMIN (GLUCOPHAGE) 1000 MG tablet Take 1 tablet (1,000 mg) by mouth 2 times daily (with meals). 180 tablet 3    metoprolol succinate ER (TOPROL XL) 100 MG 24 hr tablet Take 1 tablet (100 mg) by mouth 2 times daily. 180 tablet 4    ondansetron (ZOFRAN ODT) 4 MG ODT tab Take 1-2 tablets (4-8 mg) by mouth every 8 hours as needed for nausea Dissolve ON the tongue. 10 tablet 0    sulfaSALAzine (AZULFIDINE) 500 MG tablet Take 500 mg by mouth 2 times daily      thin (NO BRAND SPECIFIED) lancets Use with lanceting device. To accompany: Blood Glucose Monitor  "Brands: per insurance. 100 each 6    thin (NO BRAND SPECIFIED) lancets Use to test blood sugar, 4 times daily. Use with lanceting device. Blood Glucose Monitor Brands: per insurance. 400 each 6       ALLERGIES     Allergies   Allergen Reactions    Adhesive Tape Rash    Codeine Rash    Simvastatin Muscle Pain (Myalgia)     Leg pain        Tetracycline Rash       PAST MEDICAL, SURGICAL, FAMILY, SOCIAL HISTORY:  History was reviewed and updated as needed, see medical record.        Review of Systems:  A 12-point review of systems was completed, see medical record for detailed review of systems information.    Physical Exam:  Vitals: /80 (BP Location: Right arm, Patient Position: Sitting, Cuff Size: Adult Regular)   Pulse 90   Ht 1.753 m (5' 9\")   Wt 89 kg (196 lb 4.8 oz)   SpO2 96%   BMI 28.99 kg/m      Constitutional: No apparent distress, cooperative, intelligent    HEENT: pupils equal round reactive to light, thyroid normal size, JVP is normal    Chest: Clear to percussion and auscultation    Cardiac: Normal S1, normal S2 no S3, no murmur or click    Abdomen: Obese  Liver percusses to 6 cm spleen is not palpable aorta is not tender or enlarged    Extremitiies: no edema.    Neurological:  Cranial nerves II through XII are intact, strength equal and symmetrical, displays normal insight and judgment        ASSESSMENT: I believe the patient's dyspnea is a consequence of persistent atrial flutter, unrecognized previous ECGs slower than expected ventricular response patient is on metoprolol XL 20 mg daily.  His symptoms are currently very disabling uncertain he will be able to wait a full 4 to 6 weeks of full anticoagulation.  I would recommend initiating anticoagulation and proceeding with ANAID cardioversion within the next few weeks.    RECOMMENDATIONS:   1) begin apixaban 5 mg bid  2) Arrange ANAID /Cardioversion  3) continue present medications  4) follow up visit after cardioversion with consideration of EP " evaluation regarding candidacy for another ablation        Recent Lab Results:  LIPID RESULTS:  Lab Results   Component Value Date    CHOL 141 04/10/2024    CHOL 129 02/04/2021    HDL 81 04/10/2024    HDL 50 02/04/2021    LDL 38 04/10/2024    LDL 49 02/04/2021    TRIG 110 04/10/2024    TRIG 149 02/04/2021    CHOLHDLRATIO 3.5 08/19/2015       LIVER ENZYME RESULTS:  Lab Results   Component Value Date    AST 53 (H) 11/19/2024    AST 39 10/15/2020    ALT 34 11/19/2024    ALT 24 02/04/2021       CBC RESULTS:  Lab Results   Component Value Date    WBC 6.4 03/01/2025    WBC 8.6 10/15/2020    RBC 4.39 (L) 03/01/2025    RBC 4.62 10/15/2020    HGB 13.7 03/01/2025    HGB 16.4 10/15/2020    HCT 40.0 03/01/2025    HCT 47.7 10/15/2020    MCV 91 03/01/2025     (H) 10/15/2020    MCH 31.2 03/01/2025    MCH 35.5 (H) 10/15/2020    MCHC 34.3 03/01/2025    MCHC 34.4 10/15/2020    RDW 13.7 03/01/2025    RDW 12.6 10/15/2020     03/01/2025     10/15/2020       BMP RESULTS:  Lab Results   Component Value Date     03/01/2025     10/15/2020    POTASSIUM 4.3 03/01/2025    POTASSIUM 4.6 10/19/2021    POTASSIUM 5.3 10/15/2020    CHLORIDE 97 (L) 03/01/2025    CHLORIDE 107 10/19/2021    CHLORIDE 104 10/15/2020    CO2 25 03/01/2025    CO2 20 10/19/2021    CO2 24 10/15/2020    ANIONGAP 13 03/01/2025    ANIONGAP 10 10/19/2021    ANIONGAP 8 10/15/2020     (H) 03/01/2025     (H) 05/03/2024     (H) 10/19/2021     (H) 10/15/2020    BUN 31.5 (H) 03/01/2025    BUN 19 10/19/2021    BUN 30 10/15/2020    CR 1.21 (H) 03/01/2025    CR 0.88 10/15/2020    GFRESTIMATED 64 03/01/2025    GFRESTIMATED 88 10/15/2020    GFRESTBLACK >90 10/15/2020    AURE 11.0 (H) 03/01/2025    AURE 10.0 10/15/2020        A1C RESULTS:  Lab Results   Component Value Date    A1C 9.0 (H) 11/19/2024    A1C 7.9 (H) 04/02/2021       INR RESULTS:  Lab Results   Component Value Date    INR 1.04 01/02/2020    INR 0.97 10/19/2017       We  greatly appreciate the opportunity to be involved in the care of your patient, Adan Ruffin.    Sincerely,  Jamal Gomez MD      CC  SANDRA Burch CNP  [address] [place] [address]

## 2025-03-12 NOTE — LETTER
3/12/2025    Lauro Galloway MD  [address] [facility] [address]    RE: Adan Ruffin       Dear Colleague,     I had the pleasure of seeing Adan Ruffin in the [facility].  HISTORY OF PRESENT ILLNESS:  Adan Ruffin a 74 year old male with     Lost 55 lbs over 1.5 to 2 years intentional   No smoking stopped 2001  Pilonidal cyst excision  Scheduled for virtual visit 3/2017    In Fall 2024  breathing difficulty shortness of breath with exertion.  No chest pain. Gradually pulses were high 99    Orders this Visit:  No orders of the defined types were placed in this encounter.    No orders of the defined types were placed in this encounter.    There are no discontinued medications.    Encounter Diagnosis   Name Primary?     SOB (shortness of breath)        CURRENT MEDICATIONS:  Current Outpatient Medications   Medication Sig Dispense Refill     acetaminophen (TYLENOL) 325 MG tablet Take 2 tablets (650 mg) by mouth every 4 hours as needed for other (mild pain) 100 tablet 0     alcohol swab prep pads Use to swab area of injection/mar 4 times daily or as needed, for blood sugar. 400 each 3     amLODIPine (NORVASC) 5 MG tablet Take 1 tablet (5 mg) by mouth daily. 90 tablet 2     aspirin-acetaminophen-caffeine (EXCEDRIN MIGRAINE) 250-250-65 MG tablet Take 1 tablet by mouth daily as needed       atorvastatin (LIPITOR) 40 MG tablet TAKE 1 TABLET(40 MG) BY MOUTH DAILY 90 tablet 3     B-D ULTRA-FINE 33 LANCETS MISC 1 Lancet 4 times daily To test for blood sugar 400 each 3     blood glucose (CONTOUR NEXT TEST) test strip Use to test blood sugar 4 times daily. 400 strip 6     blood glucose (NO BRAND SPECIFIED) test strip Use to test blood sugar 4 times daily or as directed. To accompany: Blood Glucose Monitor Brands: per insurance. 100 strip 6     blood glucose calibration (CONTOUR NEXT CONTROL LOW VI SOLN) Low solution Use to calibrate blood glucose monitor as directed. 1 each 3     blood  glucose calibration (CONTOUR NEXT CONTROL NORMAL VI SOLN) Normal solution Use to calibrate blood glucose monitor as needed. 1 each 3     blood glucose monitoring (CONTOUR NEXT MONITOR W/DEVICE KIT) meter device kit Use to test blood sugar 4 times daily 1 kit 0     blood glucose monitoring (NO BRAND SPECIFIED) meter device kit Use to test blood sugar 4 times daily or as directed. Preferred blood glucose meter OR supplies to accompany: Blood Glucose Monitor Brands: per insurance. 1 kit 0     blood glucose monitoring (ONE TOUCH ULTRA 2) meter device kit Use to test blood sugar 4 times daily or as directed. 1 kit 0     folic acid (FOLVITE) 1 MG tablet Take 1,000 mcg by mouth daily       hydrochlorothiazide (HYDRODIURIL) 25 MG tablet TAKE 1 TABLET(25 MG) BY MOUTH DAILY 90 tablet 3     hydrOXYzine HCl (ATARAX) 10 MG tablet Take 1 tablet (10 mg) by mouth every 6 hours as needed for itching or anxiety (with pain, moderate pain) 30 tablet 0     insulin glargine (LANTUS SOLOSTAR) 100 UNIT/ML pen INJECT 72 UNITS UNDER THE SKIN TWICE DAILY 75 mL 2     insulin lispro (HUMALOG KWIKPEN) 100 UNIT/ML (1 unit dial) KWIKPEN INJECT 40 UNITS UNDER THE SKIN THREE TIMES DAILY BEFORE MEALS. 10 UNITS PER 1 CARB 45 mL 3     insulin pen needle (NOVOFINE 30) 30G X 8 MM miscellaneous USE 4 TO 5 TIMES DAILY OR AS DIRECTED 500 each 3     losartan (COZAAR) 100 MG tablet TAKE 1 TABLET(100 MG) BY MOUTH DAILY 90 tablet 3     mesalamine (CANASA) 1000 MG suppository Place 1 suppository (1,000 mg) rectally At Bedtime       metFORMIN (GLUCOPHAGE) 1000 MG tablet Take 1 tablet (1,000 mg) by mouth 2 times daily (with meals). 180 tablet 3     metoprolol succinate ER (TOPROL XL) 100 MG 24 hr tablet Take 1 tablet (100 mg) by mouth 2 times daily. 180 tablet 4     ondansetron (ZOFRAN ODT) 4 MG ODT tab Take 1-2 tablets (4-8 mg) by mouth every 8 hours as needed for nausea Dissolve ON the tongue. 10 tablet 0     sulfaSALAzine (AZULFIDINE) 500 MG tablet Take 500 mg  "by mouth 2 times daily       thin (NO BRAND SPECIFIED) lancets Use with lanceting device. To accompany: Blood Glucose Monitor Brands: per insurance. 100 each 6     thin (NO BRAND SPECIFIED) lancets Use to test blood sugar, 4 times daily. Use with lanceting device. Blood Glucose Monitor Brands: per insurance. 400 each 6       ALLERGIES     Allergies   Allergen Reactions     Adhesive Tape Rash     Codeine Rash     Simvastatin Muscle Pain (Myalgia)     Leg pain         Tetracycline Rash       PAST MEDICAL, SURGICAL, FAMILY, SOCIAL HISTORY:  History was reviewed and updated as needed, see medical record.        Review of Systems:  A 12-point review of systems was completed, see medical record for detailed review of systems information.    Physical Exam:  Vitals: /80 (BP Location: Right arm, Patient Position: Sitting, Cuff Size: Adult Regular)   Pulse 90   Ht 1.753 m (5' 9\")   Wt 89 kg (196 lb 4.8 oz)   SpO2 96%   BMI 28.99 kg/m      Constitutional: No apparent distress    HEENT: pupils equal round reactive to light, thyroid normal size, JVP is normal    Chest: Clear to percussion and auscultation    Cardiac: Normal S1, normal S2 no S3, no murmur or click    Abdomen: Scaphoid.  Liver percusses to 6 cm spleen is not palpable aorta is not tender or enlarged    Extremitiies: no edema.    Neurological:  Cranial nerves II through XII are intact, strength equal and symmetrical, displays normal insight and judgment        ASSESSMENT: Adan Ruffin is a 74 year old male with          We reviewed the benefits of a regular exercise program, a Mediterranean-style weight loss diet, and contacting us for any changes in between now and the time of her next visit.     RECOMMENDATIONS:   1) begin apixaban 5 mg bid  2) Arrange ANAID /Cardioversion  3) continue present medication        Recent Lab Results:  LIPID RESULTS:  Lab Results   Component Value Date    CHOL 141 04/10/2024    CHOL 129 02/04/2021    HDL 81 04/10/2024    " HDL 50 02/04/2021    LDL 38 04/10/2024    LDL 49 02/04/2021    TRIG 110 04/10/2024    TRIG 149 02/04/2021    CHOLHDLRATIO 3.5 08/19/2015       LIVER ENZYME RESULTS:  Lab Results   Component Value Date    AST 53 (H) 11/19/2024    AST 39 10/15/2020    ALT 34 11/19/2024    ALT 24 02/04/2021       CBC RESULTS:  Lab Results   Component Value Date    WBC 6.4 03/01/2025    WBC 8.6 10/15/2020    RBC 4.39 (L) 03/01/2025    RBC 4.62 10/15/2020    HGB 13.7 03/01/2025    HGB 16.4 10/15/2020    HCT 40.0 03/01/2025    HCT 47.7 10/15/2020    MCV 91 03/01/2025     (H) 10/15/2020    MCH 31.2 03/01/2025    MCH 35.5 (H) 10/15/2020    MCHC 34.3 03/01/2025    MCHC 34.4 10/15/2020    RDW 13.7 03/01/2025    RDW 12.6 10/15/2020     03/01/2025     10/15/2020       BMP RESULTS:  Lab Results   Component Value Date     03/01/2025     10/15/2020    POTASSIUM 4.3 03/01/2025    POTASSIUM 4.6 10/19/2021    POTASSIUM 5.3 10/15/2020    CHLORIDE 97 (L) 03/01/2025    CHLORIDE 107 10/19/2021    CHLORIDE 104 10/15/2020    CO2 25 03/01/2025    CO2 20 10/19/2021    CO2 24 10/15/2020    ANIONGAP 13 03/01/2025    ANIONGAP 10 10/19/2021    ANIONGAP 8 10/15/2020     (H) 03/01/2025     (H) 05/03/2024     (H) 10/19/2021     (H) 10/15/2020    BUN 31.5 (H) 03/01/2025    BUN 19 10/19/2021    BUN 30 10/15/2020    CR 1.21 (H) 03/01/2025    CR 0.88 10/15/2020    GFRESTIMATED 64 03/01/2025    GFRESTIMATED 88 10/15/2020    GFRESTBLACK >90 10/15/2020    AURE 11.0 (H) 03/01/2025    AURE 10.0 10/15/2020        A1C RESULTS:  Lab Results   Component Value Date    A1C 9.0 (H) 11/19/2024    A1C 7.9 (H) 04/02/2021       INR RESULTS:  Lab Results   Component Value Date    INR 1.04 01/02/2020    INR 0.97 10/19/2017       We greatly appreciate the opportunity to be involved in the care of your patient, Adan Ruffin.    Sincerely,  Jamal Gomez MD      CC  Jeannie He APRN CNP  [address] [place] [address]                                                                         Thank you for allowing me to participate in the care of your patient.      Sincerely,     Jamal Gomez MD     [facility]  cc:   SANDRA Burch CNP  [address] [place] [address]

## 2025-03-12 NOTE — TELEPHONE ENCOUNTER
Writer called pharmacy, pharmacy reports patient does not have a refill on file last refill was received 8/2024. Routing to PCP, for reorder.     Summer RN 3:05 PM March 12, 2025   [facility]

## 2025-03-14 SDOH — HEALTH STABILITY: PHYSICAL HEALTH: ON AVERAGE, HOW MANY MINUTES DO YOU ENGAGE IN EXERCISE AT THIS LEVEL?: 20 MIN

## 2025-03-14 SDOH — HEALTH STABILITY: PHYSICAL HEALTH: ON AVERAGE, HOW MANY DAYS PER WEEK DO YOU ENGAGE IN MODERATE TO STRENUOUS EXERCISE (LIKE A BRISK WALK)?: 1 DAY

## 2025-03-14 ASSESSMENT — SOCIAL DETERMINANTS OF HEALTH (SDOH): HOW OFTEN DO YOU GET TOGETHER WITH FRIENDS OR RELATIVES?: ONCE A WEEK

## 2025-03-17 ENCOUNTER — ANCILLARY PROCEDURE (OUTPATIENT)
Dept: GENERAL RADIOLOGY | Facility: CLINIC | Age: 75
End: 2025-03-17
Attending: INTERNAL MEDICINE
Payer: COMMERCIAL

## 2025-03-17 ENCOUNTER — OFFICE VISIT (OUTPATIENT)
Dept: INTERNAL MEDICINE | Facility: CLINIC | Age: 75
End: 2025-03-17
Payer: COMMERCIAL

## 2025-03-17 VITALS
TEMPERATURE: 96.8 F | HEART RATE: 95 BPM | HEIGHT: 69 IN | RESPIRATION RATE: 16 BRPM | OXYGEN SATURATION: 94 % | WEIGHT: 202.2 LBS | DIASTOLIC BLOOD PRESSURE: 72 MMHG | BODY MASS INDEX: 29.95 KG/M2 | SYSTOLIC BLOOD PRESSURE: 137 MMHG

## 2025-03-17 DIAGNOSIS — Z79.4 TYPE 2 DIABETES MELLITUS WITH DIABETIC NEPHROPATHY, WITH LONG-TERM CURRENT USE OF INSULIN (H): ICD-10-CM

## 2025-03-17 DIAGNOSIS — Z09 FOLLOW-UP EXAMINATION: ICD-10-CM

## 2025-03-17 DIAGNOSIS — Z79.899 MEDICATION MANAGEMENT: ICD-10-CM

## 2025-03-17 DIAGNOSIS — K92.1 HEMATOCHEZIA: ICD-10-CM

## 2025-03-17 DIAGNOSIS — Z00.00 ENCOUNTER FOR PREVENTATIVE ADULT HEALTH CARE EXAMINATION: Primary | ICD-10-CM

## 2025-03-17 DIAGNOSIS — I48.3 TYPICAL ATRIAL FLUTTER (H): ICD-10-CM

## 2025-03-17 DIAGNOSIS — I25.10 CORONARY ARTERY DISEASE DUE TO LIPID RICH PLAQUE: ICD-10-CM

## 2025-03-17 DIAGNOSIS — M54.41 RIGHT-SIDED LOW BACK PAIN WITH RIGHT-SIDED SCIATICA, UNSPECIFIED CHRONICITY: ICD-10-CM

## 2025-03-17 DIAGNOSIS — K51.30 CHRONIC ULCERATIVE RECTOSIGMOIDITIS WITHOUT COMPLICATIONS (H): ICD-10-CM

## 2025-03-17 DIAGNOSIS — Z12.5 SCREENING FOR PROSTATE CANCER: ICD-10-CM

## 2025-03-17 DIAGNOSIS — E11.21 TYPE 2 DIABETES MELLITUS WITH DIABETIC NEPHROPATHY, WITH LONG-TERM CURRENT USE OF INSULIN (H): ICD-10-CM

## 2025-03-17 DIAGNOSIS — I25.83 CORONARY ARTERY DISEASE DUE TO LIPID RICH PLAQUE: ICD-10-CM

## 2025-03-17 DIAGNOSIS — I10 PRIMARY HYPERTENSION: ICD-10-CM

## 2025-03-17 DIAGNOSIS — N18.31 STAGE 3A CHRONIC KIDNEY DISEASE (H): ICD-10-CM

## 2025-03-17 LAB
ALBUMIN UR-MCNC: 100 MG/DL
APPEARANCE UR: CLEAR
BASOPHILS # BLD AUTO: 0 10E3/UL (ref 0–0.2)
BASOPHILS NFR BLD AUTO: 0 %
BILIRUB UR QL STRIP: NEGATIVE
COLOR UR AUTO: YELLOW
D DIMER PPP FEU-MCNC: 0.49 UG/ML FEU (ref 0–0.5)
EOSINOPHIL # BLD AUTO: 0.6 10E3/UL (ref 0–0.7)
EOSINOPHIL NFR BLD AUTO: 7 %
ERYTHROCYTE [DISTWIDTH] IN BLOOD BY AUTOMATED COUNT: 14.8 % (ref 10–15)
EST. AVERAGE GLUCOSE BLD GHB EST-MCNC: 214 MG/DL
GLUCOSE UR STRIP-MCNC: 250 MG/DL
HBA1C MFR BLD: 9.1 % (ref 0–5.6)
HCT VFR BLD AUTO: 38.6 % (ref 40–53)
HGB BLD-MCNC: 13.8 G/DL (ref 13.3–17.7)
HGB UR QL STRIP: NEGATIVE
HYALINE CASTS #/AREA URNS LPF: ABNORMAL /LPF
IMM GRANULOCYTES # BLD: 0.2 10E3/UL
IMM GRANULOCYTES NFR BLD: 2 %
KETONES UR STRIP-MCNC: NEGATIVE MG/DL
LEUKOCYTE ESTERASE UR QL STRIP: NEGATIVE
LYMPHOCYTES # BLD AUTO: 1.9 10E3/UL (ref 0.8–5.3)
LYMPHOCYTES NFR BLD AUTO: 21 %
MCH RBC QN AUTO: 33.1 PG (ref 26.5–33)
MCHC RBC AUTO-ENTMCNC: 35.8 G/DL (ref 31.5–36.5)
MCV RBC AUTO: 93 FL (ref 78–100)
MONOCYTES # BLD AUTO: 0.7 10E3/UL (ref 0–1.3)
MONOCYTES NFR BLD AUTO: 8 %
NEUTROPHILS # BLD AUTO: 5.4 10E3/UL (ref 1.6–8.3)
NEUTROPHILS NFR BLD AUTO: 62 %
NITRATE UR QL: NEGATIVE
PH UR STRIP: 6 [PH] (ref 5–7)
PLATELET # BLD AUTO: 240 10E3/UL (ref 150–450)
RBC # BLD AUTO: 4.17 10E6/UL (ref 4.4–5.9)
RBC #/AREA URNS AUTO: ABNORMAL /HPF
SP GR UR STRIP: 1.01 (ref 1–1.03)
SQUAMOUS #/AREA URNS AUTO: ABNORMAL /LPF
UROBILINOGEN UR STRIP-ACNC: 0.2 E.U./DL
WBC # BLD AUTO: 8.8 10E3/UL (ref 4–11)
WBC #/AREA URNS AUTO: ABNORMAL /HPF

## 2025-03-17 PROCEDURE — 85025 COMPLETE CBC W/AUTO DIFF WBC: CPT | Performed by: INTERNAL MEDICINE

## 2025-03-17 PROCEDURE — 72100 X-RAY EXAM L-S SPINE 2/3 VWS: CPT | Mod: TC | Performed by: RADIOLOGY

## 2025-03-17 PROCEDURE — 80048 BASIC METABOLIC PNL TOTAL CA: CPT | Performed by: INTERNAL MEDICINE

## 2025-03-17 PROCEDURE — 36415 COLL VENOUS BLD VENIPUNCTURE: CPT | Performed by: INTERNAL MEDICINE

## 2025-03-17 PROCEDURE — 85379 FIBRIN DEGRADATION QUANT: CPT | Performed by: INTERNAL MEDICINE

## 2025-03-17 ASSESSMENT — PAIN SCALES - GENERAL: PAINLEVEL_OUTOF10: SEVERE PAIN (7)

## 2025-03-17 NOTE — PROGRESS NOTES
Preventive Care Visit  [facility]  Lauro Galloway MD, Internal Medicine  Mar 17, 2025      Assessment & Plan     Stage 3a chronic kidney disease (H)  Monitor , keep hydrated, avoid NSAID use   - Albumin Random Urine Quantitative with Creat Ratio  - OFFICE/OUTPT VISIT,ERNIELEVL III    Medication management  Medications/ side effects discussed     Coronary artery disease due to lipid rich plaque  No angina, follow up with cardiology   - Lipid panel reflex to direct LDL Non-fasting  - OFFICE/OUTPT VISIT,ERNIELEVL III    Encounter for preventative adult health care examination  advised regular aerobic activity, low cholesterol, low salt diet, wearing seat belt,  self examinations, sunscreen protection.Obtain screening cholesterol, immunizations reviewed.    - Hemoglobin A1c  - TSH with free T4 reflex  - PSA, screen  - UA with Microscopic reflex to Culture - lab collect    Screening for prostate cancer    - PSA, screen    Type 2 diabetes mellitus with diabetic nephropathy, with long-term current use of insulin (H)  Assess lab  Continue insulin and metformin   - Hemoglobin A1c  - OFFICE/OUTPT VISIT,ESTLEVL III    Primary hypertension  Controlled on treatment   - TSH with free T4 reflex  - UA with Microscopic reflex to Culture - lab collect  - OFFICE/OUTPT VISIT,ERNIELEVL III    Right-sided low back pain with right-sided sciatica, unspecified chronicity  Assess X rays  Consider PT   - XR Lumbar Spine 2/3 Views; Future  - OFFICE/OUTPT VISIT,PHILIP KLEINL III    Typical atrial flutter (H)  Follow up with cardiology for cardioversion   - OFFICE/OUTPT VISIT,ESTER KLEIN III            See Patient Instructions    Subjective   Ray is a 74 year old, presenting for the following:  Wellness Visit (Not fasting)        3/17/2025    11:30 AM   Additional Questions   Roomed by Jacque LUI   Accompanied by n/a           HPI    Has h/o HTN. on medical treatment. BP has been controlled. No side effects from medications. No  CP, HA, dizziness. good compliance with medications and low salt diet.  Has h/o ischemic heart disease, asymptomatic regarding chest pains, SOB,palpitations. Has good compliance with treatment, diet and exercise.  Has  history of  DM. On diet , exercise and insulin and metformin. Blood sugars are controlled. Checking 4 times daily. No paresthesias. No hypoglycemias.  Has H/O hyperlipidemia. On medical treatment and diet. No side effects. No muscle weakness, myalgias or upset stomach.   Has h/o fatigue, seen cardiology, has atrial flutter. Planned for cardioversion. On AC and rate control treatment.   Has had right sided buttock pain , radiating to the leg and knee, at rest, worse with walking, past 3-4 months.   Has h/o CRF. Monitoring BP, BG, medications, avoiding OTC NSAIDs. Needs periodic recheck of kidney function.           Advance Care Planning  Patient does not have a Health Care Directive: Discussed advance care planning with patient; however, patient declined at this time.      3/14/2025   General Health   How would you rate your overall physical health? (!) FAIR   Feel stress (tense, anxious, or unable to sleep) Only a little   (!) STRESS CONCERN      3/14/2025   Nutrition   Diet: I don't know         3/14/2025   Exercise   Days per week of moderate/strenous exercise 1 day   Average minutes spent exercising at this level 20 min   (!) EXERCISE CONCERN      3/14/2025   Social Factors   Frequency of gathering with friends or relatives Once a week   Worry food won't last until get money to buy more No   Food not last or not have enough money for food? No   Do you have housing? (Housing is defined as stable permanent housing and does not include staying ouside in a car, in a tent, in an abandoned building, in an overnight shelter, or couch-surfing.) Yes   Are you worried about losing your housing? No   Lack of transportation? No   Unable to get utilities (heat,electricity)? No         3/17/2025   Fall Risk    Gait Speed Test (Document in seconds) 5.4   Gait Speed Test Interpretation Greater than 5.01 seconds - ABNORMAL          3/14/2025   Activities of Daily Living- Home Safety   Needs help with the following daily activites None of the above   Safety concerns in the home None of the above         3/14/2025   Dental   Dentist two times every year? Yes         3/14/2025   Hearing Screening   Hearing concerns? None of the above         3/14/2025   Driving Risk Screening   Patient/family members have concerns about driving No         3/14/2025   General Alertness/Fatigue Screening   Have you been more tired than usual lately? (!) YES         3/14/2025   Urinary Incontinence Screening   Bothered by leaking urine in past 6 months No           Today's PHQ-2 Score:       3/16/2025     6:57 PM   PHQ-2 (  [employer])   Q1: Little interest or pleasure in doing things 1   Q2: Feeling down, depressed or hopeless 1   PHQ-2 Score 2    Q1: Little interest or pleasure in doing things Several days   Q2: Feeling down, depressed or hopeless Several days   PHQ-2 Score 2       Patient-reported           3/14/2025   Substance Use   Alcohol more than 3/day or more than 7/wk No   Do you have a current opioid prescription? No   How severe/bad is pain from 1 to 10? 6/10   Do you use any other substances recreationally? No     Social History     Tobacco Use    Smoking status: Former     Current packs/day: 0.00     Average packs/day: 1.5 packs/day for 30.0 years (45.0 ttl pk-yrs)     Types: Cigarettes     Start date: 1970     Quit date: 2000     Years since quittin.2    Smokeless tobacco: Never   Vaping Use    Vaping status: Never Used   Substance Use Topics    Alcohol use: Yes     Comment: 1-2 per day just before bed    Drug use: No       ASCVD Risk   The 10-year ASCVD risk score (Sergey CAGE, et al., 2019) is: 33.6%    Values used to calculate the score:      Age: 74 years      Sex: Male      Is Non-   American: No      Diabetic: Yes      Tobacco smoker: No      Systolic Blood Pressure: 137 mmHg      Is BP treated: Yes      HDL Cholesterol: 81 mg/dL      Total Cholesterol: 141 mg/dL            Reviewed and updated as needed this visit by Provider                    Lab work is in process  Labs reviewed in EPIC  Current providers sharing in care for this patient include:  Patient Care Team:  Lauro Galloway MD as PCP - General (Internal Medicine)  Lauro Galloway MD as Assigned PCP  Bhargavi Alford RP as Pharmacist (Pharmacist)  Sharonda Ross APRN CNP as Assigned Heart and Vascular Provider  Jamal Gomez MD as MD (Cardiovascular Disease)    The following health maintenance items are reviewed in Epic and correct as of today:  Health Maintenance   Topic Date Due    DIABETIC FOOT EXAM  12/06/2018    COVID-19 Vaccine (4 - 2024-25 season) 09/01/2024    MICROALBUMIN  10/04/2024    Medicare Annual MTM Pharmacist Visit (once per calendar year)  Never done    MEDICARE ANNUAL WELLNESS VISIT  04/10/2025    LIPID  04/10/2025    A1C  05/19/2025    COLORECTAL CANCER SCREENING  05/20/2025    EYE EXAM  08/12/2025    ANNUAL REVIEW OF HM ORDERS  11/19/2025    BMP  03/01/2026    HEMOGLOBIN  03/01/2026    FALL RISK ASSESSMENT  03/17/2026    ADVANCE CARE PLANNING  04/17/2029    DTAP/TDAP/TD IMMUNIZATION (3 - Td or Tdap) 01/13/2033    HEPATITIS C SCREENING  Completed    PHQ-2 (once per calendar year)  Completed    INFLUENZA VACCINE  Completed    Pneumococcal Vaccine: 50+ Years  Completed    URINALYSIS  Completed    ZOSTER IMMUNIZATION  Completed    RSV VACCINE  Completed    AORTIC ANEURYSM SCREENING (SYSTEM ASSIGNED)  Completed    HPV IMMUNIZATION  Aged Out    MENINGITIS IMMUNIZATION  Aged Out    TSH W/FREE T4 REFLEX  Discontinued         Review of Systems  Constitutional, HEENT, cardiovascular, pulmonary, GI, , musculoskeletal, neuro, skin, endocrine and psych systems are negative, except as otherwise  "noted.     Objective    Exam  /72 (BP Location: Left arm, Cuff Size: Adult Regular)   Pulse 95   Temp 96.8  F (36  C) (Tympanic)   Resp 16   Ht 1.753 m (5' 9\")   Wt 91.7 kg (202 lb 3.2 oz)   SpO2 94%   BMI 29.86 kg/m     Estimated body mass index is 29.86 kg/m  as calculated from the following:    Height as of this encounter: 1.753 m (5' 9\").    Weight as of this encounter: 91.7 kg (202 lb 3.2 oz).    Physical Exam  GENERAL: alert and no distress  EYES: Eyes grossly normal to inspection, PERRL and conjunctivae and sclerae normal  HENT: ear canals and TM's normal, nose and mouth without ulcers or lesions  NECK: no adenopathy, no asymmetry, masses, or scars  RESP: lungs clear to auscultation - no rales, rhonchi or wheezes  CV: regular rate and rhythm, normal S1 S2, no S3 or S4, no murmur, click or rub, no peripheral edema  ABDOMEN: soft, nontender, no hepatosplenomegaly, no masses and bowel sounds normal  MS: no gross musculoskeletal defects noted, no edema  SKIN: no suspicious lesions or rashes  NEURO: Normal strength and tone, mentation intact and speech normal  PSYCH: mentation appears normal, affect normal/bright         No data to display                       Signed Electronically by: Lauro Galloway MD    "

## 2025-03-18 ENCOUNTER — TELEPHONE (OUTPATIENT)
Dept: INTERNAL MEDICINE | Facility: CLINIC | Age: 75
End: 2025-03-18
Payer: COMMERCIAL

## 2025-03-18 DIAGNOSIS — M54.41 ACUTE RIGHT-SIDED LOW BACK PAIN WITH RIGHT-SIDED SCIATICA: Primary | ICD-10-CM

## 2025-03-18 LAB
ANION GAP SERPL CALCULATED.3IONS-SCNC: 18 MMOL/L (ref 7–15)
BUN SERPL-MCNC: 27.2 MG/DL (ref 8–23)
CALCIUM SERPL-MCNC: 11.3 MG/DL (ref 8.8–10.4)
CHLORIDE SERPL-SCNC: 92 MMOL/L (ref 98–107)
CHOLEST SERPL-MCNC: 144 MG/DL
CREAT SERPL-MCNC: 0.99 MG/DL (ref 0.67–1.17)
CREAT UR-MCNC: 75.3 MG/DL
EGFRCR SERPLBLD CKD-EPI 2021: 81 ML/MIN/1.73M2
FASTING STATUS PATIENT QL REPORTED: YES
FASTING STATUS PATIENT QL REPORTED: YES
GLUCOSE SERPL-MCNC: 236 MG/DL (ref 70–99)
HCO3 SERPL-SCNC: 22 MMOL/L (ref 22–29)
HDLC SERPL-MCNC: 61 MG/DL
LDLC SERPL CALC-MCNC: 50 MG/DL
MICROALBUMIN UR-MCNC: 416 MG/L
MICROALBUMIN/CREAT UR: 552.46 MG/G CR (ref 0–17)
NONHDLC SERPL-MCNC: 83 MG/DL
POTASSIUM SERPL-SCNC: 4.9 MMOL/L (ref 3.4–5.3)
PSA SERPL DL<=0.01 NG/ML-MCNC: 2.9 NG/ML (ref 0–6.5)
SODIUM SERPL-SCNC: 132 MMOL/L (ref 135–145)
TRIGL SERPL-MCNC: 165 MG/DL
TSH SERPL DL<=0.005 MIU/L-ACNC: 1.4 UIU/ML (ref 0.3–4.2)

## 2025-03-18 NOTE — TELEPHONE ENCOUNTER
Attempt # 1  Left message for patient to call clinic at: [phone].      ----- Message -----  From: Lauro Galloway MD  Sent: 3/17/2025   6:25 PM CDT  To: [facility]    LS spine degenerative disc disease. Recommend PT.

## 2025-03-18 NOTE — TELEPHONE ENCOUNTER
Pt calling     Advised on the information below     Patient stated an understanding and agreed with plan.   He would like to do PT - please place order     Ayala Velazquez RN, BSN  [facility]

## 2025-03-19 ENCOUNTER — MYC MEDICAL ADVICE (OUTPATIENT)
Dept: INTERNAL MEDICINE | Facility: CLINIC | Age: 75
End: 2025-03-19
Payer: COMMERCIAL

## 2025-03-25 ENCOUNTER — PATIENT OUTREACH (OUTPATIENT)
Dept: CARE COORDINATION | Facility: CLINIC | Age: 75
End: 2025-03-25
Payer: COMMERCIAL

## 2025-03-27 ENCOUNTER — THERAPY VISIT (OUTPATIENT)
Dept: PHYSICAL THERAPY | Facility: CLINIC | Age: 75
End: 2025-03-27
Payer: COMMERCIAL

## 2025-03-27 DIAGNOSIS — M54.41 ACUTE RIGHT-SIDED LOW BACK PAIN WITH RIGHT-SIDED SCIATICA: Primary | ICD-10-CM

## 2025-04-02 ENCOUNTER — HOSPITAL ENCOUNTER (OUTPATIENT)
Facility: CLINIC | Age: 75
End: 2025-04-02
Admitting: INTERNAL MEDICINE
Payer: COMMERCIAL

## 2025-04-02 ENCOUNTER — TELEPHONE (OUTPATIENT)
Dept: CARDIOLOGY | Facility: CLINIC | Age: 75
End: 2025-04-02
Payer: COMMERCIAL

## 2025-04-02 LAB — CV ZIO PRELIM RESULTS: NORMAL

## 2025-04-02 NOTE — TELEPHONE ENCOUNTER
Left detailed message for upcoming ANAID.CVVV 4-3-25 and requested call back to let Cardiology know that he received message and answer any questions or concerns prior to procedure.  Seda Ulloa RN, [phone]    Patient returned call and has been notified of the time change arrival ati 7:30 am  DCCV/ANAID prep instructions    Patient is scheduled for a ANAID with Cardioversion at [facility] - [address], [place] [address] - Main Entrance of the Hospital, on 4-3-25.  Check in time is at 7:30 am and procedure to follow.    Patient instructed to remain NPO for solid foods and liquids for 8 hours prior to arrival for their ANAID or ANAID with DCCV.    Patient is taking Pradaxa/Xarelto/Eliquis and has been taking daily uninterrupted for at least 21days.     Patient is on insulin and has been instructed to reach out to primary care provider for recommendations.    Patient is not taking Digoxin.    Patient will hold his hydrochlorothiazide the morning of the procedure.    Pt is not on a SGLT2 inhibitor.    Pt is not on a GLP-1 Agonist    Patient advised to take their other daily medications the morning of the procedure with small sips of water.     Verified patient has someone available to drive them home from the hospital and can stay with them for 24 hours after the procedure.     Patient advised to notify care team with any new COVID like symptoms prior to procedure.    Patient advised to notify care team with any new COVID like symptoms prior to procedure. Day of procedure phone number: [facility] at [phone]    Patient is aware of visitor policy.    Patient expresses understanding of above instructions and denies further questions at this time.      Seda Ulloa RN  [facility]

## 2025-04-03 ENCOUNTER — ANESTHESIA EVENT (OUTPATIENT)
Dept: SURGERY | Facility: CLINIC | Age: 75
End: 2025-04-03
Payer: COMMERCIAL

## 2025-04-03 ENCOUNTER — ANESTHESIA (OUTPATIENT)
Dept: SURGERY | Facility: CLINIC | Age: 75
End: 2025-04-03
Payer: COMMERCIAL

## 2025-04-03 ENCOUNTER — HOSPITAL ENCOUNTER (OUTPATIENT)
Facility: CLINIC | Age: 75
Discharge: HOME | End: 2025-04-03
Admitting: INTERNAL MEDICINE
Payer: COMMERCIAL

## 2025-04-03 ENCOUNTER — HOSPITAL ENCOUNTER (OUTPATIENT)
Dept: CARDIOLOGY | Facility: CLINIC | Age: 75
Discharge: HOME | End: 2025-04-03
Attending: INTERNAL MEDICINE | Admitting: INTERNAL MEDICINE
Payer: COMMERCIAL

## 2025-04-03 VITALS
OXYGEN SATURATION: 95 % | TEMPERATURE: 97.8 F | WEIGHT: 195 LBS | HEIGHT: 69 IN | SYSTOLIC BLOOD PRESSURE: 144 MMHG | HEART RATE: 76 BPM | DIASTOLIC BLOOD PRESSURE: 80 MMHG | RESPIRATION RATE: 16 BRPM | BODY MASS INDEX: 28.88 KG/M2

## 2025-04-03 DIAGNOSIS — R06.02 SOB (SHORTNESS OF BREATH): ICD-10-CM

## 2025-04-03 DIAGNOSIS — I10 BENIGN ESSENTIAL HYPERTENSION: ICD-10-CM

## 2025-04-03 DIAGNOSIS — I48.3 TYPICAL ATRIAL FLUTTER (H): ICD-10-CM

## 2025-04-03 DIAGNOSIS — I10 ESSENTIAL HYPERTENSION: ICD-10-CM

## 2025-04-03 DIAGNOSIS — I25.810 CORONARY ARTERY DISEASE INVOLVING AUTOLOGOUS ARTERY CORONARY BYPASS GRAFT WITHOUT ANGINA PECTORIS: ICD-10-CM

## 2025-04-03 DIAGNOSIS — R06.09 DOE (DYSPNEA ON EXERTION): ICD-10-CM

## 2025-04-03 LAB
ATRIAL RATE - MUSE: 78 BPM
ATRIAL RATE - MUSE: 81 BPM
DIASTOLIC BLOOD PRESSURE - MUSE: NORMAL MMHG
DIASTOLIC BLOOD PRESSURE - MUSE: NORMAL MMHG
INR PPP: 1.33 (ref 0.85–1.15)
INTERPRETATION ECG - MUSE: NORMAL
INTERPRETATION ECG - MUSE: NORMAL
LVEF ECHO: NORMAL
MAGNESIUM SERPL-MCNC: 1.5 MG/DL (ref 1.7–2.3)
P AXIS - MUSE: -89 DEGREES
P AXIS - MUSE: 269 DEGREES
POTASSIUM SERPL-SCNC: 4.1 MMOL/L (ref 3.4–5.3)
PR INTERVAL - MUSE: 106 MS
PR INTERVAL - MUSE: 134 MS
QRS DURATION - MUSE: 86 MS
QRS DURATION - MUSE: 88 MS
QT - MUSE: 406 MS
QT - MUSE: 420 MS
QTC - MUSE: 471 MS
QTC - MUSE: 478 MS
R AXIS - MUSE: 46 DEGREES
R AXIS - MUSE: 55 DEGREES
SYSTOLIC BLOOD PRESSURE - MUSE: NORMAL MMHG
SYSTOLIC BLOOD PRESSURE - MUSE: NORMAL MMHG
T AXIS - MUSE: 29 DEGREES
T AXIS - MUSE: 44 DEGREES
VENTRICULAR RATE- MUSE: 78 BPM
VENTRICULAR RATE- MUSE: 81 BPM

## 2025-04-03 PROCEDURE — 92960 CARDIOVERSION ELECTRIC EXT: CPT | Performed by: INTERNAL MEDICINE

## 2025-04-03 PROCEDURE — 999N000010 HC STATISTIC ANES STAT CODE-CRNA PER MINUTE

## 2025-04-03 PROCEDURE — 999N000054 HC STATISTIC EKG NON-CHARGEABLE

## 2025-04-03 PROCEDURE — 93320 DOPPLER ECHO COMPLETE: CPT

## 2025-04-03 PROCEDURE — 84132 ASSAY OF SERUM POTASSIUM: CPT | Performed by: INTERNAL MEDICINE

## 2025-04-03 PROCEDURE — 999N000183 HC STATISTIC TEE INCLUDES SEDATION

## 2025-04-03 PROCEDURE — 250N000011 HC RX IP 250 OP 636: Performed by: NURSE ANESTHETIST, CERTIFIED REGISTERED

## 2025-04-03 PROCEDURE — 92960 CARDIOVERSION ELECTRIC EXT: CPT

## 2025-04-03 PROCEDURE — 250N000011 HC RX IP 250 OP 636: Performed by: INTERNAL MEDICINE

## 2025-04-03 PROCEDURE — 258N000003 HC RX IP 258 OP 636: Performed by: INTERNAL MEDICINE

## 2025-04-03 PROCEDURE — 370N000017 HC ANESTHESIA TECHNICAL FEE, PER MIN

## 2025-04-03 PROCEDURE — 83735 ASSAY OF MAGNESIUM: CPT | Performed by: INTERNAL MEDICINE

## 2025-04-03 PROCEDURE — 85610 PROTHROMBIN TIME: CPT | Performed by: INTERNAL MEDICINE

## 2025-04-03 PROCEDURE — 36415 COLL VENOUS BLD VENIPUNCTURE: CPT | Performed by: INTERNAL MEDICINE

## 2025-04-03 PROCEDURE — 250N000009 HC RX 250: Performed by: INTERNAL MEDICINE

## 2025-04-03 PROCEDURE — 999N000184 HC STATISTIC TELEMETRY

## 2025-04-03 PROCEDURE — 93325 DOPPLER ECHO COLOR FLOW MAPG: CPT

## 2025-04-03 PROCEDURE — 93005 ELECTROCARDIOGRAM TRACING: CPT

## 2025-04-03 PROCEDURE — 93010 ELECTROCARDIOGRAM REPORT: CPT | Mod: XP | Performed by: INTERNAL MEDICINE

## 2025-04-03 RX ORDER — GLYCOPYRROLATE 0.2 MG/ML
0.1 INJECTION, SOLUTION INTRAMUSCULAR; INTRAVENOUS ONCE
Status: COMPLETED | OUTPATIENT
Start: 2025-04-03 | End: 2025-04-03

## 2025-04-03 RX ORDER — DEXTROSE MONOHYDRATE 25 G/50ML
9.5 INJECTION, SOLUTION INTRAVENOUS
Status: DISCONTINUED | OUTPATIENT
Start: 2025-04-03 | End: 2025-04-03 | Stop reason: HOSPADM

## 2025-04-03 RX ORDER — PROPOFOL 10 MG/ML
INJECTION, EMULSION INTRAVENOUS PRN
Status: DISCONTINUED | OUTPATIENT
Start: 2025-04-03 | End: 2025-04-03

## 2025-04-03 RX ORDER — SODIUM CHLORIDE 9 MG/ML
INJECTION, SOLUTION INTRAVENOUS CONTINUOUS
Status: DISCONTINUED | OUTPATIENT
Start: 2025-04-03 | End: 2025-04-03 | Stop reason: HOSPADM

## 2025-04-03 RX ORDER — NALOXONE HYDROCHLORIDE 0.4 MG/ML
0.4 INJECTION, SOLUTION INTRAMUSCULAR; INTRAVENOUS; SUBCUTANEOUS
Status: DISCONTINUED | OUTPATIENT
Start: 2025-04-03 | End: 2025-04-03 | Stop reason: HOSPADM

## 2025-04-03 RX ORDER — LIDOCAINE 40 MG/G
CREAM TOPICAL
Status: DISCONTINUED | OUTPATIENT
Start: 2025-04-03 | End: 2025-04-03 | Stop reason: HOSPADM

## 2025-04-03 RX ORDER — MAGNESIUM SULFATE HEPTAHYDRATE 40 MG/ML
2 INJECTION, SOLUTION INTRAVENOUS
Status: COMPLETED | OUTPATIENT
Start: 2025-04-03 | End: 2025-04-03

## 2025-04-03 RX ORDER — FENTANYL CITRATE 50 UG/ML
25 INJECTION, SOLUTION INTRAMUSCULAR; INTRAVENOUS
Status: DISCONTINUED | OUTPATIENT
Start: 2025-04-03 | End: 2025-04-03 | Stop reason: HOSPADM

## 2025-04-03 RX ORDER — FLUMAZENIL 0.1 MG/ML
0.2 INJECTION, SOLUTION INTRAVENOUS
Status: DISCONTINUED | OUTPATIENT
Start: 2025-04-03 | End: 2025-04-03 | Stop reason: HOSPADM

## 2025-04-03 RX ORDER — LIDOCAINE HYDROCHLORIDE 40 MG/ML
1.5 SOLUTION TOPICAL ONCE
Status: COMPLETED | OUTPATIENT
Start: 2025-04-03 | End: 2025-04-03

## 2025-04-03 RX ORDER — NALOXONE HYDROCHLORIDE 0.4 MG/ML
0.2 INJECTION, SOLUTION INTRAMUSCULAR; INTRAVENOUS; SUBCUTANEOUS
Status: DISCONTINUED | OUTPATIENT
Start: 2025-04-03 | End: 2025-04-03 | Stop reason: HOSPADM

## 2025-04-03 RX ORDER — POTASSIUM CHLORIDE 1500 MG/1
20 TABLET, EXTENDED RELEASE ORAL
Status: DISCONTINUED | OUTPATIENT
Start: 2025-04-03 | End: 2025-04-03 | Stop reason: HOSPADM

## 2025-04-03 RX ORDER — LIDOCAINE 50 MG/G
OINTMENT TOPICAL ONCE
Status: COMPLETED | OUTPATIENT
Start: 2025-04-03 | End: 2025-04-03

## 2025-04-03 RX ORDER — SODIUM CHLORIDE 9 MG/ML
1000 INJECTION, SOLUTION INTRAVENOUS CONTINUOUS
Status: DISCONTINUED | OUTPATIENT
Start: 2025-04-03 | End: 2025-04-03 | Stop reason: HOSPADM

## 2025-04-03 RX ADMIN — GLYCOPYRROLATE 0.1 MG: 0.2 INJECTION, SOLUTION INTRAMUSCULAR; INTRAVENOUS at 09:11

## 2025-04-03 RX ADMIN — SODIUM CHLORIDE 1000 ML: 9 INJECTION, SOLUTION INTRAVENOUS at 08:51

## 2025-04-03 RX ADMIN — TOPICAL ANESTHETIC 1 ML: 200 SPRAY DENTAL; PERIODONTAL at 09:31

## 2025-04-03 RX ADMIN — FENTANYL CITRATE 25 MCG: 50 INJECTION, SOLUTION INTRAMUSCULAR; INTRAVENOUS at 09:33

## 2025-04-03 RX ADMIN — MIDAZOLAM 1 MG: 1 INJECTION INTRAMUSCULAR; INTRAVENOUS at 09:33

## 2025-04-03 RX ADMIN — PROPOFOL 60 MG: 10 INJECTION, EMULSION INTRAVENOUS at 09:55

## 2025-04-03 RX ADMIN — MAGNESIUM SULFATE HEPTAHYDRATE 2 G: 40 INJECTION, SOLUTION INTRAVENOUS at 08:50

## 2025-04-03 RX ADMIN — LIDOCAINE HYDROCHLORIDE 1.5 ML: 40 SOLUTION TOPICAL at 09:10

## 2025-04-03 ASSESSMENT — ACTIVITIES OF DAILY LIVING (ADL)
ADLS_ACUITY_SCORE: 51
ADLS_ACUITY_SCORE: 51
ADLS_ACUITY_SCORE: 49
ADLS_ACUITY_SCORE: 51

## 2025-04-03 ASSESSMENT — LIFESTYLE VARIABLES: TOBACCO_USE: 1

## 2025-04-03 ASSESSMENT — ENCOUNTER SYMPTOMS: DYSRHYTHMIAS: 1

## 2025-04-03 NOTE — PRE-PROCEDURE
GENERAL PRE-PROCEDURE:   Procedure:  ANAID with cardioversion    Written consent obtained?: Yes    Risks and benefits: Risks, benefits and alternatives were discussed    Consent given by:  Patient  Patient states understanding of procedure being performed: Yes    Patient's understanding of procedure matches consent: Yes    Procedure consent matches procedure scheduled: Yes    Expected level of sedation:  Moderate  Appropriately NPO:  Yes  ASA Class:  1  Mallampati  :  Grade 2- soft palate, base of uvula, tonsillar pillars, and portion of posterior pharyngeal wall visible  Lungs:  Lungs clear with good breath sounds bilaterally  Heart:  Normal heart sounds and rate and a-flutter  History & Physical reviewed:  History and physical reviewed and no updates needed  Statement of review:  I have reviewed the lab findings, diagnostic data, medications, and the plan for sedation

## 2025-04-03 NOTE — ANESTHESIA POSTPROCEDURE EVALUATION
Patient: Adan Ruffin    Procedure: Procedure(s):  Transesophageal echocardiogram intraoperative  Anesthesia cardioversion       Anesthesia Type:  General    Note:  Disposition: Outpatient   Postop Pain Control: Uneventful            Sign Out: Well controlled pain   PONV: No   Neuro/Psych: Uneventful            Sign Out: Acceptable/Baseline neuro status   Airway/Respiratory: Uneventful            Sign Out: Acceptable/Baseline resp. status   CV/Hemodynamics: Uneventful            Sign Out: Acceptable CV status   Other NRE: NONE   DID A NON-ROUTINE EVENT OCCUR? No           Last vitals:  Vitals:    04/03/25 1030 04/03/25 1045 04/03/25 1045   BP: 134/72 (!) 144/80    Pulse:  82 76   Resp:  16    Temp:      SpO2:  95%        Electronically Signed By: Gadiel Hartley MD  April 3, 2025  2:23 PM

## 2025-04-03 NOTE — PROGRESS NOTES
0800 A/O. Pt denies difficulty swallowing or sleep apnea. No dentures or loose teeth. NPO x 12 hrs. Discharge / post procedure instructions given to pt pre procedure w/ verbal understanding received.  All questions & concerns addressed. Family at bedside.     ANAID: Pt tolerated Procedure well. VSS. Total sedation given - 1 mg Versed & 25 mcg Fentanyl.     CDV: Pt tolerated well. CDV x 1 @ 120 joules.  CDV x 2 @ 150 joules.  See rhythm strips. Total sedation given by anesthesia - 60 Propofol    1110 Pt discharged per w/c to private vehicle. All personal belongings sent with pt. Pt to be NPO until 1100. Both pt & Wife informed. Verbal understanding received.

## 2025-04-03 NOTE — PROCEDURES
[facility]    Procedure: *Cardioversion    Date/Time: 4/3/2025 10:02 AM    Performed by: Piero Florian MD  Authorized by: Piero Florian MD      UNIVERSAL PROTOCOL   Site Marked: Yes  Prior Images Obtained and Reviewed:  NA  Required items: Required blood products, implants, devices and special equipment available    Patient identity confirmed:  Verbally with patient  Patient was reevaluated immediately before administering moderate or deep sedation or anesthesia  Confirmation Checklist:  Patient's identity using two indicators  Time out: Immediately prior to the procedure a time out was called    Universal Protocol: the Joint Commission Universal Protocol was followed       ANESTHESIA    Anesthesia was administered and monitored by anesthesiology.  See anesthesia documentation for details.    PROCEDURE DETAILS  Cardioversion basis: elective  Pre-procedure rhythm: atrial flutter  Number of attempts: 2    Details of Attempts:  Unsuccessful cardioversion using 150 and 200 biphasic J as well as manual pressure on anterior pad.    Post-procedure rhythm: atrial flutter  Complications: no complications      PROCEDURE    Patient Tolerance:  Patient tolerated the procedure well with no immediate complications

## 2025-04-03 NOTE — ANESTHESIA CARE TRANSFER NOTE
Patient: Adan Ruffin    Procedure: Procedure(s):  Transesophageal echocardiogram intraoperative  Anesthesia cardioversion       Diagnosis: Afib (H) [I48.91]  Diagnosis Additional Information: No value filed.    Anesthesia Type:   General     Note:    Oropharynx: oropharynx clear of all foreign objects and spontaneously breathing  Level of Consciousness: awake  Oxygen Supplementation: nasal cannula  Level of Supplemental Oxygen (L/min / FiO2): 2  Independent Airway: airway patency satisfactory and stable  Dentition: dentition unchanged  Vital Signs Stable: post-procedure vital signs reviewed and stable  Report to RN Given: handoff report given  Destination: care suites 20.          Vitals:  Vitals Value Taken Time   BP 99/60 04/03/25 1009   Temp     Pulse 55 04/03/25 1013   Resp 14 04/03/25 1009   SpO2 97 % 04/03/25 1013       Electronically Signed By: SANDRA Levy CRNA  April 3, 2025  10:14 AM

## 2025-04-03 NOTE — ANESTHESIA PREPROCEDURE EVALUATION
Anesthesia Pre-Procedure Evaluation    Patient: Adan Ruffin   MRN: [id number] : 1950        Procedure : Procedure(s):  Transesophageal echocardiogram intraoperative  Anesthesia cardioversion          Past Medical History:   Diagnosis Date    Aortic root dilatation     Atrial flutter (H) 10/02/2017    ablation 2017    CAD (coronary artery disease) 2015    CABG 2015 by Dr. Johan Hall: LIMA to LAD, SVG to circumflex, SVG to PDA    Cardiomyopathy (H) 10/02/2017    tachycardia-induced with EF 40-45%, improved once back to NSR    HTN (hypertension)     Hyperlipidaemia     Microalbuminuria 2016    Obesity 2015    Type 2 diabetes mellitus with diabetic nephropathy, with long-term current use of insulin (H) 2016      Past Surgical History:   Procedure Laterality Date    BYPASS GRAFT ARTERY CORONARY N/A 2015    bypass LAD, OM, PDA       COLONOSCOPY N/A 2016    Procedure: COLONOSCOPY;  Surgeon: Marcela Schwartz MD;  Location:  GI    COLONOSCOPY N/A 2020    Procedure: Colonoscopy, With Polypectomy And Biopsy;  Surgeon: Herson Faust MD;  Location:  GI    EXCISE PILONIDAL CYST, SIMPLE  1975    HEART CATH, ANGIOPLASTY  3-    3 vessel disease-occluded RCA, stenosis LM-to have CABG    HERNIORRHAPHY, UMBILICAL, ROBOT-ASSISTED, LAPAROSCOPIC, USING DA FRANC[facility]CO XI N/A 2024    Procedure: HERNIORRHAPHY, UMBILICAL, ROBOT-ASSISTED, LAPAROSCOPIC, USING [facility]CO XI;  Surgeon: Srinath Armando MD;  Location:  OR    removal of skin cancer      TONSILLECTOMY & ADENOIDECTOMY  1950      Allergies   Allergen Reactions    Adhesive Tape Rash    Codeine Rash    Simvastatin Muscle Pain (Myalgia)     Leg pain        Tetracycline Rash      Social History     Tobacco Use    Smoking status: Former     Current packs/day: 0.00     Average packs/day: 1.5 packs/day for 30.0 years (45.0 ttl pk-yrs)     Types: Cigarettes     Start date: 1970     Quit date:  2000     Years since quittin.2    Smokeless tobacco: Never   Substance Use Topics    Alcohol use: Yes     Comment: 1-2 per day just before bed      Wt Readings from Last 1 Encounters:   25 88.5 kg (195 lb)        Anesthesia Evaluation   Pt has had prior anesthetic. Type: General.        ROS/MED HX  ENT/Pulmonary:  - neg pulmonary ROS   (+)                tobacco use, Past use,                       Neurologic:  - neg neurologic ROS     Cardiovascular:     (+)  hypertension- -  CAD -  - -                        dysrhythmias, a-flutter,             METS/Exercise Tolerance: >4 METS    Hematologic:       Musculoskeletal:       GI/Hepatic:       Renal/Genitourinary:     (+) renal disease,             Endo:     (+)  type II DM,             Obesity,       Psychiatric/Substance Use:  - neg psychiatric ROS     Infectious Disease:       Malignancy:       Other:            Physical Exam    Airway        Mallampati: II   TM distance: > 3 FB   Neck ROM: full   Mouth opening: > 3 cm    Respiratory Devices and Support         Dental       (+) Minor Abnormalities - some fillings, tiny chips      Cardiovascular   cardiovascular exam normal          Pulmonary   pulmonary exam normal                OUTSIDE LABS:  CBC:   Lab Results   Component Value Date    WBC 8.8 2025    WBC 6.4 2025    HGB 13.8 2025    HGB 13.7 2025    HCT 38.6 (L) 2025    HCT 40.0 2025     2025     2025     BMP:   Lab Results   Component Value Date     (L) 2025     2025    POTASSIUM 4.1 2025    POTASSIUM 4.9 2025    CHLORIDE 92 (L) 2025    CHLORIDE 97 (L) 2025    CO2 22 2025    CO2 25 2025    BUN 27.2 (H) 2025    BUN 31.5 (H) 2025    CR 0.99 2025    CR 1.21 (H) 2025     (H) 2025     (H) 2025     COAGS:   Lab Results   Component Value Date    PTT 32 2020    INR 1.33 (H)  "04/03/2025    FIBR 278 04/07/2015     POC:   Lab Results   Component Value Date    BGM 88 02/01/2020     HEPATIC:   Lab Results   Component Value Date    ALBUMIN 3.7 11/19/2024    PROTTOTAL 7.5 11/19/2024    ALT 34 11/19/2024    AST 53 (H) 11/19/2024    ALKPHOS 176 (H) 11/19/2024    BILITOTAL 0.4 11/19/2024     OTHER:   Lab Results   Component Value Date    PH 7.36 01/02/2020    LACT 0.5 (L) 04/09/2015    A1C 9.1 (H) 03/17/2025    AURE 11.3 (H) 03/17/2025    PHOS 3.9 04/10/2024    MAG 1.5 (L) 04/03/2025    TSH 1.40 03/17/2025    CRP 52.8 (H) 01/06/2020    SED 18 12/09/2020       Anesthesia Plan    ASA Status:  3    NPO Status:  NPO Appropriate    Anesthesia Type: General.     - Airway: Mask Only   Induction: Intravenous, Propofol.           Consents    Anesthesia Plan(s) and associated risks, benefits, and realistic alternatives discussed. Questions answered and patient/representative(s) expressed understanding.     - Discussed:     - Discussed with:  Patient      - Extended Intubation/Ventilatory Support Discussed: No.      - Patient is DNR/DNI Status: No     Use of blood products discussed: No .     Postoperative Care            Comments:               Gadiel Hartley MD    Clinically Significant Risk Factors Present on Admission             # Hypomagnesemia: Lowest Mg = 1.5 mg/dL in last 2 days, will replace as needed    # Drug Induced Coagulation Defect: home medication list includes an anticoagulant medication    # Hypertension: Noted on problem list          # DMII: A1C = 9.1 % (Ref range: 0.0 - 5.6 %) within past 6 months    # Overweight: Estimated body mass index is 28.8 kg/m  as calculated from the following:    Height as of this encounter: 1.753 m (5' 9\").    Weight as of this encounter: 88.5 kg (195 lb).        # History of CABG: noted on surgical history         "

## 2025-04-03 NOTE — DISCHARGE INSTRUCTIONS
ANAID  (Transesophageal Echocardiogram)  with Cardioversion Discharge Instructions    After you go home:    Have an adult stay with you for 6 hours.       For 24 hours - due to the sedation you received:  Relax and take it easy.  Do NOT make any important or legal decisions.  Do NOT drive or operate machines at home or at work.  Do NOT drink alcohol.    Diet:    You may resume your normal diet, but no scratchy foods for two days.  If your throat is sore, eat cold, bland or soft foods.  You may have heartburn if the tube used in the exam entered your stomach.  If so:   - Do not eat acidic and spicy foods.   - Do not eat three hours before bedtime.  Clear liquids are okay.   - When lying down, use two pillows to raise your head.    Medicines:    Take your medications, including blood thinners, unless your provider tells you not to.  If you have stopped any medicines, check with your provider about when to restart them.  You may take Tylenol (Acetaminophen) if your throat is sore.  You may take antacids if you have heartburn.      Follow Up Appointments:    Follow up with your cardiologist at [facility] patient preference as instructed.  Follow up with your primary care provider as needed.    If you ve had a cardioversion:    The skin on your chest or back may feel tender for 48 hours.  If your skin is tender, you may:  Use a cold pack on the site. Never use ice directly on your skin. Use the cold pack for 20 minutes. Remove it for at least 30 minutes before re-using.  Apply 1% hydrocortisone cream to the skin (sold at drug stores)  Take Advil (Ibuprofen) or Tylenol (Acetaminophen).      Call the clinic if:    You have heartburn that is severe or lasts more than 72 hours.  You have a sore throat that feels worse after 72 hours.  You have shortness of breath, neck pain, chest pain, fever, chills, coughing up blood, or other unusual signs.  Call your cardiologist right away if you have an irregular heartbeat,  shortness of breath or feel dizzy.  Questions or concerns      [facility] [place]:    [phone] UM (7 days a week)    Or you can contact your provider via My Chart

## 2025-04-04 ENCOUNTER — THERAPY VISIT (OUTPATIENT)
Dept: PHYSICAL THERAPY | Facility: CLINIC | Age: 75
End: 2025-04-04
Payer: COMMERCIAL

## 2025-04-04 DIAGNOSIS — M54.41 ACUTE RIGHT-SIDED LOW BACK PAIN WITH RIGHT-SIDED SCIATICA: Primary | ICD-10-CM

## 2025-04-04 PROCEDURE — 97110 THERAPEUTIC EXERCISES: CPT | Mod: GP

## 2025-04-06 LAB
ATRIAL RATE - MUSE: 78 BPM
DIASTOLIC BLOOD PRESSURE - MUSE: NORMAL MMHG
INTERPRETATION ECG - MUSE: NORMAL
P AXIS - MUSE: 269 DEGREES
PR INTERVAL - MUSE: 134 MS
QRS DURATION - MUSE: 88 MS
QT - MUSE: 420 MS
QTC - MUSE: 478 MS
R AXIS - MUSE: 46 DEGREES
SYSTOLIC BLOOD PRESSURE - MUSE: NORMAL MMHG
T AXIS - MUSE: 29 DEGREES
VENTRICULAR RATE- MUSE: 78 BPM

## 2025-04-08 ENCOUNTER — MYC MEDICAL ADVICE (OUTPATIENT)
Dept: CARDIOLOGY | Facility: CLINIC | Age: 75
End: 2025-04-08
Payer: COMMERCIAL

## 2025-04-20 ENCOUNTER — PATIENT OUTREACH (OUTPATIENT)
Dept: CARE COORDINATION | Facility: CLINIC | Age: 75
End: 2025-04-20
Payer: COMMERCIAL

## 2025-05-20 ENCOUNTER — TRANSFERRED RECORDS (OUTPATIENT)
Dept: MULTI SPECIALTY CLINIC | Facility: CLINIC | Age: 75
End: 2025-05-20
Payer: COMMERCIAL

## 2025-05-20 LAB
ALT SERPL-CCNC: 23 IU/L (ref 0–44)
AST SERPL-CCNC: 31 IU/L (ref 0–40)

## 2025-05-22 ENCOUNTER — TRANSFERRED RECORDS (OUTPATIENT)
Dept: ADMINISTRATIVE | Facility: CLINIC | Age: 75
End: 2025-05-22
Payer: COMMERCIAL

## 2025-05-23 NOTE — PROCEDURES
2025        Adan Ruffin (Ray)   [address] [facility] [place] [address]      Adan Ruffin (Ray),  :  1950    I'm writing to let you know about the tests that were taken recently.   Thank you for allowing [facility] the opportunity to take part in your healthcare.  At [facility] we strive to provide each patient with the finest gastroenterology care available.  We hope your experience was pleasant and informative.    Your blood counts, liver tests, and vitamin D level were within expected range.  Vitamin D, 25-Hydroxy 2025 16:01   Description Result Units Flags Range   Vitamin D, 25-Hydroxy 24.8 ng/mL L 30.0-100.0   Comments   Performed At: DV, [facility] [address], [place], [id number]  Adore Del Rio MD, Phone: [phone]   Vitamin D, 25-Hydroxy:  Vitamin D deficiency has been defined by the [place] of  Medicine and an Endocrine Society practice guideline as a  level of serum 25-OH vitamin D less than 20 ng/mL (1,2).  The Endocrine Society went on to further define vitamin D  insufficiency as a level between 21 and 29 ng/mL (2).  1. IOM ([place] of Medicine). 2010. Dietary reference     intakes for calcium and D. [place]: The     National Academies Press.  2. Mika MF, Renita NC, Marlys SANCHEZ, et al.     Evaluation, treatment, and prevention of vitamin D     deficiency: an Endocrine Society clinical practice     guideline. JCEM. 2011; 96(7):1911-30.   Hepatic Function Panel (7) 2025 16:01   Description Result Units Flags Range   Bilirubin, Total 0.3 mg/dL  0.0-1.2   Bilirubin, Direct 0.17 mg/dL  0.00-0.40   AST (SGOT) 31 IU/L  0-40   ALT (SGPT) 23 IU/L  0-44   Albumin 4.6 g/dL  3.8-4.8   Alkaline Phosphatase 82 IU/L     Protein, Total 7.5 g/dL  6.0-8.5   Comments   Performed At: DV, [facility] [address], [place], [id number]  Adore Del Rio MD, Phone: [phone]   CBC With Differential/Platelet 2025 16:01   Description  Result Units Flags Range   Hemoglobin 13.7 g/dL  13.0-17.7   Hematocrit 40.8 %  37.5-51.0    fL H 79-97   MCHC 33.6 g/dL  31.5-35.7   MCH 33.7 pg H 26.6-33.0   RDW 13.7 %  11.6-15.4   Platelets 243 x10E3/uL  150-450   Neutrophils 66 %  Not Estab.   Lymphs 20 %  Not Estab.   Monocytes 9 %  Not Estab.   Eos 3 %  Not Estab.   Basos 1 %  Not Estab.   Neutrophils (Absolute) 4.8 x10E3/uL  1.4-7.0   Lymphs (Absolute) 1.4 x10E3/uL  0.7-3.1   Monocytes(Absolute) 0.6 x10E3/uL  0.1-0.9   Eos (Absolute) 0.2 x10E3/uL  0.0-0.4   Baso (Absolute) 0.0 x10E3/uL  0.0-0.2   Immature Grans (Abs) 0.1 x10E3/uL  0.0-0.1   Immature Granulocytes 1 %  Not Estab.   RBC 4.06 x10E6/uL L 4.14-5.80   WBC 7.1 x10E3/uL  3.4-10.8   Comments   First Available              Performed At: , [facility] [address], [place], [place], [id number]  Adore Del Rio MD, Phone: [phone]           Thank you.    Electronically signed by:  Oleg Oliver MD 05/22/2025 12:10 PM  Document generated by:  Oleg Oliver MD  05/22/2025  If your provider ordered multiple tests; the results may not become available at the same time.  If multiple test results are received within 14 days of one another, you may receive a duplicate.  cc:  Lauro Galloway MD  cc:  Lauro Galloway MD

## 2025-05-27 ENCOUNTER — TRANSFERRED RECORDS (OUTPATIENT)
Dept: ADMINISTRATIVE | Facility: CLINIC | Age: 75
End: 2025-05-27
Payer: COMMERCIAL

## 2025-05-28 NOTE — PROCEDURES
2025        Adan Ruffin (Ray)   [address] [facility] [place] [address]      Adan Ruffin (Ray),  :  1950    I'm writing to let you know about the tests that were taken recently.   Thank you for allowing [facility] the opportunity to take part in your healthcare.  At [facility] we strive to provide each patient with the finest gastroenterology care available.  We hope your experience was pleasant and informative.    Your tuberculosis screening test (quantiferon) was negative (as expected). Please contact me with any questions.  Vitamin D, 25-Hydroxy 2025 16:01   Description Result Units Flags Range   Vitamin D, 25-Hydroxy 24.8 ng/mL L 30.0-100.0   Comments   Performed At: DV, [facility] [address], [place], [id number]  Adore Del Rio MD, Phone: [phone]   Vitamin D, 25-Hydroxy:  Vitamin D deficiency has been defined by the [place] of  Medicine and an Endocrine Society practice guideline as a  level of serum 25-OH vitamin D less than 20 ng/mL (1,2).  The Endocrine Society went on to further define vitamin D  insufficiency as a level between 21 and 29 ng/mL (2).  1. IOM ([place] of Medicine). 2010. Dietary reference     intakes for calcium and D. [place]: The     National Academies Press.  2. Mika MF, Renita DIAZ, Marlys SANCHEZ, et al.     Evaluation, treatment, and prevention of vitamin D     deficiency: an Endocrine Society clinical practice     guideline. JCEM. 2011; 96(7):1911-30.   Hepatic Function Panel (7) 2025 16:01   Description Result Units Flags Range   Bilirubin, Total 0.3 mg/dL  0.0-1.2   Bilirubin, Direct 0.17 mg/dL  0.00-0.40   AST (SGOT) 31 IU/L  0-40   ALT (SGPT) 23 IU/L  0-44   Albumin 4.6 g/dL  3.8-4.8   Alkaline Phosphatase 82 IU/L     Protein, Total 7.5 g/dL  6.0-8.5   Comments   Performed At: DV, [facility] [address], [place], [id number]  Adore Del Rio MD, Phone: [phone]   CBC With Differential/Platelet  05/20/2025 16:01   Description Result Units Flags Range   Hemoglobin 13.7 g/dL  13.0-17.7   Hematocrit 40.8 %  37.5-51.0    fL H 79-97   MCHC 33.6 g/dL  31.5-35.7   MCH 33.7 pg H 26.6-33.0   RDW 13.7 %  11.6-15.4   Platelets 243 x10E3/uL  150-450   Neutrophils 66 %  Not Estab.   Lymphs 20 %  Not Estab.   Monocytes 9 %  Not Estab.   Eos 3 %  Not Estab.   Basos 1 %  Not Estab.   Neutrophils (Absolute) 4.8 x10E3/uL  1.4-7.0   Lymphs (Absolute) 1.4 x10E3/uL  0.7-3.1   Monocytes(Absolute) 0.6 x10E3/uL  0.1-0.9   Eos (Absolute) 0.2 x10E3/uL  0.0-0.4   Baso (Absolute) 0.0 x10E3/uL  0.0-0.2   Immature Grans (Abs) 0.1 x10E3/uL  0.0-0.1   Immature Granulocytes 1 %  Not Estab.   RBC 4.06 x10E6/uL L 4.14-5.80   WBC 7.1 x10E3/uL  3.4-10.8   Comments   First Available              Performed At: [facility] [address], [place], [place], [id number]  Adore Del Rio MD, Phone: [phone]     QuantiFERON-TB Gold Plus 05/20/2025 16:01   Description Result Units Flags Range   QuantiFERON-TB Gold Plus Negative   Negative   Comments   Performed At: [facility], [facility]  [address], [place], [place], [id number]  Adore Del Rio MD, Phone: [phone]   QuantiFERON-TB Gold Plus:  No response to M tuberculosis antigens detected.  Infection with M tuberculosis is unlikely, but high risk  individuals should be considered for additional testing  (ATS/IDSA/CDC Clinical Practice Guidelines, 2017). The  reference range is an Antigen minus Nil result of <0.35 IU/mL.  Chemiluminescence immunoassay methodology   QuantiFERON-TB Gold Plus 05/20/2025 16:01   Description Result Units Flags Range   QuantiFERON Criteria Comment      QuantiFERON Incubation Incubation performed.      QuantiFERON Mitogen Value >10.00 IU/mL     QuantiFERON Nil Value 0.04 IU/mL     QuantiFERON TB1 Ag Value 0.03 IU/mL     QuantiFERON TB2 Ag Value 0.04 IU/mL     Comments   First Available              Performed At: [facility], [facility] [place] [address], [place], [place], [id number]  Joe Alejandre, PhD, Phone: [phone]  Performed At: [facility], [facility] [place]  [address], [place], [place], [id number]  Adore Del Rio MD, Phone: [phone]   QuantiFERON Criteria:  QuantiFERON-TB Gold Plus is a qualitative indirect test for  M tuberculosis infection (including disease) and is intended for use  in conjunction with risk assessment, radiography, and other medical  and diagnostic evaluations. The QuantiFERON-TB Gold Plus result is  determined by subtracting the Nil value from either TB antigen (Ag)  value. The Mitogen tube serves as a control for the test.           Thank you.    Electronically signed by:  Oleg Oliver MD 05/27/2025 07:50 AM  Document generated by:  Oleg Oliver MD  05/27/2025  If your provider ordered multiple tests; the results may not become available at the same time.  If multiple test results are received within 14 days of one another, you may receive a duplicate.  cc:  Lauro Galloway MD  cc:  Lauro Galloway MD

## (undated) DEVICE — LUBRICANT INST ELECTROLUBE EL101

## (undated) DEVICE — SUTURE VICRYL+ 2-0 CT-2 27" UND VCP269H

## (undated) DEVICE — GOWN XLG DISP 9545

## (undated) DEVICE — ENDO TRAP POLYP QUICK CATCH 710201

## (undated) DEVICE — ESU GROUND PAD ADULT W/CORD E7507

## (undated) DEVICE — RULER SURGICAL PLASTIC STRL LF CS628

## (undated) DEVICE — Device

## (undated) DEVICE — DAVINCI XI DRAPE ARM 470015

## (undated) DEVICE — ENDO SNARE EXACTO COLD 9MM LOOP 2.4MMX230CM 00711115

## (undated) DEVICE — PREP CHLORAPREP 26ML TINTED HI-LITE ORANGE 930815

## (undated) DEVICE — SOL WATER IRRIG 1000ML BOTTLE 2F7114

## (undated) DEVICE — KIT ENDO TURNOVER/PROCEDURE W/CLEAN A SCOPE LINERS 103888

## (undated) DEVICE — DRAPE MAYO STAND 23X54 8337

## (undated) DEVICE — SU STRATAFIX PDS PLUS 3-0 SPIRAL SH 15CM SXPP1B420

## (undated) DEVICE — DAVINCI XI OBTURATOR BLADELESS 8MM 470359

## (undated) DEVICE — DAVINCI XI SEAL UNIVERSAL 5-8MM 470361

## (undated) DEVICE — BLADE KNIFE SURG 11 371111

## (undated) DEVICE — DAVINCI HOT SHEARS TIP COVER  400180

## (undated) DEVICE — NDL INSUFFLATION 13GA 120MM C2201

## (undated) DEVICE — SU VICRYL 4-0 PS-2 18" UND J496H

## (undated) DEVICE — SU DERMABOND ADVANCED .7ML DNX12

## (undated) DEVICE — LINEN ORTHO ACL PACK 5447

## (undated) DEVICE — LINEN DRAPE 54X72" 5467

## (undated) DEVICE — GLOVE BIOGEL PI MICRO INDICATOR UNDERGLOVE SZ 7.5 48975

## (undated) RX ORDER — HEPARIN SODIUM 1000 [USP'U]/ML
INJECTION, SOLUTION INTRAVENOUS; SUBCUTANEOUS
Status: DISPENSED
Start: 2017-11-27

## (undated) RX ORDER — LIDOCAINE HYDROCHLORIDE 40 MG/ML
SOLUTION TOPICAL
Status: DISPENSED
Start: 2025-04-03

## (undated) RX ORDER — DEXAMETHASONE SODIUM PHOSPHATE 4 MG/ML
INJECTION, SOLUTION INTRA-ARTICULAR; INTRALESIONAL; INTRAMUSCULAR; INTRAVENOUS; SOFT TISSUE
Status: DISPENSED
Start: 2024-05-02

## (undated) RX ORDER — LIDOCAINE HYDROCHLORIDE 10 MG/ML
INJECTION, SOLUTION EPIDURAL; INFILTRATION; INTRACAUDAL; PERINEURAL
Status: DISPENSED
Start: 2017-11-27

## (undated) RX ORDER — LIDOCAINE HYDROCHLORIDE 10 MG/ML
INJECTION, SOLUTION EPIDURAL; INFILTRATION; INTRACAUDAL; PERINEURAL
Status: DISPENSED
Start: 2024-05-02

## (undated) RX ORDER — FLUMAZENIL 0.1 MG/ML
INJECTION, SOLUTION INTRAVENOUS
Status: DISPENSED
Start: 2025-04-03

## (undated) RX ORDER — CEFAZOLIN SODIUM/WATER 2 G/20 ML
SYRINGE (ML) INTRAVENOUS
Status: DISPENSED
Start: 2024-05-02

## (undated) RX ORDER — FENTANYL CITRATE 50 UG/ML
INJECTION, SOLUTION INTRAMUSCULAR; INTRAVENOUS
Status: DISPENSED
Start: 2017-11-27

## (undated) RX ORDER — DEXTROSE MONOHYDRATE 25 G/50ML
INJECTION, SOLUTION INTRAVENOUS
Status: DISPENSED
Start: 2024-05-02

## (undated) RX ORDER — MAGNESIUM SULFATE HEPTAHYDRATE 40 MG/ML
INJECTION, SOLUTION INTRAVENOUS
Status: DISPENSED
Start: 2025-04-03

## (undated) RX ORDER — GLYCOPYRROLATE 0.2 MG/ML
INJECTION, SOLUTION INTRAMUSCULAR; INTRAVENOUS
Status: DISPENSED
Start: 2024-05-02

## (undated) RX ORDER — PROPOFOL 10 MG/ML
INJECTION, EMULSION INTRAVENOUS
Status: DISPENSED
Start: 2024-05-02

## (undated) RX ORDER — LIDOCAINE 4 G/G
PATCH TOPICAL
Status: DISPENSED
Start: 2024-04-04

## (undated) RX ORDER — GLYCOPYRROLATE 0.2 MG/ML
INJECTION, SOLUTION INTRAMUSCULAR; INTRAVENOUS
Status: DISPENSED
Start: 2017-10-19

## (undated) RX ORDER — DEXTROSE MONOHYDRATE 25 G/50ML
INJECTION, SOLUTION INTRAVENOUS
Status: DISPENSED
Start: 2017-10-19

## (undated) RX ORDER — NALOXONE HYDROCHLORIDE 0.4 MG/ML
INJECTION, SOLUTION INTRAMUSCULAR; INTRAVENOUS; SUBCUTANEOUS
Status: DISPENSED
Start: 2025-04-03

## (undated) RX ORDER — NEOSTIGMINE METHYLSULFATE 1 MG/ML
VIAL (ML) INJECTION
Status: DISPENSED
Start: 2024-05-02

## (undated) RX ORDER — FENTANYL CITRATE 50 UG/ML
INJECTION, SOLUTION INTRAMUSCULAR; INTRAVENOUS
Status: DISPENSED
Start: 2017-10-19

## (undated) RX ORDER — ONDANSETRON 2 MG/ML
INJECTION INTRAMUSCULAR; INTRAVENOUS
Status: DISPENSED
Start: 2024-05-02

## (undated) RX ORDER — GLYCOPYRROLATE 0.2 MG/ML
INJECTION, SOLUTION INTRAMUSCULAR; INTRAVENOUS
Status: DISPENSED
Start: 2017-11-27

## (undated) RX ORDER — PHENYLEPHRINE HYDROCHLORIDE 10 MG/ML
INJECTION INTRAVENOUS
Status: DISPENSED
Start: 2024-05-02

## (undated) RX ORDER — FENTANYL CITRATE 50 UG/ML
INJECTION, SOLUTION INTRAMUSCULAR; INTRAVENOUS
Status: DISPENSED
Start: 2020-01-06

## (undated) RX ORDER — IBUPROFEN 600 MG/1
TABLET, FILM COATED ORAL
Status: DISPENSED
Start: 2024-04-04

## (undated) RX ORDER — FENTANYL CITRATE 50 UG/ML
INJECTION, SOLUTION INTRAMUSCULAR; INTRAVENOUS
Status: DISPENSED
Start: 2024-05-02

## (undated) RX ORDER — REGADENOSON 0.08 MG/ML
INJECTION, SOLUTION INTRAVENOUS
Status: DISPENSED
Start: 2019-09-30

## (undated) RX ORDER — FLUMAZENIL 0.1 MG/ML
INJECTION, SOLUTION INTRAVENOUS
Status: DISPENSED
Start: 2017-10-19

## (undated) RX ORDER — FLUMAZENIL 0.1 MG/ML
INJECTION, SOLUTION INTRAVENOUS
Status: DISPENSED
Start: 2017-11-27

## (undated) RX ORDER — GLYCOPYRROLATE 0.2 MG/ML
INJECTION, SOLUTION INTRAMUSCULAR; INTRAVENOUS
Status: DISPENSED
Start: 2025-04-03

## (undated) RX ORDER — NALOXONE HYDROCHLORIDE 0.4 MG/ML
INJECTION, SOLUTION INTRAMUSCULAR; INTRAVENOUS; SUBCUTANEOUS
Status: DISPENSED
Start: 2017-10-19

## (undated) RX ORDER — FENTANYL CITRATE 50 UG/ML
INJECTION, SOLUTION INTRAMUSCULAR; INTRAVENOUS
Status: DISPENSED
Start: 2025-04-03

## (undated) RX ORDER — KETOROLAC TROMETHAMINE 30 MG/ML
INJECTION, SOLUTION INTRAMUSCULAR; INTRAVENOUS
Status: DISPENSED
Start: 2024-05-02

## (undated) RX ORDER — NALOXONE HYDROCHLORIDE 0.4 MG/ML
INJECTION, SOLUTION INTRAMUSCULAR; INTRAVENOUS; SUBCUTANEOUS
Status: DISPENSED
Start: 2017-11-27